# Patient Record
Sex: FEMALE | Race: WHITE | NOT HISPANIC OR LATINO | Employment: OTHER | URBAN - METROPOLITAN AREA
[De-identification: names, ages, dates, MRNs, and addresses within clinical notes are randomized per-mention and may not be internally consistent; named-entity substitution may affect disease eponyms.]

---

## 2017-02-09 RX ORDER — LANOLIN ALCOHOL/MO/W.PET/CERES
400 CREAM (GRAM) TOPICAL 3 TIMES WEEKLY
COMMUNITY
End: 2022-05-31 | Stop reason: ALTCHOICE

## 2017-02-09 RX ORDER — SERTRALINE HYDROCHLORIDE 100 MG/1
100 TABLET, FILM COATED ORAL DAILY
COMMUNITY

## 2017-02-09 RX ORDER — ISOSORBIDE MONONITRATE 60 MG/1
60 TABLET, EXTENDED RELEASE ORAL DAILY
COMMUNITY
End: 2022-05-31 | Stop reason: ALTCHOICE

## 2017-02-09 RX ORDER — ALPRAZOLAM 0.25 MG/1
0.25 TABLET ORAL
COMMUNITY
End: 2022-05-31 | Stop reason: ALTCHOICE

## 2017-02-09 RX ORDER — SPIRONOLACTONE 25 MG/1
12.5 TABLET ORAL DAILY
COMMUNITY
End: 2022-05-31 | Stop reason: ALTCHOICE

## 2017-02-09 RX ORDER — OMEPRAZOLE 20 MG/1
20 CAPSULE, DELAYED RELEASE ORAL DAILY
COMMUNITY
End: 2020-08-26

## 2017-02-09 RX ORDER — LEVOTHYROXINE SODIUM 0.07 MG/1
75 TABLET ORAL
COMMUNITY
End: 2018-07-16

## 2017-02-09 RX ORDER — MULTIVIT-MIN/IRON FUM/FOLIC AC 7.5 MG-4
1 TABLET ORAL DAILY
COMMUNITY
End: 2020-08-26

## 2017-02-09 RX ORDER — NIACIN 1000 MG/1
1000 TABLET, EXTENDED RELEASE ORAL
COMMUNITY
End: 2020-08-26

## 2017-02-09 RX ORDER — LOSARTAN POTASSIUM 100 MG/1
25 TABLET ORAL DAILY
COMMUNITY
End: 2018-07-16

## 2017-02-09 RX ORDER — FUROSEMIDE 40 MG/1
40 TABLET ORAL 2 TIMES DAILY
COMMUNITY

## 2017-02-13 ENCOUNTER — ANESTHESIA EVENT (OUTPATIENT)
Dept: GASTROENTEROLOGY | Facility: HOSPITAL | Age: 72
End: 2017-02-13
Payer: MEDICARE

## 2017-02-14 ENCOUNTER — ANESTHESIA (OUTPATIENT)
Dept: GASTROENTEROLOGY | Facility: HOSPITAL | Age: 72
End: 2017-02-14
Payer: MEDICARE

## 2017-02-14 ENCOUNTER — HOSPITAL ENCOUNTER (OUTPATIENT)
Facility: HOSPITAL | Age: 72
Setting detail: OUTPATIENT SURGERY
Discharge: HOME | End: 2017-02-14
Attending: INTERNAL MEDICINE | Admitting: INTERNAL MEDICINE
Payer: MEDICARE

## 2017-02-14 VITALS
HEART RATE: 92 BPM | RESPIRATION RATE: 18 BRPM | OXYGEN SATURATION: 95 % | DIASTOLIC BLOOD PRESSURE: 54 MMHG | WEIGHT: 185 LBS | SYSTOLIC BLOOD PRESSURE: 118 MMHG | BODY MASS INDEX: 32.78 KG/M2 | TEMPERATURE: 97.5 F | HEIGHT: 63 IN

## 2017-02-14 DIAGNOSIS — R19.7 DIARRHEA: ICD-10-CM

## 2017-02-14 DIAGNOSIS — D64.9 ANEMIA: ICD-10-CM

## 2017-02-14 DIAGNOSIS — D50.9 IRON DEFICIENCY ANEMIA: ICD-10-CM

## 2017-02-14 PROCEDURE — 88305 TISSUE EXAM BY PATHOLOGIST: CPT | Performed by: INTERNAL MEDICINE

## 2017-02-14 PROCEDURE — 88342 IMHCHEM/IMCYTCHM 1ST ANTB: CPT | Performed by: INTERNAL MEDICINE

## 2017-02-14 RX ORDER — LORAZEPAM 2 MG/ML
0.5 INJECTION INTRAMUSCULAR ONCE
Status: CANCELLED | OUTPATIENT
Start: 2017-02-14

## 2017-02-14 RX ORDER — SODIUM CHLORIDE 9 MG/ML
125 INJECTION, SOLUTION INTRAVENOUS CONTINUOUS
Status: DISCONTINUED | OUTPATIENT
Start: 2017-02-14 | End: 2017-02-14 | Stop reason: HOSPADM

## 2017-02-14 RX ORDER — LORAZEPAM 2 MG/ML
0.5 INJECTION INTRAMUSCULAR ONCE
Status: DISCONTINUED | OUTPATIENT
Start: 2017-02-14 | End: 2017-02-14 | Stop reason: HOSPADM

## 2017-02-14 RX ORDER — PROPOFOL 10 MG/ML
INJECTION, EMULSION INTRAVENOUS AS NEEDED
Status: DISCONTINUED | OUTPATIENT
Start: 2017-02-14 | End: 2017-02-14 | Stop reason: SURG

## 2017-02-14 RX ADMIN — SODIUM CHLORIDE: 0.9 INJECTION, SOLUTION INTRAVENOUS at 13:37

## 2017-02-14 RX ADMIN — PROPOFOL 50 MG: 10 INJECTION, EMULSION INTRAVENOUS at 13:46

## 2017-02-14 RX ADMIN — SODIUM CHLORIDE 125 ML/HR: 0.9 INJECTION, SOLUTION INTRAVENOUS at 12:49

## 2017-02-14 RX ADMIN — PROPOFOL 150 MG: 10 INJECTION, EMULSION INTRAVENOUS at 13:44

## 2018-04-11 ENCOUNTER — APPOINTMENT (OUTPATIENT)
Dept: RADIOLOGY | Facility: HOSPITAL | Age: 73
End: 2018-04-11
Payer: COMMERCIAL

## 2018-04-11 ENCOUNTER — HOSPITAL ENCOUNTER (OUTPATIENT)
Facility: HOSPITAL | Age: 73
Setting detail: OBSERVATION
Discharge: HOME HEALTH | End: 2018-04-13
Attending: SURGERY | Admitting: SURGERY
Payer: COMMERCIAL

## 2018-04-11 DIAGNOSIS — S22.22XD CLOSED FRACTURE OF BODY OF STERNUM WITH ROUTINE HEALING: Primary | ICD-10-CM

## 2018-04-11 LAB
ALBUMIN SERPL BCP-MCNC: 4.5 G/DL (ref 3.5–5.7)
ALP SERPL-CCNC: 116 IU/L (ref 55–165)
ALT SERPL W P-5'-P-CCNC: 41 IU/L (ref 9–28)
ANION GAP SERPL CALCULATED.3IONS-SCNC: 15.2 MM/L
ANION GAP SERPL CALCULATED.3IONS-SCNC: 9 MMOL/L (ref 4–13)
AST SERPL W P-5'-P-CCNC: 38 U/L (ref 7–26)
BASOPHILS # BLD AUTO: 0 X3/UL (ref 0–0.3)
BASOPHILS # BLD AUTO: 0.5 % (ref 0–2)
BILIRUB SERPL-MCNC: 0.4 MG/DL (ref 0.3–1)
BUN SERPL-MCNC: 44 MG/DL (ref 5–25)
BUN SERPL-MCNC: 44 MG/DL (ref 7–25)
CALCIUM SERPL-MCNC: 9.3 MG/DL
CALCIUM SERPL-MCNC: 9.9 MG/DL (ref 8.6–10.5)
CHLORIDE SERPL-SCNC: 104 MM/L (ref 98–107)
CHLORIDE SERPL-SCNC: 110 MMOL/L (ref 100–108)
CO2 SERPL-SCNC: 22 MMOL/L (ref 21–32)
CO2 SERPL-SCNC: 23 MM/L (ref 21–31)
CREAT SERPL-MCNC: 1.63 MG/DL (ref 0.6–1.3)
CREAT SERPL-MCNC: 1.73 MG/DL (ref 0.6–1.2)
DEPRECATED RDW RBC AUTO: 15.2 % (ref 11.5–14.5)
EGFR (HISTORICAL): 29 GFR
EGFR AFRICAN AMERICAN (HISTORICAL): 35 GFR
EOSINOPHIL # BLD AUTO: 0 X3/UL (ref 0–0.5)
EOSINOPHIL NFR BLD AUTO: 0.9 % (ref 0–5)
ERYTHROCYTE [DISTWIDTH] IN BLOOD BY AUTOMATED COUNT: 15.2 % (ref 11.6–15.1)
GFR SERPL CREATININE-BSD FRML MDRD: 31 ML/MIN/1.73SQ M
GLUCOSE (HISTORICAL): 165 MG/DL (ref 65–99)
GLUCOSE P FAST SERPL-MCNC: 123 MG/DL (ref 65–99)
GLUCOSE SERPL-MCNC: 123 MG/DL (ref 65–140)
HCT VFR BLD AUTO: 26.7 % (ref 34.8–46.1)
HCT VFR BLD AUTO: 29.3 % (ref 37–47)
HGB BLD-MCNC: 9 G/DL (ref 11.5–15.4)
HGB BLD-MCNC: 9.6 G/DL (ref 12–16)
LYMPHOCYTES # BLD AUTO: 0.9 X3/UL (ref 1.2–4.2)
LYMPHOCYTES NFR BLD AUTO: 18.5 % (ref 20.5–51.1)
MCH RBC QN AUTO: 33.3 PG (ref 26–34)
MCH RBC QN AUTO: 33.7 PG (ref 26.8–34.3)
MCHC RBC AUTO-ENTMCNC: 32.9 G/DL (ref 31–36)
MCHC RBC AUTO-ENTMCNC: 33.7 G/DL (ref 31.4–37.4)
MCV RBC AUTO: 100 FL (ref 82–98)
MCV RBC AUTO: 101.4 FL (ref 81–99)
MONOCYTES # BLD AUTO: 0.4 X3/UL (ref 0–1)
MONOCYTES NFR BLD AUTO: 8.6 % (ref 1.7–12)
NEUTROPHILS # BLD AUTO: 3.4 X3/UL (ref 1.4–6.5)
NEUTS SEG NFR BLD AUTO: 71.5 % (ref 42.2–75.2)
OSMOLALITY, SERUM (HISTORICAL): 291 MOSM (ref 262–291)
PLATELET # BLD AUTO: 146 THOUSANDS/UL (ref 149–390)
PLATELET # BLD AUTO: 171 X3/UL (ref 130–400)
PMV BLD AUTO: 7.8 FL (ref 8.6–11.7)
PMV BLD AUTO: 9.5 FL (ref 8.9–12.7)
POTASSIUM SERPL-SCNC: 4.2 MM/L (ref 3.5–5.5)
POTASSIUM SERPL-SCNC: 4.2 MMOL/L (ref 3.5–5.3)
RBC # BLD AUTO: 2.67 MILLION/UL (ref 3.81–5.12)
RBC # BLD AUTO: 2.89 X6/UL (ref 3.9–5.2)
SODIUM SERPL-SCNC: 138 MM/L (ref 134–143)
SODIUM SERPL-SCNC: 141 MMOL/L (ref 136–145)
TOTAL PROTEIN (HISTORICAL): 7.1 G/DL (ref 6.4–8.9)
TROPONIN I SERPL-MCNC: 0.02 NG/ML
TROPONIN I SERPL-MCNC: 0.04 NG/ML
WBC # BLD AUTO: 4.7 X3/UL (ref 4.8–10.8)
WBC # BLD AUTO: 6.13 THOUSAND/UL (ref 4.31–10.16)

## 2018-04-11 PROCEDURE — 99285 EMERGENCY DEPT VISIT HI MDM: CPT

## 2018-04-11 PROCEDURE — 71045 X-RAY EXAM CHEST 1 VIEW: CPT

## 2018-04-11 PROCEDURE — 80048 BASIC METABOLIC PNL TOTAL CA: CPT | Performed by: STUDENT IN AN ORGANIZED HEALTH CARE EDUCATION/TRAINING PROGRAM

## 2018-04-11 PROCEDURE — 85027 COMPLETE CBC AUTOMATED: CPT | Performed by: STUDENT IN AN ORGANIZED HEALTH CARE EDUCATION/TRAINING PROGRAM

## 2018-04-11 PROCEDURE — 93005 ELECTROCARDIOGRAM TRACING: CPT

## 2018-04-11 PROCEDURE — 36415 COLL VENOUS BLD VENIPUNCTURE: CPT | Performed by: STUDENT IN AN ORGANIZED HEALTH CARE EDUCATION/TRAINING PROGRAM

## 2018-04-11 PROCEDURE — 99219 PR INITIAL OBSERVATION CARE/DAY 50 MINUTES: CPT | Performed by: SURGERY

## 2018-04-11 PROCEDURE — 84484 ASSAY OF TROPONIN QUANT: CPT | Performed by: STUDENT IN AN ORGANIZED HEALTH CARE EDUCATION/TRAINING PROGRAM

## 2018-04-11 RX ORDER — LOSARTAN POTASSIUM 50 MG/1
100 TABLET ORAL DAILY
Status: DISCONTINUED | OUTPATIENT
Start: 2018-04-12 | End: 2018-04-13 | Stop reason: HOSPADM

## 2018-04-11 RX ORDER — PANTOPRAZOLE SODIUM 20 MG/1
20 TABLET, DELAYED RELEASE ORAL
Status: DISCONTINUED | OUTPATIENT
Start: 2018-04-12 | End: 2018-04-13 | Stop reason: HOSPADM

## 2018-04-11 RX ORDER — OXYCODONE HYDROCHLORIDE 5 MG/1
5 TABLET ORAL EVERY 4 HOURS PRN
Status: DISCONTINUED | OUTPATIENT
Start: 2018-04-11 | End: 2018-04-13 | Stop reason: HOSPADM

## 2018-04-11 RX ORDER — ONDANSETRON 2 MG/ML
4 INJECTION INTRAMUSCULAR; INTRAVENOUS EVERY 6 HOURS PRN
Status: DISCONTINUED | OUTPATIENT
Start: 2018-04-11 | End: 2018-04-13 | Stop reason: HOSPADM

## 2018-04-11 RX ORDER — ACETAMINOPHEN 325 MG/1
650 TABLET ORAL EVERY 6 HOURS SCHEDULED
Status: DISCONTINUED | OUTPATIENT
Start: 2018-04-12 | End: 2018-04-12

## 2018-04-11 RX ORDER — SPIRONOLACTONE 25 MG/1
25 TABLET ORAL DAILY
Status: DISCONTINUED | OUTPATIENT
Start: 2018-04-12 | End: 2018-04-12

## 2018-04-11 RX ORDER — ASPIRIN 81 MG/1
81 TABLET ORAL DAILY
COMMUNITY
End: 2021-11-16

## 2018-04-11 RX ORDER — ALPRAZOLAM 0.25 MG/1
0.25 TABLET ORAL
Status: DISCONTINUED | OUTPATIENT
Start: 2018-04-11 | End: 2018-04-13 | Stop reason: HOSPADM

## 2018-04-11 RX ORDER — LIDOCAINE 50 MG/G
1 PATCH TOPICAL DAILY
Status: DISCONTINUED | OUTPATIENT
Start: 2018-04-12 | End: 2018-04-13 | Stop reason: HOSPADM

## 2018-04-11 RX ORDER — DOCUSATE SODIUM 100 MG/1
100 CAPSULE, LIQUID FILLED ORAL 2 TIMES DAILY
Status: DISCONTINUED | OUTPATIENT
Start: 2018-04-11 | End: 2018-04-13 | Stop reason: HOSPADM

## 2018-04-11 RX ORDER — SENNOSIDES 8.6 MG
1 TABLET ORAL DAILY
Status: DISCONTINUED | OUTPATIENT
Start: 2018-04-12 | End: 2018-04-13 | Stop reason: HOSPADM

## 2018-04-11 RX ORDER — FUROSEMIDE 20 MG/1
20 TABLET ORAL DAILY
Status: DISCONTINUED | OUTPATIENT
Start: 2018-04-12 | End: 2018-04-12

## 2018-04-11 RX ORDER — METOPROLOL TARTRATE 50 MG/1
100 TABLET, FILM COATED ORAL DAILY
Status: DISCONTINUED | OUTPATIENT
Start: 2018-04-12 | End: 2018-04-12

## 2018-04-11 RX ORDER — SERTRALINE HYDROCHLORIDE 100 MG/1
100 TABLET, FILM COATED ORAL DAILY
Status: DISCONTINUED | OUTPATIENT
Start: 2018-04-12 | End: 2018-04-13 | Stop reason: HOSPADM

## 2018-04-11 RX ORDER — ISOSORBIDE MONONITRATE 60 MG/1
60 TABLET, EXTENDED RELEASE ORAL DAILY
Status: DISCONTINUED | OUTPATIENT
Start: 2018-04-12 | End: 2018-04-13 | Stop reason: HOSPADM

## 2018-04-11 RX ORDER — LEVOTHYROXINE SODIUM 0.07 MG/1
75 TABLET ORAL
Status: DISCONTINUED | OUTPATIENT
Start: 2018-04-12 | End: 2018-04-13 | Stop reason: HOSPADM

## 2018-04-11 RX ORDER — OXYCODONE HYDROCHLORIDE 5 MG/1
2.5 TABLET ORAL EVERY 4 HOURS PRN
Status: DISCONTINUED | OUTPATIENT
Start: 2018-04-11 | End: 2018-04-13 | Stop reason: HOSPADM

## 2018-04-11 RX ORDER — LANOLIN ALCOHOL/MO/W.PET/CERES
400 CREAM (GRAM) TOPICAL DAILY
Status: DISCONTINUED | OUTPATIENT
Start: 2018-04-12 | End: 2018-04-13 | Stop reason: HOSPADM

## 2018-04-12 ENCOUNTER — APPOINTMENT (OUTPATIENT)
Dept: RADIOLOGY | Facility: HOSPITAL | Age: 73
End: 2018-04-12
Payer: COMMERCIAL

## 2018-04-12 ENCOUNTER — APPOINTMENT (OUTPATIENT)
Dept: NON INVASIVE DIAGNOSTICS | Facility: HOSPITAL | Age: 73
End: 2018-04-12
Payer: COMMERCIAL

## 2018-04-12 PROBLEM — E78.5 HLD (HYPERLIPIDEMIA): Status: ACTIVE | Noted: 2018-04-12

## 2018-04-12 PROBLEM — R68.89 FORGETFULNESS: Status: ACTIVE | Noted: 2018-04-12

## 2018-04-12 PROBLEM — I50.32 CHRONIC DIASTOLIC CONGESTIVE HEART FAILURE (HCC): Status: ACTIVE | Noted: 2018-04-12

## 2018-04-12 PROBLEM — E03.9 HYPOTHYROIDISM: Status: ACTIVE | Noted: 2018-04-12

## 2018-04-12 PROBLEM — K21.9 GERD (GASTROESOPHAGEAL REFLUX DISEASE): Status: ACTIVE | Noted: 2018-04-12

## 2018-04-12 PROBLEM — I10 HTN (HYPERTENSION): Status: ACTIVE | Noted: 2018-04-12

## 2018-04-12 PROBLEM — I25.10 CAD (CORONARY ARTERY DISEASE): Status: ACTIVE | Noted: 2018-04-12

## 2018-04-12 PROBLEM — I50.9 CONGESTIVE HEART DISEASE (HCC): Status: ACTIVE | Noted: 2018-04-12

## 2018-04-12 PROBLEM — Z91.81 HX OF FALL: Status: ACTIVE | Noted: 2018-04-12

## 2018-04-12 PROBLEM — G89.11 ACUTE PAIN DUE TO TRAUMA: Status: ACTIVE | Noted: 2018-04-12

## 2018-04-12 PROBLEM — S22.22XD CLOSED FRACTURE OF BODY OF STERNUM WITH ROUTINE HEALING: Status: ACTIVE | Noted: 2018-04-12

## 2018-04-12 PROBLEM — N39.3 STRESS INCONTINENCE IN FEMALE: Status: ACTIVE | Noted: 2018-04-12

## 2018-04-12 LAB
ALBUMIN SERPL BCP-MCNC: 3.4 G/DL (ref 3.5–5)
ALP SERPL-CCNC: 111 U/L (ref 46–116)
ALT SERPL W P-5'-P-CCNC: 40 U/L (ref 12–78)
ANION GAP SERPL CALCULATED.3IONS-SCNC: 10 MMOL/L (ref 4–13)
APTT PPP: 30 SECONDS (ref 23–35)
AST SERPL W P-5'-P-CCNC: 27 U/L (ref 5–45)
ATRIAL RATE: 95 BPM
BILIRUB SERPL-MCNC: 0.38 MG/DL (ref 0.2–1)
BUN SERPL-MCNC: 41 MG/DL (ref 5–25)
CALCIUM SERPL-MCNC: 9.2 MG/DL
CHLORIDE SERPL-SCNC: 108 MMOL/L (ref 100–108)
CO2 SERPL-SCNC: 21 MMOL/L (ref 21–32)
CREAT SERPL-MCNC: 1.55 MG/DL (ref 0.6–1.3)
GFR SERPL CREATININE-BSD FRML MDRD: 33 ML/MIN/1.73SQ M
GLUCOSE SERPL-MCNC: 105 MG/DL (ref 65–140)
INR PPP: 1.24 (ref 0.86–1.16)
P AXIS: 67 DEGREES
POTASSIUM SERPL-SCNC: 3.9 MMOL/L (ref 3.5–5.3)
PR INTERVAL: 136 MS
PROT SERPL-MCNC: 6.8 G/DL (ref 6.4–8.2)
PROTHROMBIN TIME: 15.7 SECONDS (ref 12.1–14.4)
QRS AXIS: -84 DEGREES
QRSD INTERVAL: 120 MS
QT INTERVAL: 394 MS
QTC INTERVAL: 495 MS
SODIUM SERPL-SCNC: 139 MMOL/L (ref 136–145)
T WAVE AXIS: 81 DEGREES
VENTRICULAR RATE: 95 BPM

## 2018-04-12 PROCEDURE — G8987 SELF CARE CURRENT STATUS: HCPCS

## 2018-04-12 PROCEDURE — 97166 OT EVAL MOD COMPLEX 45 MIN: CPT

## 2018-04-12 PROCEDURE — 99232 SBSQ HOSP IP/OBS MODERATE 35: CPT | Performed by: PHYSICIAN ASSISTANT

## 2018-04-12 PROCEDURE — G8988 SELF CARE GOAL STATUS: HCPCS

## 2018-04-12 PROCEDURE — 85730 THROMBOPLASTIN TIME PARTIAL: CPT | Performed by: STUDENT IN AN ORGANIZED HEALTH CARE EDUCATION/TRAINING PROGRAM

## 2018-04-12 PROCEDURE — G8979 MOBILITY GOAL STATUS: HCPCS

## 2018-04-12 PROCEDURE — 80053 COMPREHEN METABOLIC PANEL: CPT | Performed by: STUDENT IN AN ORGANIZED HEALTH CARE EDUCATION/TRAINING PROGRAM

## 2018-04-12 PROCEDURE — 93306 TTE W/DOPPLER COMPLETE: CPT

## 2018-04-12 PROCEDURE — 97163 PT EVAL HIGH COMPLEX 45 MIN: CPT

## 2018-04-12 PROCEDURE — 93306 TTE W/DOPPLER COMPLETE: CPT | Performed by: INTERNAL MEDICINE

## 2018-04-12 PROCEDURE — 93010 ELECTROCARDIOGRAM REPORT: CPT | Performed by: INTERNAL MEDICINE

## 2018-04-12 PROCEDURE — G8989 SELF CARE D/C STATUS: HCPCS

## 2018-04-12 PROCEDURE — 85610 PROTHROMBIN TIME: CPT | Performed by: STUDENT IN AN ORGANIZED HEALTH CARE EDUCATION/TRAINING PROGRAM

## 2018-04-12 PROCEDURE — G8978 MOBILITY CURRENT STATUS: HCPCS

## 2018-04-12 RX ORDER — METOPROLOL TARTRATE 50 MG/1
100 TABLET, FILM COATED ORAL
Status: DISCONTINUED | OUTPATIENT
Start: 2018-04-12 | End: 2018-04-13 | Stop reason: HOSPADM

## 2018-04-12 RX ORDER — SPIRONOLACTONE 25 MG/1
25 TABLET ORAL
Status: DISCONTINUED | OUTPATIENT
Start: 2018-04-12 | End: 2018-04-13 | Stop reason: HOSPADM

## 2018-04-12 RX ORDER — SPIRONOLACTONE 25 MG/1
TABLET ORAL
Status: COMPLETED
Start: 2018-04-12 | End: 2018-04-12

## 2018-04-12 RX ORDER — ACETAMINOPHEN 325 MG/1
TABLET ORAL
Status: COMPLETED
Start: 2018-04-12 | End: 2018-04-12

## 2018-04-12 RX ORDER — ALPRAZOLAM 0.25 MG/1
TABLET ORAL
Status: COMPLETED
Start: 2018-04-12 | End: 2018-04-12

## 2018-04-12 RX ORDER — FUROSEMIDE 20 MG/1
20 TABLET ORAL
Status: DISCONTINUED | OUTPATIENT
Start: 2018-04-12 | End: 2018-04-13 | Stop reason: HOSPADM

## 2018-04-12 RX ORDER — METOPROLOL TARTRATE 50 MG/1
TABLET, FILM COATED ORAL
Status: COMPLETED
Start: 2018-04-12 | End: 2018-04-12

## 2018-04-12 RX ORDER — SERTRALINE HYDROCHLORIDE 100 MG/1
TABLET, FILM COATED ORAL
Status: COMPLETED
Start: 2018-04-12 | End: 2018-04-12

## 2018-04-12 RX ORDER — ACETAMINOPHEN 325 MG/1
975 TABLET ORAL EVERY 8 HOURS SCHEDULED
Status: DISCONTINUED | OUTPATIENT
Start: 2018-04-12 | End: 2018-04-13 | Stop reason: HOSPADM

## 2018-04-12 RX ORDER — FUROSEMIDE 20 MG/1
20 TABLET ORAL ONCE
Status: DISCONTINUED | OUTPATIENT
Start: 2018-04-12 | End: 2018-04-13 | Stop reason: HOSPADM

## 2018-04-12 RX ADMIN — LOSARTAN POTASSIUM 100 MG: 50 TABLET ORAL at 08:19

## 2018-04-12 RX ADMIN — METOPROLOL TARTRATE 100 MG: 50 TABLET ORAL at 01:07

## 2018-04-12 RX ADMIN — LEVOTHYROXINE SODIUM 75 MCG: 75 TABLET ORAL at 05:53

## 2018-04-12 RX ADMIN — FUROSEMIDE 20 MG: 20 TABLET ORAL at 16:52

## 2018-04-12 RX ADMIN — SERTRALINE HYDROCHLORIDE 100 MG: 100 TABLET ORAL at 21:13

## 2018-04-12 RX ADMIN — ACETAMINOPHEN 400 MCG: 400 TABLET ORAL at 08:20

## 2018-04-12 RX ADMIN — SENNOSIDES 8.6 MG: 8.6 TABLET, FILM COATED ORAL at 08:20

## 2018-04-12 RX ADMIN — OXYCODONE HYDROCHLORIDE 5 MG: 5 TABLET ORAL at 05:54

## 2018-04-12 RX ADMIN — OXYCODONE HYDROCHLORIDE 5 MG: 5 TABLET ORAL at 00:16

## 2018-04-12 RX ADMIN — ACETAMINOPHEN 975 MG: 325 TABLET, FILM COATED ORAL at 13:57

## 2018-04-12 RX ADMIN — DOCUSATE SODIUM 100 MG: 100 CAPSULE, LIQUID FILLED ORAL at 08:20

## 2018-04-12 RX ADMIN — ENOXAPARIN SODIUM 30 MG: 30 INJECTION SUBCUTANEOUS at 08:20

## 2018-04-12 RX ADMIN — METOPROLOL TARTRATE 100 MG: 50 TABLET ORAL at 21:12

## 2018-04-12 RX ADMIN — ACETAMINOPHEN 650 MG: 325 TABLET, FILM COATED ORAL at 05:54

## 2018-04-12 RX ADMIN — DOCUSATE SODIUM 100 MG: 100 CAPSULE, LIQUID FILLED ORAL at 17:08

## 2018-04-12 RX ADMIN — PANTOPRAZOLE SODIUM 20 MG: 20 TABLET, DELAYED RELEASE ORAL at 05:54

## 2018-04-12 RX ADMIN — ALPRAZOLAM 0.25 MG: 0.25 TABLET ORAL at 01:07

## 2018-04-12 RX ADMIN — SPIRONOLACTONE 25 MG: 25 TABLET, FILM COATED ORAL at 21:13

## 2018-04-12 RX ADMIN — Medication 1 TABLET: at 08:19

## 2018-04-12 RX ADMIN — ACETAMINOPHEN 975 MG: 325 TABLET, FILM COATED ORAL at 21:13

## 2018-04-12 RX ADMIN — ISOSORBIDE MONONITRATE 60 MG: 60 TABLET, EXTENDED RELEASE ORAL at 08:19

## 2018-04-12 RX ADMIN — FUROSEMIDE 20 MG: 20 TABLET ORAL at 08:20

## 2018-04-12 RX ADMIN — LIDOCAINE 1 PATCH: 50 PATCH TOPICAL at 08:21

## 2018-04-12 RX ADMIN — ALPRAZOLAM 0.25 MG: 0.25 TABLET ORAL at 21:12

## 2018-04-12 RX ADMIN — SPIRONOLACTONE 25 MG: 25 TABLET, FILM COATED ORAL at 01:07
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
## 2018-04-12 NOTE — SOCIAL WORK
Cm met with pt today & explained CM role  Pt lives with  in ranch home w [address]  Was [employer], retired & drives  No dme  No h/o fna or rhb  Denies any MH, D&A tx  Noted on h&p h/o anxiety & depression  PDP Dr Vincent Baptiste  Uses [facility] or [address] [place] [address]  No POA  Emergency contact,  Amparo Griffin c) [phone]  For therapy evals pending  Cm to follow for dc needs  Informed will take preferences into account if any services are needed   to transport home  CM reviewed d/c planning process including the following: identifying help at home, patient preference for d/c planning needs, Discharge Lounge, [facility] Meds to Bed program, availability of treatment team to discuss questions or concerns patient and/or family may have regarding understanding medications and recognizing signs and symptoms once discharged  CM also encouraged patient to follow up with all recommended appointments after discharge  Patient advised of importance for patient and family to participate in managing patients medical well being
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
## 2018-04-12 NOTE — PLAN OF CARE
Problem: DISCHARGE PLANNING - CARE MANAGEMENT  Goal: Discharge to post-acute care or home with appropriate resources  INTERVENTIONS:  - Conduct assessment to determine patient/family and health care team treatment goals, and need for post-acute services based on payer coverage, community resources, and patient preferences, and barriers to discharge  - Address psychosocial, clinical, and financial barriers to discharge as identified in assessment in conjunction with the patient/family and health care team  - Arrange appropriate level of post-acute services according to patient's   needs and preference and payer coverage in collaboration with the physician and health care team  - Communicate with and update the patient/family, physician, and health care team regarding progress on the discharge plan  - Arrange appropriate transportation to post-acute venues  - Follow therapy recommendations for discharge needs  Outcome: Progressing
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
## 2018-04-12 NOTE — SOCIAL WORK
CM met with the Pt at bedside to discuss D/C plan  Pt agreeable to [facility] referrals, will need commode order placed with [facility]
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
## 2018-04-13 ENCOUNTER — APPOINTMENT (OUTPATIENT)
Dept: RADIOLOGY | Facility: HOSPITAL | Age: 73
End: 2018-04-13
Payer: COMMERCIAL

## 2018-04-13 VITALS
DIASTOLIC BLOOD PRESSURE: 71 MMHG | WEIGHT: 155 LBS | RESPIRATION RATE: 18 BRPM | SYSTOLIC BLOOD PRESSURE: 139 MMHG | TEMPERATURE: 98.3 F | BODY MASS INDEX: 26.46 KG/M2 | HEIGHT: 64 IN | OXYGEN SATURATION: 97 % | HEART RATE: 84 BPM

## 2018-04-13 PROCEDURE — 99217 PR OBSERVATION CARE DISCHARGE MANAGEMENT: CPT | Performed by: SURGERY

## 2018-04-13 RX ORDER — OXYCODONE HYDROCHLORIDE 5 MG/1
5 TABLET ORAL EVERY 4 HOURS PRN
Qty: 20 TABLET | Refills: 0 | Status: SHIPPED | OUTPATIENT
Start: 2018-04-13 | End: 2018-07-16

## 2018-04-13 RX ORDER — SENNOSIDES 8.6 MG
1 TABLET ORAL DAILY
Qty: 120 EACH | Refills: 0 | Status: SHIPPED | OUTPATIENT
Start: 2018-04-14 | End: 2018-07-16

## 2018-04-13 RX ADMIN — LOSARTAN POTASSIUM 100 MG: 50 TABLET ORAL at 09:41

## 2018-04-13 RX ADMIN — Medication 1 TABLET: at 09:40

## 2018-04-13 RX ADMIN — LIDOCAINE 1 PATCH: 50 PATCH TOPICAL at 09:44

## 2018-04-13 RX ADMIN — DOCUSATE SODIUM 100 MG: 100 CAPSULE, LIQUID FILLED ORAL at 09:41

## 2018-04-13 RX ADMIN — SENNOSIDES 8.6 MG: 8.6 TABLET, FILM COATED ORAL at 09:40

## 2018-04-13 RX ADMIN — ISOSORBIDE MONONITRATE 60 MG: 60 TABLET, EXTENDED RELEASE ORAL at 09:41

## 2018-04-13 RX ADMIN — LEVOTHYROXINE SODIUM 75 MCG: 75 TABLET ORAL at 05:39

## 2018-04-13 RX ADMIN — ACETAMINOPHEN 975 MG: 325 TABLET, FILM COATED ORAL at 05:39

## 2018-04-13 RX ADMIN — ENOXAPARIN SODIUM 30 MG: 30 INJECTION SUBCUTANEOUS at 09:42

## 2018-04-13 RX ADMIN — PANTOPRAZOLE SODIUM 20 MG: 20 TABLET, DELAYED RELEASE ORAL at 05:39

## 2018-04-13 RX ADMIN — FUROSEMIDE 20 MG: 20 TABLET ORAL at 09:40

## 2018-04-13 RX ADMIN — ACETAMINOPHEN 400 MCG: 400 TABLET ORAL at 09:40

## 2018-04-13 RX ADMIN — OXYCODONE HYDROCHLORIDE 5 MG: 5 TABLET ORAL at 02:45

## 2018-04-13 NOTE — PROGRESS NOTES
Progress Note - Lawrence Chowdary 1945, 67 y o  female MRN: [id number]    Unit/Bed#: [facility] 923-01 Encounter: [id number]    Primary Care Provider: Thomasine Meckel, DO   Date and time admitted to hospital: 4/11/2018  8:07 PM        Hx of fall   Assessment & Plan    -pt/ot recommend outpatient rehab    -will get bedside commode to go home with        Jordan Duval    Seen by geriatrics  Recommend she stay active    -[facility]        CAD (coronary artery disease)   Assessment & Plan    trops wnl  Will give f/u with cardiology        GERD (gastroesophageal reflux disease)   Assessment & Plan    protonix        HLD (hyperlipidemia)   Assessment & Plan    On statin        HTN (hypertension)   Assessment & Plan    Continue home meds        Hypothyroidism   Assessment & Plan    Continue synthroid        Congestive heart disease ([address] )   Assessment & Plan    EF 28% on echo  Pt  Asking for referral to [facility]  Will give f/u with them  * Closed fracture of body of sternum with routine healing   Assessment & Plan    -incentive spirometry  Rib fracture protocol  -pt  Monitored on tele  No events   -trops trended, wnl  -echo EF 28%          Dispo:   -dc to home today  Subjective/Objective   Chief Complaint: pt   Wants to go home    Subjective/Objective: no complaints    Meds/Allergies   Prescriptions Prior to Admission   Medication Sig Dispense Refill Last Dose    aspirin (ECOTRIN LOW STRENGTH) 81 mg EC tablet Take 81 mg by mouth daily       furosemide (LASIX) 20 mg tablet Take 20 mg by mouth daily       losartan (COZAAR) 100 MG tablet Take 100 mg by mouth       metoprolol tartrate (LOPRESSOR) 100 mg tablet Take 100 mg by mouth daily   2/12/2017    spironolactone (ALDACTONE) 25 mg tablet Take 25 mg by mouth daily       ALPRAZolam (XANAX) 0 25 mg tablet Take 0 25 mg by mouth daily at bedtime as needed for anxiety   2/14/2017 at Unknown time    co-enzyme Q-10 30 MG capsule Take 100 mg by mouth daily       folic acid (FOLVITE) 953 mcg tablet Take 400 mcg by mouth daily       isosorbide mononitrate (IMDUR) 60 mg 24 hr tablet Take 60 mg by mouth daily       levothyroxine 75 mcg tablet Take 75 mcg by mouth daily in the early morning       Multiple Vitamins-Minerals (MULTIVITAMIN WITH MINERALS) tablet Take 1 tablet by mouth daily       niacin (NIASPAN) 1000 MG CR tablet Take 1,000 mg by mouth daily at bedtime       omeprazole (PriLOSEC) 20 mg delayed release capsule Take 20 mg by mouth daily       sertraline (ZOLOFT) 100 mg tablet Take 100 mg by mouth daily          Vitals: Blood pressure 119/59, pulse 77, temperature 98 8 °F (37 1 °C), temperature source Oral, resp  rate 18, height 5' 4" (1 626 m), weight 70 3 kg (155 lb), SpO2 97 %  Body mass index is 26 61 kg/m²  SpO2: SpO2: 97 %    ABG: No results found for: PHART, RHE0XUG, PO2ART, KZU9VTZ, U4OCGFIU, BEART, SOURCE      Intake/Output Summary (Last 24 hours) at 04/13/18 0850  Last data filed at 04/13/18 0501   Gross per 24 hour   Intake             1080 ml   Output                0 ml   Net             1080 ml       Invasive Devices     Peripheral Intravenous Line            Peripheral IV 04/11/18 Right Forearm 1 day                Nutrition/GI Proph/Bowel Reg: diet    Physical Exam:   GENERAL APPEARANCE: nad aaox3  HEENT: NCAT  CRISTIAN  CV: rrr no m/r/g  LUNGS: cta bl  ABD: soft nt nd bs+  EXT: no c/c/e  Intact distal pulses  NEURO: neuro intact  gcs 15  SKIN: warm, dry    Lab Results: BMP/CMP: No results found for: NA, K, CL, CO2, ANIONGAP, BUN, CREATININE, GLUCOSE, CALCIUM, AST, ALT, ALKPHOS, PROT, ALBUMIN, BILITOT, EGFR and CBC: No results found for: WBC, HGB, HCT, MCV, PLT, ADJUSTEDWBC, MCH, MCHC, RDW, MPV, NRBC  Imaging/EKG Studies:   Other Studies: echo: EF 28%  VTE Prophylaxis: lovenox
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
## 2018-04-13 NOTE — CASE MANAGEMENT
Continued Stay Review    Date: 4/13/18    Vital Signs: /59 (BP Location: Right arm)   Pulse 77   Temp 98 8 °F (37 1 °C) (Oral)   Resp 18   Ht 5' 4" (1 626 m)   Wt 70 3 kg (155 lb)   SpO2 97%   BMI 26 61 kg/m²     Medications:   Scheduled Meds:   Current Facility-Administered Medications:  acetaminophen 975 mg Oral Q8H [facility] Any Osborne PA-C   ALPRAZolam 0 25 mg Oral HS PRN Sophia Deshpande MD   docusate sodium 100 mg Oral BID Sophia Deshpande MD   enoxaparin 30 mg Subcutaneous Daily Sophia Deshpande MD   folic acid 687 mcg Oral Daily Sophia Deshpande MD   furosemide 20 mg Oral BID (diuretic) Sophia Deshpande MD   furosemide 20 mg Oral Once Sophia Deshpande MD   HYDROmorphone 0 5 mg Intravenous Q1H PRN Sophia Deshpande MD   isosorbide mononitrate 60 mg Oral Daily Sophia Deshpande MD   levothyroxine 75 mcg Oral Early Morning Sophia Deshpande MD   lidocaine 1 patch Transdermal Daily Sophia Deshpande MD   losartan 100 mg Oral Daily Sophia Deshpande MD   metoprolol tartrate 100 mg Oral HS Sophia Deshpande MD   multivitamin-minerals 1 tablet Oral Daily Sophia Deshpande MD   ondansetron 4 mg Intravenous Q6H PRN Sophia Deshpande MD   oxyCODONE 2 5 mg Oral Q4H PRN Sophia Deshpande MD   oxyCODONE 5 mg Oral Q4H PRN Sophia Deshpande MD   pantoprazole 20 mg Oral Early Morning Sophia Deshpande MD   senna 1 tablet Oral Daily Sophia Deshpande MD   sertraline 100 mg Oral Daily Sophia Deshpande MD   spironolactone 25 mg Oral HS Sophia Deshpande MD     Continuous Infusions:    PRN Meds: ALPRAZolam    HYDROmorphone    ondansetron    oxyCODONE 5 mg x 1 in last 24 hrs    Abnormal Labs/Diagnostic Results:     4/12 Xray R ankle pending      Age/Sex: 67 y o  female     Assessment/Plan:   4/13   Hx of fall   Assessment & Plan     -pt/ot recommend outpatient rehab    -will get bedside commode to go home with          Forgetfulness   Assessment & Plan     Seen by geriatrics     Recommend she stay active    -[facility]          CAD (coronary artery disease)   Assessment & Plan     trops wnl  Will give f/u with cardiology          GERD (gastroesophageal reflux disease)   Assessment & Plan     protonix          HLD (hyperlipidemia)   Assessment & Plan     On statin          HTN (hypertension)   Assessment & Plan     Continue home meds          Hypothyroidism   Assessment & Plan     Continue synthroid          Congestive heart disease (HCC)   Assessment & Plan     EF 28% on echo  Pt  Asking for referral to [facility]  Will give f/u with them             * Closed fracture of body of sternum with routine healing   Assessment & Plan     -incentive spirometry  Rib fracture protocol  -pt  Monitored on tele  No events   -trops trended, wnl  -echo EF 28%             Dispo:   -dc to home today        Discharge Plan: Home today

## 2018-04-13 NOTE — PLAN OF CARE
DISCHARGE PLANNING     Discharge to home or other facility with appropriate resources Adequate for Discharge        DISCHARGE PLANNING - CARE MANAGEMENT     Discharge to post-acute care or home with appropriate resources Adequate for Discharge        INFECTION - ADULT     Absence or prevention of progression during hospitalization Adequate for Discharge        Knowledge Deficit     Patient/family/caregiver demonstrates understanding of disease process, treatment plan, medications, and discharge instructions Adequate for Discharge        PAIN - ADULT     Verbalizes/displays adequate comfort level or baseline comfort level Adequate for Discharge        SAFETY ADULT     Patient will remain free of falls Adequate for Discharge     Maintain or return to baseline ADL function Adequate for Discharge     Maintain or return mobility status to optimal level Adequate for Discharge

## 2018-04-13 NOTE — ASSESSMENT & PLAN NOTE
-incentive spirometry  Rib fracture protocol  -pt  Monitored on tele   No events   -trops trended, wnl  -echo EF 28%

## 2018-04-13 NOTE — ASSESSMENT & PLAN NOTE
Seen by geriatrics     Recommend she stay active    -[facility]

## 2018-04-13 NOTE — DISCHARGE INSTRUCTIONS
Sternum Fracture Discharge Instructions: Your sternum fracture will take time to heal  Do not do any strenuous activity or lift any thing more than 10 pounds until you are cleared to do so by the Trauma clinic  Take your pain medication, if needed, as prescribed and make sure you continue to perform cough and deep breathing exercises and use your incentive spirometer every two hours while awake to maintain healthy lungs post injury  You should be seen in the Trauma Clinic 2-3 weeks after being discharged  Please call the Trauma Clinic sooner if you have any questions or concerns or if you experience increased work of breathing, shortness of breath, difficulty breathing, activity intolerance or chest pain  Continue to use incentive spirometer to encourage deep breathing  Follow up with trauma, cardiology, your primary doctor  Heart Failure   WHAT YOU NEED TO KNOW:   Heart failure (HF) is a condition that does not allow your heart to fill or pump properly  Not enough oxygen in your blood gets to your organs and tissues  HF can occur in the right side, the left side, or both lower chambers of your heart  HF is often caused by damage or injury to your heart  The damage may be caused by heart attack, other heart conditions, or high blood pressure  HF is a long-term condition that tends to get worse over time  It is important to manage your health to improve your quality of life  HF can be worsened by heavy alcohol use, smoking, diabetes that is not controlled, or obesity          DISCHARGE INSTRUCTIONS:   Call 911 if:   · You have any of the following signs of a heart attack:      ¨ Squeezing, pressure, or pain in your chest that lasts longer than 5 minutes or returns    ¨ Discomfort or pain in your back, neck, jaw, stomach, or arm     ¨ Trouble breathing    ¨ Nausea or vomiting    ¨ Lightheadedness or a sudden cold sweat, especially with chest pain or trouble breathing    Seek care immediately if:   · You gain 3 or more pounds (1 4 kg) in a day, or more than your healthcare provider says you should  · Your heartbeat is fast, slow, or uneven all the time  Contact your healthcare provider if:   · You have symptoms of worsening HF:      ¨ Shortness of breath at rest, at night, or that is getting worse in any way     ¨ Weight gain of 5 or more pounds (2 2 kg) in a week     ¨ More swelling in your legs or ankles     ¨ Abdominal pain or swelling     ¨ More coughing     ¨ Loss of appetite     ¨ Feeling tired all the time    · You feel hopeless or depressed, or you have lost interest in things you used to enjoy  · You often feel worried or afraid  · You have questions or concerns about your condition or care  Medicines: You may  need any of the following:  · Medicines  may be given to help regulate your heart rhythm  You may also need medicines to lower your blood pressure, and to get rid of extra fluids  · Take your medicine as directed  Contact your healthcare provider if you think your medicine is not helping or if you have side effects  Tell him or her if you are allergic to any medicine  Keep a list of the medicines, vitamins, and herbs you take  Include the amounts, and when and why you take them  Bring the list or the pill bottles to follow-up visits  Carry your medicine list with you in case of an emergency  Follow up with your healthcare provider or cardiologist as directed: You may need to return for other tests  You may need home health care  A healthcare provider will monitor your vital signs, weight, and make sure your medicines are working  Write down your questions so you remember to ask them during your visits  Go to cardiac rehab as directed:  Cardiac rehab is a program run by specialists who will help you safely strengthen your heart  The program includes exercise, relaxation, stress management, and heart-healthy nutrition   Healthcare providers will also make sure your medicines are helping to reduce your symptoms  Manage your HF:  · Do not smoke  Nicotine and other chemicals in cigarettes and cigars can cause lung damage and make HF difficult to manage  Ask your healthcare provider for information if you currently smoke and need help to quit  E-cigarettes or smokeless tobacco still contain nicotine  Talk to your healthcare provider before you use these products  · Do not drink alcohol or take illegal drugs  Alcohol and drugs can worsen your symptoms quickly  · Weigh yourself every morning  Use the same scale, in the same spot  Do this after you use the bathroom, but before you eat or drink anything  Wear the same type of clothing  Do not wear shoes  Record your weight each day so you will notice any sudden weight gain  Swelling and weight gain are signs of fluid retention  If you are overweight, ask how to lose weight safely  · Check your blood pressure and heart rate every day  Ask for more information about how to measure your blood pressure and heart rate correctly  Ask what these numbers should be for you  · Manage any chronic health conditions you have  These include high blood pressure, diabetes, obesity, high cholesterol, metabolic syndrome, and COPD  You will have fewer symptoms if you manage these health conditions  Follow your healthcare provider's recommendations and follow up with him or her regularly  · Eat heart-healthy foods and limit sodium (salt)  An easy way to do this is to eat more fresh fruits and vegetables and fewer canned and processed foods  Replace butter and margarine with heart-healthy oils such as olive oil and canola oil  Other heart-healthy foods include walnuts, whole-grain breads, low-fat dairy products, beans, and lean meats  Fatty fish such as salmon and tuna are also heart healthy  Ask how much salt you can eat each day  Do not use salt substitutes  · Drink liquids as directed    You may need to limit the amount of liquids you drink if you retain fluid  Ask how much liquid to drink each day and which liquids are best for you  · Stay active  If you are not active, your symptoms are likely to worsen quickly  Walking, bicycling, and other types of physical activity help maintain your strength and improve your mood  Physical activity also helps you manage your weight  Work with your healthcare provider to create an exercise plan that is right for you  · Get vaccines as directed  Get a flu shot every year  You may also need the pneumonia vaccine  The flu and pneumonia can be severe for a person who has HF  Vaccines protect you from these infections  Join a support group:  Living with HF can be difficult  It may be helpful to talk with others who have HF  You may learn how to better manage your condition or get emotional support  For more information:   · [address]  [place] , [place]   Phone: 1- 800 - 057-7940  Web Address: https://www strong com/  org   © 2017 [address] Information is for End User's use only and may not be sold, redistributed or otherwise used for commercial purposes  All illustrations and images included in CareNotes® are the copyrighted property of [employer]  or [employer]  The above information is an  only  It is not intended as medical advice for individual conditions or treatments  Talk to your doctor, nurse or pharmacist before following any medical regimen to see if it is safe and effective for you

## 2018-05-01 ENCOUNTER — OFFICE VISIT (OUTPATIENT)
Dept: SURGERY | Facility: CLINIC | Age: 73
End: 2018-05-01
Payer: COMMERCIAL

## 2018-05-01 VITALS
BODY MASS INDEX: 26.36 KG/M2 | DIASTOLIC BLOOD PRESSURE: 80 MMHG | TEMPERATURE: 97 F | HEIGHT: 64 IN | WEIGHT: 154.4 LBS | SYSTOLIC BLOOD PRESSURE: 158 MMHG

## 2018-05-01 DIAGNOSIS — S22.22XD CLOSED FRACTURE OF BODY OF STERNUM WITH ROUTINE HEALING: Primary | ICD-10-CM

## 2018-05-01 PROCEDURE — 99213 OFFICE O/P EST LOW 20 MIN: CPT | Performed by: SURGERY

## 2018-05-01 RX ORDER — OMEGA-3-ACID ETHYL ESTERS 1 G/1
2 CAPSULE, LIQUID FILLED ORAL DAILY
COMMUNITY
End: 2020-08-26
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
## 2018-07-16 RX ORDER — LOSARTAN POTASSIUM 25 MG/1
25 TABLET ORAL
COMMUNITY

## 2018-07-16 RX ORDER — LEVOTHYROXINE SODIUM 0.15 MG/1
150 TABLET ORAL DAILY
COMMUNITY

## 2018-07-16 RX ORDER — METOPROLOL TARTRATE 100 MG/1
150 TABLET ORAL
Status: ON HOLD | COMMUNITY
End: 2018-07-19 | Stop reason: SDUPTHER

## 2018-07-16 NOTE — PRE-PROCEDURE INSTRUCTIONS
Pre-Surgery Instructions:   Medication Instructions    levothyroxine 150 mcg tablet Instructed patient per Anesthesia Guidelines   losartan (COZAAR) 25 mg tablet Instructed patient per Anesthesia Guidelines   metoprolol tartrate (LOPRESSOR) 100 mg tablet Instructed patient per Anesthesia Guidelines

## 2018-07-17 ENCOUNTER — ANESTHESIA EVENT (OUTPATIENT)
Dept: PERIOP | Facility: HOSPITAL | Age: 73
End: 2018-07-17
Payer: MEDICARE

## 2018-07-17 NOTE — ANESTHESIA PREPROCEDURE EVALUATION
Review of Systems/Medical History  Patient summary reviewed  Chart reviewed  No history of anesthetic complications     Cardiovascular  Exercise tolerance (METS): >4,  Pacemaker/AICD (checked 2 wks ago  never fired  ), Hyperlipidemia, Hypertension controlled, CAD , CAD status: 3VD, History of CABG, Cardiac stents (angioplasty) > 1 year Angina with exertion, CHF , NYHA Classification: II compensated CHF,    Pulmonary  Smoker ex-smoker  Cumulative Pack Years: 27, Pneumonia, COPD ,        GI/Hepatic    GERD ,        Chronic kidney disease stage 2,        Endo/Other  History of thyroid disease , hypothyroidism,      GYN    Hysterectomy,   Comment: Uterine CA and B/L mastectomies  Limb alert left arm     Hematology  Anemia ,     Musculoskeletal    Arthritis     Neurology    Headaches (asymptomatic),   Comment: Increasing frailty with h/o falls  Psychology   Depression , being treated for depression,              Physical Exam    Airway    Mallampati score: II         Dental   No notable dental hx     Cardiovascular  Cardiovascular exam normal    Pulmonary  Pulmonary exam normal     Other Findings        Anesthesia Plan  ASA Score- 4     Anesthesia Type- IV sedation with anesthesia with ASA Monitors  Additional Monitors:   Airway Plan:         Plan Factors-Patient not instructed to abstain from smoking on day of procedure       Induction- intravenous  Postoperative Plan- Plan for postoperative opioid use  Informed Consent- Anesthetic plan and risks discussed with patient and spouse

## 2018-07-19 ENCOUNTER — ANESTHESIA (OUTPATIENT)
Dept: PERIOP | Facility: HOSPITAL | Age: 73
End: 2018-07-19
Payer: MEDICARE

## 2018-07-19 ENCOUNTER — HOSPITAL ENCOUNTER (OUTPATIENT)
Facility: HOSPITAL | Age: 73
Setting detail: OUTPATIENT SURGERY
Discharge: HOME | End: 2018-07-19
Attending: PLASTIC SURGERY | Admitting: PLASTIC SURGERY
Payer: MEDICARE

## 2018-07-19 VITALS
WEIGHT: 154 LBS | OXYGEN SATURATION: 99 % | HEART RATE: 77 BPM | TEMPERATURE: 98 F | SYSTOLIC BLOOD PRESSURE: 147 MMHG | DIASTOLIC BLOOD PRESSURE: 63 MMHG | HEIGHT: 64 IN | RESPIRATION RATE: 18 BRPM | BODY MASS INDEX: 26.29 KG/M2

## 2018-07-19 DIAGNOSIS — C44.321 SQUAMOUS CELL CARCINOMA OF SKIN OF NOSE: ICD-10-CM

## 2018-07-19 PROCEDURE — 88305 TISSUE EXAM BY PATHOLOGIST: CPT | Performed by: PATHOLOGY

## 2018-07-19 RX ORDER — DEXAMETHASONE SODIUM PHOSPHATE 4 MG/ML
4 INJECTION, SOLUTION INTRA-ARTICULAR; INTRALESIONAL; INTRAMUSCULAR; INTRAVENOUS; SOFT TISSUE ONCE AS NEEDED
Status: DISCONTINUED | OUTPATIENT
Start: 2018-07-19 | End: 2018-07-19 | Stop reason: HOSPADM

## 2018-07-19 RX ORDER — FENTANYL CITRATE/PF 50 MCG/ML
25 SYRINGE (ML) INJECTION
Status: DISCONTINUED | OUTPATIENT
Start: 2018-07-19 | End: 2018-07-19 | Stop reason: HOSPADM

## 2018-07-19 RX ORDER — FENTANYL CITRATE 50 UG/ML
INJECTION, SOLUTION INTRAMUSCULAR; INTRAVENOUS AS NEEDED
Status: DISCONTINUED | OUTPATIENT
Start: 2018-07-19 | End: 2018-07-19 | Stop reason: SURG

## 2018-07-19 RX ORDER — DIPHENHYDRAMINE HYDROCHLORIDE 50 MG/ML
12.5 INJECTION INTRAMUSCULAR; INTRAVENOUS ONCE AS NEEDED
Status: DISCONTINUED | OUTPATIENT
Start: 2018-07-19 | End: 2018-07-19 | Stop reason: HOSPADM

## 2018-07-19 RX ORDER — PROPOFOL 10 MG/ML
INJECTION, EMULSION INTRAVENOUS CONTINUOUS PRN
Status: DISCONTINUED | OUTPATIENT
Start: 2018-07-19 | End: 2018-07-19 | Stop reason: SURG

## 2018-07-19 RX ORDER — MAGNESIUM HYDROXIDE 1200 MG/15ML
LIQUID ORAL AS NEEDED
Status: DISCONTINUED | OUTPATIENT
Start: 2018-07-19 | End: 2018-07-19 | Stop reason: HOSPADM

## 2018-07-19 RX ORDER — FENTANYL CITRATE/PF 50 MCG/ML
12.5 SYRINGE (ML) INJECTION
Status: DISCONTINUED | OUTPATIENT
Start: 2018-07-19 | End: 2018-07-19 | Stop reason: HOSPADM

## 2018-07-19 RX ORDER — ONDANSETRON 4 MG/1
4 TABLET, ORALLY DISINTEGRATING ORAL EVERY 6 HOURS PRN
Status: DISCONTINUED | OUTPATIENT
Start: 2018-07-19 | End: 2018-07-19 | Stop reason: HOSPADM

## 2018-07-19 RX ORDER — MIDAZOLAM HYDROCHLORIDE 1 MG/ML
INJECTION INTRAMUSCULAR; INTRAVENOUS AS NEEDED
Status: DISCONTINUED | OUTPATIENT
Start: 2018-07-19 | End: 2018-07-19 | Stop reason: SURG

## 2018-07-19 RX ORDER — SODIUM CHLORIDE 9 MG/ML
75 INJECTION, SOLUTION INTRAVENOUS CONTINUOUS
Status: DISCONTINUED | OUTPATIENT
Start: 2018-07-19 | End: 2018-07-19 | Stop reason: HOSPADM

## 2018-07-19 RX ORDER — LIDOCAINE HYDROCHLORIDE AND EPINEPHRINE 5; 5 MG/ML; UG/ML
INJECTION, SOLUTION INFILTRATION; PERINEURAL AS NEEDED
Status: DISCONTINUED | OUTPATIENT
Start: 2018-07-19 | End: 2018-07-19 | Stop reason: HOSPADM

## 2018-07-19 RX ADMIN — PROPOFOL 75 MCG/KG/MIN: 10 INJECTION, EMULSION INTRAVENOUS at 14:10

## 2018-07-19 RX ADMIN — SODIUM CHLORIDE 75 ML/HR: 9 INJECTION, SOLUTION INTRAVENOUS at 13:31

## 2018-07-19 RX ADMIN — FENTANYL CITRATE 50 MCG: 50 INJECTION INTRAMUSCULAR; INTRAVENOUS at 14:10

## 2018-07-19 RX ADMIN — MIDAZOLAM HYDROCHLORIDE 1 MG: 1 INJECTION, SOLUTION INTRAMUSCULAR; INTRAVENOUS at 14:05

## 2018-07-19 RX ADMIN — FENTANYL CITRATE 50 MCG: 50 INJECTION INTRAMUSCULAR; INTRAVENOUS at 14:05

## 2018-07-19 RX ADMIN — MIDAZOLAM HYDROCHLORIDE 1 MG: 1 INJECTION, SOLUTION INTRAMUSCULAR; INTRAVENOUS at 14:10

## 2018-07-19 RX ADMIN — SODIUM CHLORIDE: 9 INJECTION, SOLUTION INTRAVENOUS at 14:10

## 2018-07-19 NOTE — DISCHARGE INSTRUCTIONS
Skin Grafting   WHAT YOU NEED TO KNOW:   Skin grafting is surgery to cover and repair wounds with a skin graft  A skin graft is a portion of healthy skin that is taken from another area of your body called the donor site  Substitute skin grafts may also be used  These grafts may be artificial or they may come from another person or animal, such as a pig  Substitute skin grafts may be used only as temporary covers when large areas of the skin are damaged  They are replaced with your own skin over time  DISCHARGE INSTRUCTIONS:   Medicines:   · Pain:  You may be given medicine to take away or decrease pain  Do not wait until the pain is severe before you take your medicine  · Antibiotics: This medicine is given to fight or prevent an infection caused by bacteria  Always take your antibiotics exactly as ordered by your healthcare provider  Do not stop taking your medicine unless directed by your healthcare provider  Never save antibiotics or take leftover antibiotics that were given to you for another illness  · Anti-itching medicine: Caregivers may give you medicine to help keep your skin from itching  This medicine may be given in an IV, as a shot, by mouth, or as a skin lotion  Sometimes this medicine can make you sleepy  · Take your medicine as directed  Contact your healthcare provider if you think your medicine is not helping or if you have side effects  Tell him or her if you are allergic to any medicine  Keep a list of the medicines, vitamins, and herbs you take  Include the amounts, and when and why you take them  Bring the list or the pill bottles to follow-up visits  Carry your medicine list with you in case of an emergency  Follow up with your healthcare provider as directed: You may need to return to have your wound checked or stitches removed  Write down your questions so you remember to ask them during your visits  Rest when you need to while you heal after surgery    Slowly start to do more each day  Return to your daily activities as directed  Wound care:   · Keep your wounds clean and dry:  When you are allowed to bathe, carefully wash the graft and donor sites with soap and water  Dry the area and put on clean, new bandages as directed  Change your bandages every time they get wet or dirty  · Limit movements, such as stretching: This will help prevent bleeding, shearing, and swelling in the wound and graft sites  · Protect the graft site from direct sunlight for at least 6 months: This will help prevent scarring and skin color changes  Contact your healthcare provider if:   · You have a fever  · You have nausea or vomiting  · Your skin is itchy, swollen, or has a rash  · You have questions or concerns about your condition or care  Seek care immediately or call 911 if:   · Blood soaks through your bandage  · You feel something is bulging out from your graft site and not going back in     · Your graft or donor site has blood, pus, or a foul-smelling odor  · You have more pain in the graft area  · You have sudden trouble breathing  © 2017 [facility] Information is for End User's use only and may not be sold, redistributed or otherwise used for commercial purposes  All illustrations and images included in CareNotes® are the copyrighted property of A D A M , Inc  or [employer]  The above information is an  only  It is not intended as medical advice for individual conditions or treatments  Talk to your doctor, nurse or pharmacist before following any medical regimen to see if it is safe and effective for you

## 2018-07-19 NOTE — OP NOTE
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 [C44 321]    Operative  Procedure(s) (LRB):  REMOVE NASAL LESION, FROZEN SECTION (N/A)  full thickness skin graft taken from right neck (N/A)    Specimen(s):  ID Type Source Tests Collected by Time Destination   1 : right nose lesion, suture at 6 o'clock Tissue Nose TISSUE EXAM Radha Sanders MD 2018 1434      Operative history:  Patient by biopsy was found to have squamous cell carcinoma in 3 areas on the right side of her nose extending to the nostril rim  This suspicious area Was about 23 x 33 mm in size it was removed taking 5 mm margins  A suture was placed at the 6 o'clock margin with frozen section examination showing that all tumor was removed  Perichondrium remained intact so this lady with multiple comorbidities and agreed in this situation to have a full-thickness skin graft taken from her right neck for coverage  Operative Procedure: The patient was taken to the operating room and placed supine on the operating table  She was prepped and draped in the usual fashion  Anesthesia was general supplemented with xylocaine 1% with epinephrine  Wide excision of the right ala of the nose lesion was performed as described  This was marked and sent for frozen section examination  Hemostasis in the defect was achieved with the bipolar  When finding all tumor was removed, a full-thickness skin graft was harvested from the right right neck of size equal to the defect on the nose  The graft was de-fatted  It was cut to fit and inset on the right side of the nose with a 4-0 silk tie-over bolster  Hemostasis in the donor area of the right neck was achieved with the bipolar    The area was closed with 3-0 Monocryl subcutaneous sutures, followed by a 3-0 Monocryl subcuticular skin closure  Super glue was applied  The patient tolerated the procedure well and was taken to the recovery area in good condition        SIGNATURE: Vika Villagran MD  DATE: July 19, 2018  TIME: 3:54 PM

## 2018-07-19 NOTE — ADDENDUM NOTE
Addendum  created 07/19/18 1600 by Navi Betancur,     Order list changed, Order sets accessed, Sign clinical note

## 2018-07-19 NOTE — ANESTHESIA POSTPROCEDURE EVALUATION
Post-Op Assessment Note      CV Status:  Stable    Mental Status:  Alert and awake    Hydration Status:  Euvolemic    PONV Controlled:  Controlled    Airway Patency:  Patent    Post Op Vitals Reviewed: Yes          Staff: AnesthesiologistJERMAINE           BP (P) 131/63 (07/19/18 1553)    Temp (!) (P) 97 1 °F (36 2 °C) (07/19/18 1553)    Pulse (P) 71 (07/19/18 1553)   Resp (P) 16 (07/19/18 1553)    SpO2 (P) 93 % (07/19/18 1553)

## 2019-09-17 PROCEDURE — 88305 TISSUE EXAM BY PATHOLOGIST: CPT | Performed by: PATHOLOGY

## 2019-09-18 ENCOUNTER — LAB REQUISITION (OUTPATIENT)
Dept: LAB | Facility: HOSPITAL | Age: 74
End: 2019-09-18
Payer: COMMERCIAL

## 2019-09-18 DIAGNOSIS — D49.2 NEOPLASM OF UNSPECIFIED BEHAVIOR OF BONE, SOFT TISSUE, AND SKIN: ICD-10-CM

## 2020-01-28 ENCOUNTER — TRANSCRIBE ORDERS (OUTPATIENT)
Dept: LAB | Facility: CLINIC | Age: 75
End: 2020-01-28

## 2020-01-28 ENCOUNTER — APPOINTMENT (OUTPATIENT)
Dept: RADIOLOGY | Facility: CLINIC | Age: 75
End: 2020-01-28
Payer: COMMERCIAL

## 2020-01-28 DIAGNOSIS — M54.9 DORSALGIA: ICD-10-CM

## 2020-01-28 DIAGNOSIS — M54.9 DORSALGIA: Primary | ICD-10-CM

## 2020-01-28 PROCEDURE — 72040 X-RAY EXAM NECK SPINE 2-3 VW: CPT

## 2020-01-28 PROCEDURE — 72080 X-RAY EXAM THORACOLMB 2/> VW: CPT

## 2020-05-31 ENCOUNTER — APPOINTMENT (EMERGENCY)
Dept: RADIOLOGY | Facility: HOSPITAL | Age: 75
End: 2020-05-31
Payer: COMMERCIAL

## 2020-05-31 ENCOUNTER — APPOINTMENT (EMERGENCY)
Dept: CT IMAGING | Facility: HOSPITAL | Age: 75
End: 2020-05-31
Payer: COMMERCIAL

## 2020-05-31 ENCOUNTER — HOSPITAL ENCOUNTER (EMERGENCY)
Facility: HOSPITAL | Age: 75
Discharge: HOME | End: 2020-05-31
Attending: EMERGENCY MEDICINE | Admitting: EMERGENCY MEDICINE
Payer: COMMERCIAL

## 2020-05-31 VITALS
SYSTOLIC BLOOD PRESSURE: 184 MMHG | WEIGHT: 154 LBS | BODY MASS INDEX: 26.29 KG/M2 | HEART RATE: 67 BPM | TEMPERATURE: 96.8 F | HEIGHT: 64 IN | OXYGEN SATURATION: 99 % | DIASTOLIC BLOOD PRESSURE: 77 MMHG | RESPIRATION RATE: 16 BRPM

## 2020-05-31 DIAGNOSIS — S42.309A HUMERUS FRACTURE: Primary | ICD-10-CM

## 2020-05-31 PROCEDURE — 73030 X-RAY EXAM OF SHOULDER: CPT

## 2020-05-31 PROCEDURE — 72125 CT NECK SPINE W/O DYE: CPT

## 2020-05-31 PROCEDURE — 70450 CT HEAD/BRAIN W/O DYE: CPT

## 2020-05-31 PROCEDURE — 99284 EMERGENCY DEPT VISIT MOD MDM: CPT | Performed by: PHYSICIAN ASSISTANT

## 2020-05-31 PROCEDURE — 99284 EMERGENCY DEPT VISIT MOD MDM: CPT

## 2020-05-31 RX ORDER — OXYCODONE HYDROCHLORIDE AND ACETAMINOPHEN 5; 325 MG/1; MG/1
1 TABLET ORAL ONCE
Status: COMPLETED | OUTPATIENT
Start: 2020-05-31 | End: 2020-05-31

## 2020-05-31 RX ORDER — OXYCODONE HYDROCHLORIDE AND ACETAMINOPHEN 5; 325 MG/1; MG/1
1 TABLET ORAL EVERY 8 HOURS PRN
Qty: 6 TABLET | Refills: 0 | Status: SHIPPED | OUTPATIENT
Start: 2020-05-31 | End: 2020-06-02

## 2020-05-31 RX ADMIN — OXYCODONE HYDROCHLORIDE AND ACETAMINOPHEN 1 TABLET: 5; 325 TABLET ORAL at 15:43

## 2020-06-03 ENCOUNTER — OFFICE VISIT (OUTPATIENT)
Dept: OBGYN CLINIC | Facility: CLINIC | Age: 75
End: 2020-06-03
Payer: COMMERCIAL

## 2020-06-03 VITALS — BODY MASS INDEX: 26.43 KG/M2 | WEIGHT: 154 LBS

## 2020-06-03 DIAGNOSIS — S42.294A OTHER CLOSED NONDISPLACED FRACTURE OF PROXIMAL END OF RIGHT HUMERUS, INITIAL ENCOUNTER: Primary | ICD-10-CM

## 2020-06-03 PROBLEM — S42.201A CLOSED FRACTURE OF RIGHT PROXIMAL HUMERUS: Status: ACTIVE | Noted: 2020-06-03

## 2020-06-03 PROCEDURE — 23600 CLTX PROX HUMRL FX W/O MNPJ: CPT | Performed by: ORTHOPAEDIC SURGERY

## 2020-06-03 PROCEDURE — 99203 OFFICE O/P NEW LOW 30 MIN: CPT | Performed by: ORTHOPAEDIC SURGERY

## 2020-06-09 ENCOUNTER — APPOINTMENT (OUTPATIENT)
Dept: LAB | Facility: CLINIC | Age: 75
End: 2020-06-09
Payer: COMMERCIAL

## 2020-06-09 ENCOUNTER — TRANSCRIBE ORDERS (OUTPATIENT)
Dept: LAB | Facility: CLINIC | Age: 75
End: 2020-06-09

## 2020-06-09 DIAGNOSIS — E78.5 HYPERLIPIDEMIA, UNSPECIFIED HYPERLIPIDEMIA TYPE: ICD-10-CM

## 2020-06-09 DIAGNOSIS — E03.9 HYPOTHYROIDISM, UNSPECIFIED TYPE: ICD-10-CM

## 2020-06-09 DIAGNOSIS — I25.5 ISCHEMIC CARDIOMYOPATHY: ICD-10-CM

## 2020-06-09 DIAGNOSIS — E78.5 HYPERLIPIDEMIA, UNSPECIFIED HYPERLIPIDEMIA TYPE: Primary | ICD-10-CM

## 2020-06-09 DIAGNOSIS — N18.30 CHRONIC KIDNEY DISEASE, STAGE III (MODERATE) (HCC): ICD-10-CM

## 2020-06-09 LAB
ALBUMIN SERPL BCP-MCNC: 3 G/DL (ref 3.5–5)
ALP SERPL-CCNC: 143 U/L (ref 46–116)
ALT SERPL W P-5'-P-CCNC: 14 U/L (ref 12–78)
ANION GAP SERPL CALCULATED.3IONS-SCNC: 7 MMOL/L (ref 4–13)
AST SERPL W P-5'-P-CCNC: 17 U/L (ref 5–45)
BILIRUB SERPL-MCNC: 0.39 MG/DL (ref 0.2–1)
BUN SERPL-MCNC: 62 MG/DL (ref 5–25)
CALCIUM SERPL-MCNC: 9.5 MG/DL (ref 8.3–10.1)
CHLORIDE SERPL-SCNC: 107 MMOL/L (ref 100–108)
CHOLEST SERPL-MCNC: 286 MG/DL (ref 50–200)
CO2 SERPL-SCNC: 24 MMOL/L (ref 21–32)
CREAT SERPL-MCNC: 1.91 MG/DL (ref 0.6–1.3)
ERYTHROCYTE [DISTWIDTH] IN BLOOD BY AUTOMATED COUNT: 13.6 % (ref 11.6–15.1)
GFR SERPL CREATININE-BSD FRML MDRD: 25 ML/MIN/1.73SQ M
GLUCOSE P FAST SERPL-MCNC: 94 MG/DL (ref 65–99)
HCT VFR BLD AUTO: 28.5 % (ref 34.8–46.1)
HDLC SERPL-MCNC: 33 MG/DL
HGB BLD-MCNC: 9.3 G/DL (ref 11.5–15.4)
LDLC SERPL CALC-MCNC: 179 MG/DL (ref 0–100)
MCH RBC QN AUTO: 30.7 PG (ref 26.8–34.3)
MCHC RBC AUTO-ENTMCNC: 32.6 G/DL (ref 31.4–37.4)
MCV RBC AUTO: 94 FL (ref 82–98)
NONHDLC SERPL-MCNC: 253 MG/DL
PLATELET # BLD AUTO: 271 THOUSANDS/UL (ref 149–390)
PMV BLD AUTO: 11.1 FL (ref 8.9–12.7)
POTASSIUM SERPL-SCNC: 4.3 MMOL/L (ref 3.5–5.3)
PROT SERPL-MCNC: 7.1 G/DL (ref 6.4–8.2)
RBC # BLD AUTO: 3.03 MILLION/UL (ref 3.81–5.12)
SODIUM SERPL-SCNC: 138 MMOL/L (ref 136–145)
TRIGL SERPL-MCNC: 371 MG/DL
TSH SERPL DL<=0.05 MIU/L-ACNC: 0.83 UIU/ML (ref 0.36–3.74)
WBC # BLD AUTO: 8.14 THOUSAND/UL (ref 4.31–10.16)

## 2020-06-09 PROCEDURE — 36415 COLL VENOUS BLD VENIPUNCTURE: CPT

## 2020-06-09 PROCEDURE — 85027 COMPLETE CBC AUTOMATED: CPT

## 2020-06-09 PROCEDURE — 80061 LIPID PANEL: CPT

## 2020-06-09 PROCEDURE — 80053 COMPREHEN METABOLIC PANEL: CPT

## 2020-06-09 PROCEDURE — 84443 ASSAY THYROID STIM HORMONE: CPT

## 2020-06-11 ENCOUNTER — OFFICE VISIT (OUTPATIENT)
Dept: OBGYN CLINIC | Facility: CLINIC | Age: 75
End: 2020-06-11

## 2020-06-11 ENCOUNTER — APPOINTMENT (OUTPATIENT)
Dept: RADIOLOGY | Facility: CLINIC | Age: 75
End: 2020-06-11
Payer: COMMERCIAL

## 2020-06-11 VITALS — TEMPERATURE: 97.9 F | HEIGHT: 64 IN | BODY MASS INDEX: 26.43 KG/M2

## 2020-06-11 DIAGNOSIS — S42.294A OTHER CLOSED NONDISPLACED FRACTURE OF PROXIMAL END OF RIGHT HUMERUS, INITIAL ENCOUNTER: ICD-10-CM

## 2020-06-11 DIAGNOSIS — S42.294A OTHER CLOSED NONDISPLACED FRACTURE OF PROXIMAL END OF RIGHT HUMERUS, INITIAL ENCOUNTER: Primary | ICD-10-CM

## 2020-06-11 PROCEDURE — 99024 POSTOP FOLLOW-UP VISIT: CPT | Performed by: ORTHOPAEDIC SURGERY

## 2020-06-11 PROCEDURE — 73030 X-RAY EXAM OF SHOULDER: CPT

## 2020-06-23 ENCOUNTER — APPOINTMENT (OUTPATIENT)
Dept: RADIOLOGY | Facility: CLINIC | Age: 75
End: 2020-06-23
Payer: COMMERCIAL

## 2020-06-23 ENCOUNTER — OFFICE VISIT (OUTPATIENT)
Dept: OBGYN CLINIC | Facility: CLINIC | Age: 75
End: 2020-06-23

## 2020-06-23 VITALS — BODY MASS INDEX: 26.43 KG/M2 | WEIGHT: 154 LBS

## 2020-06-23 DIAGNOSIS — S42.294D OTHER CLOSED NONDISPLACED FRACTURE OF PROXIMAL END OF RIGHT HUMERUS WITH ROUTINE HEALING, SUBSEQUENT ENCOUNTER: Primary | ICD-10-CM

## 2020-06-23 DIAGNOSIS — S42.294D OTHER CLOSED NONDISPLACED FRACTURE OF PROXIMAL END OF RIGHT HUMERUS WITH ROUTINE HEALING, SUBSEQUENT ENCOUNTER: ICD-10-CM

## 2020-06-23 PROCEDURE — 73030 X-RAY EXAM OF SHOULDER: CPT

## 2020-06-23 PROCEDURE — 99024 POSTOP FOLLOW-UP VISIT: CPT | Performed by: ORTHOPAEDIC SURGERY

## 2020-07-13 ENCOUNTER — APPOINTMENT (OUTPATIENT)
Dept: LAB | Facility: CLINIC | Age: 75
End: 2020-07-13
Payer: COMMERCIAL

## 2020-07-13 ENCOUNTER — TRANSCRIBE ORDERS (OUTPATIENT)
Dept: LAB | Facility: CLINIC | Age: 75
End: 2020-07-13

## 2020-07-13 DIAGNOSIS — I50.22 CHRONIC SYSTOLIC HEART FAILURE (HCC): ICD-10-CM

## 2020-07-13 DIAGNOSIS — I50.22 CHRONIC SYSTOLIC HEART FAILURE (HCC): Primary | ICD-10-CM

## 2020-07-13 DIAGNOSIS — I10 HYPERTENSION, UNSPECIFIED TYPE: ICD-10-CM

## 2020-07-13 DIAGNOSIS — N18.4 CHRONIC KIDNEY DISEASE, STAGE IV (SEVERE) (HCC): ICD-10-CM

## 2020-07-13 LAB
25(OH)D3 SERPL-MCNC: 22.6 NG/ML (ref 30–100)
ALBUMIN SERPL BCP-MCNC: 3.1 G/DL (ref 3.5–5)
ANION GAP SERPL CALCULATED.3IONS-SCNC: 8 MMOL/L (ref 4–13)
BACTERIA UR QL AUTO: ABNORMAL /HPF
BASOPHILS # BLD AUTO: 0.09 THOUSANDS/ΜL (ref 0–0.1)
BASOPHILS NFR BLD AUTO: 1 % (ref 0–1)
BILIRUB UR QL STRIP: NEGATIVE
BUN SERPL-MCNC: 48 MG/DL (ref 5–25)
CALCIUM SERPL-MCNC: 9.4 MG/DL (ref 8.3–10.1)
CHLORIDE SERPL-SCNC: 109 MMOL/L (ref 100–108)
CLARITY UR: ABNORMAL
CO2 SERPL-SCNC: 23 MMOL/L (ref 21–32)
COLOR UR: YELLOW
CREAT SERPL-MCNC: 1.62 MG/DL (ref 0.6–1.3)
CREAT UR-MCNC: 53.4 MG/DL
EOSINOPHIL # BLD AUTO: 0.22 THOUSAND/ΜL (ref 0–0.61)
EOSINOPHIL NFR BLD AUTO: 3 % (ref 0–6)
ERYTHROCYTE [DISTWIDTH] IN BLOOD BY AUTOMATED COUNT: 14 % (ref 11.6–15.1)
FERRITIN SERPL-MCNC: 136 NG/ML (ref 8–388)
GFR SERPL CREATININE-BSD FRML MDRD: 31 ML/MIN/1.73SQ M
GLUCOSE P FAST SERPL-MCNC: 80 MG/DL (ref 65–99)
GLUCOSE UR STRIP-MCNC: NEGATIVE MG/DL
HCT VFR BLD AUTO: 30.5 % (ref 34.8–46.1)
HGB BLD-MCNC: 9.6 G/DL (ref 11.5–15.4)
HGB UR QL STRIP.AUTO: ABNORMAL
HYALINE CASTS #/AREA URNS LPF: ABNORMAL /LPF
IMM GRANULOCYTES # BLD AUTO: 0.03 THOUSAND/UL (ref 0–0.2)
IMM GRANULOCYTES NFR BLD AUTO: 0 % (ref 0–2)
IRON SATN MFR SERPL: 17 %
IRON SERPL-MCNC: 56 UG/DL (ref 50–170)
KETONES UR STRIP-MCNC: NEGATIVE MG/DL
LEUKOCYTE ESTERASE UR QL STRIP: ABNORMAL
LYMPHOCYTES # BLD AUTO: 1.59 THOUSANDS/ΜL (ref 0.6–4.47)
LYMPHOCYTES NFR BLD AUTO: 19 % (ref 14–44)
MCH RBC QN AUTO: 29.9 PG (ref 26.8–34.3)
MCHC RBC AUTO-ENTMCNC: 31.5 G/DL (ref 31.4–37.4)
MCV RBC AUTO: 95 FL (ref 82–98)
MONOCYTES # BLD AUTO: 0.88 THOUSAND/ΜL (ref 0.17–1.22)
MONOCYTES NFR BLD AUTO: 11 % (ref 4–12)
NEUTROPHILS # BLD AUTO: 5.61 THOUSANDS/ΜL (ref 1.85–7.62)
NEUTS SEG NFR BLD AUTO: 66 % (ref 43–75)
NITRITE UR QL STRIP: NEGATIVE
NON-SQ EPI CELLS URNS QL MICRO: ABNORMAL /HPF
NRBC BLD AUTO-RTO: 0 /100 WBCS
PH UR STRIP.AUTO: 6 [PH]
PHOSPHATE SERPL-MCNC: 4 MG/DL (ref 2.3–4.1)
PLATELET # BLD AUTO: 188 THOUSANDS/UL (ref 149–390)
PMV BLD AUTO: 11.3 FL (ref 8.9–12.7)
POTASSIUM SERPL-SCNC: 4.5 MMOL/L (ref 3.5–5.3)
PROT UR STRIP-MCNC: ABNORMAL MG/DL
PROT UR-MCNC: 24 MG/DL
PROT/CREAT UR: 0.45 MG/G{CREAT} (ref 0–0.1)
RBC # BLD AUTO: 3.21 MILLION/UL (ref 3.81–5.12)
RBC #/AREA URNS AUTO: ABNORMAL /HPF
SODIUM SERPL-SCNC: 140 MMOL/L (ref 136–145)
SP GR UR STRIP.AUTO: 1.01 (ref 1–1.03)
TIBC SERPL-MCNC: 327 UG/DL (ref 250–450)
UROBILINOGEN UR QL STRIP.AUTO: 0.2 E.U./DL
WBC # BLD AUTO: 8.42 THOUSAND/UL (ref 4.31–10.16)
WBC #/AREA URNS AUTO: ABNORMAL /HPF

## 2020-07-13 PROCEDURE — 80069 RENAL FUNCTION PANEL: CPT

## 2020-07-13 PROCEDURE — 36415 COLL VENOUS BLD VENIPUNCTURE: CPT

## 2020-07-13 PROCEDURE — 83550 IRON BINDING TEST: CPT

## 2020-07-13 PROCEDURE — 84156 ASSAY OF PROTEIN URINE: CPT | Performed by: NURSE PRACTITIONER

## 2020-07-13 PROCEDURE — 82570 ASSAY OF URINE CREATININE: CPT | Performed by: NURSE PRACTITIONER

## 2020-07-13 PROCEDURE — 82306 VITAMIN D 25 HYDROXY: CPT

## 2020-07-13 PROCEDURE — 85025 COMPLETE CBC W/AUTO DIFF WBC: CPT

## 2020-07-13 PROCEDURE — 82728 ASSAY OF FERRITIN: CPT

## 2020-07-13 PROCEDURE — 83540 ASSAY OF IRON: CPT

## 2020-07-13 PROCEDURE — 81001 URINALYSIS AUTO W/SCOPE: CPT | Performed by: NURSE PRACTITIONER

## 2020-07-21 ENCOUNTER — OFFICE VISIT (OUTPATIENT)
Dept: OBGYN CLINIC | Facility: CLINIC | Age: 75
End: 2020-07-21

## 2020-07-21 ENCOUNTER — APPOINTMENT (OUTPATIENT)
Dept: RADIOLOGY | Facility: CLINIC | Age: 75
End: 2020-07-21
Payer: COMMERCIAL

## 2020-07-21 VITALS
HEART RATE: 69 BPM | BODY MASS INDEX: 28.15 KG/M2 | SYSTOLIC BLOOD PRESSURE: 152 MMHG | DIASTOLIC BLOOD PRESSURE: 78 MMHG | TEMPERATURE: 97.9 F | WEIGHT: 164 LBS

## 2020-07-21 DIAGNOSIS — S42.294D OTHER CLOSED NONDISPLACED FRACTURE OF PROXIMAL END OF RIGHT HUMERUS WITH ROUTINE HEALING, SUBSEQUENT ENCOUNTER: ICD-10-CM

## 2020-07-21 DIAGNOSIS — S42.294D OTHER CLOSED NONDISPLACED FRACTURE OF PROXIMAL END OF RIGHT HUMERUS WITH ROUTINE HEALING, SUBSEQUENT ENCOUNTER: Primary | ICD-10-CM

## 2020-07-21 PROCEDURE — 99024 POSTOP FOLLOW-UP VISIT: CPT | Performed by: ORTHOPAEDIC SURGERY

## 2020-07-21 PROCEDURE — 73030 X-RAY EXAM OF SHOULDER: CPT
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
## 2020-07-28 ENCOUNTER — HOSPITAL ENCOUNTER (INPATIENT)
Facility: HOSPITAL | Age: 75
LOS: 1 days | DRG: 871 | End: 2020-07-29
Attending: EMERGENCY MEDICINE | Admitting: INTERNAL MEDICINE
Payer: COMMERCIAL

## 2020-07-28 ENCOUNTER — APPOINTMENT (EMERGENCY)
Dept: RADIOLOGY | Facility: HOSPITAL | Age: 75
DRG: 871 | End: 2020-07-28
Payer: COMMERCIAL

## 2020-07-28 DIAGNOSIS — I50.42 CHRONIC COMBINED SYSTOLIC AND DIASTOLIC CHF (CONGESTIVE HEART FAILURE) (HCC): ICD-10-CM

## 2020-07-28 DIAGNOSIS — R91.1 PULMONARY NODULE: ICD-10-CM

## 2020-07-28 DIAGNOSIS — K57.90 DIVERTICULOSIS: ICD-10-CM

## 2020-07-28 DIAGNOSIS — R42 LIGHTHEADEDNESS: ICD-10-CM

## 2020-07-28 DIAGNOSIS — N17.9 ACUTE KIDNEY INJURY SUPERIMPOSED ON CHRONIC KIDNEY DISEASE (HCC): ICD-10-CM

## 2020-07-28 DIAGNOSIS — R65.10 SIRS (SYSTEMIC INFLAMMATORY RESPONSE SYNDROME) (HCC): ICD-10-CM

## 2020-07-28 DIAGNOSIS — R31.29 MICROSCOPIC HEMATURIA: ICD-10-CM

## 2020-07-28 DIAGNOSIS — K44.9 HIATAL HERNIA: ICD-10-CM

## 2020-07-28 DIAGNOSIS — I50.22 CHRONIC SYSTOLIC CHF (CONGESTIVE HEART FAILURE) (HCC): ICD-10-CM

## 2020-07-28 DIAGNOSIS — N18.9 ACUTE KIDNEY INJURY SUPERIMPOSED ON CHRONIC KIDNEY DISEASE (HCC): ICD-10-CM

## 2020-07-28 DIAGNOSIS — R50.9 FEVER: ICD-10-CM

## 2020-07-28 DIAGNOSIS — K35.32 PERFORATED APPENDICITIS: Primary | ICD-10-CM

## 2020-07-28 LAB
ALBUMIN SERPL BCP-MCNC: 3.4 G/DL (ref 3.5–5)
ALP SERPL-CCNC: 156 U/L (ref 46–116)
ALT SERPL W P-5'-P-CCNC: 17 U/L (ref 12–78)
ANION GAP SERPL CALCULATED.3IONS-SCNC: 10 MMOL/L (ref 4–13)
APTT PPP: 31 SECONDS (ref 23–37)
AST SERPL W P-5'-P-CCNC: 16 U/L (ref 5–45)
BASOPHILS # BLD AUTO: 0.04 THOUSANDS/ΜL (ref 0–0.1)
BASOPHILS NFR BLD AUTO: 0 % (ref 0–1)
BILIRUB SERPL-MCNC: 0.5 MG/DL (ref 0.2–1)
BUN SERPL-MCNC: 63 MG/DL (ref 5–25)
CALCIUM SERPL-MCNC: 9.3 MG/DL (ref 8.3–10.1)
CHLORIDE SERPL-SCNC: 96 MMOL/L (ref 100–108)
CO2 SERPL-SCNC: 27 MMOL/L (ref 21–32)
CREAT SERPL-MCNC: 2.53 MG/DL (ref 0.6–1.3)
EOSINOPHIL # BLD AUTO: 0.01 THOUSAND/ΜL (ref 0–0.61)
EOSINOPHIL NFR BLD AUTO: 0 % (ref 0–6)
ERYTHROCYTE [DISTWIDTH] IN BLOOD BY AUTOMATED COUNT: 14 % (ref 11.6–15.1)
GFR SERPL CREATININE-BSD FRML MDRD: 18 ML/MIN/1.73SQ M
GLUCOSE SERPL-MCNC: 105 MG/DL (ref 65–140)
GLUCOSE SERPL-MCNC: 129 MG/DL (ref 65–140)
HCT VFR BLD AUTO: 31.7 % (ref 34.8–46.1)
HGB BLD-MCNC: 10.2 G/DL (ref 11.5–15.4)
IMM GRANULOCYTES # BLD AUTO: 0.09 THOUSAND/UL (ref 0–0.2)
IMM GRANULOCYTES NFR BLD AUTO: 1 % (ref 0–2)
INR PPP: 1.12 (ref 0.84–1.19)
LACTATE SERPL-SCNC: 1 MMOL/L (ref 0.5–2)
LIPASE SERPL-CCNC: 228 U/L (ref 73–393)
LYMPHOCYTES # BLD AUTO: 1.27 THOUSANDS/ΜL (ref 0.6–4.47)
LYMPHOCYTES NFR BLD AUTO: 8 % (ref 14–44)
MAGNESIUM SERPL-MCNC: 2.1 MG/DL (ref 1.6–2.6)
MCH RBC QN AUTO: 30.3 PG (ref 26.8–34.3)
MCHC RBC AUTO-ENTMCNC: 32.2 G/DL (ref 31.4–37.4)
MCV RBC AUTO: 94 FL (ref 82–98)
MONOCYTES # BLD AUTO: 1.4 THOUSAND/ΜL (ref 0.17–1.22)
MONOCYTES NFR BLD AUTO: 8 % (ref 4–12)
NEUTROPHILS # BLD AUTO: 14.15 THOUSANDS/ΜL (ref 1.85–7.62)
NEUTS SEG NFR BLD AUTO: 83 % (ref 43–75)
NRBC BLD AUTO-RTO: 0 /100 WBCS
NT-PROBNP SERPL-MCNC: 8932 PG/ML
PLATELET # BLD AUTO: 200 THOUSANDS/UL (ref 149–390)
PMV BLD AUTO: 10.8 FL (ref 8.9–12.7)
POTASSIUM SERPL-SCNC: 4.7 MMOL/L (ref 3.5–5.3)
PROT SERPL-MCNC: 7.8 G/DL (ref 6.4–8.2)
PROTHROMBIN TIME: 14.4 SECONDS (ref 11.6–14.5)
RBC # BLD AUTO: 3.37 MILLION/UL (ref 3.81–5.12)
SODIUM SERPL-SCNC: 133 MMOL/L (ref 136–145)
TROPONIN I SERPL-MCNC: <0.02 NG/ML
WBC # BLD AUTO: 16.96 THOUSAND/UL (ref 4.31–10.16)

## 2020-07-28 PROCEDURE — 83880 ASSAY OF NATRIURETIC PEPTIDE: CPT | Performed by: EMERGENCY MEDICINE

## 2020-07-28 PROCEDURE — 87040 BLOOD CULTURE FOR BACTERIA: CPT | Performed by: EMERGENCY MEDICINE

## 2020-07-28 PROCEDURE — 83690 ASSAY OF LIPASE: CPT | Performed by: EMERGENCY MEDICINE

## 2020-07-28 PROCEDURE — 71046 X-RAY EXAM CHEST 2 VIEWS: CPT

## 2020-07-28 PROCEDURE — 82948 REAGENT STRIP/BLOOD GLUCOSE: CPT

## 2020-07-28 PROCEDURE — 80053 COMPREHEN METABOLIC PANEL: CPT | Performed by: EMERGENCY MEDICINE

## 2020-07-28 PROCEDURE — 84484 ASSAY OF TROPONIN QUANT: CPT | Performed by: EMERGENCY MEDICINE

## 2020-07-28 PROCEDURE — 85610 PROTHROMBIN TIME: CPT | Performed by: EMERGENCY MEDICINE

## 2020-07-28 PROCEDURE — 85025 COMPLETE CBC W/AUTO DIFF WBC: CPT | Performed by: EMERGENCY MEDICINE

## 2020-07-28 PROCEDURE — 36415 COLL VENOUS BLD VENIPUNCTURE: CPT | Performed by: EMERGENCY MEDICINE

## 2020-07-28 PROCEDURE — 99285 EMERGENCY DEPT VISIT HI MDM: CPT | Performed by: EMERGENCY MEDICINE

## 2020-07-28 PROCEDURE — 83605 ASSAY OF LACTIC ACID: CPT | Performed by: EMERGENCY MEDICINE

## 2020-07-28 PROCEDURE — 83735 ASSAY OF MAGNESIUM: CPT | Performed by: EMERGENCY MEDICINE

## 2020-07-28 PROCEDURE — 85730 THROMBOPLASTIN TIME PARTIAL: CPT | Performed by: EMERGENCY MEDICINE

## 2020-07-28 PROCEDURE — 93005 ELECTROCARDIOGRAM TRACING: CPT

## 2020-07-28 PROCEDURE — 99285 EMERGENCY DEPT VISIT HI MDM: CPT

## 2020-07-28 RX ORDER — SODIUM CHLORIDE 9 MG/ML
3 INJECTION INTRAVENOUS
Status: DISCONTINUED | OUTPATIENT
Start: 2020-07-28 | End: 2020-07-29 | Stop reason: HOSPADM

## 2020-07-29 ENCOUNTER — APPOINTMENT (EMERGENCY)
Dept: CT IMAGING | Facility: HOSPITAL | Age: 75
DRG: 871 | End: 2020-07-29
Payer: COMMERCIAL

## 2020-07-29 ENCOUNTER — APPOINTMENT (INPATIENT)
Dept: NON INVASIVE DIAGNOSTICS | Facility: HOSPITAL | Age: 75
DRG: 871 | End: 2020-07-29
Payer: COMMERCIAL

## 2020-07-29 ENCOUNTER — HOSPITAL ENCOUNTER (INPATIENT)
Facility: HOSPITAL | Age: 75
LOS: 5 days | Discharge: HOME | DRG: 871 | End: 2020-08-03
Attending: SURGERY | Admitting: SURGERY
Payer: COMMERCIAL

## 2020-07-29 VITALS
OXYGEN SATURATION: 93 % | WEIGHT: 163.8 LBS | SYSTOLIC BLOOD PRESSURE: 143 MMHG | RESPIRATION RATE: 18 BRPM | HEIGHT: 64 IN | TEMPERATURE: 100.1 F | DIASTOLIC BLOOD PRESSURE: 61 MMHG | HEART RATE: 78 BPM | BODY MASS INDEX: 27.96 KG/M2

## 2020-07-29 DIAGNOSIS — K35.32 PERFORATED APPENDICITIS: Primary | ICD-10-CM

## 2020-07-29 DIAGNOSIS — I50.42 CHRONIC COMBINED SYSTOLIC AND DIASTOLIC CHF (CONGESTIVE HEART FAILURE) (HCC): ICD-10-CM

## 2020-07-29 DIAGNOSIS — N17.9 ACUTE KIDNEY INJURY SUPERIMPOSED ON CHRONIC KIDNEY DISEASE (HCC): ICD-10-CM

## 2020-07-29 DIAGNOSIS — N18.9 ACUTE KIDNEY INJURY SUPERIMPOSED ON CHRONIC KIDNEY DISEASE (HCC): ICD-10-CM

## 2020-07-29 DIAGNOSIS — I34.0 MITRAL VALVE INSUFFICIENCY, UNSPECIFIED ETIOLOGY: ICD-10-CM

## 2020-07-29 PROBLEM — K35.80 ACUTE APPENDICITIS: Status: ACTIVE | Noted: 2020-07-29

## 2020-07-29 PROBLEM — R82.81 PYURIA: Status: ACTIVE | Noted: 2020-07-29

## 2020-07-29 PROBLEM — I70.0 AORTIC ATHEROSCLEROSIS (HCC): Status: ACTIVE | Noted: 2020-07-29

## 2020-07-29 PROBLEM — A41.9 SEPSIS (HCC): Status: ACTIVE | Noted: 2020-07-29

## 2020-07-29 PROBLEM — K44.9 HIATAL HERNIA: Status: ACTIVE | Noted: 2020-07-29

## 2020-07-29 PROBLEM — I50.22 CHRONIC SYSTOLIC CHF (CONGESTIVE HEART FAILURE) (HCC): Status: ACTIVE | Noted: 2020-07-29

## 2020-07-29 PROBLEM — K57.90 DIVERTICULOSIS: Status: ACTIVE | Noted: 2020-07-29

## 2020-07-29 PROBLEM — R65.10 SIRS (SYSTEMIC INFLAMMATORY RESPONSE SYNDROME) (HCC): Status: ACTIVE | Noted: 2020-07-29

## 2020-07-29 PROBLEM — E78.00 HYPERCHOLESTEROLEMIA: Status: ACTIVE | Noted: 2020-07-29

## 2020-07-29 PROBLEM — R31.29 MICROSCOPIC HEMATURIA: Status: ACTIVE | Noted: 2020-07-29

## 2020-07-29 PROBLEM — J98.11 ATELECTASIS: Status: ACTIVE | Noted: 2020-07-29

## 2020-07-29 PROBLEM — N20.0 RENAL CALCULUS: Status: ACTIVE | Noted: 2020-07-29

## 2020-07-29 PROBLEM — R91.1 PULMONARY NODULE: Status: ACTIVE | Noted: 2020-07-29

## 2020-07-29 PROBLEM — D72.829 LEUKOCYTOSIS: Status: ACTIVE | Noted: 2020-07-29

## 2020-07-29 PROBLEM — E55.9 VITAMIN D INSUFFICIENCY: Status: ACTIVE | Noted: 2020-07-29

## 2020-07-29 PROBLEM — R42 LIGHTHEADEDNESS: Status: ACTIVE | Noted: 2020-07-29

## 2020-07-29 LAB
ANION GAP SERPL CALCULATED.3IONS-SCNC: 11 MMOL/L (ref 4–13)
ANION GAP SERPL CALCULATED.3IONS-SCNC: 11 MMOL/L (ref 4–13)
ANION GAP SERPL CALCULATED.3IONS-SCNC: 8 MMOL/L (ref 4–13)
ATRIAL RATE: 100 BPM
BACTERIA UR QL AUTO: ABNORMAL /HPF
BILIRUB UR QL STRIP: NEGATIVE
BUN SERPL-MCNC: 55 MG/DL (ref 5–25)
BUN SERPL-MCNC: 59 MG/DL (ref 5–25)
BUN SERPL-MCNC: 60 MG/DL (ref 5–25)
CALCIUM SERPL-MCNC: 8.1 MG/DL (ref 8.3–10.1)
CALCIUM SERPL-MCNC: 8.6 MG/DL (ref 8.3–10.1)
CALCIUM SERPL-MCNC: 9.1 MG/DL (ref 8.3–10.1)
CHLORIDE SERPL-SCNC: 102 MMOL/L (ref 100–108)
CHLORIDE SERPL-SCNC: 111 MMOL/L (ref 100–108)
CHLORIDE SERPL-SCNC: 99 MMOL/L (ref 100–108)
CLARITY UR: CLEAR
CO2 SERPL-SCNC: 21 MMOL/L (ref 21–32)
CO2 SERPL-SCNC: 24 MMOL/L (ref 21–32)
CO2 SERPL-SCNC: 25 MMOL/L (ref 21–32)
COLOR UR: YELLOW
CREAT SERPL-MCNC: 2.29 MG/DL (ref 0.6–1.3)
CREAT SERPL-MCNC: 2.46 MG/DL (ref 0.6–1.3)
CREAT SERPL-MCNC: 2.67 MG/DL (ref 0.6–1.3)
ERYTHROCYTE [DISTWIDTH] IN BLOOD BY AUTOMATED COUNT: 14 % (ref 11.6–15.1)
ERYTHROCYTE [DISTWIDTH] IN BLOOD BY AUTOMATED COUNT: 14.4 % (ref 11.6–15.1)
GFR SERPL CREATININE-BSD FRML MDRD: 17 ML/MIN/1.73SQ M
GFR SERPL CREATININE-BSD FRML MDRD: 18 ML/MIN/1.73SQ M
GFR SERPL CREATININE-BSD FRML MDRD: 20 ML/MIN/1.73SQ M
GLUCOSE SERPL-MCNC: 106 MG/DL (ref 65–140)
GLUCOSE SERPL-MCNC: 90 MG/DL (ref 65–140)
GLUCOSE SERPL-MCNC: 99 MG/DL (ref 65–140)
GLUCOSE UR STRIP-MCNC: NEGATIVE MG/DL
HCT VFR BLD AUTO: 25.8 % (ref 34.8–46.1)
HCT VFR BLD AUTO: 30.9 % (ref 34.8–46.1)
HGB BLD-MCNC: 8.3 G/DL (ref 11.5–15.4)
HGB BLD-MCNC: 9.8 G/DL (ref 11.5–15.4)
HGB UR QL STRIP.AUTO: ABNORMAL
INR PPP: 1.17 (ref 0.84–1.19)
KETONES UR STRIP-MCNC: NEGATIVE MG/DL
LACTATE SERPL-SCNC: 1.5 MMOL/L (ref 0.5–2)
LEUKOCYTE ESTERASE UR QL STRIP: ABNORMAL
MAGNESIUM SERPL-MCNC: 2.1 MG/DL (ref 1.6–2.6)
MCH RBC QN AUTO: 30.2 PG (ref 26.8–34.3)
MCH RBC QN AUTO: 30.4 PG (ref 26.8–34.3)
MCHC RBC AUTO-ENTMCNC: 31.7 G/DL (ref 31.4–37.4)
MCHC RBC AUTO-ENTMCNC: 32.2 G/DL (ref 31.4–37.4)
MCV RBC AUTO: 95 FL (ref 82–98)
MCV RBC AUTO: 95 FL (ref 82–98)
NITRITE UR QL STRIP: NEGATIVE
NON-SQ EPI CELLS URNS QL MICRO: ABNORMAL /HPF
P AXIS: 58 DEGREES
PH UR STRIP.AUTO: 5.5 [PH]
PHOSPHATE SERPL-MCNC: 2.9 MG/DL (ref 2.3–4.1)
PLATELET # BLD AUTO: 151 THOUSANDS/UL (ref 149–390)
PLATELET # BLD AUTO: 181 THOUSANDS/UL (ref 149–390)
PMV BLD AUTO: 11.1 FL (ref 8.9–12.7)
PMV BLD AUTO: 11.3 FL (ref 8.9–12.7)
POTASSIUM SERPL-SCNC: 3.8 MMOL/L (ref 3.5–5.3)
POTASSIUM SERPL-SCNC: 4.1 MMOL/L (ref 3.5–5.3)
POTASSIUM SERPL-SCNC: 4.2 MMOL/L (ref 3.5–5.3)
PR INTERVAL: 130 MS
PROT UR STRIP-MCNC: ABNORMAL MG/DL
PROTHROMBIN TIME: 15 SECONDS (ref 11.6–14.5)
QRS AXIS: -74 DEGREES
QRSD INTERVAL: 134 MS
QT INTERVAL: 400 MS
QTC INTERVAL: 516 MS
RBC # BLD AUTO: 2.73 MILLION/UL (ref 3.81–5.12)
RBC # BLD AUTO: 3.24 MILLION/UL (ref 3.81–5.12)
RBC #/AREA URNS AUTO: ABNORMAL /HPF
SARS-COV-2 RNA RESP QL NAA+PROBE: NEGATIVE
SODIUM SERPL-SCNC: 135 MMOL/L (ref 136–145)
SODIUM SERPL-SCNC: 137 MMOL/L (ref 136–145)
SODIUM SERPL-SCNC: 140 MMOL/L (ref 136–145)
SP GR UR STRIP.AUTO: 1.01 (ref 1–1.03)
T WAVE AXIS: 94 DEGREES
UROBILINOGEN UR QL STRIP.AUTO: 0.2 E.U./DL
VENTRICULAR RATE: 100 BPM
WBC # BLD AUTO: 18.97 THOUSAND/UL (ref 4.31–10.16)
WBC # BLD AUTO: 19.54 THOUSAND/UL (ref 4.31–10.16)
WBC #/AREA URNS AUTO: ABNORMAL /HPF

## 2020-07-29 PROCEDURE — 83735 ASSAY OF MAGNESIUM: CPT | Performed by: NURSE PRACTITIONER

## 2020-07-29 PROCEDURE — 70450 CT HEAD/BRAIN W/O DYE: CPT

## 2020-07-29 PROCEDURE — 81001 URINALYSIS AUTO W/SCOPE: CPT | Performed by: EMERGENCY MEDICINE

## 2020-07-29 PROCEDURE — 80048 BASIC METABOLIC PNL TOTAL CA: CPT | Performed by: SURGERY

## 2020-07-29 PROCEDURE — 74176 CT ABD & PELVIS W/O CONTRAST: CPT

## 2020-07-29 PROCEDURE — 99223 1ST HOSP IP/OBS HIGH 75: CPT | Performed by: INTERNAL MEDICINE

## 2020-07-29 PROCEDURE — 93306 TTE W/DOPPLER COMPLETE: CPT | Performed by: INTERNAL MEDICINE

## 2020-07-29 PROCEDURE — 87077 CULTURE AEROBIC IDENTIFY: CPT | Performed by: EMERGENCY MEDICINE

## 2020-07-29 PROCEDURE — 99223 1ST HOSP IP/OBS HIGH 75: CPT | Performed by: SURGERY

## 2020-07-29 PROCEDURE — 99285 EMERGENCY DEPT VISIT HI MDM: CPT

## 2020-07-29 PROCEDURE — 80048 BASIC METABOLIC PNL TOTAL CA: CPT | Performed by: NURSE PRACTITIONER

## 2020-07-29 PROCEDURE — 85610 PROTHROMBIN TIME: CPT | Performed by: NURSE PRACTITIONER

## 2020-07-29 PROCEDURE — 87186 SC STD MICRODIL/AGAR DIL: CPT | Performed by: EMERGENCY MEDICINE

## 2020-07-29 PROCEDURE — 85027 COMPLETE CBC AUTOMATED: CPT | Performed by: NURSE PRACTITIONER

## 2020-07-29 PROCEDURE — 71250 CT THORAX DX C-: CPT

## 2020-07-29 PROCEDURE — 93010 ELECTROCARDIOGRAM REPORT: CPT | Performed by: INTERNAL MEDICINE

## 2020-07-29 PROCEDURE — 96361 HYDRATE IV INFUSION ADD-ON: CPT

## 2020-07-29 PROCEDURE — 80048 BASIC METABOLIC PNL TOTAL CA: CPT | Performed by: PHYSICIAN ASSISTANT

## 2020-07-29 PROCEDURE — NC001 PR NO CHARGE

## 2020-07-29 PROCEDURE — 99255 IP/OBS CONSLTJ NEW/EST HI 80: CPT

## 2020-07-29 PROCEDURE — 85027 COMPLETE CBC AUTOMATED: CPT | Performed by: PHYSICIAN ASSISTANT

## 2020-07-29 PROCEDURE — 87086 URINE CULTURE/COLONY COUNT: CPT | Performed by: EMERGENCY MEDICINE

## 2020-07-29 PROCEDURE — 99236 HOSP IP/OBS SAME DATE HI 85: CPT | Performed by: INTERNAL MEDICINE

## 2020-07-29 PROCEDURE — 87635 SARS-COV-2 COVID-19 AMP PRB: CPT | Performed by: EMERGENCY MEDICINE

## 2020-07-29 PROCEDURE — 83605 ASSAY OF LACTIC ACID: CPT | Performed by: PHYSICIAN ASSISTANT

## 2020-07-29 PROCEDURE — 36415 COLL VENOUS BLD VENIPUNCTURE: CPT | Performed by: PHYSICIAN ASSISTANT

## 2020-07-29 PROCEDURE — 84100 ASSAY OF PHOSPHORUS: CPT | Performed by: NURSE PRACTITIONER

## 2020-07-29 PROCEDURE — 96360 HYDRATION IV INFUSION INIT: CPT

## 2020-07-29 PROCEDURE — 93306 TTE W/DOPPLER COMPLETE: CPT

## 2020-07-29 PROCEDURE — 99223 1ST HOSP IP/OBS HIGH 75: CPT | Performed by: PHYSICIAN ASSISTANT

## 2020-07-29 RX ORDER — SERTRALINE HYDROCHLORIDE 100 MG/1
100 TABLET, FILM COATED ORAL DAILY
Status: DISCONTINUED | OUTPATIENT
Start: 2020-07-29 | End: 2020-07-29 | Stop reason: HOSPADM

## 2020-07-29 RX ORDER — LEVOTHYROXINE SODIUM 0.15 MG/1
150 TABLET ORAL
Status: DISCONTINUED | OUTPATIENT
Start: 2020-07-29 | End: 2020-07-29 | Stop reason: HOSPADM

## 2020-07-29 RX ORDER — ACETAMINOPHEN 325 MG/1
650 TABLET ORAL EVERY 6 HOURS PRN
Status: DISCONTINUED | OUTPATIENT
Start: 2020-07-29 | End: 2020-08-01

## 2020-07-29 RX ORDER — SERTRALINE HYDROCHLORIDE 100 MG/1
100 TABLET, FILM COATED ORAL DAILY
Status: DISCONTINUED | OUTPATIENT
Start: 2020-07-30 | End: 2020-07-30 | Stop reason: SDUPTHER

## 2020-07-29 RX ORDER — ACETAMINOPHEN 325 MG/1
650 TABLET ORAL EVERY 6 HOURS PRN
Status: CANCELLED | OUTPATIENT
Start: 2020-07-29

## 2020-07-29 RX ORDER — SODIUM CHLORIDE 9 MG/ML
125 INJECTION, SOLUTION INTRAVENOUS CONTINUOUS
Status: DISCONTINUED | OUTPATIENT
Start: 2020-07-29 | End: 2020-07-29

## 2020-07-29 RX ORDER — ISOSORBIDE MONONITRATE 60 MG/1
60 TABLET, EXTENDED RELEASE ORAL DAILY
Status: DISCONTINUED | OUTPATIENT
Start: 2020-07-30 | End: 2020-07-29

## 2020-07-29 RX ORDER — MELATONIN
1000 DAILY
Status: CANCELLED | OUTPATIENT
Start: 2020-07-30

## 2020-07-29 RX ORDER — OXYCODONE HYDROCHLORIDE 5 MG/1
2.5 TABLET ORAL EVERY 4 HOURS PRN
Status: DISCONTINUED | OUTPATIENT
Start: 2020-07-29 | End: 2020-08-03 | Stop reason: HOSPADM

## 2020-07-29 RX ORDER — ISOSORBIDE MONONITRATE 60 MG/1
60 TABLET, EXTENDED RELEASE ORAL DAILY
Status: DISCONTINUED | OUTPATIENT
Start: 2020-07-29 | End: 2020-07-29 | Stop reason: HOSPADM

## 2020-07-29 RX ORDER — LEVOTHYROXINE SODIUM 0.15 MG/1
150 TABLET ORAL
Status: CANCELLED | OUTPATIENT
Start: 2020-07-30

## 2020-07-29 RX ORDER — LEVOTHYROXINE SODIUM 0.07 MG/1
150 TABLET ORAL DAILY
Status: CANCELLED | OUTPATIENT
Start: 2020-07-30

## 2020-07-29 RX ORDER — MELATONIN
1000 DAILY
Status: DISCONTINUED | OUTPATIENT
Start: 2020-07-30 | End: 2020-07-29 | Stop reason: HOSPADM

## 2020-07-29 RX ORDER — SERTRALINE HYDROCHLORIDE 100 MG/1
100 TABLET, FILM COATED ORAL DAILY
Status: CANCELLED | OUTPATIENT
Start: 2020-07-30

## 2020-07-29 RX ORDER — MELATONIN
1000 DAILY
Status: DISCONTINUED | OUTPATIENT
Start: 2020-07-30 | End: 2020-08-03 | Stop reason: HOSPADM

## 2020-07-29 RX ORDER — HEPARIN SODIUM 5000 [USP'U]/ML
5000 INJECTION, SOLUTION INTRAVENOUS; SUBCUTANEOUS EVERY 8 HOURS SCHEDULED
Status: DISCONTINUED | OUTPATIENT
Start: 2020-07-29 | End: 2020-07-29 | Stop reason: HOSPADM

## 2020-07-29 RX ORDER — METOPROLOL SUCCINATE 50 MG/1
50 TABLET, EXTENDED RELEASE ORAL DAILY
Status: DISCONTINUED | OUTPATIENT
Start: 2020-07-29 | End: 2020-07-29 | Stop reason: HOSPADM

## 2020-07-29 RX ORDER — METOPROLOL SUCCINATE 50 MG/1
50 TABLET, EXTENDED RELEASE ORAL DAILY
Status: DISCONTINUED | OUTPATIENT
Start: 2020-07-30 | End: 2020-07-29

## 2020-07-29 RX ORDER — ACETAMINOPHEN 325 MG/1
650 TABLET ORAL EVERY 6 HOURS PRN
Status: DISCONTINUED | OUTPATIENT
Start: 2020-07-29 | End: 2020-07-29

## 2020-07-29 RX ORDER — OXYCODONE HYDROCHLORIDE 10 MG/1
10 TABLET ORAL EVERY 6 HOURS PRN
Status: DISCONTINUED | OUTPATIENT
Start: 2020-07-29 | End: 2020-07-29

## 2020-07-29 RX ORDER — ASPIRIN 81 MG/1
81 TABLET, CHEWABLE ORAL DAILY
Status: DISCONTINUED | OUTPATIENT
Start: 2020-07-30 | End: 2020-07-29 | Stop reason: HOSPADM

## 2020-07-29 RX ORDER — SODIUM CHLORIDE 9 MG/ML
3 INJECTION INTRAVENOUS
Status: CANCELLED | OUTPATIENT
Start: 2020-07-29

## 2020-07-29 RX ORDER — LOSARTAN POTASSIUM 25 MG/1
25 TABLET ORAL DAILY
Status: CANCELLED | OUTPATIENT
Start: 2020-07-30

## 2020-07-29 RX ORDER — SODIUM CHLORIDE 9 MG/ML
3 INJECTION INTRAVENOUS
Status: DISCONTINUED | OUTPATIENT
Start: 2020-07-29 | End: 2020-08-03 | Stop reason: HOSPADM

## 2020-07-29 RX ORDER — FENTANYL CITRATE 50 UG/ML
25 INJECTION, SOLUTION INTRAMUSCULAR; INTRAVENOUS EVERY 2 HOUR PRN
Status: DISCONTINUED | OUTPATIENT
Start: 2020-07-29 | End: 2020-08-01

## 2020-07-29 RX ORDER — SODIUM CHLORIDE 9 MG/ML
125 INJECTION, SOLUTION INTRAVENOUS CONTINUOUS
Status: DISCONTINUED | OUTPATIENT
Start: 2020-07-29 | End: 2020-07-29 | Stop reason: HOSPADM

## 2020-07-29 RX ORDER — OXYCODONE HYDROCHLORIDE 5 MG/1
5 TABLET ORAL EVERY 6 HOURS PRN
Status: DISCONTINUED | OUTPATIENT
Start: 2020-07-29 | End: 2020-07-29

## 2020-07-29 RX ORDER — SODIUM CHLORIDE 9 MG/ML
75 INJECTION, SOLUTION INTRAVENOUS CONTINUOUS
Status: DISCONTINUED | OUTPATIENT
Start: 2020-07-29 | End: 2020-07-29

## 2020-07-29 RX ORDER — SODIUM CHLORIDE, SODIUM GLUCONATE, SODIUM ACETATE, POTASSIUM CHLORIDE, MAGNESIUM CHLORIDE, SODIUM PHOSPHATE, DIBASIC, AND POTASSIUM PHOSPHATE .53; .5; .37; .037; .03; .012; .00082 G/100ML; G/100ML; G/100ML; G/100ML; G/100ML; G/100ML; G/100ML
125 INJECTION, SOLUTION INTRAVENOUS CONTINUOUS
Status: DISCONTINUED | OUTPATIENT
Start: 2020-07-29 | End: 2020-07-30

## 2020-07-29 RX ORDER — LOSARTAN POTASSIUM 25 MG/1
25 TABLET ORAL DAILY
Status: DISCONTINUED | OUTPATIENT
Start: 2020-07-29 | End: 2020-07-29

## 2020-07-29 RX ORDER — ACETAMINOPHEN 325 MG/1
650 TABLET ORAL EVERY 6 HOURS PRN
Status: DISCONTINUED | OUTPATIENT
Start: 2020-07-29 | End: 2020-07-29 | Stop reason: HOSPADM

## 2020-07-29 RX ORDER — OXYCODONE HYDROCHLORIDE 10 MG/1
10 TABLET ORAL EVERY 6 HOURS PRN
Status: CANCELLED | OUTPATIENT
Start: 2020-07-29

## 2020-07-29 RX ORDER — ALPRAZOLAM 0.25 MG/1
0.25 TABLET ORAL
Status: CANCELLED | OUTPATIENT
Start: 2020-07-29

## 2020-07-29 RX ORDER — HEPARIN SODIUM 5000 [USP'U]/ML
5000 INJECTION, SOLUTION INTRAVENOUS; SUBCUTANEOUS EVERY 8 HOURS SCHEDULED
Status: DISCONTINUED | OUTPATIENT
Start: 2020-07-29 | End: 2020-07-29

## 2020-07-29 RX ORDER — PANTOPRAZOLE SODIUM 40 MG/1
40 TABLET, DELAYED RELEASE ORAL
Status: CANCELLED | OUTPATIENT
Start: 2020-07-30

## 2020-07-29 RX ORDER — CEFTRIAXONE 1 G/50ML
1000 INJECTION, SOLUTION INTRAVENOUS ONCE
Status: DISCONTINUED | OUTPATIENT
Start: 2020-07-29 | End: 2020-07-29

## 2020-07-29 RX ORDER — ASPIRIN 81 MG/1
81 TABLET ORAL DAILY
Status: CANCELLED | OUTPATIENT
Start: 2020-07-30

## 2020-07-29 RX ORDER — HEPARIN SODIUM 5000 [USP'U]/ML
5000 INJECTION, SOLUTION INTRAVENOUS; SUBCUTANEOUS EVERY 8 HOURS SCHEDULED
Status: CANCELLED | OUTPATIENT
Start: 2020-07-29

## 2020-07-29 RX ORDER — PANTOPRAZOLE SODIUM 40 MG/1
40 TABLET, DELAYED RELEASE ORAL
Status: DISCONTINUED | OUTPATIENT
Start: 2020-07-29 | End: 2020-07-29 | Stop reason: HOSPADM

## 2020-07-29 RX ORDER — ASPIRIN 81 MG/1
81 TABLET, CHEWABLE ORAL DAILY
Status: DISCONTINUED | OUTPATIENT
Start: 2020-07-30 | End: 2020-08-03 | Stop reason: HOSPADM

## 2020-07-29 RX ORDER — DEXTROSE AND SODIUM CHLORIDE 5; .45 G/100ML; G/100ML
125 INJECTION, SOLUTION INTRAVENOUS CONTINUOUS
Status: DISCONTINUED | OUTPATIENT
Start: 2020-07-29 | End: 2020-07-29

## 2020-07-29 RX ORDER — ISOSORBIDE MONONITRATE 60 MG/1
60 TABLET, EXTENDED RELEASE ORAL DAILY
Status: CANCELLED | OUTPATIENT
Start: 2020-07-30

## 2020-07-29 RX ORDER — CEFTRIAXONE 1 G/50ML
INJECTION, SOLUTION INTRAVENOUS
Status: COMPLETED
Start: 2020-07-29 | End: 2020-07-29

## 2020-07-29 RX ORDER — OXYCODONE HYDROCHLORIDE 5 MG/1
5 TABLET ORAL EVERY 6 HOURS PRN
Status: CANCELLED | OUTPATIENT
Start: 2020-07-29

## 2020-07-29 RX ORDER — POTASSIUM CHLORIDE 20 MEQ/1
20 TABLET, EXTENDED RELEASE ORAL ONCE
Status: COMPLETED | OUTPATIENT
Start: 2020-07-29 | End: 2020-07-29

## 2020-07-29 RX ORDER — OXYCODONE HYDROCHLORIDE 5 MG/1
5 TABLET ORAL EVERY 6 HOURS PRN
Status: DISCONTINUED | OUTPATIENT
Start: 2020-07-29 | End: 2020-07-29 | Stop reason: HOSPADM

## 2020-07-29 RX ORDER — HEPARIN SODIUM 5000 [USP'U]/ML
5000 INJECTION, SOLUTION INTRAVENOUS; SUBCUTANEOUS EVERY 8 HOURS SCHEDULED
Status: DISCONTINUED | OUTPATIENT
Start: 2020-07-29 | End: 2020-08-02

## 2020-07-29 RX ORDER — LEVOTHYROXINE SODIUM 0.07 MG/1
150 TABLET ORAL
Status: DISCONTINUED | OUTPATIENT
Start: 2020-07-30 | End: 2020-08-03 | Stop reason: HOSPADM

## 2020-07-29 RX ORDER — SODIUM CHLORIDE 9 MG/ML
125 INJECTION, SOLUTION INTRAVENOUS CONTINUOUS
Status: CANCELLED | OUTPATIENT
Start: 2020-07-29

## 2020-07-29 RX ORDER — CHLORHEXIDINE GLUCONATE 0.12 MG/ML
15 RINSE ORAL EVERY 12 HOURS SCHEDULED
Status: DISCONTINUED | OUTPATIENT
Start: 2020-07-29 | End: 2020-07-29

## 2020-07-29 RX ORDER — METOPROLOL TARTRATE 100 MG/1
100 TABLET ORAL DAILY
Status: DISCONTINUED | OUTPATIENT
Start: 2020-07-29 | End: 2020-07-29

## 2020-07-29 RX ORDER — OXYCODONE HYDROCHLORIDE 5 MG/1
5 TABLET ORAL EVERY 4 HOURS PRN
Status: DISCONTINUED | OUTPATIENT
Start: 2020-07-29 | End: 2020-08-03 | Stop reason: HOSPADM

## 2020-07-29 RX ORDER — ASPIRIN 81 MG/1
81 TABLET, CHEWABLE ORAL DAILY
Status: CANCELLED | OUTPATIENT
Start: 2020-07-30

## 2020-07-29 RX ORDER — OXYCODONE HYDROCHLORIDE 10 MG/1
10 TABLET ORAL EVERY 6 HOURS PRN
Status: DISCONTINUED | OUTPATIENT
Start: 2020-07-29 | End: 2020-07-29 | Stop reason: HOSPADM

## 2020-07-29 RX ORDER — METOPROLOL SUCCINATE 50 MG/1
50 TABLET, EXTENDED RELEASE ORAL DAILY
Status: CANCELLED | OUTPATIENT
Start: 2020-07-30

## 2020-07-29 RX ORDER — PANTOPRAZOLE SODIUM 40 MG/1
40 TABLET, DELAYED RELEASE ORAL
Status: DISCONTINUED | OUTPATIENT
Start: 2020-07-30 | End: 2020-08-03 | Stop reason: HOSPADM

## 2020-07-29 RX ADMIN — SODIUM CHLORIDE 75 ML/HR: 0.9 INJECTION, SOLUTION INTRAVENOUS at 04:36

## 2020-07-29 RX ADMIN — CEFTRIAXONE 1000 MG: 1 INJECTION, SOLUTION INTRAVENOUS at 06:11

## 2020-07-29 RX ADMIN — PANTOPRAZOLE SODIUM 40 MG: 40 TABLET, DELAYED RELEASE ORAL at 06:15

## 2020-07-29 RX ADMIN — HEPARIN SODIUM 5000 UNITS: 5000 INJECTION INTRAVENOUS; SUBCUTANEOUS at 22:12

## 2020-07-29 RX ADMIN — PIPERACILLIN AND TAZOBACTAM 3.38 G: 3; .375 INJECTION, POWDER, LYOPHILIZED, FOR SOLUTION INTRAVENOUS at 08:14

## 2020-07-29 RX ADMIN — POTASSIUM CHLORIDE 20 MEQ: 1500 TABLET, EXTENDED RELEASE ORAL at 22:11

## 2020-07-29 RX ADMIN — ACETAMINOPHEN 650 MG: 325 TABLET, FILM COATED ORAL at 22:17

## 2020-07-29 RX ADMIN — LEVOTHYROXINE SODIUM 150 MCG: 150 TABLET ORAL at 06:12

## 2020-07-29 RX ADMIN — ISOSORBIDE MONONITRATE 60 MG: 60 TABLET, EXTENDED RELEASE ORAL at 08:07

## 2020-07-29 RX ADMIN — SODIUM CHLORIDE, SODIUM GLUCONATE, SODIUM ACETATE, POTASSIUM CHLORIDE, MAGNESIUM CHLORIDE, SODIUM PHOSPHATE, DIBASIC, AND POTASSIUM PHOSPHATE 125 ML/HR: .53; .5; .37; .037; .03; .012; .00082 INJECTION, SOLUTION INTRAVENOUS at 18:19

## 2020-07-29 RX ADMIN — METOPROLOL SUCCINATE 50 MG: 50 TABLET, EXTENDED RELEASE ORAL at 08:07

## 2020-07-29 RX ADMIN — ACETAMINOPHEN 650 MG: 325 TABLET, FILM COATED ORAL at 07:36

## 2020-07-29 RX ADMIN — HEPARIN SODIUM 5000 UNITS: 5000 INJECTION INTRAVENOUS; SUBCUTANEOUS at 06:11

## 2020-07-29 RX ADMIN — SODIUM CHLORIDE 1000 ML: 0.9 INJECTION, SOLUTION INTRAVENOUS at 00:32

## 2020-07-29 RX ADMIN — METRONIDAZOLE 500 MG: 500 INJECTION, SOLUTION INTRAVENOUS at 18:20

## 2020-07-29 RX ADMIN — Medication 3.38 G: at 02:55

## 2020-07-29 RX ADMIN — SERTRALINE HYDROCHLORIDE 100 MG: 100 TABLET ORAL at 08:08

## 2020-07-29 NOTE — ASSESSMENT & PLAN NOTE
As evidence by febrile of 103 5,  tachycardic and leukocytosis white blood cell count of 16 96-most likely secondary to perforated appendicitis    Please see additional treatment plan for perforated appendicitis

## 2020-07-29 NOTE — ASSESSMENT & PLAN NOTE
As evidence by a creatinine level 2 53 on admission baseline creatinine 1 5-1 6  Patient was given 1 L bolus in the emergency room  Will hold prior to admission Lasix and spironolactone  Cautious use of nephrotoxin agents  Trend BMP daily

## 2020-07-29 NOTE — ASSESSMENT & PLAN NOTE
· When the patient is able to tolerate PO, initiate cholecalciferol 1000 I  U  PO Qdaily  Outpatient follow-up with PCP in regards to this matter  Recheck a vitamin D 25-OH level in 1-2 months with her PCP    Results for [place] Patch (MRN [id number]) as of 7/29/2020 12:01   Ref   Range 7/13/2020 10:10   Vit D, 25-Hydroxy Latest Ref Range: 30 0 - 100 0 ng/mL 22 6 (L)

## 2020-07-29 NOTE — PLAN OF CARE
Problem: Potential for Falls  Goal: Patient will remain free of falls  Description  INTERVENTIONS:  - Assess patient frequently for physical needs  -  Identify cognitive and physical deficits and behaviors that affect risk of falls    -  [place] fall precautions as indicated by assessment   - Educate patient/family on patient safety including physical limitations  - Instruct patient to call for assistance with activity based on assessment  - Modify environment to reduce risk of injury  - Consider OT/PT consult to assist with strengthening/mobility  Outcome: Progressing     Problem: Prexisting or High Potential for Compromised Skin Integrity  Goal: Skin integrity is maintained or improved  Description  INTERVENTIONS:  - Identify patients at risk for skin breakdown  - Assess and monitor skin integrity  - Assess and monitor nutrition and hydration status  - Monitor labs   - Assess for incontinence   - Turn and reposition patient  - Assist with mobility/ambulation  - Relieve pressure over bony prominences  - Avoid friction and shearing  - Provide appropriate hygiene as needed including keeping skin clean and dry  - Evaluate need for skin moisturizer/barrier cream  - Collaborate with interdisciplinary team   - Patient/family teaching  - Consider wound care consult   Outcome: Progressing     Problem: PAIN - ADULT  Goal: Verbalizes/displays adequate comfort level or baseline comfort level  Description  Interventions:  - Encourage patient to monitor pain and request assistance  - Assess pain using appropriate pain scale  - Administer analgesics based on type and severity of pain and evaluate response  - Implement non-pharmacological measures as appropriate and evaluate response  - Consider cultural and social influences on pain and pain management  - Notify physician/advanced practitioner if interventions unsuccessful or patient reports new pain  Outcome: Progressing     Problem: INFECTION - ADULT  Goal: Absence or prevention of progression during hospitalization  Description  INTERVENTIONS:  - Assess and monitor for signs and symptoms of infection  - Monitor lab/diagnostic results  - Monitor all insertion sites, i e  indwelling lines, tubes, and drains  - Monitor endotracheal if appropriate and nasal secretions for changes in amount and color  - [place] appropriate cooling/warming therapies per order  - Administer medications as ordered  - Instruct and encourage patient and family to use good hand hygiene technique  - Identify and instruct in appropriate isolation precautions for identified infection/condition  Outcome: Progressing  Goal: Absence of fever/infection during neutropenic period  Description  INTERVENTIONS:  - Monitor WBC    Outcome: Progressing     Problem: SAFETY ADULT  Goal: Maintain or return to baseline ADL function  Description  INTERVENTIONS:  -  Assess patient's ability to carry out ADLs; assess patient's baseline for ADL function and identify physical deficits which impact ability to perform ADLs (bathing, care of mouth/teeth, toileting, grooming, dressing, etc )  - Assess/evaluate cause of self-care deficits   - Assess range of motion  - Assess patient's mobility; develop plan if impaired  - Assess patient's need for assistive devices and provide as appropriate  - Encourage maximum independence but intervene and supervise when necessary  - Involve family in performance of ADLs  - Assess for home care needs following discharge   - Consider OT consult to assist with ADL evaluation and planning for discharge  - Provide patient education as appropriate  Outcome: Progressing  Goal: Maintain or return mobility status to optimal level  Description  INTERVENTIONS:  - Assess patient's baseline mobility status (ambulation, transfers, stairs, etc )    - Identify cognitive and physical deficits and behaviors that affect mobility  - Identify mobility aids required to assist with transfers and/or ambulation (gait belt, sit-to-stand, lift, walker, cane, etc )  - [place] fall precautions as indicated by assessment  - Record patient progress and toleration of activity level on Mobility SBAR; progress patient to next Phase/Stage  - Instruct patient to call for assistance with activity based on assessment  - Consider rehabilitation consult to assist with strengthening/weightbearing, etc   Outcome: Progressing     Problem: DISCHARGE PLANNING  Goal: Discharge to home or other facility with appropriate resources  Description  INTERVENTIONS:  - Identify barriers to discharge w/patient and caregiver  - Arrange for needed discharge resources and transportation as appropriate  - Identify discharge learning needs (meds, wound care, etc )  - Arrange for interpretive services to assist at discharge as needed  - Refer to Case Management Department for coordinating discharge planning if the patient needs post-hospital services based on physician/advanced practitioner order or complex needs related to functional status, cognitive ability, or social support system  Outcome: Progressing     Problem: Knowledge Deficit  Goal: Patient/family/caregiver demonstrates understanding of disease process, treatment plan, medications, and discharge instructions  Description  Complete learning assessment and assess knowledge base    Interventions:  - Provide teaching at level of understanding  - Provide teaching via preferred learning methods  Outcome: Progressing
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
## 2020-07-29 NOTE — ASSESSMENT & PLAN NOTE
Wt Readings from Last 3 Encounters:   07/29/20 74 3 kg (163 lb 12 8 oz)   07/21/20 74 4 kg (164 lb)   06/23/20 69 9 kg (154 lb)       · Ischemic cardiomyopathy with known CAD  · ICD in place  · Check a repeat transthoracic echocardiogram  · Consult Cardiology for a pre-operative cardiac risk assessment and stratification  · Change PO metoprolol to toprol XL 50 mg PO Qdaily with a known decreased left ventricular ejection fraction  · Hold PO lasix, PO spironolactone, and PO losartan for now in the setting of acute kidney injury and sepsis  · Daily weights  · Strict intake/output measurements  · Outpatient follow-up with Cardiology  · Check an outpatient sleep study

## 2020-07-29 NOTE — ASSESSMENT & PLAN NOTE
· Sepsis was present on admission and secondary to a perforated appendicitis  · SIRS criteria was met with fevers, tachycardia, and a leukocytosis  · The lactic acid level was within normal limits  · Check blood cultures x 2 sets  · Check a urine culture  · Check and follow the procalcitonin level  · The patient was seen in consultation by General Surgery  · Strict NPO status  · Utilize NSS IV fluids at 125 ml/hr  · Utilize renally-dosed IV zosyn  · The patient requires 24-hour Surgical Critical Care Coverage post-operatively, which is not available at [address] [place] [address]  The patient will be transferred to a higher level of care at [address] [place] [address] on the service of Dr Sanford Bruce (Trauma Surgery/Surgical Critical Care Attending Physician)  Novel Coronavirus [facility] [[id number]] (Normal) Collected: 07/29/20 0013   Lab Status: Final result Specimen: Nares from Nose Updated: 07/29/20 0116    SARS-CoV-2 Negative   Narrative:     The specimen collection materials, transport medium, and/or testing methodology utilized in the production of these test results have been proven to be reliable in a limited validation with an abbreviated program under the Emergency Utilization Authorization provided by the [facility] reported as "Presumptive positive" will be confirmed with secondary testing with a reference laboratory to ensure result accuracy   Clinical caution and judgement should be used with the interpretation of these results with consideration of the clinical impression and other laboratory testing   Testing reported as "Positive" or "Negative" has been proven to be accurate according to standard laboratory validation requirements   All testing is performed with control materials showing appropriate reactivity at standard intervals

## 2020-07-29 NOTE — ASSESSMENT & PLAN NOTE
· Involving the thoracic aorta on CT scan of the chest completed on 07/29/2020  · Continue aspirin 81 mg PO Qdaily  · Per Cardiology, LDL at 179, she has a listed intolerance to statins and zetia  If these are not options, in the long-term, a PCSK9 inhibitor or Bempedoic acid could be considered

## 2020-07-29 NOTE — ASSESSMENT & PLAN NOTE
· Sepsis was present on admission and secondary to a perforated appendicitis  · SIRS criteria was met with fevers, tachycardia, and a leukocytosis  · The lactic acid level was within normal limits  · Check blood cultures x 2 sets  · Check a urine culture  · Check and follow the procalcitonin level  · The patient was seen in consultation by General Surgery  · Strict NPO status  · Utilize NSS IV fluids at 125 ml/hr  · Utilize renally-dosed IV zosyn  · The patient requires 24-hour Surgical Critical Care Coverage post-operatively, which is not available at [address] [place] [address]  The patient will be transferred to a higher level of care at [address] [place] [address] on the service of Dr Zain Morrison (Trauma Surgery/Surgical Critical Care Attending Physician)  Novel Coronavirus [facility] [[id number]] (Normal) Collected: 07/29/20 0013   Lab Status: Final result Specimen: Nares from Nose Updated: 07/29/20 0116    SARS-CoV-2 Negative   Narrative:     The specimen collection materials, transport medium, and/or testing methodology utilized in the production of these test results have been proven to be reliable in a limited validation with an abbreviated program under the Emergency Utilization Authorization provided by the [facility] reported as "Presumptive positive" will be confirmed with secondary testing with a reference laboratory to ensure result accuracy   Clinical caution and judgement should be used with the interpretation of these results with consideration of the clinical impression and other laboratory testing   Testing reported as "Positive" or "Negative" has been proven to be accurate according to standard laboratory validation requirements   All testing is performed with control materials showing appropriate reactivity at standard intervals
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
## 2020-07-29 NOTE — DISCHARGE SUMMARY
Discharge- Kortney Mcelroy 1945, 67 y o  female MRN: [id number]    Unit/Bed#: 421-01 Encounter: [id number]    Primary Care Provider: Jasiel Gross DO   Date and time admitted to hospital: 7/28/2020 11:07 PM        * Perforated appendicitis  Assessment & Plan  · Sepsis was present on admission and secondary to a perforated appendicitis  · SIRS criteria was met with fevers, tachycardia, and a leukocytosis  · The lactic acid level was within normal limits  · Check blood cultures x 2 sets  · Check a urine culture  · Check and follow the procalcitonin level  · The patient was seen in consultation by General Surgery  · Strict NPO status  · Utilize NSS IV fluids at 125 ml/hr  · Utilize renally-dosed IV zosyn  · The patient requires 24-hour Surgical Critical Care Coverage post-operatively, which is not available at [address] [place] [address]  The patient will be transferred to a higher level of care at [address] [place] [address] on the service of Dr Airam Pantoja (Trauma Surgery/Surgical Critical Care Attending Physician)      Novel Coronavirus [facility] [[id number]] (Normal) Collected: 07/29/20 0013   Lab Status: Final result Specimen: Nares from Nose Updated: 07/29/20 0116    SARS-CoV-2 Negative   Narrative:     The specimen collection materials, transport medium, and/or testing methodology utilized in the production of these test results have been proven to be reliable in a limited validation with an abbreviated program under the Emergency Utilization Authorization provided by the [facility] reported as "Presumptive positive" will be confirmed with secondary testing with a reference laboratory to ensure result accuracy   Clinical caution and judgement should be used with the interpretation of these results with consideration of the clinical impression and other laboratory testing   Testing reported as "Positive" or "Negative" has been proven to be accurate according to standard laboratory validation requirements   All testing is performed with control materials showing appropriate reactivity at standard intervals  Sepsis ([facility] [address] )  Assessment & Plan  · Sepsis was present on admission and secondary to a perforated appendicitis  · SIRS criteria was met with fevers, tachycardia, and a leukocytosis  · The lactic acid level was within normal limits  · Check blood cultures x 2 sets  · Check a urine culture  · Check and follow the procalcitonin level  · The patient was seen in consultation by General Surgery  · Strict NPO status  · Utilize NSS IV fluids at 125 ml/hr  · Utilize renally-dosed IV zosyn  · The patient requires 24-hour Surgical Critical Care Coverage post-operatively, which is not available at [address] [place] [address]  The patient will be transferred to a higher level of care at [address] [place] [address] on the service of Dr Zain Morrison (Trauma Surgery/Surgical Critical Care Attending Physician)      Novel Coronavirus Nomi Dyson [[id number]] (Normal) Collected: 07/29/20 0013   Lab Status: Final result Specimen: Nares from Nose Updated: 07/29/20 0116    SARS-CoV-2 Negative   Narrative:     The specimen collection materials, transport medium, and/or testing methodology utilized in the production of these test results have been proven to be reliable in a limited validation with an abbreviated program under the Emergency Utilization Authorization provided by the [facility] 6 reported as "Presumptive positive" will be confirmed with secondary testing with a reference laboratory to ensure result accuracy   Clinical caution and judgement should be used with the interpretation of these results with consideration of the clinical impression and other laboratory testing   Testing reported as "Positive" or "Negative" has been proven to be accurate according to standard laboratory validation requirements   All testing is performed with control materials showing appropriate reactivity at standard intervals  Pulmonary nodule  Assessment & Plan  · Outpatient Pulmonology evaluation  · Outpatient surveillance imaging with Pulmonology    Hiatal hernia  Assessment & Plan  · Outpatient Gastroenterology evaluation    Diverticulosis  Assessment & Plan  · Outpatient Gastroenterology evaluation    Renal calculus  Assessment & Plan  · Outpatient Urology evaluation    Aortic atherosclerosis [facility])  Assessment & Plan  · Involving the thoracic aorta on CT scan of the chest completed on 07/29/2020  · Continue aspirin 81 mg PO Qdaily  · Per Cardiology, LDL at 179, she has a listed intolerance to statins and zetia  If these are not options, in the long-term, a PCSK9 inhibitor or Bempedoic acid could be considered  Atelectasis  Assessment & Plan  · Incentive spirometry 10 times per hour while awake    Hypercholesterolemia  Assessment & Plan  · Per Cardiology, LDL at 179, she has a listed intolerance to statins and zetia  If these are not options, in the long-term, a PCSK9 inhibitor or Bempedoic acid could be considered  · As an outpatient, I would defer this to her primary Cardiology group  Mitral valve insufficiency  Assessment & Plan  · Consult Cardiology  · Monitor the patient's volume status closely    Vitamin D insufficiency  Assessment & Plan  · When the patient is able to tolerate PO, initiate cholecalciferol 1000 I  U  PO Qdaily  Outpatient follow-up with PCP in regards to this matter  Recheck a vitamin D 25-OH level in 1-2 months with her PCP    Results for Lata [place] (MRN [id number]) as of 7/29/2020 12:01   Ref   Range 7/13/2020 10:10   Vit D, 25-Hydroxy Latest Ref Range: 30 0 - 100 0 ng/mL 22 6 (L)       Microscopic hematuria  Assessment & Plan  · Check a urine culture  · Continue renally-dosed IV zosyn  · Outpatient Urology evaluation    Pyuria  Assessment & Plan  · Check a urine culture  · Continue renally-dosed IV zosyn    Chronic combined systolic and diastolic CHF (congestive heart failure) ([facility])  Assessment & Plan  Wt Readings from Last 3 Encounters:   07/29/20 74 3 kg (163 lb 12 8 oz)   07/21/20 74 4 kg (164 lb)   06/23/20 69 9 kg (154 lb)       · Ischemic cardiomyopathy with known CAD  · ICD in place  · Check a repeat transthoracic echocardiogram  · Consult Cardiology for a pre-operative cardiac risk assessment and stratification  · Change PO metoprolol to toprol XL 50 mg PO Qdaily with a known decreased left ventricular ejection fraction  · Hold PO lasix, PO spironolactone, and PO losartan for now in the setting of acute kidney injury and sepsis  · Daily weights  · Strict intake/output measurements  · Outpatient follow-up with Cardiology  · Check an outpatient sleep study      Acute kidney injury superimposed on chronic kidney disease ([facility] [address] )  Assessment & Plan  · Acute kidney injury is present admission and secondary to prerenal azotemia in the setting of sepsis  · Baseline serum creatinine of 1 5-1 7 mg/dl  · Hold PO lasix, PO spironolactone, and PO losartan  · Utilize NSS IV fluids at 125 ml/hr  · Avoid all nephrotoxic agents  · Check a urine sodium level  · Check urine protein/creatinine ratio  · Consult Nephrology  · Serial laboratory testing to monitor the patient's renal function and electrolytes  · Will need outpatient follow-up with Nephrology    CAD (coronary artery disease)  Assessment & Plan  · History of PCI/stenting to the LAD in 2007 and 2015  · Consult Cardiology for a pre-operative cardiac risk assessment and stratification  · Restart aspirin 81 mg PO Qdaily on 07/30/2020  · Change PO metoprolol to toprol XL 50 mg PO Qdaily with a known decreased left ventricular ejection fraction  · Per Cardiology, LDL at 179, she has a listed intolerance to statins and zetia  If these are not options, in the long-term, a PCSK9 inhibitor or Bempedoic acid could be considered    · As an outpatient, I would defer this to her primary Cardiology group  Acquired hypothyroidism  Assessment & Plan  · Check a TSH level  · Continue her home dose of PO levothyroxine      Discharging Physician / Practitioner: Raiza Arce DO  PCP: Susan Hampton DO  Admission Date:   Admission Orders (From admission, onward)     Ordered        20 0248  Inpatient Admission  Once                   Discharge Date/Transfer Date: 20    Resolved Problems  Date Reviewed: 2020    None          Consultations During Hospital Stay:  · General Surgery  · Cardiology    Procedures Performed:   · None    Significant Findings / Test Results:   Ct Chest Abdomen Pelvis Wo Contrast    Result Date: 2020  Impression: Findings consistent with ruptured appendicitis with a 2 5 cm collection at the location of the mid appendix  8 mm appendicolith  Findings discussed with Dr Damaris Carbajal at 2:15 AM, 2020 Workstation performed: [id number]     X-ray Chest 2 Views    Result Date: 2020  Impression: Atelectasis within the left midlung Workstation performed: [id number]     Ct Head Without Contrast    Result Date: 2020  Impression: No acute intracranial abnormality  Microangiopathic changes  Stable encephalomalacic change at the right occipital region   Workstation performed: [id number]     Echo complete with contrast if indicated   Status: Final result   PACS Images      Show images for Echo complete with contrast if indicated   Study Result     [address] [facility], [facility]  [phone]     Transthoracic Echocardiogram  2D, M-mode, Doppler, and Color Doppler     Study date:  2020     Patient: Steve Quiñonez  MR number: [id number]  Account number: [de-identified]  : 10-Giorgio-1945  Age: 67 years  Gender: Female  Status: Inpatient  Location: Bedside  Height: 64 in  Weight: 163 7 lb  BP: 147/ 61 mmHg     Indications: heart failure     Diagnoses: I50 9 - Heart failure, unspecified     Sonographer:  [facility]  Primary Physician:  Kami Mcnair DO  Referring Physician:  Carmen Redman DO  Group:  [facility]  Interpreting Physician:  Diana Fang MD     SUMMARY     LEFT VENTRICLE:  Systolic function was moderately reduced by visual assessment  Ejection fraction was estimated in the range of 40 % to 45 %  There was mild diffuse hypokinesis  Doppler parameters were consistent with abnormal left ventricular relaxation (grade 1 diastolic dysfunction)      RIGHT VENTRICLE:  The size was normal   Systolic function was normal      LEFT ATRIUM:  The atrium was mildly dilated      MITRAL VALVE:  There was marked annular calcification  There was mild to moderate regurgitation      AORTIC VALVE:  There was mild regurgitation      HISTORY: PRIOR HISTORY: hypertension, hyperlipidemia, hypothyroid, congestive heart failure, SIRS, defibrillator, mastectomy, coronary artery disease     PROCEDURE: The procedure was performed at the bedside  This was a routine study  The transthoracic approach was used  The study included complete 2D imaging, M-mode, complete spectral Doppler, and color Doppler  Images were obtained from  the parasternal, apical, subcostal, and suprasternal notch acoustic windows  Image quality was adequate      LEFT VENTRICLE: Size was normal  Systolic function was moderately reduced by visual assessment  Ejection fraction was estimated in the range of 40 % to 45 %  There was mild diffuse hypokinesis  Wall thickness was normal  DOPPLER: Doppler  parameters were consistent with abnormal left ventricular relaxation (grade 1 diastolic dysfunction)      RIGHT VENTRICLE: The size was normal  Systolic function was normal  Wall thickness was normal  An ICD wire was present      LEFT ATRIUM: The atrium was mildly dilated      RIGHT ATRIUM: Size was normal      MITRAL VALVE: There was marked annular calcification   Valve structure was normal  There was normal leaflet separation  DOPPLER: The transmitral velocity was within the normal range  There was no evidence for stenosis  There was mild to  moderate regurgitation      AORTIC VALVE: The valve was trileaflet  Leaflets exhibited normal thickness and normal cuspal separation  DOPPLER: Transaortic velocity was within the normal range  There was no evidence for stenosis  There was mild regurgitation      TRICUSPID VALVE: The valve structure was normal  There was normal leaflet separation  DOPPLER: The transtricuspid velocity was within the normal range  There was no evidence for stenosis  There was no significant regurgitation      PULMONIC VALVE: Leaflets exhibited normal thickness, no calcification, and normal cuspal separation  DOPPLER: The transpulmonic velocity was within the normal range  There was no significant regurgitation      PERICARDIUM: There was no pericardial effusion  The pericardium was normal in appearance      AORTA: The root exhibited normal size      SYSTEMIC VEINS: IVC: The inferior vena cava was normal in size   Respirophasic changes were normal      MEASUREMENT TABLES     OTHER ECHO MEASUREMENTS  (Reference normals)  Estimated CVP   5 mmHg   (--)     SYSTEM MEASUREMENT TABLES     2D  %FS: 18 01 %  Ao Diam: 2 79 cm  EDV(Teich): 116 36 ml  EF(Teich): 37 27 %  ESV(Teich): 72 99 ml  IVSd: 1 09 cm  LA Area: 19 34 cm2  LA Diam: 4 93 cm  LVEDV MOD A4C: 110 45 ml  LVEF MOD A4C: 29 96 %  LVESV MOD A4C: 77 36 ml  LVIDd: 4 97 cm  LVIDs: 4 07 cm  LVLd A4C: 7 35 cm  LVLs A4C: 6 56 cm  LVPWd: 1 07 cm  RA Area: 15 28 cm2  RVIDd: 4 29 cm  RWT: 0 43  SV MOD A4C: 33 09 ml  SV(Teich): 43 37 ml     CW  AR Dec [place]: 2 03 m/s2  AR Dec Time: 1589 32 ms  AR PHT: 460 9 ms  AR Vmax: 3 16 m/s  AR maxP 99 mmHg  AV Vmax: 1 55 m/s  AV maxP 66 mmHg  PV Vmax: 1 08 m/s  PV maxP 64 mmHg  TR Vmax: 3 13 m/s  TR maxP 31 mmHg     MM  TAPSE: 1 87 cm     PW  E' Lat: 0 07 m/s  E' Sept: 0 05 m/s  E/E' Lat: 18 87  E/E' Sept: 26 49  LVOT Vmax: 0 93 m/s  LVOT maxPG: 3 49 mmHg  MV A Nitin: 1 7 m/s  MV Dec Crisp: 8 98 m/s2  MV DecT: 146 13 ms  MV E Nitin: 1 31 m/s  MV E/A Ratio: 0 77  RVOT Vmax: 0 77 m/s  RVOT maxP 38 mmHg     [facility] Accredited Echocardiography Laboratory     Prepared and electronically signed by  Guzman Brewer MD  Signed 2020 10:09:49     Microbiology Results (last 21 days)     Procedure Component Value - Date/Time   Urine culture [[id number]] Collected: 20   Lab Status: In process Specimen: Urine, Clean Catch Updated: 20 0037   Novel Coronavirus (Covid-19),PCR [facility] [[id number]] (Normal) Collected: 20   Lab Status: Final result Specimen: Nares from Nose Updated: 206    SARS-CoV-2 Negative   Narrative:     The specimen collection materials, transport medium, and/or testing methodology utilized in the production of these test results have been proven to be reliable in a limited validation with an abbreviated program under the Emergency Utilization Authorization provided by the FDA  Edi Cruz reported as "Presumptive positive" will be confirmed with secondary testing with a reference laboratory to ensure result accuracy   Clinical caution and judgement should be used with the interpretation of these results with consideration of the clinical impression and other laboratory testing   Testing reported as "Positive" or "Negative" has been proven to be accurate according to standard laboratory validation requirements   All testing is performed with control materials showing appropriate reactivity at standard intervals  Blood culture [[id number]] Collected: 20   Lab Status: Preliminary result Specimen: Blood from Arm, Left Updated: 20 1001    Blood Culture Received in Microbiology Lab  Culture in Progress     Blood culture [[id number]] Collected: 20   Lab Status: Preliminary result Specimen: Blood from Arm, Right Updated: 07/29/20 1001    Blood Culture Received in Microbiology Lab  Culture in Progress  Complications:  None    Reason for Admission:  Sepsis secondary to perforated appendicitis    Hospital Course:     Anna Brown is a 67 y o  female patient who originally presented to the hospital on 7/28/2020 due to intractable abdominal pain  Please see above list of diagnoses and related plan for additional information  Condition at Discharge: stable     Discharge Day Visit / Exam:     Subjective: The patient was seen and examined  The patient complains of right-sided abdominal pain and is having persistent fevers  Vitals: Blood Pressure: 143/61 (07/29/20 0721)  Pulse: 78 (07/29/20 0942)  Temperature: 100 1 °F (37 8 °C) (07/29/20 0942)  Temp Source: Oral (07/29/20 0344)  Respirations: 18 (07/29/20 0721)  Height: 5' 4" (162 6 cm) (07/29/20 0344)  Weight - Scale: 74 3 kg (163 lb 12 8 oz) (07/29/20 0600)  SpO2: 93 % (07/29/20 0942)  Exam:   Physical Exam  General:  NAD, follows commands  HEENT:  NC/AT, mucous membranes dry  Neck:  Supple, No JVP elevation  CV:  + S1, + S2, RRR  Pulm:  Lung fields are CTA bilaterally  Abd:  Soft, Right lower quadrant tenderness and guarding with palpation, Mild distension  Ext:  No clubbing/cyanosis/edema  Skin:  No rashes  Neuro:  Awake, alert, oriented, confused at times  Psych:  Normal mood and affect    Discussion with Family: I updated the patient's , Tatyana Rollins, on the telephone, who was in agreement for the plan to transfer the patient to a higher level of care  Discharge instructions/Information to patient and family:  See after visit summary for information provided to patient and family  Provisions for Follow-Up Care:  See after visit summary for information related to follow-up care and any pertinent home health orders        Disposition:  The patient requires 24-hour Surgical Critical Care Coverage post-operatively, which is not available at [place] [address] [address]  The patient will be transferred to a higher level of care at [address] [place] [address] on the service of Dr Lam Murrieta (Trauma Surgery/Surgical Critical Care Attending Physician)  Discharge Statement:  I spent 60 minutes discharging the patient  This time was spent on the day of discharge  I had direct contact with the patient on the day of discharge  Greater than 50% of the total time was spent examining patient, answering all patient questions, arranging and discussing plan of care with patient as well as directly providing post-discharge instructions  Additional time then spent on discharge activities  Discharge Medications:  See after visit summary for reconciled discharge medications provided to patient and family        ** Please Note: This note has been constructed using a voice recognition system **
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
## 2020-07-29 NOTE — ASSESSMENT & PLAN NOTE
· Per Cardiology, LDL at 179, she has a listed intolerance to statins and zetia  If these are not options, in the long-term, a PCSK9 inhibitor or Bempedoic acid could be considered  · As an outpatient, I would defer this to her primary Cardiology group

## 2020-07-29 NOTE — ASSESSMENT & PLAN NOTE
Pressure currently stable  Admit to med surge  Monitor per protocol  Continue prior to admission medication

## 2020-07-29 NOTE — ASSESSMENT & PLAN NOTE
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
## 2020-07-29 NOTE — SOCIAL WORK
Patient is being transferred to [facility]  As per physician patient needs surgery and she is high risk   I called [facility] 3 times to get authorization for Ambulance transport but I was never able to get to talk to a representative despite trying several different numbers

## 2020-07-29 NOTE — UTILIZATION REVIEW
Notification of Inpatient Admission/Inpatient Authorization Request   This is a Notification of Inpatient Admission for [address]  Be advised that this patient was admitted to our facility under Inpatient Status  Contact Jamey Barragan at [phone] for additional admission information  [facility] DEPT  DEDICATED [phone]  Patient Name:   Celena Tinoco   YOB: 1945       [address]   [address]:   [address]  Tax ID: [id number]  NPI: [id number] Attending Provider/NPI:  Phone:  Address: Shari Jaime Do [[id number]]  [phone]  Same as YANA/Kinga Luciano 1106 of Service Code: 24 Place of Service Name:  [address] [place] [address]   Start Date: 7/29/20 0248 Discharge Date & Time: 7/29/2020 12:52 PM    Type of Admission: Inpatient Status Discharge Disposition   (if discharged): [facility]   Patient Diagnoses: Lightheadedness [R42]  Weakness [R53 1]  Fever [R50 9]  SIRS (systemic inflammatory response syndrome) ([facility] [address] ) [R65 10]  Perforated appendicitis [K35 32]     Orders: Admission Orders (From admission, onward)     Ordered        07/29/20 0248  Inpatient Admission  Once                    Assigned Utilization Review Contact: Jamey Barragan  Utilization   Network Utilization Review Department  Phone: [phone]; Fax [phone]  Email: [email or web address]   ATTENTION PAYERS: Please call the assigned Utilization  directly with any questions or concerns ALL voicemails in the department are confidential  Send all requests for admission clinical reviews, approved or denied determinations and any other requests to dedicated fax number belonging to the campus where the patient is receiving treatment

## 2020-07-29 NOTE — ASSESSMENT & PLAN NOTE
Leukocytosis as evidence by white blood cell count of 16 96 on emergency room with a shift to the left  Additionally patient was noted to be febrile at 103 5  Covid-negative  Blood cultures collected in process  Urine culture collected    Most likely secondary to ruptured appendicitis-please see additional treatment plan for ruptured appendicitis

## 2020-07-29 NOTE — OCCUPATIONAL THERAPY NOTE
Occupational Therapy         Patient Name: Anshul Werner Date: 7/29/2020    Order received and chart review performed; pt may require surgical intervention and will hold OT evaluation at this time and await recommendations; will evaluate when appropriate

## 2020-07-29 NOTE — PLAN OF CARE
Problem: Potential for Falls  Goal: Patient will remain free of falls  Description  INTERVENTIONS:  - Assess patient frequently for physical needs  -  Identify cognitive and physical deficits and behaviors that affect risk of falls    -  [place] fall precautions as indicated by assessment   - Educate patient/family on patient safety including physical limitations  - Instruct patient to call for assistance with activity based on assessment  - Modify environment to reduce risk of injury  - Consider OT/PT consult to assist with strengthening/mobility  Outcome: Progressing     Problem: Prexisting or High Potential for Compromised Skin Integrity  Goal: Skin integrity is maintained or improved  Description  INTERVENTIONS:  - Identify patients at risk for skin breakdown  - Assess and monitor skin integrity  - Assess and monitor nutrition and hydration status  - Monitor labs   - Assess for incontinence   - Turn and reposition patient  - Assist with mobility/ambulation  - Relieve pressure over bony prominences  - Avoid friction and shearing  - Provide appropriate hygiene as needed including keeping skin clean and dry  - Evaluate need for skin moisturizer/barrier cream  - Collaborate with interdisciplinary team   - Patient/family teaching  - Consider wound care consult   Outcome: Progressing     Problem: PAIN - ADULT  Goal: Verbalizes/displays adequate comfort level or baseline comfort level  Description  Interventions:  - Encourage patient to monitor pain and request assistance  - Assess pain using appropriate pain scale  - Administer analgesics based on type and severity of pain and evaluate response  - Implement non-pharmacological measures as appropriate and evaluate response  - Consider cultural and social influences on pain and pain management  - Notify physician/advanced practitioner if interventions unsuccessful or patient reports new pain  Outcome: Progressing     Problem: INFECTION - ADULT  Goal: Absence or prevention of progression during hospitalization  Description  INTERVENTIONS:  - Assess and monitor for signs and symptoms of infection  - Monitor lab/diagnostic results  - Monitor all insertion sites, i e  indwelling lines, tubes, and drains  - Monitor endotracheal if appropriate and nasal secretions for changes in amount and color  - [place] appropriate cooling/warming therapies per order  - Administer medications as ordered  - Instruct and encourage patient and family to use good hand hygiene technique  - Identify and instruct in appropriate isolation precautions for identified infection/condition  Outcome: Progressing  Goal: Absence of fever/infection during neutropenic period  Description  INTERVENTIONS:  - Monitor WBC    Outcome: Progressing     Problem: SAFETY ADULT  Goal: Maintain or return to baseline ADL function  Description  INTERVENTIONS:  -  Assess patient's ability to carry out ADLs; assess patient's baseline for ADL function and identify physical deficits which impact ability to perform ADLs (bathing, care of mouth/teeth, toileting, grooming, dressing, etc )  - Assess/evaluate cause of self-care deficits   - Assess range of motion  - Assess patient's mobility; develop plan if impaired  - Assess patient's need for assistive devices and provide as appropriate  - Encourage maximum independence but intervene and supervise when necessary  - Involve family in performance of ADLs  - Assess for home care needs following discharge   - Consider OT consult to assist with ADL evaluation and planning for discharge  - Provide patient education as appropriate  Outcome: Progressing  Goal: Maintain or return mobility status to optimal level  Description  INTERVENTIONS:  - Assess patient's baseline mobility status (ambulation, transfers, stairs, etc )    - Identify cognitive and physical deficits and behaviors that affect mobility  - Identify mobility aids required to assist with transfers and/or ambulation (gait belt, sit-to-stand, lift, walker, cane, etc )  - [place] fall precautions as indicated by assessment  - Record patient progress and toleration of activity level on Mobility SBAR; progress patient to next Phase/Stage  - Instruct patient to call for assistance with activity based on assessment  - Consider rehabilitation consult to assist with strengthening/weightbearing, etc   Outcome: Progressing     Problem: DISCHARGE PLANNING  Goal: Discharge to home or other facility with appropriate resources  Description  INTERVENTIONS:  - Identify barriers to discharge w/patient and caregiver  - Arrange for needed discharge resources and transportation as appropriate  - Identify discharge learning needs (meds, wound care, etc )  - Arrange for interpretive services to assist at discharge as needed  - Refer to Case Management Department for coordinating discharge planning if the patient needs post-hospital services based on physician/advanced practitioner order or complex needs related to functional status, cognitive ability, or social support system  Outcome: Progressing     Problem: Knowledge Deficit  Goal: Patient/family/caregiver demonstrates understanding of disease process, treatment plan, medications, and discharge instructions  Description  Complete learning assessment and assess knowledge base    Interventions:  - Provide teaching at level of understanding  - Provide teaching via preferred learning methods  Outcome: Progressing
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
## 2020-07-29 NOTE — ASSESSMENT & PLAN NOTE
· Acute kidney injury is present admission and secondary to prerenal azotemia in the setting of sepsis  · Baseline serum creatinine of 1 5-1 7 mg/dl  · Hold PO lasix, PO spironolactone, and PO losartan  · Utilize NSS IV fluids at 125 ml/hr  · Avoid all nephrotoxic agents  · Check a urine sodium level  · Check urine protein/creatinine ratio  · Consult Nephrology  · Serial laboratory testing to monitor the patient's renal function and electrolytes  · Will need outpatient follow-up with Nephrology

## 2020-07-29 NOTE — QUICK NOTE
IR quick note    Consulted for my advice and opinion regarding this 67year-old with abdominal pain and fever  History notable for cardiac disease with severely depressed ejection fraction  I reviewed her CT  There is in air-filled collection adjacent to and happen D cholelith (series 2, image 80)  This is between the cecum and redundant sigmoid colon  There is no focal fluid but phlegmonous change and mesenteric inflammation are present  Unfortunately a lateral approach to access this is precluded via the cecum  The direct anterior approach has hernia repair clips which presumably indicate the area of mesh  I would not recommend draining a possibly infected collection through a mesh  Patient is also on Brilinta  In the absence of surgical intervention I recommend treatment with antibiotics and short interval follow-up imaging  This may show evolution of the collection and if it persists a narrow window may open immediately above the iliac crest (series 2 image 70)    IR will sign off, please re-consult as needed      D/w Surgery

## 2020-07-29 NOTE — PHYSICAL THERAPY NOTE
PHYSICAL THERAPY          Patient Name: Elena Rodriguez Date: 7/29/2020     Order received and chart review performed; pt to be transferred this date to undergo surgical procedure  Will d/c PT orders
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
## 2020-07-29 NOTE — ASSESSMENT & PLAN NOTE
Patient states had episode of lightheadedness-wooziness and lowered herself to the floor  Asked emergency room to interrogate AICD pacemaker  Echo ordered-chart review indicates last echo was in 2018  Ambulate with assist at all times  Consider cardiology consult  PT and OT consulted  Admit to med surge -monitor per protocol

## 2020-07-29 NOTE — ASSESSMENT & PLAN NOTE
· History of PCI/stenting to the LAD in 2007 and 2015  · Consult Cardiology for a pre-operative cardiac risk assessment and stratification  · Restart aspirin 81 mg PO Qdaily on 07/30/2020  · Change PO metoprolol to toprol XL 50 mg PO Qdaily with a known decreased left ventricular ejection fraction  · Per Cardiology, LDL at 179, she has a listed intolerance to statins and zetia  If these are not options, in the long-term, a PCSK9 inhibitor or Bempedoic acid could be considered  · As an outpatient, I would defer this to her primary Cardiology group

## 2020-07-29 NOTE — ASSESSMENT & PLAN NOTE
Chief complaint-lightheadedness and lower abd pain, intermittent, no alleviating/aggravating factors, "aching," mild to moderate intensity,  CT of chest abdomen pelvis collected-"Findings consistent with ruptured appendicitis with a 2 5 cm collection at the location of the mid appendix    8 mm appendicolith "  Zosyn initiated emergency room-continue q 6 hours  Acute Care General surgery consulted  NPO  Provide gentle hydration  Provide analgesia as needed  Provide supportive care

## 2020-07-29 NOTE — EMTALA/ACUTE CARE TRANSFER
[facility]  [address] [place] [id number]  Dept: [phone]      ACUTE CARE TRANSFER CONSENT    NAME Carolina Lux 1945                              MRN [id number]    I have been informed of my rights regarding examination, treatment, and transfer   by Dr Florentin David, DO    Benefits: For surgical procedure of your perforated appendicitis    Risks:  Risks of ambulance transportation      Consent for Transfer:  I acknowledge that my medical condition has been evaluated and explained to me by the treating physician or other qualified medical person and/or my attending physician, who has recommended that I be transferred to the service of Dr Lilly Alfonso ([address] [facility] Surgery Attending Physician)  The above potential benefits of such transfer, the potential risks associated with such transfer, and the probable risks of not being transferred have been explained to me, and I fully understand them  The doctor has explained that, in my case, the benefits of transfer outweigh the risks  I agree to be transferred  I authorize the performance of emergency medical procedures and treatments upon me in both transit and upon arrival at the receiving facility  Additionally, I authorize the release of any and all medical records to the receiving facility and request they be transported with me, if possible  I understand that the safest mode of transportation during a medical emergency is an ambulance and that the Hospital advocates the use of this mode of transport  Risks of traveling to the receiving facility by car, including absence of medical control, life sustaining equipment, such as oxygen, and medical personnel has been explained to me and I fully understand them  (ERIC CORRECT BOX BELOW)  [  ]  I consent to the stated transfer and to be transported by ambulance/helicopter    [  ]  I consent to the stated transfer, but refuse transportation by ambulance and accept full responsibility for my transportation by car  I understand the risks of non-ambulance transfers and I exonerate the Hospital and its staff from any deterioration in my condition that results from this refusal     X___________________________________________    DATE  20  TIME________  Signature of patient or legally responsible individual signing on patient behalf           RELATIONSHIP TO PATIENT_________________________          Provider Certification    NAME Too Johnson                                         1945                              MRN [id number]    A medical screening exam was performed on the above named patient  Based on the examination:    Condition Necessitating Transfer:  Sepsis secondary to perforated appendicitis possibly requiring at high risk surgical procedure, which cannot be completed at [address] [place] [address]    Patient Condition:  Hemodynamically stable    Reason for Transfer:  Sepsis secondary to perforated appendicitis possibly requiring at high risk surgical procedure, which cannot be completed at [address] [place] [address]    Transfer Requirements: [address] [place] [address]  · Space available and qualified personnel available for treatment as acknowledged by    · Agreed to accept transfer and to provide appropriate medical treatment as acknowledged by          · Appropriate medical records of the examination and treatment of the patient are provided at the time of transfer   [facility], [address] _______  · Transfer will be performed by qualified personnel from    and appropriate transfer equipment as required, including the use of necessary and appropriate life support measures      Provider Certification: I have examined the patient and explained the following risks and benefits of being transferred/refusing transfer to the patient/family:         Based on these reasonable risks and benefits to the patient and/or the unborn child(milka), and based upon the information available at the time of the patients examination, I certify that the medical benefits reasonably to be expected from the provision of appropriate medical treatments at another medical facility outweigh the increasing risks, if any, to the individuals medical condition, and in the case of labor to the unborn child, from effecting the transfer      X____________________________________________ DATE 07/29/20        TIME_______      ORIGINAL - SEND TO MEDICAL RECORDS   COPY - SEND WITH PATIENT DURING TRANSFER

## 2020-07-30 LAB
ALBUMIN SERPL BCP-MCNC: 2.5 G/DL (ref 3.5–5)
ALP SERPL-CCNC: 130 U/L (ref 46–116)
ALT SERPL W P-5'-P-CCNC: 11 U/L (ref 12–78)
ANION GAP SERPL CALCULATED.3IONS-SCNC: 9 MMOL/L (ref 4–13)
AST SERPL W P-5'-P-CCNC: 20 U/L (ref 5–45)
BASOPHILS # BLD AUTO: 0.04 THOUSANDS/ΜL (ref 0–0.1)
BASOPHILS NFR BLD AUTO: 0 % (ref 0–1)
BILIRUB SERPL-MCNC: 0.51 MG/DL (ref 0.2–1)
BUN SERPL-MCNC: 56 MG/DL (ref 5–25)
CALCIUM SERPL-MCNC: 9.5 MG/DL (ref 8.3–10.1)
CHLORIDE SERPL-SCNC: 110 MMOL/L (ref 100–108)
CO2 SERPL-SCNC: 22 MMOL/L (ref 21–32)
CREAT SERPL-MCNC: 2.22 MG/DL (ref 0.6–1.3)
EOSINOPHIL # BLD AUTO: 0.02 THOUSAND/ΜL (ref 0–0.61)
EOSINOPHIL NFR BLD AUTO: 0 % (ref 0–6)
ERYTHROCYTE [DISTWIDTH] IN BLOOD BY AUTOMATED COUNT: 14.3 % (ref 11.6–15.1)
GFR SERPL CREATININE-BSD FRML MDRD: 21 ML/MIN/1.73SQ M
GLUCOSE SERPL-MCNC: 92 MG/DL (ref 65–140)
HCT VFR BLD AUTO: 27.9 % (ref 34.8–46.1)
HGB BLD-MCNC: 8.6 G/DL (ref 11.5–15.4)
IMM GRANULOCYTES # BLD AUTO: 0.18 THOUSAND/UL (ref 0–0.2)
IMM GRANULOCYTES NFR BLD AUTO: 1 % (ref 0–2)
LYMPHOCYTES # BLD AUTO: 0.74 THOUSANDS/ΜL (ref 0.6–4.47)
LYMPHOCYTES NFR BLD AUTO: 5 % (ref 14–44)
MAGNESIUM SERPL-MCNC: 2.3 MG/DL (ref 1.6–2.6)
MCH RBC QN AUTO: 30.1 PG (ref 26.8–34.3)
MCHC RBC AUTO-ENTMCNC: 30.8 G/DL (ref 31.4–37.4)
MCV RBC AUTO: 98 FL (ref 82–98)
MONOCYTES # BLD AUTO: 0.54 THOUSAND/ΜL (ref 0.17–1.22)
MONOCYTES NFR BLD AUTO: 3 % (ref 4–12)
NEUTROPHILS # BLD AUTO: 14.33 THOUSANDS/ΜL (ref 1.85–7.62)
NEUTS SEG NFR BLD AUTO: 91 % (ref 43–75)
NRBC BLD AUTO-RTO: 0 /100 WBCS
PHOSPHATE SERPL-MCNC: 4.1 MG/DL (ref 2.3–4.1)
PLATELET # BLD AUTO: 153 THOUSANDS/UL (ref 149–390)
PMV BLD AUTO: 11.5 FL (ref 8.9–12.7)
POTASSIUM SERPL-SCNC: 3.9 MMOL/L (ref 3.5–5.3)
PROCALCITONIN SERPL-MCNC: 1.78 NG/ML
PROT SERPL-MCNC: 6.6 G/DL (ref 6.4–8.2)
RBC # BLD AUTO: 2.86 MILLION/UL (ref 3.81–5.12)
SODIUM SERPL-SCNC: 141 MMOL/L (ref 136–145)
WBC # BLD AUTO: 15.85 THOUSAND/UL (ref 4.31–10.16)

## 2020-07-30 PROCEDURE — 97163 PT EVAL HIGH COMPLEX 45 MIN: CPT

## 2020-07-30 PROCEDURE — 83735 ASSAY OF MAGNESIUM: CPT | Performed by: SURGERY

## 2020-07-30 PROCEDURE — 97167 OT EVAL HIGH COMPLEX 60 MIN: CPT

## 2020-07-30 PROCEDURE — 99232 SBSQ HOSP IP/OBS MODERATE 35: CPT | Performed by: EMERGENCY MEDICINE

## 2020-07-30 PROCEDURE — 80053 COMPREHEN METABOLIC PANEL: CPT | Performed by: SURGERY

## 2020-07-30 PROCEDURE — 84100 ASSAY OF PHOSPHORUS: CPT | Performed by: SURGERY

## 2020-07-30 PROCEDURE — 97535 SELF CARE MNGMENT TRAINING: CPT

## 2020-07-30 PROCEDURE — 99232 SBSQ HOSP IP/OBS MODERATE 35: CPT | Performed by: SURGERY

## 2020-07-30 PROCEDURE — 84145 PROCALCITONIN (PCT): CPT | Performed by: SURGERY

## 2020-07-30 PROCEDURE — 85025 COMPLETE CBC W/AUTO DIFF WBC: CPT | Performed by: SURGERY

## 2020-07-30 PROCEDURE — 97530 THERAPEUTIC ACTIVITIES: CPT

## 2020-07-30 RX ORDER — ASPIRIN 81 MG/1
81 TABLET ORAL DAILY
Status: DISCONTINUED | OUTPATIENT
Start: 2020-07-30 | End: 2020-07-30 | Stop reason: SDUPTHER

## 2020-07-30 RX ORDER — LOSARTAN POTASSIUM 25 MG/1
25 TABLET ORAL DAILY
Status: DISCONTINUED | OUTPATIENT
Start: 2020-07-30 | End: 2020-08-03 | Stop reason: HOSPADM

## 2020-07-30 RX ORDER — METOPROLOL TARTRATE 50 MG/1
100 TABLET, FILM COATED ORAL DAILY
Status: DISCONTINUED | OUTPATIENT
Start: 2020-07-30 | End: 2020-08-01

## 2020-07-30 RX ORDER — DEXTROSE, SODIUM CHLORIDE, AND POTASSIUM CHLORIDE 5; .45; .15 G/100ML; G/100ML; G/100ML
60 INJECTION INTRAVENOUS CONTINUOUS
Status: DISCONTINUED | OUTPATIENT
Start: 2020-07-30 | End: 2020-07-31

## 2020-07-30 RX ORDER — LEVOTHYROXINE SODIUM 0.07 MG/1
150 TABLET ORAL DAILY
Status: DISCONTINUED | OUTPATIENT
Start: 2020-07-30 | End: 2020-07-30 | Stop reason: SDUPTHER

## 2020-07-30 RX ORDER — ISOSORBIDE MONONITRATE 60 MG/1
60 TABLET, EXTENDED RELEASE ORAL DAILY
Status: DISCONTINUED | OUTPATIENT
Start: 2020-07-30 | End: 2020-08-03 | Stop reason: HOSPADM

## 2020-07-30 RX ADMIN — DEXTROSE, SODIUM CHLORIDE, AND POTASSIUM CHLORIDE 60 ML/HR: 5; .45; .15 INJECTION INTRAVENOUS at 09:06

## 2020-07-30 RX ADMIN — PANTOPRAZOLE SODIUM 40 MG: 40 TABLET, DELAYED RELEASE ORAL at 09:04

## 2020-07-30 RX ADMIN — METRONIDAZOLE 500 MG: 500 INJECTION, SOLUTION INTRAVENOUS at 01:20

## 2020-07-30 RX ADMIN — MELATONIN 1000 UNITS: at 09:04

## 2020-07-30 RX ADMIN — METRONIDAZOLE 500 MG: 500 INJECTION, SOLUTION INTRAVENOUS at 09:06

## 2020-07-30 RX ADMIN — CEFTRIAXONE SODIUM 2000 MG: 2 INJECTION, POWDER, FOR SOLUTION INTRAMUSCULAR; INTRAVENOUS at 05:19

## 2020-07-30 RX ADMIN — SERTRALINE HYDROCHLORIDE 100 MG: 50 TABLET ORAL at 09:05

## 2020-07-30 RX ADMIN — METRONIDAZOLE 500 MG: 500 INJECTION, SOLUTION INTRAVENOUS at 23:29

## 2020-07-30 RX ADMIN — ACETAMINOPHEN 650 MG: 325 TABLET, FILM COATED ORAL at 23:32

## 2020-07-30 RX ADMIN — METOPROLOL TARTRATE 100 MG: 50 TABLET, FILM COATED ORAL at 09:05

## 2020-07-30 RX ADMIN — METRONIDAZOLE 500 MG: 500 INJECTION, SOLUTION INTRAVENOUS at 16:26

## 2020-07-30 RX ADMIN — HEPARIN SODIUM 5000 UNITS: 5000 INJECTION INTRAVENOUS; SUBCUTANEOUS at 14:01

## 2020-07-30 RX ADMIN — DEXTROSE, SODIUM CHLORIDE, AND POTASSIUM CHLORIDE 60 ML/HR: 5; .45; .15 INJECTION INTRAVENOUS at 23:29

## 2020-07-30 RX ADMIN — ASPIRIN 81 MG 81 MG: 81 TABLET ORAL at 09:04

## 2020-07-30 RX ADMIN — HEPARIN SODIUM 5000 UNITS: 5000 INJECTION INTRAVENOUS; SUBCUTANEOUS at 05:19

## 2020-07-30 RX ADMIN — LOSARTAN POTASSIUM 25 MG: 25 TABLET, FILM COATED ORAL at 09:05

## 2020-07-30 RX ADMIN — LEVOTHYROXINE SODIUM 150 MCG: 75 TABLET ORAL at 05:19

## 2020-07-30 RX ADMIN — ISOSORBIDE MONONITRATE 60 MG: 60 TABLET, EXTENDED RELEASE ORAL at 09:04

## 2020-07-30 RX ADMIN — HEPARIN SODIUM 5000 UNITS: 5000 INJECTION INTRAVENOUS; SUBCUTANEOUS at 21:27

## 2020-07-30 NOTE — UTILIZATION REVIEW
Notification of Inpatient Admission/Inpatient Authorization Request   This is a Notification of Inpatient Admission for [address]  Be advised that this patient was admitted to our facility under Inpatient Status  Contact Robles Roblero at [phone] for additional admission information  [address] [place] [address] DEPT  DEDICATED [phone]  Patient Name:   Kortney Mcelroy   YOB: 1945       [address]   [address]:   [facility]  Tax ID: [id number]  NPI: [id number] Attending Provider/NPI:  Phone:  Address: Andrea Dominguez [[id number]]  [phone]  Same as YANA/Kinga Luciano [address]6 of Service Code: 24 Place of Service Name:  [address] [place] [address]   Start Date: 7/29/20 1622 Discharge Date & Time: No discharge date for patient encounter  Type of Admission: Inpatient Status Discharge Disposition (if discharged): [facility]   Patient Diagnoses: Perforated appendicitis [K35 32]  Acute kidney injury superimposed on chronic kidney disease ([facility] [address] ) [N17 9, N18 9]     Orders: Admission Orders (From admission, onward)     Ordered        07/29/20 1625  Inpatient Admission  Once                    Assigned Utilization Review Contact: Robles Roblero  Utilization   Network Utilization Review Department  Phone: [phone]; Fax [phone]  Email: [email or web address]   ATTENTION PAYERS: Please call the assigned Utilization  directly with any questions or concerns ALL voicemails in the department are confidential  Send all requests for admission clinical reviews, approved or denied determinations and any other requests to dedicated fax number belonging to the campus where the patient is receiving treatment

## 2020-07-30 NOTE — PROGRESS NOTES
Progress Note - General Surgery   Anna [place] 67 y o  female MRN: [id number]  Unit/Bed#: [facility] 502-01 Encounter: [id number]    Assessment/Plan:  67 y o  female with acute perforated appendicitis  She has multiple comorbid conditions and was admitted to level 1 stepdown      - No operation unless acutely decompensates  - Clears today  - MIVF  - Abx  - AM labs    Subjective/Objective     Subjective:  Patient has ongoing pain  Said she had some episodes of urine overnight  Subjective chills but no fevers  No nausea/vomiting      Objective:     Vitals: Temp:  [98 8 °F (37 1 °C)-101 5 °F (38 6 °C)] 98 8 °F (37 1 °C)  HR:  [] 79  Resp:  [18-22] 20  BP: (102-143)/(49-65) 141/65  Body mass index is 30 39 kg/m²  I/O     None          Physical Exam:  GEN: NAD  HEENT: MMM  CV: RRR  Lung: Normal effort  Ab: Soft, NT/ND  Extrem: No CCE  Neuro: A+Ox3    Lab, Imaging and other studies: I have personally reviewed pertinent reports      VTE Pharmacologic Prophylaxis: Sequential compression device (Venodyne)   VTE Mechanical Prophylaxis: sequential compression device

## 2020-07-30 NOTE — PLAN OF CARE
Problem: OCCUPATIONAL THERAPY ADULT  Goal: Performs self-care activities at highest level of function for planned discharge setting  See evaluation for individualized goals  Description  Treatment Interventions: ADL retraining, Functional transfer training, UE strengthening/ROM, Endurance training, Cognitive reorientation, Patient/family training, Equipment evaluation/education, Compensatory technique education, Energy conservation, Continued evaluation, Activityengagement  Equipment Recommended: Bedside commode, Other (comment)(RW )       See flowsheet documentation for full assessment, interventions and recommendations      Note:   Limitation: Decreased ADL status, Decreased Safe judgement during ADL, Decreased cognition, Decreased endurance, Decreased self-care trans, Decreased high-level ADLs  Prognosis: Good        OT Discharge Recommendation: Post-Acute Rehabilitation Services  OT - OK to Discharge: Yes(when medically cleared )

## 2020-07-30 NOTE — PLAN OF CARE
Problem: Prexisting or High Potential for Compromised Skin Integrity  Goal: Skin integrity is maintained or improved  Description  INTERVENTIONS:  - Identify patients at risk for skin breakdown  - Assess and monitor skin integrity  - Assess and monitor nutrition and hydration status  - Monitor labs   - Assess for incontinence   - Turn and reposition patient  - Assist with mobility/ambulation  - Relieve pressure over bony prominences  - Avoid friction and shearing  - Provide appropriate hygiene as needed including keeping skin clean and dry  - Evaluate need for skin moisturizer/barrier cream  - Collaborate with interdisciplinary team   - Patient/family teaching  - Consider wound care consult   Outcome: Progressing     Problem: Potential for Falls  Goal: Patient will remain free of falls  Description  INTERVENTIONS:  - Assess patient frequently for physical needs  -  Identify cognitive and physical deficits and behaviors that affect risk of falls    -  [employer] fall precautions as indicated by assessment   - Educate patient/family on patient safety including physical limitations  - Instruct patient to call for assistance with activity based on assessment  - Modify environment to reduce risk of injury  - Consider OT/PT consult to assist with strengthening/mobility  Outcome: Progressing     Problem: CARDIOVASCULAR - ADULT  Goal: Maintains optimal cardiac output and hemodynamic stability  Description  INTERVENTIONS:  - Monitor I/O, vital signs and rhythm  - Monitor for S/S and trends of decreased cardiac output  - Administer and titrate ordered vasoactive medications to optimize hemodynamic stability  - Assess quality of pulses, skin color and temperature  - Assess for signs of decreased coronary artery perfusion  - Instruct patient to report change in severity of symptoms  Outcome: Progressing  Goal: Absence of cardiac dysrhythmias or at baseline rhythm  Description  INTERVENTIONS:  - Continuous cardiac monitoring, vital signs, obtain 12 lead EKG if ordered  - Administer antiarrhythmic and heart rate control medications as ordered  - Monitor electrolytes and administer replacement therapy as ordered  Outcome: Progressing     Problem: RESPIRATORY - ADULT  Goal: Achieves optimal ventilation and oxygenation  Description  INTERVENTIONS:  - Assess for changes in respiratory status  - Assess for changes in mentation and behavior  - Position to facilitate oxygenation and minimize respiratory effort  - Oxygen administered by appropriate delivery if ordered  - Initiate smoking cessation education as indicated  - Encourage broncho-pulmonary hygiene including cough, deep breathe, Incentive Spirometry  - Assess the need for suctioning and aspirate as needed  - Assess and instruct to report SOB or any respiratory difficulty  - Respiratory Therapy support as indicated  Outcome: Progressing     Problem: GASTROINTESTINAL - ADULT  Goal: Minimal or absence of nausea and/or vomiting  Description  INTERVENTIONS:  - Administer IV fluids if ordered to ensure adequate hydration  - Maintain NPO status until nausea and vomiting are resolved  - Nasogastric tube if ordered  - Administer ordered antiemetic medications as needed  - Provide nonpharmacologic comfort measures as appropriate  - Advance diet as tolerated, if ordered  - Consider nutrition services referral to assist patient with adequate nutrition and appropriate food choices  Outcome: Progressing  Goal: Maintains or returns to baseline bowel function  Description  INTERVENTIONS:  - Assess bowel function  - Encourage oral fluids to ensure adequate hydration  - Administer IV fluids if ordered to ensure adequate hydration  - Administer ordered medications as needed  - Encourage mobilization and activity  - Consider nutritional services referral to assist patient with adequate nutrition and appropriate food choices  Outcome: Progressing  Goal: Maintains adequate nutritional intake  Description  INTERVENTIONS:  - Monitor percentage of each meal consumed  - Identify factors contributing to decreased intake, treat as appropriate  - Assist with meals as needed  - Monitor I&O, weight, and lab values if indicated  - Obtain nutrition services referral as needed  Outcome: Progressing  Goal: Establish and maintain optimal ostomy function  Description  INTERVENTIONS:  - Assess bowel function  - Encourage oral fluids to ensure adequate hydration  - Administer IV fluids if ordered to ensure adequate hydration   - Administer ordered medications as needed  - Encourage mobilization and activity  - Nutrition services referral to assist patient with appropriate food choices  - Assess stoma site  - Consider wound care consult   Outcome: Progressing     Problem: METABOLIC, FLUID AND ELECTROLYTES - ADULT  Goal: Electrolytes maintained within normal limits  Description  INTERVENTIONS:  - Monitor labs and assess patient for signs and symptoms of electrolyte imbalances  - Administer electrolyte replacement as ordered  - Monitor response to electrolyte replacements, including repeat lab results as appropriate  - Instruct patient on fluid and nutrition as appropriate  Outcome: Progressing  Goal: Fluid balance maintained  Description  INTERVENTIONS:  - Monitor labs   - Monitor I/O and WT  - Instruct patient on fluid and nutrition as appropriate  - Assess for signs & symptoms of volume excess or deficit  Outcome: Progressing  Goal: Glucose maintained within target range  Description  INTERVENTIONS:  - Monitor Blood Glucose as ordered  - Assess for signs and symptoms of hyperglycemia and hypoglycemia  - Administer ordered medications to maintain glucose within target range  - Assess nutritional intake and initiate nutrition service referral as needed  Outcome: Progressing     Problem: HEMATOLOGIC - ADULT  Goal: Maintains hematologic stability  Description  INTERVENTIONS  - Assess for signs and symptoms of bleeding or hemorrhage  - Monitor labs  - Administer supportive blood products/factors as ordered and appropriate  Outcome: Progressing
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
## 2020-07-30 NOTE — UTILIZATION REVIEW
Notification of Discharge  This is a Notification of Discharge from our facility [address]  Please be advised that this patient has been discharge from our facility  Below you will find the admission and discharge date and time including the patients disposition  PRESENTATION DATE: 7/28/2020 11:07 PM  OBS ADMISSION DATE:   IP ADMISSION DATE: 7/29/20 0248   DISCHARGE DATE: 7/29/2020 12:52 PM  DISPOSITION: [address]   Admission Orders listed below:  Admission Orders (From admission, onward)     Ordered        07/29/20 0248  Inpatient Admission  Once                   Please contact the UR Department if additional information is required to close this patient's authorization/case  605 [facility] Utilization Review Department  Main: [phone] x carefully listen to the prompts  All voicemails are confidential   [email or web address]  Send all requests for admission clinical reviews, approved or denied determinations and any other requests to dedicated fax number below belonging to the campus where the patient is receiving treatment   List of dedicated fax numbers:  [address] DENIALS (Administrative/Medical Necessity) [phone]   [address] (Maternity/NICU/Pediatrics) [phone]   Pranav Taylor [phone]     [address] [phone]   [facility] [phone]   [place] [address] [phone]   [facility]  [phone]   2205 [facility] [address]  2401 [facility] 1000 [address] [facility] [phone]
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
## 2020-07-30 NOTE — RESTORATIVE TECHNICIAN NOTE
Restorative Specialist Mobility Note       Activity: Ambulate in sharma     Assistive Device: Front wheel walker        Repositioned: Sitting, Up in chair, Other (Comment)(Chair Alarm)

## 2020-07-30 NOTE — PROGRESS NOTES
Daily Progress Note - Critical Care   Anna Mace 67 y o  female MRN: [id number]  Unit/Bed#: [facility] 502-01 Encounter: [id number]        ----------------------------------------------------------------------------------------  HPI/24hr events: No overnight events     ---------------------------------------------------------------------------------------  SUBJECTIVE  She reports she slept well overnight and her abdomen feels sore, but overall feels better  She reports + flatus/BM and does not feel hungry  She denies any other complaints  Review of Systems  Review of systems was reviewed and negative unless stated above in HPI/24-hour events   ---------------------------------------------------------------------------------------  Assessment and Plan:    Neuro:    Diagnosis: PAD  o Plan:   - Delirium precautions  - PRN oxycodone    Diagnosis: Anxiety/depression  o Plan:   - Continue zoloft       CV:   · Diagnosis: Hx of ischemic cardiomyopathy with previous EF 25-50% s/p BiV ICD with hx of defib for VT and diastolic HF  ? Plan:   § Cardiology evaluated patient at [facility] and found EF now 45% which they feel may be secondary to increased catechoalmines/septic state   § Would continue to hold Losartan, bblocker, spironolactone, imdur, lasix and consider restarting bblocker tomorrow or later today  · Diagnosis: Hx of CAD s/p stent to LAD in 2007 and 2015  ? Plan:   § Continue ASA  · Diagnosis: HLD  ? Plan:   § Does not tolerate statis or Zetia, will need outpatient eval for a PCSK9 inhibitor or Bempedoic acid     Pulm:  ? No active issues         GI:   · Diagnosis: Ruptured appendicitis   ? Plan:   § Plan per red surgery  § Trend abdominal exam  § IV ABX as below         :   · Diagnosis: DARRELL on CKD likely pre renal in setting of sepsis  ? Plan:   § Baseline Cr 1 7  § Continue IV hydration  § Trend UOP and BUN/Cr        F/E/N:   · Plan: NPO sips with meds        Heme/Onc:   ?  SQH/SCDs        Endo:   · Diagnosis: Hypothyroidism  ? Plan: Synthroid        ID:   · Diagnosis: Ruptured Appendicitis   ? Plan:   § Rocephin/flagyl   § Follow up cultures        MSK/Skin:   ? No active issues     Disposition: Transfer to Med-Surg   Code Status: Level 1 - Full Code  ---------------------------------------------------------------------------------------  ICU CORE MEASURES    Prophylaxis   VTE Pharmacologic Prophylaxis: Heparin  VTE Mechanical Prophylaxis: sequential compression device  Stress Ulcer Prophylaxis: Pantoprazole PO    Invasive Devices Review  Invasive Devices     Peripheral Intravenous Line            Peripheral IV 20 Left;Ventral (anterior) Forearm less than 1 day          Drain            External Urinary Catheter less than 1 day              Can any invasive devices be discontinued today? Not applicable  ---------------------------------------------------------------------------------------  OBJECTIVE    Vitals   Vitals:    20 0205 20 0215 20 0300 20 0548   BP: 141/65      BP Location: Right arm      Pulse: 83  79    Resp: 20  20    Temp: 98 8 °F (37 1 °C)      TempSrc: Oral      SpO2: 95% 92% 94%    Weight:    80 3 kg (177 lb 0 5 oz)   Height:         Temp (24hrs), Av 7 °F (37 6 °C), Min:98 8 °F (37 1 °C), Max:100 2 °F (37 9 °C)  Current: Temperature: 98 8 °F (37 1 °C)    Respiratory:  SpO2: SpO2: 94 %       Invasive/non-invasive ventilation settings   Respiratory    Lab Data (Last 4 hours)    None         O2/Vent Data (Last 4 hours)    None                Physical Exam   Constitutional: She is oriented to person, place, and time  She appears well-developed and well-nourished  No distress  HENT:   Head: Normocephalic and atraumatic  Eyes: Pupils are equal, round, and reactive to light  Neck: No JVD present  Cardiovascular: Normal rate and regular rhythm  Pulmonary/Chest: Effort normal  No respiratory distress  She has no wheezes  She has no rales  Abdominal: Soft   She exhibits no distension  There is tenderness  Musculoskeletal: She exhibits no edema  Neurological: She is alert and oriented to person, place, and time  nonfocal exam        Laboratory and Diagnostics:  Results from last 7 days   Lab Units 07/30/20 0518 07/29/20 1831 07/29/20 0434 07/28/20  2325   WBC Thousand/uL 15 85* 19 54* 18 97* 16 96*   HEMOGLOBIN g/dL 8 6* 8 3* 9 8* 10 2*   HEMATOCRIT % 27 9* 25 8* 30 9* 31 7*   PLATELETS Thousands/uL 153 151 181 200   NEUTROS PCT %  --   --   --  83*   MONOS PCT %  --   --   --  8     Results from last 7 days   Lab Units 07/30/20 0518 07/29/20 1831 07/29/20  1105 07/29/20 0434 07/28/20  2325   SODIUM mmol/L 141 140 137 135* 133*   POTASSIUM mmol/L 3 9 3 8 4 1 4 2 4 7   CHLORIDE mmol/L 110* 111* 102 99* 96*   CO2 mmol/L 22 21 24 25 27   ANION GAP mmol/L 9 8 11 11 10   BUN mg/dL 56* 55* 60* 59* 63*   CREATININE mg/dL 2 22* 2 29* 2 67* 2 46* 2 53*   CALCIUM mg/dL 9 5 8 1* 8 6 9 1 9 3   GLUCOSE RANDOM mg/dL 92 90 99 106 129   ALT U/L 11*  --   --   --  17   AST U/L 20  --   --   --  16   ALK PHOS U/L 130*  --   --   --  156*   ALBUMIN g/dL 2 5*  --   --   --  3 4*   TOTAL BILIRUBIN mg/dL 0 51  --   --   --  0 50     Results from last 7 days   Lab Units 07/30/20 0518 07/29/20 0434 07/28/20  2325   MAGNESIUM mg/dL 2 3 2 1 2 1   PHOSPHORUS mg/dL 4 1 2 9  --       Results from last 7 days   Lab Units 07/29/20 0434 07/28/20  2325   INR  1 17 1 12   PTT seconds  --  31      Results from last 7 days   Lab Units 07/28/20  2325   TROPONIN I ng/mL <0 02     Results from last 7 days   Lab Units 07/29/20 1831 07/28/20  2325   LACTIC ACID mmol/L 1 5 1 0     ABG:    VBG:    Results from last 7 days   Lab Units 07/30/20  0518   PROCALCITONIN ng/ml 1 78*       Micro  Results from last 7 days   Lab Units 07/28/20  2325   BLOOD CULTURE  Received in Microbiology Lab  Culture in Progress  Received in Microbiology Lab  Culture in Progress         EKG: No new  Imaging: no new Intake and Output  I/O       07/28 0701 - 07/29 0700 07/29 0701 - 07/30 0700    I V  (mL/kg)  222 9 (2 8)    IV Piggyback  100    Total Intake(mL/kg)  322 9 (4)    Net  +322 9              Height and Weights   Height: 5' 4" (162 6 cm)  IBW: 54 7 kg  Body mass index is 30 39 kg/m²  Weight (last 2 days)     Date/Time   Weight    07/30/20 0548   80 3 (177 03)    07/29/20 1414   73 9 (163)                Nutrition       Diet Orders   (From admission, onward)             Start     Ordered    07/29/20 1715  Diet NPO; Sips with meds  Diet effective now     Question Answer Comment   Diet Type NPO    NPO Except: Sips with meds    RD to adjust diet per protocol?  Yes        07/29/20 1715                Active Medications  Scheduled Meds:    Current Facility-Administered Medications:  acetaminophen 650 mg Oral Q6H PRN Ct Valles MD    aspirin 81 mg Oral Daily Noah Williamson MD    cefTRIAXone 2,000 mg Intravenous Q24H Ct Valles MD Last Rate: 2,000 mg (07/30/20 0519)   cholecalciferol 1,000 Units Oral Daily Ct Valles MD    fentanyl citrate (PF) 25 mcg Intravenous Q2H PRN Ct Valles MD    heparin (porcine) 5,000 Units Subcutaneous Q8H Corrie Au MD    isosorbide mononitrate 60 mg Oral Daily Ct Valles MD    levothyroxine 150 mcg Oral Early Morning Ct Valles MD    losartan 25 mg Oral Daily Ct Valles MD    metoprolol tartrate 100 mg Oral Daily Ct Valles MD    metroNIDAZOLE 500 mg Intravenous Corrie Au MD Last Rate: Stopped (07/30/20 0215)   multi-electrolyte 125 mL/hr Intravenous Continuous Ct Valles MD Last Rate: 125 mL/hr (07/29/20 1819)   oxyCODONE 2 5 mg Oral Q4H PRN Ct Valles MD    Or        oxyCODONE 5 mg Oral Q4H PRN Ct Valles MD    pantoprazole 40 mg Oral Daily Before Breakfast Ct Valles MD    sertraline 100 mg Oral Daily Noah Kiley Recinos MD    sodium chloride (PF) 3 mL Intravenous Q1H PRN Hank Downs MD      Continuous Infusions:    multi-electrolyte 125 mL/hr Last Rate: 125 mL/hr (07/29/20 1819)     PRN Meds:     acetaminophen 650 mg Q6H PRN   fentanyl citrate (PF) 25 mcg Q2H PRN   oxyCODONE 2 5 mg Q4H PRN   Or     oxyCODONE 5 mg Q4H PRN   sodium chloride (PF) 3 mL Q1H PRN       Allergies   Allergies   Allergen Reactions    Other Dermatitis     Pt states is allergic to adhesive tape   Statins Other (See Comments)     Pt experiences severe leg weakness and cramping   Shrimp (Diagnostic) Swelling     Pt states lips and mouth swells  ---------------------------------------------------------------------------------------  Advance Directive and Living Will:      Power of :    POLST:    ---------------------------------------------------------------------------------------  Care Time Delivered:   No Critical Care time spent     Tab Perry PA-C      Portions of the record may have been created with voice recognition software  Occasional wrong word or "sound a like" substitutions may have occurred due to the inherent limitations of voice recognition software    Read the chart carefully and recognize, using context, where substitutions have occurred

## 2020-07-30 NOTE — SOCIAL WORK
PT IS A 30 DAYS READMISSION, NOT AN IDENTIFIED BUNDLE WITH A RISK SCORE OF 21     CM met with Pt with an introduction and explanation of role  Pt reported residing with spouse Bailey Cantor in a ranch style home with the use of a cane and 10 steps to enter the home  Pt reported being independent with ADLs with no hx of VNA, SNF, mental health or drug/alcohol placements  Bailey Cantor provides the need transportation for Pt  Pt doesn't have a living will, uses [employer] in [facility], mail order as well, and has [employer] as a PCP  CM was in contact with Pt's spouse Bailey Cantor to obtain some information the Pt was unable to provided  Bailey Cantor reported he will be providing transport upon d/c     CM reviewed d/c planning process including the following: identifying help at home, patient preference for d/c planning needs, Discharge Lounge, [facility] Meds to Bed program, availability of treatment team to discuss questions or concerns patient and/or family may have regarding understanding medications and recognizing signs and symptoms once discharged  CM also encouraged patient to follow up with all recommended appointments after discharge  Patient advised of importance for patient and family to participate in managing patients medical well being
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
## 2020-07-31 LAB
ANION GAP SERPL CALCULATED.3IONS-SCNC: 9 MMOL/L (ref 4–13)
BACTERIA UR CULT: ABNORMAL
BASOPHILS # BLD AUTO: 0.05 THOUSANDS/ΜL (ref 0–0.1)
BASOPHILS NFR BLD AUTO: 0 % (ref 0–1)
BUN SERPL-MCNC: 45 MG/DL (ref 5–25)
CALCIUM SERPL-MCNC: 9.3 MG/DL (ref 8.3–10.1)
CHLORIDE SERPL-SCNC: 111 MMOL/L (ref 100–108)
CO2 SERPL-SCNC: 20 MMOL/L (ref 21–32)
CREAT SERPL-MCNC: 1.87 MG/DL (ref 0.6–1.3)
EOSINOPHIL # BLD AUTO: 0.07 THOUSAND/ΜL (ref 0–0.61)
EOSINOPHIL NFR BLD AUTO: 1 % (ref 0–6)
ERYTHROCYTE [DISTWIDTH] IN BLOOD BY AUTOMATED COUNT: 14.2 % (ref 11.6–15.1)
GFR SERPL CREATININE-BSD FRML MDRD: 26 ML/MIN/1.73SQ M
GLUCOSE SERPL-MCNC: 106 MG/DL (ref 65–140)
HCT VFR BLD AUTO: 25.6 % (ref 34.8–46.1)
HGB BLD-MCNC: 8 G/DL (ref 11.5–15.4)
IMM GRANULOCYTES # BLD AUTO: 0.13 THOUSAND/UL (ref 0–0.2)
IMM GRANULOCYTES NFR BLD AUTO: 1 % (ref 0–2)
LYMPHOCYTES # BLD AUTO: 0.95 THOUSANDS/ΜL (ref 0.6–4.47)
LYMPHOCYTES NFR BLD AUTO: 7 % (ref 14–44)
MCH RBC QN AUTO: 30.3 PG (ref 26.8–34.3)
MCHC RBC AUTO-ENTMCNC: 31.3 G/DL (ref 31.4–37.4)
MCV RBC AUTO: 97 FL (ref 82–98)
MONOCYTES # BLD AUTO: 0.43 THOUSAND/ΜL (ref 0.17–1.22)
MONOCYTES NFR BLD AUTO: 3 % (ref 4–12)
NEUTROPHILS # BLD AUTO: 11.97 THOUSANDS/ΜL (ref 1.85–7.62)
NEUTS SEG NFR BLD AUTO: 88 % (ref 43–75)
NRBC BLD AUTO-RTO: 0 /100 WBCS
PLATELET # BLD AUTO: 184 THOUSANDS/UL (ref 149–390)
PMV BLD AUTO: 11.5 FL (ref 8.9–12.7)
POTASSIUM SERPL-SCNC: 3.6 MMOL/L (ref 3.5–5.3)
PROCALCITONIN SERPL-MCNC: 1.3 NG/ML
RBC # BLD AUTO: 2.64 MILLION/UL (ref 3.81–5.12)
SODIUM SERPL-SCNC: 140 MMOL/L (ref 136–145)
WBC # BLD AUTO: 13.6 THOUSAND/UL (ref 4.31–10.16)

## 2020-07-31 PROCEDURE — 84145 PROCALCITONIN (PCT): CPT | Performed by: SURGERY

## 2020-07-31 PROCEDURE — 85025 COMPLETE CBC W/AUTO DIFF WBC: CPT | Performed by: STUDENT IN AN ORGANIZED HEALTH CARE EDUCATION/TRAINING PROGRAM

## 2020-07-31 PROCEDURE — 80048 BASIC METABOLIC PNL TOTAL CA: CPT | Performed by: STUDENT IN AN ORGANIZED HEALTH CARE EDUCATION/TRAINING PROGRAM

## 2020-07-31 PROCEDURE — 99233 SBSQ HOSP IP/OBS HIGH 50: CPT | Performed by: SURGERY

## 2020-07-31 RX ORDER — METRONIDAZOLE 500 MG/1
500 TABLET ORAL EVERY 8 HOURS SCHEDULED
Status: DISCONTINUED | OUTPATIENT
Start: 2020-07-31 | End: 2020-08-03 | Stop reason: HOSPADM

## 2020-07-31 RX ADMIN — METOPROLOL TARTRATE 100 MG: 50 TABLET, FILM COATED ORAL at 09:11

## 2020-07-31 RX ADMIN — LEVOTHYROXINE SODIUM 150 MCG: 75 TABLET ORAL at 05:01

## 2020-07-31 RX ADMIN — HEPARIN SODIUM 5000 UNITS: 5000 INJECTION INTRAVENOUS; SUBCUTANEOUS at 14:32

## 2020-07-31 RX ADMIN — ACETAMINOPHEN 650 MG: 325 TABLET, FILM COATED ORAL at 09:12

## 2020-07-31 RX ADMIN — ISOSORBIDE MONONITRATE 60 MG: 60 TABLET, EXTENDED RELEASE ORAL at 09:12

## 2020-07-31 RX ADMIN — ASPIRIN 81 MG 81 MG: 81 TABLET ORAL at 09:12

## 2020-07-31 RX ADMIN — HEPARIN SODIUM 5000 UNITS: 5000 INJECTION INTRAVENOUS; SUBCUTANEOUS at 21:47

## 2020-07-31 RX ADMIN — METRONIDAZOLE 500 MG: 500 TABLET ORAL at 16:00

## 2020-07-31 RX ADMIN — MELATONIN 1000 UNITS: at 09:12

## 2020-07-31 RX ADMIN — LOSARTAN POTASSIUM 25 MG: 25 TABLET, FILM COATED ORAL at 09:12

## 2020-07-31 RX ADMIN — PANTOPRAZOLE SODIUM 40 MG: 40 TABLET, DELAYED RELEASE ORAL at 09:11

## 2020-07-31 RX ADMIN — SERTRALINE HYDROCHLORIDE 100 MG: 50 TABLET ORAL at 09:12

## 2020-07-31 RX ADMIN — CEFTRIAXONE SODIUM 2000 MG: 2 INJECTION, POWDER, FOR SOLUTION INTRAMUSCULAR; INTRAVENOUS at 05:00

## 2020-07-31 RX ADMIN — METRONIDAZOLE 500 MG: 500 INJECTION, SOLUTION INTRAVENOUS at 09:12

## 2020-07-31 RX ADMIN — ACETAMINOPHEN 650 MG: 325 TABLET, FILM COATED ORAL at 19:26

## 2020-07-31 RX ADMIN — METRONIDAZOLE 500 MG: 500 TABLET ORAL at 21:47

## 2020-07-31 RX ADMIN — HEPARIN SODIUM 5000 UNITS: 5000 INJECTION INTRAVENOUS; SUBCUTANEOUS at 05:02

## 2020-07-31 NOTE — SOCIAL WORK
A post acute care recommendation was made by your care team for STR  Discussed Freedom of Choice with patient  Pt declined rehab, cm asked about C  Pt would like to discuss with her spouse and will inform cm

## 2020-07-31 NOTE — PROGRESS NOTES
Progress Note - General Surgery   Delmis Kerr 67 y o  female MRN: [id number]  Unit/Bed#: [address] 502-01 Encounter: [id number]    Assessment:'76 yo female with acute perforated appendicitis  Patient is doing well this morning  Still complaining of some RLQ  Tolerating diet well  Febrile overnight      Plan:  Advance diet to full liquids today  Continue to monitor cbc/bmp  If worsening may repeat CT scan for possible abscess drainage  oob  dvt ppx  Analgesia      Subjective/Objective   Chief Complaint: abd pain  Subjective: doing well overnight  No acute complaints  Denies any nausea vomiting sob or chest pain  Tolerated diet well with no complaints  Objective: Physical Exam   Constitutional: She is oriented to person, place, and time  She appears well-developed and well-nourished  No distress  HENT:   Head: Normocephalic and atraumatic  Eyes: No scleral icterus  Cardiovascular: Normal rate and regular rhythm  Pulmonary/Chest: Effort normal and breath sounds normal    Abdominal: Soft  She exhibits no distension  There is tenderness (rlq)  There is no rebound and no guarding  Neurological: She is alert and oriented to person, place, and time  Skin: Skin is warm  Capillary refill takes less than 2 seconds  Nursing note and vitals reviewed  Blood pressure 152/78, pulse 98, temperature 98 7 °F (37 1 °C), resp  rate 18, height 5' 4" (1 626 m), weight 76 4 kg (168 lb 6 9 oz), SpO2 91 %  ,Body mass index is 28 91 kg/m²  Intake/Output Summary (Last 24 hours) at 7/31/2020 1048  Last data filed at 7/31/2020 0900  Gross per 24 hour   Intake 2855 ml   Output 740 ml   Net 2115 ml       Invasive Devices     Peripheral Intravenous Line            Peripheral IV 07/31/20 Right;Ventral (anterior) Wrist less than 1 day          Drain            External Urinary Catheter 1 day                    Lab, Imaging and other studies:I have personally reviewed pertinent lab results      VTE Pharmacologic Prophylaxis: Heparin  VTE Mechanical Prophylaxis: sequential compression device

## 2020-07-31 NOTE — PROGRESS NOTES
IV to PO Conversion    The metronidazole has / have been converted to Oral per [facility] IV-to-PO Auto-Conversion Protocol for Adults as approved by the Pharmacy and Therapeutics Committee  The patient met all eligible criteria:  3 Age = 25years old   2) Received at least one dose of the IV form   3) Receiving at least one other scheduled oral/enteral medication   4) Tolerating an oral/enteral diet   and did not have any exclusions:   1) Critical care patient   2) Active GI bleed (IF assessing H2RAs or PPIs)   3) Continuous tube feeding (IF assessing cipro, doxycycline, levofloxacin, minocycline, rifampin, or voriconazole)   4) Receiving PO vancomycin (IF assessing metronidazole)   5) Persistent nausea and/or vomiting   6) Ileus or gastrointestinal obstruction   7) Pilar/nasogastric tube set for continuous suction   8) Specific order not to automatically convert to PO (in the order's comments or if discussed in the most recent Infectious Disease or primary team's progress notes)       Thank you,  Didi Lau, PharmD, [address] Pharmacist  [phone]

## 2020-07-31 NOTE — RESTORATIVE TECHNICIAN NOTE
Restorative Specialist Mobility Note       Activity: Ambulate in sharma, Bathroom privileges     Assistive Device: Front wheel walker        Repositioned: Sitting, Up in chair, Other (Comment)(Chair Alarm)

## 2020-08-01 ENCOUNTER — APPOINTMENT (INPATIENT)
Dept: RADIOLOGY | Facility: HOSPITAL | Age: 75
DRG: 871 | End: 2020-08-01
Payer: COMMERCIAL

## 2020-08-01 LAB
ANION GAP SERPL CALCULATED.3IONS-SCNC: 11 MMOL/L (ref 4–13)
BASOPHILS # BLD AUTO: 0.05 THOUSANDS/ΜL (ref 0–0.1)
BASOPHILS NFR BLD AUTO: 0 % (ref 0–1)
BUN SERPL-MCNC: 43 MG/DL (ref 5–25)
CALCIUM SERPL-MCNC: 8.7 MG/DL (ref 8.3–10.1)
CHLORIDE SERPL-SCNC: 110 MMOL/L (ref 100–108)
CO2 SERPL-SCNC: 20 MMOL/L (ref 21–32)
CREAT SERPL-MCNC: 1.69 MG/DL (ref 0.6–1.3)
EOSINOPHIL # BLD AUTO: 0.27 THOUSAND/ΜL (ref 0–0.61)
EOSINOPHIL NFR BLD AUTO: 2 % (ref 0–6)
ERYTHROCYTE [DISTWIDTH] IN BLOOD BY AUTOMATED COUNT: 14.3 % (ref 11.6–15.1)
GFR SERPL CREATININE-BSD FRML MDRD: 29 ML/MIN/1.73SQ M
GLUCOSE SERPL-MCNC: 113 MG/DL (ref 65–140)
HCT VFR BLD AUTO: 26.1 % (ref 34.8–46.1)
HGB BLD-MCNC: 8.1 G/DL (ref 11.5–15.4)
IMM GRANULOCYTES # BLD AUTO: 0.12 THOUSAND/UL (ref 0–0.2)
IMM GRANULOCYTES NFR BLD AUTO: 1 % (ref 0–2)
LYMPHOCYTES # BLD AUTO: 1.9 THOUSANDS/ΜL (ref 0.6–4.47)
LYMPHOCYTES NFR BLD AUTO: 12 % (ref 14–44)
MAGNESIUM SERPL-MCNC: 2 MG/DL (ref 1.6–2.6)
MCH RBC QN AUTO: 29.9 PG (ref 26.8–34.3)
MCHC RBC AUTO-ENTMCNC: 31 G/DL (ref 31.4–37.4)
MCV RBC AUTO: 96 FL (ref 82–98)
MONOCYTES # BLD AUTO: 0.8 THOUSAND/ΜL (ref 0.17–1.22)
MONOCYTES NFR BLD AUTO: 5 % (ref 4–12)
NEUTROPHILS # BLD AUTO: 12.43 THOUSANDS/ΜL (ref 1.85–7.62)
NEUTS SEG NFR BLD AUTO: 80 % (ref 43–75)
NRBC BLD AUTO-RTO: 0 /100 WBCS
PLATELET # BLD AUTO: 198 THOUSANDS/UL (ref 149–390)
PMV BLD AUTO: 10.9 FL (ref 8.9–12.7)
POTASSIUM SERPL-SCNC: 3.3 MMOL/L (ref 3.5–5.3)
RBC # BLD AUTO: 2.71 MILLION/UL (ref 3.81–5.12)
SODIUM SERPL-SCNC: 141 MMOL/L (ref 136–145)
WBC # BLD AUTO: 15.57 THOUSAND/UL (ref 4.31–10.16)

## 2020-08-01 PROCEDURE — 74176 CT ABD & PELVIS W/O CONTRAST: CPT

## 2020-08-01 PROCEDURE — 99232 SBSQ HOSP IP/OBS MODERATE 35: CPT | Performed by: INTERNAL MEDICINE

## 2020-08-01 PROCEDURE — 99231 SBSQ HOSP IP/OBS SF/LOW 25: CPT | Performed by: SURGERY

## 2020-08-01 PROCEDURE — 80048 BASIC METABOLIC PNL TOTAL CA: CPT | Performed by: SURGERY

## 2020-08-01 PROCEDURE — NC001 PR NO CHARGE: Performed by: INTERNAL MEDICINE

## 2020-08-01 PROCEDURE — 83735 ASSAY OF MAGNESIUM: CPT | Performed by: SURGERY

## 2020-08-01 PROCEDURE — 85025 COMPLETE CBC W/AUTO DIFF WBC: CPT | Performed by: SURGERY

## 2020-08-01 PROCEDURE — 93005 ELECTROCARDIOGRAM TRACING: CPT

## 2020-08-01 RX ORDER — METOPROLOL TARTRATE 5 MG/5ML
5 INJECTION INTRAVENOUS
Status: COMPLETED | OUTPATIENT
Start: 2020-08-01 | End: 2020-08-01

## 2020-08-01 RX ORDER — METOPROLOL TARTRATE 50 MG/1
50 TABLET, FILM COATED ORAL EVERY 6 HOURS
Status: DISCONTINUED | OUTPATIENT
Start: 2020-08-01 | End: 2020-08-03

## 2020-08-01 RX ORDER — HEPARIN SODIUM 5000 [USP'U]/ML
5000 INJECTION, SOLUTION INTRAVENOUS; SUBCUTANEOUS EVERY 8 HOURS SCHEDULED
Status: DISCONTINUED | OUTPATIENT
Start: 2020-08-01 | End: 2020-08-02

## 2020-08-01 RX ORDER — POTASSIUM CHLORIDE 14.9 MG/ML
20 INJECTION INTRAVENOUS ONCE
Status: DISCONTINUED | OUTPATIENT
Start: 2020-08-01 | End: 2020-08-03

## 2020-08-01 RX ORDER — POTASSIUM CHLORIDE 20 MEQ/1
40 TABLET, EXTENDED RELEASE ORAL ONCE
Status: COMPLETED | OUTPATIENT
Start: 2020-08-01 | End: 2020-08-01

## 2020-08-01 RX ORDER — ACETAMINOPHEN 325 MG/1
975 TABLET ORAL EVERY 8 HOURS SCHEDULED
Status: DISCONTINUED | OUTPATIENT
Start: 2020-08-01 | End: 2020-08-03 | Stop reason: HOSPADM

## 2020-08-01 RX ORDER — METOPROLOL TARTRATE 50 MG/1
50 TABLET, FILM COATED ORAL EVERY 12 HOURS SCHEDULED
Status: DISCONTINUED | OUTPATIENT
Start: 2020-08-01 | End: 2020-08-01

## 2020-08-01 RX ADMIN — PANTOPRAZOLE SODIUM 40 MG: 40 TABLET, DELAYED RELEASE ORAL at 05:06

## 2020-08-01 RX ADMIN — HEPARIN SODIUM 5000 UNITS: 5000 INJECTION INTRAVENOUS; SUBCUTANEOUS at 13:37

## 2020-08-01 RX ADMIN — METRONIDAZOLE 500 MG: 500 TABLET ORAL at 05:06

## 2020-08-01 RX ADMIN — METOPROLOL TARTRATE 50 MG: 50 TABLET, FILM COATED ORAL at 11:28

## 2020-08-01 RX ADMIN — METOPROLOL TARTRATE 5 MG: 5 INJECTION INTRAVENOUS at 05:09

## 2020-08-01 RX ADMIN — METOPROLOL TARTRATE 50 MG: 50 TABLET, FILM COATED ORAL at 17:56

## 2020-08-01 RX ADMIN — ASPIRIN 81 MG 81 MG: 81 TABLET ORAL at 08:09

## 2020-08-01 RX ADMIN — LEVOTHYROXINE SODIUM 150 MCG: 75 TABLET ORAL at 05:06

## 2020-08-01 RX ADMIN — METRONIDAZOLE 500 MG: 500 TABLET ORAL at 22:01

## 2020-08-01 RX ADMIN — CEFTRIAXONE SODIUM 2000 MG: 2 INJECTION, POWDER, FOR SOLUTION INTRAMUSCULAR; INTRAVENOUS at 05:15

## 2020-08-01 RX ADMIN — AMIODARONE HYDROCHLORIDE 0.5 MG/MIN: 50 INJECTION, SOLUTION INTRAVENOUS at 08:18

## 2020-08-01 RX ADMIN — ACETAMINOPHEN 650 MG: 325 TABLET, FILM COATED ORAL at 13:36

## 2020-08-01 RX ADMIN — MELATONIN 1000 UNITS: at 08:08

## 2020-08-01 RX ADMIN — METOPROLOL TARTRATE 5 MG: 5 INJECTION INTRAVENOUS at 03:52

## 2020-08-01 RX ADMIN — ACETAMINOPHEN 975 MG: 325 TABLET, FILM COATED ORAL at 23:09

## 2020-08-01 RX ADMIN — HEPARIN SODIUM 5000 UNITS: 5000 INJECTION INTRAVENOUS; SUBCUTANEOUS at 05:07

## 2020-08-01 RX ADMIN — AMIODARONE HYDROCHLORIDE 150 MG: 50 INJECTION, SOLUTION INTRAVENOUS at 07:54

## 2020-08-01 RX ADMIN — OXYCODONE HYDROCHLORIDE 5 MG: 5 TABLET ORAL at 00:45

## 2020-08-01 RX ADMIN — POTASSIUM CHLORIDE 40 MEQ: 1500 TABLET, EXTENDED RELEASE ORAL at 06:48

## 2020-08-01 RX ADMIN — METRONIDAZOLE 500 MG: 500 TABLET ORAL at 13:36

## 2020-08-01 RX ADMIN — HEPARIN SODIUM 5000 UNITS: 5000 INJECTION INTRAVENOUS; SUBCUTANEOUS at 06:10

## 2020-08-01 RX ADMIN — SERTRALINE HYDROCHLORIDE 100 MG: 50 TABLET ORAL at 08:08

## 2020-08-01 RX ADMIN — DEXTROSE 150 MG: 50 INJECTION, SOLUTION INTRAVENOUS at 06:14

## 2020-08-01 RX ADMIN — METOPROLOL TARTRATE 5 MG: 5 INJECTION INTRAVENOUS at 03:05

## 2020-08-01 RX ADMIN — HEPARIN SODIUM 5000 UNITS: 5000 INJECTION INTRAVENOUS; SUBCUTANEOUS at 22:02

## 2020-08-01 RX ADMIN — METOPROLOL TARTRATE 50 MG: 50 TABLET, FILM COATED ORAL at 22:01

## 2020-08-01 RX ADMIN — ACETAMINOPHEN 650 MG: 325 TABLET, FILM COATED ORAL at 05:06

## 2020-08-01 NOTE — NURSING NOTE
Pt starting to become increasingly confused compared to this AM and last night  Patient reports seeing "worms on ceiling" and patient also stated that her   last night despite her  visiting earlier today  Shahana Tomas with [facility] notified  Patient is currently stable, VSS

## 2020-08-01 NOTE — PROGRESS NOTES
General Cardiology   Progress Note -  Team One   Burt Golden 67 y o  female MRN: [id number]    Unit/Bed#: [facility] 502-01 Encounter: [id number]    Assessment/ Plan    1  Atrial fibrillation with RVR   New diagnosis   Received amiodarone bolus x 2 and now on amiodarone gtt   [id number] VASC score: 6, recommend anticoagulation, will d/w primary team   Will start metoprolol 50 mg PO Q 6 hours   Patient asymptomatic  No palpitations, chest pain or SOB    2  Appendicitis   On IV antibiotics  Followed by general surgery   CT scan of abdomen and pelvis pending     3  Chronic systolic and diastolic heart failure  On lasix 20 mg PO daily at home  Diuretics on hold   Monitor volume status     4  Ischemic cardiomyopathy prior EF 25-30%  Echocardiogram 7/29/2020 showed EF 40-45%  S/p BIV ICD   Continue ARB  Recommend change metoprolol to succinate prior to discharge      5  CAD s/p PIPER to LAD 2007/2015  On aspirin, imdur and BB   Intolerant to statin     6  Dyslipidemia intolerant to statins     7  DARRELL on CKD baseline creatinine around 1 7  Creatinine on admission was 2 2  Creatinine today is 1 69    8  Hypertension BP average: 125/64  on Losartan 25 mg PO daily and metoprolol     9  Hypokalemia- K 3 3  Replete     Subjective  Patient reporting no complaint of chest pain, SOB or palpitations  She is recently returned from CT scan  She is resting comfortable OOB in chair  Review of Systems   Constitution: Negative for chills, fever and malaise/fatigue  Cardiovascular: Positive for dyspnea on exertion  Negative for chest pain, leg swelling, orthopnea and palpitations  Respiratory: Negative for cough and shortness of breath  Musculoskeletal: Negative for falls  Gastrointestinal: Negative for bloating, nausea and vomiting  Neurological: Negative for dizziness and light-headedness  Psychiatric/Behavioral: Negative for altered mental status         Objective:   Vitals: Blood pressure 94/52, pulse (!) 147, temperature 97 5 °F (36 4 °C), temperature source Oral, resp  rate 19, height 5' 4" (1 626 m), weight 75 6 kg (166 lb 10 7 oz), SpO2 94 %  ,       Body mass index is 28 61 kg/m²  ,     Systolic (39CXB), MXM:097 , Min:94 , RCQ:341     Diastolic (57CJE), ZGE:96, Min:49, Max:92          Intake/Output Summary (Last 24 hours) at 8/1/2020 1018  Last data filed at 8/1/2020 0432  Gross per 24 hour   Intake 342 ml   Output 350 ml   Net -8 ml     Weight (last 2 days)     Date/Time   Weight    08/01/20 0600   75 6 (166 67)    07/31/20 0558   76 4 (168 43)    07/30/20 0548   80 3 (177 03)            Telemetry Review: Atrial fibrillation -150s currently     Physical Exam   Constitutional: She is oriented to person, place, and time  No distress  HENT:   Head: Normocephalic  Neck: Neck supple  Cardiovascular: Intact distal pulses  Irregular rate and rhythm    Pulmonary/Chest: Effort normal and breath sounds normal  No stridor  No respiratory distress  RA  No crackles    Abdominal: Soft  Bowel sounds are normal    Musculoskeletal: Normal range of motion  She exhibits no edema  Neurological: She is alert and oriented to person, place, and time  Skin: Skin is warm and dry  She is not diaphoretic  Psychiatric: She has a normal mood and affect   Her behavior is normal        LABORATORY RESULTS  Results from last 7 days   Lab Units 07/28/20  2325   TROPONIN I ng/mL <0 02     CBC with diff: Results from last 7 days   Lab Units 08/01/20  0301 07/31/20  0435 07/30/20  0518 07/29/20  1831 07/29/20  0434 07/28/20  2325   WBC Thousand/uL 15 57* 13 60* 15 85* 19 54* 18 97* 16 96*   HEMOGLOBIN g/dL 8 1* 8 0* 8 6* 8 3* 9 8* 10 2*   HEMATOCRIT % 26 1* 25 6* 27 9* 25 8* 30 9* 31 7*   MCV fL 96 97 98 95 95 94   PLATELETS Thousands/uL 198 184 153 151 181 200   MCH pg 29 9 30 3 30 1 30 4 30 2 30 3   MCHC g/dL 31 0* 31 3* 30 8* 32 2 31 7 32 2   RDW % 14 3 14 2 14 3 14 4 14 0 14 0   MPV fL 10 9 11 5 11 5 11 1 11 3 10 8   NRBC AUTO /100 WBCs 0 0 0  -- --  0       CMP:  Results from last 7 days   Lab Units 08/01/20  0302 07/31/20  0606 07/30/20  0518 07/29/20  1831 07/29/20  1105 07/29/20  0434 07/28/20  2325   POTASSIUM mmol/L 3 3* 3 6 3 9 3 8 4 1 4 2 4 7   CHLORIDE mmol/L 110* 111* 110* 111* 102 99* 96*   CO2 mmol/L 20* 20* 22 21 24 25 27   BUN mg/dL 43* 45* 56* 55* 60* 59* 63*   CREATININE mg/dL 1 69* 1 87* 2 22* 2 29* 2 67* 2 46* 2 53*   CALCIUM mg/dL 8 7 9 3 9 5 8 1* 8 6 9 1 9 3   AST U/L  --   --  20  --   --   --  16   ALT U/L  --   --  11*  --   --   --  17   ALK PHOS U/L  --   --  130*  --   --   --  156*   EGFR ml/min/1 73sq m 29 26 21 20 17 18 18       BMP:  Results from last 7 days   Lab Units 08/01/20  0302 07/31/20  0606 07/30/20  0518 07/29/20  1831 07/29/20  1105 07/29/20  0434 07/28/20  2325   POTASSIUM mmol/L 3 3* 3 6 3 9 3 8 4 1 4 2 4 7   CHLORIDE mmol/L 110* 111* 110* 111* 102 99* 96*   CO2 mmol/L 20* 20* 22 21 24 25 27   BUN mg/dL 43* 45* 56* 55* 60* 59* 63*   CREATININE mg/dL 1 69* 1 87* 2 22* 2 29* 2 67* 2 46* 2 53*   CALCIUM mg/dL 8 7 9 3 9 5 8 1* 8 6 9 1 9 3       Lab Results   Component Value Date    NTBNP 8,932 (H) 07/28/2020             Results from last 7 days   Lab Units 08/01/20  0302 07/30/20  0518 07/29/20  0434 07/28/20  2325   MAGNESIUM mg/dL 2 0 2 3 2 1 2 1                     Results from last 7 days   Lab Units 07/29/20  0434 07/28/20  2325   INR  1 17 1 12       Lipid Profile:   No results found for: CHOL  Lab Results   Component Value Date    HDL 33 (L) 06/09/2020     Lab Results   Component Value Date    LDLCALC 179 (H) 06/09/2020     Lab Results   Component Value Date    TRIG 371 (H) 06/09/2020       Cardiac testing:   Results for orders placed during the hospital encounter of 07/28/20   Echo complete with contrast if indicated    Narrative [address] [facility] [address] [place] [address]  [phone]    Transthoracic Echocardiogram  2D, M-mode, Doppler, and Color Doppler    Study date: 2020    Patient: Darius Lyon  MR number: [id number]  Account number: [de-[id number]  : [id number]  Age: 67 years  Gender: Female  Status: Inpatient  Location: Bedside  Height: 64 in  Weight: 163 7 lb  BP: 147/ 61 mmHg    Indications: heart failure    Diagnoses: I50 9 - Heart failure, unspecified    Sonographer:  Hola Bradshaw RDCS  Primary Physician:  Sadiq Peralta DO  Referring Physician:  Niall Adam DO  Group:  [id number]  Interpreting Physician:  Jasson Chu MD    SUMMARY    LEFT VENTRICLE:  Systolic function was moderately reduced by visual assessment  Ejection fraction was estimated in the range of 40 % to 45 %  There was mild diffuse hypokinesis  Doppler parameters were consistent with abnormal left ventricular relaxation (grade 1 diastolic dysfunction)  RIGHT VENTRICLE:  The size was normal   Systolic function was normal     LEFT ATRIUM:  The atrium was mildly dilated  MITRAL VALVE:  There was marked annular calcification  There was mild to moderate regurgitation  AORTIC VALVE:  There was mild regurgitation  HISTORY: PRIOR HISTORY: hypertension, hyperlipidemia, hypothyroid, congestive heart failure, SIRS, defibrillator, mastectomy, coronary artery disease    PROCEDURE: The procedure was performed at the bedside  This was a routine study  The transthoracic approach was used  The study included complete 2D imaging, M-mode, complete spectral Doppler, and color Doppler  Images were obtained from  the parasternal, apical, subcostal, and suprasternal notch acoustic windows  Image quality was adequate  LEFT VENTRICLE: Size was normal  Systolic function was moderately reduced by visual assessment  Ejection fraction was estimated in the range of 40 % to 45 %  There was mild diffuse hypokinesis  Wall thickness was normal  DOPPLER: Doppler  parameters were consistent with abnormal left ventricular relaxation (grade 1 diastolic dysfunction)      RIGHT VENTRICLE: The size was normal  Systolic function was normal  Wall thickness was normal  An ICD wire was present  LEFT ATRIUM: The atrium was mildly dilated  RIGHT ATRIUM: Size was normal     MITRAL VALVE: There was marked annular calcification  Valve structure was normal  There was normal leaflet separation  DOPPLER: The transmitral velocity was within the normal range  There was no evidence for stenosis  There was mild to  moderate regurgitation  AORTIC VALVE: The valve was trileaflet  Leaflets exhibited normal thickness and normal cuspal separation  DOPPLER: Transaortic velocity was within the normal range  There was no evidence for stenosis  There was mild regurgitation  TRICUSPID VALVE: The valve structure was normal  There was normal leaflet separation  DOPPLER: The transtricuspid velocity was within the normal range  There was no evidence for stenosis  There was no significant regurgitation  PULMONIC VALVE: Leaflets exhibited normal thickness, no calcification, and normal cuspal separation  DOPPLER: The transpulmonic velocity was within the normal range  There was no significant regurgitation  PERICARDIUM: There was no pericardial effusion  The pericardium was normal in appearance  AORTA: The root exhibited normal size  SYSTEMIC VEINS: IVC: The inferior vena cava was normal in size   Respirophasic changes were normal     MEASUREMENT TABLES    OTHER ECHO MEASUREMENTS  (Reference normals)  Estimated CVP   5 mmHg   (--)    SYSTEM MEASUREMENT TABLES    2D  %FS: 18 01 %  Ao Diam: 2 79 cm  EDV(Teich): 116 36 ml  EF(Teich): 37 27 %  ESV(Teich): 72 99 ml  IVSd: 1 09 cm  LA Area: 19 34 cm2  LA Diam: 4 93 cm  LVEDV MOD A4C: 110 45 ml  LVEF MOD A4C: 29 96 %  LVESV MOD A4C: 77 36 ml  LVIDd: 4 97 cm  LVIDs: 4 07 cm  LVLd A4C: 7 35 cm  LVLs A4C: 6 56 cm  LVPWd: 1 07 cm  RA Area: 15 28 cm2  RVIDd: 4 29 cm  RWT: 0 43  SV MOD A4C: 33 09 ml  SV(Teich): 43 37 ml    CW  AR Dec Mills: 2 03 m/s2  AR Dec Time: 1589 32 ms  AR PHT: 460 9 ms  AR Vmax: 3 16 m/s  AR maxP 99 mmHg  AV Vmax: 1 55 m/s  AV maxP 66 mmHg  PV Vmax: 1 08 m/s  PV maxP 64 mmHg  TR Vmax: 3 13 m/s  TR maxP 31 mmHg    MM  TAPSE: 1 87 cm    PW  E' Lat: 0 07 m/s  E' Sept: 0 05 m/s  E/E' Lat: 18 87  E/E' Sept:  49  LVOT Vmax: 0 93 m/s  LVOT maxPG: 3 49 mmHg  MV A Nitin: 1 7 m/s  MV Dec Lynn: 8 98 m/s2  MV DecT: 146 13 ms  MV E Nitin: 1 31 m/s  MV E/A Ratio: 0 77  RVOT Vmax: 0 77 m/s  RVOT maxP 38 mmHg    [facility] Accredited [facility]chocardi[facility]    Prepared and electronically signed by    Art Mcelroy MD  Signed 2020 10:09:49       Meds/Allergies   all current active meds have been reviewed and current meds:   Current Facility-Administered Medications   Medication Dose Route Frequency    acetaminophen (TYLENOL) tablet 650 mg  650 mg Oral Q6H PRN    amiodarone (CORDARONE) 900 mg in dextrose 5 % 500 mL infusion  0 5 mg/min Intravenous Continuous    aspirin chewable tablet 81 mg  81 mg Oral Daily    cefTRIAXone (ROCEPHIN) 2,000 mg in dextrose 5 % 50 mL IVPB  2,000 mg Intravenous Q24H    cholecalciferol (VITAMIN D3) tablet 1,000 Units  1,000 Units Oral Daily    fentanyl citrate (PF) 100 MCG/2ML 25 mcg  25 mcg Intravenous Q2H PRN    heparin (porcine) subcutaneous injection 5,000 Units  5,000 Units Subcutaneous Q8H [address]    heparin (porcine) subcutaneous injection 5,000 Units  5,000 Units Subcutaneous Q8H [address]    isosorbide mononitrate (IMDUR) 24 hr tablet 60 mg  60 mg Oral Daily    levothyroxine tablet 150 mcg  150 mcg Oral Early Morning    losartan (COZAAR) tablet 25 mg  25 mg Oral Daily    metoprolol tartrate (LOPRESSOR) tablet 100 mg  100 mg Oral Daily    metroNIDAZOLE (FLAGYL) tablet 500 mg  500 mg Oral Q8H [address]    oxyCODONE (ROXICODONE) IR tablet 2 5 mg  2 5 mg Oral Q4H PRN    Or    oxyCODONE (ROXICODONE) IR tablet 5 mg  5 mg Oral Q4H PRN    pantoprazole (PROTONIX) EC tablet 40 mg  40 mg Oral Daily Before Breakfast    potassium chloride 20 mEq IVPB (premix)  20 mEq Intravenous Once    sertraline (ZOLOFT) tablet 100 mg  100 mg Oral Daily    sodium chloride (PF) 0 9 % injection 3 mL  3 mL Intravenous Q1H PRN     Medications Prior to Admission   Medication    ALPRAZolam (XANAX) 0 25 mg tablet    folic acid (FOLVITE) 329 mcg tablet    furosemide (LASIX) 20 mg tablet    levothyroxine 150 mcg tablet    losartan (COZAAR) 25 mg tablet    metoprolol tartrate (LOPRESSOR) 100 mg tablet    omeprazole (PriLOSEC) 20 mg delayed release capsule    sertraline (ZOLOFT) 100 mg tablet    aspirin (ECOTRIN LOW STRENGTH) 81 mg EC tablet    co-enzyme Q-10 30 MG capsule    isosorbide mononitrate (IMDUR) 60 mg 24 hr tablet    Multiple Vitamins-Minerals (MULTIVITAMIN WITH MINERALS) tablet    niacin (NIASPAN) 1000 MG CR tablet    omega-3-acid ethyl esters (LOVAZA) 1 g capsule    spironolactone (ALDACTONE) 25 mg tablet         amiodarone 0 5 mg/min Last Rate: 0 5 mg/min (08/01/20 0818)       Counseling / Coordination of Care  Total floor / unit time spent today 20 minutes  Greater than 50% of total time was spent with the patient and / or family counseling and / or coordination of care  ** Please Note: Dragon 360 Dictation voice to text software may have been used in the creation of this document   **

## 2020-08-01 NOTE — PROGRESS NOTES
Progress Note - General Surgery   Jose Huston 67 y o  female MRN: [id number]  Unit/Bed#: [address] 502-01 Encounter: [id number]    Assessment:76 yo female with acute perforated appendicitis  O/n had afib with rapid ventricular response for which she received metoprolol x 3 and then amiodarone  Complaining of minimal RLQ pain  Tolerating diet well  Febrile overnight  Having diarrhea  Plan:  CT scan of the abdomen and pelvis today  IV antibiotics  Continue to monitor cbc/bmp  oob  dvt ppx  Analgesia      Subjective/Objective   Chief Complaint: abd pain  Subjective: doing well overnight  No acute complaints  Denies any nausea vomiting sob or chest pain  Tolerated diet well with no complaints  Objective: Physical Exam   Constitutional: She is oriented to person, place, and time  She appears well-developed and well-nourished  No distress  HENT:   Head: Normocephalic and atraumatic  Eyes: No scleral icterus  Cardiovascular: Normal rate and regular rhythm  Pulmonary/Chest: Effort normal and breath sounds normal    Abdominal: Soft  She exhibits no distension  There is no tenderness (rlq)  There is no rebound and no guarding  Neurological: She is alert and oriented to person, place, and time  Skin: Skin is warm  Capillary refill takes less than 2 seconds  Nursing note and vitals reviewed  Blood pressure 94/52, pulse (!) 147, temperature 97 5 °F (36 4 °C), temperature source Oral, resp  rate 19, height 5' 4" (1 626 m), weight 75 6 kg (166 lb 10 7 oz), SpO2 94 %  ,Body mass index is 28 61 kg/m²        Intake/Output Summary (Last 24 hours) at 8/1/2020 0932  Last data filed at 8/1/2020 5792  Gross per 24 hour   Intake 342 ml   Output 350 ml   Net -8 ml       Invasive Devices     Peripheral Intravenous Line            Peripheral IV 08/01/20 Left;Ventral (anterior) Hand less than 1 day          Drain            External Urinary Catheter 2 days                    Lab, Imaging and other studies:I have personally reviewed pertinent lab results      VTE Pharmacologic Prophylaxis: Heparin  VTE Mechanical Prophylaxis: sequential compression device

## 2020-08-01 NOTE — PROGRESS NOTES
Pt converted into SARAH w/ RVR when ambulating from bathroom to bed, w/ HR sustained in 150-160bpm  Noah Degroot w/ [facility] team paged and presented to bedside  ECG, BMP, and Mg ordered and obtained  5mg IV metoprolol ordered and given

## 2020-08-01 NOTE — PLAN OF CARE
Problem: Prexisting or High Potential for Compromised Skin Integrity  Goal: Skin integrity is maintained or improved  Description  INTERVENTIONS:  - Identify patients at risk for skin breakdown  - Assess and monitor skin integrity  - Assess and monitor nutrition and hydration status  - Monitor labs   - Assess for incontinence   - Turn and reposition patient  - Assist with mobility/ambulation  - Relieve pressure over bony prominences  - Avoid friction and shearing  - Provide appropriate hygiene as needed including keeping skin clean and dry  - Evaluate need for skin moisturizer/barrier cream  - Collaborate with interdisciplinary team   - Patient/family teaching  - Consider wound care consult   Outcome: Progressing     Problem: Potential for Falls  Goal: Patient will remain free of falls  Description  INTERVENTIONS:  - Assess patient frequently for physical needs  -  Identify cognitive and physical deficits and behaviors that affect risk of falls    -  [place] fall precautions as indicated by assessment   - Educate patient/family on patient safety including physical limitations  - Instruct patient to call for assistance with activity based on assessment  - Modify environment to reduce risk of injury  - Consider OT/PT consult to assist with strengthening/mobility  Outcome: Progressing     Problem: CARDIOVASCULAR - ADULT  Goal: Maintains optimal cardiac output and hemodynamic stability  Description  INTERVENTIONS:  - Monitor I/O, vital signs and rhythm  - Monitor for S/S and trends of decreased cardiac output  - Administer and titrate ordered vasoactive medications to optimize hemodynamic stability  - Assess quality of pulses, skin color and temperature  - Assess for signs of decreased coronary artery perfusion  - Instruct patient to report change in severity of symptoms  Outcome: Progressing  Goal: Absence of cardiac dysrhythmias or at baseline rhythm  Description  INTERVENTIONS:  - Continuous cardiac monitoring, vital signs, obtain 12 lead EKG if ordered  - Administer antiarrhythmic and heart rate control medications as ordered  - Monitor electrolytes and administer replacement therapy as ordered  Outcome: Progressing     Problem: RESPIRATORY - ADULT  Goal: Achieves optimal ventilation and oxygenation  Description  INTERVENTIONS:  - Assess for changes in respiratory status  - Assess for changes in mentation and behavior  - Position to facilitate oxygenation and minimize respiratory effort  - Oxygen administered by appropriate delivery if ordered  - Initiate smoking cessation education as indicated  - Encourage broncho-pulmonary hygiene including cough, deep breathe, Incentive Spirometry  - Assess the need for suctioning and aspirate as needed  - Assess and instruct to report SOB or any respiratory difficulty  - Respiratory Therapy support as indicated  Outcome: Progressing     Problem: GASTROINTESTINAL - ADULT  Goal: Minimal or absence of nausea and/or vomiting  Description  INTERVENTIONS:  - Administer IV fluids if ordered to ensure adequate hydration  - Maintain NPO status until nausea and vomiting are resolved  - Nasogastric tube if ordered  - Administer ordered antiemetic medications as needed  - Provide nonpharmacologic comfort measures as appropriate  - Advance diet as tolerated, if ordered  - Consider nutrition services referral to assist patient with adequate nutrition and appropriate food choices  Outcome: Progressing  Goal: Maintains or returns to baseline bowel function  Description  INTERVENTIONS:  - Assess bowel function  - Encourage oral fluids to ensure adequate hydration  - Administer IV fluids if ordered to ensure adequate hydration  - Administer ordered medications as needed  - Encourage mobilization and activity  - Consider nutritional services referral to assist patient with adequate nutrition and appropriate food choices  Outcome: Progressing  Goal: Maintains adequate nutritional intake  Description  INTERVENTIONS:  - Monitor percentage of each meal consumed  - Identify factors contributing to decreased intake, treat as appropriate  - Assist with meals as needed  - Monitor I&O, weight, and lab values if indicated  - Obtain nutrition services referral as needed  Outcome: Progressing  Goal: Establish and maintain optimal ostomy function  Description  INTERVENTIONS:  - Assess bowel function  - Encourage oral fluids to ensure adequate hydration  - Administer IV fluids if ordered to ensure adequate hydration   - Administer ordered medications as needed  - Encourage mobilization and activity  - Nutrition services referral to assist patient with appropriate food choices  - Assess stoma site  - Consider wound care consult   Outcome: Progressing     Problem: METABOLIC, FLUID AND ELECTROLYTES - ADULT  Goal: Electrolytes maintained within normal limits  Description  INTERVENTIONS:  - Monitor labs and assess patient for signs and symptoms of electrolyte imbalances  - Administer electrolyte replacement as ordered  - Monitor response to electrolyte replacements, including repeat lab results as appropriate  - Instruct patient on fluid and nutrition as appropriate  Outcome: Progressing  Goal: Fluid balance maintained  Description  INTERVENTIONS:  - Monitor labs   - Monitor I/O and WT  - Instruct patient on fluid and nutrition as appropriate  - Assess for signs & symptoms of volume excess or deficit  Outcome: Progressing  Goal: Glucose maintained within target range  Description  INTERVENTIONS:  - Monitor Blood Glucose as ordered  - Assess for signs and symptoms of hyperglycemia and hypoglycemia  - Administer ordered medications to maintain glucose within target range  - Assess nutritional intake and initiate nutrition service referral as needed  Outcome: Progressing     Problem: HEMATOLOGIC - ADULT  Goal: Maintains hematologic stability  Description  INTERVENTIONS  - Assess for signs and symptoms of bleeding or hemorrhage  - Monitor labs  - Administer supportive blood products/factors as ordered and appropriate  Outcome: Progressing

## 2020-08-02 LAB
ANION GAP SERPL CALCULATED.3IONS-SCNC: 8 MMOL/L (ref 4–13)
APTT PPP: 23 SECONDS (ref 23–37)
APTT PPP: 67 SECONDS (ref 23–37)
BASOPHILS # BLD AUTO: 0.06 THOUSANDS/ΜL (ref 0–0.1)
BASOPHILS NFR BLD AUTO: 1 % (ref 0–1)
BUN SERPL-MCNC: 47 MG/DL (ref 5–25)
CALCIUM SERPL-MCNC: 8.9 MG/DL (ref 8.3–10.1)
CHLORIDE SERPL-SCNC: 110 MMOL/L (ref 100–108)
CO2 SERPL-SCNC: 19 MMOL/L (ref 21–32)
CREAT SERPL-MCNC: 1.88 MG/DL (ref 0.6–1.3)
EOSINOPHIL # BLD AUTO: 0.1 THOUSAND/ΜL (ref 0–0.61)
EOSINOPHIL NFR BLD AUTO: 1 % (ref 0–6)
ERYTHROCYTE [DISTWIDTH] IN BLOOD BY AUTOMATED COUNT: 14.5 % (ref 11.6–15.1)
ERYTHROCYTE [DISTWIDTH] IN BLOOD BY AUTOMATED COUNT: 14.5 % (ref 11.6–15.1)
GFR SERPL CREATININE-BSD FRML MDRD: 26 ML/MIN/1.73SQ M
GLUCOSE SERPL-MCNC: 117 MG/DL (ref 65–140)
HCT VFR BLD AUTO: 27.7 % (ref 34.8–46.1)
HCT VFR BLD AUTO: 27.7 % (ref 34.8–46.1)
HGB BLD-MCNC: 8.8 G/DL (ref 11.5–15.4)
HGB BLD-MCNC: 8.9 G/DL (ref 11.5–15.4)
IMM GRANULOCYTES # BLD AUTO: 0.12 THOUSAND/UL (ref 0–0.2)
IMM GRANULOCYTES NFR BLD AUTO: 1 % (ref 0–2)
INR PPP: 1.26 (ref 0.84–1.19)
LYMPHOCYTES # BLD AUTO: 1.23 THOUSANDS/ΜL (ref 0.6–4.47)
LYMPHOCYTES NFR BLD AUTO: 10 % (ref 14–44)
MCH RBC QN AUTO: 30.1 PG (ref 26.8–34.3)
MCH RBC QN AUTO: 30.4 PG (ref 26.8–34.3)
MCHC RBC AUTO-ENTMCNC: 31.8 G/DL (ref 31.4–37.4)
MCHC RBC AUTO-ENTMCNC: 32.1 G/DL (ref 31.4–37.4)
MCV RBC AUTO: 95 FL (ref 82–98)
MCV RBC AUTO: 95 FL (ref 82–98)
MONOCYTES # BLD AUTO: 0.84 THOUSAND/ΜL (ref 0.17–1.22)
MONOCYTES NFR BLD AUTO: 7 % (ref 4–12)
NEUTROPHILS # BLD AUTO: 10.05 THOUSANDS/ΜL (ref 1.85–7.62)
NEUTS SEG NFR BLD AUTO: 80 % (ref 43–75)
NRBC BLD AUTO-RTO: 0 /100 WBCS
PLATELET # BLD AUTO: 239 THOUSANDS/UL (ref 149–390)
PLATELET # BLD AUTO: 246 THOUSANDS/UL (ref 149–390)
PMV BLD AUTO: 10.8 FL (ref 8.9–12.7)
PMV BLD AUTO: 11.1 FL (ref 8.9–12.7)
POTASSIUM SERPL-SCNC: 4.7 MMOL/L (ref 3.5–5.3)
PROTHROMBIN TIME: 15.8 SECONDS (ref 11.6–14.5)
RBC # BLD AUTO: 2.92 MILLION/UL (ref 3.81–5.12)
RBC # BLD AUTO: 2.93 MILLION/UL (ref 3.81–5.12)
SODIUM SERPL-SCNC: 137 MMOL/L (ref 136–145)
WBC # BLD AUTO: 12.11 THOUSAND/UL (ref 4.31–10.16)
WBC # BLD AUTO: 12.4 THOUSAND/UL (ref 4.31–10.16)

## 2020-08-02 PROCEDURE — 85730 THROMBOPLASTIN TIME PARTIAL: CPT | Performed by: SURGERY

## 2020-08-02 PROCEDURE — 99232 SBSQ HOSP IP/OBS MODERATE 35: CPT | Performed by: SURGERY

## 2020-08-02 PROCEDURE — 85610 PROTHROMBIN TIME: CPT | Performed by: NURSE PRACTITIONER

## 2020-08-02 PROCEDURE — 85027 COMPLETE CBC AUTOMATED: CPT | Performed by: NURSE PRACTITIONER

## 2020-08-02 PROCEDURE — 80048 BASIC METABOLIC PNL TOTAL CA: CPT | Performed by: STUDENT IN AN ORGANIZED HEALTH CARE EDUCATION/TRAINING PROGRAM

## 2020-08-02 PROCEDURE — 85025 COMPLETE CBC W/AUTO DIFF WBC: CPT | Performed by: STUDENT IN AN ORGANIZED HEALTH CARE EDUCATION/TRAINING PROGRAM

## 2020-08-02 PROCEDURE — 99232 SBSQ HOSP IP/OBS MODERATE 35: CPT | Performed by: INTERNAL MEDICINE

## 2020-08-02 RX ORDER — SODIUM CHLORIDE 9 MG/ML
75 INJECTION, SOLUTION INTRAVENOUS CONTINUOUS
Status: DISCONTINUED | OUTPATIENT
Start: 2020-08-03 | End: 2020-08-03

## 2020-08-02 RX ORDER — HEPARIN SODIUM 1000 [USP'U]/ML
6000 INJECTION, SOLUTION INTRAVENOUS; SUBCUTANEOUS
Status: DISCONTINUED | OUTPATIENT
Start: 2020-08-02 | End: 2020-08-03

## 2020-08-02 RX ORDER — HEPARIN SODIUM 10000 [USP'U]/100ML
3-30 INJECTION, SOLUTION INTRAVENOUS
Status: DISCONTINUED | OUTPATIENT
Start: 2020-08-02 | End: 2020-08-03

## 2020-08-02 RX ORDER — HEPARIN SODIUM 1000 [USP'U]/ML
6000 INJECTION, SOLUTION INTRAVENOUS; SUBCUTANEOUS ONCE
Status: COMPLETED | OUTPATIENT
Start: 2020-08-02 | End: 2020-08-02

## 2020-08-02 RX ORDER — HEPARIN SODIUM 1000 [USP'U]/ML
3000 INJECTION, SOLUTION INTRAVENOUS; SUBCUTANEOUS
Status: DISCONTINUED | OUTPATIENT
Start: 2020-08-02 | End: 2020-08-03

## 2020-08-02 RX ORDER — LANOLIN ALCOHOL/MO/W.PET/CERES
3 CREAM (GRAM) TOPICAL
Status: DISCONTINUED | OUTPATIENT
Start: 2020-08-02 | End: 2020-08-03 | Stop reason: HOSPADM

## 2020-08-02 RX ADMIN — ACETAMINOPHEN 975 MG: 325 TABLET, FILM COATED ORAL at 06:19

## 2020-08-02 RX ADMIN — ACETAMINOPHEN 975 MG: 325 TABLET, FILM COATED ORAL at 14:58

## 2020-08-02 RX ADMIN — HEPARIN SODIUM 5000 UNITS: 5000 INJECTION INTRAVENOUS; SUBCUTANEOUS at 06:19

## 2020-08-02 RX ADMIN — HEPARIN SODIUM 6000 UNITS: 1000 INJECTION INTRAVENOUS; SUBCUTANEOUS at 12:30

## 2020-08-02 RX ADMIN — METOPROLOL TARTRATE 50 MG: 50 TABLET, FILM COATED ORAL at 16:27

## 2020-08-02 RX ADMIN — LEVOTHYROXINE SODIUM 150 MCG: 75 TABLET ORAL at 06:19

## 2020-08-02 RX ADMIN — METOPROLOL TARTRATE 50 MG: 50 TABLET, FILM COATED ORAL at 22:32

## 2020-08-02 RX ADMIN — METOPROLOL TARTRATE 50 MG: 50 TABLET, FILM COATED ORAL at 06:18

## 2020-08-02 RX ADMIN — METRONIDAZOLE 500 MG: 500 TABLET ORAL at 14:59

## 2020-08-02 RX ADMIN — METRONIDAZOLE 500 MG: 500 TABLET ORAL at 22:32

## 2020-08-02 RX ADMIN — MELATONIN 1000 UNITS: at 09:03

## 2020-08-02 RX ADMIN — ASPIRIN 81 MG 81 MG: 81 TABLET ORAL at 09:03

## 2020-08-02 RX ADMIN — METOPROLOL TARTRATE 50 MG: 50 TABLET, FILM COATED ORAL at 10:40

## 2020-08-02 RX ADMIN — PANTOPRAZOLE SODIUM 40 MG: 40 TABLET, DELAYED RELEASE ORAL at 06:19

## 2020-08-02 RX ADMIN — LOSARTAN POTASSIUM 25 MG: 25 TABLET, FILM COATED ORAL at 09:03

## 2020-08-02 RX ADMIN — SERTRALINE HYDROCHLORIDE 100 MG: 50 TABLET ORAL at 09:03

## 2020-08-02 RX ADMIN — METRONIDAZOLE 500 MG: 500 TABLET ORAL at 06:19

## 2020-08-02 RX ADMIN — HEPARIN SODIUM AND DEXTROSE 18 UNITS/KG/HR: 10000; 5 INJECTION INTRAVENOUS at 12:29

## 2020-08-02 RX ADMIN — CEFTRIAXONE SODIUM 2000 MG: 2 INJECTION, POWDER, FOR SOLUTION INTRAMUSCULAR; INTRAVENOUS at 06:20

## 2020-08-02 RX ADMIN — ACETAMINOPHEN 975 MG: 325 TABLET, FILM COATED ORAL at 22:31

## 2020-08-02 RX ADMIN — MELATONIN 3 MG: at 22:32

## 2020-08-02 RX ADMIN — ISOSORBIDE MONONITRATE 60 MG: 60 TABLET, EXTENDED RELEASE ORAL at 09:03

## 2020-08-02 NOTE — PROGRESS NOTES
General Cardiology   Progress Note -  Team One   Latha Arellano 67 y o  female MRN: [id number]    Unit/Bed#: [facility] 502-01 Encounter: [id number]    Assessment/ Plan  1  Atrial fibrillation with RVR   New diagnosis this admission   Received amiodarone bolus x 2 yesterday and now on amiodarone gtt  Will d/c amiodarone gtt today  [id number] VASC score: 6, recommend anticoagulation, Discussed with primary team  Will start heparin gtt  Continue metoprolol 50 mg PO Q 6 hours   Reviewed telemetry, patient V paced HR 70s     2  Appendicitis   On antibiotics  Followed by general surgery      3  Chronic systolic and diastolic heart failure  On lasix 20 mg PO daily at home  Diuretics on hold   Monitor volume status      4  Ischemic cardiomyopathy prior EF 25-30%  Echocardiogram 7/29/2020 showed EF 40-45%  S/p BIV ICD   Continue ARB  Recommend change metoprolol to succinate prior to discharge       5  CAD s/p PIPER to LAD 2007/2015  On aspirin, imdur and BB   Intolerant to statin      6  Dyslipidemia intolerant to statins      7  DARRELL on CKD baseline creatinine around 1 7  Creatinine on admission was 2 2  Creatinine today is 1 88     8  Hypertension BP average: 162/82  on Losartan 25 mg PO daily and metoprolol   Last /82       Subjective  Patient reports no complaints of chest pain, SOB or palpitations  No further abdominal pain  No fever or chills  Review of Systems   Constitution: Negative for chills, fever and malaise/fatigue  HENT: Negative for congestion  Cardiovascular: Negative for chest pain and dyspnea on exertion  Respiratory: Negative for cough and shortness of breath  Musculoskeletal: Negative for falls  Gastrointestinal: Negative for bloating, nausea and vomiting  Neurological: Negative for dizziness and light-headedness  Psychiatric/Behavioral: Negative for altered mental status         Objective:   Vitals: Blood pressure 121/82, pulse 68, temperature 98 1 °F (36 7 °C), temperature source Oral, resp  rate 20, height 5' 4", weight 76 4 kg (168 lb 6 9 oz), SpO2 96 %  ,       Body mass index is 28 91 kg/m²  ,     Systolic (02TSL), RVY:510 , Min:121 , ECU:183     Diastolic (06BFK), QQ, Min:73, Max:100          Intake/Output Summary (Last 24 hours) at 2020 1153  Last data filed at 2020 0800  Gross per 24 hour   Intake 635 79 ml   Output 200 ml   Net 435 79 ml     Weight (last 2 days)     Date/Time   Weight    20 0600   76 4 (168 43)    20 0600   75 6 (166 67)    20 0558   76 4 (168 43)            Telemetry Review: V paced HR 70s    Physical Exam   Constitutional: She is oriented to person, place, and time  She appears well-developed  No distress  HENT:   Head: Normocephalic  Neck: Neck supple  Cardiovascular: Normal rate and intact distal pulses  Pulses are palpable  No murmur heard  RACW PPM    Pulmonary/Chest: Effort normal and breath sounds normal  No stridor  No respiratory distress  Abdominal: Soft  Bowel sounds are normal    Neurological: She is alert and oriented to person, place, and time  Periods of confusion    Skin: Skin is warm and dry  She is not diaphoretic  Psychiatric: She has a normal mood and affect   Her behavior is normal        LABORATORY RESULTS  Results from last 7 days   Lab Units 20  2325   TROPONIN I ng/mL <0 02     CBC with diff:   Results from last 7 days   Lab Units 20  0618 20  0301 20  0435 20  0518 20  1831 20  0434 20  2325   WBC Thousand/uL 12 40* 15 57* 13 60* 15 85* 19 54* 18 97* 16 96*   HEMOGLOBIN g/dL 8 8* 8 1* 8 0* 8 6* 8 3* 9 8* 10 2*   HEMATOCRIT % 27 7* 26 1* 25 6* 27 9* 25 8* 30 9* 31 7*   MCV fL 95 96 97 98 95 95 94   PLATELETS Thousands/uL 239 198 184 153 151 181 200   MCH pg 30 1 29 9 30 3 30 1 30 4 30 2 30 3   MCHC g/dL 31 8 31 0* 31 3* 30 8* 32 2 31 7 32 2   RDW % 14 5 14 3 14 2 14 3 14 4 14 0 14 0   MPV fL 11 1 10 9 11 5 11 5 11 1 11 3 10 8   NRBC AUTO /100 WBCs 0 0 0 0  -- --  0       CMP:  Results from last 7 days   Lab Units 08/02/20  0618 08/01/20  0302 07/31/20  0606 07/30/20  0518 07/29/20  1831 07/29/20  1105 07/29/20  0434 07/28/20  2325   POTASSIUM mmol/L 4 7 3 3* 3 6 3 9 3 8 4 1 4 2 4 7   CHLORIDE mmol/L 110* 110* 111* 110* 111* 102 99* 96*   CO2 mmol/L 19* 20* 20* 22 21 24 25 27   BUN mg/dL 47* 43* 45* 56* 55* 60* 59* 63*   CREATININE mg/dL 1 88* 1 69* 1 87* 2 22* 2 29* 2 67* 2 46* 2 53*   CALCIUM mg/dL 8 9 8 7 9 3 9 5 8 1* 8 6 9 1 9 3   AST U/L  --   --   --  20  --   --   --  16   ALT U/L  --   --   --  11*  --   --   --  17   ALK PHOS U/L  --   --   --  130*  --   --   --  156*   EGFR ml/min/1 73sq m 26 29 26 21 20 17 18 18       BMP:  Results from last 7 days   Lab Units 08/02/20  0618 08/01/20 0302 07/31/20 0606 07/30/20  0518 07/29/20  1831 07/29/20  1105 07/29/20  0434   POTASSIUM mmol/L 4 7 3 3* 3 6 3 9 3 8 4 1 4 2   CHLORIDE mmol/L 110* 110* 111* 110* 111* 102 99*   CO2 mmol/L 19* 20* 20* 22 21 24 25   BUN mg/dL 47* 43* 45* 56* 55* 60* 59*   CREATININE mg/dL 1 88* 1 69* 1 87* 2 22* 2 29* 2 67* 2 46*   CALCIUM mg/dL 8 9 8 7 9 3 9 5 8 1* 8 6 9 1       Lab Results   Component Value Date    NTBN 8,932 (H) 07/28/2020             Results from last 7 days   Lab Units 08/01/20 0302 07/30/20  0518 07/29/20  0434 07/28/20  2325   MAGNESIUM mg/dL 2 0 2 3 2 1 2 1                     Results from last 7 days   Lab Units 07/29/20  0434 07/28/20  2325   INR  1 17 1 12       Lipid Profile:   No results found for: CHOL  Lab Results   Component Value Date    HDL 33 (L) 06/09/2020     Lab Results   Component Value Date    LDLCALC 179 (H) 06/09/2020     Lab Results   Component Value Date    TRIG 371 (H) 06/09/2020       Cardiac testing:   Results for orders placed during the hospital encounter of 07/28/20   Echo complete with contrast if indicated    Narrative [id number] Located within [facility] [address] [facility]  [address]  [address]  [phone]    Transthoracic Echocardiogram  2D, M-mode, Doppler, and Color Doppler    Study date:  2020    Patient: June Grace  MR number: [id number]  Account number: [de-identified]  : [id number]  Age: 67 years  Gender: Female  Status: Inpatient  Location: Bedside  Height: 64 in  Weight: 163 7 lb  BP: 147/ 61 mmHg    Indications: heart failure    Diagnoses: I50 9 - Heart failure, unspecified    Sonographer:  Willie Yuen RDCS  Primary Physician:  Jaja Mckinney DO  Referring Physician:  Ragena Olszewski, DO  Group:  [id number]  Interpreting Physician:  Terra Salazar MD    SUMMARY    LEFT VENTRICLE:  Systolic function was moderately reduced by visual assessment  Ejection fraction was estimated in the range of 40 % to 45 %  There was mild diffuse hypokinesis  Doppler parameters were consistent with abnormal left ventricular relaxation (grade 1 diastolic dysfunction)  RIGHT VENTRICLE:  The size was normal   Systolic function was normal     LEFT ATRIUM:  The atrium was mildly dilated  MITRAL VALVE:  There was marked annular calcification  There was mild to moderate regurgitation  AORTIC VALVE:  There was mild regurgitation  HISTORY: PRIOR HISTORY: hypertension, hyperlipidemia, hypothyroid, congestive heart failure, SIRS, defibrillator, mastectomy, coronary artery disease    PROCEDURE: The procedure was performed at the bedside  This was a routine study  The transthoracic approach was used  The study included complete 2D imaging, M-mode, complete spectral Doppler, and color Doppler  Images were obtained from  the parasternal, apical, subcostal, and suprasternal notch acoustic windows  Image quality was adequate  LEFT VENTRICLE: Size was normal  Systolic function was moderately reduced by visual assessment  Ejection fraction was estimated in the range of 40 % to 45 %  There was mild diffuse hypokinesis   Wall thickness was normal  DOPPLER: Doppler  parameters were consistent with abnormal left ventricular relaxation (grade 1 diastolic dysfunction)  RIGHT VENTRICLE: The size was normal  Systolic function was normal  Wall thickness was normal  An ICD wire was present  LEFT ATRIUM: The atrium was mildly dilated  RIGHT ATRIUM: Size was normal     MITRAL VALVE: There was marked annular calcification  Valve structure was normal  There was normal leaflet separation  DOPPLER: The transmitral velocity was within the normal range  There was no evidence for stenosis  There was mild to  moderate regurgitation  AORTIC VALVE: The valve was trileaflet  Leaflets exhibited normal thickness and normal cuspal separation  DOPPLER: Transaortic velocity was within the normal range  There was no evidence for stenosis  There was mild regurgitation  TRICUSPID VALVE: The valve structure was normal  There was normal leaflet separation  DOPPLER: The transtricuspid velocity was within the normal range  There was no evidence for stenosis  There was no significant regurgitation  PULMONIC VALVE: Leaflets exhibited normal thickness, no calcification, and normal cuspal separation  DOPPLER: The transpulmonic velocity was within the normal range  There was no significant regurgitation  PERICARDIUM: There was no pericardial effusion  The pericardium was normal in appearance  AORTA: The root exhibited normal size  SYSTEMIC VEINS: IVC: The inferior vena cava was normal in size   Respirophasic changes were normal     MEASUREMENT TABLES    OTHER ECHO MEASUREMENTS  (Reference normals)  Estimated CVP   5 mmHg   (--)    SYSTEM MEASUREMENT TABLES    2D  %FS: 18 01 %  Ao Diam: 2 79 cm  EDV(Teich): 116 36 ml  EF(Teich): 37 27 %  ESV(Teich): 72 99 ml  IVSd: 1 09 cm  LA Area: 19 34 cm2  LA Diam: 4 93 cm  LVEDV MOD A4C: 110 45 ml  LVEF MOD A4C: 29 96 %  LVESV MOD A4C: 77 36 ml  LVIDd: 4 97 cm  LVIDs: 4 07 cm  LVLd A4C: 7 35 cm  LVLs A4C: 6 56 cm  LVPWd: 1 07 cm  RA Area: 15 28 cm2  RVIDd: 4 29 cm  RWT: 0 43  SV MOD A4C: 33 09 ml  SV(Teich): 43 37 ml    CW  AR Dec [place]: 2 03 m/s2  AR Dec Time: 1589 32 ms  AR PHT: 460 9 ms  AR Vmax: 3 16 m/s  AR maxP 99 mmHg  AV Vmax: 1 55 m/s  AV maxP 66 mmHg  PV Vmax: 1 08 m/s  PV maxP 64 mmHg  TR Vmax: 3 13 m/s  TR maxP 31 mmHg    MM  TAPSE: 1 87 cm    PW  E' Lat: 0 07 m/s  E' Sept: 0 05 m/s  E/E' Lat: 18 87  E/E' Sept:  49  LVOT Vmax: 0 93 m/s  LVOT maxPG: 3 49 mmHg  MV A Nitin: 1 7 m/s  MV Dec [place]: 8 98 m/s2  MV DecT: 146 13 ms  MV E Nitin: 1 31 m/s  MV E/A Ratio: 0 77  RVOT Vmax: 0 77 m/s  RVOT maxP 38 mmHg    [facility]    Prepared and electronically signed by    Jasson Chu MD  Signed 2020 10:09:49       No results found for this or any previous visit  No results found for this or any previous visit  No procedure found  No results found for this or any previous visit        Meds/Allergies   all current active meds have been reviewed and current meds:   Current Facility-Administered Medications   Medication Dose Route Frequency    acetaminophen (TYLENOL) tablet 975 mg  975 mg Oral Q8H [facility]    aspirin chewable tablet 81 mg  81 mg Oral Daily    cefTRIAXone (ROCEPHIN) 2,000 mg in dextrose 5 % 50 mL IVPB  2,000 mg Intravenous Q24H    cholecalciferol (VITAMIN D3) tablet 1,000 Units  1,000 Units Oral Daily    heparin (porcine) subcutaneous injection 5,000 Units  5,000 Units Subcutaneous Q8H [facility]    heparin (porcine) subcutaneous injection 5,000 Units  5,000 Units Subcutaneous Q8H [facility]    heparin (VTE/PE) high   Intravenous Once    isosorbide mononitrate (IMDUR) 24 hr tablet 60 mg  60 mg Oral Daily    levothyroxine tablet 150 mcg  150 mcg Oral Early Morning    losartan (COZAAR) tablet 25 mg  25 mg Oral Daily    metoprolol tartrate (LOPRESSOR) tablet 50 mg  50 mg Oral Q6H    metroNIDAZOLE (FLAGYL) tablet 500 mg  500 mg Oral Q8H [facility]    oxyCODONE (ROXICODONE) IR tablet 2 5 mg  2 5 mg Oral Q4H PRN    Or    oxyCODONE (ROXICODONE) IR tablet 5 mg  5 mg Oral Q4H PRN    pantoprazole (PROTONIX) EC tablet 40 mg  40 mg Oral Daily Before Breakfast    potassium chloride 20 mEq IVPB (premix)  20 mEq Intravenous Once    sertraline (ZOLOFT) tablet 100 mg  100 mg Oral Daily    sodium chloride (PF) 0 9 % injection 3 mL  3 mL Intravenous Q1H PRN     Medications Prior to Admission   Medication    ALPRAZolam (XANAX) 0 25 mg tablet    folic acid (FOLVITE) 107 mcg tablet    furosemide (LASIX) 20 mg tablet    levothyroxine 150 mcg tablet    losartan (COZAAR) 25 mg tablet    metoprolol tartrate (LOPRESSOR) 100 mg tablet    omeprazole (PriLOSEC) 20 mg delayed release capsule    sertraline (ZOLOFT) 100 mg tablet    aspirin (ECOTRIN LOW STRENGTH) 81 mg EC tablet    co-enzyme Q-10 30 MG capsule    isosorbide mononitrate (IMDUR) 60 mg 24 hr tablet    Multiple Vitamins-Minerals (MULTIVITAMIN WITH MINERALS) tablet    niacin (NIASPAN) 1000 MG CR tablet    omega-3-acid ethyl esters (LOVAZA) 1 g capsule    spironolactone (ALDACTONE) 25 mg tablet       amiodarone, 0 5 mg/min, Last Rate: 0 5 mg/min (08/02/20 0800)        Counseling / Coordination of Care  Total floor / unit time spent today 20 minutes  Greater than 50% of total time was spent with the patient and / or family counseling and / or coordination of care  ** Please Note: Dragon 360 Dictation voice to text software may have been used in the creation of this document   **

## 2020-08-02 NOTE — PROGRESS NOTES
Progress Note - General Surgery   Gina Ahmadi 67 y o  female MRN: [id number]  Unit/Bed#: [facility] 502-01 Encounter: [id number]    Assessment:  Gina Ahmadi is a 67 y o  female w/ perforated appendicitis    Plan:  · Continue diet as tolerated  · Continue CTX/Flagyl, WBC improving, afebrile  · Serial exams, abdominal pain improving  · Add melatonin  · Continue to monitor Cr  · Appreciate cards recommendations, on amio gtt  · DVT ppx    Subjective/Objective     Subjective: Some delirium overnight  Improved this AM  Tolerating diet and having bowel function  Objective:     Blood pressure (!) 183/79, pulse 76, temperature 98 1 °F (36 7 °C), temperature source Oral, resp  rate 20, height 5' 4", weight 76 4 kg (168 lb 6 9 oz), SpO2 96 %  ,Body mass index is 28 91 kg/m²  Intake/Output Summary (Last 24 hours) at 8/2/2020 9510  Last data filed at 8/1/2020 2309  Gross per 24 hour   Intake 238 ml   Output 200 ml   Net 38 ml       Invasive Devices     Peripheral Intravenous Line            Peripheral IV 08/01/20 Left;Upper;Ventral (anterior) Arm less than 1 day    Peripheral IV 08/02/20 Left;Ventral (anterior) Forearm less than 1 day          Drain            External Urinary Catheter 3 days                Physical Exam:   GEN: NAD  HEENT: MMM  CV: warm/well perfused  Lung: normal effort  Ab: Soft, NT/ND  Extrem: No CCE  Neuro:  A+Ox3, motor and sensation grossly intact    Lab, Imaging and other studies:  CBC:   Lab Results   Component Value Date    WBC 12 40 (H) 08/02/2020    HGB 8 8 (L) 08/02/2020    HCT 27 7 (L) 08/02/2020    MCV 95 08/02/2020     08/02/2020    MCH 30 1 08/02/2020    MCHC 31 8 08/02/2020    RDW 14 5 08/02/2020    MPV 11 1 08/02/2020    NRBC 0 08/02/2020   , CMP:   Lab Results   Component Value Date    SODIUM 137 08/02/2020    K 4 7 08/02/2020     (H) 08/02/2020    CO2 19 (L) 08/02/2020    BUN 47 (H) 08/02/2020    CREATININE 1 88 (H) 08/02/2020    CALCIUM 8 9 08/02/2020    EGFR 26 08/02/2020 , Coagulation: No results found for: PT, INR, APTT  VTE Pharmacologic Prophylaxis: Heparin  VTE Mechanical Prophylaxis: sequential compression device

## 2020-08-03 VITALS
TEMPERATURE: 97.8 F | OXYGEN SATURATION: 94 % | BODY MASS INDEX: 29.06 KG/M2 | HEART RATE: 93 BPM | DIASTOLIC BLOOD PRESSURE: 59 MMHG | RESPIRATION RATE: 18 BRPM | SYSTOLIC BLOOD PRESSURE: 159 MMHG | WEIGHT: 170.19 LBS | HEIGHT: 64 IN

## 2020-08-03 PROBLEM — A41.9 SEPSIS (HCC): Status: RESOLVED | Noted: 2020-07-29 | Resolved: 2020-08-03

## 2020-08-03 LAB
ANION GAP SERPL CALCULATED.3IONS-SCNC: 9 MMOL/L (ref 4–13)
APTT PPP: 131 SECONDS (ref 23–37)
APTT PPP: 65 SECONDS (ref 23–37)
APTT PPP: >210 SECONDS (ref 23–37)
ATRIAL RATE: 174 BPM
BACTERIA BLD CULT: NORMAL
BACTERIA BLD CULT: NORMAL
BASOPHILS # BLD MANUAL: 0.12 THOUSAND/UL (ref 0–0.1)
BASOPHILS NFR MAR MANUAL: 1 % (ref 0–1)
BUN SERPL-MCNC: 43 MG/DL (ref 5–25)
CALCIUM SERPL-MCNC: 8.5 MG/DL (ref 8.3–10.1)
CHLORIDE SERPL-SCNC: 112 MMOL/L (ref 100–108)
CO2 SERPL-SCNC: 18 MMOL/L (ref 21–32)
CREAT SERPL-MCNC: 1.71 MG/DL (ref 0.6–1.3)
EOSINOPHIL # BLD MANUAL: 0.36 THOUSAND/UL (ref 0–0.4)
EOSINOPHIL NFR BLD MANUAL: 3 % (ref 0–6)
ERYTHROCYTE [DISTWIDTH] IN BLOOD BY AUTOMATED COUNT: 14.6 % (ref 11.6–15.1)
GFR SERPL CREATININE-BSD FRML MDRD: 29 ML/MIN/1.73SQ M
GLUCOSE SERPL-MCNC: 118 MG/DL (ref 65–140)
HCT VFR BLD AUTO: 25.3 % (ref 34.8–46.1)
HELMET CELLS BLD QL SMEAR: PRESENT
HGB BLD-MCNC: 8.1 G/DL (ref 11.5–15.4)
LYMPHOCYTES # BLD AUTO: 1.21 THOUSAND/UL (ref 0.6–4.47)
LYMPHOCYTES # BLD AUTO: 10 % (ref 14–44)
MAGNESIUM SERPL-MCNC: 2 MG/DL (ref 1.6–2.6)
MCH RBC QN AUTO: 30.2 PG (ref 26.8–34.3)
MCHC RBC AUTO-ENTMCNC: 32 G/DL (ref 31.4–37.4)
MCV RBC AUTO: 94 FL (ref 82–98)
MONOCYTES # BLD AUTO: 0.73 THOUSAND/UL (ref 0–1.22)
MONOCYTES NFR BLD: 6 % (ref 4–12)
NEUTROPHILS # BLD MANUAL: 9.2 THOUSAND/UL (ref 1.85–7.62)
NEUTS SEG NFR BLD AUTO: 76 % (ref 43–75)
NRBC BLD AUTO-RTO: 0 /100 WBCS
OVALOCYTES BLD QL SMEAR: PRESENT
PLATELET # BLD AUTO: 258 THOUSANDS/UL (ref 149–390)
PLATELET BLD QL SMEAR: ADEQUATE
PMV BLD AUTO: 10.7 FL (ref 8.9–12.7)
POIKILOCYTOSIS BLD QL SMEAR: PRESENT
POTASSIUM SERPL-SCNC: 3.7 MMOL/L (ref 3.5–5.3)
QRS AXIS: -43 DEGREES
QRSD INTERVAL: 148 MS
QT INTERVAL: 280 MS
QTC INTERVAL: 445 MS
RBC # BLD AUTO: 2.68 MILLION/UL (ref 3.81–5.12)
RBC MORPH BLD: PRESENT
SODIUM SERPL-SCNC: 139 MMOL/L (ref 136–145)
T WAVE AXIS: 167 DEGREES
VARIANT LYMPHS # BLD AUTO: 4 %
VENTRICULAR RATE: 152 BPM
WBC # BLD AUTO: 12.1 THOUSAND/UL (ref 4.31–10.16)
WBC TOXIC VACUOLES BLD QL SMEAR: PRESENT

## 2020-08-03 PROCEDURE — NC001 PR NO CHARGE: Performed by: SURGERY

## 2020-08-03 PROCEDURE — 85730 THROMBOPLASTIN TIME PARTIAL: CPT | Performed by: SURGERY

## 2020-08-03 PROCEDURE — 85007 BL SMEAR W/DIFF WBC COUNT: CPT | Performed by: SURGERY

## 2020-08-03 PROCEDURE — 83735 ASSAY OF MAGNESIUM: CPT | Performed by: SURGERY

## 2020-08-03 PROCEDURE — 99238 HOSP IP/OBS DSCHRG MGMT 30/<: CPT | Performed by: SURGERY

## 2020-08-03 PROCEDURE — 93010 ELECTROCARDIOGRAM REPORT: CPT | Performed by: INTERNAL MEDICINE

## 2020-08-03 PROCEDURE — 85027 COMPLETE CBC AUTOMATED: CPT | Performed by: SURGERY

## 2020-08-03 PROCEDURE — 85730 THROMBOPLASTIN TIME PARTIAL: CPT | Performed by: INTERNAL MEDICINE

## 2020-08-03 PROCEDURE — 80048 BASIC METABOLIC PNL TOTAL CA: CPT | Performed by: SURGERY

## 2020-08-03 PROCEDURE — 99232 SBSQ HOSP IP/OBS MODERATE 35: CPT | Performed by: NURSE PRACTITIONER

## 2020-08-03 RX ORDER — POTASSIUM CHLORIDE 20 MEQ/1
20 TABLET, EXTENDED RELEASE ORAL ONCE
Status: COMPLETED | OUTPATIENT
Start: 2020-08-03 | End: 2020-08-03

## 2020-08-03 RX ORDER — METOPROLOL SUCCINATE 25 MG/1
25 TABLET, EXTENDED RELEASE ORAL EVERY 12 HOURS
Status: DISCONTINUED | OUTPATIENT
Start: 2020-08-03 | End: 2020-08-03 | Stop reason: HOSPADM

## 2020-08-03 RX ORDER — METRONIDAZOLE 500 MG/1
500 TABLET ORAL EVERY 8 HOURS SCHEDULED
Qty: 21 TABLET | Refills: 0 | Status: SHIPPED | OUTPATIENT
Start: 2020-08-03 | End: 2020-08-10

## 2020-08-03 RX ORDER — CEFDINIR 300 MG/1
300 CAPSULE ORAL EVERY 12 HOURS SCHEDULED
Qty: 14 CAPSULE | Refills: 0 | Status: SHIPPED | OUTPATIENT
Start: 2020-08-03 | End: 2020-08-10

## 2020-08-03 RX ADMIN — METRONIDAZOLE 500 MG: 500 TABLET ORAL at 05:02

## 2020-08-03 RX ADMIN — ACETAMINOPHEN 975 MG: 325 TABLET, FILM COATED ORAL at 05:00

## 2020-08-03 RX ADMIN — ISOSORBIDE MONONITRATE 60 MG: 60 TABLET, EXTENDED RELEASE ORAL at 08:52

## 2020-08-03 RX ADMIN — ACETAMINOPHEN 975 MG: 325 TABLET, FILM COATED ORAL at 13:33

## 2020-08-03 RX ADMIN — METRONIDAZOLE 500 MG: 500 TABLET ORAL at 13:33

## 2020-08-03 RX ADMIN — MELATONIN 1000 UNITS: at 08:48

## 2020-08-03 RX ADMIN — ASPIRIN 81 MG 81 MG: 81 TABLET ORAL at 08:52

## 2020-08-03 RX ADMIN — SODIUM CHLORIDE 75 ML/HR: 0.9 INJECTION, SOLUTION INTRAVENOUS at 00:16

## 2020-08-03 RX ADMIN — CEFTRIAXONE SODIUM 2000 MG: 2 INJECTION, POWDER, FOR SOLUTION INTRAMUSCULAR; INTRAVENOUS at 05:00

## 2020-08-03 RX ADMIN — POTASSIUM CHLORIDE 20 MEQ: 1500 TABLET, EXTENDED RELEASE ORAL at 11:30

## 2020-08-03 RX ADMIN — HEPARIN SODIUM AND DEXTROSE 12 UNITS/KG/HR: 10000; 5 INJECTION INTRAVENOUS at 10:13

## 2020-08-03 RX ADMIN — APIXABAN 5 MG: 5 TABLET, FILM COATED ORAL at 11:30

## 2020-08-03 RX ADMIN — LOSARTAN POTASSIUM 25 MG: 25 TABLET, FILM COATED ORAL at 08:52

## 2020-08-03 RX ADMIN — PANTOPRAZOLE SODIUM 40 MG: 40 TABLET, DELAYED RELEASE ORAL at 06:42

## 2020-08-03 RX ADMIN — LEVOTHYROXINE SODIUM 150 MCG: 75 TABLET ORAL at 05:01

## 2020-08-03 RX ADMIN — SERTRALINE HYDROCHLORIDE 100 MG: 50 TABLET ORAL at 08:52

## 2020-08-03 RX ADMIN — METOPROLOL TARTRATE 50 MG: 50 TABLET, FILM COATED ORAL at 04:55

## 2020-08-03 NOTE — PROGRESS NOTES
General Cardiology   Progress Note -  [employer]   Celena Slight 67 y o  female MRN: [id number]    Unit/Bed#: [facility] 502-01 Encounter: [id number]    Assessment/ Plan    1  Paroxysmal atrial fibrillation with RVR, new onset  New diagnosis this admission, in setting of acute appendicitis  [id number] VASC score: 6, currently on IV heparin  No invasive procedures planned for this hospital stay and can transition to Eliquis 5 mg BID with plan to hold prior to future appendectomy  Transition to Toprol XL 25 mg q12 hours  Tele reviewed- V paced with 2 short runs of NSVT   K 3 7, replete  Mg 2 0      2  Appendicitis   On antibiotics, plan for future appendectomy  Afebrile, pain free on exam and tolerating diet  Followed by general surgery      3  Chronic systolic and diastolic heart failure  On lasix 20 mg PO daily at home  Diuretics on hold and she appears euvolemic on exam  +4 7 L since admission  If tolerating diet, would recommend decreasing or stopping IVF in next 24 hours to help prevent iatrogenic volume overload       4  Ischemic cardiomyopathy prior EF 25-30%  Echocardiogram 7/29/2020 showed EF 40-45%  S/p BIV ICD- V paced on tele  Continue ARB  Recommend changing metoprolol to succinate prior to discharge       5  CAD s/p PIPER to LAD 2007/2015  On aspirin, imdur and BB   Intolerant to statins     6  Dyslipidemia intolerant to statins      7  DARRELL on CKD improved back to baseline Cr 1 7 today      8  Hypertension BP average: 135/68  on Losartan 25 mg PO daily and metoprolol as above  Subjective  No abdominal pain but did have some mild nausea after eating this morning  Denies chest pain or SOB  Does note intermittent palpitations but none yet today  Review of Systems   Constitution: Negative for chills, diaphoresis and malaise/fatigue  Cardiovascular: Positive for palpitations  Negative for chest pain, dyspnea on exertion, leg swelling and orthopnea  Respiratory: Negative for cough and shortness of breath  Gastrointestinal: Positive for nausea  Negative for bloating and abdominal pain  Neurological: Negative for dizziness and light-headedness  All other systems reviewed and are negative  Objective:   Vitals: Blood pressure 153/67, pulse 74, temperature 98 °F (36 7 °C), temperature source Oral, resp  rate 18, height 5' 4", weight 77 2 kg (170 lb 3 1 oz), SpO2 96 %  , Body mass index is 29 21 kg/m²  ,     Systolic (45ZNS), LLZ:631 , Min:109 , PATTI:073     Diastolic (38IHP), ILL:37, Min:59, Max:82      Intake/Output Summary (Last 24 hours) at 8/3/2020 0909  Last data filed at 8/3/2020 0600  Gross per 24 hour   Intake 1159 94 ml   Output 500 ml   Net 659 94 ml     Wt Readings from Last 3 Encounters:   08/03/20 77 2 kg (170 lb 3 1 oz)   07/29/20 74 3 kg (163 lb 12 8 oz)   07/21/20 74 4 kg (164 lb)     Telemetry Review: V paced with 2 runs NSVT    Physical Exam   Constitutional: She is oriented to person, place, and time  No distress  Neck: Neck supple  No JVD present  Cardiovascular: Normal rate, regular rhythm, normal heart sounds and intact distal pulses  Exam reveals no gallop and no friction rub  No murmur heard  RACW ICD   Pulmonary/Chest: Effort normal  No respiratory distress  She has no rales  Clear, room air   Abdominal: Soft  Bowel sounds are normal  She exhibits no distension  There is no abdominal tenderness  Musculoskeletal: Normal range of motion  General: No edema  Neurological: She is alert and oriented to person, place, and time  Skin: Skin is warm and dry  She is not diaphoretic  No erythema  Psychiatric: She has a normal mood and affect  Vitals reviewed      LABORATORY RESULTS  Results from last 7 days   Lab Units 07/28/20  2325   TROPONIN I ng/mL <0 02     CBC with diff:   Results from last 7 days   Lab Units 08/03/20  0251 08/02/20  1246 08/02/20  0618 08/01/20  0301 07/31/20  0435 07/30/20  0518 07/29/20  1831  07/28/20  2325   WBC Thousand/uL 12 10* 12 11* 12 40* 15 57* 13 60* 15 85* 19 54*   < > 16 96*   HEMOGLOBIN g/dL 8 1* 8 9* 8 8* 8 1* 8 0* 8 6* 8 3*   < > 10 2*   HEMATOCRIT % 25 3* 27 7* 27 7* 26 1* 25 6* 27 9* 25 8*   < > 31 7*   MCV fL 94 95 95 96 97 98 95   < > 94   PLATELETS Thousands/uL 258 246 239 198 184 153 151   < > 200   MCH pg 30 2 30 4 30 1 29 9 30 3 30 1 30 4   < > 30 3   MCHC g/dL 32 0 32 1 31 8 31 0* 31 3* 30 8* 32 2   < > 32 2   RDW % 14 6 14 5 14 5 14 3 14 2 14 3 14 4   < > 14 0   MPV fL 10 7 10 8 11 1 10 9 11 5 11 5 11 1   < > 10 8   NRBC AUTO /100 WBCs 0  --  0 0 0 0  --   --  0    < > = values in this interval not displayed  CMP:  Results from last 7 days   Lab Units 08/03/20  0251 08/02/20  0618 08/01/20  0302 07/31/20  0606 07/30/20  0518 07/29/20  1831 07/29/20  1105  07/28/20  2325   POTASSIUM mmol/L 3 7 4 7 3 3* 3 6 3 9 3 8 4 1   < > 4 7   CHLORIDE mmol/L 112* 110* 110* 111* 110* 111* 102   < > 96*   CO2 mmol/L 18* 19* 20* 20* 22 21 24   < > 27   BUN mg/dL 43* 47* 43* 45* 56* 55* 60*   < > 63*   CREATININE mg/dL 1 71* 1 88* 1 69* 1 87* 2 22* 2 29* 2 67*   < > 2 53*   CALCIUM mg/dL 8 5 8 9 8 7 9 3 9 5 8 1* 8 6   < > 9 3   AST U/L  --   --   --   --  20  --   --   --  16   ALT U/L  --   --   --   --  11*  --   --   --  17   ALK PHOS U/L  --   --   --   --  130*  --   --   --  156*   EGFR ml/min/1 73sq m 29 26 29 26 21 20 17   < > 18    < > = values in this interval not displayed       BMP:  Results from last 7 days   Lab Units 08/03/20  0251 08/02/20  0618 08/01/20  0302 07/31/20  0606 07/30/20  0518 07/29/20  1831 07/29/20  1105   POTASSIUM mmol/L 3 7 4 7 3 3* 3 6 3 9 3 8 4 1   CHLORIDE mmol/L 112* 110* 110* 111* 110* 111* 102   CO2 mmol/L 18* 19* 20* 20* 22 21 24   BUN mg/dL 43* 47* 43* 45* 56* 55* 60*   CREATININE mg/dL 1 71* 1 88* 1 69* 1 87* 2 22* 2 29* 2 67*   CALCIUM mg/dL 8 5 8 9 8 7 9 3 9 5 8 1* 8 6     Lab Results   Component Value Date    CREATININE 1 71 (H) 08/03/2020    CREATININE 1 88 (H) 08/02/2020    CREATININE 1 69 (H) 2020     Lab Results   Component Value Date    NTBNP 8,932 (H) 2020      Results from last 7 days   Lab Units 20  0251 20  0302 20  0518 20  0434 20  2325   MAGNESIUM mg/dL 2 0 2 0 2 3 2 1 2 1     Results from last 7 days   Lab Units 20  1246 20  0434 20  2325   INR  1 26* 1 17 1 12     Lipid Profile:   No results found for: CHOL  Lab Results   Component Value Date    HDL 33 (L) 2020     Lab Results   Component Value Date    LDLCALC 179 (H) 2020     Lab Results   Component Value Date    TRIG 371 (H) 2020     Cardiac testing:   Results for orders placed during the hospital encounter of 20   Echo complete with contrast if indicated    Narrative [address]  [address], [address]  [phone]    Transthoracic Echocardiogram  2D, M-mode, Doppler, and Color Doppler    Study date:  2020    Patient: Lu Saavedra  MR number: [id number]  Account number: [de-[id number]  : [id number]  Age: 67 years  Gender: Female  Status: Inpatient  Location: Bedside  Height: 64 in  Weight: 163 7 lb  BP: 147/ 61 mmHg    Indications: heart failure    Diagnoses: I50 9 - Heart failure, unspecified    Sonographer:  Juventino Brady RDCS  Primary Physician:  [employer], DO  Referring Physician:  Abelino Adams DO  Group:  [id number]  Interpreting Physician:  Fior Newton MD    SUMMARY    LEFT VENTRICLE:  Systolic function was moderately reduced by visual assessment  Ejection fraction was estimated in the range of 40 % to 45 %  There was mild diffuse hypokinesis  Doppler parameters were consistent with abnormal left ventricular relaxation (grade 1 diastolic dysfunction)  RIGHT VENTRICLE:  The size was normal   Systolic function was normal     LEFT ATRIUM:  The atrium was mildly dilated  MITRAL VALVE:  There was marked annular calcification    There was mild to moderate regurgitation  AORTIC VALVE:  There was mild regurgitation  HISTORY: PRIOR HISTORY: hypertension, hyperlipidemia, hypothyroid, congestive heart failure, SIRS, defibrillator, mastectomy, coronary artery disease    PROCEDURE: The procedure was performed at the bedside  This was a routine study  The transthoracic approach was used  The study included complete 2D imaging, M-mode, complete spectral Doppler, and color Doppler  Images were obtained from  the parasternal, apical, subcostal, and suprasternal notch acoustic windows  Image quality was adequate  LEFT VENTRICLE: Size was normal  Systolic function was moderately reduced by visual assessment  Ejection fraction was estimated in the range of 40 % to 45 %  There was mild diffuse hypokinesis  Wall thickness was normal  DOPPLER: Doppler  parameters were consistent with abnormal left ventricular relaxation (grade 1 diastolic dysfunction)  RIGHT VENTRICLE: The size was normal  Systolic function was normal  Wall thickness was normal  An ICD wire was present  LEFT ATRIUM: The atrium was mildly dilated  RIGHT ATRIUM: Size was normal     MITRAL VALVE: There was marked annular calcification  Valve structure was normal  There was normal leaflet separation  DOPPLER: The transmitral velocity was within the normal range  There was no evidence for stenosis  There was mild to  moderate regurgitation  AORTIC VALVE: The valve was trileaflet  Leaflets exhibited normal thickness and normal cuspal separation  DOPPLER: Transaortic velocity was within the normal range  There was no evidence for stenosis  There was mild regurgitation  TRICUSPID VALVE: The valve structure was normal  There was normal leaflet separation  DOPPLER: The transtricuspid velocity was within the normal range  There was no evidence for stenosis  There was no significant regurgitation  PULMONIC VALVE: Leaflets exhibited normal thickness, no calcification, and normal cuspal separation  DOPPLER: The transpulmonic velocity was within the normal range  There was no significant regurgitation  PERICARDIUM: There was no pericardial effusion  The pericardium was normal in appearance  AORTA: The root exhibited normal size  SYSTEMIC VEINS: IVC: The inferior vena cava was normal in size   Respirophasic changes were normal     MEASUREMENT TABLES    OTHER ECHO MEASUREMENTS  (Reference normals)  Estimated CVP   5 mmHg   (--)    SYSTEM MEASUREMENT TABLES    2D  %FS: 18 01 %  Ao Diam: 2 79 cm  EDV(Teich): 116 36 ml  EF(Teich): 37 27 %  ESV(Teich): 72 99 ml  IVSd: 1 09 cm  LA Area: 19 34 cm2  LA Diam: 4 93 cm  LVEDV MOD A4C: 110 45 ml  LVEF MOD A4C: 29 96 %  LVESV MOD A4C: 77 36 ml  LVIDd: 4 97 cm  LVIDs: 4 07 cm  LVLd A4C: 7 35 cm  LVLs A4C: 6 56 cm  LVPWd: 1 07 cm  RA Area: 15 28 cm2  RVIDd: 4 29 cm  RWT: 0 43  SV MOD A4C: 33 09 ml  SV(Teich): 43 37 ml    CW  AR Dec McMinn: 2 03 m/s2  AR Dec Time: 1589 32 ms  AR PHT: 460 9 ms  AR Vmax: 3 16 m/s  AR maxP 99 mmHg  AV Vmax: 1 55 m/s  AV maxP 66 mmHg  PV Vmax: 1 08 m/s  PV maxP 64 mmHg  TR Vmax: 3 13 m/s  TR maxP 31 mmHg    MM  TAPSE: 1 87 cm    PW  E' Lat: 0 07 m/s  E' Sept: 0 05 m/s  E/E' Lat: 18 87  E/E' Sept: 26 49  LVOT Vmax: 0 93 m/s  LVOT maxPG: 3 49 mmHg  MV A Nitin: 1 7 m/s  MV Dec McMinn: 8 98 m/s2  MV DecT: 146 13 ms  MV E Nitin: 1 31 m/s  MV E/A Ratio: 0 77  RVOT Vmax: 0 77 m/s  RVOT maxP 38 mmHg    [facility] Commission Accredited Echocardiography Laboratory    Prepared and electronically signed by    Fior Newton MD  Signed 2020 10:09:49       Meds/Allergies   all current active meds have been reviewed and current meds:   Current Facility-Administered Medications   Medication Dose Route Frequency    acetaminophen (TYLENOL) tablet 975 mg  975 mg Oral Q8H [address]    aspirin chewable tablet 81 mg  81 mg Oral Daily    cefTRIAXone (ROCEPHIN) 2,000 mg in dextrose 5 % 50 mL IVPB  2,000 mg Intravenous Q24H    cholecalciferol (VITAMIN D3) tablet 1,000 Units  1,000 Units Oral Daily    heparin (porcine) 25,000 units in 250 mL infusion (premix)  3-30 Units/kg/hr (Order-Specific) Intravenous Titrated    heparin (porcine) injection 3,000 Units  3,000 Units Intravenous Q1H PRN    heparin (porcine) injection 6,000 Units  6,000 Units Intravenous Q1H PRN    isosorbide mononitrate (IMDUR) 24 hr tablet 60 mg  60 mg Oral Daily    levothyroxine tablet 150 mcg  150 mcg Oral Early Morning    losartan (COZAAR) tablet 25 mg  25 mg Oral Daily    melatonin tablet 3 mg  3 mg Oral HS    metoprolol tartrate (LOPRESSOR) tablet 50 mg  50 mg Oral Q6H    metroNIDAZOLE (FLAGYL) tablet 500 mg  500 mg Oral Q8H [facility]    oxyCODONE (ROXICODONE) IR tablet 2 5 mg  2 5 mg Oral Q4H PRN    Or    oxyCODONE (ROXICODONE) IR tablet 5 mg  5 mg Oral Q4H PRN    pantoprazole (PROTONIX) EC tablet 40 mg  40 mg Oral Daily Before Breakfast    sertraline (ZOLOFT) tablet 100 mg  100 mg Oral Daily    sodium chloride (PF) 0 9 % injection 3 mL  3 mL Intravenous Q1H PRN    sodium chloride 0 9 % infusion  75 mL/hr Intravenous Continuous     Medications Prior to Admission   Medication    ALPRAZolam (XANAX) 0 25 mg tablet    folic acid (FOLVITE) 467 mcg tablet    furosemide (LASIX) 20 mg tablet    levothyroxine 150 mcg tablet    losartan (COZAAR) 25 mg tablet    metoprolol tartrate (LOPRESSOR) 100 mg tablet    omeprazole (PriLOSEC) 20 mg delayed release capsule    sertraline (ZOLOFT) 100 mg tablet    aspirin (ECOTRIN LOW STRENGTH) 81 mg EC tablet    co-enzyme Q-10 30 MG capsule    isosorbide mononitrate (IMDUR) 60 mg 24 hr tablet    Multiple Vitamins-Minerals (MULTIVITAMIN WITH MINERALS) tablet    niacin (NIASPAN) 1000 MG CR tablet    omega-3-acid ethyl esters (LOVAZA) 1 g capsule    spironolactone (ALDACTONE) 25 mg tablet       heparin (porcine), 3-30 Units/kg/hr (Order-Specific), Last Rate: 12 Units/kg/hr (08/03/20 0986)  sodium chloride, 75 mL/hr, Last Rate: 75 mL/hr (08/03/20 0016)      Counseling / Coordination of Care  Total floor / unit time spent today 20 minutes  Greater than 50% of total time was spent with the patient and / or family counseling and / or coordination of care  ** Please Note: Dragon 360 Dictation voice to text software may have been used in the creation of this document   **

## 2020-08-03 NOTE — RESTORATIVE TECHNICIAN NOTE
Restorative Specialist Mobility Note       Activity: Ambulate in sharma, Chair     Assistive Device: Front wheel walker        Repositioned: Sitting, Up in chair, Other (Comment)(Chair Alarm)

## 2020-08-03 NOTE — QUICK NOTE
Nurse-Patient-Provider rounds were completed with the patient's nurse today, Maisha Erickson  We discussed the plan is to   - PT/OT eval  - Cardiology following for Afib with RVR  - heparin gtt  - abx    We reviewed all of the invasive devices/lines/telemetry orders  DVT prophylaxis:  - heparin gtt    Diet:  - soft, fluid restriction 2L, low sodium    Pain Assessment / Plan:  - Continue current pain management regimen    Mobility Assessment / Plan:  - Activity as tolerated  All questions and concerns were addressed        Jade Mcclellan PA-C

## 2020-08-03 NOTE — DISCHARGE SUMMARY
Discharge Summary - Jimmie Curtis 67 y o  female MRN: [id number]    Unit/Bed#: [address] 502-01 Encounter: [id number]    Admission Date:   Admission Orders (From admission, onward)     Ordered        07/29/20 1625  Inpatient Admission  Once                     Admitting Diagnosis: Perforated appendicitis [K35 32]  Acute kidney injury superimposed on chronic kidney disease ([facility] [place] [address] ) [N17 9, N18 9]    HPI:  Patient is a 50-year-old female past medical history migraines, hypertension, hyperlipidemia, GERD, chronic kidney disease, CHF, coronary artery disease, presents with acute perforated appendicitis with magali appendiceal abscess complicated with sepsis  Prior to arrival patient had complaints of fever, nausea, loss of appetite, multiple watery bowel movements  Patient was hypotensive EN route to the emergency department which responded to fluid boluses  Leukocytosis on admission 18 9  CT chest abdomen pelvis demonstrating ruptured appendix with 2 5 cm fluid collection  Procedures Performed: No orders of the defined types were placed in this encounter  Summary of Hospital Course:  Patient was admitted to the acute care surgery service 07/29/2020, started on antibiotics, ceftriaxone-Flagyl  Patient also had urine culture demonstrating urinary tract infection, treated with antibiotics  Also echocardiogram demonstrating EF of 40-45% diffuse hypokinesis and grade 1 diastolic dysfunction  CT chest abdomen pelvis on 08/01 demonstrating persistent inflammatory changes in the right lower quadrant, phlegmon with gas and minimum fluid  Patient also went into AFib with RVR on 08/01, Cardiology was consulted patient was started on a heparin drip and it was recommended to start Eliquis on discharge  Patient was tolerating a diet, having bowel movements, her pain was adequately controlled, she was voiding adequate urine, she was ambulating, she was deemed medically stable for discharge on 08/03/2020    Patient was sent home on Eliquis, cefdinir in Flagyl antibiotics to complete a 10 day course  Patient will follow up with General surgery in the office in 2 weeks  At the time of her office visit patient will be scheduled for interval appendectomy  Significant Findings, Care, Treatment and Services Provided: none    Complications: none    Discharge Diagnosis: same    Resolved Problems  Date Reviewed: 7/29/2020          Resolved    Sepsis ([facility] [address] ) 8/3/2020     Resolved by  Antonia Phipps MD          Condition at Discharge: good         Discharge instructions/Information to patient and family:   See after visit summary for information provided to patient and family  Provisions for Follow-Up Care:  See after visit summary for information related to follow-up care and any pertinent home health orders  PCP: Gracy Soto DO    Disposition: See After Visit Summary for discharge disposition information  Planned Readmission: No      Discharge Statement   I spent 30 minutes discharging the patient  This time was spent on the day of discharge  I had direct contact with the patient on the day of discharge  Additional documentation is required if more than 30 minutes were spent on discharge  Discharge Medications:  See after visit summary for reconciled discharge medications provided to patient and family

## 2020-08-03 NOTE — UTILIZATION REVIEW
Additional information as requested:    Patient states had episode of lightheadedness-wooziness and lowered herself to the floor    Patient is confused and cannot provide a reliable history at this time  She was admitted with lightheadedness and also admitted to lower abdominal pain, was also febrile with leukocytosis and evaluation showed acute kidney injury as well as a ruptured appendicitis  Pt is febrile in the ED here at 103 5    Neurological: Positive for light-headedness  Neurological: She is alert and oriented to person, place, and time  No cranial nerve deficit  Coordination normal  GCS eye subscore is 4  GCS verbal subscore is 5  GCS motor subscore is 6

## 2020-08-03 NOTE — UTILIZATION REVIEW
Continued Stay Review    Date: 8/3/2020                          Current Patient Class:  Inpatient  Current Level of Care:  Med Surg     HPI:75 y o  female initially admitted on 7/29/2020 with acute perforated appendicits with abscess  On antibiotics no surgical intervention planned  Assessment/Plan: 8/3: Pt with new onset afib with RVR on 8/1 s/o Amiodarone IV x 2 then gtt, weaned off on 8/2 Heparin gtt started  -  Plan Eliquis and Toprol XL, history of chronic systolic and diastolic heart failure  No PO lasix, monitor fluid intake consider stopping IVF' s to help prevent iatrogenic fluid overload  Ischemic cardiomyopathy - Echo on 7/29 EF 40/45% - s/- Biv ICD - V paced - continue ARB and change metoprolol to succinate prior to discaharge  Continues on ABx' s for appendiciti, continue serial exams, abdominal pain improving   Heparin gtt continues per Cardiology ( afib ) Diet as tolerated      Pertinent Labs/Diagnostic Results:   Results from last 7 days   Lab Units 07/29/20  0013   SARS-COV-2  Negative     Results from last 7 days   Lab Units 08/03/20  0251 08/02/20  1246 08/02/20  0618 08/01/20  0301 07/31/20  0435   WBC Thousand/uL 12 10* 12 11* 12 40* 15 57* 13 60*   HEMOGLOBIN g/dL 8 1* 8 9* 8 8* 8 1* 8 0*   HEMATOCRIT % 25 3* 27 7* 27 7* 26 1* 25 6*   PLATELETS Thousands/uL 258 246 239 198 184   NEUTROS ABS Thousands/µL  --   --  10 05* 12 43* 11 97*         Results from last 7 days   Lab Units 08/03/20  0251 08/02/20  0618 08/01/20  0302 07/31/20  0606 07/30/20  0518  07/29/20  0434 07/28/20  2325   SODIUM mmol/L 139 137 141 140 141   < > 135* 133*   POTASSIUM mmol/L 3 7 4 7 3 3* 3 6 3 9   < > 4 2 4 7   CHLORIDE mmol/L 112* 110* 110* 111* 110*   < > 99* 96*   CO2 mmol/L 18* 19* 20* 20* 22   < > 25 27   ANION GAP mmol/L 9 8 11 9 9   < > 11 10   BUN mg/dL 43* 47* 43* 45* 56*   < > 59* 63*   CREATININE mg/dL 1 71* 1 88* 1 69* 1 87* 2 22*   < > 2 46* 2 53*   EGFR ml/min/1 73sq m 29 26 29 26 21   < > 18 18 CALCIUM mg/dL 8 5 8 9 8 7 9 3 9 5   < > 9 1 9 3   MAGNESIUM mg/dL 2 0  --  2 0  --  2 3  --  2 1 2 1   PHOSPHORUS mg/dL  --   --   --   --  4 1  --  2 9  --     < > = values in this interval not displayed  Results from last 7 days   Lab Units 07/30/20  0518 07/28/20  2325   AST U/L 20 16   ALT U/L 11* 17   ALK PHOS U/L 130* 156*   TOTAL PROTEIN g/dL 6 6 7 8   ALBUMIN g/dL 2 5* 3 4*   TOTAL BILIRUBIN mg/dL 0 51 0 50     Results from last 7 days   Lab Units 07/28/20  2316   POC GLUCOSE mg/dl 105     Results from last 7 days   Lab Units 08/03/20  0251 08/02/20  0618 08/01/20  0302 07/31/20  0606 07/30/20  0518 07/29/20  1831 07/29/20  1105 07/29/20  0434 07/28/20  2325   GLUCOSE RANDOM mg/dL 118 117 113 106 92 90 99 106 129     Results from last 7 days   Lab Units 07/28/20  2325   TROPONIN I ng/mL <0 02     Results from last 7 days   Lab Units 08/03/20  1009 08/03/20  0251 08/03/20  0036  08/02/20  1246 07/29/20  0434 07/28/20  2325   PROTIME seconds  --   --   --   --  15 8* 15 0* 14 4   INR   --   --   --   --  1 26* 1 17 1 12   PTT seconds 65* 131* >210*   < > 23  --  31    < > = values in this interval not displayed       Results from last 7 days   Lab Units 07/31/20  0435 07/30/20  0518   PROCALCITONIN ng/ml 1 30* 1 78*     Results from last 7 days   Lab Units 07/29/20  1831 07/28/20  2325   LACTIC ACID mmol/L 1 5 1 0     Results from last 7 days   Lab Units 07/28/20  2325   NT-PRO BNP pg/mL 8,932*     Results from last 7 days   Lab Units 07/28/20  2325   LIPASE u/L 228     Results from last 7 days   Lab Units 07/29/20  0016   CLARITY UA  Clear   COLOR UA  Yellow   SPEC GRAV UA  1 010   PH UA  5 5   GLUCOSE UA mg/dl Negative   KETONES UA mg/dl Negative   BLOOD UA  Trace-Intact*   PROTEIN UA mg/dl Trace*   NITRITE UA  Negative   BILIRUBIN UA  Negative   UROBILINOGEN UA E U /dl 0 2   LEUKOCYTES UA  Moderate*   WBC UA /hpf 10-20*   RBC UA /hpf 1-2*   BACTERIA UA /hpf Occasional   EPITHELIAL CELLS WET PREP /hpf Occasional     Results from last 7 days   Lab Units 07/29/20  0016 07/28/20  2325   BLOOD CULTURE   --  No Growth After 5 Days  No Growth After 5 Days  URINE CULTURE  >100,000 cfu/ml Klebsiella pneumoniae*  --      CT A & P - 8/1 - Persistent inflammatory changes in the right lower quadrant with redemonstrated findings of an inflammatory phlegmonous region containing gas and minimal fluid in the suspected bed of the ruptured appendix     Fluid within the colon, correlate for diarrheal illness  Sigmoid diverticulosis with probable focal secondary sigmoid colitis/diverticulitis  Ventral abdominal hernia mesh repair       Vital Signs:   Date/Time   Temp   Pulse   Resp   BP   MAP (mmHg)   SpO2   O2 Device   Patient Position - Orthostatic VS    08/03/20 0716   98 °F (36 7 °C)   74   18   153/67   97   96 %   --   Lying    08/03/20 0038   98 2 °F (36 8 °C)   75   17   109/59   --   97 %   None (Room air)   Lying    08/02/20 2000   --   --   --   --   --   --   Nasal cannula   --    08/02/20 1913   98 8 °F (37 1 °C)   73   18   145/65   --   96 %   --   --    08/02/20 1516   98 °F (36 7 °C)   68   18   146/65   --   97 %   --   Sitting    08/02/20 1040   --   68   --   121/82   --   --   --   --    08/02/20 0700   98 1 °F (36 7 °C)   76   20   183/79Abnormal     --   96 %   --   Lying         Medications:   Scheduled Medications:  acetaminophen, 975 mg, Oral, Q8H BRIDGETTE  apixaban, 5 mg, Oral, BID  aspirin, 81 mg, Oral, Daily  cefTRIAXone, 2,000 mg, Intravenous, Q24H  cholecalciferol, 1,000 Units, Oral, Daily  isosorbide mononitrate, 60 mg, Oral, Daily  levothyroxine, 150 mcg, Oral, Early Morning  losartan, 25 mg, Oral, Daily  melatonin, 3 mg, Oral, HS  metoprolol succinate, 25 mg, Oral, Q12H  metroNIDAZOLE, 500 mg, Oral, Q8H BRIDGETTE  pantoprazole, 40 mg, Oral, Daily Before Breakfast  sertraline, 100 mg, Oral, Daily      Continuous IV Infusions:     PRN Meds:  oxyCODONE, 2 5 mg, Oral, Q4H PRN    Or  oxyCODONE, 5 mg, Oral, Q4H PRN  sodium chloride (PF), 3 mL, Intravenous, Q1H PRN        Discharge Plan: D     Network Utilization Review Department  [email or web address]  ATTENTION: Please call with any questions or concerns to [phone] and carefully listen to the prompts so that you are directed to the right person  All voicemails are confidential   Farrel Bodily all requests for admission clinical reviews, approved or denied determinations and any other requests to dedicated fax number below belonging to the campus where the patient is receiving treatment   List of dedicated fax numbers for the Facilities:  [address] [place] [address] DENIALS (Administrative/Medical Necessity) [phone]   [address] (Maternity/NICU/Pediatrics) [phone]   Oksana Albrecht [phone]   Nilam Blankenship [phone]   Radha Marquez [phone]   Marlyn Morin [phone]   [address] [place] [address] [phone]   [facility]  [phone]   [address] [place] [address], [address]  [address]01 [facility] [address] [facility] [phone]

## 2020-08-03 NOTE — DISCHARGE INSTRUCTIONS
Appendicitis   WHAT YOU NEED TO KNOW:   What is appendicitis? Appendicitis is inflammation of your appendix  The appendix is a small pouch  It is attached to the large intestine on the lower right side of the abdomen  The appendix may get blocked by food or by part of a bowel movement that becomes hard  The appendix can become infected with bacteria or a virus  Appendicitis can also be caused by a parasite or tumor  You will need immediate care to prevent a ruptured appendix  A ruptured appendix can cause bacteria to leak into the abdomen  This can lead to a serious infection called peritonitis  What are the signs and symptoms of appendicitis? Symptoms may start suddenly  The most common symptom is pain that starts at the belly button and moves to the lower right side of your abdomen  The pain worsens when you touch your abdomen, move, sneeze, cough, or take a deep breath  You may also have any of the following:  · Abdomen that feels hard    · Diarrhea or constipation    · Loss of appetite     · Nausea or vomiting     · Fever  How is appendicitis diagnosed? Your healthcare provider will examine you and check for pain or tenderness in your abdomen  You may also need any of the following:  · Blood tests  may show if you have an infection  · A urine test  may be used to check for a urinary tract infection or kidney stone  · CT or ultrasound  pictures of your abdomen may be used to check your appendix  You may be given contrast liquid to help your appendix show up better in the pictures  Tell the healthcare provider if you have ever had an allergic reaction to contrast liquid  How is appendicitis treated? · Medicines  may be given to fight an infection or to manage pain  Ask your healthcare provider how to take pain medicine safely  · Drainage  may be needed if you develop an abscess after a burst appendix   To drain the abscess, your healthcare provider guides a tube through your skin and into the abscess  Infected fluid drains through the tube  · An appendectomy  is surgery to remove your appendix  Your appendix may be removed through small incisions in your abdomen  If your appendix has burst, you may need an open appendectomy  A single, larger incision is made to remove the appendix and clean out the abdomen  When should I seek immediate care? · You have a fever  · You have severe pain in your abdomen  · You are vomiting and cannot keep food down  When should I contact my healthcare provider? · You have abdominal pain that does not go away, even after you take medicine  · You have chills, a cough, or feel weak and achy  · You have trouble having a bowel movement, or you have diarrhea  · You have questions or concerns about your condition or care  CARE AGREEMENT:   You have the right to help plan your care  Learn about your health condition and how it may be treated  Discuss treatment options with your caregivers to decide what care you want to receive  You always have the right to refuse treatment  The above information is an  only  It is not intended as medical advice for individual conditions or treatments  Talk to your doctor, nurse or pharmacist before following any medical regimen to see if it is safe and effective for you  © 2017 [address]  Information is for End User's use only and may not be sold, redistributed or otherwise used for commercial purposes  All illustrations and images included in CareNotes® are the copyrighted property of A D A M , Inc  or [employer] Drew

## 2020-08-03 NOTE — QUICK NOTE
Surgery   Quick note    Called to come evaluate patient as she slipped in the bathroom while changing her clothes she slowly fell on her left buttock  On evaluation, pt was sitting up in chair, laughing saying she was too embarrassed to ask for help getting up and really wants to go home  Pt did not striker her head  Pt did not lose consciousness  Patient is doing well, vital signs are stable  Strength and sensation upper and lower extremities full bilaterally  No tenderness over right hip  No current need for trauma evaluation  Patient is still cleared for discharge       German Calrin MD  8/3/2020  3:19 PM

## 2020-08-03 NOTE — PROGRESS NOTES
Progress Note - General Surgery   Manuel Lockett 67 y o  female MRN: [id number]  Unit/Bed#: [facility] 502-01 Encounter: [id number]    Assessment:  Manuel Lockett is a 67 y o  female w/ perforated appendicitis    Plan:  · PT OT recommending acute rehab, will have the re-evaluate patient she was previously independent at home for possible discharge with p o  Antibiotics and plan for interval appendectomy  · Continue diet as tolerated  · Continue CTX/Flagyl, WBC continues to improve, remains afebrile  · Serial exams, abdominal pain improving  · Continue to monitor Cr  · Appreciate cards recommendations, continue heparin drip  · DVT ppx    Subjective/Objective     Subjective:  No acute events overnight  Doing well  Tolerating diet  Pain well controlled  Objective:     Blood pressure 153/67, pulse 74, temperature 98 °F (36 7 °C), temperature source Oral, resp  rate 18, height 5' 4", weight 76 4 kg (168 lb 6 9 oz), SpO2 96 %  ,Body mass index is 28 91 kg/m²  Intake/Output Summary (Last 24 hours) at 8/3/2020 0827  Last data filed at 8/3/2020 0600  Gross per 24 hour   Intake 1159 94 ml   Output 500 ml   Net 659 94 ml       Invasive Devices     Peripheral Intravenous Line            Peripheral IV 08/02/20 Left;Ventral (anterior) Forearm 1 day          Drain            External Urinary Catheter less than 1 day                Physical Exam:   GEN: NAD  HEENT: MMM  CV:  Warm/well perfused  Lung: normal effort  Ab: Soft, NT/ND  Extrem: No CCE  Neuro:  A+Ox3, motor and sensation grossly intact    Lab, Imaging and other studies:  CBC:   Lab Results   Component Value Date    WBC 12 10 (H) 08/03/2020    HGB 8 1 (L) 08/03/2020    HCT 25 3 (L) 08/03/2020    MCV 94 08/03/2020     08/03/2020    MCH 30 2 08/03/2020    MCHC 32 0 08/03/2020    RDW 14 6 08/03/2020    MPV 10 7 08/03/2020    NRBC 0 08/03/2020   , CMP:   Lab Results   Component Value Date    SODIUM 139 08/03/2020    K 3 7 08/03/2020     (H) 08/03/2020    CO2 18 (L) 08/03/2020    BUN 43 (H) 08/03/2020    CREATININE 1 71 (H) 08/03/2020    CALCIUM 8 5 08/03/2020    EGFR 29 08/03/2020   , Coagulation:   Lab Results   Component Value Date    INR 1 26 (H) 08/02/2020     VTE Pharmacologic Prophylaxis: Heparin  VTE Mechanical Prophylaxis: sequential compression device

## 2020-08-04 NOTE — UTILIZATION REVIEW
Notification of Discharge  This is a Notification of Discharge from our facility [address]  Please be advised that this patient has been discharge from our facility  Below you will find the admission and discharge date and time including the patients disposition  PRESENTATION DATE: 7/29/2020  2:05 PM    IP ADMISSION DATE: 7/29/20 1622   DISCHARGE DATE: 8/3/2020  4:20 PM  DISPOSITION: Home/Self Care Home/Self Care   Admission Orders listed below:  Admission Orders (From admission, onward)     Ordered        07/29/20 1625  Inpatient Admission  Once                   Please contact the UR Department if additional information is required to close this patient's authorization/case  [facility] Utilization Review Department  Main: [phone] x carefully listen to the prompts  All voicemails are confidential   [email or web address]  Send all requests for admission clinical reviews, approved or denied determinations and any other requests to dedicated fax number below belonging to the campus where the patient is receiving treatment   List of dedicated fax numbers:  [address] DENIALS (Administrative/Medical Necessity) [phone]   [address] (Maternity/NICU/Pediatrics) [phone]   Nehal Handing [phone]   Yadiel Payton [phone]   Bree Moralessom [phone]   Malaika Anderson [facility] [address] [place] [address] [phone]   [facility]  [phone]   [address] [facility], [address]  2401 [facility] 1000 [address] [facility] [phone]

## 2020-08-17 ENCOUNTER — CONSULT (OUTPATIENT)
Dept: SURGERY | Facility: CLINIC | Age: 75
End: 2020-08-17
Payer: COMMERCIAL

## 2020-08-17 VITALS — HEIGHT: 64 IN | WEIGHT: 161.4 LBS | TEMPERATURE: 96.1 F | BODY MASS INDEX: 27.55 KG/M2 | HEART RATE: 50 BPM

## 2020-08-17 DIAGNOSIS — K35.32 PERFORATED APPENDICITIS: Primary | ICD-10-CM

## 2020-08-17 DIAGNOSIS — M79.89 LEG SWELLING: ICD-10-CM

## 2020-08-17 PROCEDURE — 99214 OFFICE O/P EST MOD 30 MIN: CPT | Performed by: SURGERY
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
## 2020-08-19 ENCOUNTER — TELEPHONE (OUTPATIENT)
Dept: SURGERY | Facility: CLINIC | Age: 75
End: 2020-08-19

## 2020-08-19 NOTE — TELEPHONE ENCOUNTER
Call to [facility] @ [phone] CT abdomen and pelvis w oral contrast [facility] pre-auth Approval # [id number] good from 8- thru 2-     K35 32 icd 10 code  23996 CPT code

## 2020-08-21 ENCOUNTER — HOSPITAL ENCOUNTER (OUTPATIENT)
Dept: CT IMAGING | Facility: HOSPITAL | Age: 75
Discharge: HOME | End: 2020-08-21
Payer: COMMERCIAL

## 2020-08-21 DIAGNOSIS — K35.32 PERFORATED APPENDICITIS: ICD-10-CM

## 2020-08-21 PROCEDURE — 74176 CT ABD & PELVIS W/O CONTRAST: CPT

## 2020-08-21 PROCEDURE — G1004 CDSM NDSC: HCPCS

## 2020-08-30 PROBLEM — Z95.810 S/P IMPLANTATION OF AUTOMATIC CARDIOVERTER/DEFIBRILLATOR (AICD): Status: ACTIVE | Noted: 2020-08-30

## 2020-08-30 PROBLEM — I10 ESSENTIAL HYPERTENSION: Status: ACTIVE | Noted: 2020-08-30

## 2020-08-30 PROBLEM — I25.5 ISCHEMIC CARDIOMYOPATHY: Status: ACTIVE | Noted: 2020-08-30

## 2020-08-30 PROBLEM — Z98.61 HISTORY OF PTCA: Status: ACTIVE | Noted: 2020-08-30

## 2020-08-30 PROBLEM — I48.91 ATRIAL FIBRILLATION (HCC): Status: ACTIVE | Noted: 2020-08-30

## 2020-08-31 DIAGNOSIS — K35.32 PERFORATED APPENDICITIS: Primary | ICD-10-CM

## 2020-09-01 ENCOUNTER — TELEPHONE (OUTPATIENT)
Dept: SURGERY | Facility: CLINIC | Age: 75
End: 2020-09-01

## 2020-09-01 NOTE — TELEPHONE ENCOUNTER
Called patient and informed her of the appointment for her CT scan on Friday 9-4 @ 1:30  Also told her I obtained her authorization

## 2020-09-04 ENCOUNTER — HOSPITAL ENCOUNTER (OUTPATIENT)
Dept: CT IMAGING | Facility: HOSPITAL | Age: 75
Discharge: HOME | End: 2020-09-04
Payer: COMMERCIAL

## 2020-09-04 DIAGNOSIS — K35.32 PERFORATED APPENDICITIS: ICD-10-CM

## 2020-09-04 PROCEDURE — G1004 CDSM NDSC: HCPCS

## 2020-09-04 PROCEDURE — 74176 CT ABD & PELVIS W/O CONTRAST: CPT

## 2020-09-14 ENCOUNTER — OFFICE VISIT (OUTPATIENT)
Dept: SURGERY | Facility: CLINIC | Age: 75
End: 2020-09-14
Payer: COMMERCIAL

## 2020-09-14 VITALS — BODY MASS INDEX: 27.66 KG/M2 | HEIGHT: 64 IN | HEART RATE: 66 BPM | TEMPERATURE: 96.5 F | WEIGHT: 162 LBS

## 2020-09-14 DIAGNOSIS — K35.33 APPENDICITIS WITH ABSCESS: Primary | ICD-10-CM

## 2020-09-14 PROCEDURE — 99213 OFFICE O/P EST LOW 20 MIN: CPT | Performed by: SURGERY
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
## 2020-09-18 ENCOUNTER — APPOINTMENT (OUTPATIENT)
Dept: LAB | Facility: CLINIC | Age: 75
End: 2020-09-18
Payer: COMMERCIAL

## 2020-09-18 ENCOUNTER — TRANSCRIBE ORDERS (OUTPATIENT)
Dept: LAB | Facility: CLINIC | Age: 75
End: 2020-09-18

## 2020-09-18 DIAGNOSIS — I10 HYPERTENSION, UNSPECIFIED TYPE: ICD-10-CM

## 2020-09-18 DIAGNOSIS — I50.22 CHRONIC SYSTOLIC HEART FAILURE (HCC): Primary | ICD-10-CM

## 2020-09-18 DIAGNOSIS — I50.22 CHRONIC SYSTOLIC HEART FAILURE (HCC): ICD-10-CM

## 2020-09-18 LAB
ANION GAP SERPL CALCULATED.3IONS-SCNC: 8 MMOL/L (ref 4–13)
BUN SERPL-MCNC: 37 MG/DL (ref 5–25)
CALCIUM SERPL-MCNC: 9.6 MG/DL (ref 8.3–10.1)
CHLORIDE SERPL-SCNC: 108 MMOL/L (ref 100–108)
CO2 SERPL-SCNC: 24 MMOL/L (ref 21–32)
CREAT SERPL-MCNC: 1.53 MG/DL (ref 0.6–1.3)
GFR SERPL CREATININE-BSD FRML MDRD: 33 ML/MIN/1.73SQ M
GLUCOSE SERPL-MCNC: 107 MG/DL (ref 65–140)
POTASSIUM SERPL-SCNC: 4.1 MMOL/L (ref 3.5–5.3)
SODIUM SERPL-SCNC: 140 MMOL/L (ref 136–145)

## 2020-09-18 PROCEDURE — 36415 COLL VENOUS BLD VENIPUNCTURE: CPT

## 2020-09-18 PROCEDURE — 80048 BASIC METABOLIC PNL TOTAL CA: CPT

## 2021-01-11 DIAGNOSIS — R11.10 VOMITING, INTRACTABILITY OF VOMITING NOT SPECIFIED, PRESENCE OF NAUSEA NOT SPECIFIED, UNSPECIFIED VOMITING TYPE: ICD-10-CM

## 2021-01-11 DIAGNOSIS — R51.9 NONINTRACTABLE HEADACHE, UNSPECIFIED CHRONICITY PATTERN, UNSPECIFIED HEADACHE TYPE: ICD-10-CM

## 2021-01-11 DIAGNOSIS — R06.02 SOB (SHORTNESS OF BREATH): ICD-10-CM

## 2021-01-11 DIAGNOSIS — R05.9 COUGH: ICD-10-CM

## 2021-01-11 PROCEDURE — U0005 INFEC AGEN DETEC AMPLI PROBE: HCPCS | Performed by: FAMILY MEDICINE

## 2021-01-11 PROCEDURE — U0003 INFECTIOUS AGENT DETECTION BY NUCLEIC ACID (DNA OR RNA); SEVERE ACUTE RESPIRATORY SYNDROME CORONAVIRUS 2 (SARS-COV-2) (CORONAVIRUS DISEASE [COVID-19]), AMPLIFIED PROBE TECHNIQUE, MAKING USE OF HIGH THROUGHPUT TECHNOLOGIES AS DESCRIBED BY CMS-2020-01-R: HCPCS | Performed by: FAMILY MEDICINE

## 2021-01-13 LAB — SARS-COV-2 RNA SPEC QL NAA+PROBE: NOT DETECTED

## 2021-06-07 ENCOUNTER — IMMUNIZATIONS (OUTPATIENT)
Dept: FAMILY MEDICINE CLINIC | Facility: HOSPITAL | Age: 76
End: 2021-06-07
Payer: COMMERCIAL

## 2021-06-07 ENCOUNTER — TRANSCRIBE ORDERS (OUTPATIENT)
Dept: LAB | Facility: CLINIC | Age: 76
End: 2021-06-07

## 2021-06-07 ENCOUNTER — APPOINTMENT (OUTPATIENT)
Dept: LAB | Facility: CLINIC | Age: 76
End: 2021-06-07
Payer: COMMERCIAL

## 2021-06-07 DIAGNOSIS — E03.9 HYPOTHYROIDISM, UNSPECIFIED TYPE: ICD-10-CM

## 2021-06-07 DIAGNOSIS — N17.9 ACUTE RENAL FAILURE, UNSPECIFIED ACUTE RENAL FAILURE TYPE (HCC): Primary | ICD-10-CM

## 2021-06-07 DIAGNOSIS — Z13.220 SCREENING FOR LIPOID DISORDERS: ICD-10-CM

## 2021-06-07 DIAGNOSIS — N17.9 ACUTE RENAL FAILURE, UNSPECIFIED ACUTE RENAL FAILURE TYPE (HCC): ICD-10-CM

## 2021-06-07 DIAGNOSIS — Z23 ENCOUNTER FOR IMMUNIZATION: Primary | ICD-10-CM

## 2021-06-07 LAB
ALBUMIN SERPL BCP-MCNC: 2.6 G/DL (ref 3.5–5)
ALP SERPL-CCNC: 170 U/L (ref 46–116)
ALT SERPL W P-5'-P-CCNC: 14 U/L (ref 12–78)
ANION GAP SERPL CALCULATED.3IONS-SCNC: 8 MMOL/L (ref 4–13)
AST SERPL W P-5'-P-CCNC: 24 U/L (ref 5–45)
BILIRUB SERPL-MCNC: 0.36 MG/DL (ref 0.2–1)
BUN SERPL-MCNC: 56 MG/DL (ref 5–25)
CALCIUM ALBUM COR SERPL-MCNC: 10.8 MG/DL (ref 8.3–10.1)
CALCIUM SERPL-MCNC: 9.7 MG/DL (ref 8.3–10.1)
CHLORIDE SERPL-SCNC: 102 MMOL/L (ref 100–108)
CHOLEST SERPL-MCNC: 278 MG/DL (ref 50–200)
CO2 SERPL-SCNC: 27 MMOL/L (ref 21–32)
CREAT SERPL-MCNC: 1.64 MG/DL (ref 0.6–1.3)
ERYTHROCYTE [DISTWIDTH] IN BLOOD BY AUTOMATED COUNT: 17.5 % (ref 11.6–15.1)
GFR SERPL CREATININE-BSD FRML MDRD: 30 ML/MIN/1.73SQ M
GLUCOSE P FAST SERPL-MCNC: 87 MG/DL (ref 65–99)
HCT VFR BLD AUTO: 26.1 % (ref 34.8–46.1)
HDLC SERPL-MCNC: 28 MG/DL
HGB BLD-MCNC: 7.9 G/DL (ref 11.5–15.4)
LDLC SERPL CALC-MCNC: 176 MG/DL (ref 0–100)
MCH RBC QN AUTO: 26.6 PG (ref 26.8–34.3)
MCHC RBC AUTO-ENTMCNC: 30.3 G/DL (ref 31.4–37.4)
MCV RBC AUTO: 88 FL (ref 82–98)
NONHDLC SERPL-MCNC: 250 MG/DL
PLATELET # BLD AUTO: 289 THOUSANDS/UL (ref 149–390)
PMV BLD AUTO: 11 FL (ref 8.9–12.7)
POTASSIUM SERPL-SCNC: 3.9 MMOL/L (ref 3.5–5.3)
PROT SERPL-MCNC: 6.7 G/DL (ref 6.4–8.2)
RBC # BLD AUTO: 2.97 MILLION/UL (ref 3.81–5.12)
SODIUM SERPL-SCNC: 137 MMOL/L (ref 136–145)
TRIGL SERPL-MCNC: 369 MG/DL
TSH SERPL DL<=0.05 MIU/L-ACNC: 0.13 UIU/ML (ref 0.36–3.74)
WBC # BLD AUTO: 6.82 THOUSAND/UL (ref 4.31–10.16)

## 2021-06-07 PROCEDURE — 80053 COMPREHEN METABOLIC PANEL: CPT

## 2021-06-07 PROCEDURE — 85027 COMPLETE CBC AUTOMATED: CPT

## 2021-06-07 PROCEDURE — 36415 COLL VENOUS BLD VENIPUNCTURE: CPT

## 2021-06-07 PROCEDURE — 80061 LIPID PANEL: CPT

## 2021-06-07 PROCEDURE — 0001A SARS-COV-2 / COVID-19 MRNA VACCINE (PFIZER-BIONTECH) 30 MCG: CPT

## 2021-06-07 PROCEDURE — 91300 SARS-COV-2 / COVID-19 MRNA VACCINE (PFIZER-BIONTECH) 30 MCG: CPT

## 2021-06-07 PROCEDURE — 84443 ASSAY THYROID STIM HORMONE: CPT

## 2021-06-28 ENCOUNTER — IMMUNIZATIONS (OUTPATIENT)
Dept: FAMILY MEDICINE CLINIC | Facility: HOSPITAL | Age: 76
End: 2021-06-28

## 2021-06-28 DIAGNOSIS — Z23 ENCOUNTER FOR IMMUNIZATION: Primary | ICD-10-CM

## 2021-06-28 PROCEDURE — 91300 SARS-COV-2 / COVID-19 MRNA VACCINE (PFIZER-BIONTECH) 30 MCG: CPT

## 2021-06-28 PROCEDURE — 0002A SARS-COV-2 / COVID-19 MRNA VACCINE (PFIZER-BIONTECH) 30 MCG: CPT

## 2021-10-22 ENCOUNTER — HOSPITAL ENCOUNTER (EMERGENCY)
Facility: HOSPITAL | Age: 76
Discharge: HOME | End: 2021-10-22
Attending: EMERGENCY MEDICINE | Admitting: EMERGENCY MEDICINE
Payer: COMMERCIAL

## 2021-10-22 ENCOUNTER — APPOINTMENT (EMERGENCY)
Dept: RADIOLOGY | Facility: HOSPITAL | Age: 76
End: 2021-10-22
Payer: COMMERCIAL

## 2021-10-22 VITALS
DIASTOLIC BLOOD PRESSURE: 77 MMHG | RESPIRATION RATE: 18 BRPM | WEIGHT: 138.89 LBS | HEART RATE: 79 BPM | HEIGHT: 66 IN | BODY MASS INDEX: 22.32 KG/M2 | SYSTOLIC BLOOD PRESSURE: 193 MMHG | OXYGEN SATURATION: 95 % | TEMPERATURE: 97.8 F

## 2021-10-22 DIAGNOSIS — S42.309A HUMERUS FRACTURE: Primary | ICD-10-CM

## 2021-10-22 PROCEDURE — 73030 X-RAY EXAM OF SHOULDER: CPT

## 2021-10-22 PROCEDURE — 99284 EMERGENCY DEPT VISIT MOD MDM: CPT | Performed by: PHYSICIAN ASSISTANT

## 2021-10-22 PROCEDURE — 73090 X-RAY EXAM OF FOREARM: CPT

## 2021-10-22 PROCEDURE — 73060 X-RAY EXAM OF HUMERUS: CPT

## 2021-10-22 PROCEDURE — 99283 EMERGENCY DEPT VISIT LOW MDM: CPT

## 2021-10-22 RX ORDER — OXYCODONE HYDROCHLORIDE AND ACETAMINOPHEN 5; 325 MG/1; MG/1
1 TABLET ORAL ONCE
Status: COMPLETED | OUTPATIENT
Start: 2021-10-22 | End: 2021-10-22

## 2021-10-22 RX ORDER — HYDROCODONE BITARTRATE AND ACETAMINOPHEN 5; 325 MG/1; MG/1
1 TABLET ORAL EVERY 6 HOURS PRN
Qty: 12 TABLET | Refills: 0 | Status: SHIPPED | OUTPATIENT
Start: 2021-10-22 | End: 2021-11-02

## 2021-10-22 RX ADMIN — OXYCODONE HYDROCHLORIDE AND ACETAMINOPHEN 1 TABLET: 5; 325 TABLET ORAL at 17:30

## 2021-10-23 PROCEDURE — 29105 APPLICATION LONG ARM SPLINT: CPT | Performed by: PHYSICIAN ASSISTANT

## 2021-10-26 ENCOUNTER — OFFICE VISIT (OUTPATIENT)
Dept: OBGYN CLINIC | Facility: CLINIC | Age: 76
End: 2021-10-26
Payer: COMMERCIAL

## 2021-10-26 VITALS
HEART RATE: 63 BPM | HEIGHT: 66 IN | BODY MASS INDEX: 22.42 KG/M2 | SYSTOLIC BLOOD PRESSURE: 151 MMHG | DIASTOLIC BLOOD PRESSURE: 75 MMHG

## 2021-10-26 DIAGNOSIS — S42.401A CLOSED FRACTURE OF DISTAL END OF RIGHT HUMERUS, UNSPECIFIED FRACTURE MORPHOLOGY, INITIAL ENCOUNTER: Primary | ICD-10-CM

## 2021-10-26 PROCEDURE — 99213 OFFICE O/P EST LOW 20 MIN: CPT | Performed by: ORTHOPAEDIC SURGERY

## 2021-10-28 ENCOUNTER — APPOINTMENT (EMERGENCY)
Dept: RADIOLOGY | Facility: HOSPITAL | Age: 76
End: 2021-10-28
Payer: COMMERCIAL

## 2021-10-28 ENCOUNTER — HOSPITAL ENCOUNTER (EMERGENCY)
Facility: HOSPITAL | Age: 76
Discharge: HOME | End: 2021-10-28
Attending: EMERGENCY MEDICINE | Admitting: EMERGENCY MEDICINE
Payer: COMMERCIAL

## 2021-10-28 VITALS
DIASTOLIC BLOOD PRESSURE: 80 MMHG | TEMPERATURE: 98.2 F | OXYGEN SATURATION: 98 % | SYSTOLIC BLOOD PRESSURE: 193 MMHG | HEART RATE: 87 BPM | RESPIRATION RATE: 20 BRPM

## 2021-10-28 DIAGNOSIS — S42.201A CLOSED FRACTURE OF RIGHT PROXIMAL HUMERUS: ICD-10-CM

## 2021-10-28 DIAGNOSIS — R07.81 RIB PAIN ON RIGHT SIDE: Primary | ICD-10-CM

## 2021-10-28 PROCEDURE — 71045 X-RAY EXAM CHEST 1 VIEW: CPT

## 2021-10-28 PROCEDURE — 99285 EMERGENCY DEPT VISIT HI MDM: CPT | Performed by: EMERGENCY MEDICINE

## 2021-10-28 PROCEDURE — 99283 EMERGENCY DEPT VISIT LOW MDM: CPT

## 2021-10-28 PROCEDURE — 93005 ELECTROCARDIOGRAM TRACING: CPT

## 2021-10-28 RX ORDER — ACETAMINOPHEN 325 MG/1
650 TABLET ORAL ONCE
Status: COMPLETED | OUTPATIENT
Start: 2021-10-28 | End: 2021-10-28

## 2021-10-28 RX ORDER — OXYCODONE HYDROCHLORIDE 5 MG/1
5 TABLET ORAL EVERY 4 HOURS PRN
Qty: 7 TABLET | Refills: 0 | Status: SHIPPED | OUTPATIENT
Start: 2021-10-28 | End: 2021-10-31

## 2021-10-28 RX ORDER — OXYCODONE HYDROCHLORIDE 5 MG/1
5 TABLET ORAL ONCE
Status: COMPLETED | OUTPATIENT
Start: 2021-10-28 | End: 2021-10-28

## 2021-10-28 RX ADMIN — ACETAMINOPHEN 650 MG: 325 TABLET, FILM COATED ORAL at 20:43

## 2021-10-28 RX ADMIN — OXYCODONE HYDROCHLORIDE 5 MG: 5 TABLET ORAL at 20:43

## 2021-11-01 LAB
ATRIAL RATE: 83 BPM
ATRIAL RATE: 84 BPM
P AXIS: 80 DEGREES
P AXIS: 91 DEGREES
PR INTERVAL: 142 MS
PR INTERVAL: 142 MS
QRS AXIS: -66 DEGREES
QRS AXIS: -66 DEGREES
QRSD INTERVAL: 134 MS
QRSD INTERVAL: 134 MS
QT INTERVAL: 436 MS
QT INTERVAL: 446 MS
QTC INTERVAL: 515 MS
QTC INTERVAL: 524 MS
T WAVE AXIS: 77 DEGREES
T WAVE AXIS: 85 DEGREES
VENTRICULAR RATE: 83 BPM
VENTRICULAR RATE: 84 BPM

## 2021-11-01 PROCEDURE — 93010 ELECTROCARDIOGRAM REPORT: CPT | Performed by: INTERNAL MEDICINE

## 2021-11-02 ENCOUNTER — OFFICE VISIT (OUTPATIENT)
Dept: OBGYN CLINIC | Facility: CLINIC | Age: 76
End: 2021-11-02
Payer: COMMERCIAL

## 2021-11-02 ENCOUNTER — APPOINTMENT (OUTPATIENT)
Dept: RADIOLOGY | Facility: MEDICAL CENTER | Age: 76
End: 2021-11-02
Payer: COMMERCIAL

## 2021-11-02 VITALS — BODY MASS INDEX: 22.42 KG/M2 | HEIGHT: 66 IN

## 2021-11-02 DIAGNOSIS — S42.401D CLOSED FRACTURE OF DISTAL END OF RIGHT HUMERUS WITH ROUTINE HEALING, UNSPECIFIED FRACTURE MORPHOLOGY, SUBSEQUENT ENCOUNTER: ICD-10-CM

## 2021-11-02 DIAGNOSIS — S42.401D CLOSED FRACTURE OF DISTAL END OF RIGHT HUMERUS WITH ROUTINE HEALING, UNSPECIFIED FRACTURE MORPHOLOGY, SUBSEQUENT ENCOUNTER: Primary | ICD-10-CM

## 2021-11-02 PROCEDURE — 99213 OFFICE O/P EST LOW 20 MIN: CPT | Performed by: ORTHOPAEDIC SURGERY

## 2021-11-02 PROCEDURE — 73060 X-RAY EXAM OF HUMERUS: CPT

## 2021-11-16 ENCOUNTER — APPOINTMENT (EMERGENCY)
Dept: CT IMAGING | Facility: HOSPITAL | Age: 76
End: 2021-11-16
Payer: COMMERCIAL

## 2021-11-16 ENCOUNTER — HOSPITAL ENCOUNTER (EMERGENCY)
Facility: HOSPITAL | Age: 76
Discharge: HOME | End: 2021-11-16
Attending: EMERGENCY MEDICINE
Payer: COMMERCIAL

## 2021-11-16 VITALS
DIASTOLIC BLOOD PRESSURE: 72 MMHG | RESPIRATION RATE: 20 BRPM | BODY MASS INDEX: 22.35 KG/M2 | OXYGEN SATURATION: 99 % | SYSTOLIC BLOOD PRESSURE: 158 MMHG | WEIGHT: 138.45 LBS | HEART RATE: 91 BPM | TEMPERATURE: 99 F

## 2021-11-16 DIAGNOSIS — N12 PYELONEPHRITIS OF LEFT KIDNEY: ICD-10-CM

## 2021-11-16 DIAGNOSIS — N39.0 UTI (URINARY TRACT INFECTION): Primary | ICD-10-CM

## 2021-11-16 DIAGNOSIS — D64.9 ANEMIA: ICD-10-CM

## 2021-11-16 LAB
ABO GROUP BLD: NORMAL
ABO GROUP BLD: NORMAL
ALBUMIN SERPL BCP-MCNC: 2.6 G/DL (ref 3.5–5)
ALP SERPL-CCNC: 226 U/L (ref 46–116)
ALT SERPL W P-5'-P-CCNC: 9 U/L (ref 12–78)
ANION GAP SERPL CALCULATED.3IONS-SCNC: 9 MMOL/L (ref 4–13)
AST SERPL W P-5'-P-CCNC: 17 U/L (ref 5–45)
BACTERIA UR QL AUTO: ABNORMAL /HPF
BASOPHILS # BLD AUTO: 0.04 THOUSANDS/ΜL (ref 0–0.1)
BASOPHILS NFR BLD AUTO: 0 % (ref 0–1)
BILIRUB SERPL-MCNC: 0.16 MG/DL (ref 0.2–1)
BILIRUB UR QL STRIP: ABNORMAL
BLD GP AB SCN SERPL QL: NEGATIVE
BUN SERPL-MCNC: 63 MG/DL (ref 5–25)
CALCIUM ALBUM COR SERPL-MCNC: 10.2 MG/DL (ref 8.3–10.1)
CALCIUM SERPL-MCNC: 9.1 MG/DL (ref 8.3–10.1)
CHLORIDE SERPL-SCNC: 100 MMOL/L (ref 100–108)
CLARITY UR: ABNORMAL
CO2 SERPL-SCNC: 26 MMOL/L (ref 21–32)
COLOR UR: ABNORMAL
CREAT SERPL-MCNC: 2.18 MG/DL (ref 0.6–1.3)
EOSINOPHIL # BLD AUTO: 0.12 THOUSAND/ΜL (ref 0–0.61)
EOSINOPHIL NFR BLD AUTO: 1 % (ref 0–6)
ERYTHROCYTE [DISTWIDTH] IN BLOOD BY AUTOMATED COUNT: 17.5 % (ref 11.6–15.1)
GFR SERPL CREATININE-BSD FRML MDRD: 21 ML/MIN/1.73SQ M
GLUCOSE SERPL-MCNC: 145 MG/DL (ref 65–140)
GLUCOSE UR STRIP-MCNC: NEGATIVE MG/DL
HCT VFR BLD AUTO: 24.4 % (ref 34.8–46.1)
HGB BLD-MCNC: 7.7 G/DL (ref 11.5–15.4)
HGB UR QL STRIP.AUTO: ABNORMAL
IMM GRANULOCYTES # BLD AUTO: 0.04 THOUSAND/UL (ref 0–0.2)
IMM GRANULOCYTES NFR BLD AUTO: 0 % (ref 0–2)
KETONES UR STRIP-MCNC: ABNORMAL MG/DL
LEUKOCYTE ESTERASE UR QL STRIP: ABNORMAL
LYMPHOCYTES # BLD AUTO: 1.03 THOUSANDS/ΜL (ref 0.6–4.47)
LYMPHOCYTES NFR BLD AUTO: 12 % (ref 14–44)
MCH RBC QN AUTO: 29.6 PG (ref 26.8–34.3)
MCHC RBC AUTO-ENTMCNC: 31.6 G/DL (ref 31.4–37.4)
MCV RBC AUTO: 94 FL (ref 82–98)
MONOCYTES # BLD AUTO: 0.79 THOUSAND/ΜL (ref 0.17–1.22)
MONOCYTES NFR BLD AUTO: 9 % (ref 4–12)
MUCOUS THREADS UR QL AUTO: ABNORMAL
NEUTROPHILS # BLD AUTO: 6.93 THOUSANDS/ΜL (ref 1.85–7.62)
NEUTS SEG NFR BLD AUTO: 78 % (ref 43–75)
NITRITE UR QL STRIP: POSITIVE
NON-SQ EPI CELLS URNS QL MICRO: ABNORMAL /HPF
NRBC BLD AUTO-RTO: 0 /100 WBCS
PH UR STRIP.AUTO: 8.5 [PH]
PLATELET # BLD AUTO: 386 THOUSANDS/UL (ref 149–390)
PMV BLD AUTO: 11 FL (ref 8.9–12.7)
POTASSIUM SERPL-SCNC: 4.1 MMOL/L (ref 3.5–5.3)
PROT SERPL-MCNC: 6.9 G/DL (ref 6.4–8.2)
PROT UR STRIP-MCNC: >=300 MG/DL
RBC # BLD AUTO: 2.6 MILLION/UL (ref 3.81–5.12)
RBC #/AREA URNS AUTO: ABNORMAL /HPF
RH BLD: POSITIVE
RH BLD: POSITIVE
SODIUM SERPL-SCNC: 135 MMOL/L (ref 136–145)
SP GR UR STRIP.AUTO: 1.01 (ref 1–1.03)
SPECIMEN EXPIRATION DATE: NORMAL
UROBILINOGEN UR QL STRIP.AUTO: 1 E.U./DL
WBC # BLD AUTO: 8.95 THOUSAND/UL (ref 4.31–10.16)
WBC #/AREA URNS AUTO: ABNORMAL /HPF

## 2021-11-16 PROCEDURE — 86850 RBC ANTIBODY SCREEN: CPT | Performed by: EMERGENCY MEDICINE

## 2021-11-16 PROCEDURE — 85025 COMPLETE CBC W/AUTO DIFF WBC: CPT

## 2021-11-16 PROCEDURE — G1004 CDSM NDSC: HCPCS

## 2021-11-16 PROCEDURE — 74176 CT ABD & PELVIS W/O CONTRAST: CPT

## 2021-11-16 PROCEDURE — 80053 COMPREHEN METABOLIC PANEL: CPT

## 2021-11-16 PROCEDURE — 87086 URINE CULTURE/COLONY COUNT: CPT

## 2021-11-16 PROCEDURE — 86901 BLOOD TYPING SEROLOGIC RH(D): CPT | Performed by: EMERGENCY MEDICINE

## 2021-11-16 PROCEDURE — 96368 THER/DIAG CONCURRENT INF: CPT

## 2021-11-16 PROCEDURE — 87186 SC STD MICRODIL/AGAR DIL: CPT

## 2021-11-16 PROCEDURE — 99285 EMERGENCY DEPT VISIT HI MDM: CPT | Performed by: EMERGENCY MEDICINE

## 2021-11-16 PROCEDURE — 96365 THER/PROPH/DIAG IV INF INIT: CPT

## 2021-11-16 PROCEDURE — 87077 CULTURE AEROBIC IDENTIFY: CPT

## 2021-11-16 PROCEDURE — 36415 COLL VENOUS BLD VENIPUNCTURE: CPT

## 2021-11-16 PROCEDURE — 81001 URINALYSIS AUTO W/SCOPE: CPT

## 2021-11-16 PROCEDURE — 86900 BLOOD TYPING SEROLOGIC ABO: CPT | Performed by: EMERGENCY MEDICINE

## 2021-11-16 PROCEDURE — 99284 EMERGENCY DEPT VISIT MOD MDM: CPT

## 2021-11-16 RX ORDER — SODIUM CHLORIDE, SODIUM GLUCONATE, SODIUM ACETATE, POTASSIUM CHLORIDE, MAGNESIUM CHLORIDE, SODIUM PHOSPHATE, DIBASIC, AND POTASSIUM PHOSPHATE .53; .5; .37; .037; .03; .012; .00082 G/100ML; G/100ML; G/100ML; G/100ML; G/100ML; G/100ML; G/100ML
500 INJECTION, SOLUTION INTRAVENOUS ONCE
Status: COMPLETED | OUTPATIENT
Start: 2021-11-16 | End: 2021-11-16

## 2021-11-16 RX ORDER — CEFTRIAXONE 1 G/50ML
1000 INJECTION, SOLUTION INTRAVENOUS EVERY 24 HOURS
Status: DISCONTINUED | OUTPATIENT
Start: 2021-11-16 | End: 2021-11-16 | Stop reason: HOSPADM

## 2021-11-16 RX ORDER — CEPHALEXIN 500 MG/1
1000 CAPSULE ORAL EVERY 12 HOURS SCHEDULED
Qty: 20 CAPSULE | Refills: 0 | Status: SHIPPED | OUTPATIENT
Start: 2021-11-16 | End: 2021-11-17 | Stop reason: SDUPTHER

## 2021-11-16 RX ORDER — CEPHALEXIN 500 MG/1
1000 CAPSULE ORAL EVERY 12 HOURS SCHEDULED
Qty: 20 CAPSULE | Refills: 0 | Status: SHIPPED | OUTPATIENT
Start: 2021-11-16 | End: 2021-11-16 | Stop reason: SDUPTHER

## 2021-11-16 RX ADMIN — CEFTRIAXONE 1000 MG: 1 INJECTION, SOLUTION INTRAVENOUS at 16:51

## 2021-11-16 RX ADMIN — SODIUM CHLORIDE, SODIUM GLUCONATE, SODIUM ACETATE, POTASSIUM CHLORIDE, MAGNESIUM CHLORIDE, SODIUM PHOSPHATE, DIBASIC, AND POTASSIUM PHOSPHATE 500 ML: .53; .5; .37; .037; .03; .012; .00082 INJECTION, SOLUTION INTRAVENOUS at 16:38

## 2021-11-17 ENCOUNTER — TELEPHONE (OUTPATIENT)
Dept: EMERGENCY DEPT | Facility: HOSPITAL | Age: 76
End: 2021-11-17

## 2021-11-17 DIAGNOSIS — N39.0 UTI (URINARY TRACT INFECTION): ICD-10-CM

## 2021-11-17 RX ORDER — CEPHALEXIN 500 MG/1
1000 CAPSULE ORAL EVERY 12 HOURS SCHEDULED
Qty: 20 CAPSULE | Refills: 0 | Status: SHIPPED | OUTPATIENT
Start: 2021-11-17 | End: 2021-11-27

## 2021-11-18 DIAGNOSIS — N39.0 UTI (URINARY TRACT INFECTION): Primary | ICD-10-CM

## 2021-11-18 LAB
BACTERIA UR CULT: ABNORMAL

## 2021-11-18 RX ORDER — TETRACYCLINE HYDROCHLORIDE 250 MG/1
250 CAPSULE ORAL 2 TIMES DAILY
Qty: 14 CAPSULE | Refills: 0 | Status: SHIPPED | OUTPATIENT
Start: 2021-11-18 | End: 2021-11-25

## 2021-11-30 ENCOUNTER — APPOINTMENT (OUTPATIENT)
Dept: RADIOLOGY | Facility: MEDICAL CENTER | Age: 76
End: 2021-11-30
Payer: COMMERCIAL

## 2021-11-30 ENCOUNTER — OFFICE VISIT (OUTPATIENT)
Dept: OBGYN CLINIC | Facility: CLINIC | Age: 76
End: 2021-11-30
Payer: COMMERCIAL

## 2021-11-30 VITALS — BODY MASS INDEX: 22.35 KG/M2 | HEIGHT: 66 IN

## 2021-11-30 DIAGNOSIS — S42.401D CLOSED FRACTURE OF DISTAL END OF RIGHT HUMERUS WITH ROUTINE HEALING, UNSPECIFIED FRACTURE MORPHOLOGY, SUBSEQUENT ENCOUNTER: ICD-10-CM

## 2021-11-30 DIAGNOSIS — S42.401D CLOSED FRACTURE OF DISTAL END OF RIGHT HUMERUS WITH ROUTINE HEALING, UNSPECIFIED FRACTURE MORPHOLOGY, SUBSEQUENT ENCOUNTER: Primary | ICD-10-CM

## 2021-11-30 PROCEDURE — 99213 OFFICE O/P EST LOW 20 MIN: CPT | Performed by: ORTHOPAEDIC SURGERY

## 2021-11-30 PROCEDURE — 73060 X-RAY EXAM OF HUMERUS: CPT

## 2021-12-21 ENCOUNTER — TELEPHONE (OUTPATIENT)
Dept: HEMATOLOGY ONCOLOGY | Facility: CLINIC | Age: 76
End: 2021-12-21

## 2022-01-26 ENCOUNTER — TELEPHONE (OUTPATIENT)
Dept: HEMATOLOGY ONCOLOGY | Facility: CLINIC | Age: 77
End: 2022-01-26

## 2022-01-26 NOTE — TELEPHONE ENCOUNTER
Reschedule Appointment     Who is calling in [address] [place] [address]   Doctor Appointment Scheduled with Amado Rick    Original date and time 1-28-22 @ 11:00am    New date and time 3-4-22 @ 11;00am    Location [facility]   Patient verbalized understanding

## 2022-02-15 ENCOUNTER — APPOINTMENT (OUTPATIENT)
Dept: RADIOLOGY | Facility: MEDICAL CENTER | Age: 77
End: 2022-02-15
Payer: COMMERCIAL

## 2022-02-15 ENCOUNTER — OFFICE VISIT (OUTPATIENT)
Dept: OBGYN CLINIC | Facility: CLINIC | Age: 77
End: 2022-02-15
Payer: COMMERCIAL

## 2022-02-15 VITALS
HEIGHT: 66 IN | BODY MASS INDEX: 22.35 KG/M2 | SYSTOLIC BLOOD PRESSURE: 151 MMHG | HEART RATE: 71 BPM | DIASTOLIC BLOOD PRESSURE: 81 MMHG

## 2022-02-15 DIAGNOSIS — S42.294A OTHER CLOSED NONDISPLACED FRACTURE OF PROXIMAL END OF RIGHT HUMERUS, INITIAL ENCOUNTER: ICD-10-CM

## 2022-02-15 DIAGNOSIS — S42.294A OTHER CLOSED NONDISPLACED FRACTURE OF PROXIMAL END OF RIGHT HUMERUS, INITIAL ENCOUNTER: Primary | ICD-10-CM

## 2022-02-15 DIAGNOSIS — S42.401A CLOSED FRACTURE OF DISTAL END OF RIGHT HUMERUS, UNSPECIFIED FRACTURE MORPHOLOGY, INITIAL ENCOUNTER: ICD-10-CM

## 2022-02-15 PROCEDURE — 73060 X-RAY EXAM OF HUMERUS: CPT

## 2022-02-15 PROCEDURE — 99213 OFFICE O/P EST LOW 20 MIN: CPT | Performed by: ORTHOPAEDIC SURGERY
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
## 2022-02-16 ENCOUNTER — APPOINTMENT (OUTPATIENT)
Dept: LAB | Facility: MEDICAL CENTER | Age: 77
End: 2022-02-16
Payer: COMMERCIAL

## 2022-02-16 DIAGNOSIS — E78.5 HYPERLIPIDEMIA, UNSPECIFIED HYPERLIPIDEMIA TYPE: ICD-10-CM

## 2022-02-16 DIAGNOSIS — E03.9 MYXEDEMA HEART DISEASE: ICD-10-CM

## 2022-02-16 DIAGNOSIS — I51.9 MYXEDEMA HEART DISEASE: ICD-10-CM

## 2022-02-16 LAB
ALBUMIN SERPL BCP-MCNC: 3.1 G/DL (ref 3.5–5)
ALP SERPL-CCNC: 228 U/L (ref 46–116)
ALT SERPL W P-5'-P-CCNC: 16 U/L (ref 12–78)
ANION GAP SERPL CALCULATED.3IONS-SCNC: 9 MMOL/L (ref 4–13)
AST SERPL W P-5'-P-CCNC: 18 U/L (ref 5–45)
BILIRUB SERPL-MCNC: 0.29 MG/DL (ref 0.2–1)
BUN SERPL-MCNC: 49 MG/DL (ref 5–25)
CALCIUM ALBUM COR SERPL-MCNC: 9.9 MG/DL (ref 8.3–10.1)
CALCIUM SERPL-MCNC: 9.2 MG/DL (ref 8.3–10.1)
CHLORIDE SERPL-SCNC: 106 MMOL/L (ref 100–108)
CHOLEST SERPL-MCNC: 223 MG/DL
CO2 SERPL-SCNC: 21 MMOL/L (ref 21–32)
CREAT SERPL-MCNC: 2.02 MG/DL (ref 0.6–1.3)
ERYTHROCYTE [DISTWIDTH] IN BLOOD BY AUTOMATED COUNT: 16.6 % (ref 11.6–15.1)
GFR SERPL CREATININE-BSD FRML MDRD: 23 ML/MIN/1.73SQ M
GLUCOSE P FAST SERPL-MCNC: 100 MG/DL (ref 65–99)
HCT VFR BLD AUTO: 26.2 % (ref 34.8–46.1)
HDLC SERPL-MCNC: 25 MG/DL
HGB BLD-MCNC: 7.8 G/DL (ref 11.5–15.4)
LDLC SERPL CALC-MCNC: 136 MG/DL (ref 0–100)
MCH RBC QN AUTO: 29.7 PG (ref 26.8–34.3)
MCHC RBC AUTO-ENTMCNC: 29.8 G/DL (ref 31.4–37.4)
MCV RBC AUTO: 100 FL (ref 82–98)
NONHDLC SERPL-MCNC: 198 MG/DL
PLATELET # BLD AUTO: 357 THOUSANDS/UL (ref 149–390)
PMV BLD AUTO: 11.1 FL (ref 8.9–12.7)
POTASSIUM SERPL-SCNC: 3.5 MMOL/L (ref 3.5–5.3)
PROT SERPL-MCNC: 7.1 G/DL (ref 6.4–8.2)
RBC # BLD AUTO: 2.63 MILLION/UL (ref 3.81–5.12)
SODIUM SERPL-SCNC: 136 MMOL/L (ref 136–145)
TRIGL SERPL-MCNC: 312 MG/DL
TSH SERPL DL<=0.05 MIU/L-ACNC: 0.73 UIU/ML (ref 0.36–3.74)
WBC # BLD AUTO: 8.79 THOUSAND/UL (ref 4.31–10.16)

## 2022-02-16 PROCEDURE — 80053 COMPREHEN METABOLIC PANEL: CPT

## 2022-02-16 PROCEDURE — 84443 ASSAY THYROID STIM HORMONE: CPT

## 2022-02-16 PROCEDURE — 85027 COMPLETE CBC AUTOMATED: CPT

## 2022-02-16 PROCEDURE — 36415 COLL VENOUS BLD VENIPUNCTURE: CPT

## 2022-02-16 PROCEDURE — 80061 LIPID PANEL: CPT

## 2022-03-04 ENCOUNTER — CONSULT (OUTPATIENT)
Dept: HEMATOLOGY ONCOLOGY | Facility: CLINIC | Age: 77
End: 2022-03-04
Payer: COMMERCIAL

## 2022-03-04 ENCOUNTER — APPOINTMENT (OUTPATIENT)
Dept: LAB | Facility: MEDICAL CENTER | Age: 77
End: 2022-03-04
Payer: COMMERCIAL

## 2022-03-04 VITALS — TEMPERATURE: 98.5 F | SYSTOLIC BLOOD PRESSURE: 159 MMHG | HEART RATE: 70 BPM | DIASTOLIC BLOOD PRESSURE: 67 MMHG

## 2022-03-04 DIAGNOSIS — D63.1 ANEMIA DUE TO CHRONIC KIDNEY DISEASE, UNSPECIFIED CKD STAGE: ICD-10-CM

## 2022-03-04 DIAGNOSIS — N18.9 ANEMIA DUE TO CHRONIC KIDNEY DISEASE, UNSPECIFIED CKD STAGE: Primary | ICD-10-CM

## 2022-03-04 DIAGNOSIS — N18.9 ANEMIA DUE TO CHRONIC KIDNEY DISEASE, UNSPECIFIED CKD STAGE: ICD-10-CM

## 2022-03-04 DIAGNOSIS — D50.9 IRON DEFICIENCY ANEMIA, UNSPECIFIED IRON DEFICIENCY ANEMIA TYPE: ICD-10-CM

## 2022-03-04 DIAGNOSIS — N18.9 CHRONIC KIDNEY DISEASE, UNSPECIFIED CKD STAGE: ICD-10-CM

## 2022-03-04 DIAGNOSIS — D63.1 ANEMIA DUE TO CHRONIC KIDNEY DISEASE, UNSPECIFIED CKD STAGE: Primary | ICD-10-CM

## 2022-03-04 LAB
ERYTHROCYTE [DISTWIDTH] IN BLOOD BY AUTOMATED COUNT: 15.9 % (ref 11.6–15.1)
FERRITIN SERPL-MCNC: 78 NG/ML (ref 8–388)
HCT VFR BLD AUTO: 31.7 % (ref 34.8–46.1)
HGB BLD-MCNC: 9.7 G/DL (ref 11.5–15.4)
IRON SATN MFR SERPL: 9 % (ref 15–50)
IRON SERPL-MCNC: 37 UG/DL (ref 50–170)
MCH RBC QN AUTO: 28.1 PG (ref 26.8–34.3)
MCHC RBC AUTO-ENTMCNC: 30.6 G/DL (ref 31.4–37.4)
MCV RBC AUTO: 92 FL (ref 82–98)
PLATELET # BLD AUTO: 339 THOUSANDS/UL (ref 149–390)
PMV BLD AUTO: 11 FL (ref 8.9–12.7)
RBC # BLD AUTO: 3.45 MILLION/UL (ref 3.81–5.12)
TIBC SERPL-MCNC: 428 UG/DL (ref 250–450)
WBC # BLD AUTO: 6.98 THOUSAND/UL (ref 4.31–10.16)

## 2022-03-04 PROCEDURE — 82728 ASSAY OF FERRITIN: CPT

## 2022-03-04 PROCEDURE — 99205 OFFICE O/P NEW HI 60 MIN: CPT | Performed by: NURSE PRACTITIONER

## 2022-03-04 PROCEDURE — 83540 ASSAY OF IRON: CPT

## 2022-03-04 PROCEDURE — 36415 COLL VENOUS BLD VENIPUNCTURE: CPT | Performed by: NURSE PRACTITIONER

## 2022-03-04 PROCEDURE — 85027 COMPLETE CBC AUTOMATED: CPT | Performed by: NURSE PRACTITIONER

## 2022-03-04 PROCEDURE — 83550 IRON BINDING TEST: CPT
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
## 2022-03-07 DIAGNOSIS — N18.9 ANEMIA DUE TO CHRONIC KIDNEY DISEASE, UNSPECIFIED CKD STAGE: Primary | ICD-10-CM

## 2022-03-07 DIAGNOSIS — D63.1 ANEMIA DUE TO CHRONIC KIDNEY DISEASE, UNSPECIFIED CKD STAGE: Primary | ICD-10-CM

## 2022-03-07 PROBLEM — D50.9 IRON DEFICIENCY ANEMIA: Status: ACTIVE | Noted: 2022-03-07

## 2022-03-07 RX ORDER — SODIUM CHLORIDE 9 MG/ML
20 INJECTION, SOLUTION INTRAVENOUS ONCE
Status: CANCELLED | OUTPATIENT
Start: 2022-03-10

## 2022-03-10 ENCOUNTER — HOSPITAL ENCOUNTER (OUTPATIENT)
Dept: INFUSION CENTER | Facility: HOSPITAL | Age: 77
Discharge: HOME | End: 2022-03-10
Attending: INTERNAL MEDICINE

## 2022-03-10 ENCOUNTER — HOSPITAL ENCOUNTER (OUTPATIENT)
Dept: INFUSION CENTER | Facility: HOSPITAL | Age: 77
Discharge: HOME | End: 2022-03-10
Attending: INTERNAL MEDICINE
Payer: COMMERCIAL

## 2022-03-10 VITALS
SYSTOLIC BLOOD PRESSURE: 150 MMHG | TEMPERATURE: 96.9 F | DIASTOLIC BLOOD PRESSURE: 70 MMHG | RESPIRATION RATE: 16 BRPM | HEART RATE: 65 BPM

## 2022-03-10 DIAGNOSIS — N18.9 ANEMIA DUE TO CHRONIC KIDNEY DISEASE, UNSPECIFIED CKD STAGE: ICD-10-CM

## 2022-03-10 DIAGNOSIS — D50.9 IRON DEFICIENCY ANEMIA, UNSPECIFIED IRON DEFICIENCY ANEMIA TYPE: Primary | ICD-10-CM

## 2022-03-10 DIAGNOSIS — D63.1 ANEMIA DUE TO CHRONIC KIDNEY DISEASE, UNSPECIFIED CKD STAGE: ICD-10-CM

## 2022-03-10 PROCEDURE — 96365 THER/PROPH/DIAG IV INF INIT: CPT

## 2022-03-10 RX ORDER — SODIUM CHLORIDE 9 MG/ML
20 INJECTION, SOLUTION INTRAVENOUS ONCE
Status: CANCELLED | OUTPATIENT
Start: 2022-03-17

## 2022-03-10 RX ORDER — SODIUM CHLORIDE 9 MG/ML
20 INJECTION, SOLUTION INTRAVENOUS ONCE
Status: COMPLETED | OUTPATIENT
Start: 2022-03-10 | End: 2022-03-10

## 2022-03-10 RX ADMIN — SODIUM CHLORIDE 20 ML/HR: 0.9 INJECTION, SOLUTION INTRAVENOUS at 14:29

## 2022-03-10 RX ADMIN — SODIUM CHLORIDE 510 MG: 9 INJECTION, SOLUTION INTRAVENOUS at 14:29

## 2022-03-10 NOTE — PLAN OF CARE
Problem: INFECTION - ADULT  Goal: Absence or prevention of progression during hospitalization  Description: INTERVENTIONS:  - Assess and monitor for signs and symptoms of infection  - Monitor lab/diagnostic results  - Monitor all insertion sites, i e  indwelling lines, tubes, and drains  - Monitor endotracheal if appropriate and nasal secretions for changes in amount and color  - [place] appropriate cooling/warming therapies per order  - Administer medications as ordered  - Instruct and encourage patient and family to use good hand hygiene technique  - Identify and instruct in appropriate isolation precautions for identified infection/condition  Outcome: Progressing     Problem: Knowledge Deficit  Goal: Patient/family/caregiver demonstrates understanding of disease process, treatment plan, medications, and discharge instructions  Description: Complete learning assessment and assess knowledge base    Interventions:  - Provide teaching at level of understanding  - Provide teaching via preferred learning methods  Outcome: Progressing

## 2022-03-17 ENCOUNTER — HOSPITAL ENCOUNTER (OUTPATIENT)
Dept: INFUSION CENTER | Facility: HOSPITAL | Age: 77
Discharge: HOME | End: 2022-03-17
Attending: INTERNAL MEDICINE

## 2022-03-17 ENCOUNTER — HOSPITAL ENCOUNTER (OUTPATIENT)
Dept: INFUSION CENTER | Facility: HOSPITAL | Age: 77
Discharge: HOME | End: 2022-03-17
Attending: INTERNAL MEDICINE
Payer: COMMERCIAL

## 2022-03-17 VITALS
TEMPERATURE: 97.8 F | RESPIRATION RATE: 18 BRPM | DIASTOLIC BLOOD PRESSURE: 78 MMHG | OXYGEN SATURATION: 97 % | HEART RATE: 75 BPM | SYSTOLIC BLOOD PRESSURE: 152 MMHG

## 2022-03-17 DIAGNOSIS — N18.9 ANEMIA DUE TO CHRONIC KIDNEY DISEASE, UNSPECIFIED CKD STAGE: ICD-10-CM

## 2022-03-17 DIAGNOSIS — D50.9 IRON DEFICIENCY ANEMIA, UNSPECIFIED IRON DEFICIENCY ANEMIA TYPE: Primary | ICD-10-CM

## 2022-03-17 DIAGNOSIS — D63.1 ANEMIA DUE TO CHRONIC KIDNEY DISEASE, UNSPECIFIED CKD STAGE: ICD-10-CM

## 2022-03-17 PROCEDURE — 96365 THER/PROPH/DIAG IV INF INIT: CPT

## 2022-03-17 RX ORDER — SODIUM CHLORIDE 9 MG/ML
20 INJECTION, SOLUTION INTRAVENOUS ONCE
Status: CANCELLED | OUTPATIENT
Start: 2022-03-17

## 2022-03-17 RX ORDER — SODIUM CHLORIDE 9 MG/ML
20 INJECTION, SOLUTION INTRAVENOUS ONCE
Status: COMPLETED | OUTPATIENT
Start: 2022-03-17 | End: 2022-03-17

## 2022-03-17 RX ADMIN — SODIUM CHLORIDE 20 ML/HR: 9 INJECTION, SOLUTION INTRAVENOUS at 14:59

## 2022-03-17 RX ADMIN — SODIUM CHLORIDE 510 MG: 9 INJECTION, SOLUTION INTRAVENOUS at 15:03

## 2022-03-17 NOTE — PLAN OF CARE
Problem: Potential for Falls  Goal: Patient will remain free of falls  Description: INTERVENTIONS:  - Educate patient/family on patient safety including physical limitations  - Instruct patient to call for assistance with activity   - Consult OT/PT to assist with strengthening/mobility   - Keep Call bell within reach  - Keep bed low and locked with side rails adjusted as appropriate  - Keep care items and personal belongings within reach  - Initiate and maintain comfort rounds  - Make Fall Risk Sign visible to staff  - Apply yellow socks and bracelet for high fall risk patients  - Consider moving patient to room near nurses station  Outcome: Progressing     Problem: INFECTION - ADULT  Goal: Absence or prevention of progression during hospitalization  Description: INTERVENTIONS:  - Assess and monitor for signs and symptoms of infection  - Monitor lab/diagnostic results  - Monitor all insertion sites, i e  indwelling lines, tubes, and drains  - Monitor endotracheal if appropriate and nasal secretions for changes in amount and color  - [place] appropriate cooling/warming therapies per order  - Administer medications as ordered  - Instruct and encourage patient and family to use good hand hygiene technique  - Identify and instruct in appropriate isolation precautions for identified infection/condition  Outcome: Progressing     Problem: Knowledge Deficit  Goal: Patient/family/caregiver demonstrates understanding of disease process, treatment plan, medications, and discharge instructions  Description: Complete learning assessment and assess knowledge base    Interventions:  - Provide teaching at level of understanding  - Provide teaching via preferred learning methods  Outcome: Progressing

## 2022-04-04 ENCOUNTER — APPOINTMENT (OUTPATIENT)
Dept: LAB | Facility: MEDICAL CENTER | Age: 77
End: 2022-04-04
Payer: COMMERCIAL

## 2022-04-04 DIAGNOSIS — N18.9 CHRONIC KIDNEY DISEASE, UNSPECIFIED CKD STAGE: ICD-10-CM

## 2022-04-04 DIAGNOSIS — E79.0 HYPERURICEMIA: ICD-10-CM

## 2022-04-04 LAB
25(OH)D3 SERPL-MCNC: 34.6 NG/ML (ref 30–100)
ALBUMIN SERPL BCP-MCNC: 3.1 G/DL (ref 3.5–5)
ANION GAP SERPL CALCULATED.3IONS-SCNC: 11 MMOL/L (ref 4–13)
BUN SERPL-MCNC: 43 MG/DL (ref 5–25)
CALCIUM ALBUM COR SERPL-MCNC: 10.5 MG/DL (ref 8.3–10.1)
CALCIUM SERPL-MCNC: 9.8 MG/DL (ref 8.3–10.1)
CHLORIDE SERPL-SCNC: 104 MMOL/L (ref 100–108)
CO2 SERPL-SCNC: 24 MMOL/L (ref 21–32)
CREAT SERPL-MCNC: 1.73 MG/DL (ref 0.6–1.3)
GFR SERPL CREATININE-BSD FRML MDRD: 28 ML/MIN/1.73SQ M
GLUCOSE SERPL-MCNC: 141 MG/DL (ref 65–140)
PHOSPHATE SERPL-MCNC: 4 MG/DL (ref 2.3–4.1)
POTASSIUM SERPL-SCNC: 3 MMOL/L (ref 3.5–5.3)
PTH-INTACT SERPL-MCNC: 183.9 PG/ML (ref 18.4–80.1)
SODIUM SERPL-SCNC: 139 MMOL/L (ref 136–145)
URATE SERPL-MCNC: 7.8 MG/DL (ref 2–6.8)

## 2022-04-04 PROCEDURE — 84550 ASSAY OF BLOOD/URIC ACID: CPT

## 2022-04-04 PROCEDURE — 83970 ASSAY OF PARATHORMONE: CPT

## 2022-04-04 PROCEDURE — 36415 COLL VENOUS BLD VENIPUNCTURE: CPT

## 2022-04-04 PROCEDURE — 80069 RENAL FUNCTION PANEL: CPT

## 2022-04-04 PROCEDURE — 82306 VITAMIN D 25 HYDROXY: CPT

## 2022-04-21 ENCOUNTER — APPOINTMENT (OUTPATIENT)
Dept: LAB | Facility: MEDICAL CENTER | Age: 77
End: 2022-04-21
Payer: COMMERCIAL

## 2022-04-21 DIAGNOSIS — N18.4 CHRONIC KIDNEY DISEASE, STAGE IV (SEVERE) (HCC): ICD-10-CM

## 2022-04-21 LAB
ANION GAP SERPL CALCULATED.3IONS-SCNC: 6 MMOL/L (ref 4–13)
BUN SERPL-MCNC: 48 MG/DL (ref 5–25)
CALCIUM SERPL-MCNC: 9.4 MG/DL (ref 8.3–10.1)
CHLORIDE SERPL-SCNC: 110 MMOL/L (ref 100–108)
CO2 SERPL-SCNC: 25 MMOL/L (ref 21–32)
CREAT SERPL-MCNC: 1.97 MG/DL (ref 0.6–1.3)
GFR SERPL CREATININE-BSD FRML MDRD: 23 ML/MIN/1.73SQ M
GLUCOSE SERPL-MCNC: 120 MG/DL (ref 65–140)
POTASSIUM SERPL-SCNC: 3.3 MMOL/L (ref 3.5–5.3)
SODIUM SERPL-SCNC: 141 MMOL/L (ref 136–145)

## 2022-04-21 PROCEDURE — 80048 BASIC METABOLIC PNL TOTAL CA: CPT

## 2022-04-21 PROCEDURE — 36415 COLL VENOUS BLD VENIPUNCTURE: CPT

## 2022-05-05 ENCOUNTER — APPOINTMENT (OUTPATIENT)
Dept: LAB | Facility: MEDICAL CENTER | Age: 77
End: 2022-05-05
Payer: COMMERCIAL

## 2022-05-05 DIAGNOSIS — N18.4 CHRONIC KIDNEY DISEASE, STAGE IV (SEVERE) (HCC): ICD-10-CM

## 2022-05-05 LAB
ANION GAP SERPL CALCULATED.3IONS-SCNC: 12 MMOL/L (ref 4–13)
BUN SERPL-MCNC: 42 MG/DL (ref 5–25)
CALCIUM SERPL-MCNC: 9.7 MG/DL (ref 8.3–10.1)
CHLORIDE SERPL-SCNC: 104 MMOL/L (ref 100–108)
CO2 SERPL-SCNC: 21 MMOL/L (ref 21–32)
CREAT SERPL-MCNC: 1.88 MG/DL (ref 0.6–1.3)
GFR SERPL CREATININE-BSD FRML MDRD: 25 ML/MIN/1.73SQ M
GLUCOSE P FAST SERPL-MCNC: 90 MG/DL (ref 65–99)
POTASSIUM SERPL-SCNC: 4 MMOL/L (ref 3.5–5.3)
SODIUM SERPL-SCNC: 137 MMOL/L (ref 136–145)

## 2022-05-05 PROCEDURE — 80048 BASIC METABOLIC PNL TOTAL CA: CPT

## 2022-05-05 PROCEDURE — 36415 COLL VENOUS BLD VENIPUNCTURE: CPT

## 2022-05-31 ENCOUNTER — HOSPITAL ENCOUNTER (INPATIENT)
Facility: HOSPITAL | Age: 77
LOS: 3 days | Discharge: HOME HEALTH | DRG: 392 | End: 2022-06-03
Attending: EMERGENCY MEDICINE | Admitting: FAMILY MEDICINE
Payer: COMMERCIAL

## 2022-05-31 ENCOUNTER — APPOINTMENT (EMERGENCY)
Dept: CT IMAGING | Facility: HOSPITAL | Age: 77
DRG: 392 | End: 2022-05-31
Payer: COMMERCIAL

## 2022-05-31 ENCOUNTER — APPOINTMENT (EMERGENCY)
Dept: RADIOLOGY | Facility: HOSPITAL | Age: 77
DRG: 392 | End: 2022-05-31
Payer: COMMERCIAL

## 2022-05-31 DIAGNOSIS — K62.5 RECTAL BLEEDING: ICD-10-CM

## 2022-05-31 DIAGNOSIS — K57.92 DIVERTICULITIS: ICD-10-CM

## 2022-05-31 DIAGNOSIS — N17.9 AKI (ACUTE KIDNEY INJURY) (HCC): Primary | ICD-10-CM

## 2022-05-31 DIAGNOSIS — R11.2 NAUSEA & VOMITING: ICD-10-CM

## 2022-05-31 DIAGNOSIS — N18.9 ACUTE KIDNEY INJURY SUPERIMPOSED ON CHRONIC KIDNEY DISEASE (HCC): ICD-10-CM

## 2022-05-31 DIAGNOSIS — K57.90 DIVERTICULOSIS: ICD-10-CM

## 2022-05-31 DIAGNOSIS — N17.9 ACUTE KIDNEY INJURY SUPERIMPOSED ON CHRONIC KIDNEY DISEASE (HCC): ICD-10-CM

## 2022-05-31 PROBLEM — R77.8 ELEVATED TROPONIN: Status: ACTIVE | Noted: 2022-05-31

## 2022-05-31 PROBLEM — R79.89 ELEVATED TROPONIN: Status: ACTIVE | Noted: 2022-05-31

## 2022-05-31 PROBLEM — I48.0 PAROXYSMAL ATRIAL FIBRILLATION (HCC): Status: ACTIVE | Noted: 2020-08-30

## 2022-05-31 LAB
2HR DELTA HS TROPONIN: -4 NG/L
4HR DELTA HS TROPONIN: 9 NG/L
ABO GROUP BLD: NORMAL
ALBUMIN SERPL BCP-MCNC: 2.7 G/DL (ref 3.5–5)
ALP SERPL-CCNC: 194 U/L (ref 46–116)
ALT SERPL W P-5'-P-CCNC: 9 U/L (ref 12–78)
ANION GAP SERPL CALCULATED.3IONS-SCNC: 17 MMOL/L (ref 4–13)
AST SERPL W P-5'-P-CCNC: 20 U/L (ref 5–45)
BASOPHILS # BLD AUTO: 0.05 THOUSANDS/ΜL (ref 0–0.1)
BASOPHILS NFR BLD AUTO: 1 % (ref 0–1)
BILIRUB DIRECT SERPL-MCNC: 0.24 MG/DL (ref 0–0.2)
BILIRUB SERPL-MCNC: 0.41 MG/DL (ref 0.2–1)
BLD GP AB SCN SERPL QL: NEGATIVE
BUN SERPL-MCNC: 128 MG/DL (ref 5–25)
CALCIUM SERPL-MCNC: 9.7 MG/DL (ref 8.3–10.1)
CARDIAC TROPONIN I PNL SERPL HS: 61 NG/L
CARDIAC TROPONIN I PNL SERPL HS: 65 NG/L
CARDIAC TROPONIN I PNL SERPL HS: 74 NG/L
CHLORIDE SERPL-SCNC: 96 MMOL/L (ref 100–108)
CO2 SERPL-SCNC: 17 MMOL/L (ref 21–32)
CREAT SERPL-MCNC: 4.07 MG/DL (ref 0.6–1.3)
EOSINOPHIL # BLD AUTO: 0.07 THOUSAND/ΜL (ref 0–0.61)
EOSINOPHIL NFR BLD AUTO: 1 % (ref 0–6)
ERYTHROCYTE [DISTWIDTH] IN BLOOD BY AUTOMATED COUNT: 15.2 % (ref 11.6–15.1)
GFR SERPL CREATININE-BSD FRML MDRD: 9 ML/MIN/1.73SQ M
GLUCOSE SERPL-MCNC: 97 MG/DL (ref 65–140)
HCT VFR BLD AUTO: 30.4 % (ref 34.8–46.1)
HGB BLD-MCNC: 9.5 G/DL (ref 11.5–15.4)
IMM GRANULOCYTES # BLD AUTO: 0.05 THOUSAND/UL (ref 0–0.2)
IMM GRANULOCYTES NFR BLD AUTO: 1 % (ref 0–2)
LACTATE SERPL-SCNC: 0.4 MMOL/L (ref 0.5–2)
LIPASE SERPL-CCNC: 360 U/L (ref 73–393)
LYMPHOCYTES # BLD AUTO: 1.59 THOUSANDS/ΜL (ref 0.6–4.47)
LYMPHOCYTES NFR BLD AUTO: 19 % (ref 14–44)
MAGNESIUM SERPL-MCNC: 2.5 MG/DL (ref 1.6–2.6)
MCH RBC QN AUTO: 28.4 PG (ref 26.8–34.3)
MCHC RBC AUTO-ENTMCNC: 31.3 G/DL (ref 31.4–37.4)
MCV RBC AUTO: 91 FL (ref 82–98)
MONOCYTES # BLD AUTO: 0.72 THOUSAND/ΜL (ref 0.17–1.22)
MONOCYTES NFR BLD AUTO: 9 % (ref 4–12)
NEUTROPHILS # BLD AUTO: 6.02 THOUSANDS/ΜL (ref 1.85–7.62)
NEUTS SEG NFR BLD AUTO: 69 % (ref 43–75)
NRBC BLD AUTO-RTO: 0 /100 WBCS
PHOSPHATE SERPL-MCNC: 7 MG/DL (ref 2.3–4.1)
PLATELET # BLD AUTO: 299 THOUSANDS/UL (ref 149–390)
PMV BLD AUTO: 11.8 FL (ref 8.9–12.7)
POTASSIUM SERPL-SCNC: 5.3 MMOL/L (ref 3.5–5.3)
PROT SERPL-MCNC: 7.5 G/DL (ref 6.4–8.2)
RBC # BLD AUTO: 3.34 MILLION/UL (ref 3.81–5.12)
RH BLD: POSITIVE
SODIUM SERPL-SCNC: 130 MMOL/L (ref 136–145)
SPECIMEN EXPIRATION DATE: NORMAL
WBC # BLD AUTO: 8.5 THOUSAND/UL (ref 4.31–10.16)

## 2022-05-31 PROCEDURE — 83605 ASSAY OF LACTIC ACID: CPT | Performed by: FAMILY MEDICINE

## 2022-05-31 PROCEDURE — 96365 THER/PROPH/DIAG IV INF INIT: CPT

## 2022-05-31 PROCEDURE — 86850 RBC ANTIBODY SCREEN: CPT | Performed by: EMERGENCY MEDICINE

## 2022-05-31 PROCEDURE — 83690 ASSAY OF LIPASE: CPT | Performed by: EMERGENCY MEDICINE

## 2022-05-31 PROCEDURE — 83735 ASSAY OF MAGNESIUM: CPT | Performed by: EMERGENCY MEDICINE

## 2022-05-31 PROCEDURE — 85025 COMPLETE CBC W/AUTO DIFF WBC: CPT | Performed by: EMERGENCY MEDICINE

## 2022-05-31 PROCEDURE — 36415 COLL VENOUS BLD VENIPUNCTURE: CPT | Performed by: EMERGENCY MEDICINE

## 2022-05-31 PROCEDURE — G1004 CDSM NDSC: HCPCS

## 2022-05-31 PROCEDURE — 80048 BASIC METABOLIC PNL TOTAL CA: CPT | Performed by: EMERGENCY MEDICINE

## 2022-05-31 PROCEDURE — 99285 EMERGENCY DEPT VISIT HI MDM: CPT

## 2022-05-31 PROCEDURE — 99285 EMERGENCY DEPT VISIT HI MDM: CPT | Performed by: EMERGENCY MEDICINE

## 2022-05-31 PROCEDURE — 84484 ASSAY OF TROPONIN QUANT: CPT | Performed by: FAMILY MEDICINE

## 2022-05-31 PROCEDURE — 84484 ASSAY OF TROPONIN QUANT: CPT | Performed by: EMERGENCY MEDICINE

## 2022-05-31 PROCEDURE — 84100 ASSAY OF PHOSPHORUS: CPT | Performed by: EMERGENCY MEDICINE

## 2022-05-31 PROCEDURE — 74176 CT ABD & PELVIS W/O CONTRAST: CPT

## 2022-05-31 PROCEDURE — 93005 ELECTROCARDIOGRAM TRACING: CPT

## 2022-05-31 PROCEDURE — 86900 BLOOD TYPING SEROLOGIC ABO: CPT | Performed by: EMERGENCY MEDICINE

## 2022-05-31 PROCEDURE — 99223 1ST HOSP IP/OBS HIGH 75: CPT | Performed by: FAMILY MEDICINE

## 2022-05-31 PROCEDURE — 86901 BLOOD TYPING SEROLOGIC RH(D): CPT | Performed by: EMERGENCY MEDICINE

## 2022-05-31 PROCEDURE — 80076 HEPATIC FUNCTION PANEL: CPT | Performed by: EMERGENCY MEDICINE

## 2022-05-31 PROCEDURE — 71045 X-RAY EXAM CHEST 1 VIEW: CPT

## 2022-05-31 PROCEDURE — 96361 HYDRATE IV INFUSION ADD-ON: CPT

## 2022-05-31 RX ORDER — FLUTICASONE PROPIONATE 50 MCG
2 SPRAY, SUSPENSION (ML) NASAL DAILY
COMMUNITY

## 2022-05-31 RX ORDER — DOCUSATE SODIUM 100 MG/1
100 CAPSULE, LIQUID FILLED ORAL 2 TIMES DAILY
Status: DISCONTINUED | OUTPATIENT
Start: 2022-05-31 | End: 2022-06-03 | Stop reason: HOSPADM

## 2022-05-31 RX ORDER — ALLOPURINOL 100 MG/1
100 TABLET ORAL DAILY
Status: DISCONTINUED | OUTPATIENT
Start: 2022-05-31 | End: 2022-06-03 | Stop reason: HOSPADM

## 2022-05-31 RX ORDER — METOPROLOL SUCCINATE 100 MG/1
200 TABLET, EXTENDED RELEASE ORAL DAILY
Status: DISCONTINUED | OUTPATIENT
Start: 2022-05-31 | End: 2022-06-03 | Stop reason: HOSPADM

## 2022-05-31 RX ORDER — LANOLIN ALCOHOL/MO/W.PET/CERES
6 CREAM (GRAM) TOPICAL
Status: DISCONTINUED | OUTPATIENT
Start: 2022-05-31 | End: 2022-06-03 | Stop reason: HOSPADM

## 2022-05-31 RX ORDER — METRONIDAZOLE 500 MG/100ML
500 INJECTION, SOLUTION INTRAVENOUS EVERY 8 HOURS
Status: DISCONTINUED | OUTPATIENT
Start: 2022-05-31 | End: 2022-05-31

## 2022-05-31 RX ORDER — B-COMPLEX WITH VITAMIN C
1 TABLET ORAL 2 TIMES DAILY WITH MEALS
COMMUNITY

## 2022-05-31 RX ORDER — CHOLECALCIFEROL (VITAMIN D3) 125 MCG
5 CAPSULE ORAL
COMMUNITY

## 2022-05-31 RX ORDER — SERTRALINE HYDROCHLORIDE 100 MG/1
100 TABLET, FILM COATED ORAL DAILY
Status: DISCONTINUED | OUTPATIENT
Start: 2022-05-31 | End: 2022-06-03 | Stop reason: HOSPADM

## 2022-05-31 RX ORDER — ALLOPURINOL 100 MG/1
100 TABLET ORAL DAILY
COMMUNITY

## 2022-05-31 RX ORDER — OMEPRAZOLE 20 MG/1
20 CAPSULE, DELAYED RELEASE ORAL DAILY
COMMUNITY

## 2022-05-31 RX ORDER — SODIUM CHLORIDE, SODIUM GLUCONATE, SODIUM ACETATE, POTASSIUM CHLORIDE, MAGNESIUM CHLORIDE, SODIUM PHOSPHATE, DIBASIC, AND POTASSIUM PHOSPHATE .53; .5; .37; .037; .03; .012; .00082 G/100ML; G/100ML; G/100ML; G/100ML; G/100ML; G/100ML; G/100ML
100 INJECTION, SOLUTION INTRAVENOUS CONTINUOUS
Status: DISCONTINUED | OUTPATIENT
Start: 2022-05-31 | End: 2022-06-03 | Stop reason: HOSPADM

## 2022-05-31 RX ORDER — LEVOTHYROXINE SODIUM 0.15 MG/1
150 TABLET ORAL
Status: DISCONTINUED | OUTPATIENT
Start: 2022-06-01 | End: 2022-06-03 | Stop reason: HOSPADM

## 2022-05-31 RX ORDER — CEFTRIAXONE 1 G/50ML
1000 INJECTION, SOLUTION INTRAVENOUS DAILY
Status: DISCONTINUED | OUTPATIENT
Start: 2022-06-01 | End: 2022-06-03 | Stop reason: HOSPADM

## 2022-05-31 RX ORDER — PANTOPRAZOLE SODIUM 20 MG/1
20 TABLET, DELAYED RELEASE ORAL
Status: DISCONTINUED | OUTPATIENT
Start: 2022-06-01 | End: 2022-06-03 | Stop reason: HOSPADM

## 2022-05-31 RX ORDER — SENNA AND DOCUSATE SODIUM 50; 8.6 MG/1; MG/1
1 TABLET, FILM COATED ORAL 2 TIMES DAILY
COMMUNITY

## 2022-05-31 RX ORDER — METRONIDAZOLE 500 MG/100ML
500 INJECTION, SOLUTION INTRAVENOUS EVERY 8 HOURS
Status: DISCONTINUED | OUTPATIENT
Start: 2022-05-31 | End: 2022-06-03 | Stop reason: HOSPADM

## 2022-05-31 RX ADMIN — Medication 6 MG: at 21:21

## 2022-05-31 RX ADMIN — METOPROLOL SUCCINATE 200 MG: 100 TABLET, EXTENDED RELEASE ORAL at 16:46

## 2022-05-31 RX ADMIN — SERTRALINE 100 MG: 100 TABLET, FILM COATED ORAL at 16:47

## 2022-05-31 RX ADMIN — METRONIDAZOLE 500 MG: 500 INJECTION, SOLUTION INTRAVENOUS at 23:35

## 2022-05-31 RX ADMIN — CEFEPIME HYDROCHLORIDE 500 MG: 2 INJECTION, POWDER, FOR SOLUTION INTRAVENOUS at 13:41

## 2022-05-31 RX ADMIN — SODIUM CHLORIDE 1000 ML: 0.9 INJECTION, SOLUTION INTRAVENOUS at 12:22

## 2022-05-31 RX ADMIN — ALLOPURINOL 100 MG: 100 TABLET ORAL at 16:47

## 2022-05-31 RX ADMIN — METRONIDAZOLE 500 MG: 500 INJECTION, SOLUTION INTRAVENOUS at 15:01

## 2022-05-31 RX ADMIN — SODIUM CHLORIDE, SODIUM GLUCONATE, SODIUM ACETATE, POTASSIUM CHLORIDE, MAGNESIUM CHLORIDE, SODIUM PHOSPHATE, DIBASIC, AND POTASSIUM PHOSPHATE 100 ML/HR: .53; .5; .37; .037; .03; .012; .00082 INJECTION, SOLUTION INTRAVENOUS at 16:45

## 2022-05-31 RX ADMIN — DOCUSATE SODIUM 100 MG: 100 CAPSULE, LIQUID FILLED ORAL at 21:21
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
## 2022-05-31 NOTE — ED NOTES
Patient transported to [address] via litter with [address] R [address], SAE  05/31/22 9255

## 2022-05-31 NOTE — ASSESSMENT & PLAN NOTE
Increasing bleeding due to hemorrhoids this past week  Continue to hold Xarelto  GI Consult   Stool Softener

## 2022-05-31 NOTE — ASSESSMENT & PLAN NOTE
Non ischemic myocardial injury  Monitor on tele and trend 1 additional troponin  Hold Xarelto due to rectal bleeding

## 2022-05-31 NOTE — ED NOTES
Medication reconciliation completed based off of medication list that patient provided upon arrival to ED        Roger Burton RN  05/31/22 7506

## 2022-05-31 NOTE — ASSESSMENT & PLAN NOTE
Wt Readings from Last 3 Encounters:   05/31/22 62 8 kg (138 lb 7 2 oz)   11/16/21 62 8 kg (138 lb 7 2 oz)   10/22/21 63 kg (138 lb 14 2 oz)       Appears euvolemic on exam  Will hold lasix and provide gentle volume expansion in setting of DARRELL   Low salt diet, I&O

## 2022-05-31 NOTE — ASSESSMENT & PLAN NOTE
Lab Results   Component Value Date    EGFR 9 05/31/2022    EGFR 25 05/05/2022    EGFR 23 04/21/2022    CREATININE 4 07 (H) 05/31/2022    CREATININE 1 88 (H) 05/05/2022    CREATININE 1 97 (H) 04/21/2022     Baseline creatinine appears to be around 2, Patient established with outpatient Nephrology  Check urine sodium, urine creatinine, urine urea nitrogen  Improved with volume expansion  Isolyte @ 100cc/hr   PVR q shift  Retention protocol  Continue to hold nephrotoxin agents (Lasix)

## 2022-06-01 LAB
ANION GAP SERPL CALCULATED.3IONS-SCNC: 12 MMOL/L (ref 4–13)
ATRIAL RATE: 87 BPM
BACTERIA UR QL AUTO: ABNORMAL /HPF
BASOPHILS # BLD AUTO: 0.05 THOUSANDS/ΜL (ref 0–0.1)
BASOPHILS NFR BLD AUTO: 1 % (ref 0–1)
BILIRUB UR QL STRIP: NEGATIVE
BUN SERPL-MCNC: 115 MG/DL (ref 5–25)
CALCIUM SERPL-MCNC: 9.5 MG/DL (ref 8.3–10.1)
CHLORIDE SERPL-SCNC: 102 MMOL/L (ref 100–108)
CLARITY UR: ABNORMAL
CO2 SERPL-SCNC: 19 MMOL/L (ref 21–32)
COLOR UR: YELLOW
CREAT SERPL-MCNC: 3.21 MG/DL (ref 0.6–1.3)
EOSINOPHIL # BLD AUTO: 0.11 THOUSAND/ΜL (ref 0–0.61)
EOSINOPHIL NFR BLD AUTO: 2 % (ref 0–6)
ERYTHROCYTE [DISTWIDTH] IN BLOOD BY AUTOMATED COUNT: 15 % (ref 11.6–15.1)
GFR SERPL CREATININE-BSD FRML MDRD: 13 ML/MIN/1.73SQ M
GLUCOSE SERPL-MCNC: 106 MG/DL (ref 65–140)
GLUCOSE UR STRIP-MCNC: NEGATIVE MG/DL
HCT VFR BLD AUTO: 29.5 % (ref 34.8–46.1)
HGB BLD-MCNC: 9.3 G/DL (ref 11.5–15.4)
HGB UR QL STRIP.AUTO: ABNORMAL
IMM GRANULOCYTES # BLD AUTO: 0.02 THOUSAND/UL (ref 0–0.2)
IMM GRANULOCYTES NFR BLD AUTO: 0 % (ref 0–2)
KETONES UR STRIP-MCNC: NEGATIVE MG/DL
LEUKOCYTE ESTERASE UR QL STRIP: ABNORMAL
LYMPHOCYTES # BLD AUTO: 1 THOUSANDS/ΜL (ref 0.6–4.47)
LYMPHOCYTES NFR BLD AUTO: 15 % (ref 14–44)
MCH RBC QN AUTO: 28.9 PG (ref 26.8–34.3)
MCHC RBC AUTO-ENTMCNC: 31.5 G/DL (ref 31.4–37.4)
MCV RBC AUTO: 92 FL (ref 82–98)
MONOCYTES # BLD AUTO: 0.39 THOUSAND/ΜL (ref 0.17–1.22)
MONOCYTES NFR BLD AUTO: 6 % (ref 4–12)
NEUTROPHILS # BLD AUTO: 4.94 THOUSANDS/ΜL (ref 1.85–7.62)
NEUTS SEG NFR BLD AUTO: 76 % (ref 43–75)
NITRITE UR QL STRIP: NEGATIVE
NON-SQ EPI CELLS URNS QL MICRO: ABNORMAL /HPF
NRBC BLD AUTO-RTO: 0 /100 WBCS
P AXIS: 40 DEGREES
PH UR STRIP.AUTO: 6 [PH]
PLATELET # BLD AUTO: 276 THOUSANDS/UL (ref 149–390)
PMV BLD AUTO: 11.6 FL (ref 8.9–12.7)
POTASSIUM SERPL-SCNC: 5.2 MMOL/L (ref 3.5–5.3)
PR INTERVAL: 130 MS
PROT UR STRIP-MCNC: ABNORMAL MG/DL
QRS AXIS: 269 DEGREES
QRSD INTERVAL: 118 MS
QT INTERVAL: 404 MS
QTC INTERVAL: 486 MS
RBC # BLD AUTO: 3.22 MILLION/UL (ref 3.81–5.12)
RBC #/AREA URNS AUTO: ABNORMAL /HPF
SODIUM SERPL-SCNC: 133 MMOL/L (ref 136–145)
SP GR UR STRIP.AUTO: 1.01 (ref 1–1.03)
T WAVE AXIS: 92 DEGREES
UROBILINOGEN UR QL STRIP.AUTO: 0.2 E.U./DL
VENTRICULAR RATE: 87 BPM
WBC # BLD AUTO: 6.51 THOUSAND/UL (ref 4.31–10.16)
WBC #/AREA URNS AUTO: ABNORMAL /HPF

## 2022-06-01 PROCEDURE — 99222 1ST HOSP IP/OBS MODERATE 55: CPT | Performed by: INTERNAL MEDICINE

## 2022-06-01 PROCEDURE — 84300 ASSAY OF URINE SODIUM: CPT | Performed by: FAMILY MEDICINE

## 2022-06-01 PROCEDURE — 97163 PT EVAL HIGH COMPLEX 45 MIN: CPT

## 2022-06-01 PROCEDURE — 81001 URINALYSIS AUTO W/SCOPE: CPT | Performed by: FAMILY MEDICINE

## 2022-06-01 PROCEDURE — 99232 SBSQ HOSP IP/OBS MODERATE 35: CPT | Performed by: FAMILY MEDICINE

## 2022-06-01 PROCEDURE — 97535 SELF CARE MNGMENT TRAINING: CPT

## 2022-06-01 PROCEDURE — 87086 URINE CULTURE/COLONY COUNT: CPT | Performed by: FAMILY MEDICINE

## 2022-06-01 PROCEDURE — 82570 ASSAY OF URINE CREATININE: CPT | Performed by: FAMILY MEDICINE

## 2022-06-01 PROCEDURE — 85025 COMPLETE CBC W/AUTO DIFF WBC: CPT | Performed by: FAMILY MEDICINE

## 2022-06-01 PROCEDURE — 97530 THERAPEUTIC ACTIVITIES: CPT

## 2022-06-01 PROCEDURE — 87077 CULTURE AEROBIC IDENTIFY: CPT | Performed by: FAMILY MEDICINE

## 2022-06-01 PROCEDURE — 87186 SC STD MICRODIL/AGAR DIL: CPT | Performed by: FAMILY MEDICINE

## 2022-06-01 PROCEDURE — 97167 OT EVAL HIGH COMPLEX 60 MIN: CPT

## 2022-06-01 PROCEDURE — 93010 ELECTROCARDIOGRAM REPORT: CPT | Performed by: INTERNAL MEDICINE

## 2022-06-01 PROCEDURE — 80048 BASIC METABOLIC PNL TOTAL CA: CPT | Performed by: FAMILY MEDICINE

## 2022-06-01 RX ADMIN — Medication 6 MG: at 21:01

## 2022-06-01 RX ADMIN — METRONIDAZOLE 500 MG: 500 INJECTION, SOLUTION INTRAVENOUS at 23:30

## 2022-06-01 RX ADMIN — CEFTRIAXONE 1000 MG: 1 INJECTION, SOLUTION INTRAVENOUS at 10:27

## 2022-06-01 RX ADMIN — ALLOPURINOL 100 MG: 100 TABLET ORAL at 10:25

## 2022-06-01 RX ADMIN — SODIUM CHLORIDE, SODIUM GLUCONATE, SODIUM ACETATE, POTASSIUM CHLORIDE, MAGNESIUM CHLORIDE, SODIUM PHOSPHATE, DIBASIC, AND POTASSIUM PHOSPHATE 100 ML/HR: .53; .5; .37; .037; .03; .012; .00082 INJECTION, SOLUTION INTRAVENOUS at 13:12

## 2022-06-01 RX ADMIN — METRONIDAZOLE 500 MG: 500 INJECTION, SOLUTION INTRAVENOUS at 06:07

## 2022-06-01 RX ADMIN — SERTRALINE 100 MG: 100 TABLET, FILM COATED ORAL at 10:25

## 2022-06-01 RX ADMIN — METOPROLOL SUCCINATE 200 MG: 100 TABLET, EXTENDED RELEASE ORAL at 10:25

## 2022-06-01 RX ADMIN — LEVOTHYROXINE SODIUM 150 MCG: 150 TABLET ORAL at 06:07

## 2022-06-01 RX ADMIN — DOCUSATE SODIUM 100 MG: 100 CAPSULE, LIQUID FILLED ORAL at 10:25

## 2022-06-01 RX ADMIN — DOCUSATE SODIUM 100 MG: 100 CAPSULE, LIQUID FILLED ORAL at 17:40

## 2022-06-01 RX ADMIN — METRONIDAZOLE 500 MG: 500 INJECTION, SOLUTION INTRAVENOUS at 15:25

## 2022-06-01 RX ADMIN — PANTOPRAZOLE SODIUM 20 MG: 20 TABLET, DELAYED RELEASE ORAL at 06:07
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
## 2022-06-01 NOTE — PLAN OF CARE
Problem: PHYSICAL THERAPY ADULT  Goal: Performs mobility at highest level of function for planned discharge setting  See evaluation for individualized goals  Description: Treatment/Interventions: Functional transfer training, LE strengthening/ROM, Elevations, Therapeutic exercise, Endurance training, Bed mobility, Gait training          See flowsheet documentation for full assessment, interventions and recommendations  Note: Prognosis: Guarded  Problem List: Decreased strength, Decreased endurance, Decreased mobility, Impaired balance, Decreased cognition, Impaired judgement, Decreased safety awareness  Assessment: Patient is a 67 y o  female evaluated by Physical Therapy s/p admit to [facility] on 5/31/2022 with admitting diagnosis of: Diverticulitis, Diverticulosis, Nausea, Rectal bleeding, Nausea & vomiting, DARRELL (acute kidney injury), and principal problem of: Acute kidney injury superimposed on chronic kidney disease  PT was consulted to assess patient's functional mobility and discharge needs  Ordered are PT Evaluation and treatment with activity level of: up with assistance  Comorbidities affecting patient's physical performance at time of assessment include: CAD, GERD, CHF, HLD, HTN, arthritis, cancer, CKD, a-fib  Personal factors affecting the patient at time of IE include: ambulating with assistive device, step(s) to enter home, inability to navigate community distances, impaired cognition, history of fall(s), impaired safety awareness, decreased initiation and engagement, limited insight into impairments, inability/difficulty performing IADLs and inability/difficulty performing ADLs  Please locate objective findings from PT assessment regarding body systems outlined above  Upon evaluation, pt able to perform all functional mobility with mod-maxA x 2, hand held assist, and increased time  Max verbal cuing provided for safety awareness, sequencing, and direction   RW trialed initially for functional transfers, however pt unable to successfully bear weight through UEs or move RW during pivot transfer  Hand held assist x 2 then trialed; required maxA x 2 but remained much safer than attempt with RW  Ambulation not attempted this date d/t this  HR and SpO2 remained WFL on RA throughout  The patient's AM-PAC Basic Mobility Inpatient Short Form Raw Score is 9  A Raw score of less than or equal to 16 suggests the patient may benefit from discharge to post-acute rehabilitation services  Please also refer to the recommendation of the Physical Therapist for safe discharge planning  Co treatment with OT secondary to complex medical condition of pt, possible A of 2 required to achieve and maintain transitional movements, requiring the need of skilled therapeutic intervention of 2 therapists to achieve delivery of services  Pt will benefit from continued PT intervention during LOS to address current deficits, increase LOF, and facilitate safe d/c to next level of care when medically appropriate  D/c recommendation at this time is post-acute rehabilitation services  PT Discharge Recommendation: Post acute rehabilitation services          See flowsheet documentation for full assessment
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
## 2022-06-01 NOTE — PLAN OF CARE
Problem: Potential for Falls  Goal: Patient will remain free of falls  Description: INTERVENTIONS:  - Educate patient/family on patient safety including physical limitations  - Instruct patient to call for assistance with activity   - Consult OT/PT to assist with strengthening/mobility   - Keep Call bell within reach  - Keep bed low and locked with side rails adjusted as appropriate  - Keep care items and personal belongings within reach  - Initiate and maintain comfort rounds  - Make Fall Risk Sign visible to staff  - Offer Toileting every 2 Hours, in advance of need  - Initiate/Maintain bed alarm  - Obtain necessary fall risk management equipment  - Apply yellow socks and bracelet for high fall risk patients  - Keep patient in room near nurses station  Outcome: Progressing     Problem: MOBILITY - ADULT  Goal: Maintain or return to baseline ADL function  Description: INTERVENTIONS:  -  Assess patient's ability to carry out ADLs; assess patient's baseline for ADL function and identify physical deficits which impact ability to perform ADLs (bathing, care of mouth/teeth, toileting, grooming, dressing, etc )  - Assess/evaluate cause of self-care deficits   - Assess range of motion  - Assess patient's mobility; develop plan if impaired  - Assess patient's need for assistive devices and provide as appropriate  - Encourage maximum independence but intervene and supervise when necessary  - Involve family in performance of ADLs  - Assess for home care needs following discharge   - Consider OT consult to assist with ADL evaluation and planning for discharge  - Provide patient education as appropriate  Outcome: Progressing  Goal: Maintains/Returns to pre admission functional level  Description: INTERVENTIONS:  - Perform BMAT or MOVE assessment daily    - Set and communicate daily mobility goal to care team and patient/family/caregiver     - Collaborate with rehabilitation services on mobility goals if consulted  - Reposition patient every 2 hours  - Out of bed to chair 3 times a day   - Out of bed for meals 3 times a day  - Out of bed for toileting  - Record patient progress and toleration of activity level   Outcome: Progressing     Problem: Prexisting or High Potential for Compromised Skin Integrity  Goal: Skin integrity is maintained or improved  Description: INTERVENTIONS:  - Identify patients at risk for skin breakdown  - Assess and monitor skin integrity  - Assess and monitor nutrition and hydration status  - Monitor labs   - Assess for incontinence   - Turn and reposition patient  - Assist with mobility/ambulation  - Relieve pressure over bony prominences  - Avoid friction and shearing  - Provide appropriate hygiene as needed including keeping skin clean and dry  - Evaluate need for skin moisturizer/barrier cream  - Collaborate with interdisciplinary team   - Patient/family teaching  Outcome: Progressing     Problem: Nutrition/Hydration-ADULT  Goal: Nutrient/Hydration intake appropriate for improving, restoring or maintaining nutritional needs  Description: Monitor and assess patient's nutrition/hydration status for malnutrition  Collaborate with interdisciplinary team and initiate plan and interventions as ordered  Monitor patient's weight and dietary intake as ordered or per policy  Utilize nutrition screening tool and intervene as necessary  Determine patient's food preferences and provide high-protein, high-caloric foods as appropriate       INTERVENTIONS:  - Monitor oral intake, urinary output, labs, and treatment plans  - Assess nutrition and hydration status and recommend course of action  - Evaluate amount of meals eaten  - Allow adequate time for meals  - Assess need for intravenous fluids  - Provide specific nutrition/hydration education as appropriate  - Include patient/family/caregiver in decisions related to nutrition  Outcome: Progressing     Problem: PAIN - ADULT  Goal: Verbalizes/displays adequate comfort level or baseline comfort level  Description: Interventions:  - Encourage patient to monitor pain and request assistance  - Assess pain using 0-10 pain scale  - Administer analgesics based on type and severity of pain and evaluate response  - Implement non-pharmacological measures as appropriate and evaluate response  - Notify physician/advanced practitioner if interventions unsuccessful or patient reports new pain  Outcome: Progressing     Problem: INFECTION - ADULT  Goal: Absence or prevention of progression during hospitalization  Description: INTERVENTIONS:  - Assess and monitor for signs and symptoms of infection  - Monitor WBC  - Monitor temp  - Monitor all insertion sites, i e  indwelling lines, tubes, and drains  - [place] appropriate cooling/warming therapies per order  - Administer medications as ordered  - Instruct and encourage patient and family to use good hand hygiene technique  Outcome: Progressing     Problem: DISCHARGE PLANNING  Goal: Discharge to home or other facility with appropriate resources  Description: INTERVENTIONS:  - Identify barriers to discharge w/patient and caregiver  - Arrange for needed discharge resources and transportation as appropriate  - Identify discharge learning needs (meds, wound care, etc )  - Refer to Case Management Department for coordinating discharge planning if the patient needs post-hospital services based on physician/advanced practitioner order or complex needs related to functional status, cognitive ability, or social support system  Outcome: Progressing     Problem: Knowledge Deficit  Goal: Patient/family/caregiver demonstrates understanding of disease process, treatment plan, medications, and discharge instructions  Description: Complete learning assessment and assess knowledge base    Interventions:  - Provide teaching at level of understanding  - Provide teaching via preferred learning methods  Outcome: Progressing     Problem: GASTROINTESTINAL - ADULT  Goal: Minimal or absence of nausea and/or vomiting  Description: INTERVENTIONS:  - Administer IV fluids if ordered to ensure adequate hydration  - Administer ordered antiemetic medications as needed  - Provide nonpharmacologic comfort measures as appropriate  - Advance diet as tolerated, if ordered  - Consider nutrition services referral to assist patient with adequate nutrition and appropriate food choices  Outcome: Progressing  Goal: Maintains or returns to baseline bowel function  Description: INTERVENTIONS:  - Assess bowel function  - Encourage oral fluids to ensure adequate hydration  - Administer IV fluids if ordered to ensure adequate hydration  - Administer ordered medications as needed  - Encourage mobilization and activity  - Consider nutritional services referral to assist patient with adequate nutrition and appropriate food choices  Outcome: Progressing
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
## 2022-06-01 NOTE — PLAN OF CARE
Problem: Potential for Falls  Goal: Patient will remain free of falls  Description: INTERVENTIONS:  - Educate patient/family on patient safety including physical limitations  - Instruct patient to call for assistance with activity   - Consult OT/PT to assist with strengthening/mobility   - Keep Call bell within reach  - Keep bed low and locked with side rails adjusted as appropriate  - Keep care items and personal belongings within reach  - Initiate and maintain comfort rounds  - Make Fall Risk Sign visible to staff  - Offer Toileting every 2 Hours, in advance of need  - Initiate/Maintain bed alarm  - Obtain necessary fall risk management equipment  - Apply yellow socks and bracelet for high fall risk patients  - Keep patient in room near nurses station  Outcome: Progressing     Problem: MOBILITY - ADULT  Goal: Maintain or return to baseline ADL function  Description: INTERVENTIONS:  -  Assess patient's ability to carry out ADLs; assess patient's baseline for ADL function and identify physical deficits which impact ability to perform ADLs (bathing, care of mouth/teeth, toileting, grooming, dressing, etc )  - Assess/evaluate cause of self-care deficits   - Assess range of motion  - Assess patient's mobility; develop plan if impaired  - Assess patient's need for assistive devices and provide as appropriate  - Encourage maximum independence but intervene and supervise when necessary  - Involve family in performance of ADLs  - Assess for home care needs following discharge   - Consider OT consult to assist with ADL evaluation and planning for discharge  - Provide patient education as appropriate  Outcome: Progressing  Goal: Maintains/Returns to pre admission functional level  Description: INTERVENTIONS:  - Perform BMAT or MOVE assessment daily    - Set and communicate daily mobility goal to care team and patient/family/caregiver     - Collaborate with rehabilitation services on mobility goals if consulted  - Reposition patient every 2 hours  - Out of bed to chair 3 times a day   - Out of bed for meals 3 times a day  - Out of bed for toileting  - Record patient progress and toleration of activity level   Outcome: Progressing     Problem: Prexisting or High Potential for Compromised Skin Integrity  Goal: Skin integrity is maintained or improved  Description: INTERVENTIONS:  - Identify patients at risk for skin breakdown  - Assess and monitor skin integrity  - Assess and monitor nutrition and hydration status  - Monitor labs   - Assess for incontinence   - Turn and reposition patient  - Assist with mobility/ambulation  - Relieve pressure over bony prominences  - Avoid friction and shearing  - Provide appropriate hygiene as needed including keeping skin clean and dry  - Evaluate need for skin moisturizer/barrier cream  - Collaborate with interdisciplinary team   - Patient/family teaching  Outcome: Progressing     Problem: Nutrition/Hydration-ADULT  Goal: Nutrient/Hydration intake appropriate for improving, restoring or maintaining nutritional needs  Description: Monitor and assess patient's nutrition/hydration status for malnutrition  Collaborate with interdisciplinary team and initiate plan and interventions as ordered  Monitor patient's weight and dietary intake as ordered or per policy  Utilize nutrition screening tool and intervene as necessary  Determine patient's food preferences and provide high-protein, high-caloric foods as appropriate       INTERVENTIONS:  - Monitor oral intake, urinary output, labs, and treatment plans  - Assess nutrition and hydration status and recommend course of action  - Evaluate amount of meals eaten  - Allow adequate time for meals  - Assess need for intravenous fluids  - Provide specific nutrition/hydration education as appropriate  - Include patient/family/caregiver in decisions related to nutrition  Outcome: Progressing     Problem: PAIN - ADULT  Goal: Verbalizes/displays adequate comfort level or baseline comfort level  Description: Interventions:  - Encourage patient to monitor pain and request assistance  - Assess pain using 0-10 pain scale  - Administer analgesics based on type and severity of pain and evaluate response  - Implement non-pharmacological measures as appropriate and evaluate response  - Notify physician/advanced practitioner if interventions unsuccessful or patient reports new pain  Outcome: Progressing     Problem: INFECTION - ADULT  Goal: Absence or prevention of progression during hospitalization  Description: INTERVENTIONS:  - Assess and monitor for signs and symptoms of infection  - Monitor WBC  - Monitor temp  - Monitor all insertion sites, i e  indwelling lines, tubes, and drains  - [place] appropriate cooling/warming therapies per order  - Administer medications as ordered  - Instruct and encourage patient and family to use good hand hygiene technique  Outcome: Progressing     Problem: DISCHARGE PLANNING  Goal: Discharge to home or other facility with appropriate resources  Description: INTERVENTIONS:  - Identify barriers to discharge w/patient and caregiver  - Arrange for needed discharge resources and transportation as appropriate  - Identify discharge learning needs (meds, wound care, etc )  - Refer to Case Management Department for coordinating discharge planning if the patient needs post-hospital services based on physician/advanced practitioner order or complex needs related to functional status, cognitive ability, or social support system  Outcome: Progressing     Problem: Knowledge Deficit  Goal: Patient/family/caregiver demonstrates understanding of disease process, treatment plan, medications, and discharge instructions  Description: Complete learning assessment and assess knowledge base    Interventions:  - Provide teaching at level of understanding  - Provide teaching via preferred learning methods  Outcome: Progressing     Problem: GASTROINTESTINAL - ADULT  Goal: Minimal or absence of nausea and/or vomiting  Description: INTERVENTIONS:  - Administer IV fluids if ordered to ensure adequate hydration  - Administer ordered antiemetic medications as needed  - Provide nonpharmacologic comfort measures as appropriate  - Advance diet as tolerated, if ordered  - Consider nutrition services referral to assist patient with adequate nutrition and appropriate food choices  Outcome: Progressing  Goal: Maintains or returns to baseline bowel function  Description: INTERVENTIONS:  - Assess bowel function  - Encourage oral fluids to ensure adequate hydration  - Administer IV fluids if ordered to ensure adequate hydration  - Administer ordered medications as needed  - Encourage mobilization and activity  - Consider nutritional services referral to assist patient with adequate nutrition and appropriate food choices  Outcome: Progressing

## 2022-06-01 NOTE — PLAN OF CARE
Problem: Nutrition/Hydration-ADULT  Goal: Nutrient/Hydration intake appropriate for improving, restoring or maintaining nutritional needs  Description: Monitor and assess patient's nutrition/hydration status for malnutrition  Collaborate with interdisciplinary team and initiate plan and interventions as ordered  Monitor patient's weight and dietary intake as ordered or per policy  Utilize nutrition screening tool and intervene as necessary  Determine patient's food preferences and provide high-protein, high-caloric foods as appropriate       INTERVENTIONS:  - Monitor oral intake, urinary output, labs, and treatment plans  - Assess nutrition and hydration status and recommend course of action  - Evaluate amount of meals eaten  - Allow adequate time for meals  - Assess need for intravenous fluids  - Provide specific nutrition/hydration education as appropriate  - Include patient/family/caregiver in decisions related to nutrition  Outcome: Progressing

## 2022-06-01 NOTE — PROGRESS NOTES
[facility] [address]  Progress Note - David Jameson 1945, 67 y o  female MRN: [id number]  Unit/Bed#: 411-01 Encounter: [id number]  Primary Care Provider: Salina Card DO   Date and time admitted to hospital: 5/31/2022 10:52 AM    Diverticulitis  Assessment & Plan  Mild acute diverticulitis, will treat with Rocephin/Flagyl      Rectal bleeding  Assessment & Plan  Increasing bleeding due to hemorrhoids this past week  Continue to hold Xarelto  GI Consult   Stool Softener     * Acute kidney injury superimposed on chronic kidney disease [facility])  Assessment & Plan  Lab Results   Component Value Date    EGFR 9 05/31/2022    EGFR 25 05/05/2022    EGFR 23 04/21/2022    CREATININE 4 07 (H) 05/31/2022    CREATININE 1 88 (H) 05/05/2022    CREATININE 1 97 (H) 04/21/2022     Baseline creatinine appears to be around 2, Patient established with outpatient Nephrology  Check urine sodium, urine creatinine, urine urea nitrogen  Improved with volume expansion  Isolyte @ 100cc/hr   PVR q shift  Retention protocol  Continue to hold nephrotoxin agents (Lasix)      Elevated troponin  Assessment & Plan  Non ischemic myocardial injury  Monitor on tele and trend 1 additional troponin  Hold Xarelto due to rectal bleeding     Essential hypertension  Assessment & Plan  Hold cozaar and lasix due to DARRELL  Continue metoprolol 200mg daily      Paroxysmal atrial fibrillation (HCC)  Assessment & Plan  S/p pacer/defibrilator   Continue toprol xl 200mg daily  Hold xarelto     Chronic combined systolic and diastolic CHF (congestive heart failure) (HCC)  Assessment & Plan  Wt Readings from Last 3 Encounters:   05/31/22 62 8 kg (138 lb 7 2 oz)   11/16/21 62 8 kg (138 lb 7 2 oz)   10/22/21 63 kg (138 lb 14 2 oz)       Appears euvolemic on exam  Will hold lasix and provide gentle volume expansion in setting of DARRELL   Low salt diet, I&O       Acquired hypothyroidism  Assessment & Plan  Continue Synthroid        Progress Note - Carmen AGUILAR Georgian Riedel 67 y o  female MRN: [id number]    Unit/Bed#: 411-01 Encounter: [id number]        Subjective:   Patient seen and examined, feeling better less abdominal pain without nausea/emesis  , no acute complaints or events overnight     Objective:     Vitals:   Vitals:    06/01/22 0705   BP: 150/62   Pulse: 66   Resp: 16   Temp: (!) 97 4 °F (36 3 °C)   SpO2: 96%     Body mass index is 22 35 kg/m²      Intake/Output Summary (Last 24 hours) at 6/1/2022 1059  Last data filed at 6/1/2022 0839  Gross per 24 hour   Intake 240 ml   Output --   Net 240 ml       Physical Exam:   /62 (BP Location: Right arm)   Pulse 66   Temp (!) 97 4 °F (36 3 °C) (Oral)   Resp 16   Ht 5' 6" (1 676 m)   Wt 62 8 kg (138 lb 7 2 oz)   SpO2 96%   BMI 22 35 kg/m²   General appearance: alert and oriented, in no acute distress  Head: Normocephalic, without obvious abnormality, atraumatic  Lungs: clear to auscultation bilaterally  Heart: regular rate and rhythm, S1, S2 normal, no murmur, click, rub or gallop  Abdomen: soft, mild tenderness, no rebound/guarding  Extremities: extremities normal, warm and well-perfused; no cyanosis, clubbing, or edema  Pulses: 2+ and symmetric  Neurologic: Grossly normal     Invasive Devices  Report    Peripheral Intravenous Line  Duration           Peripheral IV 05/31/22 Right;Ventral (anterior) Forearm <1 day                Results from last 7 days   Lab Units 06/01/22  0922 05/31/22  1119   WBC Thousand/uL 6 51 8 50   HEMOGLOBIN g/dL 9 3* 9 5*   HEMATOCRIT % 29 5* 30 4*   PLATELETS Thousands/uL 276 299       Results from last 7 days   Lab Units 06/01/22  0922 05/31/22  1119   POTASSIUM mmol/L 5 2 5 3   CHLORIDE mmol/L 102 96*   CO2 mmol/L 19* 17*   BUN mg/dL 115* 128*   CREATININE mg/dL 3 21* 4 07*   CALCIUM mg/dL 9 5 9 7   ALK PHOS U/L  --  194*   ALT U/L  --  9*   AST U/L  --  20       Medication Administration - last 24 hours from 05/31/2022 1059 to 06/01/2022 1059       Date/Time Order Dose Route Action Action by     05/31/2022 1222 sodium chloride 0 9 % bolus 1,000 mL 1,000 mL Intravenous New Bag Lorene Friendgalina     05/31/2022 1454 cefepime (MAXIPIME) 500 mg in sodium chloride 0 9 % 50 mL IVPB 0 mg Intravenous Stopped Caroline R Sep, RN     05/31/2022 1341 cefepime (MAXIPIME) 500 mg in sodium chloride 0 9 % 50 mL IVPB 500 mg Intravenous New Bag Lorene Friendgalina     05/31/2022 1501 metroNIDAZOLE (FLAGYL) IVPB (premix) 500 mg 100 mL 500 mg Intravenous New Bag Caroline R Sep, RN     06/01/2022 1025 allopurinol (ZYLOPRIM) tablet 100 mg 100 mg Oral Given Alfredo Santiago, RN     05/31/2022 1647 allopurinol (ZYLOPRIM) tablet 100 mg 100 mg Oral Given Mikki Phillips, RN     06/01/2022 0607 levothyroxine tablet 150 mcg 150 mcg Oral Given Mandi Singh, RN     05/31/2022 2121 melatonin tablet 6 mg 6 mg Oral Given Mikki Phillips, RN     06/01/2022 0607 pantoprazole (PROTONIX) EC tablet 20 mg 20 mg Oral Given Mandi Francisco, RN     06/01/2022 1025 sertraline (ZOLOFT) tablet 100 mg 100 mg Oral Given [facility] Jack, RN     05/31/2022 1647 sertraline (ZOLOFT) tablet 100 mg 100 mg Oral Given Mikki Phillips, RN     06/01/2022 1025 metoprolol succinate (TOPROL-XL) 24 hr tablet 200 mg 200 mg Oral Given [facility], RN     05/31/2022 1646 metoprolol succinate (TOPROL-XL) 24 hr tablet 200 mg 200 mg Oral Given Mikki Phillips, RN     05/31/2022 1645 multi-electrolyte (PLASMALYTE-A/ISOLYTE-S PH 7 4) IV solution 100 mL/hr Intravenous New Bag Nela Rice, SAE     06/01/2022 1027 cefTRIAXone (ROCEPHIN) IVPB (premix in dextrose) 1,000 mg 50 mL 1,000 mg Intravenous [address] [facility] Jack, RN     06/01/2022 4798 metroNIDAZOLE (FLAGYL) IVPB (premix) 500 mg 100 mL 500 mg Intravenous [facility]     05/31/2022 2335 metroNIDAZOLE (FLAGYL) IVPB (premix) 500 mg 100 mL 500 mg Intravenous [address] [facility] [address] [place], RN     06/01/2022 1028 docusate sodium (COLACE) capsule 100 mg 100 mg Oral Given Alfredo Santiago RN     05/31/2022 0376 docusate sodium (COLACE) capsule 100 mg 100 mg Oral Given Carley Martinez RN            Lab, Imaging and other studies: I have personally reviewed pertinent reports      VTE Pharmacologic Prophylaxis: none 2/2 rectal bleeding  VTE Mechanical Prophylaxis: sequential compression device     Tio Peralta MD  6/1/2022,10:59 AM

## 2022-06-01 NOTE — CASE MANAGEMENT
Case Management Assessment & Discharge Planning Note    Patient name Marybeth Garcia  Location [address] [id number]/[id number] MRN [id number]  : 1945 Date 2022       Current Admission Date: 2022  Current Admission Diagnosis:Acute kidney injury superimposed on chronic kidney disease [facility])   Patient Active Problem List    Diagnosis Date Noted    Diverticulitis 2022    Rectal bleeding 2022    Elevated troponin 2022    Iron deficiency anemia 2022    Anemia due to chronic kidney disease 2022    [facility] 2020    Ischemic cardiomyopathy 2020    S/P implantation of automatic cardioverter/defibrillator (AICD) 2020    Essential hypertension 2020    History of PTCA 2020    Leg swelling 2020    Acute kidney injury superimposed on chronic kidney disease ([facility] ) 2020    Perforated appendicitis 2020    Chronic combined systolic and diastolic CHF (congestive heart failure) ([facility] ) 2020    Pyuria 2020    Microscopic hematuria 2020    Vitamin D insufficiency 2020    Mitral valve insufficiency 2020    Hypercholesterolemia 2020    Atelectasis 2020    Aortic atherosclerosis ([facility] ) 2020    Renal calculus 2020    Diverticulosis 2020    Hiatal hernia 2020    Pulmonary nodule 2020    Acute appendicitis 2020    Closed fracture of right proximal humerus 2020    Closed fracture of body of sternum with routine healing 2018    Congestive heart disease ([facility] ) 2018    Acquired hypothyroidism 2018    CAD (coronary artery disease) 2018    Forgetfulness 2018    Acute pain due to trauma 2018    Hx of fall 2018    Stress incontinence in female 2018      LOS (days): 1  Geometric Mean LOS (GMLOS) (days): 2 60  Days to GMLOS:1 8     OBJECTIVE:    Risk of Unplanned Readmission Score: 12 52 Current admission status: Inpatient    Preferred Pharmacy:   [id number] [address], [address] [place] [address] Jacquelyn [address]  [address]  DR Bhupinder FREEMAN [address]  Phone: [phone] Fax: [phone]    Primary Care Provider: Shannon Warner DO    Primary Insurance: [facility] REP  Secondary Insurance:     ASSESSMENT:  [address]  Representative - Spouse   Primary Phone: [phone] (Home)               Advance Directives  Does patient have a [address]?: No  Was patient offered paperwork?: Yes (declines)  Does patient currently have a Health Care decision maker?: Yes, please see Health Care Proxy section  Does patient have Advance Directives?: No  Was patient offered paperwork?: Yes (declines)  Primary Contact: Jax Leyla (Spouse)   [phone] (H)         Readmission Root Cause  30 Day Readmission: No    Patient Information  Admitted from[de-identified] Home  Mental Status: Alert  During Assessment patient was accompanied by: Not accompanied during assessment  Assessment information provided by[de-identified] Patient, Spouse  Primary Caregiver: Self  Support Systems: Spouse/significant other  [place] of Residence: [address] do you live in?: [employer] entry access options   Select all that apply : Stairs  Number of steps to enter home : 2 (2 TAMY from the back; alot from the front)  Type of Current Residence: Other (Comment) (1 story home)  In the last 12 months, how many places have you lived?: 1  In the last 12 months, was there a time when you did not have a steady place to sleep or slept in a shelter (including now)?: No  Homeless/housing insecurity resource given?: N/A  Living Arrangements: Lives w/ Spouse/significant other    Activities of Daily Living Prior to Admission  Functional Status: Assistance  Completes ADLs independently?: No  Level of ADL dependence: Assistance  Ambulates independently?: No  Level of ambulatory dependence: Assistance  Does patient use assisted devices?: Yes  Assisted Devices (DME) used:  Wheelchair, [employer] (spouses states pt pretty much uses the wc)  Does patient currently own DME?: Yes  What DME does the patient currently own?: Straight Darrol Flattery, Wheelchair  Does patient have a history of Outpatient Therapy (PT/OT)?: No  Does the patient have a history of Short-Term Rehab?: No  Does patient have a history of HHC?: No  Does patient currently have [facility]?: No      Patient Information Continued  Income Source: Pension/skilled nursing  Does patient have prescription coverage?: Yes  Within the past 12 months, you worried that your food would run out before you got the money to buy more : Never true  Within the past 12 months, the food you bought just didn't last and you didn't have money to get more : Never true  Food insecurity resource given?: N/A  Does patient receive dialysis treatments?: No  Does patient have a history of substance abuse?: No  Does patient have a history of Mental Health Diagnosis?: Yes (depression)  Has patient received inpatient treatment related to mental health in the last 2 years?: No    PHQ 2/9 Screening   Reviewed PHQ 2/9 Depression Screening Score?: No    Means of Transportation  Means of Transport to Appts[de-identified] Family transport (spouse)  In the past 12 months, has lack of transportation kept you from medical appointments or from getting medications?: No  In the past 12 months, has lack of transportation kept you from meetings, work, or from getting things needed for daily living?: No  Was application for public transport provided?: N/A        DISCHARGE DETAILS:    Discharge planning discussed with[de-identified] patient and spouse        CM contacted family/caregiver?: Yes Valery Bashir (Spouse)   [phone] (H))  Were Treatment Team discharge recommendations reviewed with patient/caregiver?: Yes  Did patient/caregiver verbalize understanding of patient care needs?: Yes  Were patient/caregiver advised of the risks associated with not following Treatment Team discharge recommendations?: Yes    Contacts  Patient Contacts: Carmela Breen (Spouse)   [phone] (H)  Relationship to Patient[de-identified] Family  Contact Method: Phone  Phone Number: [phone] (H)  Reason/Outcome: Discharge Planning    Discharge Destination Plan[de-identified] Home with [address] [place] [address], Short Term Rehab (?home w hhc vs STR, will follow pt's progress with PT/OT)  Transport at Discharge :  (if home, spouse to transport pt home   If rehab, Cm will arrange transport)

## 2022-06-01 NOTE — PLAN OF CARE
Problem: OCCUPATIONAL THERAPY ADULT  Goal: Performs self-care activities at highest level of function for planned discharge setting  See evaluation for individualized goals  Description: Treatment Interventions: ADL retraining, Functional transfer training, UE strengthening/ROM, Endurance training, Cognitive reorientation, Patient/family training, Equipment evaluation/education, Energy conservation, Activityengagement          See flowsheet documentation for full assessment, interventions and recommendations  Note: Limitation: Decreased ADL status, Decreased UE strength, Decreased Safe judgement during ADL, Decreased cognition, Decreased endurance, Decreased self-care trans, Decreased high-level ADLs  Prognosis: Fair  Assessment: Pt is a 67 y o  female who was admitted to [facility] on 5/31/2022 w/ Acute kidney injury superimposed on chronic kidney disease ([facility] )  Pt has a past medical history of a-Fib, CAD, breast cancer, depression, CKD, GERD, HTN, and pneumonia  Pt lives with her  in a 1 story home with 10 TAMY  At baseline, pt was completing ADLs with assistance and functional mobility and transfers with assistance and use of RW versus w/c  Currently, pt requires maximum to total A overall for ADLs and max A of 2 with RW for mobility and transfers  Pt presents with deficits in the following categories: steps to enter environment, difficulty performing ADLS and difficulty performing IADLS  activity tolerance, endurance, standing balance/tolerance, sitting balance/tolerance, UE strength, safety , judgement , attention  and sequencing   These deficits, along with pt's  cognitive impairments limit the pt's ability to safely engage in areas of occupation such as: bathing/shower, toilet hygiene, dressing, functional mobility and clothing management  Pt would benefit from continued skilled acute OT in order to maximize independence and safety in ADLs, mobility, and transfers   OT would recommend pt discharge to STR when medically cleared  Pt's raw score on the AM-PAC Daily Activity inpatient short form is 14, standardized score is 33 39, less than 39 4  Patients at this level are likely to benefit from DC to STR however, please refer to therapist recommendation for safe DC planning  OT will work towards the established goals  Co treatment with PT secondary to complex medical condition of pt, possible A of 2 required to achieve and maintain transitional movements, requiring the need of skilled therapeutic intervention of 2 therapists to achieve delivery of services  OT Discharge Recommendation: Post acute rehabilitation services  OT - OK to Discharge:  Yes
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
## 2022-06-02 LAB
ABO GROUP BLD: NORMAL
ANION GAP SERPL CALCULATED.3IONS-SCNC: 18 MMOL/L (ref 4–13)
BASOPHILS # BLD AUTO: 0.03 THOUSANDS/ΜL (ref 0–0.1)
BASOPHILS NFR BLD AUTO: 1 % (ref 0–1)
BLD GP AB SCN SERPL QL: NEGATIVE
BUN SERPL-MCNC: 88 MG/DL (ref 5–25)
CALCIUM SERPL-MCNC: 8.7 MG/DL (ref 8.3–10.1)
CHLORIDE SERPL-SCNC: 101 MMOL/L (ref 100–108)
CO2 SERPL-SCNC: 15 MMOL/L (ref 21–32)
CREAT SERPL-MCNC: 2.37 MG/DL (ref 0.6–1.3)
CREAT UR-MCNC: 22 MG/DL
EOSINOPHIL # BLD AUTO: 0.17 THOUSAND/ΜL (ref 0–0.61)
EOSINOPHIL NFR BLD AUTO: 4 % (ref 0–6)
ERYTHROCYTE [DISTWIDTH] IN BLOOD BY AUTOMATED COUNT: 14.9 % (ref 11.6–15.1)
GFR SERPL CREATININE-BSD FRML MDRD: 19 ML/MIN/1.73SQ M
GLUCOSE SERPL-MCNC: 82 MG/DL (ref 65–140)
HCT VFR BLD AUTO: 22.9 % (ref 34.8–46.1)
HCT VFR BLD AUTO: 31.3 % (ref 34.8–46.1)
HGB BLD-MCNC: 7.2 G/DL (ref 11.5–15.4)
HGB BLD-MCNC: 9.9 G/DL (ref 11.5–15.4)
IMM GRANULOCYTES # BLD AUTO: 0.02 THOUSAND/UL (ref 0–0.2)
IMM GRANULOCYTES NFR BLD AUTO: 0 % (ref 0–2)
LYMPHOCYTES # BLD AUTO: 0.73 THOUSANDS/ΜL (ref 0.6–4.47)
LYMPHOCYTES NFR BLD AUTO: 16 % (ref 14–44)
MCH RBC QN AUTO: 28.7 PG (ref 26.8–34.3)
MCHC RBC AUTO-ENTMCNC: 31.4 G/DL (ref 31.4–37.4)
MCV RBC AUTO: 91 FL (ref 82–98)
MONOCYTES # BLD AUTO: 0.32 THOUSAND/ΜL (ref 0.17–1.22)
MONOCYTES NFR BLD AUTO: 7 % (ref 4–12)
NEUTROPHILS # BLD AUTO: 3.26 THOUSANDS/ΜL (ref 1.85–7.62)
NEUTS SEG NFR BLD AUTO: 72 % (ref 43–75)
NRBC BLD AUTO-RTO: 0 /100 WBCS
PLATELET # BLD AUTO: 225 THOUSANDS/UL (ref 149–390)
PMV BLD AUTO: 11.7 FL (ref 8.9–12.7)
POTASSIUM SERPL-SCNC: 4.5 MMOL/L (ref 3.5–5.3)
RBC # BLD AUTO: 2.51 MILLION/UL (ref 3.81–5.12)
RH BLD: POSITIVE
SODIUM 24H UR-SCNC: 77 MOL/L
SODIUM SERPL-SCNC: 134 MMOL/L (ref 136–145)
SPECIMEN EXPIRATION DATE: NORMAL
WBC # BLD AUTO: 4.53 THOUSAND/UL (ref 4.31–10.16)

## 2022-06-02 PROCEDURE — 80048 BASIC METABOLIC PNL TOTAL CA: CPT | Performed by: FAMILY MEDICINE

## 2022-06-02 PROCEDURE — 99232 SBSQ HOSP IP/OBS MODERATE 35: CPT | Performed by: FAMILY MEDICINE

## 2022-06-02 PROCEDURE — 86900 BLOOD TYPING SEROLOGIC ABO: CPT | Performed by: FAMILY MEDICINE

## 2022-06-02 PROCEDURE — 85014 HEMATOCRIT: CPT | Performed by: FAMILY MEDICINE

## 2022-06-02 PROCEDURE — 97116 GAIT TRAINING THERAPY: CPT

## 2022-06-02 PROCEDURE — 85018 HEMOGLOBIN: CPT | Performed by: FAMILY MEDICINE

## 2022-06-02 PROCEDURE — 86901 BLOOD TYPING SEROLOGIC RH(D): CPT | Performed by: FAMILY MEDICINE

## 2022-06-02 PROCEDURE — 97530 THERAPEUTIC ACTIVITIES: CPT

## 2022-06-02 PROCEDURE — 85025 COMPLETE CBC W/AUTO DIFF WBC: CPT | Performed by: FAMILY MEDICINE

## 2022-06-02 PROCEDURE — 86850 RBC ANTIBODY SCREEN: CPT | Performed by: FAMILY MEDICINE

## 2022-06-02 RX ORDER — SODIUM BICARBONATE 650 MG/1
650 TABLET ORAL
Status: DISCONTINUED | OUTPATIENT
Start: 2022-06-02 | End: 2022-06-03 | Stop reason: HOSPADM

## 2022-06-02 RX ADMIN — METRONIDAZOLE 500 MG: 500 INJECTION, SOLUTION INTRAVENOUS at 16:49

## 2022-06-02 RX ADMIN — METRONIDAZOLE 500 MG: 500 INJECTION, SOLUTION INTRAVENOUS at 22:41

## 2022-06-02 RX ADMIN — LEVOTHYROXINE SODIUM 150 MCG: 150 TABLET ORAL at 05:03

## 2022-06-02 RX ADMIN — DOCUSATE SODIUM 100 MG: 100 CAPSULE, LIQUID FILLED ORAL at 17:28

## 2022-06-02 RX ADMIN — SODIUM CHLORIDE, SODIUM GLUCONATE, SODIUM ACETATE, POTASSIUM CHLORIDE, MAGNESIUM CHLORIDE, SODIUM PHOSPHATE, DIBASIC, AND POTASSIUM PHOSPHATE 100 ML/HR: .53; .5; .37; .037; .03; .012; .00082 INJECTION, SOLUTION INTRAVENOUS at 01:09

## 2022-06-02 RX ADMIN — METOPROLOL SUCCINATE 200 MG: 100 TABLET, EXTENDED RELEASE ORAL at 08:16

## 2022-06-02 RX ADMIN — SODIUM CHLORIDE, SODIUM GLUCONATE, SODIUM ACETATE, POTASSIUM CHLORIDE, MAGNESIUM CHLORIDE, SODIUM PHOSPHATE, DIBASIC, AND POTASSIUM PHOSPHATE 100 ML/HR: .53; .5; .37; .037; .03; .012; .00082 INJECTION, SOLUTION INTRAVENOUS at 13:02

## 2022-06-02 RX ADMIN — DOCUSATE SODIUM 100 MG: 100 CAPSULE, LIQUID FILLED ORAL at 08:15

## 2022-06-02 RX ADMIN — SODIUM BICARBONATE 650 MG: 650 TABLET ORAL at 08:15

## 2022-06-02 RX ADMIN — Medication 6 MG: at 21:08

## 2022-06-02 RX ADMIN — SODIUM BICARBONATE 650 MG: 650 TABLET ORAL at 16:48

## 2022-06-02 RX ADMIN — PANTOPRAZOLE SODIUM 20 MG: 20 TABLET, DELAYED RELEASE ORAL at 06:02

## 2022-06-02 RX ADMIN — CEFTRIAXONE 1000 MG: 1 INJECTION, SOLUTION INTRAVENOUS at 08:15

## 2022-06-02 RX ADMIN — METRONIDAZOLE 500 MG: 500 INJECTION, SOLUTION INTRAVENOUS at 06:03

## 2022-06-02 RX ADMIN — SERTRALINE 100 MG: 100 TABLET, FILM COATED ORAL at 08:15

## 2022-06-02 RX ADMIN — ALLOPURINOL 100 MG: 100 TABLET ORAL at 08:15

## 2022-06-02 NOTE — ASSESSMENT & PLAN NOTE
Increasing bleeding due to hemorrhoids this past week  Continue to hold Xarelto  GI Consult appreciated  Stool Softener   No further bleeding episodes, plan to resume Xarelto on discharge

## 2022-06-02 NOTE — PHYSICAL THERAPY NOTE
PHYSICAL THERAPY NOTE          Patient Name: Wilber Mireles Date: 6/2/2022 06/02/22 9496   PT Last Visit   PT Visit Date 06/02/22   Note Type   Note Type Treatment   Pain Assessment   Pain Assessment Tool 0-10   Pain Score No Pain   Restrictions/Precautions   Weight Bearing Precautions Per Order No   Other Precautions   (Fall Risk; Multiple lines; Bed Alarm; Chair Alarm; Cognitive)   General   Family/Caregiver Present No   Cognition   Overall Cognitive Status Impaired   Arousal/Participation Alert; Cooperative   Following Commands Follows one step commands with increased time or repetition   Subjective   Subjective "I'd rather sit"   Bed Mobility   Additional Comments OOB in chair at start and end of PT session   Transfers   Sit to Stand 3  Moderate assistance   Additional items Assist x 2;Armrests; Increased time required;Verbal cues   Stand to Sit 3  Moderate assistance   Additional items Assist x 2;Armrests; Increased time required;Verbal cues   Additional Comments Stood with RW poor+ balance for brief change and hygiene with max A  Ambulation/Elevation   Gait pattern Excessively slow; Short stride; Foward flexed; Shuffling;Decreased foot clearance   Gait Assistance 3  Moderate assist   Additional items Assist x 1;Assist x 2;Verbal cues   Assistive Device Rolling walker   Distance 5' with chiar follow for safety   Balance   Static Sitting Fair   Dynamic Sitting Fair   Static Standing Poor +   Dynamic Standing Poor +   Ambulatory Poor +  (RW)   Endurance Deficit   Endurance Deficit Yes   Activity Tolerance   Activity Tolerance Patient limited by fatigue   Assessment   Prognosis Fair   Problem List Decreased strength;Decreased endurance; Impaired balance;Decreased mobility; Decreased cognition; Impaired judgement;Decreased safety awareness   Assessment Pt  seen for PT treatment session this date with interventions consisting of transfers and  gait training w/ emphasis on improving pt's ability to ambulate  Pt  Requiring extensive cues for sequence and safety  In comparison to previous session, Pt  With improvements in activity tolerance  Limited to short distance due to fatigue and cognative deficit  Difficulty follow cues  Gait is unsteady  Pt is in need of continued activity in PT to improve strength balance endurance mobility transfers and ambulation with return to maximize LOF  From PT/mobility standpoint, recommendation at time of d/c would be post acute rehab in order to promote return to PLOF and independence  The patient's AM-[facility] Basic Mobility Inpatient Short Form Raw Score is 11  A Raw score of less than or equal to 16 suggests the patient may benefit from discharge to post-acute rehabilitation services  Please also refer to physical therapy recommendation for safe DC planning  Goals   LTG Expiration Date 06/15/22   PT Treatment Day 1   Plan   Treatment/Interventions   (Functional transfer training; LE strengthening/ROM; Elevations; Therapeutic exercise;  Endurance training; Bed mobility; Gait training)   Progress Slow progress, decreased activity tolerance   PT Frequency 3-5x/wk   Recommendation   PT Discharge Recommendation Post acute rehabilitation services   AM-PAC Basic Mobility Inpatient   Turning in Bed Without Bedrails 2   Lying on Back to Sitting on Edge of Flat Bed 2   Moving Bed to Chair 2   Standing Up From Chair 2   Walk in Room 2   Climb 3-5 Stairs 1   Basic Mobility Inpatient Raw Score 11   Basic Mobility Standardized Score 30 25   Turning Head Towards Sound 4   Follow Simple Instructions 3   Low Function Basic Mobility Raw Score 18   Low Function Basic Mobility Standardized Score 29 25   Highest Level Of Mobility   JH-HLM Goal 4: Move to chair/commode   JH-HLM Achieved 5: Stand (1 or more minutes)   Education   Education Provided Mobility training   Patient Reinforcement needed   End of Consult   Patient Position at End of Consult Bedside chair;Bed/Chair alarm activated; All needs within reach   End of Consult Comments discussed POC with PT

## 2022-06-02 NOTE — PLAN OF CARE
Problem: PHYSICAL THERAPY ADULT  Goal: Performs mobility at highest level of function for planned discharge setting  See evaluation for individualized goals  Description: Treatment/Interventions: Functional transfer training, LE strengthening/ROM, Elevations, Therapeutic exercise, Endurance training, Bed mobility, Gait training          See flowsheet documentation for full assessment, interventions and recommendations  Outcome: Progressing  Note: Prognosis: Fair  Problem List: Decreased strength, Decreased endurance, Impaired balance, Decreased mobility, Decreased cognition, Impaired judgement, Decreased safety awareness  Assessment: Pt  seen for PT treatment session this date with interventions consisting of transfers and  gait training w/ emphasis on improving pt's ability to ambulate  Pt  Requiring extensive cues for sequence and safety  In comparison to previous session, Pt  With improvements in activity tolerance  Limited to short distance due to fatigue and cognative deficit  Difficulty follow cues  Gait is unsteady  Pt is in need of continued activity in PT to improve strength balance endurance mobility transfers and ambulation with return to maximize LOF  From PT/mobility standpoint, recommendation at time of d/c would be post acute rehab in order to promote return to PLOF and independence  The patient's AM-[facility] Basic Mobility Inpatient Short Form Raw Score is 11  A Raw score of less than or equal to 16 suggests the patient may benefit from discharge to post-acute rehabilitation services  Please also refer to physical therapy recommendation for safe DC planning  PT Discharge Recommendation: Post acute rehabilitation services          See flowsheet documentation for full assessment

## 2022-06-02 NOTE — PLAN OF CARE
Problem: Potential for Falls  Goal: Patient will remain free of falls  Description: INTERVENTIONS:  - Educate patient/family on patient safety including physical limitations  - Instruct patient to call for assistance with activity   - Consult OT/PT to assist with strengthening/mobility   - Keep Call bell within reach  - Keep bed low and locked with side rails adjusted as appropriate  - Keep care items and personal belongings within reach  - Initiate and maintain comfort rounds  - Make Fall Risk Sign visible to staff  - Offer Toileting every 2 Hours, in advance of need  - Initiate/Maintain bed alarm  - Obtain necessary fall risk management equipment  - Apply yellow socks and bracelet for high fall risk patients  - Keep patient in room near nurses station  Outcome: Progressing     Problem: PAIN - ADULT  Goal: Verbalizes/displays adequate comfort level or baseline comfort level  Description: Interventions:  - Encourage patient to monitor pain and request assistance  - Assess pain using 0-10 pain scale  - Administer analgesics based on type and severity of pain and evaluate response  - Implement non-pharmacological measures as appropriate and evaluate response  - Notify physician/advanced practitioner if interventions unsuccessful or patient reports new pain  Outcome: Progressing     Problem: GASTROINTESTINAL - ADULT  Goal: Minimal or absence of nausea and/or vomiting  Description: INTERVENTIONS:  - Administer IV fluids if ordered to ensure adequate hydration  - Administer ordered antiemetic medications as needed  - Provide nonpharmacologic comfort measures as appropriate  - Advance diet as tolerated, if ordered  - Consider nutrition services referral to assist patient with adequate nutrition and appropriate food choices  Outcome: Progressing  Goal: Maintains or returns to baseline bowel function  Description: INTERVENTIONS:  - Assess bowel function  - Encourage oral fluids to ensure adequate hydration  - Administer IV fluids if ordered to ensure adequate hydration  - Administer ordered medications as needed  - Encourage mobilization and activity  - Consider nutritional services referral to assist patient with adequate nutrition and appropriate food choices  Outcome: Progressing

## 2022-06-02 NOTE — UTILIZATION REVIEW
Inpatient Admission Authorization Request   NOTIFICATION OF INPATIENT ADMISSION/INPATIENT AUTHORIZATION REQUEST   SERVICING FACILITY:   [address] [place] [address]  [address], [place], [address]  Tax ID:  [id number]  NPI: [id number]  Place of Service: [place] [address]  Place of Service Code: 24     ATTENDING PROVIDER:  Attending Name and NPI#: Ralph Pearce [place] [[id number]]  Address: [address]  Phone: [phone]     UTILIZATION REVIEW CONTACT:  Cb Wasserman Utilization   Network Utilization Review Department  Phone: [phone]  Fax [phone]  Email: [email or web address]     PHYSICIAN ADVISORY SERVICES:  FOR [id number] REVIEW - MEDICAL NECESSITY DENIAL  Phone: [phone]  Fax: [phone]  Email: [email or web address]     TYPE OF REQUEST:  Inpatient Status     ADMISSION INFORMATION:  ADMISSION DATE/TIME: 5/31/22  2:31 PM  PATIENT DIAGNOSIS CODE/DESCRIPTION:  Diverticulitis [K57 92]  Diverticulosis [K57 90]  Nausea [R11 0]  Rectal bleeding [K62 5]  Nausea & vomiting [R11 2]  DARRELL (acute kidney injury) ([facility] ) [N17 9]  DISCHARGE DATE/TIME: No discharge date for patient encounter  IMPORTANT INFORMATION:  Please contact the Cb Pretty "Christa Chanel" Bautista directly with any questions or concerns regarding this request  Department voicemails are confidential     Send requests for admission clinical reviews, concurrent reviews, approvals, and administrative denials due to lack of clinical to fax [phone]

## 2022-06-02 NOTE — ASSESSMENT & PLAN NOTE
Mild acute diverticulitis, will treat with Rocephin/Flagyl  Transition to ceftin/flagyl on dc for 7 days total antibiotics

## 2022-06-02 NOTE — ASSESSMENT & PLAN NOTE
Lab Results   Component Value Date    EGFR 19 06/02/2022    EGFR 13 06/01/2022    EGFR 9 05/31/2022    CREATININE 2 37 (H) 06/02/2022    CREATININE 3 21 (H) 06/01/2022    CREATININE 4 07 (H) 05/31/2022     Baseline creatinine appears to be around 2, Patient established with outpatient Nephrology  Continues to improve with volume expansion  Has an anion gap acidosis, likely due to renal failure, will start bicarb tablets  Isolyte @ 100cc/hr through today  PVR q shift  Retention protocol  Continue to hold nephrotoxin agents (Lasix)

## 2022-06-02 NOTE — CASE MANAGEMENT
Case Management Discharge Planning Note    Patient name Mia Encarnacionopshire  Location [address] [id number] MRN [id number]  : 1945 Date 2022       Current Admission Date: 2022  Current Admission Diagnosis:Acute kidney injury superimposed on chronic kidney disease [facility])   Patient Active Problem List    Diagnosis Date Noted    Diverticulitis 2022    Rectal bleeding 2022    Elevated troponin 2022    Iron deficiency anemia 2022    Anemia due to chronic kidney disease 2022    [facility] 2020    Ischemic cardiomyopathy 2020    S/P implantation of automatic cardioverter/defibrillator (AICD) 2020    Essential hypertension 2020    History of PTCA 2020    Leg swelling 2020    Acute kidney injury superimposed on chronic kidney disease ([facility] ) 2020    Perforated appendicitis 2020    Chronic combined systolic and diastolic CHF (congestive heart failure) ([facility] ) 2020    Pyuria 2020    Microscopic hematuria 2020    Vitamin D insufficiency 2020    Mitral valve insufficiency 2020    Hypercholesterolemia 2020    Atelectasis 2020    Aortic atherosclerosis ([facility] ) 2020    Renal calculus 2020    Diverticulosis 2020    Hiatal hernia 2020    Pulmonary nodule 2020    Acute appendicitis 2020    Closed fracture of right proximal humerus 2020    Closed fracture of body of sternum with routine healing 2018    Congestive heart disease ([facility] ) 2018    Acquired hypothyroidism 2018    CAD (coronary artery disease) 2018    Forgetfulness 2018    Acute pain due to trauma 2018    Hx of fall 2018    Stress incontinence in female 2018      LOS (days): 2  Geometric Mean LOS (GMLOS) (days): 2 60  Days to GMLOS:0 6     OBJECTIVE:  Risk of Unplanned Readmission Score: 14 92         Current admission status: Inpatient   Preferred Pharmacy:   [facility] [address], [address] [facility] [address]  [address]  [place] [address]  Phone: [phone] Fax: [phone]    Primary Care Provider: Anai Giron DO    Primary Insurance: Nilay Bustos [facility] [place] REP  Secondary Insurance:     DISCHARGE DETAILS:Message left for pt's spouse:Mika to discuss dc plans/needs  ?STR vs returning home with [facility]hc  Awaiting a call back

## 2022-06-03 VITALS
RESPIRATION RATE: 18 BRPM | SYSTOLIC BLOOD PRESSURE: 157 MMHG | TEMPERATURE: 97.3 F | BODY MASS INDEX: 22.25 KG/M2 | DIASTOLIC BLOOD PRESSURE: 56 MMHG | HEART RATE: 61 BPM | WEIGHT: 138.45 LBS | HEIGHT: 66 IN | OXYGEN SATURATION: 100 %

## 2022-06-03 DIAGNOSIS — D63.1 ANEMIA DUE TO CHRONIC KIDNEY DISEASE, UNSPECIFIED CKD STAGE: Primary | ICD-10-CM

## 2022-06-03 DIAGNOSIS — N18.9 CHRONIC KIDNEY DISEASE, UNSPECIFIED CKD STAGE: ICD-10-CM

## 2022-06-03 DIAGNOSIS — D50.9 IRON DEFICIENCY ANEMIA, UNSPECIFIED IRON DEFICIENCY ANEMIA TYPE: ICD-10-CM

## 2022-06-03 DIAGNOSIS — N18.9 ANEMIA DUE TO CHRONIC KIDNEY DISEASE, UNSPECIFIED CKD STAGE: Primary | ICD-10-CM

## 2022-06-03 LAB
ANION GAP SERPL CALCULATED.3IONS-SCNC: 16 MMOL/L (ref 4–13)
BASOPHILS # BLD AUTO: 0.03 THOUSANDS/ΜL (ref 0–0.1)
BASOPHILS NFR BLD AUTO: 1 % (ref 0–1)
BUN SERPL-MCNC: 70 MG/DL (ref 5–25)
CALCIUM SERPL-MCNC: 8.9 MG/DL (ref 8.3–10.1)
CHLORIDE SERPL-SCNC: 103 MMOL/L (ref 100–108)
CO2 SERPL-SCNC: 20 MMOL/L (ref 21–32)
CREAT SERPL-MCNC: 2.05 MG/DL (ref 0.6–1.3)
EOSINOPHIL # BLD AUTO: 0.2 THOUSAND/ΜL (ref 0–0.61)
EOSINOPHIL NFR BLD AUTO: 4 % (ref 0–6)
ERYTHROCYTE [DISTWIDTH] IN BLOOD BY AUTOMATED COUNT: 14.8 % (ref 11.6–15.1)
GFR SERPL CREATININE-BSD FRML MDRD: 22 ML/MIN/1.73SQ M
GLUCOSE SERPL-MCNC: 84 MG/DL (ref 65–140)
HCT VFR BLD AUTO: 24.9 % (ref 34.8–46.1)
HGB BLD-MCNC: 8.1 G/DL (ref 11.5–15.4)
IMM GRANULOCYTES # BLD AUTO: 0.02 THOUSAND/UL (ref 0–0.2)
IMM GRANULOCYTES NFR BLD AUTO: 0 % (ref 0–2)
LYMPHOCYTES # BLD AUTO: 0.85 THOUSANDS/ΜL (ref 0.6–4.47)
LYMPHOCYTES NFR BLD AUTO: 17 % (ref 14–44)
MCH RBC QN AUTO: 28.8 PG (ref 26.8–34.3)
MCHC RBC AUTO-ENTMCNC: 32.5 G/DL (ref 31.4–37.4)
MCV RBC AUTO: 89 FL (ref 82–98)
MONOCYTES # BLD AUTO: 0.42 THOUSAND/ΜL (ref 0.17–1.22)
MONOCYTES NFR BLD AUTO: 8 % (ref 4–12)
NEUTROPHILS # BLD AUTO: 3.6 THOUSANDS/ΜL (ref 1.85–7.62)
NEUTS SEG NFR BLD AUTO: 70 % (ref 43–75)
NRBC BLD AUTO-RTO: 0 /100 WBCS
PLATELET # BLD AUTO: 257 THOUSANDS/UL (ref 149–390)
PMV BLD AUTO: 11.7 FL (ref 8.9–12.7)
POTASSIUM SERPL-SCNC: 3.8 MMOL/L (ref 3.5–5.3)
RBC # BLD AUTO: 2.81 MILLION/UL (ref 3.81–5.12)
SODIUM SERPL-SCNC: 139 MMOL/L (ref 136–145)
WBC # BLD AUTO: 5.12 THOUSAND/UL (ref 4.31–10.16)

## 2022-06-03 PROCEDURE — 99239 HOSP IP/OBS DSCHRG MGMT >30: CPT | Performed by: FAMILY MEDICINE

## 2022-06-03 PROCEDURE — 97110 THERAPEUTIC EXERCISES: CPT

## 2022-06-03 PROCEDURE — 80048 BASIC METABOLIC PNL TOTAL CA: CPT | Performed by: FAMILY MEDICINE

## 2022-06-03 PROCEDURE — 97530 THERAPEUTIC ACTIVITIES: CPT

## 2022-06-03 PROCEDURE — 97535 SELF CARE MNGMENT TRAINING: CPT

## 2022-06-03 PROCEDURE — 85025 COMPLETE CBC W/AUTO DIFF WBC: CPT | Performed by: FAMILY MEDICINE

## 2022-06-03 RX ORDER — METRONIDAZOLE 500 MG/1
500 TABLET ORAL EVERY 8 HOURS SCHEDULED
Qty: 15 TABLET | Refills: 0 | Status: SHIPPED | OUTPATIENT
Start: 2022-06-03 | End: 2022-06-08

## 2022-06-03 RX ORDER — CEFUROXIME AXETIL 250 MG/1
250 TABLET ORAL EVERY 12 HOURS SCHEDULED
Qty: 10 TABLET | Refills: 0 | Status: SHIPPED | OUTPATIENT
Start: 2022-06-03 | End: 2022-06-08

## 2022-06-03 RX ORDER — SODIUM BICARBONATE 650 MG/1
650 TABLET ORAL
Qty: 30 TABLET | Refills: 0 | Status: SHIPPED | OUTPATIENT
Start: 2022-06-03

## 2022-06-03 RX ORDER — ONDANSETRON 2 MG/ML
4 INJECTION INTRAMUSCULAR; INTRAVENOUS EVERY 4 HOURS PRN
Status: DISCONTINUED | OUTPATIENT
Start: 2022-06-03 | End: 2022-06-03 | Stop reason: HOSPADM

## 2022-06-03 RX ADMIN — PANTOPRAZOLE SODIUM 20 MG: 20 TABLET, DELAYED RELEASE ORAL at 06:01

## 2022-06-03 RX ADMIN — METRONIDAZOLE 500 MG: 500 INJECTION, SOLUTION INTRAVENOUS at 06:01

## 2022-06-03 RX ADMIN — METOPROLOL SUCCINATE 200 MG: 100 TABLET, EXTENDED RELEASE ORAL at 08:20

## 2022-06-03 RX ADMIN — SERTRALINE 100 MG: 100 TABLET, FILM COATED ORAL at 08:20

## 2022-06-03 RX ADMIN — CEFTRIAXONE 1000 MG: 1 INJECTION, SOLUTION INTRAVENOUS at 08:20

## 2022-06-03 RX ADMIN — DOCUSATE SODIUM 100 MG: 100 CAPSULE, LIQUID FILLED ORAL at 08:20

## 2022-06-03 RX ADMIN — ONDANSETRON 4 MG: 2 INJECTION INTRAMUSCULAR; INTRAVENOUS at 08:58

## 2022-06-03 RX ADMIN — SODIUM BICARBONATE 650 MG: 650 TABLET ORAL at 08:20

## 2022-06-03 RX ADMIN — LEVOTHYROXINE SODIUM 150 MCG: 150 TABLET ORAL at 05:54

## 2022-06-03 RX ADMIN — ALLOPURINOL 100 MG: 100 TABLET ORAL at 08:20

## 2022-06-03 RX ADMIN — SODIUM CHLORIDE, SODIUM GLUCONATE, SODIUM ACETATE, POTASSIUM CHLORIDE, MAGNESIUM CHLORIDE, SODIUM PHOSPHATE, DIBASIC, AND POTASSIUM PHOSPHATE 100 ML/HR: .53; .5; .37; .037; .03; .012; .00082 INJECTION, SOLUTION INTRAVENOUS at 00:01

## 2022-06-03 NOTE — DISCHARGE SUMMARY
Discharge Summary - David Jameson 67 y o  female MRN: [id number]    Unit/Bed#: 849-86 Encounter: [id number]    Admission Date:   Admission Orders (From admission, onward)     Ordered        05/31/22 1431  Inpatient Admission  Once                        Admitting Diagnosis: Diverticulitis [K57 92]  Diverticulosis [K57 90]  Nausea [R11 0]  Rectal bleeding [K62 5]  Nausea & vomiting [R11 2]  DARRELL (acute kidney injury) ([facility] [address] ) [N17 9]    HPI: Patient is a pleasant 79-year-old female who presents to the emergency room accompanied by her  chief complaint of decreased appetite, nausea, vomiting and rectal bleeding  Patient has a known history of hemorrhoids and stated that over the past week she has had increasing lower abdominal pain associated with rectal bleeding  She states that she at times strains to move her bowels  She has been unable to keep most things down by mouth  Denies any fevers, chills, sick contacts or recent travel  No dizziness falls or chest pain  Procedures Performed: No orders of the defined types were placed in this encounter  Summary of Hospital Course:     Acute diverticulitis:  Patient presented with rectal bleeding in the ER, found to have acute mild diverticulitis on CT scan, initiated on Rocephin/Flagyl and seen in consultation by Gastroenterology  Fortunately she tolerated diet, had normal bowel movement prior to discharge, and will follow-up with GI and Colorectal surgery  She will complete an additional 5 days of oral antibiotics on discharge  Rectal bleeding:  Patient has a history of hemorrhoids with intermittent bleeding, anticoagulation (Xarelto) was held, hemoglobin was trended and remained stable  Due to decreased to 8 on date of discharge but I suspect that is due to hemodilution as she had 2 normal bowel movements prior to discharge    An ambulatory referral colorectal surgery was placed prior to discharge the patient was instructed to follow up to assess if she could address hemorrhoids noted on exam     Acute kidney injury superimposed on chronic kidney disease:  Patient had a mild renal tubular acidosis, started on IV fluids and sodium bicarb with improvement  Renal function returned to baseline on discharge, chronic renal medications were resumed and she will follow-up with her primary nephrologist within a week of discharge    Significant Findings, Care, Treatment and Services Provided:     CT    1   Mild uncomplicated sigmoid diverticulitis  Fluid levels in the colon as can be seen with diarrheal disease  2   Extensive inflammatory changes involving the left renal collecting system are similar in appearance to most recent study, with stable positioning of a left-sided nephroureteral stent and unchanged moderate hydroureteronephrosis  Resolution of the   previously seen perivesicular fat stranding  Complications: none    Discharge Diagnosis: see above    Medical Problems             Resolved Problems  Date Reviewed: 11/2/2021   None                 Condition at Discharge: fair         Discharge instructions/Information to patient and family:   See after visit summary for information provided to patient and family  Provisions for Follow-Up Care:  See after visit summary for information related to follow-up care and any pertinent home health orders  PCP: Merle Galiica DO    Disposition: Home    Planned Readmission: No      Discharge Statement   I spent 45 minutes discharging the patient  This time was spent on the day of discharge  I had direct contact with the patient on the day of discharge  Additional documentation is required if more than 30 minutes were spent on discharge  Discharge Medications:  See after visit summary for reconciled discharge medications provided to patient and family

## 2022-06-03 NOTE — CASE MANAGEMENT
Case Management Discharge Planning Note    Patient name Sabra Julien  Location [address] [id number] MRN [id number]  : 1945 Date 6/3/2022       Current Admission Date: 2022  Current Admission Diagnosis:Acute kidney injury superimposed on chronic kidney disease [facility])   Patient Active Problem List    Diagnosis Date Noted    Diverticulitis 2022    Rectal bleeding 2022    Elevated troponin 2022    Iron deficiency anemia 2022    Anemia due to chronic kidney disease 2022    Paroxysmal atrial fibrillation ([facility] ) 2020    Ischemic cardiomyopathy 2020    S/P implantation of automatic cardioverter/defibrillator (AICD) 2020    Essential hypertension 2020    History of PTCA 2020    Leg swelling 2020    Acute kidney injury superimposed on chronic kidney disease ([facility] ) 2020    Perforated appendicitis 2020    Chronic combined systolic and diastolic CHF (congestive heart failure) ([facility] ) 2020    Pyuria 2020    Microscopic hematuria 2020    Vitamin D insufficiency 2020    Mitral valve insufficiency 2020    Hypercholesterolemia 2020    Atelectasis 2020    Aortic atherosclerosis ([facility] ) 2020    Renal calculus 2020    Diverticulosis 2020    Hiatal hernia 2020    Pulmonary nodule 2020    Acute appendicitis 2020    Closed fracture of right proximal humerus 2020    Closed fracture of body of sternum with routine healing 2018    Congestive heart disease ([facility] ) 2018    Acquired hypothyroidism 2018    CAD (coronary artery disease) 2018    Forgetfulness 2018    Acute pain due to trauma 2018    Hx of fall 2018    Stress incontinence in female 2018      LOS (days): 3  Geometric Mean LOS (GMLOS) (days): 2 60  Days to GMLOS:-0 1     OBJECTIVE:  Risk of Unplanned Readmission Score: 15 11         Current admission status: Inpatient   Preferred Pharmacy:   [facility], [address] [place] [address] [address]  [address]  [place] [address]  Phone: [phone] Fax: [phone]    Primary Care Provider: Michelle Saldana DO    Primary Insurance: Anthony Peterson [facility] REP  Secondary Insurance:     DISCHARGE DETAILS:Spoke w pt's spouse Scott Alvarez this am and discussed therapy's recommendation of STR on dc  Per Scott Alvarez, they did this before and his wife came out no better than when she went in  At this time spouse wants his wife to return home on dc with [facility] (RN, PT and OT)

## 2022-06-03 NOTE — CASE MANAGEMENT
Case Management Discharge Planning Note    Patient name Angeli Arora  Location [address] [id number] MRN [id number]  : 1945 Date 6/3/2022       Current Admission Date: 2022  Current Admission Diagnosis:Acute kidney injury superimposed on chronic kidney disease [facility])   Patient Active Problem List    Diagnosis Date Noted    Diverticulitis 2022    Rectal bleeding 2022    Elevated troponin 2022    Iron deficiency anemia 2022    Anemia due to chronic kidney disease 2022    Paroxysmal atrial fibrillation ([facility] ) 2020    Ischemic cardiomyopathy 2020    S/P implantation of automatic cardioverter/defibrillator (AICD) 2020    Essential hypertension 2020    History of PTCA 2020    Leg swelling 2020    Acute kidney injury superimposed on chronic kidney disease ([facility] ) 2020    Perforated appendicitis 2020    Chronic combined systolic and diastolic CHF (congestive heart failure) ([facility] ) 2020    Pyuria 2020    Microscopic hematuria 2020    Vitamin D insufficiency 2020    Mitral valve insufficiency 2020    Hypercholesterolemia 2020    Atelectasis 2020    Aortic atherosclerosis ([facility] ) 2020    Renal calculus 2020    Diverticulosis 2020    Hiatal hernia 2020    Pulmonary nodule 2020    Acute appendicitis 2020    Closed fracture of right proximal humerus 2020    Closed fracture of body of sternum with routine healing 2018    Congestive heart disease ([facility] ) 2018    Acquired hypothyroidism 2018    CAD (coronary artery disease) 2018    Forgetfulness 2018    Acute pain due to trauma 2018    Hx of fall 2018    Stress incontinence in female 2018      LOS (days): 3  Geometric Mean LOS (GMLOS) (days): 2 60  Days to GMLOS:-0 2     OBJECTIVE:  Risk of Unplanned Readmission Score: 15 11         Current admission status: Inpatient   Preferred Pharmacy:   [facility], [address] [place] [address] [address]  [address]  [place] [address]  Phone: [phone] Fax: [phone]    Primary Care Provider: Shamar Ibarra DO    Primary Insurance: [facility] REP  Secondary Insurance:     [facility] homecare accepted pt for a start of care on Sunday 6/5/22

## 2022-06-03 NOTE — NURSING NOTE
AVS printed and discharge instructions reviewed with spouse by RN, Shanell Gomez, including follow-up appointments, medication regimen, e-prescribed medications, information on taking antibiotics at home, information on HF and National Suicide Prevention Hotline  IV discontinued  Patient dressed with assistance and transferred into wheelchair   pulled car around to main exit  Patient taken in wheelchair to same location by PCA, [facility]  Patient discharged

## 2022-06-03 NOTE — CASE MANAGEMENT
Case Management Discharge Planning Note    Patient name Sabra Julien  Location [address] [id number] MRN [id number]  : 1945 Date 6/3/2022       Current Admission Date: 2022  Current Admission Diagnosis:Acute kidney injury superimposed on chronic kidney disease [facility])   Patient Active Problem List    Diagnosis Date Noted    Diverticulitis 2022    Rectal bleeding 2022    Elevated troponin 2022    Iron deficiency anemia 2022    Anemia due to chronic kidney disease 2022    Paroxysmal atrial fibrillation ([facility] ) 2020    Ischemic cardiomyopathy 2020    S/P implantation of automatic cardioverter/defibrillator (AICD) 2020    Essential hypertension 2020    History of PTCA 2020    Leg swelling 2020    Acute kidney injury superimposed on chronic kidney disease ([facility] ) 2020    Perforated appendicitis 2020    Chronic combined systolic and diastolic CHF (congestive heart failure) ([facility] ) 2020    Pyuria 2020    Microscopic hematuria 2020    Vitamin D insufficiency 2020    Mitral valve insufficiency 2020    Hypercholesterolemia 2020    Atelectasis 2020    Aortic atherosclerosis ([facility] ) 2020    Renal calculus 2020    Diverticulosis 2020    Hiatal hernia 2020    Pulmonary nodule 2020    Acute appendicitis 2020    Closed fracture of right proximal humerus 2020    Closed fracture of body of sternum with routine healing 2018    Congestive heart disease ([facility] ) 2018    Acquired hypothyroidism 2018    CAD (coronary artery disease) 2018    Forgetfulness 2018    Acute pain due to trauma 2018    Hx of fall 2018    Stress incontinence in female 2018      LOS (days): 3  Geometric Mean LOS (GMLOS) (days): 2 60  Days to GMLOS:-0 2     OBJECTIVE:  Risk of Unplanned Readmission Score: 15 11         Current admission status: Inpatient   Preferred Pharmacy:   [facility] [address], [address] [address]  [address]  [address]  [place] [address]  Phone: [phone] Fax: [phone]    Primary Care Provider: Michelle Saldana DO    Primary Insurance: Anthony Peterson [facility] REP  Secondary Insurance:     Brian Zuñiga; [facility] both unable to accept pt  Referral to [facility], waiting to hear back

## 2022-06-03 NOTE — PHYSICAL THERAPY NOTE
PHYSICAL THERAPY NOTE          Patient Name: Gwen David Date: 6/3/2022   06/03/22 1134   PT Last Visit   PT Visit Date 06/03/22   Note Type   Note Type Treatment   Pain Assessment   Pain Assessment Tool 0-10   Pain Score No Pain   Restrictions/Precautions   Weight Bearing Precautions Per Order No   Other Precautions   (Cognitive; Chair Alarm; Bed Alarm; Multiple lines; Telemetry; Fall Risk)   General   Family/Caregiver Present No   Cognition   Overall Cognitive Status Impaired   Arousal/Participation Alert; Cooperative   Attention Attends with cues to redirect   Following Commands Follows one step commands inconsistently   Subjective   Subjective Agreeable to therapy   Bed Mobility   Supine to Sit 3  Moderate assistance   Additional items Assist x 2; Increased time required;Verbal cues;LE management; Bedrails   Additional Comments Completed TE prior to tx OOB   Transfers   Sit to Stand 3  Moderate assistance   Additional items Assist x 2; Increased time required;Verbal cues   Stand to Sit 3  Moderate assistance   Additional items Assist x 2;Armrests; Increased time required;Verbal cues   Stand pivot 3  Moderate assistance   Additional items Assist x 2; Increased time required;Verbal cues   Toilet transfer 3  Moderate assistance   Additional items Assist x 2; Increased time required;Verbal cues; Commode   Additional Comments Requires max A for clothing and hygiene  Ambulation/Elevation   Gait pattern   (essively slow; Short stride; Foward flexed;  Shuffling; Decreased foot clearance)   Gait Assistance 3  Moderate assist   Additional items Assist x 2;Verbal cues   Assistive Device Rolling walker   Distance 5' x 2, 3' x 1   Balance   Static Sitting Fair   Dynamic Sitting Fair   Static Standing Poor +   Dynamic Standing Poor +   Ambulatory Poor +  (RW)   Endurance Deficit   Endurance Deficit Yes   Activity Tolerance   Activity Tolerance Patient limited by fatigue   Exercises   Heelslides Supine;15 reps   Hip Abduction Supine;15 reps   Hip Adduction Supine;15 reps   Knee AROM Long Arc Quad Supine;15 reps   Ankle Pumps Supine;15 reps   Assessment   Prognosis Fair   Problem List Decreased strength;Decreased endurance; Impaired balance;Decreased mobility; Decreased cognition; Impaired judgement;Decreased safety awareness   Assessment Pt  seen for PT treatment session this date with interventions consisting of transfers and  gait training w/ emphasis on improving pt's ability to ambulate  Pt  Requiring extensive cues for sequence and safety  In comparison to previous session, Pt  With min improvements in activity tolerance  Limited to short distance due to fatigue and cognative deficit  Difficulty follow cues  Pt is in need of continued activity in PT to improve strength balance endurance mobility transfers and ambulation with return to maximize LOF  From PT/mobility standpoint, recommendation at time of d/c would be post acute rehab in order to promote return to PLOF and independence  The patient's AM-[facility] Basic Mobility Inpatient Short Form Raw Score is 11  A Raw score of less than or equal to 16 suggests the patient may benefit from discharge to post-acute rehabilitation services  Please also refer to physical therapy recommendation for safe DC planning  Co-treat with ALETHA this session due to complexity of pt and requires A x 2 to provided skilled interventions  Goals   LTG Expiration Date 06/15/22   PT Treatment Day 2   Plan   Treatment/Interventions   (Functional transfer training; LE strengthening/ROM; Elevations; Therapeutic exercise;  Endurance training; Bed mobility; Gait training)   Progress Slow progress, decreased activity tolerance   PT Frequency 3-5x/wk   Recommendation   PT Discharge Recommendation Post acute rehabilitation services   AM-PAC Basic Mobility Inpatient   Turning in Bed Without Bedrails 2   Lying on Back to Sitting on Edge of Flat Bed 2   Moving Bed to Chair 2   Standing Up From Chair 2   Walk in Room 2   Climb 3-5 Stairs 1   Basic Mobility Inpatient Raw Score 11   Basic Mobility Standardized Score 30 25   Turning Head Towards Sound 4   Follow Simple Instructions 3   Low Function Basic Mobility Raw Score 18   Low Function Basic Mobility Standardized Score 29 25   Highest Level Of Mobility   JH-M Goal 4: Move to chair/commode   JH-HLM Achieved 5: Stand (1 or more minutes)   Education   Education Provided Mobility training   Patient Reinforcement needed   End of Consult   Patient Position at End of Consult Bedside chair;Bed/Chair alarm activated; All needs within reach   End of Consult Comments discussed POC with PT

## 2022-06-03 NOTE — PLAN OF CARE
Problem: OCCUPATIONAL THERAPY ADULT  Goal: Performs self-care activities at highest level of function for planned discharge setting  See evaluation for individualized goals  Description: Treatment Interventions: ADL retraining, Functional transfer training, UE strengthening/ROM, Endurance training, Cognitive reorientation, Patient/family training, Equipment evaluation/education, Energy conservation, Activityengagement          See flowsheet documentation for full assessment, interventions and recommendations  Outcome: Progressing  Note: Limitation: Decreased ADL status, Decreased UE strength, Decreased Safe judgement during ADL, Decreased cognition, Decreased endurance, Decreased self-care trans, Decreased high-level ADLs  Prognosis: Fair  Assessment: Patient participated in Skilled OT session this date with interventions consisting of ADL re training with the use of correct body mechnaics, therapeutic exercise to: increase functional use of BUEs, increase BUE muscle strength  and  therapeutic activities to: increase activity tolerance   Co treatment with PTA secondary to complex medical condition of pt, mod x A of 2 required to achieve and maintain transitional movements, requiring the need of skilled therapeutic intervention of 2 therapists to achieve delivery of services  Patient agreeable to OT treatment session, upon arrival patient was found supine in bed  Supine to sit txfrs with Mod Ax2, sit to stand txfrs from bed with Mod Ax2, txfrs from bed to kimberly chair with Mod Ax2 and use of RW, Sit to stand txfrs from kimberly chair with Mod Ax2, pivot txfrs from kimberly chair to bedside commode with use of RW and Mod Ax2, Sit to stand txfrs from bedside commode with Mod Ax2  Pt completed toileting with Max A  Pt participated in UE exercises BUE 1x10 all planes to increase strength, endurance, ROM, txfrs, self cares   Pt required Max verbal cues throughout treatment to complete 1 step tasks with inconsistent follow through  Patient continues to be functioning below baseline level, occupational performance remains limited secondary to factors listed above and increased risk for falls and injury  From OT standpoint, recommendation at time of d/c would be Post acute rehabilitation services  The patient's raw score on the AM-PAC Daily Activity inpatient short form is 15, standardized score is 34 69, less than 39 4  Patients at this level are likely to benefit from discharge to post-acute rehabilitation services  Please refer to the recommendation of the Occupational Therapist for safe discharge planning  OT Discharge Recommendation: Post acute rehabilitation services  OT - OK to Discharge:  Yes

## 2022-06-03 NOTE — CASE MANAGEMENT
Case Management Discharge Planning Note    Patient name Abby Ortega  Location [address] [id number] MRN [id number]  : 1945 Date 6/3/2022       Current Admission Date: 2022  Current Admission Diagnosis:Acute kidney injury superimposed on chronic kidney disease [facility])   Patient Active Problem List    Diagnosis Date Noted    Diverticulitis 2022    Rectal bleeding 2022    Elevated troponin 2022    Iron deficiency anemia 2022    Anemia due to chronic kidney disease 2022    Paroxysmal atrial fibrillation ([facility] ) 2020    Ischemic cardiomyopathy 2020    S/P implantation of automatic cardioverter/defibrillator (AICD) 2020    Essential hypertension 2020    History of PTCA 2020    Leg swelling 2020    Acute kidney injury superimposed on chronic kidney disease ([facility] ) 2020    Perforated appendicitis 2020    Chronic combined systolic and diastolic CHF (congestive heart failure) ([facility] ) 2020    Pyuria 2020    Microscopic hematuria 2020    Vitamin D insufficiency 2020    Mitral valve insufficiency 2020    Hypercholesterolemia 2020    Atelectasis 2020    Aortic atherosclerosis ([facility] ) 2020    Renal calculus 2020    Diverticulosis 2020    Hiatal hernia 2020    Pulmonary nodule 2020    Acute appendicitis 2020    Closed fracture of right proximal humerus 2020    Closed fracture of body of sternum with routine healing 2018    [facility] ) 2018    Acquired hypothyroidism 2018    CAD (coronary artery disease) 2018    Forgetfulness 2018    Acute pain due to trauma 2018    Hx of fall 2018    Stress incontinence in female 2018      LOS (days): 3  Geometric Mean LOS (GMLOS) (days): 2 60  Days to GMLOS:-0 3     OBJECTIVE:  Risk of Unplanned Readmission Score: 14 41         Current admission status: Inpatient   Preferred Pharmacy:   [facility] [address], [address]  [address]  [place] [address]  Phone: [phone] Fax: [phone]    Primary Care Provider: Merle Galicia DO    Primary Insurance: Maricela Schaumann [facility] REP  Secondary Insurance:     DISCHARGE DETAILS:Pt is being dc'd home on this date [facility]  Spouse will be over shortly to transport pt home  Statement Selected

## 2022-06-03 NOTE — PLAN OF CARE
Problem: Potential for Falls  Goal: Patient will remain free of falls  Description: INTERVENTIONS:  - Educate patient/family on patient safety including physical limitations  - Instruct patient to call for assistance with activity   - Consult OT/PT to assist with strengthening/mobility   - Keep Call bell within reach  - Keep bed low and locked with side rails adjusted as appropriate  - Keep care items and personal belongings within reach  - Initiate and maintain comfort rounds  - Make Fall Risk Sign visible to staff  - Offer Toileting every 2 Hours, in advance of need  - Initiate/Maintain bed alarm  - Obtain necessary fall risk management equipment  - Apply yellow socks and bracelet for high fall risk patients  - Keep patient in room near nurses station  Outcome: Progressing     Problem: MOBILITY - ADULT  Goal: Maintain or return to baseline ADL function  Description: INTERVENTIONS:  -  Assess patient's ability to carry out ADLs; assess patient's baseline for ADL function and identify physical deficits which impact ability to perform ADLs (bathing, care of mouth/teeth, toileting, grooming, dressing, etc )  - Assess/evaluate cause of self-care deficits   - Assess range of motion  - Assess patient's mobility; develop plan if impaired  - Assess patient's need for assistive devices and provide as appropriate  - Encourage maximum independence but intervene and supervise when necessary  - Involve family in performance of ADLs  - Assess for home care needs following discharge   - Consider OT consult to assist with ADL evaluation and planning for discharge  - Provide patient education as appropriate  Outcome: Progressing  Goal: Maintains/Returns to pre admission functional level  Description: INTERVENTIONS:  - Perform BMAT or MOVE assessment daily    - Set and communicate daily mobility goal to care team and patient/family/caregiver     - Collaborate with rehabilitation services on mobility goals if consulted  - Reposition patient every 2 hours  - Out of bed to chair 3 times a day   - Out of bed for meals 3 times a day  - Out of bed for toileting  - Record patient progress and toleration of activity level   Outcome: Progressing     Problem: Prexisting or High Potential for Compromised Skin Integrity  Goal: Skin integrity is maintained or improved  Description: INTERVENTIONS:  - Identify patients at risk for skin breakdown  - Assess and monitor skin integrity  - Assess and monitor nutrition and hydration status  - Monitor labs   - Assess for incontinence   - Turn and reposition patient  - Assist with mobility/ambulation  - Relieve pressure over bony prominences  - Avoid friction and shearing  - Provide appropriate hygiene as needed including keeping skin clean and dry  - Evaluate need for skin moisturizer/barrier cream  - Collaborate with interdisciplinary team   - Patient/family teaching  Outcome: Progressing     Problem: Nutrition/Hydration-ADULT  Goal: Nutrient/Hydration intake appropriate for improving, restoring or maintaining nutritional needs  Description: Monitor and assess patient's nutrition/hydration status for malnutrition  Collaborate with interdisciplinary team and initiate plan and interventions as ordered  Monitor patient's weight and dietary intake as ordered or per policy  Utilize nutrition screening tool and intervene as necessary  Determine patient's food preferences and provide high-protein, high-caloric foods as appropriate       INTERVENTIONS:  - Monitor oral intake, urinary output, labs, and treatment plans  - Assess nutrition and hydration status and recommend course of action  - Evaluate amount of meals eaten  - Allow adequate time for meals  - Assess need for intravenous fluids  - Provide specific nutrition/hydration education as appropriate  - Include patient/family/caregiver in decisions related to nutrition  Outcome: Progressing     Problem: PAIN - ADULT  Goal: Verbalizes/displays adequate comfort level or baseline comfort level  Description: Interventions:  - Encourage patient to monitor pain and request assistance  - Assess pain using 0-10 pain scale  - Administer analgesics based on type and severity of pain and evaluate response  - Implement non-pharmacological measures as appropriate and evaluate response  - Notify physician/advanced practitioner if interventions unsuccessful or patient reports new pain  Outcome: Progressing     Problem: INFECTION - ADULT  Goal: Absence or prevention of progression during hospitalization  Description: INTERVENTIONS:  - Assess and monitor for signs and symptoms of infection  - Monitor WBC  - Monitor temp  - Monitor all insertion sites, i e  indwelling lines, tubes, and drains  - [place] appropriate cooling/warming therapies per order  - Administer medications as ordered  - Instruct and encourage patient and family to use good hand hygiene technique  Outcome: Progressing     Problem: DISCHARGE PLANNING  Goal: Discharge to home or other facility with appropriate resources  Description: INTERVENTIONS:  - Identify barriers to discharge w/patient and caregiver  - Arrange for needed discharge resources and transportation as appropriate  - Identify discharge learning needs (meds, wound care, etc )  - Refer to Case Management Department for coordinating discharge planning if the patient needs post-hospital services based on physician/advanced practitioner order or complex needs related to functional status, cognitive ability, or social support system  Outcome: Progressing     Problem: Knowledge Deficit  Goal: Patient/family/caregiver demonstrates understanding of disease process, treatment plan, medications, and discharge instructions  Description: Complete learning assessment and assess knowledge base    Interventions:  - Provide teaching at level of understanding  - Provide teaching via preferred learning methods  Outcome: Progressing     Problem: GASTROINTESTINAL - ADULT  Goal: Minimal or absence of nausea and/or vomiting  Description: INTERVENTIONS:  - Administer IV fluids if ordered to ensure adequate hydration  - Administer ordered antiemetic medications as needed  - Provide nonpharmacologic comfort measures as appropriate  - Advance diet as tolerated, if ordered  - Consider nutrition services referral to assist patient with adequate nutrition and appropriate food choices  Outcome: Progressing  Goal: Maintains or returns to baseline bowel function  Description: INTERVENTIONS:  - Assess bowel function  - Encourage oral fluids to ensure adequate hydration  - Administer IV fluids if ordered to ensure adequate hydration  - Administer ordered medications as needed  - Encourage mobilization and activity  - Consider nutritional services referral to assist patient with adequate nutrition and appropriate food choices  Outcome: Progressing     Problem: GENITOURINARY - ADULT  Goal: Maintains or returns to baseline urinary function  Description: INTERVENTIONS:  - Assess urinary function  - Encourage oral fluids to ensure adequate hydration if ordered  - Administer IV fluids as ordered to ensure adequate hydration  - Administer ordered medications as needed  - Offer frequent toileting  - Follow urinary retention protocol if ordered  Outcome: Progressing  Goal: Absence of urinary retention  Description: INTERVENTIONS:  - Assess patients ability to void and empty bladder  - Monitor I/O  - Bladder scan as needed  - Discuss with physician/AP medications to alleviate retention as needed  - Discuss catheterization for long term situations as appropriate  Outcome: Progressing

## 2022-06-03 NOTE — PLAN OF CARE
Problem: PHYSICAL THERAPY ADULT  Goal: Performs mobility at highest level of function for planned discharge setting  See evaluation for individualized goals  Description: Treatment/Interventions: Functional transfer training, LE strengthening/ROM, Elevations, Therapeutic exercise, Endurance training, Bed mobility, Gait training          See flowsheet documentation for full assessment, interventions and recommendations  Outcome: Progressing  Note: Prognosis: Fair  Problem List: Decreased strength, Decreased endurance, Impaired balance, Decreased mobility, Decreased cognition, Impaired judgement, Decreased safety awareness  Assessment: Pt  seen for PT treatment session this date with interventions consisting of transfers and  gait training w/ emphasis on improving pt's ability to ambulate  Pt  Requiring extensive cues for sequence and safety  In comparison to previous session, Pt  With min improvements in activity tolerance  Limited to short distance due to fatigue and cognative deficit  Difficulty follow cues  Pt is in need of continued activity in PT to improve strength balance endurance mobility transfers and ambulation with return to maximize LOF  From PT/mobility standpoint, recommendation at time of d/c would be post acute rehab in order to promote return to PLOF and independence  The patient's AM-PAC Basic Mobility Inpatient Short Form Raw Score is 11  A Raw score of less than or equal to 16 suggests the patient may benefit from discharge to post-acute rehabilitation services  Please also refer to physical therapy recommendation for safe DC planning  Co-treat with ALETHA this session due to complexity of pt and requires A x 2 to provided skilled interventions  PT Discharge Recommendation: Post acute rehabilitation services          See flowsheet documentation for full assessment

## 2022-06-03 NOTE — CASE MANAGEMENT
Case Management Discharge Planning Note    Patient name Mainor Peters  Location [address] [id number] MRN [id number]  : 1945 Date 6/3/2022       Current Admission Date: 2022  Current Admission Diagnosis:Acute kidney injury superimposed on chronic kidney disease [facility])   Patient Active Problem List    Diagnosis Date Noted    Diverticulitis 2022    Rectal bleeding 2022    Elevated troponin 2022    Iron deficiency anemia 2022    Anemia due to chronic kidney disease 2022    [facility] ) 2020    Ischemic cardiomyopathy 2020    S/P implantation of automatic cardioverter/defibrillator (AICD) 2020    Essential hypertension 2020    History of PTCA 2020    Leg swelling 2020    Acute kidney injury superimposed on chronic kidney disease ([facility] ) 2020    Perforated appendicitis 2020    Chronic combined systolic and diastolic CHF (congestive heart failure) ([facility] ) 2020    Pyuria 2020    Microscopic hematuria 2020    Vitamin D insufficiency 2020    Mitral valve insufficiency 2020    Hypercholesterolemia 2020    Atelectasis 2020    Aortic atherosclerosis ([facility] ) 2020    Renal calculus 2020    Diverticulosis 2020    Hiatal hernia 2020    Pulmonary nodule 2020    Acute appendicitis 2020    Closed fracture of right proximal humerus 2020    Closed fracture of body of sternum with routine healing 2018    Congestive heart disease ([facility] ) 2018    Acquired hypothyroidism 2018    CAD (coronary artery disease) 2018    Forgetfulness 2018    Acute pain due to trauma 2018    Hx of fall 2018    Stress incontinence in female 2018      LOS (days): 3  Geometric Mean LOS (GMLOS) (days): 2 60  Days to GMLOS:-0 2     OBJECTIVE:  Risk of Unplanned Readmission Score: 15 11         Current admission status: Inpatient   Preferred Pharmacy:   [id number], [address] [facility]  [address]  [address]  [place] [address]  Phone: [phone] Fax: [phone]    Primary Care Provider: Salina Card DO    Primary Insurance: Breanna Arias [facility]  Secondary Insurance:     DISCHARGE DETAILS:    Discharge planning discussed with[de-identified] ELBA  Freedom of Choice: Yes  Comments - Freedom of Choice: referral [facility]; if can't accept referral to [facility]  CM contacted family/caregiver?: Yes  Were Treatment Team discharge recommendations reviewed with patient/caregiver?: Yes  Did patient/caregiver verbalize understanding of patient care needs?: Yes  Were patient/caregiver advised of the risks associated with not following Treatment Team discharge recommendations?: Yes         [address] [facility]         Is the patient interested in [address] at discharge?: Yes  [address] requested[de-identified] Occupational Therapy, Physical Therapy, [address] Name[de-identified] Other ([facility])  [address] [place] [address] Provider[de-identified] PCP  Home Health Services Needed[de-identified] Strengthening/Theraputic Exercises to Improve Function, Gait/ADL Training, Evaluate Functional Status and Safety  Homebound Criteria Met[de-identified] Requires the Assistance of Another Person for Safe Ambulation or to Leave the Home, Uses an Assist Device (i e  cane, walker, etc)  Supporting Clincal Findings[de-identified] Bed Bound or Wheelchair Bound, Limited Endurance     [facility] unable to accept pt; referral to [facility]

## 2022-06-03 NOTE — CASE MANAGEMENT
Case Management Discharge Planning Note    Patient name Madina Perez  Location [address] [id number] MRN [id number]  : 1945 Date 6/3/2022       Current Admission Date: 2022  Current Admission Diagnosis:Acute kidney injury superimposed on chronic kidney disease [facility])   Patient Active Problem List    Diagnosis Date Noted    Diverticulitis 2022    Rectal bleeding 2022    Elevated troponin 2022    Iron deficiency anemia 2022    Anemia due to chronic kidney disease 2022    [facility] 2020    Ischemic cardiomyopathy 2020    S/P implantation of automatic cardioverter/defibrillator (AICD) 2020    Essential hypertension 2020    History of PTCA 2020    Leg swelling 2020    Acute kidney injury superimposed on chronic kidney disease ([facility] ) 2020    Perforated appendicitis 2020    Chronic combined systolic and diastolic CHF (congestive heart failure) ([facility] ) 2020    Pyuria 2020    Microscopic hematuria 2020    Vitamin D insufficiency 2020    Mitral valve insufficiency 2020    Hypercholesterolemia 2020    Atelectasis 2020    Aortic atherosclerosis [facility] ) 2020    Renal calculus 2020    Diverticulosis 2020    Hiatal hernia 2020    Pulmonary nodule 2020    Acute appendicitis 2020    Closed fracture of right proximal humerus 2020    Closed fracture of body of sternum with routine healing 2018    Congestive heart disease [facility] ) 2018    Acquired hypothyroidism 2018    CAD (coronary artery disease) 2018    Forgetfulness 2018    Acute pain due to trauma 2018    Hx of fall 2018    Stress incontinence in female 2018      LOS (days): 3  Geometric Mean LOS (GMLOS) (days): 2 60  Days to GMLOS:-0 1     OBJECTIVE:  Risk of Unplanned Readmission Score: 15 11         Current admission status: Inpatient   Preferred Pharmacy:   [id number] [address], [address] [facility] [address]  [address]   [place] [address]  Phone: [phone] Fax: [phone]    Primary Care Provider: Mikie Flores DO    [facility]  Secondary Insurance:     DISCHARGE DETAILS:    Discharge planning discussed with[de-identified] TAMRANVYJ:AHLIOAS  [place] of Choice: Yes  Comments - Freedom of Choice: referral [facility]  CM contacted family/caregiver?: Yes  Were Treatment Team discharge recommendations reviewed with patient/caregiver?: Yes  Did patient/caregiver verbalize understanding of patient care needs?: Yes  Were patient/caregiver advised of the risks associated with not following Treatment Team discharge recommendations?: Yes         [address] [facility]         Is the patient interested in [address] at discharge?: Yes  [address] requested[de-identified] Occupational Therapy, Physical Therapy, [address] Name[de-identified] [address] [place] [address] Provider[de-identified] PCP  Home Health Services Needed[de-identified] Strengthening/Theraputic Exercises to Improve Function, Gait/ADL Training, Evaluate Functional Status and Safety  Homebound Criteria Met[de-identified] Requires the Assistance of Another Person for Safe Ambulation or to Leave the Home, Uses an Assist Device (i e  cane, walker, etc)  Supporting Clincal Findings[de-identified] Bed Bound or Wheelchair Bound, Limited Endurance

## 2022-06-03 NOTE — OCCUPATIONAL THERAPY NOTE
Occupational Therapy Progress Note     Patient Name: Shai Gonzales  [id number] Date: 6/3/2022  Problem List  Principal Problem:    Acute kidney injury superimposed on chronic kidney disease [facility])  Active Problems:    Acquired hypothyroidism    Chronic combined systolic and diastolic CHF (congestive heart failure) (HCC)    Paroxysmal atrial fibrillation ([facility] )    Essential hypertension    Diverticulitis    Rectal bleeding    Elevated troponin            06/03/22 1133   OT Last Visit   OT Visit Date 06/03/22   Note Type   Note Type Treatment   Restrictions/Precautions   Weight Bearing Precautions Per Order No   Other Precautions Cognitive; Chair Alarm; Bed Alarm;Multiple lines;Telemetry; Fall Risk   Pain Assessment   Pain Assessment Tool 0-10   Pain Score No Pain   ADL   Toileting Assistance  2  Maximal Assistance   Toileting Deficit Setup;Verbal cueing;Supervison/safety; Increased time to complete;Clothing management up;Clothing management down;Perineal hygiene; Bedside commode;Steadying   Bed Mobility   Supine to Sit 3  Moderate assistance   Additional items Assist x 2; Increased time required;Verbal cues;LE management; Bedrails   Transfers   Sit to Stand 3  Moderate assistance   Additional items Assist x 2; Increased time required;Verbal cues;Armrests   Stand to Sit 3  Moderate assistance   Additional items Assist x 2; Increased time required;Verbal cues;Armrests   Stand pivot 3  Moderate assistance   Additional items Assist x 2; Increased time required;Verbal cues   Additional Comments Use of RW and Max cues throughout   Functional Mobility   Additional Comments Unable to assess at this time due to inconsistant follow through   Toilet Transfers   Toilet Transfer From Rolling walker   Toilet Transfer Type To and from   Toilet Transfer to Standard bedside commode   Toilet Transfer Technique Stand pivot   Toilet Transfers Moderate assistance  (Ax2)   Therapeutic Exercise - ROM   UE-ROM Yes   ROM- Right Upper Extremities   R Shoulder AROM; Flexion; Extension;ABduction;Horizontal ABduction   R Elbow AROM;Elbow flexion;Elbow extension   R Weight/Reps/Sets 1x10   ROM - Left Upper Extremities    L Shoulder AROM; Flexion; Extension;ABduction;Horizontal ABduction   L Elbow AROM;Elbow flexion;Elbow extension   L Weight/Reps/Sets 1x10   Cognition   Overall Cognitive Status Impaired   Arousal/Participation Alert; Cooperative   Attention Attends with cues to redirect   Orientation Level Disoriented to place; Disoriented to time;Disoriented to situation   Memory Within functional limits   Following Commands Follows one step commands inconsistently   Assessment   Assessment Patient participated in Skilled OT session this date with interventions consisting of ADL re training with the use of correct body mechnaics, therapeutic exercise to: increase functional use of BUEs, increase BUE muscle strength  and  therapeutic activities to: increase activity tolerance   Co treatment with PTA secondary to complex medical condition of pt, mod x A of 2 required to achieve and maintain transitional movements, requiring the need of skilled therapeutic intervention of 2 therapists to achieve delivery of services  Patient agreeable to OT treatment session, upon arrival patient was found supine in bed  Supine to sit txfrs with Mod Ax2, sit to stand txfrs from bed with Mod Ax2, txfrs from bed to kimberly chair with Mod Ax2 and use of RW, Sit to stand txfrs from kimberly chair with Mod Ax2, pivot txfrs from kimberly chair to bedside commode with use of RW and Mod Ax2, Sit to stand txfrs from bedside commode with Mod Ax2  Pt completed toileting with Max A  Pt participated in UE exercises BUE 1x10 all planes to increase strength, endurance, ROM, txfrs, self cares  Pt required Max verbal cues throughout treatment to complete 1 step tasks with inconsistent follow through   Patient continues to be functioning below baseline level, occupational performance remains limited secondary to factors listed above and increased risk for falls and injury  From OT standpoint, recommendation at time of d/c would be Post acute rehabilitation services  The patient's raw score on the AM-PAC Daily Activity inpatient short form is 15, standardized score is 34 69, less than 39 4  Patients at this level are likely to benefit from discharge to post-acute rehabilitation services  Please refer to the recommendation of the Occupational Therapist for safe discharge planning  Plan   Goal Expiration Date 06/15/22   OT Treatment Day 1   OT Frequency 3-5x/wk   Recommendation   OT Discharge Recommendation Post acute rehabilitation services   AM-PAC Daily Activity Inpatient   Lower Body Dressing 2   Bathing 2   Toileting 2   Upper Body Dressing 2   Grooming 3   Eating 4   Daily Activity Raw Score 15   Daily Activity Standardized Score (Calc for Raw Score >=11) 34 69   AM-PAC Applied Cognition Inpatient   Following a Speech/Presentation 3   Understanding Ordinary Conversation 3   Taking Medications 2   Remembering Where Things Are Placed or Put Away 2   Remembering List of 4-5 Errands 2   Taking Care of Complicated Tasks 2   Applied Cognition Raw Score 14   Applied Cognition Standardized Score 32 02     Pt left seated in kimberly chair with chair alarm on and all needs in reach

## 2022-06-04 LAB — BACTERIA UR CULT: ABNORMAL

## 2022-06-27 ENCOUNTER — APPOINTMENT (OUTPATIENT)
Dept: LAB | Facility: MEDICAL CENTER | Age: 77
End: 2022-06-27
Payer: COMMERCIAL

## 2022-06-27 DIAGNOSIS — N18.9 CHRONIC KIDNEY DISEASE, UNSPECIFIED CKD STAGE: ICD-10-CM

## 2022-06-27 DIAGNOSIS — D50.9 IRON DEFICIENCY ANEMIA, UNSPECIFIED IRON DEFICIENCY ANEMIA TYPE: ICD-10-CM

## 2022-06-27 LAB
ALBUMIN SERPL BCP-MCNC: 2.8 G/DL (ref 3.5–5)
ALP SERPL-CCNC: 142 U/L (ref 46–116)
ALT SERPL W P-5'-P-CCNC: 10 U/L (ref 12–78)
ANION GAP SERPL CALCULATED.3IONS-SCNC: 10 MMOL/L (ref 4–13)
AST SERPL W P-5'-P-CCNC: 21 U/L (ref 5–45)
BASOPHILS # BLD AUTO: 0.06 THOUSANDS/ΜL (ref 0–0.1)
BASOPHILS NFR BLD AUTO: 1 % (ref 0–1)
BILIRUB SERPL-MCNC: 0.34 MG/DL (ref 0.2–1)
BUN SERPL-MCNC: 37 MG/DL (ref 5–25)
CALCIUM ALBUM COR SERPL-MCNC: 10.4 MG/DL (ref 8.3–10.1)
CALCIUM SERPL-MCNC: 9.4 MG/DL (ref 8.3–10.1)
CHLORIDE SERPL-SCNC: 108 MMOL/L (ref 100–108)
CO2 SERPL-SCNC: 23 MMOL/L (ref 21–32)
CREAT SERPL-MCNC: 1.36 MG/DL (ref 0.6–1.3)
EOSINOPHIL # BLD AUTO: 0.15 THOUSAND/ΜL (ref 0–0.61)
EOSINOPHIL NFR BLD AUTO: 2 % (ref 0–6)
ERYTHROCYTE [DISTWIDTH] IN BLOOD BY AUTOMATED COUNT: 16.1 % (ref 11.6–15.1)
FERRITIN SERPL-MCNC: 313 NG/ML (ref 8–388)
GFR SERPL CREATININE-BSD FRML MDRD: 37 ML/MIN/1.73SQ M
GLUCOSE P FAST SERPL-MCNC: 96 MG/DL (ref 65–99)
HCT VFR BLD AUTO: 26.4 % (ref 34.8–46.1)
HGB BLD-MCNC: 8.2 G/DL (ref 11.5–15.4)
IMM GRANULOCYTES # BLD AUTO: 0.01 THOUSAND/UL (ref 0–0.2)
IMM GRANULOCYTES NFR BLD AUTO: 0 % (ref 0–2)
IRON SATN MFR SERPL: 27 % (ref 15–50)
IRON SERPL-MCNC: 60 UG/DL (ref 50–170)
LYMPHOCYTES # BLD AUTO: 1.63 THOUSANDS/ΜL (ref 0.6–4.47)
LYMPHOCYTES NFR BLD AUTO: 25 % (ref 14–44)
MCH RBC QN AUTO: 28.8 PG (ref 26.8–34.3)
MCHC RBC AUTO-ENTMCNC: 31.1 G/DL (ref 31.4–37.4)
MCV RBC AUTO: 93 FL (ref 82–98)
MONOCYTES # BLD AUTO: 0.53 THOUSAND/ΜL (ref 0.17–1.22)
MONOCYTES NFR BLD AUTO: 8 % (ref 4–12)
NEUTROPHILS # BLD AUTO: 4.11 THOUSANDS/ΜL (ref 1.85–7.62)
NEUTS SEG NFR BLD AUTO: 64 % (ref 43–75)
NRBC BLD AUTO-RTO: 0 /100 WBCS
PLATELET # BLD AUTO: 264 THOUSANDS/UL (ref 149–390)
PMV BLD AUTO: 11.2 FL (ref 8.9–12.7)
POTASSIUM SERPL-SCNC: 3.4 MMOL/L (ref 3.5–5.3)
PROT SERPL-MCNC: 6.8 G/DL (ref 6.4–8.2)
RBC # BLD AUTO: 2.85 MILLION/UL (ref 3.81–5.12)
SODIUM SERPL-SCNC: 141 MMOL/L (ref 136–145)
TIBC SERPL-MCNC: 219 UG/DL (ref 250–450)
WBC # BLD AUTO: 6.49 THOUSAND/UL (ref 4.31–10.16)

## 2022-06-27 PROCEDURE — 82728 ASSAY OF FERRITIN: CPT

## 2022-06-27 PROCEDURE — 83540 ASSAY OF IRON: CPT

## 2022-06-27 PROCEDURE — 83550 IRON BINDING TEST: CPT

## 2022-06-27 PROCEDURE — 36415 COLL VENOUS BLD VENIPUNCTURE: CPT

## 2022-06-27 PROCEDURE — 85025 COMPLETE CBC W/AUTO DIFF WBC: CPT

## 2022-06-27 PROCEDURE — 80053 COMPREHEN METABOLIC PANEL: CPT

## 2022-07-05 ENCOUNTER — TELEPHONE (OUTPATIENT)
Dept: HEMATOLOGY ONCOLOGY | Facility: CLINIC | Age: 77
End: 2022-07-05

## 2022-07-05 NOTE — TELEPHONE ENCOUNTER
Appointment Cancellation Or Reschedule     Person calling in Patients  [facility]    Provider Dr Reggie Ann    Office Visit Date and Time 7/5 @ 2pm   Office Visit Location [place]   Did patient want to reschedule their office appointment? If so, when was it scheduled to? no   Is this patient calling to reschedule an infusion appointment? no   When is their next infusion appointment? n/a   Is this patient a Chemo patient? no   Reason for Cancellation or Reschedule patient sick, will call back when able to make appt       If the patient is a treatment patient, please route this to the office nurse  If the patient is not on treatment, please route to the office MA  If the patient is a surgical oncology patient, please route to surg/onc clinical pool

## 2022-09-21 ENCOUNTER — HOSPITAL ENCOUNTER (INPATIENT)
Facility: HOSPITAL | Age: 77
LOS: 12 days | Discharge: NURSING, REHAB OR LONG-TERM CARE | DRG: 871 | End: 2022-10-04
Attending: EMERGENCY MEDICINE | Admitting: INTERNAL MEDICINE
Payer: COMMERCIAL

## 2022-09-21 ENCOUNTER — APPOINTMENT (EMERGENCY)
Dept: CT IMAGING | Facility: HOSPITAL | Age: 77
DRG: 871 | End: 2022-09-21
Payer: COMMERCIAL

## 2022-09-21 DIAGNOSIS — K62.5 RECTAL BLEEDING: ICD-10-CM

## 2022-09-21 DIAGNOSIS — N13.30 HYDROURETERONEPHROSIS: ICD-10-CM

## 2022-09-21 DIAGNOSIS — E03.9 ACQUIRED HYPOTHYROIDISM: ICD-10-CM

## 2022-09-21 DIAGNOSIS — I48.0 PAROXYSMAL ATRIAL FIBRILLATION (HCC): ICD-10-CM

## 2022-09-21 DIAGNOSIS — R29.90 STROKE-LIKE SYMPTOMS: ICD-10-CM

## 2022-09-21 DIAGNOSIS — E87.29 INCREASED ANION GAP METABOLIC ACIDOSIS: ICD-10-CM

## 2022-09-21 DIAGNOSIS — I27.20 PULMONARY HYPERTENSION (HCC): ICD-10-CM

## 2022-09-21 DIAGNOSIS — R79.89 LOW TSH LEVEL: ICD-10-CM

## 2022-09-21 DIAGNOSIS — K59.00 CONSTIPATION: ICD-10-CM

## 2022-09-21 DIAGNOSIS — N17.9 ACUTE KIDNEY INJURY (HCC): ICD-10-CM

## 2022-09-21 DIAGNOSIS — E87.5 HYPERKALEMIA: Primary | ICD-10-CM

## 2022-09-21 DIAGNOSIS — R19.5 OCCULT GI BLEEDING: ICD-10-CM

## 2022-09-21 DIAGNOSIS — E55.9 VITAMIN D DEFICIENCY: ICD-10-CM

## 2022-09-21 DIAGNOSIS — A41.9 SEPSIS DUE TO URINARY TRACT INFECTION (HCC): ICD-10-CM

## 2022-09-21 DIAGNOSIS — I10 ESSENTIAL HYPERTENSION: ICD-10-CM

## 2022-09-21 DIAGNOSIS — N39.0 SEPSIS DUE TO URINARY TRACT INFECTION (HCC): ICD-10-CM

## 2022-09-21 DIAGNOSIS — K92.2 GASTROINTESTINAL HEMORRHAGE, UNSPECIFIED GASTROINTESTINAL HEMORRHAGE TYPE: ICD-10-CM

## 2022-09-21 DIAGNOSIS — U07.1 COVID-19 VIRUS INFECTION: ICD-10-CM

## 2022-09-21 DIAGNOSIS — D62 ACUTE BLOOD LOSS ANEMIA: ICD-10-CM

## 2022-09-21 DIAGNOSIS — D64.9 ANEMIA: ICD-10-CM

## 2022-09-21 DIAGNOSIS — I50.42 CHRONIC COMBINED SYSTOLIC AND DIASTOLIC CHF (CONGESTIVE HEART FAILURE) (HCC): ICD-10-CM

## 2022-09-21 LAB
ANION GAP SERPL CALCULATED.3IONS-SCNC: 13 MMOL/L (ref 4–13)
BUN SERPL-MCNC: 92 MG/DL (ref 5–25)
CALCIUM SERPL-MCNC: 9.6 MG/DL (ref 8.3–10.1)
CARDIAC TROPONIN I PNL SERPL HS: 24 NG/L
CHLORIDE SERPL-SCNC: 108 MMOL/L (ref 96–108)
CO2 SERPL-SCNC: 14 MMOL/L (ref 21–32)
CREAT SERPL-MCNC: 3.02 MG/DL (ref 0.6–1.3)
ERYTHROCYTE [DISTWIDTH] IN BLOOD BY AUTOMATED COUNT: 17.5 % (ref 11.6–15.1)
GFR SERPL CREATININE-BSD FRML MDRD: 14 ML/MIN/1.73SQ M
GLUCOSE SERPL-MCNC: 105 MG/DL (ref 65–140)
HCT VFR BLD AUTO: 20.2 % (ref 34.8–46.1)
HGB BLD-MCNC: 6.1 G/DL (ref 11.5–15.4)
INR PPP: 1.82 (ref 0.84–1.19)
MAGNESIUM SERPL-MCNC: 2.4 MG/DL (ref 1.6–2.6)
MCH RBC QN AUTO: 28.6 PG (ref 26.8–34.3)
MCHC RBC AUTO-ENTMCNC: 30.2 G/DL (ref 31.4–37.4)
MCV RBC AUTO: 95 FL (ref 82–98)
PLATELET # BLD AUTO: 308 THOUSANDS/UL (ref 149–390)
PMV BLD AUTO: 10.3 FL (ref 8.9–12.7)
POTASSIUM SERPL-SCNC: 6.8 MMOL/L (ref 3.5–5.3)
PROTHROMBIN TIME: 21.3 SECONDS (ref 11.6–14.5)
RBC # BLD AUTO: 2.13 MILLION/UL (ref 3.81–5.12)
SODIUM SERPL-SCNC: 135 MMOL/L (ref 135–147)
WBC # BLD AUTO: 13.11 THOUSAND/UL (ref 4.31–10.16)

## 2022-09-21 PROCEDURE — 36415 COLL VENOUS BLD VENIPUNCTURE: CPT | Performed by: EMERGENCY MEDICINE

## 2022-09-21 PROCEDURE — 85730 THROMBOPLASTIN TIME PARTIAL: CPT | Performed by: EMERGENCY MEDICINE

## 2022-09-21 PROCEDURE — 80048 BASIC METABOLIC PNL TOTAL CA: CPT | Performed by: EMERGENCY MEDICINE

## 2022-09-21 PROCEDURE — 94640 AIRWAY INHALATION TREATMENT: CPT

## 2022-09-21 PROCEDURE — 85025 COMPLETE CBC W/AUTO DIFF WBC: CPT | Performed by: EMERGENCY MEDICINE

## 2022-09-21 PROCEDURE — 93005 ELECTROCARDIOGRAM TRACING: CPT

## 2022-09-21 PROCEDURE — 99285 EMERGENCY DEPT VISIT HI MDM: CPT

## 2022-09-21 PROCEDURE — 99285 EMERGENCY DEPT VISIT HI MDM: CPT | Performed by: EMERGENCY MEDICINE

## 2022-09-21 PROCEDURE — 83735 ASSAY OF MAGNESIUM: CPT | Performed by: EMERGENCY MEDICINE

## 2022-09-21 PROCEDURE — 70450 CT HEAD/BRAIN W/O DYE: CPT

## 2022-09-21 PROCEDURE — 84484 ASSAY OF TROPONIN QUANT: CPT | Performed by: EMERGENCY MEDICINE

## 2022-09-21 PROCEDURE — 85610 PROTHROMBIN TIME: CPT | Performed by: EMERGENCY MEDICINE

## 2022-09-21 RX ORDER — CALCIUM GLUCONATE 20 MG/ML
2 INJECTION, SOLUTION INTRAVENOUS ONCE
Status: COMPLETED | OUTPATIENT
Start: 2022-09-22 | End: 2022-09-22

## 2022-09-21 RX ORDER — DEXTROSE MONOHYDRATE 25 G/50ML
25 INJECTION, SOLUTION INTRAVENOUS ONCE
Status: COMPLETED | OUTPATIENT
Start: 2022-09-21 | End: 2022-09-22

## 2022-09-21 RX ORDER — ALBUTEROL SULFATE 2.5 MG/3ML
10 SOLUTION RESPIRATORY (INHALATION) ONCE
Status: COMPLETED | OUTPATIENT
Start: 2022-09-21 | End: 2022-09-22

## 2022-09-21 RX ORDER — SODIUM CHLORIDE 9 MG/ML
3 INJECTION INTRAVENOUS
Status: DISCONTINUED | OUTPATIENT
Start: 2022-09-21 | End: 2022-10-04 | Stop reason: HOSPADM

## 2022-09-21 RX ORDER — CALCIUM GLUCONATE 20 MG/ML
1 INJECTION, SOLUTION INTRAVENOUS ONCE
Status: COMPLETED | OUTPATIENT
Start: 2022-09-22 | End: 2022-09-22

## 2022-09-21 RX ORDER — SODIUM CHLORIDE, SODIUM GLUCONATE, SODIUM ACETATE, POTASSIUM CHLORIDE, MAGNESIUM CHLORIDE, SODIUM PHOSPHATE, DIBASIC, AND POTASSIUM PHOSPHATE .53; .5; .37; .037; .03; .012; .00082 G/100ML; G/100ML; G/100ML; G/100ML; G/100ML; G/100ML; G/100ML
1000 INJECTION, SOLUTION INTRAVENOUS ONCE
Status: COMPLETED | OUTPATIENT
Start: 2022-09-21 | End: 2022-09-22

## 2022-09-22 ENCOUNTER — APPOINTMENT (INPATIENT)
Dept: CT IMAGING | Facility: HOSPITAL | Age: 77
DRG: 871 | End: 2022-09-22
Payer: COMMERCIAL

## 2022-09-22 ENCOUNTER — APPOINTMENT (OUTPATIENT)
Dept: ULTRASOUND IMAGING | Facility: HOSPITAL | Age: 77
DRG: 871 | End: 2022-09-22
Payer: COMMERCIAL

## 2022-09-22 ENCOUNTER — APPOINTMENT (OUTPATIENT)
Dept: NON INVASIVE DIAGNOSTICS | Facility: HOSPITAL | Age: 77
DRG: 871 | End: 2022-09-22
Payer: COMMERCIAL

## 2022-09-22 PROBLEM — E87.2 NORMAL ANION GAP METABOLIC ACIDOSIS: Status: ACTIVE | Noted: 2022-09-22

## 2022-09-22 PROBLEM — R29.90 STROKE-LIKE SYMPTOMS: Status: ACTIVE | Noted: 2022-09-22

## 2022-09-22 PROBLEM — N18.9 ANEMIA IN CHRONIC KIDNEY DISEASE: Status: ACTIVE | Noted: 2018-03-20

## 2022-09-22 PROBLEM — E87.5 HYPERKALEMIA: Status: ACTIVE | Noted: 2022-09-22

## 2022-09-22 PROBLEM — K92.2 GI BLEED: Status: ACTIVE | Noted: 2022-09-22

## 2022-09-22 PROBLEM — K21.9 GASTROESOPHAGEAL REFLUX DISEASE WITHOUT ESOPHAGITIS: Status: ACTIVE | Noted: 2019-02-08

## 2022-09-22 PROBLEM — N39.0 UTI (URINARY TRACT INFECTION): Status: ACTIVE | Noted: 2022-09-22

## 2022-09-22 PROBLEM — E87.29 INCREASED ANION GAP METABOLIC ACIDOSIS: Status: ACTIVE | Noted: 2022-09-22

## 2022-09-22 PROBLEM — I12.9 BENIGN HYPERTENSIVE RENAL DISEASE: Status: ACTIVE | Noted: 2019-09-12

## 2022-09-22 PROBLEM — E87.20 NORMAL ANION GAP METABOLIC ACIDOSIS: Status: ACTIVE | Noted: 2022-09-22

## 2022-09-22 PROBLEM — A41.9 SEPSIS DUE TO URINARY TRACT INFECTION (HCC): Status: ACTIVE | Noted: 2022-09-22

## 2022-09-22 PROBLEM — D63.1 ANEMIA IN CHRONIC KIDNEY DISEASE: Status: ACTIVE | Noted: 2018-03-20

## 2022-09-22 PROBLEM — N13.30 HYDROURETERONEPHROSIS: Status: ACTIVE | Noted: 2022-09-22

## 2022-09-22 PROBLEM — D62 ACUTE BLOOD LOSS ANEMIA: Status: ACTIVE | Noted: 2019-02-08

## 2022-09-22 LAB
2HR DELTA HS TROPONIN: 6 NG/L
4HR DELTA HS TROPONIN: 9 NG/L
ABO GROUP BLD BPU: NORMAL
ABO GROUP BLD: NORMAL
ALBUMIN SERPL BCP-MCNC: 3 G/DL (ref 3.5–5)
ALP SERPL-CCNC: 151 U/L (ref 46–116)
ALT SERPL W P-5'-P-CCNC: 9 U/L (ref 12–78)
ANION GAP SERPL CALCULATED.3IONS-SCNC: 14 MMOL/L (ref 4–13)
ANION GAP SERPL CALCULATED.3IONS-SCNC: 16 MMOL/L (ref 4–13)
ANION GAP SERPL CALCULATED.3IONS-SCNC: 17 MMOL/L (ref 4–13)
ANION GAP SERPL CALCULATED.3IONS-SCNC: 9 MMOL/L (ref 4–13)
AORTIC ROOT: 3.1 CM
APICAL FOUR CHAMBER EJECTION FRACTION: 47 %
APTT PPP: 39 SECONDS (ref 23–37)
AST SERPL W P-5'-P-CCNC: 18 U/L (ref 5–45)
ATRIAL RATE: 85 BPM
BACTERIA UR QL AUTO: ABNORMAL /HPF
BASE EX.OXY STD BLDV CALC-SCNC: 66.2 % (ref 60–80)
BASE EXCESS BLDV CALC-SCNC: -15.9 MMOL/L
BASOPHILS # BLD AUTO: 0.05 THOUSANDS/ÂΜL (ref 0–0.1)
BASOPHILS NFR BLD AUTO: 0 % (ref 0–1)
BILIRUB SERPL-MCNC: 0.28 MG/DL (ref 0.2–1)
BILIRUB UR QL STRIP: NEGATIVE
BLD GP AB SCN SERPL QL: NEGATIVE
BPU ID: NORMAL
BUN SERPL-MCNC: 81 MG/DL (ref 5–25)
BUN SERPL-MCNC: 88 MG/DL (ref 5–25)
BUN SERPL-MCNC: 91 MG/DL (ref 5–25)
BUN SERPL-MCNC: 93 MG/DL (ref 5–25)
CALCIUM ALBUM COR SERPL-MCNC: 10.9 MG/DL (ref 8.3–10.1)
CALCIUM SERPL-MCNC: 10.1 MG/DL (ref 8.3–10.1)
CALCIUM SERPL-MCNC: 8.7 MG/DL (ref 8.3–10.1)
CALCIUM SERPL-MCNC: 8.9 MG/DL (ref 8.3–10.1)
CALCIUM SERPL-MCNC: 9.4 MG/DL (ref 8.3–10.1)
CARDIAC TROPONIN I PNL SERPL HS: 30 NG/L
CARDIAC TROPONIN I PNL SERPL HS: 33 NG/L
CHLORIDE SERPL-SCNC: 104 MMOL/L (ref 96–108)
CHLORIDE SERPL-SCNC: 105 MMOL/L (ref 96–108)
CHLORIDE SERPL-SCNC: 106 MMOL/L (ref 96–108)
CHLORIDE SERPL-SCNC: 107 MMOL/L (ref 96–108)
CHOLEST SERPL-MCNC: 199 MG/DL
CLARITY UR: CLEAR
CO2 SERPL-SCNC: 13 MMOL/L (ref 21–32)
CO2 SERPL-SCNC: 14 MMOL/L (ref 21–32)
CO2 SERPL-SCNC: 17 MMOL/L (ref 21–32)
CO2 SERPL-SCNC: 24 MMOL/L (ref 21–32)
COLOR UR: YELLOW
CREAT SERPL-MCNC: 2.57 MG/DL (ref 0.6–1.3)
CREAT SERPL-MCNC: 2.64 MG/DL (ref 0.6–1.3)
CREAT SERPL-MCNC: 2.66 MG/DL (ref 0.6–1.3)
CREAT SERPL-MCNC: 2.69 MG/DL (ref 0.6–1.3)
CREAT UR-MCNC: 23.9 MG/DL
CROSSMATCH: NORMAL
CRP SERPL QL: 10.8 MG/L
E WAVE DECELERATION TIME: 190 MS
EOSINOPHIL # BLD AUTO: 0.1 THOUSAND/ÂΜL (ref 0–0.61)
EOSINOPHIL NFR BLD AUTO: 1 % (ref 0–6)
ERYTHROCYTE [DISTWIDTH] IN BLOOD BY AUTOMATED COUNT: 16 % (ref 11.6–15.1)
EST. AVERAGE GLUCOSE BLD GHB EST-MCNC: 105 MG/DL
FERRITIN SERPL-MCNC: 100 NG/ML (ref 8–388)
FLUAV RNA RESP QL NAA+PROBE: NEGATIVE
FLUBV RNA RESP QL NAA+PROBE: NEGATIVE
FRACTIONAL SHORTENING: 35 % (ref 28–44)
GFR SERPL CREATININE-BSD FRML MDRD: 16 ML/MIN/1.73SQ M
GFR SERPL CREATININE-BSD FRML MDRD: 17 ML/MIN/1.73SQ M
GLUCOSE SERPL-MCNC: 106 MG/DL (ref 65–140)
GLUCOSE SERPL-MCNC: 125 MG/DL (ref 65–140)
GLUCOSE SERPL-MCNC: 138 MG/DL (ref 65–140)
GLUCOSE SERPL-MCNC: 313 MG/DL (ref 65–140)
GLUCOSE SERPL-MCNC: 91 MG/DL (ref 65–140)
GLUCOSE UR STRIP-MCNC: NEGATIVE MG/DL
HBA1C MFR BLD: 5.3 %
HCO3 BLDV-SCNC: 11.6 MMOL/L (ref 24–30)
HCT VFR BLD AUTO: 24.1 % (ref 34.8–46.1)
HDLC SERPL-MCNC: 26 MG/DL
HGB BLD-MCNC: 7 G/DL (ref 11.5–15.4)
HGB BLD-MCNC: 7.4 G/DL (ref 11.5–15.4)
HGB BLD-MCNC: 7.5 G/DL (ref 11.5–15.4)
HGB UR QL STRIP.AUTO: ABNORMAL
IMM GRANULOCYTES # BLD AUTO: 0.1 THOUSAND/UL (ref 0–0.2)
IMM GRANULOCYTES NFR BLD AUTO: 1 % (ref 0–2)
INTERVENTRICULAR SEPTUM IN DIASTOLE (PARASTERNAL SHORT AXIS VIEW): 1.5 CM
INTERVENTRICULAR SEPTUM: 1.5 CM (ref 0.6–1.1)
IRON SATN MFR SERPL: 26 % (ref 15–50)
IRON SERPL-MCNC: 90 UG/DL (ref 50–170)
KETONES UR STRIP-MCNC: NEGATIVE MG/DL
LACTATE SERPL-SCNC: 1.4 MMOL/L (ref 0.5–2)
LDLC SERPL CALC-MCNC: 102 MG/DL (ref 0–100)
LEFT ATRIUM AREA SYSTOLE SINGLE PLANE A4C: 20.9 CM2
LEFT ATRIUM SIZE: 4.8 CM
LEFT INTERNAL DIMENSION IN SYSTOLE: 3.2 CM (ref 2.1–4)
LEFT VENTRICLE DIASTOLIC VOLUME (MOD BIPLANE): 102 ML
LEFT VENTRICLE SYSTOLIC VOLUME (MOD BIPLANE): 55 ML
LEFT VENTRICULAR INTERNAL DIMENSION IN DIASTOLE: 4.9 CM (ref 3.5–6)
LEFT VENTRICULAR POSTERIOR WALL IN END DIASTOLE: 1.4 CM
LEFT VENTRICULAR STROKE VOLUME: 75 ML
LEUKOCYTE ESTERASE UR QL STRIP: ABNORMAL
LV EF: 46 %
LVSV (TEICH): 75 ML
LYMPHOCYTES # BLD AUTO: 1.82 THOUSANDS/ÂΜL (ref 0.6–4.47)
LYMPHOCYTES NFR BLD AUTO: 14 % (ref 14–44)
MAGNESIUM SERPL-MCNC: 2.3 MG/DL (ref 1.6–2.6)
MCH RBC QN AUTO: 29.3 PG (ref 26.8–34.3)
MCHC RBC AUTO-ENTMCNC: 31.1 G/DL (ref 31.4–37.4)
MCV RBC AUTO: 94 FL (ref 82–98)
MONOCYTES # BLD AUTO: 0.96 THOUSAND/ÂΜL (ref 0.17–1.22)
MONOCYTES NFR BLD AUTO: 8 % (ref 4–12)
MV E'TISSUE VEL-SEP: 6 CM/S
MV PEAK A VEL: 1.41 M/S
MV PEAK E VEL: 123 CM/S
MV STENOSIS PRESSURE HALF TIME: 74 MS
MV VALVE AREA P 1/2 METHOD: 2.97 CM2
NEUTROPHILS # BLD AUTO: 9.57 THOUSANDS/ÂΜL (ref 1.85–7.62)
NEUTS SEG NFR BLD AUTO: 76 % (ref 43–75)
NITRITE UR QL STRIP: POSITIVE
NON-SQ EPI CELLS URNS QL MICRO: ABNORMAL /HPF
NRBC BLD AUTO-RTO: 0 /100 WBCS
O2 CT BLDV-SCNC: 5.1 ML/DL
P AXIS: 76 DEGREES
PA SYSTOLIC PRESSURE: 52 MMHG
PCO2 BLDV: 34.8 MM HG (ref 42–50)
PH BLDV: 7.14 [PH] (ref 7.3–7.4)
PH UR STRIP.AUTO: 6 [PH]
PHOSPHATE SERPL-MCNC: 5.2 MG/DL (ref 2.3–4.1)
PLATELET # BLD AUTO: 272 THOUSANDS/UL (ref 149–390)
PMV BLD AUTO: 9.9 FL (ref 8.9–12.7)
PO2 BLDV: 39.5 MM HG (ref 35–45)
POTASSIUM SERPL-SCNC: 5.1 MMOL/L (ref 3.5–5.3)
POTASSIUM SERPL-SCNC: 5.4 MMOL/L (ref 3.5–5.3)
POTASSIUM SERPL-SCNC: 5.7 MMOL/L (ref 3.5–5.3)
POTASSIUM SERPL-SCNC: 6.2 MMOL/L (ref 3.5–5.3)
PR INTERVAL: 156 MS
PROCALCITONIN SERPL-MCNC: 0.27 NG/ML
PROCALCITONIN SERPL-MCNC: 0.29 NG/ML
PROT SERPL-MCNC: 6.8 G/DL (ref 6.4–8.4)
PROT UR STRIP-MCNC: ABNORMAL MG/DL
QRS AXIS: -84 DEGREES
QRSD INTERVAL: 124 MS
QT INTERVAL: 408 MS
QTC INTERVAL: 485 MS
RA PRESSURE ESTIMATED: 10 MMHG
RBC # BLD AUTO: 2.56 MILLION/UL (ref 3.81–5.12)
RBC #/AREA URNS AUTO: ABNORMAL /HPF
RH BLD: POSITIVE
RIGHT ATRIUM AREA SYSTOLE A4C: 12.8 CM2
RIGHT VENTRICLE ID DIMENSION: 3.5 CM
RSV RNA RESP QL NAA+PROBE: NEGATIVE
RV PSP: 52 MMHG
SARS-COV-2 RNA RESP QL NAA+PROBE: NEGATIVE
SL CV LV EF: 45
SL CV PED ECHO LEFT VENTRICLE DIASTOLIC VOLUME (MOD BIPLANE) 2D: 114 ML
SL CV PED ECHO LEFT VENTRICLE SYSTOLIC VOLUME (MOD BIPLANE) 2D: 40 ML
SODIUM 24H UR-SCNC: 98 MOL/L
SODIUM SERPL-SCNC: 136 MMOL/L (ref 135–147)
SODIUM SERPL-SCNC: 136 MMOL/L (ref 135–147)
SODIUM SERPL-SCNC: 137 MMOL/L (ref 135–147)
SODIUM SERPL-SCNC: 137 MMOL/L (ref 135–147)
SP GR UR STRIP.AUTO: 1.01 (ref 1–1.03)
SPECIMEN EXPIRATION DATE: NORMAL
T WAVE AXIS: 97 DEGREES
TIBC SERPL-MCNC: 340 UG/DL (ref 250–450)
TR MAX PG: 42 MMHG
TR PEAK VELOCITY: 3.3 M/S
TRICUSPID ANNULAR PLANE SYSTOLIC EXCURSION: 2 CM
TRICUSPID VALVE PEAK REGURGITATION VELOCITY: 3.25 M/S
TRIGL SERPL-MCNC: 354 MG/DL
UNIT DISPENSE STATUS: NORMAL
UNIT PRODUCT CODE: NORMAL
UNIT PRODUCT VOLUME: 350 ML
UNIT RH: NORMAL
URATE SERPL-MCNC: 4.6 MG/DL (ref 2–7.5)
UROBILINOGEN UR QL STRIP.AUTO: 0.2 E.U./DL
UUN 24H UR-MCNC: 296 MG/DL
VENTRICULAR RATE: 85 BPM
WBC # BLD AUTO: 12.6 THOUSAND/UL (ref 4.31–10.16)
WBC #/AREA URNS AUTO: ABNORMAL /HPF
WBC CLUMPS # UR AUTO: PRESENT /UL

## 2022-09-22 PROCEDURE — 93306 TTE W/DOPPLER COMPLETE: CPT | Performed by: INTERNAL MEDICINE

## 2022-09-22 PROCEDURE — 86900 BLOOD TYPING SEROLOGIC ABO: CPT | Performed by: EMERGENCY MEDICINE

## 2022-09-22 PROCEDURE — 86850 RBC ANTIBODY SCREEN: CPT | Performed by: EMERGENCY MEDICINE

## 2022-09-22 PROCEDURE — 83540 ASSAY OF IRON: CPT

## 2022-09-22 PROCEDURE — 86140 C-REACTIVE PROTEIN: CPT

## 2022-09-22 PROCEDURE — 0241U HB NFCT DS VIR RESP RNA 4 TRGT: CPT | Performed by: INTERNAL MEDICINE

## 2022-09-22 PROCEDURE — 80048 BASIC METABOLIC PNL TOTAL CA: CPT | Performed by: INTERNAL MEDICINE

## 2022-09-22 PROCEDURE — 97163 PT EVAL HIGH COMPLEX 45 MIN: CPT

## 2022-09-22 PROCEDURE — G1004 CDSM NDSC: HCPCS

## 2022-09-22 PROCEDURE — 92523 SPEECH SOUND LANG COMPREHEN: CPT

## 2022-09-22 PROCEDURE — 36415 COLL VENOUS BLD VENIPUNCTURE: CPT | Performed by: EMERGENCY MEDICINE

## 2022-09-22 PROCEDURE — 81001 URINALYSIS AUTO W/SCOPE: CPT | Performed by: EMERGENCY MEDICINE

## 2022-09-22 PROCEDURE — C9113 INJ PANTOPRAZOLE SODIUM, VIA: HCPCS | Performed by: EMERGENCY MEDICINE

## 2022-09-22 PROCEDURE — 85025 COMPLETE CBC W/AUTO DIFF WBC: CPT

## 2022-09-22 PROCEDURE — 83036 HEMOGLOBIN GLYCOSYLATED A1C: CPT

## 2022-09-22 PROCEDURE — 86923 COMPATIBILITY TEST ELECTRIC: CPT

## 2022-09-22 PROCEDURE — 87086 URINE CULTURE/COLONY COUNT: CPT | Performed by: EMERGENCY MEDICINE

## 2022-09-22 PROCEDURE — 87077 CULTURE AEROBIC IDENTIFY: CPT | Performed by: EMERGENCY MEDICINE

## 2022-09-22 PROCEDURE — 97167 OT EVAL HIGH COMPLEX 60 MIN: CPT

## 2022-09-22 PROCEDURE — 93306 TTE W/DOPPLER COMPLETE: CPT

## 2022-09-22 PROCEDURE — 93010 ELECTROCARDIOGRAM REPORT: CPT | Performed by: INTERNAL MEDICINE

## 2022-09-22 PROCEDURE — 84300 ASSAY OF URINE SODIUM: CPT | Performed by: EMERGENCY MEDICINE

## 2022-09-22 PROCEDURE — 82570 ASSAY OF URINE CREATININE: CPT | Performed by: EMERGENCY MEDICINE

## 2022-09-22 PROCEDURE — 82728 ASSAY OF FERRITIN: CPT

## 2022-09-22 PROCEDURE — 99255 IP/OBS CONSLTJ NEW/EST HI 80: CPT | Performed by: INTERNAL MEDICINE

## 2022-09-22 PROCEDURE — 96365 THER/PROPH/DIAG IV INF INIT: CPT

## 2022-09-22 PROCEDURE — 82948 REAGENT STRIP/BLOOD GLUCOSE: CPT

## 2022-09-22 PROCEDURE — 85018 HEMOGLOBIN: CPT

## 2022-09-22 PROCEDURE — 84100 ASSAY OF PHOSPHORUS: CPT

## 2022-09-22 PROCEDURE — 92610 EVALUATE SWALLOWING FUNCTION: CPT

## 2022-09-22 PROCEDURE — 96375 TX/PRO/DX INJ NEW DRUG ADDON: CPT

## 2022-09-22 PROCEDURE — 83735 ASSAY OF MAGNESIUM: CPT

## 2022-09-22 PROCEDURE — 80061 LIPID PANEL: CPT

## 2022-09-22 PROCEDURE — 74176 CT ABD & PELVIS W/O CONTRAST: CPT

## 2022-09-22 PROCEDURE — 71250 CT THORAX DX C-: CPT

## 2022-09-22 PROCEDURE — 84540 ASSAY OF URINE/UREA-N: CPT | Performed by: EMERGENCY MEDICINE

## 2022-09-22 PROCEDURE — 87186 SC STD MICRODIL/AGAR DIL: CPT | Performed by: EMERGENCY MEDICINE

## 2022-09-22 PROCEDURE — 86901 BLOOD TYPING SEROLOGIC RH(D): CPT | Performed by: EMERGENCY MEDICINE

## 2022-09-22 PROCEDURE — P9016 RBC LEUKOCYTES REDUCED: HCPCS

## 2022-09-22 PROCEDURE — 99223 1ST HOSP IP/OBS HIGH 75: CPT

## 2022-09-22 PROCEDURE — 80053 COMPREHEN METABOLIC PANEL: CPT

## 2022-09-22 PROCEDURE — 84484 ASSAY OF TROPONIN QUANT: CPT | Performed by: EMERGENCY MEDICINE

## 2022-09-22 PROCEDURE — 87040 BLOOD CULTURE FOR BACTERIA: CPT | Performed by: EMERGENCY MEDICINE

## 2022-09-22 PROCEDURE — 84145 PROCALCITONIN (PCT): CPT | Performed by: EMERGENCY MEDICINE

## 2022-09-22 PROCEDURE — 82805 BLOOD GASES W/O2 SATURATION: CPT | Performed by: EMERGENCY MEDICINE

## 2022-09-22 PROCEDURE — 83605 ASSAY OF LACTIC ACID: CPT | Performed by: EMERGENCY MEDICINE

## 2022-09-22 PROCEDURE — 83550 IRON BINDING TEST: CPT

## 2022-09-22 PROCEDURE — 84550 ASSAY OF BLOOD/URIC ACID: CPT

## 2022-09-22 PROCEDURE — 84484 ASSAY OF TROPONIN QUANT: CPT

## 2022-09-22 PROCEDURE — 76770 US EXAM ABDO BACK WALL COMP: CPT

## 2022-09-22 PROCEDURE — 80048 BASIC METABOLIC PNL TOTAL CA: CPT

## 2022-09-22 PROCEDURE — 84145 PROCALCITONIN (PCT): CPT

## 2022-09-22 RX ORDER — ACETAMINOPHEN 325 MG/1
500 TABLET ORAL EVERY 6 HOURS PRN
Status: DISCONTINUED | OUTPATIENT
Start: 2022-09-22 | End: 2022-09-28

## 2022-09-22 RX ORDER — ASPIRIN 81 MG/1
81 TABLET, CHEWABLE ORAL DAILY
Status: DISCONTINUED | OUTPATIENT
Start: 2022-09-22 | End: 2022-09-22

## 2022-09-22 RX ORDER — DEXTROSE MONOHYDRATE 25 G/50ML
25 INJECTION, SOLUTION INTRAVENOUS ONCE
Status: COMPLETED | OUTPATIENT
Start: 2022-09-22 | End: 2022-09-22

## 2022-09-22 RX ORDER — SERTRALINE HYDROCHLORIDE 100 MG/1
100 TABLET, FILM COATED ORAL DAILY
Status: DISCONTINUED | OUTPATIENT
Start: 2022-09-22 | End: 2022-10-04 | Stop reason: HOSPADM

## 2022-09-22 RX ORDER — CEFTRIAXONE 2 G/50ML
2000 INJECTION, SOLUTION INTRAVENOUS ONCE
Status: COMPLETED | OUTPATIENT
Start: 2022-09-22 | End: 2022-09-22

## 2022-09-22 RX ORDER — PANTOPRAZOLE SODIUM 40 MG/10ML
40 INJECTION, POWDER, LYOPHILIZED, FOR SOLUTION INTRAVENOUS ONCE
Status: COMPLETED | OUTPATIENT
Start: 2022-09-22 | End: 2022-09-22

## 2022-09-22 RX ORDER — FUROSEMIDE 10 MG/ML
40 INJECTION INTRAMUSCULAR; INTRAVENOUS ONCE
Status: COMPLETED | OUTPATIENT
Start: 2022-09-22 | End: 2022-09-22

## 2022-09-22 RX ORDER — CALCIUM GLUCONATE 20 MG/ML
1 INJECTION, SOLUTION INTRAVENOUS ONCE
Status: COMPLETED | OUTPATIENT
Start: 2022-09-22 | End: 2022-09-22

## 2022-09-22 RX ORDER — METOPROLOL TARTRATE 100 MG/1
200 TABLET ORAL DAILY
Status: DISCONTINUED | OUTPATIENT
Start: 2022-09-22 | End: 2022-09-22

## 2022-09-22 RX ORDER — SODIUM BICARBONATE 650 MG/1
650 TABLET ORAL
Status: DISCONTINUED | OUTPATIENT
Start: 2022-09-22 | End: 2022-09-22

## 2022-09-22 RX ORDER — CEFTRIAXONE 1 G/50ML
1000 INJECTION, SOLUTION INTRAVENOUS EVERY 24 HOURS
Status: DISCONTINUED | OUTPATIENT
Start: 2022-09-23 | End: 2022-09-29

## 2022-09-22 RX ORDER — FLUTICASONE PROPIONATE 50 MCG
2 SPRAY, SUSPENSION (ML) NASAL DAILY
Status: DISCONTINUED | OUTPATIENT
Start: 2022-09-22 | End: 2022-10-04 | Stop reason: HOSPADM

## 2022-09-22 RX ORDER — LEVOTHYROXINE SODIUM 0.15 MG/1
150 TABLET ORAL
Status: DISCONTINUED | OUTPATIENT
Start: 2022-09-22 | End: 2022-09-25

## 2022-09-22 RX ORDER — METOPROLOL TARTRATE 50 MG/1
50 TABLET, FILM COATED ORAL 3 TIMES DAILY
Status: DISCONTINUED | OUTPATIENT
Start: 2022-09-23 | End: 2022-09-24

## 2022-09-22 RX ADMIN — Medication 1 TABLET: at 08:27

## 2022-09-22 RX ADMIN — CALCIUM GLUCONATE 2 G: 20 INJECTION, SOLUTION INTRAVENOUS at 02:22

## 2022-09-22 RX ADMIN — SODIUM CHLORIDE, SODIUM GLUCONATE, SODIUM ACETATE, POTASSIUM CHLORIDE, MAGNESIUM CHLORIDE, SODIUM PHOSPHATE, DIBASIC, AND POTASSIUM PHOSPHATE 1000 ML: .53; .5; .37; .037; .03; .012; .00082 INJECTION, SOLUTION INTRAVENOUS at 00:29

## 2022-09-22 RX ADMIN — SERTRALINE 100 MG: 100 TABLET, FILM COATED ORAL at 08:27

## 2022-09-22 RX ADMIN — ASPIRIN 81 MG CHEWABLE TABLET 81 MG: 81 TABLET CHEWABLE at 08:27

## 2022-09-22 RX ADMIN — FLUTICASONE PROPIONATE 2 SPRAY: 50 SPRAY, METERED NASAL at 09:45

## 2022-09-22 RX ADMIN — INSULIN HUMAN 5 UNITS: 100 INJECTION, SOLUTION PARENTERAL at 20:07

## 2022-09-22 RX ADMIN — CEFTRIAXONE 2000 MG: 2 INJECTION, SOLUTION INTRAVENOUS at 03:41

## 2022-09-22 RX ADMIN — CALCIUM GLUCONATE 1 G: 20 INJECTION, SOLUTION INTRAVENOUS at 00:37

## 2022-09-22 RX ADMIN — SODIUM BICARBONATE 75 ML/HR: 84 INJECTION, SOLUTION INTRAVENOUS at 11:23

## 2022-09-22 RX ADMIN — PANTOPRAZOLE SODIUM 40 MG: 40 INJECTION, POWDER, FOR SOLUTION INTRAVENOUS at 01:06

## 2022-09-22 RX ADMIN — SODIUM BICARBONATE 50 MEQ: 84 INJECTION INTRAVENOUS at 10:00

## 2022-09-22 RX ADMIN — CALCIUM GLUCONATE 1 G: 20 INJECTION, SOLUTION INTRAVENOUS at 19:51

## 2022-09-22 RX ADMIN — SODIUM CHLORIDE 250 ML: 0.9 INJECTION, SOLUTION INTRAVENOUS at 19:52

## 2022-09-22 RX ADMIN — Medication 1 TABLET: at 15:57

## 2022-09-22 RX ADMIN — METOPROLOL TARTRATE 200 MG: 50 TABLET, FILM COATED ORAL at 08:28

## 2022-09-22 RX ADMIN — INSULIN HUMAN 10 UNITS: 100 INJECTION, SOLUTION PARENTERAL at 00:22

## 2022-09-22 RX ADMIN — LEVOTHYROXINE SODIUM 150 MCG: 150 TABLET ORAL at 08:27

## 2022-09-22 RX ADMIN — DEXTROSE MONOHYDRATE 25 G: 25 INJECTION, SOLUTION INTRAVENOUS at 00:29

## 2022-09-22 RX ADMIN — FUROSEMIDE 40 MG: 10 INJECTION, SOLUTION INTRAMUSCULAR; INTRAVENOUS at 19:48

## 2022-09-22 RX ADMIN — SODIUM BICARBONATE 650 MG TABLET 650 MG: at 08:27

## 2022-09-22 RX ADMIN — ALBUTEROL SULFATE 10 MG: 2.5 SOLUTION RESPIRATORY (INHALATION) at 00:24

## 2022-09-22 RX ADMIN — DEXTROSE MONOHYDRATE 25 ML: 25 INJECTION, SOLUTION INTRAVENOUS at 19:45

## 2022-09-22 NOTE — ASSESSMENT & PLAN NOTE
· Patient presented with episodes of vertigo over the past several days    Abnormal coordination on exam  · CT of the head negative  · Will admit on stroke pathway    9/22 - lipid panel shows cholesterol 223,     Plan:  Follow-up MRI   Q4 neurochecks  Continue Aspirin 81 mg oral daily  Will consider starting statin after MRI results (patient has allergy to atorvastatin)  PT/OT consult

## 2022-09-22 NOTE — ASSESSMENT & PLAN NOTE
· Continue metoprolol tartrate 200 mg oral daily  · Current blood pressure is 143/78  · Hold Cozaar due to DARRELL
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
## 2022-09-22 NOTE — ASSESSMENT & PLAN NOTE
CT of pelvis:   Left nephroureteral stent in place  Mild-to moderate left hydronephrosis is similar prior study  Inflammation along the proximal left ureter is similar prior study  Bilateral renal vascular calcifications  4 mm calcification in the   right kidney is likely a calculus  No right hydroureteronephrosis  Bilateral renal cortical thinning      FENA 8 1% - post renal/obstruction    Plan:  Further recs from Nephrology appreciated
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
## 2022-09-22 NOTE — PROGRESS NOTES
[facility] [address]  Progress Note - Latha Arellano 1945, 65 y o  female MRN: [id number]  Unit/Bed#: 409-01 Encounter: [id number]  Primary Care Provider: Gabby Vines DO   Date and time admitted to hospital: 9/21/2022  9:40 PM    * Sepsis due to urinary tract infection [facility])  Assessment & Plan  · Patient presented meeting sepsis criteria for tachycardia, leukocytosis  Did present with several episodes of vertigo over the past several days  · White blood cells elevated at 13 11  · Procalcitonin elevated at 0 29  · Lactic acid within normal limits  · Urinalysis showed evidence of nitrites, leukocytes, moderate bacteria    9/22 - WBCs trending down, procalcitonin trending down    Plan:  Follow-up Urine culture   Follow-up Blood culture   Continue ceftriaxone IV Q 24  Trend WBCs and procalcitonin     Stroke-like symptoms  Assessment & Plan  · Patient presented with episodes of vertigo over the past several days  Abnormal coordination on exam  · CT of the head negative  · Will admit on stroke pathway    9/22 - lipid panel shows cholesterol 223,     Plan:  Follow-up MRI   Q4 neurochecks  Continue Aspirin 81 mg oral daily  Will consider starting statin after MRI results (patient has allergy to atorvastatin)  PT/OT consult      Hydroureteronephrosis  Assessment & Plan  CT of pelvis:   Left nephroureteral stent in place  Mild-to moderate left hydronephrosis is similar prior study  Inflammation along the proximal left ureter is similar prior study  Bilateral renal vascular calcifications  4 mm calcification in the   right kidney is likely a calculus  No right hydroureteronephrosis  Bilateral renal cortical thinning      FENA 8 1% - post renal/obstruction    Plan:  Further recs from Nephrology appreciated    Increased anion gap metabolic acidosis  Assessment & Plan  · anion gap metabolic acidosis present on admission  · VBG showed pH of 7 141  · Anion gap 17  · Suspect this may be due to acute kidney injury  Patient has a history of renal tubular acidosis  · Serum uric acid normal  · FENA 8 1% - post-renal/obstructive    Plan:  Continue sodium bicarbonate 650 mg tablet b i d  Nephrology consult    GI bleed  Assessment & Plan  · Patient presented with hemoglobin of 6 1  Denies any knowledge of melena, hematochezia does have history of diverticulitis causing rectal bleed in the past  · CT of abdomen/pelvis shows no acute bleeding  · Will administer 1 unit PRBCs tonight    Plan:   Continue to Hold Eliquis  Follow-up GI consult    Hyperkalemia  Assessment & Plan  · Potassium 6 8 upon arrival  · Patient received insulin, bicarb, albuterol, calcium gluconate in the ED  · Potassium now improved to 5 1    9/22 - increasing again to 5 7, then 5 4  Plan:  Administer additional dose of calcium gluconate    Acute blood loss anemia  Assessment & Plan  · Patient presented with hemoglobin of 6 1 upon arrival   This is far from baseline of 8 2  · Patient was noted to have heme-positive stool on exam in the ED  Denies any melena, hematochezia to her knowledge  · Will administer 1 unit PRBCs tonight  · Q 8 H&H  · GI consult  · Please see assessment and plan for GI bleed    Essential hypertension  Assessment & Plan  · Continue metoprolol tartrate 200 mg oral daily  · Current blood pressure is 143/78  · Hold Cozaar due to DARRELL    Paroxysmal atrial fibrillation [facility])  Assessment & Plan  · Patient has history of paroxysmal AFib  · Rhythm is AV paced  Pacemaker in place    · Current heart rate 95  Plan:  Continue metoprolol tartrate 200 mg oral daily  Eliquis on hold due to GI bleed    Chronic combined systolic and diastolic CHF (congestive heart failure) ([facility])  Assessment & Plan  Wt Readings from Last 3 Encounters:   09/22/22 63 5 kg (140 lb)   05/31/22 62 8 kg (138 lb 7 2 oz)   11/16/21 62 8 kg (138 lb 7 2 oz)     · Does not appear to be in exacerbation at this time  · Continue metoprolol tartrate 20 mg oral daily  · Will hold Lasix due to DARRELL  · Continue monitor for signs of volume overload        Acute kidney injury superimposed on chronic kidney disease [facility])  Assessment & Plan  Lab Results   Component Value Date    EGFR 16 2022    EGFR 16 2022    EGFR 16 2022    CREATININE 2 64 (H) 2022    CREATININE 2 66 (H) 2022    CREATININE 2 69 (H) 2022   · Patient has history of CKD  In the past, patient was admitted with DARRELL which was determined to be renal tubular acidosis  Current presentation is similar with elevated potassium on arrival   Other etiology and may be due to the anemia  · Creatinine 3 02 upon arrival now down to 2 69 after 1 L fluids  · Will hold Lasix, losartan  · Continue home sodium bicarb 650 mg tablet oral b i d  After meals  · Will monitor for improvement in a m  After administration blood   · nephrology consult          VTE Pharmacologic Prophylaxis: VTE Score: 10 High Risk (Score >/= 5) - Pharmacological DVT Prophylaxis Ordered: enoxaparin (Lovenox)  Sequential Compression Devices Ordered  Patient Centered Rounds: I performed bedside rounds with nursing staff today  Discussions with Specialists or Other Care Team Provider:     Time Spent for Care: 20 minutes  More than 50% of total time spent on counseling and coordination of care as described above  Current Length of Stay: 0 day(s)  Current Patient Status: Inpatient   Certification Statement: The patient will continue to require additional inpatient hospital stay due to IV antibiotics  Discharge Plan: Anticipate discharge in 24-48 hrs to home  Code Status: Level 3 - DNAR and DNI    Subjective:   Patient seen at bedside, lying in bed comfortably  No acute distress  Patient seems slightly confused but is able to answer questions  Currently denies any pain       Objective:     Vitals:   Temp (24hrs), Av °F (36 7 °C), Min:97 3 °F (36 3 °C), Max:98 8 °F (37 1 °C)    Temp:  [97 3 °F (36 3 °C)-98 8 °F (37 1 °C)] 98 4 °F (36 9 °C)  HR:  [] 67  Resp:  [15-28] 17  BP: (105-187)/(46-89) 137/47  SpO2:  [92 %-100 %] 95 %  Body mass index is 19 kg/m²  Input and Output Summary (last 24 hours): Intake/Output Summary (Last 24 hours) at 9/22/2022 1818  Last data filed at 9/22/2022 1425  Gross per 24 hour   Intake 850 ml   Output 50 ml   Net 800 ml       Physical Exam:   Physical Exam  Vitals and nursing note reviewed  Constitutional:       General: She is not in acute distress  Appearance: She is well-developed  She is obese  HENT:      Head: Normocephalic and atraumatic  Right Ear: External ear normal       Left Ear: External ear normal       Nose: Nose normal       Mouth/Throat:      Mouth: Mucous membranes are moist    Eyes:      Conjunctiva/sclera: Conjunctivae normal    Cardiovascular:      Rate and Rhythm: Normal rate and regular rhythm  Heart sounds: No murmur heard  Pulmonary:      Effort: Pulmonary effort is normal  No respiratory distress  Breath sounds: Normal breath sounds  Abdominal:      Palpations: Abdomen is soft  Tenderness: There is no abdominal tenderness  Musculoskeletal:         General: No swelling or tenderness  Cervical back: Neck supple  Skin:     General: Skin is warm and dry  Findings: No rash  Neurological:      Mental Status: She is alert  She is disoriented  Motor: Weakness present  Comments: Impaired memory  Has been confirms this is patient's dementia baseline         Additional Data:     Labs:  Results from last 7 days   Lab Units 09/22/22  1639 09/22/22  0724 09/22/22  0540   WBC Thousand/uL  --   --  12 60*   HEMOGLOBIN g/dL 7 4*   < > 7 5*   HEMATOCRIT %  --   --  24 1*   PLATELETS Thousands/uL  --   --  272   NEUTROS PCT %  --   --  76*   LYMPHS PCT %  --   --  14   MONOS PCT %  --   --  8   EOS PCT %  --   --  1    < > = values in this interval not displayed       Results from last 7 days   Lab Units 09/22/22  1639 09/22/22  1220 09/22/22  0430   SODIUM mmol/L 136   < > 136   POTASSIUM mmol/L 6 2*   < > 5 7*   CHLORIDE mmol/L 105   < > 106   CO2 mmol/L 17*   < > 14*   BUN mg/dL 88*   < > 93*   CREATININE mg/dL 2 57*   < > 2 66*   ANION GAP mmol/L 14*   < > 16*   CALCIUM mg/dL 9 4   < > 10 1   ALBUMIN g/dL  --   --  3 0*   TOTAL BILIRUBIN mg/dL  --   --  0 28   ALK PHOS U/L  --   --  151*   ALT U/L  --   --  9*   AST U/L  --   --  18   GLUCOSE RANDOM mg/dL 106   < > 106    < > = values in this interval not displayed  Results from last 7 days   Lab Units 09/21/22  2314   INR  1 82*     Results from last 7 days   Lab Units 09/22/22  0119 09/22/22  0021   POC GLUCOSE mg/dl 125 106     Results from last 7 days   Lab Units 09/22/22  0430   HEMOGLOBIN A1C % 5 3     Results from last 7 days   Lab Units 09/22/22  0430 09/22/22  0229 09/22/22  0125   LACTIC ACID mmol/L  --  1 4  --    PROCALCITONIN ng/ml 0 27*  --  0 29*       Lines/Drains:  Invasive Devices  Report    Peripheral Intravenous Line  Duration           Peripheral IV 09/21/22 Right Forearm <1 day    Peripheral IV 09/22/22 Right Antecubital <1 day          Drain  Duration           External Urinary Catheter 112 days                  Telemetry:  Telemetry Orders (From admission, onward)             48 Hour Telemetry Monitoring  Continuous x 48 hours        References:    Telemetry Guidelines   Question:  Reason for 48 Hour Telemetry  Answer:  Acute CVA (<24 hrs old, hemispheric strokes, selected brainstem strokes, cardiac arrhythmias)                          Imaging: Reviewed radiology reports from this admission including: abdominal/pelvic CT    Recent Cultures (last 7 days):   Results from last 7 days   Lab Units 09/22/22  0229   BLOOD CULTURE  Received in Microbiology Lab  Culture in Progress  Received in Microbiology Lab  Culture in Progress         Last 24 Hours Medication List:   Current Facility-Administered Medications   Medication Dose Route Frequency Provider Last Rate    acetaminophen  488 mg Oral Q6H PRN Beth Pan PA-C      aspirin  81 mg Oral Daily Beth Pan PA-C      calcium carbonate-vitamin D  1 tablet Oral BID With Meals Beth Pan PA-C      [START ON 9/23/2022] cefTRIAXone  1,000 mg Intravenous Q24H Beth Pan PA-C      fluticasone  2 spray Nasal Daily Beth Pan PA-C      levothyroxine  150 mcg Oral Early Morning Beth Pan PA-C      [START ON 9/23/2022] metoprolol tartrate  50 mg Oral TID Sergey Ceballos DO      sertraline  100 mg Oral Daily Beth Pan PA-C      sodium bicarbonate infusion  75 mL/hr Intravenous Continuous Homar Hebert DO 75 mL/hr (09/22/22 1123)    sodium chloride (PF)  3 mL Intravenous Q1H PRN Beth Pan PA-C          Today, Patient Was Seen By: Kevin King MD    **Please Note: This note may have been constructed using a voice recognition system  **

## 2022-09-22 NOTE — ASSESSMENT & PLAN NOTE
· Patient presented with episodes of vertigo over the past several days    Abnormal coordination on exam  · CT of the head negative  · Will admit on stroke pathway  · MRI ordered  · Q 4 neurochecks  · Check fasting lipid panel  · Aspirin 81 mg oral daily  · PT consult  · OT consult  · Will follow-up MRI

## 2022-09-22 NOTE — ASSESSMENT & PLAN NOTE
· anion gap metabolic acidosis present on admission  · VBG showed pH of 7 141  · Anion gap 17  · Suspect this may be due to acute kidney injury  Patient has a history of renal tubular acidosis  · Serum uric acid normal  · FENA 8 1% - post-renal/obstructive    Plan:  Continue sodium bicarbonate 650 mg tablet b i d    Nephrology consult

## 2022-09-22 NOTE — NURSING NOTE
Patient refusing to follow commands and finish Neuro assessment stating shes "sick of doing this" Neuro assessment incomplete

## 2022-09-22 NOTE — PLAN OF CARE
Problem: PAIN - ADULT  Goal: Verbalizes/displays adequate comfort level or baseline comfort level  Description: Interventions:  - Encourage patient to monitor pain and request assistance  - Assess pain using appropriate pain scale  - Administer analgesics based on type and severity of pain and evaluate response  - Implement non-pharmacological measures as appropriate and evaluate response  - Consider cultural and social influences on pain and pain management  - Notify physician/advanced practitioner if interventions unsuccessful or patient reports new pain  Outcome: Progressing     Problem: INFECTION - ADULT  Goal: Absence or prevention of progression during hospitalization  Description: INTERVENTIONS:  - Assess and monitor for signs and symptoms of infection  - Monitor lab/diagnostic results  - Monitor all insertion sites, i e  indwelling lines, tubes, and drains  - Monitor endotracheal if appropriate and nasal secretions for changes in amount and color  - [place] appropriate cooling/warming therapies per order  - Administer medications as ordered  - Instruct and encourage patient and family to use good hand hygiene technique  - Identify and instruct in appropriate isolation precautions for identified infection/condition  Outcome: Progressing  Goal: Absence of fever/infection during neutropenic period  Description: INTERVENTIONS:  - Monitor WBC    Outcome: Progressing     Problem: SAFETY ADULT  Goal: Patient will remain free of falls  Description: INTERVENTIONS:  - Educate patient/family on patient safety including physical limitations  - Instruct patient to call for assistance with activity   - Consult OT/PT to assist with strengthening/mobility   - Keep Call bell within reach  - Keep bed low and locked with side rails adjusted as appropriate  - Keep care items and personal belongings within reach  - Initiate and maintain comfort rounds  - Make Fall Risk Sign visible to staff  - Offer Toileting every 2 Hours, in advance of need  - Initiate/Maintain bed/chair alarm  - Obtain necessary fall risk management equipment: alarms  - Apply yellow socks and bracelet for high fall risk patients  - Consider moving patient to room near nurses station  Outcome: Progressing  Goal: Maintain or return to baseline ADL function  Description: INTERVENTIONS:  -  Assess patient's ability to carry out ADLs; assess patient's baseline for ADL function and identify physical deficits which impact ability to perform ADLs (bathing, care of mouth/teeth, toileting, grooming, dressing, etc )  - Assess/evaluate cause of self-care deficits   - Assess range of motion  - Assess patient's mobility; develop plan if impaired  - Assess patient's need for assistive devices and provide as appropriate  - Encourage maximum independence but intervene and supervise when necessary  - Involve family in performance of ADLs  - Assess for home care needs following discharge   - Consider OT consult to assist with ADL evaluation and planning for discharge  - Provide patient education as appropriate  Outcome: Progressing  Goal: Maintains/Returns to pre admission functional level  Description: INTERVENTIONS:  - Perform BMAT or MOVE assessment daily    - Set and communicate daily mobility goal to care team and patient/family/caregiver  - Collaborate with rehabilitation services on mobility goals if consulted  - Perform Range of Motion 4 times a day  - Reposition patient every 2 hours    - Dangle patient 3-4 times a day  - Stand patient 4 times a day  - Ambulate patient 4 times a day  - Out of bed to chair 3 times a day   - Out of bed for meals 3 times a day  - Out of bed for toileting  - Record patient progress and toleration of activity level   Outcome: Progressing     Problem: DISCHARGE PLANNING  Goal: Discharge to home or other facility with appropriate resources  Description: INTERVENTIONS:  - Identify barriers to discharge w/patient and caregiver  - Arrange for needed discharge resources and transportation as appropriate  - Identify discharge learning needs (meds, wound care, etc )  - Arrange for interpretive services to assist at discharge as needed  - Refer to Case Management Department for coordinating discharge planning if the patient needs post-hospital services based on physician/advanced practitioner order or complex needs related to functional status, cognitive ability, or social support system  Outcome: Progressing     Problem: Knowledge Deficit  Goal: Patient/family/caregiver demonstrates understanding of disease process, treatment plan, medications, and discharge instructions  Description: Complete learning assessment and assess knowledge base    Interventions:  - Provide teaching at level of understanding  - Provide teaching via preferred learning methods  Outcome: Progressing     Problem: NEUROSENSORY - ADULT  Goal: Achieves stable or improved neurological status  Description: INTERVENTIONS  - Monitor and report changes in neurological status  - Monitor vital signs such as temperature, blood pressure, glucose, and any other labs ordered   - Initiate measures to prevent increased intracranial pressure      Outcome: Progressing     Problem: CARDIOVASCULAR - ADULT  Goal: Maintains optimal cardiac output and hemodynamic stability  Description: INTERVENTIONS:  - Monitor I/O, vital signs and rhythm  - Monitor for S/S and trends of decreased cardiac output  - Administer and titrate ordered vasoactive medications to optimize hemodynamic stability  - Assess quality of pulses, skin color and temperature  - Assess for signs of decreased coronary artery perfusion  - Instruct patient to report change in severity of symptoms  Outcome: Progressing     Problem: GENITOURINARY - ADULT  Goal: Maintains or returns to baseline urinary function  Description: INTERVENTIONS:  - Assess urinary function  - Encourage oral fluids to ensure adequate hydration if ordered  - Administer IV fluids as ordered to ensure adequate hydration  - Administer ordered medications as needed  - Offer frequent toileting  - Follow urinary retention protocol if ordered  Outcome: Progressing  Goal: Absence of urinary retention  Description: INTERVENTIONS:  - Assess patient's ability to void and empty bladder  - Monitor I/O  - Bladder scan as needed  - Discuss with physician/AP medications to alleviate retention as needed  - Discuss catheterization for long term situations as appropriate  Outcome: Progressing     Problem: METABOLIC, FLUID AND ELECTROLYTES - ADULT  Goal: Electrolytes maintained within normal limits  Description: INTERVENTIONS:  - Monitor labs and assess patient for signs and symptoms of electrolyte imbalances  - Administer electrolyte replacement as ordered  - Monitor response to electrolyte replacements, including repeat lab results as appropriate  - Instruct patient on fluid and nutrition as appropriate  Outcome: Progressing     Problem: SKIN/TISSUE INTEGRITY - ADULT  Goal: Skin Integrity remains intact(Skin Breakdown Prevention)  Description: Assess:  -Perform Arcadio assessment every shift  -Clean and moisturize skin every 4 houra  -Inspect skin when repositioning, toileting, and assisting with ADLS  -Assess under medical devices such as electrodes every shift  -Assess extremities for adequate circulation and sensation     Bed Management:  -Have minimal linens on bed & keep smooth, unwrinkled  -Change linens as needed when moist or perspiring  -Avoid sitting or lying in one position for more than 2 hours while in bed  -Keep HOB at 30 degrees     Toileting:  -Offer bedside commode  -Assess for incontinence every 2 hours  -Use incontinent care products after each incontinent episode such as breif    Activity:  -Mobilize patient 4 times a day  -Encourage activity and walks on unit  -Encourage or provide ROM exercises   -Turn and reposition patient every 2 Hours  -Use appropriate equipment to lift or move patient in bed  -Instruct/ Assist with weight shifting every 2 hours when out of bed in chair  -Consider limitation of chair time 4 hour intervals    Skin Care:  -Avoid use of baby powder, tape, friction and shearing, hot water or constrictive clothing  -Relieve pressure over bony prominences using alevyn  -Do not massage red bony areas    Next Steps:  -Teach patient strategies to minimize risks such as weight shift  Outcome: Progressing     Problem: MUSCULOSKELETAL - ADULT  Goal: Maintain or return mobility to safest level of function  Description: INTERVENTIONS:  - Assess patient's ability to carry out ADLs; assess patient's baseline for ADL function and identify physical deficits which impact ability to perform ADLs (bathing, care of mouth/teeth, toileting, grooming, dressing, etc )  - Assess/evaluate cause of self-care deficits   - Assess range of motion  - Assess patient's mobility  - Assess patient's need for assistive devices and provide as appropriate  - Encourage maximum independence but intervene and supervise when necessary  - Involve family in performance of ADLs  - Assess for home care needs following discharge   - Consider OT consult to assist with ADL evaluation and planning for discharge  - Provide patient education as appropriate  Outcome: Progressing

## 2022-09-22 NOTE — PLAN OF CARE
Problem: OCCUPATIONAL THERAPY ADULT  Goal: Performs self-care activities at highest level of function for planned discharge setting  See evaluation for individualized goals  Description: Treatment Interventions: ADL retraining, Functional transfer training, UE strengthening/ROM, Endurance training, Patient/family training, Equipment evaluation/education, Activityengagement, Cognitive reorientation          See flowsheet documentation for full assessment, interventions and recommendations  Note: Limitation: Decreased ADL status, Decreased UE strength, Decreased Safe judgement during ADL, Decreased cognition, Decreased endurance, Decreased self-care trans, Decreased high-level ADLs     Assessment: Pt is a 65 y o  female seen for OT evaluation s/p admit to [facility] on 9/21/2022 w/ Sepsis due to urinary tract infection ([facility] [address] )  Comorbidities affecting pt's functional performance at time of assessment include: DM and depression, GERD, CHF, migraine, hyperlipidemia, HTN, anemia, CA, CKD, a-fib, thyroid gland, arthritis  Personal factors affecting pt at time of IE include:steps to enter environment, behavioral pattern, difficulty performing ADLS, difficulty performing IADLS , limited insight into deficits, decreased initiation and engagement  and health management   Prior to admission, pt was (A) ADLs and IADLs with use of RW during mobility  Upon evaluation: Pt requires max (A) x1-2 with use of RW for functional transfers 2* the following deficits impacting occupational performance: weakness, decreased strength, decreased balance, decreased tolerance, impaired attention, impaired initiation, impaired memory, impaired sequencing, impaired problem solving, impulsivity, decreased safety awareness and impaired interpersonal skills  Pt to benefit from continued skilled OT tx while in the hospital to address deficits as defined above and maximize level of functional independence w ADL's and functional mobility   Occupational Performance areas to address include: grooming, bathing/shower, toilet hygiene, dressing, functional mobility, community mobility and clothing management  The patient's raw score on the AM-PAC Daily Activity inpatient short form is 13, standardized score is 32 03, less than 39 4  Patients at this level are likely to benefit from discharge to post-acute rehabilitation services  Please refer to the recommendation of the Occupational Therapist for safe discharge planning  Pt benefited from co-evaluation of skilled OT and PT therapists in order to most appropriately address functional deficits d/t extensive assistance required for safe functional mobility, decreased activity tolerance, and regression from functioning level prior to admission and/or onset of present illness  OT/PT objectives were addressed separately; please see PT note for specific goal areas targeted       OT Discharge Recommendation: Post acute rehabilitation services
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
## 2022-09-22 NOTE — ASSESSMENT & PLAN NOTE
· Patient presented meeting sepsis criteria for tachycardia, leukocytosis    Did present with several episodes of vertigo over the past several days  · White blood cells elevated at 13 11  · Procalcitonin elevated at 0 29  · Lactic acid within normal limits  · Urinalysis showed evidence of nitrites, leukocytes, moderate bacteria  · Urine culture sent  · Blood culture sent  · Initiate patient on ceftriaxone IV Q 24  · Follow-up white blood cells, procalcitonin
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
## 2022-09-22 NOTE — ASSESSMENT & PLAN NOTE
· anion gap metabolic acidosis present on admission  · VBG showed pH of 7 141  · Anion gap 17  · Suspect this may be due to acute kidney injury  Patient has a history of renal tubular acidosis  · Continue sodium bicarbonate 650 mg tablet b i d    · Check serum uric acid  · Check urine urea, sodium, creatinine  · Nephrology consult
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
## 2022-09-22 NOTE — ASSESSMENT & PLAN NOTE
· Patient presented meeting sepsis criteria for tachycardia, leukocytosis    Did present with several episodes of vertigo over the past several days  · White blood cells elevated at 13 11  · Procalcitonin elevated at 0 29  · Lactic acid within normal limits  · Urinalysis showed evidence of nitrites, leukocytes, moderate bacteria    9/22 - WBCs trending down, procalcitonin trending down    Plan:  Follow-up Urine culture   Follow-up Blood culture   Continue ceftriaxone IV Q 24  Trend WBCs and procalcitonin

## 2022-09-22 NOTE — ASSESSMENT & PLAN NOTE
· Patient presented with hemoglobin of 6 1    Denies any knowledge of melena, hematochezia does have history of diverticulitis causing rectal bleed in the past  · CT abdomen pelvis pending  · GI consult  · Will administer 1 unit PRBCs tonight  · Hold Eliquis  · GI consult

## 2022-09-22 NOTE — ASSESSMENT & PLAN NOTE
· Patient presented with hemoglobin of 6 1 upon arrival   This is far from baseline of 8 2  · Patient was noted to have heme-positive stool on exam in the ED    Denies any melena, hematochezia to her knowledge  · Will administer 1 unit PRBCs tonight  · Q 8 H&H  · GI consult  · Please see assessment and plan for GI bleed

## 2022-09-22 NOTE — PLAN OF CARE
Problem: PHYSICAL THERAPY ADULT  Goal: Performs mobility at highest level of function for planned discharge setting  See evaluation for individualized goals  Description: Treatment/Interventions: Functional transfer training, LE strengthening/ROM, Elevations, Therapeutic exercise, Endurance training, Bed mobility, Gait training          See flowsheet documentation for full assessment, interventions and recommendations  Note: Prognosis: Guarded  Problem List: Decreased strength, Decreased endurance, Impaired balance, Decreased mobility, Impaired judgement, Decreased cognition, Decreased safety awareness  Assessment: Patient is a 65 y o  female evaluated by Physical Therapy s/p admit to [address] [place] [address],[address] on 9/21/2022 with admitting diagnosis of: Hyperkalemia, Rectal bleeding, Weakness, Anemia, Occult GI bleeding, Acute kidney injury, Increased anion gap metabolic acidosis, Stroke-like symptoms, Sepsis due to urinary tract infection, and principal problem of: Sepsis due to urinary tract infection  PT was consulted to assess patient's functional mobility and discharge needs  Ordered are PT Evaluation and treatment with activity level of: up and OOB as tolerated  Comorbidities affecting patient's physical performance at time of assessment include: CAD, GERD, CHF, HLD, HTN, arthritis, hx of cancer, CKD, hypothyroidism, a-fib  Personal factors affecting the patient at time of IE include: ambulating with assistive device, step(s) to enter home, inability to navigate community distances, impaired cognition, impaired safety awareness, decreased initiation and engagement, inability/difficulty performing IADLs and inability/difficulty performing ADLs  Please locate objective findings from PT assessment regarding body systems outlined above  Upon evaluation, pt able to perform all functional mobility with maxA x 2, RW, and increased time  Frequent verbal cuing provided throughout for safety awareness and sequencing  Pt able to stand EOB with maxA x 2 however significant posterior lean demonstrated and pt unable to shift weight forwards onto RW  Further mobility not assessed d/t this  Pt with significant confusion throughout session; appropriately conversational, however providing incorrect answers to PLOF questions  The patient's AM-PAC Basic Mobility Inpatient Short Form Raw Score is 8  A Raw score of less than or equal to 16 suggests the patient may benefit from discharge to post-acute rehabilitation services  Please also refer to the recommendation of the Physical Therapist for safe discharge planning  Co treatment with OT secondary to complex medical condition of pt, possible A of 2 required to achieve and maintain transitional movements, requiring the need of skilled therapeutic intervention of 2 therapists to achieve delivery of services  Pt will benefit from continued PT intervention during LOS to address current deficits, increase LOF, and facilitate safe d/c to next level of care when medically appropriate  D/c recommendation at this time is post-acute rehabilitation services  PT Discharge Recommendation: Post acute rehabilitation services    See flowsheet documentation for full assessment

## 2022-09-22 NOTE — ASSESSMENT & PLAN NOTE
· Patient has history of paroxysmal AFib  · Rhythm is AV paced    Pacemaker in place  · Current heart rate 95  · Continue metoprolol tartrate 200 mg oral daily  · Eliquis on hold due to GI bleed

## 2022-09-22 NOTE — ASSESSMENT & PLAN NOTE
· Potassium 6 8 upon arrival  · Patient received insulin, bicarb, albuterol, calcium gluconate in the ED  · Potassium now improved to 5 1    9/22 - increasing again to 5 7, then 5 4    Plan:  Administer additional dose of calcium gluconate

## 2022-09-22 NOTE — ASSESSMENT & PLAN NOTE
Wt Readings from Last 3 Encounters:   09/21/22 63 5 kg (140 lb)   05/31/22 62 8 kg (138 lb 7 2 oz)   11/16/21 62 8 kg (138 lb 7 2 oz)     · Does not appear to be in exacerbation at this time  · Continue metoprolol tartrate 20 mg oral daily  · Will hold Lasix due to DARRELL  · Continue monitor for signs of volume overload

## 2022-09-22 NOTE — ASSESSMENT & PLAN NOTE
Lab Results   Component Value Date    EGFR 16 09/22/2022    EGFR 16 09/22/2022    EGFR 16 09/22/2022    CREATININE 2 64 (H) 09/22/2022    CREATININE 2 66 (H) 09/22/2022    CREATININE 2 69 (H) 09/22/2022   · Patient has history of CKD  In the past, patient was admitted with DARRELL which was determined to be renal tubular acidosis  Current presentation is similar with elevated potassium on arrival   Other etiology and may be due to the anemia  · Creatinine 3 02 upon arrival now down to 2 69 after 1 L fluids  · Will hold Lasix, losartan  · Continue home sodium bicarb 650 mg tablet oral b i d  After meals  · Will monitor for improvement in a m   After administration blood   · nephrology consult

## 2022-09-22 NOTE — TREATMENT PLAN
65year old female admitted with vertiginous symptoms as well as urinary tract infection  In January 2021 she was admitted at [facility] with a proximal ureteral stone and had cystoscopy LEFT ureteral stent insertion  Appears this same stent has been retained since that time without exchange or definitive therapy now almost 22 months  She has had at least 2 symptomatic enterococcus faecalis UTIs since that time  On review of images this stent is likely chronically occluded as she has had left hydroureteronephrosis for over a year  Acute plan  Stroke pathway for vertiginous symptoms  Culture directed antibiotic therapy for likely UTI    Longterm plan  She willl need definitive stone treatment and extraction of the retained stent via cystoscopy LEFT ureteroscopy lasering extraction of encrusted/retained stent as well as ureteral/renal stones, temporary stent exchange  This can be arranged with her primary/performing urologist or a referral to [facility] urology on an outpatient basis  Formal consult to follow      Timo Franco PA-C  09/22/22  7:47 PM

## 2022-09-22 NOTE — QUICK NOTE
Repeat labs noted  TCO2 Trend had increased to 24 and trended back to 17 which is above previous 14  Pt on bicarbonate gtt  Potassium had decreased to 5 4 and trended back up to 6 2  There is concern for GI bleed so AVOID kayexalate  Recommend IV calcium, insulin/dextrose for shifting and repeat chem in 4 hours  Give lasix 40mg IV for excretion and NSS bolus to maintain euvolemia  Would replace losses in 1:1 ratio

## 2022-09-22 NOTE — ASSESSMENT & PLAN NOTE
Lab Results   Component Value Date    EGFR 16 09/22/2022    EGFR 14 09/21/2022    EGFR 37 06/27/2022    CREATININE 2 69 (H) 09/22/2022    CREATININE 3 02 (H) 09/21/2022    CREATININE 1 36 (H) 06/27/2022   · Patient has history of CKD  In the past, patient was admitted with DARRELL which was determined to be renal tubular acidosis  Current presentation is similar with elevated potassium on arrival   Other etiology and may be due to the anemia  · Creatinine 3 02 upon arrival now down to 2 69 after 1 L fluids  · Will hold Lasix, losartan  · Continue home sodium bicarb 650 mg tablet oral b i d  After meals  · Check urine sodium, urea nitrogen, creatinine  · Will monitor for improvement in a m   After administration blood

## 2022-09-22 NOTE — ASSESSMENT & PLAN NOTE
Wt Readings from Last 3 Encounters:   09/22/22 63 5 kg (140 lb)   05/31/22 62 8 kg (138 lb 7 2 oz)   11/16/21 62 8 kg (138 lb 7 2 oz)     · Does not appear to be in exacerbation at this time  · Continue metoprolol tartrate 20 mg oral daily  · Will hold Lasix due to DARRELL  · Continue monitor for signs of volume overload

## 2022-09-22 NOTE — ASSESSMENT & PLAN NOTE
· Potassium 6 8 upon arrival  · Patient received insulin, bicarb, albuterol, calcium gluconate in the ED  · Potassium now improved to 5 1

## 2022-09-22 NOTE — ASSESSMENT & PLAN NOTE
· Patient has history of paroxysmal AFib  · Rhythm is AV paced  Pacemaker in place    · Current heart rate 95  Plan:  Continue metoprolol tartrate 200 mg oral daily  Eliquis on hold due to GI bleed

## 2022-09-22 NOTE — ASSESSMENT & PLAN NOTE
· Patient presented with hemoglobin of 6 1    Denies any knowledge of melena, hematochezia does have history of diverticulitis causing rectal bleed in the past  · CT of abdomen/pelvis shows no acute bleeding  · Will administer 1 unit PRBCs tonight    Plan:   Continue to Hold Eliquis  Follow-up GI consult

## 2022-09-23 ENCOUNTER — ANESTHESIA EVENT (INPATIENT)
Dept: PERIOP | Facility: HOSPITAL | Age: 77
DRG: 871 | End: 2022-09-23
Payer: COMMERCIAL

## 2022-09-23 ENCOUNTER — APPOINTMENT (OUTPATIENT)
Dept: PERIOP | Facility: HOSPITAL | Age: 77
DRG: 871 | End: 2022-09-23
Payer: COMMERCIAL

## 2022-09-23 ENCOUNTER — TELEPHONE (OUTPATIENT)
Dept: OTHER | Facility: HOSPITAL | Age: 77
End: 2022-09-23

## 2022-09-23 ENCOUNTER — ANESTHESIA (INPATIENT)
Dept: PERIOP | Facility: HOSPITAL | Age: 77
DRG: 871 | End: 2022-09-23
Payer: COMMERCIAL

## 2022-09-23 PROBLEM — S42.201A CLOSED FRACTURE OF RIGHT PROXIMAL HUMERUS: Status: RESOLVED | Noted: 2020-06-03 | Resolved: 2022-09-23

## 2022-09-23 PROBLEM — K35.80 ACUTE APPENDICITIS: Status: RESOLVED | Noted: 2020-07-29 | Resolved: 2022-09-23

## 2022-09-23 PROBLEM — J98.11 ATELECTASIS: Status: RESOLVED | Noted: 2020-07-29 | Resolved: 2022-09-23

## 2022-09-23 LAB
ABO GROUP BLD BPU: NORMAL
ALBUMIN SERPL BCP-MCNC: 2.6 G/DL (ref 3.5–5)
ALP SERPL-CCNC: 138 U/L (ref 46–116)
ALT SERPL W P-5'-P-CCNC: 6 U/L (ref 12–78)
ANION GAP SERPL CALCULATED.3IONS-SCNC: 10 MMOL/L (ref 4–13)
ANION GAP SERPL CALCULATED.3IONS-SCNC: 9 MMOL/L (ref 4–13)
AST SERPL W P-5'-P-CCNC: 24 U/L (ref 5–45)
BASOPHILS # BLD AUTO: 0.05 THOUSANDS/ÂΜL (ref 0–0.1)
BASOPHILS NFR BLD AUTO: 1 % (ref 0–1)
BILIRUB DIRECT SERPL-MCNC: 0.09 MG/DL (ref 0–0.2)
BILIRUB SERPL-MCNC: 0.45 MG/DL (ref 0.2–1)
BPU ID: NORMAL
BUN SERPL-MCNC: 76 MG/DL (ref 5–25)
BUN SERPL-MCNC: 78 MG/DL (ref 5–25)
CALCIUM SERPL-MCNC: 9.1 MG/DL (ref 8.3–10.1)
CALCIUM SERPL-MCNC: 9.4 MG/DL (ref 8.3–10.1)
CHLORIDE SERPL-SCNC: 105 MMOL/L (ref 96–108)
CHLORIDE SERPL-SCNC: 105 MMOL/L (ref 96–108)
CO2 SERPL-SCNC: 23 MMOL/L (ref 21–32)
CO2 SERPL-SCNC: 24 MMOL/L (ref 21–32)
CREAT SERPL-MCNC: 2.4 MG/DL (ref 0.6–1.3)
CREAT SERPL-MCNC: 2.45 MG/DL (ref 0.6–1.3)
CROSSMATCH: NORMAL
EOSINOPHIL # BLD AUTO: 0.35 THOUSAND/ÂΜL (ref 0–0.61)
EOSINOPHIL NFR BLD AUTO: 4 % (ref 0–6)
ERYTHROCYTE [DISTWIDTH] IN BLOOD BY AUTOMATED COUNT: 26.3 % (ref 11.6–15.1)
GFR SERPL CREATININE-BSD FRML MDRD: 18 ML/MIN/1.73SQ M
GFR SERPL CREATININE-BSD FRML MDRD: 18 ML/MIN/1.73SQ M
GLUCOSE SERPL-MCNC: 119 MG/DL (ref 65–140)
GLUCOSE SERPL-MCNC: 85 MG/DL (ref 65–140)
HCT VFR BLD AUTO: 27 % (ref 34.8–46.1)
HCT VFR BLD AUTO: 27.5 % (ref 34.8–46.1)
HGB BLD-MCNC: 6.4 G/DL (ref 11.5–15.4)
HGB BLD-MCNC: 9 G/DL (ref 11.5–15.4)
HGB BLD-MCNC: 9.1 G/DL (ref 11.5–15.4)
IMM GRANULOCYTES # BLD AUTO: 0.04 THOUSAND/UL (ref 0–0.2)
IMM GRANULOCYTES NFR BLD AUTO: 0 % (ref 0–2)
LYMPHOCYTES # BLD AUTO: 1.4 THOUSANDS/ÂΜL (ref 0.6–4.47)
LYMPHOCYTES NFR BLD AUTO: 16 % (ref 14–44)
MAGNESIUM SERPL-MCNC: 1.8 MG/DL (ref 1.6–2.6)
MCH RBC QN AUTO: 26.7 PG (ref 26.8–34.3)
MCHC RBC AUTO-ENTMCNC: 33.3 G/DL (ref 31.4–37.4)
MCV RBC AUTO: 80 FL (ref 82–98)
MONOCYTES # BLD AUTO: 0.62 THOUSAND/ÂΜL (ref 0.17–1.22)
MONOCYTES NFR BLD AUTO: 7 % (ref 4–12)
NEUTROPHILS # BLD AUTO: 6.58 THOUSANDS/ÂΜL (ref 1.85–7.62)
NEUTS SEG NFR BLD AUTO: 72 % (ref 43–75)
NRBC BLD AUTO-RTO: 0 /100 WBCS
PHOSPHATE SERPL-MCNC: 4.8 MG/DL (ref 2.3–4.1)
PLATELET # BLD AUTO: 182 THOUSANDS/UL (ref 149–390)
PMV BLD AUTO: 9.8 FL (ref 8.9–12.7)
POTASSIUM SERPL-SCNC: 4.6 MMOL/L (ref 3.5–5.3)
POTASSIUM SERPL-SCNC: 5.2 MMOL/L (ref 3.5–5.3)
PROCALCITONIN SERPL-MCNC: 0.34 NG/ML
PROT SERPL-MCNC: 6.6 G/DL (ref 6.4–8.4)
RBC # BLD AUTO: 3.37 MILLION/UL (ref 3.81–5.12)
SODIUM SERPL-SCNC: 138 MMOL/L (ref 135–147)
SODIUM SERPL-SCNC: 138 MMOL/L (ref 135–147)
UNIT DISPENSE STATUS: NORMAL
UNIT PRODUCT CODE: NORMAL
UNIT PRODUCT VOLUME: 300 ML
UNIT RH: NORMAL
WBC # BLD AUTO: 9.04 THOUSAND/UL (ref 4.31–10.16)

## 2022-09-23 PROCEDURE — 97530 THERAPEUTIC ACTIVITIES: CPT

## 2022-09-23 PROCEDURE — NC001 PR NO CHARGE: Performed by: INTERNAL MEDICINE

## 2022-09-23 PROCEDURE — 99233 SBSQ HOSP IP/OBS HIGH 50: CPT | Performed by: INTERNAL MEDICINE

## 2022-09-23 PROCEDURE — 85025 COMPLETE CBC W/AUTO DIFF WBC: CPT

## 2022-09-23 PROCEDURE — 84100 ASSAY OF PHOSPHORUS: CPT | Performed by: INTERNAL MEDICINE

## 2022-09-23 PROCEDURE — 99222 1ST HOSP IP/OBS MODERATE 55: CPT

## 2022-09-23 PROCEDURE — 85018 HEMOGLOBIN: CPT | Performed by: INTERNAL MEDICINE

## 2022-09-23 PROCEDURE — 97110 THERAPEUTIC EXERCISES: CPT

## 2022-09-23 PROCEDURE — 85014 HEMATOCRIT: CPT | Performed by: INTERNAL MEDICINE

## 2022-09-23 PROCEDURE — 43235 EGD DIAGNOSTIC BRUSH WASH: CPT | Performed by: INTERNAL MEDICINE

## 2022-09-23 PROCEDURE — 99223 1ST HOSP IP/OBS HIGH 75: CPT | Performed by: INTERNAL MEDICINE

## 2022-09-23 PROCEDURE — 80048 BASIC METABOLIC PNL TOTAL CA: CPT | Performed by: INTERNAL MEDICINE

## 2022-09-23 PROCEDURE — 84145 PROCALCITONIN (PCT): CPT | Performed by: INTERNAL MEDICINE

## 2022-09-23 PROCEDURE — 83735 ASSAY OF MAGNESIUM: CPT | Performed by: INTERNAL MEDICINE

## 2022-09-23 PROCEDURE — NC001 PR NO CHARGE

## 2022-09-23 PROCEDURE — 80076 HEPATIC FUNCTION PANEL: CPT | Performed by: INTERNAL MEDICINE

## 2022-09-23 PROCEDURE — 99232 SBSQ HOSP IP/OBS MODERATE 35: CPT | Performed by: INTERNAL MEDICINE

## 2022-09-23 PROCEDURE — 0DJ08ZZ INSPECTION OF UPPER INTESTINAL TRACT, VIA NATURAL OR ARTIFICIAL OPENING ENDOSCOPIC: ICD-10-PCS | Performed by: INTERNAL MEDICINE

## 2022-09-23 PROCEDURE — 85018 HEMOGLOBIN: CPT

## 2022-09-23 PROCEDURE — P9016 RBC LEUKOCYTES REDUCED: HCPCS

## 2022-09-23 RX ORDER — PHENYLEPHRINE HYDROCHLORIDE 10 MG/ML
INJECTION INTRAVENOUS AS NEEDED
Status: DISCONTINUED | OUTPATIENT
Start: 2022-09-23 | End: 2022-09-23

## 2022-09-23 RX ORDER — SODIUM CHLORIDE, SODIUM LACTATE, POTASSIUM CHLORIDE, CALCIUM CHLORIDE 600; 310; 30; 20 MG/100ML; MG/100ML; MG/100ML; MG/100ML
INJECTION, SOLUTION INTRAVENOUS CONTINUOUS PRN
Status: DISCONTINUED | OUTPATIENT
Start: 2022-09-23 | End: 2022-09-23

## 2022-09-23 RX ORDER — EPHEDRINE SULFATE 50 MG/ML
INJECTION INTRAVENOUS AS NEEDED
Status: DISCONTINUED | OUTPATIENT
Start: 2022-09-23 | End: 2022-09-23

## 2022-09-23 RX ORDER — SODIUM CHLORIDE, SODIUM LACTATE, POTASSIUM CHLORIDE, CALCIUM CHLORIDE 600; 310; 30; 20 MG/100ML; MG/100ML; MG/100ML; MG/100ML
75 INJECTION, SOLUTION INTRAVENOUS CONTINUOUS
Status: DISCONTINUED | OUTPATIENT
Start: 2022-09-23 | End: 2022-09-23

## 2022-09-23 RX ORDER — PROPOFOL 10 MG/ML
INJECTION, EMULSION INTRAVENOUS AS NEEDED
Status: DISCONTINUED | OUTPATIENT
Start: 2022-09-23 | End: 2022-09-23

## 2022-09-23 RX ORDER — LIDOCAINE HYDROCHLORIDE 20 MG/ML
INJECTION, SOLUTION EPIDURAL; INFILTRATION; INTRACAUDAL; PERINEURAL AS NEEDED
Status: DISCONTINUED | OUTPATIENT
Start: 2022-09-23 | End: 2022-09-23

## 2022-09-23 RX ORDER — DEXTROSE, SODIUM CHLORIDE, SODIUM LACTATE, POTASSIUM CHLORIDE, AND CALCIUM CHLORIDE 5; .6; .31; .03; .02 G/100ML; G/100ML; G/100ML; G/100ML; G/100ML
50 INJECTION, SOLUTION INTRAVENOUS CONTINUOUS
Status: DISCONTINUED | OUTPATIENT
Start: 2022-09-23 | End: 2022-09-27

## 2022-09-23 RX ADMIN — PROPOFOL 20 MG: 10 INJECTION, EMULSION INTRAVENOUS at 10:13

## 2022-09-23 RX ADMIN — LIDOCAINE HYDROCHLORIDE 40 MG: 20 INJECTION, SOLUTION EPIDURAL; INFILTRATION; INTRACAUDAL; PERINEURAL at 10:11

## 2022-09-23 RX ADMIN — METOPROLOL TARTRATE 50 MG: 50 TABLET, FILM COATED ORAL at 08:37

## 2022-09-23 RX ADMIN — METOPROLOL TARTRATE 50 MG: 50 TABLET, FILM COATED ORAL at 20:47

## 2022-09-23 RX ADMIN — SODIUM CHLORIDE, SODIUM LACTATE, POTASSIUM CHLORIDE, AND CALCIUM CHLORIDE: .6; .31; .03; .02 INJECTION, SOLUTION INTRAVENOUS at 09:50

## 2022-09-23 RX ADMIN — PROPOFOL 20 MG: 10 INJECTION, EMULSION INTRAVENOUS at 10:12

## 2022-09-23 RX ADMIN — LEVOTHYROXINE SODIUM 150 MCG: 150 TABLET ORAL at 05:16

## 2022-09-23 RX ADMIN — SODIUM BICARBONATE 75 ML/HR: 84 INJECTION, SOLUTION INTRAVENOUS at 06:07

## 2022-09-23 RX ADMIN — SERTRALINE 100 MG: 100 TABLET, FILM COATED ORAL at 08:37

## 2022-09-23 RX ADMIN — METOPROLOL TARTRATE 50 MG: 50 TABLET, FILM COATED ORAL at 15:31

## 2022-09-23 RX ADMIN — CEFTRIAXONE 1000 MG: 1 INJECTION, SOLUTION INTRAVENOUS at 05:16

## 2022-09-23 RX ADMIN — DEXTROSE, SODIUM CHLORIDE, SODIUM LACTATE, POTASSIUM CHLORIDE, AND CALCIUM CHLORIDE 50 ML/HR: 5; .6; .31; .03; .02 INJECTION, SOLUTION INTRAVENOUS at 09:46

## 2022-09-23 RX ADMIN — EPHEDRINE SULFATE 5 MG: 50 INJECTION, SOLUTION INTRAVENOUS at 10:22

## 2022-09-23 RX ADMIN — PHENYLEPHRINE HYDROCHLORIDE 100 MCG: 10 INJECTION INTRAVENOUS at 10:22

## 2022-09-23 RX ADMIN — Medication 1 TABLET: at 15:31

## 2022-09-23 RX ADMIN — PROPOFOL 30 MG: 10 INJECTION, EMULSION INTRAVENOUS at 10:11

## 2022-09-23 RX ADMIN — Medication 1 TABLET: at 08:37

## 2022-09-23 NOTE — UTILIZATION REVIEW
Inpatient Admission Authorization Request   NOTIFICATION OF INPATIENT ADMISSION/INPATIENT AUTHORIZATION REQUEST   SERVICING FACILITY:   [address] [place] [address]  [address]  Tax ID:  [id number]  NPI: [id number]  Place of Service: [place] [address]  Place of Service Code: 24     ATTENDING PROVIDER:  Attending Name and NPI#: James Romo [[id number]]  Address: [address]  Phone: [phone]     UTILIZATION REVIEW CONTACT:  Sharon Solis, Utilization   Network Utilization Review Department  Phone: [phone]  Fax [phone]  Email: [email or web address]     PHYSICIAN ADVISORY SERVICES:  FOR FXWZ-JR-PAFF REVIEW - MEDICAL NECESSITY DENIAL  Phone: [phone]  Fax: [phone]  Email: [email or web address]     TYPE OF REQUEST:  Inpatient Status     ADMISSION INFORMATION:  ADMISSION DATE/TIME: 9/22/22  1:25 AM  PATIENT DIAGNOSIS CODE/DESCRIPTION:  Hyperkalemia [E87 5]  Rectal bleeding [K62 5]  Weakness [R53 1]  Anemia [D64 9]  Occult GI bleeding [R19 5]  Acute kidney injury ([address] ) [N17 9]  Increased anion gap metabolic acidosis [X53 5]  Stroke-like symptoms [R29 90]  Sepsis due to urinary tract infection ([facility] [address] ) [A41 9, N39 0]  DISCHARGE DATE/TIME: No discharge date for patient encounter  IMPORTANT INFORMATION:  Please contact Sharon Lyles (Lorenz Sills) directly with any questions or concerns regarding this request  Department voicemails are confidential     Send requests for admission clinical reviews, concurrent reviews, approvals, and administrative denials due to lack of clinical to fax [phone]

## 2022-09-23 NOTE — ASSESSMENT & PLAN NOTE
· Patient presented meeting sepsis criteria for tachycardia, leukocytosis  Did present with several episodes of vertigo over the past several days  · White blood cells elevated at 13 11  · Procalcitonin elevated at 0 29  · Lactic acid within normal limits  · Urinalysis showed evidence of nitrites, leukocytes, moderate bacteria    9/23 - WBCs trending down, procalcitonin trending down    Patient clinically feeling much better, no burning with urination    Plan:  Urine culture shows Gram-negative bacteria   Follow-up Blood culture   Continue ceftriaxone IV Q 24  Trend WBCs and procalcitonin

## 2022-09-23 NOTE — PROGRESS NOTES
[facility] [address] [place]  Progress Note - Etelvina Ponce 1945, 65 y o  female MRN: [id number]  Unit/Bed#: 409-01 Encounter: [id number]  Primary Care Provider: Lajuanda Osgood, DO   Date and time admitted to hospital: 9/21/2022  9:40 PM    * Sepsis due to urinary tract infection [facility])  Assessment & Plan  · Patient presented meeting sepsis criteria for tachycardia, leukocytosis  Did present with several episodes of vertigo over the past several days  · White blood cells elevated at 13 11  · Procalcitonin elevated at 0 29  · Lactic acid within normal limits  · Urinalysis showed evidence of nitrites, leukocytes, moderate bacteria    9/23 - WBCs trending down, procalcitonin trending down  Patient clinically feeling much better, no burning with urination    Plan:  Urine culture shows Gram-negative bacteria   Follow-up Blood culture   Continue ceftriaxone IV Q 24  Trend WBCs and procalcitonin     Stroke-like symptoms  Assessment & Plan  · Patient presented with episodes of vertigo over the past several days  Abnormal coordination on exam  · CT of the head negative  · Will admit on stroke pathway    9/23- lipid panel shows cholesterol 223,   Patient's weakness unchanged    Plan:  Follow-up MRI   Q4 neurochecks  Continue Aspirin 81 mg oral daily  Will consider starting statin after MRI results (patient has allergy to atorvastatin)  PT/OT consult      Hydroureteronephrosis  Assessment & Plan  CT of pelvis:   Left nephroureteral stent in place  Mild-to moderate left hydronephrosis is similar prior study  Inflammation along the proximal left ureter is similar prior study  Bilateral renal vascular calcifications  4 mm calcification in the   right kidney is likely a calculus  No right hydroureteronephrosis  Bilateral renal cortical thinning  FENA 8 1% - post renal/obstruction  Patient seen by Nephrology, chronic > 1 year  Stent chronically occluded   Definitive stone tx and extraction can be done as op per urology  Plan:  Follow-up with urology as outpatient for definitive surgical treatment    GI bleed  Assessment & Plan  · Patient presented with hemoglobin of 6 1  Denies any knowledge of melena, hematochezia does have history of diverticulitis causing rectal bleed in the past  · CT of abdomen/pelvis shows no acute bleeding  · Will administer 1 unit PRBCs tonight    9/23 - EGD found 10+ polyps in stomach, most likely source of GI bleed  Patient received 1 unit of blood overnight, hemoglobin improved from 6 4-9  Plan:   Continue to Hold Eliquis  Continue to monitor hemoglobin Q8, transfuse if less than 7    Hyperkalemia  Assessment & Plan  · Potassium 6 8 upon arrival  · Patient received insulin, bicarb, albuterol, calcium gluconate in the ED  · Potassium now improved to 5 1    9/22 - increasing again to 5 7, then 5 4  Most likely due to holding diuretics  Plan:  Administer additional dose of calcium gluconate    Acute blood loss anemia  Assessment & Plan  · Patient presented with hemoglobin of 6 1 upon arrival   This is far from baseline of 8 2  · Patient was noted to have heme-positive stool on exam in the ED    Denies any melena, hematochezia to her knowledge  · Will administer 1 unit PRBCs tonight  · Q 8 H&H  · GI consult  · Please see assessment and plan for GI bleed    Chronic combined systolic and diastolic CHF (congestive heart failure) (HCC)  Assessment & Plan  Wt Readings from Last 3 Encounters:   09/22/22 53 4 kg (117 lb 11 6 oz)   05/31/22 62 8 kg (138 lb 7 2 oz)   11/16/21 62 8 kg (138 lb 7 2 oz)     · Does not appear to be in exacerbation at this time  · Continue metoprolol tartrate 20 mg oral daily  · Will hold Lasix due to DARRELL  · Continue monitor for signs of volume overload        Acute kidney injury superimposed on chronic kidney disease [facility])  Assessment & Plan  Lab Results   Component Value Date    EGFR 18 09/23/2022    EGFR 18 09/23/2022    EGFR 17 09/22/2022    CREATININE 2 40 (H) 2022    CREATININE 2 45 (H) 2022    CREATININE 2 57 (H) 2022   · Patient has history of CKD  In the past, patient was admitted with DARRELL which was determined to be renal tubular acidosis  Current presentation is similar with elevated potassium on arrival   Other etiology and may be due to the anemia  · Creatinine 3 02 upon arrival now down to 2 69 after 1 L fluids  · Will hold Lasix, losartan  · Continue home sodium bicarb 650 mg tablet oral b i d  After meals  · Creatinine continues to improve    Nephro recommendations: Start d5lr at gentle rate until PO intake resumed  Plan:  Continue gentle IV fluids  Monitor creatinine              VTE Pharmacologic Prophylaxis: VTE Score: 10 High Risk (Score >/= 5) - Pharmacological DVT Prophylaxis Ordered: enoxaparin (Lovenox)  Sequential Compression Devices Ordered  Patient Centered Rounds: I performed bedside rounds with nursing staff today  Discussions with Specialists or Other Care Team Provider:  Nephrology and urology    Education and Discussions with Family / Patient: Updated  (niece) at bedside  Time Spent for Care: 20 minutes  More than 50% of total time spent on counseling and coordination of care as described above  Current Length of Stay: 1 day(s)  Current Patient Status: Inpatient   Certification Statement: The patient will continue to require additional inpatient hospital stay due to IV antibiotics  Discharge Plan: Anticipate discharge in 24-48 hrs to home  Code Status: Level 3 - DNAR and DNI    Subjective:   Patient seen at bedside today, sitting up in chair comfortably  No acute distress  Patient states that she is feeling much better  She has energy and appetite  Denies fever/nausea/vomiting  Patient denies burning with urination, and states she is peeing very well      Objective:     Vitals:   Temp (24hrs), Av 4 °F (36 9 °C), Min:97 9 °F (36 6 °C), Max:100 2 °F (37 9 °C)    Temp:  [97 9 °F (36 6 °C)-100 2 °F (37 9 °C)] 98 5 °F (36 9 °C)  HR:  [61-79] 72  Resp:  [13-20] 18  BP: (101-181)/(47-73) 135/51  SpO2:  [93 %-100 %] 98 %  Body mass index is 19 kg/m²  Input and Output Summary (last 24 hours): Intake/Output Summary (Last 24 hours) at 9/23/2022 1501  Last data filed at 9/23/2022 1149  Gross per 24 hour   Intake 464 58 ml   Output 450 ml   Net 14 58 ml       Physical Exam:   Physical Exam  Vitals and nursing note reviewed  Constitutional:       General: She is not in acute distress  Appearance: She is well-developed and normal weight  HENT:      Head: Normocephalic and atraumatic  Right Ear: External ear normal       Left Ear: External ear normal       Nose: Nose normal       Mouth/Throat:      Mouth: Mucous membranes are moist    Eyes:      General:         Right eye: No discharge  Left eye: No discharge  Conjunctiva/sclera: Conjunctivae normal    Cardiovascular:      Rate and Rhythm: Normal rate and regular rhythm  Heart sounds: No murmur heard  Pulmonary:      Effort: Pulmonary effort is normal  No respiratory distress  Breath sounds: Normal breath sounds  Abdominal:      Palpations: Abdomen is soft  Tenderness: There is no abdominal tenderness  Musculoskeletal:      Cervical back: Neck supple  Right lower leg: No edema  Left lower leg: No edema  Skin:     General: Skin is warm and dry  Findings: No rash  Neurological:      Mental Status: She is alert  Motor: Weakness present     Psychiatric:         Mood and Affect: Mood normal       Comments: Patient has dementia, confusion present yesterday is resolved         Additional Data:     Labs:  Results from last 7 days   Lab Units 09/23/22  0632   WBC Thousand/uL 9 04   HEMOGLOBIN g/dL 9 0*   HEMATOCRIT % 27 0*   PLATELETS Thousands/uL 182   NEUTROS PCT % 72   LYMPHS PCT % 16   MONOS PCT % 7   EOS PCT % 4     Results from last 7 days   Lab Units 09/23/22  0632   SODIUM mmol/L 138 POTASSIUM mmol/L 5 2   CHLORIDE mmol/L 105   CO2 mmol/L 23   BUN mg/dL 76*   CREATININE mg/dL 2 40*   ANION GAP mmol/L 10   CALCIUM mg/dL 9 4   ALBUMIN g/dL 2 6*   TOTAL BILIRUBIN mg/dL 0 45   ALK PHOS U/L 138*   ALT U/L 6*   AST U/L 24   GLUCOSE RANDOM mg/dL 85     Results from last 7 days   Lab Units 09/21/22  2314   INR  1 82*     Results from last 7 days   Lab Units 09/22/22  2039 09/22/22  0119 09/22/22  0021   POC GLUCOSE mg/dl 138 125 106     Results from last 7 days   Lab Units 09/22/22  0430   HEMOGLOBIN A1C % 5 3     Results from last 7 days   Lab Units 09/23/22  0632 09/22/22  0430 09/22/22  0229 09/22/22  0125   LACTIC ACID mmol/L  --   --  1 4  --    PROCALCITONIN ng/ml 0 34* 0 27*  --  0 29*       Lines/Drains:  Invasive Devices  Report    Peripheral Intravenous Line  Duration           Peripheral IV 09/21/22 Right Forearm 1 day    Peripheral IV 09/22/22 Right Antecubital 1 day          Drain  Duration           External Urinary Catheter <1 day                  Telemetry:  Telemetry Orders (From admission, onward)             48 Hour Telemetry Monitoring  Continuous x 48 hours        References:    Telemetry Guidelines   Question:  Reason for 48 Hour Telemetry  Answer:  Acute CVA (<24 hrs old, hemispheric strokes, selected brainstem strokes, cardiac arrhythmias)                            Imaging: Reviewed radiology reports from this admission including: abdominal/pelvic CT    Recent Cultures (last 7 days):   Results from last 7 days   Lab Units 09/22/22 0229 09/22/22  0128   BLOOD CULTURE  No Growth at 24 hrs    No Growth at 24 hrs   --    URINE CULTURE   --  80,000-89,000 cfu/ml Gram Negative Robin Enteric Like*       Last 24 Hours Medication List:   Current Facility-Administered Medications   Medication Dose Route Frequency Provider Last Rate    acetaminophen  488 mg Oral Q6H PRN Conrad Duverney, PA-C      calcium carbonate-vitamin D  1 tablet Oral BID With Meals Conrad Duverney, PA-C  cefTRIAXone  1,000 mg Intravenous Q24H Radames Fernandes PA-C 1,000 mg (09/23/22 0516)    dextrose 5% lactated ringer's  50 mL/hr Intravenous Continuous [place] Anup, DO 50 mL/hr (09/23/22 1007)    fluticasone  2 spray Nasal Daily Radames Fernandes PA-C      levothyroxine  150 mcg Oral Early Morning Radames Fernandes PA-C      metoprolol tartrate  50 mg Oral TID Sharonda Ceballos DO      sertraline  100 mg Oral Daily Radames Fernandes PA-C      sodium chloride (PF)  3 mL Intravenous Q1H PRN Radames Fernandes PA-C          Today, Patient Was Seen By: Bhavna Urbina MD    **Please Note: This note may have been constructed using a voice recognition system  **

## 2022-09-23 NOTE — PLAN OF CARE
Problem: PHYSICAL THERAPY ADULT  Goal: Performs mobility at highest level of function for planned discharge setting  See evaluation for individualized goals  Description: Treatment/Interventions: Functional transfer training, LE strengthening/ROM, Elevations, Therapeutic exercise, Endurance training, Bed mobility, Gait training          See flowsheet documentation for full assessment, interventions and recommendations  Outcome: Progressing  Note: Prognosis: Fair  Problem List:  (Decreased strength; Decreased endurance; Impaired balance; Decreased mobility; Impaired judgement; Decreased cognition; Decreased safety awareness)  Assessment: Pt seen for PT treatment session this date with interventions consisting of therapeutic exercise to improve endurance to improve functional mobility and therapeutic activity to improve transfers and increase activity tolerance with functional mobility to decrease fall risk  Pt agreeable to PT treatment session upon arrival, pt found in bed  in no apparent distress  Since previous session, pt has made good progress as evidenced by improved transfers  Barriers during this session include weakness and fatigue  Pt continues to be functioning below baseline level, and remains limited 2* factors listed above and including impaired activity tolerance  Pt prognosis for achieving goals is good, pending pt progress with hospitalization/medical status improvements, and indicated by motivated to participate in therapy and ability to follow cues  PT will continue to see pt during current hospitalization in order to address the deficits listed above and provide interventions consistent w/ POC in effort to achieve goals  Current goals and POC remain appropriate, pt continues to have rehab potential  Pt is in need of continued activity in PT to improve strength balance endurance mobility transfers  with return to maximize LOF   From PT/mobility standpoint, recommendation at time of d/c would be post acute rehab  in order to promote return to PLOF and independence  The patient's AM-PAC Basic Mobility Inpatient Short Form Raw Score is 10  A Raw score of less than or equal to 16 suggests the patient may benefit from discharge to post-acute rehabilitation services  Please also refer to the recommendation of  Physical Therapy for safe discharge planning  PT Discharge Recommendation: Post acute rehabilitation services    See flowsheet documentation for full assessment
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
## 2022-09-23 NOTE — PLAN OF CARE
Problem: PAIN - ADULT  Goal: Verbalizes/displays adequate comfort level or baseline comfort level  Description: Interventions:  - Encourage patient to monitor pain and request assistance  - Assess pain using appropriate pain scale  - Administer analgesics based on type and severity of pain and evaluate response  - Implement non-pharmacological measures as appropriate and evaluate response  - Consider cultural and social influences on pain and pain management  - Notify physician/advanced practitioner if interventions unsuccessful or patient reports new pain  Outcome: Progressing     Problem: INFECTION - ADULT  Goal: Absence or prevention of progression during hospitalization  Description: INTERVENTIONS:  - Assess and monitor for signs and symptoms of infection  - Monitor lab/diagnostic results  - Monitor all insertion sites, i e  indwelling lines, tubes, and drains  - Monitor endotracheal if appropriate and nasal secretions for changes in amount and color  - [place] appropriate cooling/warming therapies per order  - Administer medications as ordered  - Instruct and encourage patient and family to use good hand hygiene technique  - Identify and instruct in appropriate isolation precautions for identified infection/condition  Outcome: Progressing  Goal: Absence of fever/infection during neutropenic period  Description: INTERVENTIONS:  - Monitor WBC    Outcome: Progressing     Problem: SAFETY ADULT  Goal: Patient will remain free of falls  Description: INTERVENTIONS:  - Educate patient/family on patient safety including physical limitations  - Instruct patient to call for assistance with activity   - Consult OT/PT to assist with strengthening/mobility   - Keep Call bell within reach  - Keep bed low and locked with side rails adjusted as appropriate  - Keep care items and personal belongings within reach  - Initiate and maintain comfort rounds  - Make Fall Risk Sign visible to staff  - Offer Toileting every 2 Hours, in advance of need  - Initiate/Maintain bed/chair alarm  - Obtain necessary fall risk management equipment: alarms  - Apply yellow socks and bracelet for high fall risk patients  - Consider moving patient to room near nurses station  Outcome: Progressing  Goal: Maintain or return to baseline ADL function  Description: INTERVENTIONS:  -  Assess patient's ability to carry out ADLs; assess patient's baseline for ADL function and identify physical deficits which impact ability to perform ADLs (bathing, care of mouth/teeth, toileting, grooming, dressing, etc )  - Assess/evaluate cause of self-care deficits   - Assess range of motion  - Assess patient's mobility; develop plan if impaired  - Assess patient's need for assistive devices and provide as appropriate  - Encourage maximum independence but intervene and supervise when necessary  - Involve family in performance of ADLs  - Assess for home care needs following discharge   - Consider OT consult to assist with ADL evaluation and planning for discharge  - Provide patient education as appropriate  Outcome: Progressing  Goal: Maintains/Returns to pre admission functional level  Description: INTERVENTIONS:  - Perform BMAT or MOVE assessment daily    - Set and communicate daily mobility goal to care team and patient/family/caregiver  - Collaborate with rehabilitation services on mobility goals if consulted  - Perform Range of Motion 4 times a day  - Reposition patient every 2 hours    - Dangle patient 3-4 times a day  - Stand patient 4 times a day  - Ambulate patient 4 times a day  - Out of bed to chair 3 times a day   - Out of bed for meals 3 times a day  - Out of bed for toileting  - Record patient progress and toleration of activity level   Outcome: Progressing     Problem: DISCHARGE PLANNING  Goal: Discharge to home or other facility with appropriate resources  Description: INTERVENTIONS:  - Identify barriers to discharge w/patient and caregiver  - Arrange for needed discharge resources and transportation as appropriate  - Identify discharge learning needs (meds, wound care, etc )  - Arrange for interpretive services to assist at discharge as needed  - Refer to Case Management Department for coordinating discharge planning if the patient needs post-hospital services based on physician/advanced practitioner order or complex needs related to functional status, cognitive ability, or social support system  Outcome: Progressing     Problem: Knowledge Deficit  Goal: Patient/family/caregiver demonstrates understanding of disease process, treatment plan, medications, and discharge instructions  Description: Complete learning assessment and assess knowledge base    Interventions:  - Provide teaching at level of understanding  - Provide teaching via preferred learning methods  Outcome: Progressing     Problem: NEUROSENSORY - ADULT  Goal: Achieves stable or improved neurological status  Description: INTERVENTIONS  - Monitor and report changes in neurological status  - Monitor vital signs such as temperature, blood pressure, glucose, and any other labs ordered   - Initiate measures to prevent increased intracranial pressure      Outcome: Progressing     Problem: CARDIOVASCULAR - ADULT  Goal: Maintains optimal cardiac output and hemodynamic stability  Description: INTERVENTIONS:  - Monitor I/O, vital signs and rhythm  - Monitor for S/S and trends of decreased cardiac output  - Administer and titrate ordered vasoactive medications to optimize hemodynamic stability  - Assess quality of pulses, skin color and temperature  - Assess for signs of decreased coronary artery perfusion  - Instruct patient to report change in severity of symptoms  Outcome: Progressing     Problem: GENITOURINARY - ADULT  Goal: Maintains or returns to baseline urinary function  Description: INTERVENTIONS:  - Assess urinary function  - Encourage oral fluids to ensure adequate hydration if ordered  - Administer IV fluids as ordered to ensure adequate hydration  - Administer ordered medications as needed  - Offer frequent toileting  - Follow urinary retention protocol if ordered  Outcome: Progressing  Goal: Absence of urinary retention  Description: INTERVENTIONS:  - Assess patient's ability to void and empty bladder  - Monitor I/O  - Bladder scan as needed  - Discuss with physician/AP medications to alleviate retention as needed  - Discuss catheterization for long term situations as appropriate  Outcome: Progressing     Problem: METABOLIC, FLUID AND ELECTROLYTES - ADULT  Goal: Electrolytes maintained within normal limits  Description: INTERVENTIONS:  - Monitor labs and assess patient for signs and symptoms of electrolyte imbalances  - Administer electrolyte replacement as ordered  - Monitor response to electrolyte replacements, including repeat lab results as appropriate  - Instruct patient on fluid and nutrition as appropriate  Outcome: Progressing     Problem: SKIN/TISSUE INTEGRITY - ADULT  Goal: Skin Integrity remains intact(Skin Breakdown Prevention)  Description: Assess:  -Perform Arcadio assessment every shift  -Clean and moisturize skin every 4 houra  -Inspect skin when repositioning, toileting, and assisting with ADLS  -Assess under medical devices such as electrodes every shift  -Assess extremities for adequate circulation and sensation     Bed Management:  -Have minimal linens on bed & keep smooth, unwrinkled  -Change linens as needed when moist or perspiring  -Avoid sitting or lying in one position for more than 2 hours while in bed  -Keep HOB at 30 degrees     Toileting:  -Offer bedside commode  -Assess for incontinence every 2 hours  -Use incontinent care products after each incontinent episode such as breif    Activity:  -Mobilize patient 4 times a day  -Encourage activity and walks on unit  -Encourage or provide ROM exercises   -Turn and reposition patient every 2 Hours  -Use appropriate equipment to lift or move patient in bed  -Instruct/ Assist with weight shifting every 2 hours when out of bed in chair  -Consider limitation of chair time 4 hour intervals    Skin Care:  -Avoid use of baby powder, tape, friction and shearing, hot water or constrictive clothing  -Relieve pressure over bony prominences using alevyn  -Do not massage red bony areas    Next Steps:  -Teach patient strategies to minimize risks such as weight shift  Outcome: Progressing     Problem: MUSCULOSKELETAL - ADULT  Goal: Maintain or return mobility to safest level of function  Description: INTERVENTIONS:  - Assess patient's ability to carry out ADLs; assess patient's baseline for ADL function and identify physical deficits which impact ability to perform ADLs (bathing, care of mouth/teeth, toileting, grooming, dressing, etc )  - Assess/evaluate cause of self-care deficits   - Assess range of motion  - Assess patient's mobility  - Assess patient's need for assistive devices and provide as appropriate  - Encourage maximum independence but intervene and supervise when necessary  - Involve family in performance of ADLs  - Assess for home care needs following discharge   - Consider OT consult to assist with ADL evaluation and planning for discharge  - Provide patient education as appropriate  Outcome: Progressing     Problem: Potential for Falls  Goal: Patient will remain free of falls  Description: INTERVENTIONS:  - Educate patient/family on patient safety including physical limitations  - Instruct patient to call for assistance with activity   - Consult OT/PT to assist with strengthening/mobility   - Keep Call bell within reach  - Keep bed low and locked with side rails adjusted as appropriate  - Keep care items and personal belongings within reach  - Initiate and maintain comfort rounds  - Make Fall Risk Sign visible to staff  - Offer Toileting every 2 Hours, in advance of need  - Initiate/Maintain bed/chair alarm  - Obtain necessary fall risk management equipment: alarms  - Apply yellow socks and bracelet for high fall risk patients  - Consider moving patient to room near nurses station  Outcome: Progressing     Problem: MOBILITY - ADULT  Goal: Maintain or return to baseline ADL function  Description: INTERVENTIONS:  -  Assess patient's ability to carry out ADLs; assess patient's baseline for ADL function and identify physical deficits which impact ability to perform ADLs (bathing, care of mouth/teeth, toileting, grooming, dressing, etc )  - Assess/evaluate cause of self-care deficits   - Assess range of motion  - Assess patient's mobility; develop plan if impaired  - Assess patient's need for assistive devices and provide as appropriate  - Encourage maximum independence but intervene and supervise when necessary  - Involve family in performance of ADLs  - Assess for home care needs following discharge   - Consider OT consult to assist with ADL evaluation and planning for discharge  - Provide patient education as appropriate  Outcome: Progressing  Goal: Maintains/Returns to pre admission functional level  Description: INTERVENTIONS:  - Perform BMAT or MOVE assessment daily    - Set and communicate daily mobility goal to care team and patient/family/caregiver  - Collaborate with rehabilitation services on mobility goals if consulted  - Perform Range of Motion 4 times a day  - Reposition patient every 2 hours    - Dangle patient 3-4 times a day  - Stand patient 4 times a day  - Ambulate patient 4 times a day  - Out of bed to chair 3 times a day   - Out of bed for meals 3 times a day  - Out of bed for toileting  - Record patient progress and toleration of activity level   Outcome: Progressing

## 2022-09-23 NOTE — TELEPHONE ENCOUNTER
Angelia Diaz is a 71-year-old female seen in urologic consultation at my [facility]  She has retained ureteral stent for 22 months  She will be treated for UTI  Will require sooner rather than later planning for ureteroscopy with laser and stone removal   Please contact patient with office follow-up with AP and/or Dr Noemi Cogan to discuss surgical options moving forward then begin scheduling contingent on discussion outcome  Thank you

## 2022-09-23 NOTE — ASSESSMENT & PLAN NOTE
Wt Readings from Last 3 Encounters:   09/22/22 53 4 kg (117 lb 11 6 oz)   05/31/22 62 8 kg (138 lb 7 2 oz)   11/16/21 62 8 kg (138 lb 7 2 oz)     · Does not appear to be in exacerbation at this time  · Continue metoprolol tartrate 20 mg oral daily  · Will hold Lasix due to DARRELL  · Continue monitor for signs of volume overload

## 2022-09-23 NOTE — ASSESSMENT & PLAN NOTE
CT of pelvis:   Left nephroureteral stent in place  Mild-to moderate left hydronephrosis is similar prior study  Inflammation along the proximal left ureter is similar prior study  Bilateral renal vascular calcifications  4 mm calcification in the   right kidney is likely a calculus  No right hydroureteronephrosis  Bilateral renal cortical thinning  FENA 8 1% - post renal/obstruction  Patient seen by Nephrology, chronic > 1 year  Stent chronically occluded  Definitive stone tx and extraction can be done as op per urology      Plan:  Follow-up with urology as outpatient for definitive surgical treatment
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
## 2022-09-23 NOTE — ANESTHESIA PREPROCEDURE EVALUATION
Procedure:  EGD    Relevant Problems   ANESTHESIA (within normal limits)      CARDIO   (+) Aortic atherosclerosis (HCC)   (+) CAD (coronary artery disease)   (+) Essential hypertension   (+) History of PTCA   (+) Hypercholesterolemia   (+) Mitral valve insufficiency   (+) Paroxysmal atrial fibrillation (HCC)      ENDO   (+) Acquired hypothyroidism      GI/HEPATIC   (+) GI bleed   (+) Gastroesophageal reflux disease without esophagitis   (+) Hiatal hernia   (+) Rectal bleeding      /RENAL   (+) Acute kidney injury superimposed on chronic kidney disease (HCC)   (+) Benign hypertensive renal disease   (+) Hydroureteronephrosis      HEMATOLOGY   (+) Acute blood loss anemia   (+) Anemia due to chronic kidney disease   (+) Iron deficiency anemia      NEURO/PSYCH   (+) Dementia (HCC)      Cardiovascular and Mediastinum   (+) Chronic combined systolic and diastolic CHF (congestive heart failure) (HCC)   (+) Ischemic cardiomyopathy      Other   (+) Hyperkalemia   (+) Increased anion gap metabolic acidosis   (+) S/P implantation of automatic cardioverter/defibrillator (AICD)   (+) Sepsis due to urinary tract infection ([facility])   (+) Stroke-like symptoms      Physical Exam    Airway    Mallampati score: II  TM Distance: >3 FB       Dental   Comment: edentulous,     Cardiovascular      Pulmonary      Other Findings  Denies nausea/vomiting, states no abdominal pain but stomach feels "upset" - reports diarrhea with blood     Lab Results   Component Value Date    WBC 9 04 09/23/2022    HGB 9 0 (L) 09/23/2022     09/23/2022     Lab Results   Component Value Date    SODIUM 138 09/23/2022    K 5 2 09/23/2022    BUN 76 (H) 09/23/2022    CREATININE 2 40 (H) 09/23/2022    EGFR 18 09/23/2022     Lab Results   Component Value Date    HGBA1C 5 3 09/22/2022       TTE yesterday Interpretation Summary         Left Ventricle: Left ventricular cavity size is normal  Wall thickness is moderately increased   There is moderate concentric hypertrophy  The left ventricular ejection fraction is 45%  Systolic function is mildly reduced  There is mild global hypokinesis  Diastolic function is mildly abnormal, consistent with grade I (abnormal) relaxation    Right Ventricle: Right ventricular cavity size is normal  Systolic function is normal     Left Atrium: The atrium is moderately dilated    Mitral Valve: There is severe annular calcification  There is moderate regurgitation    Tricuspid Valve: The estimated right ventricular systolic pressure is 38 95 mmHg    IVC/SVC: The right atrial pressure is estimated at 10 0 mmHg  The inferior vena cava is normal in size  Respirophasic changes were normal     Pulmonary Artery: The estimated pulmonary artery systolic pressure is 62 0 mmHg  The pulmonary artery systolic pressure is moderately increased  Anesthesia Plan  ASA Score- 3     Anesthesia Type- IV sedation with anesthesia with ASA Monitors  Additional Monitors:   Airway Plan:           Plan Factors-    Chart reviewed  Existing labs reviewed  Patient summary reviewed  Patient is not a current smoker  Induction- intravenous  Postoperative Plan-     Informed Consent- Anesthetic plan and risks discussed with patient and spouse (phone consent with  Remy Bales)  I personally reviewed this patient with the CRNA  Discussed and agreed on the Anesthesia Plan with the CRNA  Mike Hall

## 2022-09-23 NOTE — ASSESSMENT & PLAN NOTE
· Patient presented with episodes of vertigo over the past several days  Abnormal coordination on exam  · CT of the head negative  · Will admit on stroke pathway    9/23- lipid panel shows cholesterol 223,    Patient's weakness unchanged    Plan:  Follow-up MRI   Q4 neurochecks  Continue Aspirin 81 mg oral daily  Will consider starting statin after MRI results (patient has allergy to atorvastatin)  PT/OT consult

## 2022-09-23 NOTE — PROGRESS NOTES
Progress Note - Nephrology   Dagoberto Norman 65 y o  female MRN: [id number]  Unit/Bed#: 409-01 Encounter: [id number]    A/P:  1  DINO POA, suspect prerenal in part due to anemia,diuretic use, anorexia, gi bleed  Likely ischemic ATN component as well  Her Cr trends are unchanged at 2 4  Can be severely azotemic due to gi bleed/anemia/diuretic  Continue to hold standing diuretics  Hyperkalemia and acidosis improving,no emergent need for HD at this point  Record accurate I/O  Attempted to contact Isabel Yepez (spouse) but the phone # does not connect  Do not feel pt overtly uremic  Start d5lr at gentle rate until PO intake resumed  NPO status can cause hyperkalemia by transcellular shifts and insulin deficiency  2  CKD 4 baseline 1 7-2 0 previously, followed by Dr Denita Lal, seen in office 4/22  No emergent need for HD, still above baseline  3  Hyperkalemia due to gi bleed, increased reabsorption, dino,ckd, acidosis  Most recent potassium 5 2  Treat if > 5 4  Check chem q12 for now until trends more stable  2 gram k diet  Required D5/insulin/bicarb/ lasix/IVF for transcellular shifting  NPO today  Can start gentle IVF with LR until diet changed, LR does not cause hyperkalemia unless given at significant volumes  4  AGMA due to DINO/CKD  Can come off gtt, goal > 22  Resume sodium bicarb 650 bid  5  Hydronephrosis, chronic > 1 year  Stent chronically occluded  Check urine cx  Definitive stone tx and extraction can be done as op per urology  6  UTI, sepsis, abx per primary  7  ABLA, gi bleed  Keep type and screen active  Gi eval  hgb trended down to 6 4 and 9 0 this AM, sp 2 unit PRBC so far        Follow up reason for today's visit:   Dino/ckd/hyperkalemia      Subjective:   Patient seen and examined today  Offers no complaints  Appears comfortable  Denies cp/sob/n/v/d  A complete 10 point review of systems was performed and is otherwise negative  BP mildly elevated  Pt on room air     Weight highly variable, not likely accurate  UOP 500ml if completely recorded  Objective:     Vitals: Blood pressure 166/66, pulse 77, temperature 98 4 °F (36 9 °C), resp  rate 20, height 5' 6" (1 676 m), weight 53 4 kg (117 lb 11 6 oz), SpO2 99 %  ,Body mass index is 19 kg/m²  Weight (last 2 days)     Date/Time Weight    09/22/22 13:58:41 53 4 (117 73)    09/22/22 1027 63 5 (140)    09/21/22 2147 63 5 (140)            Intake/Output Summary (Last 24 hours) at 9/23/2022 0828  Last data filed at 9/23/2022 0527  Gross per 24 hour   Intake 364 58 ml   Output 500 ml   Net -135 42 ml     I/O last 3 completed shifts:   In: 1214 6 [Blood:1014 6; IV Piggyback:200]  Out: 500 [Urine:500]         Physical Exam: /66   Pulse 77   Temp 98 4 °F (36 9 °C)   Resp 20   Ht 5' 6" (1 676 m)   Wt 53 4 kg (117 lb 11 6 oz)   SpO2 99%   BMI 19 00 kg/m²     General Appearance:    Alert, cooperative, no distress, appears stated age   Head:    Normocephalic, without obvious abnormality, atraumatic   Eyes:    Conjunctiva/corneas clear   Ears:    Normal external ears   Nose:   Nares normal, septum midline, mucosa normal, no drainage    or sinus tenderness   Throat:   Lips, mucosa, and tongue normal; teeth and gums normal   Neck:   Supple, symmetrical, trachea midline, no adenopathy;        thyroid:  No enlargement/tenderness/nodules; no carotid    bruit or JVD   Back:     Symmetric, no curvature, ROM normal, no CVA tenderness   Lungs:     Clear to auscultation bilaterally, respirations unlabored   Chest wall:    No tenderness or deformity   Heart:    Regular rate and rhythm, S1 and S2 normal, no murmur, rub   or gallop   Abdomen:     Soft, non-tender, bowel sounds active   Extremities:   Extremities normal, atraumatic, no cyanosis or edema   Skin:   Skin color, texture, turgor normal, no rashes or lesions   Lymph nodes:   Cervical normal   Neurologic:   CNII-XII intact, disoriented, weakness, dementia at baseline             Lab, Imaging and other studies: I have personally reviewed pertinent labs  CBC:   Lab Results   Component Value Date    WBC 9 04 09/23/2022    HGB 9 0 (L) 09/23/2022    HCT 27 0 (L) 09/23/2022    MCV 80 (L) 09/23/2022     09/23/2022    MCH 26 7 (L) 09/23/2022    MCHC 33 3 09/23/2022    RDW 26 3 (H) 09/23/2022    MPV 9 8 09/23/2022    NRBC 0 09/23/2022     CMP:   Lab Results   Component Value Date    K 5 2 09/23/2022     09/23/2022    CO2 23 09/23/2022    BUN 76 (H) 09/23/2022    CREATININE 2 40 (H) 09/23/2022    CALCIUM 9 4 09/23/2022    AST 24 09/23/2022    ALT 6 (L) 09/23/2022    ALKPHOS 138 (H) 09/23/2022    EGFR 18 09/23/2022         Results from last 7 days   Lab Units 09/23/22  0632 09/23/22  0041 09/22/22  1639 09/22/22  1220 09/22/22  0430   POTASSIUM mmol/L 5 2 4 6 6 2*   < > 5 7*   CHLORIDE mmol/L 105 105 105   < > 106   CO2 mmol/L 23 24 17*   < > 14*   BUN mg/dL 76* 78* 88*   < > 93*   CREATININE mg/dL 2 40* 2 45* 2 57*   < > 2 66*   CALCIUM mg/dL 9 4 9 1 9 4   < > 10 1   ALK PHOS U/L 138*  --   --   --  151*   ALT U/L 6*  --   --   --  9*   AST U/L 24  --   --   --  18    < > = values in this interval not displayed  Phosphorus:   Lab Results   Component Value Date    PHOS 4 8 (H) 09/23/2022     Magnesium:   Lab Results   Component Value Date    MG 1 8 09/23/2022     Urinalysis: No results found for: Kristyn Cheese, SPECGRAV, PHUR, LEUKOCYTESUR, NITRITE, PROTEINUA, GLUCOSEU, KETONESU, BILIRUBINUR, BLOODU  Ionized Calcium: No results found for: CAION  Coagulation: No results found for: PT, INR, APTT  Troponin: No results found for: TROPONINI  ABG: No results found for: PHART, JFX7IIM, PO2ART, JBA5GOM, T4BTIOHC, BEART, SOURCE  Radiology review:     IMAGING  Procedure: CT chest abdomen pelvis wo contrast    Result Date: 9/22/2022  Narrative: CT CHEST, ABDOMEN AND PELVIS WITHOUT IV CONTRAST INDICATION:   Sepsis sepsis d/t UTI; DARRELL  Phuc Zapata COMPARISON:  CT chest July 29, 2020    CT abdomen pelvis May 31, 2022 TECHNIQUE: CT examination of the chest, abdomen and pelvis was performed without intravenous contrast  Axial, sagittal, and coronal 2D reformatted images were created from the source data and submitted for interpretation  Radiation dose length product (DLP) for this visit:  589 mGy-cm   This examination, like all CT scans performed in the [facility], was performed utilizing techniques to minimize radiation dose exposure, including the use of iterative reconstruction and automated exposure control  Enteric contrast was not administered  FINDINGS: CHEST LUNGS:  No focal consolidation  Mild bibasilar linear atelectasis  Subpleural 2 mm left lower lobe nodule is unchanged from index study of April 11, 2018 (series 3, image 68)  Bronchiolectasis in the posterior lower lobes PLEURA:  Unremarkable  HEART/GREAT VESSELS: Cardiomegaly  Coronary artery calcifications and atherosclerotic calcifications of the aorta  Mitral annular calcifications  No thoracic aortic aneurysm  MEDIASTINUM AND JEN:  Unremarkable  CHEST WALL AND LOWER NECK:  Right chest wall cardiac device  ABDOMEN LIVER/BILIARY TREE:  Unremarkable  GALLBLADDER:  Gallbladder is surgically absent  SPLEEN:  Unremarkable  PANCREAS:  Unremarkable  ADRENAL GLANDS:  Unremarkable  KIDNEYS/URETERS:  Left nephroureteral stent in place  Mild-to moderate left hydronephrosis is similar prior study  Inflammation along the proximal left ureter is similar prior study  Bilateral renal vascular calcifications  4 mm calcification in the right kidney is likely a calculus  No right hydroureteronephrosis  Bilateral renal cortical thinning  STOMACH AND BOWEL:  There is colonic diverticulosis without evidence of acute diverticulitis  Small amount of hyperattenuating layering material in the cecum represents ingested material (series 601, image 55)  No bowel obstruction  Small hiatal hernia   APPENDIX:  There are expected postoperative changes of appendectomy  ABDOMINOPELVIC CAVITY:  Left para-aortic lymph node at the level of the renal vein is unchanged in size measuring 1 1 cm in short axis (series 2, image 63)  No pneumoperitoneum  No ascites  VESSELS:  Atherosclerotic changes are present  No evidence of aneurysm  PELVIS REPRODUCTIVE ORGANS:  Surgical changes of prior hysterectomy  URINARY BLADDER:  Unremarkable  ABDOMINAL WALL/INGUINAL REGIONS:  Postsurgical change  Small infraumbilical hernia containing nonobstructed small bowel  Unchanged angulation of the sternum without displacement likely representing sequelae of remote injury  OSSEOUS STRUCTURES:  No acute fracture or destructive osseous lesion  Spinal degenerative changes are noted  Angulation of the sternum without displacement is similar prior study and likely represents sequelae of remote injury  Chronic fracture deformity of the right humeral neck (series 601, image 68     Impression: Mild-to-moderate left hydroureteronephrosis with inflammation along the proximal ureter is similar to the May 31, 2022 CT  Nephroureteral stent is in place  Workstation performed: [id number]     Procedure: CT head without contrast    Result Date: 9/21/2022  Narrative: CT BRAIN - WITHOUT CONTRAST INDICATION:   Dysmetria and gait disturbance for several days  COMPARISON:  7/29/2020  TECHNIQUE:  CT examination of the brain was performed  In addition to axial images, sagittal and coronal 2D reformatted images were created and submitted for interpretation  Radiation dose length product (DLP) for this visit:  825 38 mGy-cm   This examination, like all CT scans performed in the [facility], was performed utilizing techniques to minimize radiation dose exposure, including the use of iterative  reconstruction and automated exposure control  IMAGE QUALITY:  Diagnostic   FINDINGS: PARENCHYMA:  Stable encephalomalacia and gliosis in the right posterior temporal occipital region consistent with a chronic infarct  Periventricular and subcortical hypoattenuating foci consistent with microangiopathic disease  No acute intracranial hemorrhage or mass effect  VENTRICLES AND EXTRA-AXIAL SPACES:  No hydrocephalus or extra-axial collection  VISUALIZED ORBITS AND PARANASAL SINUSES:  Intact globes and orbits  Clear paranasal sinuses  CALVARIUM AND EXTRACRANIAL SOFT TISSUES:  No lytic or blastic lesion or fracture  Impression: No acute intracranial abnormality  Workstation performed: [id number]     Procedure: US kidney and bladder    Result Date: 9/23/2022  Narrative: RENAL ULTRASOUND INDICATION:   acute kidney injury, hydronephrosis  COMPARISON: Multiple priors most recently same day CT TECHNIQUE:   Ultrasound of the retroperitoneum was performed with a curvilinear transducer utilizing volumetric sweeps and still imaging techniques  FINDINGS: KIDNEYS: Symmetric and normal size  Right kidney:  10 1 x 5 2 x 4 7 cm  Volume 128 8 mL Left kidney:  9 9 x 7 9 x 6 1 cm  Volume 248 1 mL Right kidney Poorly visualized due to underpenetration  Normal echogenicity and contour  No mass is identified  No hydronephrosis  No shadowing calculi  No perinephric fluid collections  Left kidney Poorly visualized due to underpenetration  Normal echogenicity and contour  No mass is identified  Moderate hydronephrosis with stent in place, however, the stent is poorly visualized  No shadowing calculi  No perinephric fluid collections  URETERS: Nonvisualized  BLADDER: Under distended, limiting evaluation  Impression: Limited visualization of the kidneys  Moderate left hydronephrosis  Stent poorly visualized  Workstation performed: [id number]     Procedure: Echo complete w/ contrast if indicated    Result Date: 9/22/2022  Narrative: [employer]  Left Ventricle: Left ventricular cavity size is normal  Wall thickness is moderately increased  There is moderate concentric hypertrophy  The left ventricular ejection fraction is 45%   Systolic function is mildly reduced  There is mild global hypokinesis  Diastolic function is mildly abnormal, consistent with grade I (abnormal) relaxation    Right Ventricle: Right ventricular cavity size is normal  Systolic function is normal    Left Atrium: The atrium is moderately dilated    Mitral Valve: There is severe annular calcification  There is moderate regurgitation    Tricuspid Valve: The estimated right ventricular systolic pressure is 64 44 mmHg    IVC/SVC: The right atrial pressure is estimated at 10 0 mmHg  The inferior vena cava is normal in size  Respirophasic changes were normal    Pulmonary Artery: The estimated pulmonary artery systolic pressure is 27 3 mmHg  The pulmonary artery systolic pressure is moderately increased  Current Facility-Administered Medications   Medication Dose Route Frequency    acetaminophen (TYLENOL) tablet 488 mg  488 mg Oral Q6H PRN    calcium carbonate-vitamin D (OSCAL-D) 500 mg-200 units per tablet 1 tablet  1 tablet Oral BID With Meals    cefTRIAXone (ROCEPHIN) IVPB (premix in dextrose) 1,000 mg 50 mL  1,000 mg Intravenous Q24H    fluticasone (FLONASE) 50 mcg/act nasal spray 2 spray  2 spray Nasal Daily    levothyroxine tablet 150 mcg  150 mcg Oral Early Morning    metoprolol tartrate (LOPRESSOR) tablet 50 mg  50 mg Oral TID    sertraline (ZOLOFT) tablet 100 mg  100 mg Oral Daily    sodium bicarbonate 150 mEq in dextrose 5 % 1,000 mL infusion  75 mL/hr Intravenous Continuous    sodium chloride (PF) 0 9 % injection 3 mL  3 mL Intravenous Q1H PRN     Medications Discontinued During This Encounter   Medication Reason    calcium gluconate 3 g in sodium chloride 0 9 % 100 mL IVPB     sodium bicarbonate tablet 650 mg     metoprolol tartrate (LOPRESSOR) tablet 200 mg     aspirin chewable tablet 81 mg        Windy Velasquez DO      This progress note was produced in part using a dictation device which may document imprecise wording from author's original intent

## 2022-09-23 NOTE — ANESTHESIA POSTPROCEDURE EVALUATION
Post-Op Assessment Note    CV Status:  Stable  Pain Score: 0    Pain management: adequate     Mental Status:  Alert and awake   Hydration Status:  Euvolemic   PONV Controlled:  Controlled   Airway Patency:  Patent      Post Op Vitals Reviewed: Yes      Staff: Anesthesiologist, CRNA         No complications documented      BP (!) 172/72 (09/23/22 1031)    Temp 100 2 °F (37 9 °C) (09/23/22 1031)    Pulse 69 (09/23/22 1031)   Resp 20 (09/23/22 1031)    SpO2 99 % (09/23/22 1031)

## 2022-09-23 NOTE — ASSESSMENT & PLAN NOTE
Lab Results   Component Value Date    EGFR 18 09/23/2022    EGFR 18 09/23/2022    EGFR 17 09/22/2022    CREATININE 2 40 (H) 09/23/2022    CREATININE 2 45 (H) 09/23/2022    CREATININE 2 57 (H) 09/22/2022   · Patient has history of CKD  In the past, patient was admitted with DARRELL which was determined to be renal tubular acidosis  Current presentation is similar with elevated potassium on arrival   Other etiology and may be due to the anemia  · Creatinine 3 02 upon arrival now down to 2 69 after 1 L fluids  · Will hold Lasix, losartan  · Continue home sodium bicarb 650 mg tablet oral b i d  After meals  · Creatinine continues to improve    Nephro recommendations: Start d5lr at gentle rate until PO intake resumed       Plan:  Continue gentle IV fluids  Monitor creatinine

## 2022-09-23 NOTE — ASSESSMENT & PLAN NOTE
· Patient presented with hemoglobin of 6 1  Denies any knowledge of melena, hematochezia does have history of diverticulitis causing rectal bleed in the past  · CT of abdomen/pelvis shows no acute bleeding  · Will administer 1 unit PRBCs tonight    9/23 - EGD found 10+ polyps in stomach, most likely source of GI bleed  Patient received 1 unit of blood overnight, hemoglobin improved from 6 4-9      Plan:   Continue to Hold Eliquis  Continue to monitor hemoglobin Q8, transfuse if less than 7
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
## 2022-09-23 NOTE — CASE MANAGEMENT
Case Management Assessment & Discharge Planning Note    Patient name Jessie Banegas  Location [id number] MRN [id number]  : 1945 Date 2022       Current Admission Date: 2022  Current Admission Diagnosis:Sepsis due to urinary tract infection [facility])   Patient Active Problem List    Diagnosis Date Noted    Dementia ([place] [address] )     Stroke-like symptoms 2022    Hyperkalemia 2022    Sepsis due to urinary tract infection ([place] [address] ) 2022    GI bleed 2022    Increased anion gap metabolic acidosis     Hydroureteronephrosis 2022    Diverticulitis 2022    Rectal bleeding 2022    Elevated troponin 2022    Iron deficiency anemia 2022    Anemia due to chronic kidney disease 2022    Paroxysmal atrial fibrillation ([place] [address] ) 2020    Ischemic cardiomyopathy 2020    S/P implantation of automatic cardioverter/defibrillator (AICD) 2020    Essential hypertension 2020    History of PTCA 2020    Leg swelling 2020    Acute kidney injury superimposed on chronic kidney disease ([place] [address] ) 2020    Perforated appendicitis 2020    Sepsis ([place] [address] ) 2020    Chronic combined systolic and diastolic CHF (congestive heart failure) ([place] [address] ) 2020    Pyuria 2020    Microscopic hematuria 2020    Vitamin D insufficiency 2020    Mitral valve insufficiency 2020    Hypercholesterolemia 2020    Aortic atherosclerosis ([place] [address] ) 2020    Renal calculus 2020    Diverticulosis 2020    Hiatal hernia 2020    Pulmonary nodule 2020    Benign hypertensive renal disease 2019    Acute blood loss anemia 2019    Gastroesophageal reflux disease without esophagitis 2019    Closed fracture of body of sternum with routine healing 2018    Congest[facility] disease [facility] ) 2018    Acquired hypothyroidism 2018    CAD (coronary artery disease) 04/12/2018    Forgetfulness 04/12/2018    Acute pain due to trauma 04/12/2018    Hx of fall 04/12/2018    Stress incontinence in female 04/12/2018    Anemia in chronic kidney disease 03/20/2018      LOS (days): 1  Geometric Mean LOS (GMLOS) (days): 3 50  Days to GMLOS:2 1     OBJECTIVE:    Risk of Unplanned Readmission Score: 21 93         Current admission status: Inpatient       Preferred Pharmacy:   [address] [place] [address] [address], [address] [place] [address]  [address]  [address]  DR Lauro FREEMAN [address]  Phone: [phone] Fax: [phone]    Primary Care Provider: Maine Contreras DO    Primary Insurance: [facility]  Secondary Insurance:     ASSESSMENT:  [address]  Representative - Spouse   Primary Phone: [phone] (Home)               Advance Directives  Does patient have a [address]?: No  Was patient offered paperwork?: Yes (declines)  Does patient currently have a Health Care decision maker?: Yes, please see Health Care Proxy section  Does patient have Advance Directives?: No  Was patient offered paperwork?: Yes (declines)  Primary Contact: Lepanto Shankar (Spouse)   [phone] (H)         Readmission Root Cause  30 Day Readmission: No    Patient Information  Admitted from[de-identified] Home  Mental Status: Alert  During Assessment patient was accompanied by: Not accompanied during assessment  Assessment information provided by[de-identified] Patient  Primary Caregiver: [address] [facility] of Residence: [address] do you live in?: [place] entry access options   Select all that apply : Stairs  Number of steps to enter home :  (5+5 TAMY)  Type of Current Residence: [place]  In the last 12 months, was there a time when you were not able to pay the mortgage or rent on time?: No  In the last 12 months, how many places have you lived?: 1  In the last 12 months, was there a time when you did not have a steady place to sleep or slept in a shelter (including now)?: No  Homeless/housing insecurity resource given?: N/A  Living Arrangements: Lives w/ Spouse/significant other  Is patient a ?: No    Activities of Daily Living Prior to Admission  Functional Status: Assistance  Completes ADLs independently?: No  Level of ADL dependence: Assistance  Ambulates independently?: No  Level of ambulatory dependence: Assistance  Does patient use assisted devices?: Yes  Assisted Devices (DME) used: Olivia Paulino, Wheelchair  Does patient currently own DME?: Yes  What DME does the patient currently own?: Straight Jessica Valle, Wheelchair  Does patient have a history of Outpatient Therapy (PT/OT)?: No  Does the patient have a history of Short-Term Rehab?: Yes (hx STR [facility] and hx STR at [facility])  Does patient have a history of [facility]?: Yes (spouse unsure of agency)  Does patient currently have [facility]?: No         Patient Information Continued  Income Source: Pension/detention  Does patient have prescription coverage?: Yes  Within the past 12 months, you worried that your food would run out before you got the money to buy more : Never true  Within the past 12 months, the food you bought just didn't last and you didn't have money to get more : Never true  Food insecurity resource given?: N/A  Does patient receive dialysis treatments?: No  Does patient have a history of substance abuse?: No  Does patient have a history of Mental Health Diagnosis?: No    PHQ 2/9 Screening   Reviewed PHQ 2/9 Depression Screening Score?: No    Means of Transportation  Means of Transport to Appts[de-identified] Family transport (spouse)  In the past 12 months, has lack of transportation kept you from medical appointments or from getting medications?: No  In the past 12 months, has lack of transportation kept you from meetings, work, or from getting things needed for daily living?: No  Was application for public transport provided?: N/A        DISCHARGE DETAILS:    Discharge planning discussed with[de-identified] patient and spouse:Mika  Freedom of Choice: Yes  Comments - Freedom of Choice: blanket referrals to SNF's  CM contacted family/caregiver?: Yes  Were Treatment Team discharge recommendations reviewed with patient/caregiver?: Yes  Did patient/caregiver verbalize understanding of patient care needs?: Yes  Were patient/caregiver advised of the risks associated with not following Treatment Team discharge recommendations?: Yes    Contacts  Patient Contacts: Doug Velasquez (Spouse)  Relationship to Patient[de-identified] Family  Contact Method: Phone  Phone Number: [phone]  Reason/Outcome: Discharge Planning      Would you like to participate in our [address] service program?  : No - Declined       Discharge Destination Plan[de-identified] Short Term Rehab  Transport at Discharge :  (will need transport to rehab facility on dc)      Therapy recommending STR on dc  CM followed up with pt's spouse:Mika who is agreeable to same  [facility] referrals made, CM will f/u
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
## 2022-09-24 PROBLEM — E83.52 HYPERCALCEMIA: Status: ACTIVE | Noted: 2022-09-24

## 2022-09-24 LAB
ALBUMIN SERPL BCP-MCNC: 2.7 G/DL (ref 3.5–5)
ALP SERPL-CCNC: 146 U/L (ref 46–116)
ALT SERPL W P-5'-P-CCNC: 7 U/L (ref 12–78)
ANION GAP SERPL CALCULATED.3IONS-SCNC: 12 MMOL/L (ref 4–13)
AST SERPL W P-5'-P-CCNC: 18 U/L (ref 5–45)
BACTERIA UR CULT: ABNORMAL
BASOPHILS # BLD AUTO: 0.05 THOUSANDS/ÂΜL (ref 0–0.1)
BASOPHILS NFR BLD AUTO: 1 % (ref 0–1)
BILIRUB SERPL-MCNC: 0.32 MG/DL (ref 0.2–1)
BUN SERPL-MCNC: 57 MG/DL (ref 5–25)
CALCIUM ALBUM COR SERPL-MCNC: 10.2 MG/DL (ref 8.3–10.1)
CALCIUM SERPL-MCNC: 9.2 MG/DL (ref 8.3–10.1)
CHLORIDE SERPL-SCNC: 103 MMOL/L (ref 96–108)
CO2 SERPL-SCNC: 23 MMOL/L (ref 21–32)
CREAT SERPL-MCNC: 2.04 MG/DL (ref 0.6–1.3)
EOSINOPHIL # BLD AUTO: 0.33 THOUSAND/ÂΜL (ref 0–0.61)
EOSINOPHIL NFR BLD AUTO: 3 % (ref 0–6)
ERYTHROCYTE [DISTWIDTH] IN BLOOD BY AUTOMATED COUNT: 25.9 % (ref 11.6–15.1)
GFR SERPL CREATININE-BSD FRML MDRD: 22 ML/MIN/1.73SQ M
GLUCOSE SERPL-MCNC: 97 MG/DL (ref 65–140)
HCT VFR BLD AUTO: 27.7 % (ref 34.8–46.1)
HGB BLD-MCNC: 9 G/DL (ref 11.5–15.4)
IMM GRANULOCYTES # BLD AUTO: 0.03 THOUSAND/UL (ref 0–0.2)
IMM GRANULOCYTES NFR BLD AUTO: 0 % (ref 0–2)
LYMPHOCYTES # BLD AUTO: 1.21 THOUSANDS/ÂΜL (ref 0.6–4.47)
LYMPHOCYTES NFR BLD AUTO: 11 % (ref 14–44)
MAGNESIUM SERPL-MCNC: 1.7 MG/DL (ref 1.6–2.6)
MCH RBC QN AUTO: 26.3 PG (ref 26.8–34.3)
MCHC RBC AUTO-ENTMCNC: 32.5 G/DL (ref 31.4–37.4)
MCV RBC AUTO: 81 FL (ref 82–98)
MONOCYTES # BLD AUTO: 0.85 THOUSAND/ÂΜL (ref 0.17–1.22)
MONOCYTES NFR BLD AUTO: 8 % (ref 4–12)
NEUTROPHILS # BLD AUTO: 8.4 THOUSANDS/ÂΜL (ref 1.85–7.62)
NEUTS SEG NFR BLD AUTO: 77 % (ref 43–75)
NRBC BLD AUTO-RTO: 0 /100 WBCS
PHOSPHATE SERPL-MCNC: 4.1 MG/DL (ref 2.3–4.1)
PLATELET # BLD AUTO: 246 THOUSANDS/UL (ref 149–390)
PMV BLD AUTO: 10.4 FL (ref 8.9–12.7)
POTASSIUM SERPL-SCNC: 4.2 MMOL/L (ref 3.5–5.3)
PROT SERPL-MCNC: 6.6 G/DL (ref 6.4–8.4)
RBC # BLD AUTO: 3.42 MILLION/UL (ref 3.81–5.12)
SODIUM SERPL-SCNC: 138 MMOL/L (ref 135–147)
WBC # BLD AUTO: 10.87 THOUSAND/UL (ref 4.31–10.16)

## 2022-09-24 PROCEDURE — 99232 SBSQ HOSP IP/OBS MODERATE 35: CPT | Performed by: INTERNAL MEDICINE

## 2022-09-24 PROCEDURE — 85025 COMPLETE CBC W/AUTO DIFF WBC: CPT | Performed by: INTERNAL MEDICINE

## 2022-09-24 PROCEDURE — 84100 ASSAY OF PHOSPHORUS: CPT | Performed by: INTERNAL MEDICINE

## 2022-09-24 PROCEDURE — 80053 COMPREHEN METABOLIC PANEL: CPT | Performed by: INTERNAL MEDICINE

## 2022-09-24 PROCEDURE — 83735 ASSAY OF MAGNESIUM: CPT | Performed by: INTERNAL MEDICINE

## 2022-09-24 RX ORDER — METOPROLOL SUCCINATE 50 MG/1
50 TABLET, EXTENDED RELEASE ORAL EVERY 12 HOURS
Status: DISCONTINUED | OUTPATIENT
Start: 2022-09-24 | End: 2022-10-04 | Stop reason: HOSPADM

## 2022-09-24 RX ORDER — LABETALOL HYDROCHLORIDE 5 MG/ML
10 INJECTION, SOLUTION INTRAVENOUS EVERY 4 HOURS PRN
Status: DISCONTINUED | OUTPATIENT
Start: 2022-09-24 | End: 2022-10-04 | Stop reason: HOSPADM

## 2022-09-24 RX ORDER — METOPROLOL SUCCINATE 50 MG/1
50 TABLET, EXTENDED RELEASE ORAL EVERY 12 HOURS
Status: DISCONTINUED | OUTPATIENT
Start: 2022-09-24 | End: 2022-09-24

## 2022-09-24 RX ADMIN — MAGNESIUM OXIDE TAB 400 MG (241.3 MG ELEMENTAL MG) 400 MG: 400 (241.3 MG) TAB at 09:00

## 2022-09-24 RX ADMIN — METOPROLOL TARTRATE 50 MG: 50 TABLET, FILM COATED ORAL at 09:00

## 2022-09-24 RX ADMIN — METOPROLOL SUCCINATE 50 MG: 50 TABLET, FILM COATED, EXTENDED RELEASE ORAL at 20:59

## 2022-09-24 RX ADMIN — Medication 1 TABLET: at 17:05

## 2022-09-24 RX ADMIN — FLUTICASONE PROPIONATE 2 SPRAY: 50 SPRAY, METERED NASAL at 09:36

## 2022-09-24 RX ADMIN — DEXTROSE, SODIUM CHLORIDE, SODIUM LACTATE, POTASSIUM CHLORIDE, AND CALCIUM CHLORIDE 50 ML/HR: 5; .6; .31; .03; .02 INJECTION, SOLUTION INTRAVENOUS at 05:59

## 2022-09-24 RX ADMIN — LEVOTHYROXINE SODIUM 150 MCG: 150 TABLET ORAL at 05:05

## 2022-09-24 RX ADMIN — CEFTRIAXONE 1000 MG: 1 INJECTION, SOLUTION INTRAVENOUS at 03:13

## 2022-09-24 RX ADMIN — Medication 1 TABLET: at 09:00

## 2022-09-24 RX ADMIN — SERTRALINE 100 MG: 100 TABLET, FILM COATED ORAL at 09:00

## 2022-09-24 NOTE — PLAN OF CARE
Problem: PAIN - ADULT  Goal: Verbalizes/displays adequate comfort level or baseline comfort level  Description: Interventions:  - Encourage patient to monitor pain and request assistance  - Assess pain using appropriate pain scale  - Administer analgesics based on type and severity of pain and evaluate response  - Implement non-pharmacological measures as appropriate and evaluate response  - Consider cultural and social influences on pain and pain management  - Notify physician/advanced practitioner if interventions unsuccessful or patient reports new pain  Outcome: Progressing     Problem: INFECTION - ADULT  Goal: Absence or prevention of progression during hospitalization  Description: INTERVENTIONS:  - Assess and monitor for signs and symptoms of infection  - Monitor lab/diagnostic results  - Monitor all insertion sites, i e  indwelling lines, tubes, and drains  - Monitor endotracheal if appropriate and nasal secretions for changes in amount and color  - Sturgeon appropriate cooling/warming therapies per order  - Administer medications as ordered  - Instruct and encourage patient and family to use good hand hygiene technique  - Identify and instruct in appropriate isolation precautions for identified infection/condition  Outcome: Progressing  Goal: Absence of fever/infection during neutropenic period  Description: INTERVENTIONS:  - Monitor WBC    Outcome: Progressing     Problem: SAFETY ADULT  Goal: Patient will remain free of falls  Description: INTERVENTIONS:  - Educate patient/family on patient safety including physical limitations  - Instruct patient to call for assistance with activity   - Consult OT/PT to assist with strengthening/mobility   - Keep Call bell within reach  - Keep bed low and locked with side rails adjusted as appropriate  - Keep care items and personal belongings within reach  - Initiate and maintain comfort rounds  - Make Fall Risk Sign visible to staff  - Offer Toileting every 2 Hours, in advance of need  - Initiate/Maintain bed/chair alarm  - Obtain necessary fall risk management equipment: alarms  - Apply yellow socks and bracelet for high fall risk patients  - Consider moving patient to room near nurses station  Outcome: Progressing  Goal: Maintain or return to baseline ADL function  Description: INTERVENTIONS:  -  Assess patient's ability to carry out ADLs; assess patient's baseline for ADL function and identify physical deficits which impact ability to perform ADLs (bathing, care of mouth/teeth, toileting, grooming, dressing, etc )  - Assess/evaluate cause of self-care deficits   - Assess range of motion  - Assess patient's mobility; develop plan if impaired  - Assess patient's need for assistive devices and provide as appropriate  - Encourage maximum independence but intervene and supervise when necessary  - Involve family in performance of ADLs  - Assess for home care needs following discharge   - Consider OT consult to assist with ADL evaluation and planning for discharge  - Provide patient education as appropriate  Outcome: Progressing  Goal: Maintains/Returns to pre admission functional level  Description: INTERVENTIONS:  - Perform BMAT or MOVE assessment daily    - Set and communicate daily mobility goal to care team and patient/family/caregiver  - Collaborate with rehabilitation services on mobility goals if consulted  - Perform Range of Motion 4 times a day  - Reposition patient every 2 hours    - Dangle patient 3-4 times a day  - Stand patient 4 times a day  - Ambulate patient 4 times a day  - Out of bed to chair 3 times a day   - Out of bed for meals 3 times a day  - Out of bed for toileting  - Record patient progress and toleration of activity level   Outcome: Progressing     Problem: DISCHARGE PLANNING  Goal: Discharge to home or other facility with appropriate resources  Description: INTERVENTIONS:  - Identify barriers to discharge w/patient and caregiver  - Arrange for needed discharge resources and transportation as appropriate  - Identify discharge learning needs (meds, wound care, etc )  - Arrange for interpretive services to assist at discharge as needed  - Refer to Case Management Department for coordinating discharge planning if the patient needs post-hospital services based on physician/advanced practitioner order or complex needs related to functional status, cognitive ability, or social support system  Outcome: Progressing     Problem: Knowledge Deficit  Goal: Patient/family/caregiver demonstrates understanding of disease process, treatment plan, medications, and discharge instructions  Description: Complete learning assessment and assess knowledge base    Interventions:  - Provide teaching at level of understanding  - Provide teaching via preferred learning methods  Outcome: Progressing     Problem: NEUROSENSORY - ADULT  Goal: Achieves stable or improved neurological status  Description: INTERVENTIONS  - Monitor and report changes in neurological status  - Monitor vital signs such as temperature, blood pressure, glucose, and any other labs ordered   - Initiate measures to prevent increased intracranial pressure      Outcome: Progressing     Problem: CARDIOVASCULAR - ADULT  Goal: Maintains optimal cardiac output and hemodynamic stability  Description: INTERVENTIONS:  - Monitor I/O, vital signs and rhythm  - Monitor for S/S and trends of decreased cardiac output  - Administer and titrate ordered vasoactive medications to optimize hemodynamic stability  - Assess quality of pulses, skin color and temperature  - Assess for signs of decreased coronary artery perfusion  - Instruct patient to report change in severity of symptoms  Outcome: Progressing     Problem: GENITOURINARY - ADULT  Goal: Maintains or returns to baseline urinary function  Description: INTERVENTIONS:  - Assess urinary function  - Encourage oral fluids to ensure adequate hydration if ordered  - Administer IV fluids as ordered to ensure adequate hydration  - Administer ordered medications as needed  - Offer frequent toileting  - Follow urinary retention protocol if ordered  Outcome: Progressing  Goal: Absence of urinary retention  Description: INTERVENTIONS:  - Assess patient's ability to void and empty bladder  - Monitor I/O  - Bladder scan as needed  - Discuss with physician/AP medications to alleviate retention as needed  - Discuss catheterization for long term situations as appropriate  Outcome: Progressing     Problem: METABOLIC, FLUID AND ELECTROLYTES - ADULT  Goal: Electrolytes maintained within normal limits  Description: INTERVENTIONS:  - Monitor labs and assess patient for signs and symptoms of electrolyte imbalances  - Administer electrolyte replacement as ordered  - Monitor response to electrolyte replacements, including repeat lab results as appropriate  - Instruct patient on fluid and nutrition as appropriate  Outcome: Progressing     Problem: SKIN/TISSUE INTEGRITY - ADULT  Goal: Skin Integrity remains intact(Skin Breakdown Prevention)  Description: Assess:  -Perform Arcadio assessment every shift  -Clean and moisturize skin every 4 houra  -Inspect skin when repositioning, toileting, and assisting with ADLS  -Assess under medical devices such as electrodes every shift  -Assess extremities for adequate circulation and sensation     Bed Management:  -Have minimal linens on bed & keep smooth, unwrinkled  -Change linens as needed when moist or perspiring  -Avoid sitting or lying in one position for more than 2 hours while in bed  -Keep HOB at 30 degrees     Toileting:  -Offer bedside commode  -Assess for incontinence every 2 hours  -Use incontinent care products after each incontinent episode such as breif    Activity:  -Mobilize patient 4 times a day  -Encourage activity and walks on unit  -Encourage or provide ROM exercises   -Turn and reposition patient every 2 Hours  -Use appropriate equipment to lift or move patient in bed  -Instruct/ Assist with weight shifting every 2 hours when out of bed in chair  -Consider limitation of chair time 4 hour intervals    Skin Care:  -Avoid use of baby powder, tape, friction and shearing, hot water or constrictive clothing  -Relieve pressure over bony prominences using alevyn  -Do not massage red bony areas    Next Steps:  -Teach patient strategies to minimize risks such as weight shift  Outcome: Progressing     Problem: MUSCULOSKELETAL - ADULT  Goal: Maintain or return mobility to safest level of function  Description: INTERVENTIONS:  - Assess patient's ability to carry out ADLs; assess patient's baseline for ADL function and identify physical deficits which impact ability to perform ADLs (bathing, care of mouth/teeth, toileting, grooming, dressing, etc )  - Assess/evaluate cause of self-care deficits   - Assess range of motion  - Assess patient's mobility  - Assess patient's need for assistive devices and provide as appropriate  - Encourage maximum independence but intervene and supervise when necessary  - Involve family in performance of ADLs  - Assess for home care needs following discharge   - Consider OT consult to assist with ADL evaluation and planning for discharge  - Provide patient education as appropriate  Outcome: Progressing     Problem: Potential for Falls  Goal: Patient will remain free of falls  Description: INTERVENTIONS:  - Educate patient/family on patient safety including physical limitations  - Instruct patient to call for assistance with activity   - Consult OT/PT to assist with strengthening/mobility   - Keep Call bell within reach  - Keep bed low and locked with side rails adjusted as appropriate  - Keep care items and personal belongings within reach  - Initiate and maintain comfort rounds  - Make Fall Risk Sign visible to staff  - Offer Toileting every 2 Hours, in advance of need  - Initiate/Maintain bed/chair alarm  - Obtain necessary fall risk management equipment: alarms  - Apply yellow socks and bracelet for high fall risk patients  - Consider moving patient to room near nurses station  Outcome: Progressing     Problem: MOBILITY - ADULT  Goal: Maintain or return to baseline ADL function  Description: INTERVENTIONS:  -  Assess patient's ability to carry out ADLs; assess patient's baseline for ADL function and identify physical deficits which impact ability to perform ADLs (bathing, care of mouth/teeth, toileting, grooming, dressing, etc )  - Assess/evaluate cause of self-care deficits   - Assess range of motion  - Assess patient's mobility; develop plan if impaired  - Assess patient's need for assistive devices and provide as appropriate  - Encourage maximum independence but intervene and supervise when necessary  - Involve family in performance of ADLs  - Assess for home care needs following discharge   - Consider OT consult to assist with ADL evaluation and planning for discharge  - Provide patient education as appropriate  Outcome: Progressing  Goal: Maintains/Returns to pre admission functional level  Description: INTERVENTIONS:  - Perform BMAT or MOVE assessment daily    - Set and communicate daily mobility goal to care team and patient/family/caregiver  - Collaborate with rehabilitation services on mobility goals if consulted  - Perform Range of Motion 4 times a day  - Reposition patient every 2 hours    - Dangle patient 3-4 times a day  - Stand patient 4 times a day  - Ambulate patient 4 times a day  - Out of bed to chair 3 times a day   - Out of bed for meals 3 times a day  - Out of bed for toileting  - Record patient progress and toleration of activity level   Outcome: Progressing     Problem: Prexisting or High Potential for Compromised Skin Integrity  Goal: Skin integrity is maintained or improved  Description: INTERVENTIONS:  - Identify patients at risk for skin breakdown  - Assess and monitor skin integrity  - Assess and monitor nutrition and hydration status  - Monitor labs   - Assess for incontinence   - Turn and reposition patient  - Assist with mobility/ambulation  - Relieve pressure over bony prominences  - Avoid friction and shearing  - Provide appropriate hygiene as needed including keeping skin clean and dry  - Evaluate need for skin moisturizer/barrier cream  - Collaborate with interdisciplinary team   - Patient/family teaching  - Consider wound care consult   Outcome: Progressing     Problem: Nutrition/Hydration-ADULT  Goal: Nutrient/Hydration intake appropriate for improving, restoring or maintaining nutritional needs  Description: Monitor and assess patient's nutrition/hydration status for malnutrition  Collaborate with interdisciplinary team and initiate plan and interventions as ordered  Monitor patient's weight and dietary intake as ordered or per policy  Utilize nutrition screening tool and intervene as necessary  Determine patient's food preferences and provide high-protein, high-caloric foods as appropriate       INTERVENTIONS:  - Monitor oral intake, urinary output, labs, and treatment plans  - Assess nutrition and hydration status and recommend course of action  - Evaluate amount of meals eaten  - Assist patient with eating if necessary   - Allow adequate time for meals  - Recommend/ encourage appropriate diets, oral nutritional supplements, and vitamin/mineral supplements  - Order, calculate, and assess calorie counts as needed  - Recommend, monitor, and adjust tube feedings and TPN/PPN based on assessed needs  - Assess need for intravenous fluids  - Provide specific nutrition/hydration education as appropriate  - Include patient/family/caregiver in decisions related to nutrition  Outcome: Progressing

## 2022-09-24 NOTE — ASSESSMENT & PLAN NOTE
· Resolved with treatment  · Follow the potassium level    Results from last 7 days   Lab Units 09/24/22  0417 09/23/22  0632 09/23/22  0041   SODIUM mmol/L 138 138 138   POTASSIUM mmol/L 4 2 5 2 4 6   CHLORIDE mmol/L 103 105 105   CO2 mmol/L 23 23 24   BUN mg/dL 57* 76* 78*   CREATININE mg/dL 2 04* 2 40* 2 45*   CALCIUM mg/dL 9 2 9 4 9 1

## 2022-09-24 NOTE — ASSESSMENT & PLAN NOTE
· Required a PRBC's (packed red blood cells) transfusion  · Continue to hold eliquis for now  · Serial laboratory testing to monitor the patient's hemoglobin/hematocrit levels  · The patient was seen in consultation by Gastroenterology and underwent an EGD on 09/23/2022:  IMPRESSION:  · The stomach and duodenum appeared normal   · Ten or more polyps measuring smaller than 5 mm in the cardia and body of the stomach  · Medium sliding hiatal hernia (type I hiatal hernia)  · The esophagus appeared normal   RECOMMENDATION:  Consider colonoscopy if continues to drop her hemoglobin    Results from last 7 days   Lab Units 09/24/22  0417 09/23/22  2101 09/23/22  0632 09/22/22  0724 09/22/22  0540   WBC Thousand/uL 10 87*  --  9 04  --  12 60*   HEMOGLOBIN g/dL 9 0* 9 1* 9 0*   < > 7 5*   HEMATOCRIT % 27 7* 27 5* 27 0*  --  24 1*   PLATELETS Thousands/uL 246  --  182  --  272    < > = values in this interval not displayed

## 2022-09-24 NOTE — ASSESSMENT & PLAN NOTE
Lab Results   Component Value Date    EGFR 22 09/24/2022    EGFR 18 09/23/2022    EGFR 18 09/23/2022    CREATININE 2 04 (H) 09/24/2022    CREATININE 2 40 (H) 09/23/2022    CREATININE 2 45 (H) 09/23/2022     · Acute kidney injury was present on admission  · Baseline serum creatinine of 1 7-2 0 mg/dl  · The patient was seen in consultation by Nephrology    · Continue IV fluids with D5 Lactated Ringer's solution at 50 ml/hr per Nephrology  · Continue to hold PO lasix and PO losartan  · Avoid all nephrotoxic agents  · Serial laboratory testing to monitor the patient's renal function and electrolyte levels

## 2022-09-24 NOTE — PLAN OF CARE
Problem: PAIN - ADULT  Goal: Verbalizes/displays adequate comfort level or baseline comfort level  Description: Interventions:  - Encourage patient to monitor pain and request assistance  - Assess pain using appropriate pain scale  - Administer analgesics based on type and severity of pain and evaluate response  - Implement non-pharmacological measures as appropriate and evaluate response  - Consider cultural and social influences on pain and pain management  - Notify physician/advanced practitioner if interventions unsuccessful or patient reports new pain  Outcome: Progressing     Problem: INFECTION - ADULT  Goal: Absence or prevention of progression during hospitalization  Description: INTERVENTIONS:  - Assess and monitor for signs and symptoms of infection  - Monitor lab/diagnostic results  - Monitor all insertion sites, i e  indwelling lines, tubes, and drains  - Monitor endotracheal if appropriate and nasal secretions for changes in amount and color  - [place] appropriate cooling/warming therapies per order  - Administer medications as ordered  - Instruct and encourage patient and family to use good hand hygiene technique  - Identify and instruct in appropriate isolation precautions for identified infection/condition  Outcome: Progressing  Goal: Absence of fever/infection during neutropenic period  Description: INTERVENTIONS:  - Monitor WBC    Outcome: Progressing     Problem: SAFETY ADULT  Goal: Patient will remain free of falls  Description: INTERVENTIONS:  - Educate patient/family on patient safety including physical limitations  - Instruct patient to call for assistance with activity   - Consult OT/PT to assist with strengthening/mobility   - Keep Call bell within reach  - Keep bed low and locked with side rails adjusted as appropriate  - Keep care items and personal belongings within reach  - Initiate and maintain comfort rounds  - Make Fall Risk Sign visible to staff  - Offer Toileting every 2 Hours, in advance of need  - Initiate/Maintain bed/chair alarm  - Obtain necessary fall risk management equipment: alarms  - Apply yellow socks and bracelet for high fall risk patients  - Consider moving patient to room near nurses station  Outcome: Progressing  Goal: Maintain or return to baseline ADL function  Description: INTERVENTIONS:  -  Assess patient's ability to carry out ADLs; assess patient's baseline for ADL function and identify physical deficits which impact ability to perform ADLs (bathing, care of mouth/teeth, toileting, grooming, dressing, etc )  - Assess/evaluate cause of self-care deficits   - Assess range of motion  - Assess patient's mobility; develop plan if impaired  - Assess patient's need for assistive devices and provide as appropriate  - Encourage maximum independence but intervene and supervise when necessary  - Involve family in performance of ADLs  - Assess for home care needs following discharge   - Consider OT consult to assist with ADL evaluation and planning for discharge  - Provide patient education as appropriate  Outcome: Progressing  Goal: Maintains/Returns to pre admission functional level  Description: INTERVENTIONS:  - Perform BMAT or MOVE assessment daily    - Set and communicate daily mobility goal to care team and patient/family/caregiver  - Collaborate with rehabilitation services on mobility goals if consulted  - Perform Range of Motion 4 times a day  - Reposition patient every 2 hours    - Dangle patient 3-4 times a day  - Stand patient 4 times a day  - Ambulate patient 4 times a day  - Out of bed to chair 3 times a day   - Out of bed for meals 3 times a day  - Out of bed for toileting  - Record patient progress and toleration of activity level   Outcome: Progressing     Problem: DISCHARGE PLANNING  Goal: Discharge to home or other facility with appropriate resources  Description: INTERVENTIONS:  - Identify barriers to discharge w/patient and caregiver  - Arrange for needed discharge resources and transportation as appropriate  - Identify discharge learning needs (meds, wound care, etc )  - Arrange for interpretive services to assist at discharge as needed  - Refer to Case Management Department for coordinating discharge planning if the patient needs post-hospital services based on physician/advanced practitioner order or complex needs related to functional status, cognitive ability, or social support system  Outcome: Progressing     Problem: Knowledge Deficit  Goal: Patient/family/caregiver demonstrates understanding of disease process, treatment plan, medications, and discharge instructions  Description: Complete learning assessment and assess knowledge base    Interventions:  - Provide teaching at level of understanding  - Provide teaching via preferred learning methods  Outcome: Progressing     Problem: NEUROSENSORY - ADULT  Goal: Achieves stable or improved neurological status  Description: INTERVENTIONS  - Monitor and report changes in neurological status  - Monitor vital signs such as temperature, blood pressure, glucose, and any other labs ordered   - Initiate measures to prevent increased intracranial pressure      Outcome: Progressing     Problem: CARDIOVASCULAR - ADULT  Goal: Maintains optimal cardiac output and hemodynamic stability  Description: INTERVENTIONS:  - Monitor I/O, vital signs and rhythm  - Monitor for S/S and trends of decreased cardiac output  - Administer and titrate ordered vasoactive medications to optimize hemodynamic stability  - Assess quality of pulses, skin color and temperature  - Assess for signs of decreased coronary artery perfusion  - Instruct patient to report change in severity of symptoms  Outcome: Progressing     Problem: GENITOURINARY - ADULT  Goal: Maintains or returns to baseline urinary function  Description: INTERVENTIONS:  - Assess urinary function  - Encourage oral fluids to ensure adequate hydration if ordered  - Administer IV fluids as ordered to ensure adequate hydration  - Administer ordered medications as needed  - Offer frequent toileting  - Follow urinary retention protocol if ordered  Outcome: Progressing  Goal: Absence of urinary retention  Description: INTERVENTIONS:  - Assess patient's ability to void and empty bladder  - Monitor I/O  - Bladder scan as needed  - Discuss with physician/AP medications to alleviate retention as needed  - Discuss catheterization for long term situations as appropriate  Outcome: Progressing     Problem: METABOLIC, FLUID AND ELECTROLYTES - ADULT  Goal: Electrolytes maintained within normal limits  Description: INTERVENTIONS:  - Monitor labs and assess patient for signs and symptoms of electrolyte imbalances  - Administer electrolyte replacement as ordered  - Monitor response to electrolyte replacements, including repeat lab results as appropriate  - Instruct patient on fluid and nutrition as appropriate  Outcome: Progressing     Problem: SKIN/TISSUE INTEGRITY - ADULT  Goal: Skin Integrity remains intact(Skin Breakdown Prevention)  Description: Assess:  -Perform [place] assessment every shift  -Clean and moisturize skin every 4 houra  -Inspect skin when repositioning, toileting, and assisting with ADLS  -Assess under medical devices such as electrodes every shift  -Assess extremities for adequate circulation and sensation     Bed Management:  -Have minimal linens on bed & keep smooth, unwrinkled  -Change linens as needed when moist or perspiring  -Avoid sitting or lying in one position for more than 2 hours while in bed  -Keep HOB at 30 degrees     Toileting:  -Offer bedside commode  -Assess for incontinence every 2 hours  -Use incontinent care products after each incontinent episode such as breif    Activity:  -Mobilize patient 4 times a day  -Encourage activity and walks on unit  -Encourage or provide ROM exercises   -Turn and reposition patient every 2 Hours  -Use appropriate equipment to lift or move patient in bed  -Instruct/ Assist with weight shifting every 2 hours when out of bed in chair  -Consider limitation of chair time 4 hour intervals    Skin Care:  -Avoid use of baby powder, tape, friction and shearing, hot water or constrictive clothing  -Relieve pressure over bony prominences using alevyn  -Do not massage red bony areas    Next Steps:  -Teach patient strategies to minimize risks such as weight shift  Outcome: Progressing     Problem: MUSCULOSKELETAL - ADULT  Goal: Maintain or return mobility to safest level of function  Description: INTERVENTIONS:  - Assess patient's ability to carry out ADLs; assess patient's baseline for ADL function and identify physical deficits which impact ability to perform ADLs (bathing, care of mouth/teeth, toileting, grooming, dressing, etc )  - Assess/evaluate cause of self-care deficits   - Assess range of motion  - Assess patient's mobility  - Assess patient's need for assistive devices and provide as appropriate  - Encourage maximum independence but intervene and supervise when necessary  - Involve family in performance of ADLs  - Assess for home care needs following discharge   - Consider OT consult to assist with ADL evaluation and planning for discharge  - Provide patient education as appropriate  Outcome: Progressing     Problem: Potential for Falls  Goal: Patient will remain free of falls  Description: INTERVENTIONS:  - Educate patient/family on patient safety including physical limitations  - Instruct patient to call for assistance with activity   - Consult OT/PT to assist with strengthening/mobility   - Keep Call bell within reach  - Keep bed low and locked with side rails adjusted as appropriate  - Keep care items and personal belongings within reach  - Initiate and maintain comfort rounds  - Make Fall Risk Sign visible to staff  - Offer Toileting every 2 Hours, in advance of need  - Initiate/Maintain bed/chair alarm  - Obtain necessary fall risk management equipment: alarms  - Apply yellow socks and bracelet for high fall risk patients  - Consider moving patient to room near nurses station  Outcome: Progressing     Problem: MOBILITY - ADULT  Goal: Maintain or return to baseline ADL function  Description: INTERVENTIONS:  -  Assess patient's ability to carry out ADLs; assess patient's baseline for ADL function and identify physical deficits which impact ability to perform ADLs (bathing, care of mouth/teeth, toileting, grooming, dressing, etc )  - Assess/evaluate cause of self-care deficits   - Assess range of motion  - Assess patient's mobility; develop plan if impaired  - Assess patient's need for assistive devices and provide as appropriate  - Encourage maximum independence but intervene and supervise when necessary  - Involve family in performance of ADLs  - Assess for home care needs following discharge   - Consider OT consult to assist with ADL evaluation and planning for discharge  - Provide patient education as appropriate  Outcome: Progressing  Goal: Maintains/Returns to pre admission functional level  Description: INTERVENTIONS:  - Perform BMAT or MOVE assessment daily    - Set and communicate daily mobility goal to care team and patient/family/caregiver  - Collaborate with rehabilitation services on mobility goals if consulted  - Perform Range of Motion 4 times a day  - Reposition patient every 2 hours    - Dangle patient 3-4 times a day  - Stand patient 4 times a day  - Ambulate patient 4 times a day  - Out of bed to chair 3 times a day   - Out of bed for meals 3 times a day  - Out of bed for toileting  - Record patient progress and toleration of activity level   Outcome: Progressing     Problem: Prexisting or High Potential for Compromised Skin Integrity  Goal: Skin integrity is maintained or improved  Description: INTERVENTIONS:  - Identify patients at risk for skin breakdown  - Assess and monitor skin integrity  - Assess and monitor nutrition and hydration status  - Monitor labs   - Assess for incontinence   - Turn and reposition patient  - Assist with mobility/ambulation  - Relieve pressure over bony prominences  - Avoid friction and shearing  - Provide appropriate hygiene as needed including keeping skin clean and dry  - Evaluate need for skin moisturizer/barrier cream  - Collaborate with interdisciplinary team   - Patient/family teaching  - Consider wound care consult   Outcome: Progressing     Problem: Nutrition/Hydration-ADULT  Goal: Nutrient/Hydration intake appropriate for improving, restoring or maintaining nutritional needs  Description: Monitor and assess patient's nutrition/hydration status for malnutrition  Collaborate with interdisciplinary team and initiate plan and interventions as ordered  Monitor patient's weight and dietary intake as ordered or per policy  Utilize nutrition screening tool and intervene as necessary  Determine patient's food preferences and provide high-protein, high-caloric foods as appropriate       INTERVENTIONS:  - Monitor oral intake, urinary output, labs, and treatment plans  - Assess nutrition and hydration status and recommend course of action  - Evaluate amount of meals eaten  - Assist patient with eating if necessary   - Allow adequate time for meals  - Recommend/ encourage appropriate diets, oral nutritional supplements, and vitamin/mineral supplements  - Order, calculate, and assess calorie counts as needed  - Recommend, monitor, and adjust tube feedings and TPN/PPN based on assessed needs  - Assess need for intravenous fluids  - Provide specific nutrition/hydration education as appropriate  - Include patient/family/caregiver in decisions related to nutrition  Outcome: Progressing

## 2022-09-24 NOTE — ASSESSMENT & PLAN NOTE
· Stroke pathway  · No acute intracranial abnormality observed on CT scan of the head without contrast completed on 09/21/2022  · Cannot have an MRI of the brain without contrast at [address] [facility] due to the patient having an indwelling pacemaker  · Will need an outpatient MRI of the brain without contrast at another campus as soon as possible  · If the patient has worsening neurologic symptoms, she will need to be transferred to a different campus, which is capable of performing an MRI of the brain with an indwelling pacemaker  · Allergy/intolerance to statin therapy in the past  · The eliquis is on hold the setting of gastrointestinal bleeding    · PT/OT  · Outpatient Neurology evaluation

## 2022-09-24 NOTE — ASSESSMENT & PLAN NOTE
· Continue to hold eliquis at this time in the setting of gastrointestinal bleeding  · Change metoprolol tartrate to metoprolol succinate (toprol XL) with the decreased LVEF, which should be continued upon discharge

## 2022-09-24 NOTE — ASSESSMENT & PLAN NOTE
Wt Readings from Last 3 Encounters:   09/22/22 53 4 kg (117 lb 11 6 oz)   05/31/22 62 8 kg (138 lb 7 2 oz)   11/16/21 62 8 kg (138 lb 7 2 oz)     · Does not appear to be in exacerbation at this time  · Continue to hold lasix per Nephrology  · Change metoprolol tartrate to metoprolol succinate (toprol XL) with the decreased LVEF, which should be continued upon discharge  · Daily weights  · Strict intake/output measurements  · Outpatient follow-up with Cardiology    Echocardiogram (09/22/2022): Interpretation Summary         Left Ventricle: Left ventricular cavity size is normal  Wall thickness is moderately increased  There is moderate concentric hypertrophy  The left ventricular ejection fraction is 45%  Systolic function is mildly reduced  There is mild global hypokinesis  Diastolic function is mildly abnormal, consistent with grade I (abnormal) relaxation    Right Ventricle: Right ventricular cavity size is normal  Systolic function is normal     Left Atrium: The atrium is moderately dilated    Mitral Valve: There is severe annular calcification  There is moderate regurgitation    Tricuspid Valve: The estimated right ventricular systolic pressure is 88 92 mmHg    IVC/SVC: The right atrial pressure is estimated at 10 0 mmHg  The inferior vena cava is normal in size  Respirophasic changes were normal     Pulmonary Artery: The estimated pulmonary artery systolic pressure is 94 9 mmHg  The pulmonary artery systolic pressure is moderately increased

## 2022-09-24 NOTE — ASSESSMENT & PLAN NOTE
· Change metoprolol tartrate to metoprolol succinate (toprol XL) with the decreased LVEF, which should be continued upon discharge  · Continue to hold losartan in the setting of acute kidney injury  · Likely will need additional PO hypertension medications  · Utilize PRN IV labetalol for a SBP>170 mmHg or DBP>100 mmHg, Hold for a HR<60 bpm  · Follow the blood pressure trend

## 2022-09-24 NOTE — ASSESSMENT & PLAN NOTE
· Sepsis was present on admission and due to acute cystitis  · SIRS criteria was met with a leukocytosis and tachycardia    · Continue ceftriaxone 1000 mg IV every 24 hours  · Follow the culture results

## 2022-09-24 NOTE — ASSESSMENT & PLAN NOTE
· The patient was seen in consultation by Urology with the following recommendations:  "Plan:   No plan for any urological intervention at this time      Treat current suspected UTI, await final urine cultures  Patient maintain on IV ceftriaxone 1 g daily      Dr Daniel Kim from Urology attested brief note, he remarked that retained indwelling ureteral stents can be very difficult to remove her left ureteral stent is likely encrusted and will require both retrograde and antegrade renal surgery if the proximal coil is fixed with stone formation      The patient will need definitive stone treatment and extraction of the retained left ureteral stent via cystoscopy and laser for extraction of then crusted/retained stent as well as ureteral/renal stones with temporary stent exchange  This can be done after hospital discharge and after the suspected acute urinary tract infection has cleared completed antibiotic course    The patient can follow with her neurologist at the [facility] that she previously establish care with almost 2 years ago for the initial placement of the stent or referral to [facility] on an outpatient basis      If the patient develops worsening creatinine over the weekend, then obtain renal ultrasound to see if worsening hydroureteronephrosis and then, in that situation, the patient may be a candidate for IR placement of a left PCN tube "

## 2022-09-24 NOTE — PROGRESS NOTES
Progress Note - Nephrology   Jose Huston 65 y o  female MRN: [id number]  Unit/Bed#: 409-01 Encounter: [id number]    A/P:  1  Acute kidney injury present on admission   - maximal creatinine of 3 point II mg per has trended down to 2 04 mg/dL today   - etiology is sepsis secondary to UTI, GI bleed with anemia and decreased renal perfusion superimposed on diuretic use  - has left hydronephrosis with a stent in place but the stent is not well visualized   - urology states that a stent would need to be removed by cystoscopy but if creatinine worsens would consider percutaneous nephrostomy   - CT initially demonstrated left proximal ureteral inflammation   - fortunately the creatinine is improving and will hold on a percutaneous nephrostomy  - receiving D5 lactated Ringer's at 50 mils per hour with improvement   - avoid nephrotoxins and maintain a mean arterial pressure greater than 65 mmHg  2  Chronic kidney disease stage 4 -    - baseline creatinine was 1 7-2 mg/dL-    -follows with Dr Latricia Bradshaw at [facility]   - kidney ultrasound shows bilateral cortical thinning    3  Hyperkalemia   - -treated urgently with glucose and insulin  Was persisting due to acidosis  Acidosis was treated with bicarbonate with improvement   - would check postvoid residuals and in-situ low-potassium diet   - potassium is 4 2 today    4  E coli urinary tract infection   - being treated with ceftriaxone with improvement    5  Chronic combined systolic and diastolic failure   - obtain daily weights and salt avoidance        6  Acute blood loss anemia   - hemoglobin is stable at 9 g per dL      Follow up reason for today's visit: Acute kidney injury    Sepsis due to urinary tract infection [facility])    Patient Active Problem List   Diagnosis    Closed fracture of body of sternum with routine healing    Congestive heart disease ([facility] [address] )    Acquired hypothyroidism    CAD (coronary artery disease)    Forgetfulness    Acute pain due to trauma    Hx of fall    Stress incontinence in female    Acute kidney injury superimposed on chronic kidney disease ([facility] [address] )    Perforated appendicitis    Sepsis ([facility] )    Chronic combined systolic and diastolic CHF (congestive heart failure) ([facility])    Pyuria    Microscopic hematuria    Vitamin D insufficiency    Mitral valve insufficiency    Hypercholesterolemia    Aortic atherosclerosis (HCC)    Renal calculus    Diverticulosis    Hiatal hernia    Pulmonary nodule    Leg swelling    Paroxysmal atrial fibrillation (HCC)    Ischemic cardiomyopathy    S/P implantation of automatic cardioverter/defibrillator (AICD)    Essential hypertension    History of PTCA    Anemia due to chronic kidney disease    Iron deficiency anemia    Diverticulitis    Rectal bleeding    Elevated troponin    Acute blood loss anemia    Anemia in chronic kidney disease    Benign hypertensive renal disease    Gastroesophageal reflux disease without esophagitis    Stroke-like symptoms    Hyperkalemia    Sepsis due to urinary tract infection (HCC)    Gastrointestinal bleeding    Increased anion gap metabolic acidosis    Hydroureteronephrosis    Dementia (HCC)    Hypercalcemia         Subjective:   A 10 point ROS is neg  She "wants to go home"    Objective:     Vitals: Blood pressure 168/81, pulse 77, temperature 98 6 °F (37 °C), temperature source Oral, resp  rate 21, height 5' 6" (1 676 m), weight 53 4 kg (117 lb 11 6 oz), SpO2 97 %  ,Body mass index is 19 kg/m²  Weight (last 2 days)     Date/Time Weight    09/22/22 13:58:41 53 4 (117 73)    09/22/22 1027 63 5 (140)            Intake/Output Summary (Last 24 hours) at 9/24/2022 1410  Last data filed at 9/24/2022 1230  Gross per 24 hour   Intake 1490 83 ml   Output 1000 ml   Net 490 83 ml     I/O last 3 completed shifts: In: 1595 4 [P O :120; I V :1110 8;  Blood:314 6; IV Piggyback:50]  Out: 950 [Urine:950]         Physical Exam: /81   Pulse 77   Temp 98 6 °F (37 °C) (Oral)   Resp 21   Ht 5' 6" (1 676 m)   Wt 53 4 kg (117 lb 11 6 oz)   SpO2 97%   BMI 19 00 kg/m²     General Appearance:    Alert,frail  cooperative, no distress, appears stated age   Head:    Normocephalic, without obvious abnormality, atraumatic   Eyes:    Conjunctiva/corneas clear   Ears:    Normal external ears   Nose:   Nares normal, septum midline, mucosa normal, no drainage    or sinus tenderness   Throat:   Lips, mucosa, and tongue normal; teeth and gums normal   Neck:   Supple, symmetrical, trachea midline, no adenopathy;        thyroid:  No enlargement/tenderness/nodules; no carotid    bruit or JVD   Back:     Symmetric, no curvature, ROM normal, no CVA tenderness   Lungs:     Clear to auscultation bilaterally, respirations unlabored   Chest wall:    No tenderness or deformity   Heart:    Regular rate and rhythm, S1 and S2 normal, no murmur, rub   or gallop   Abdomen:     Soft, non-tender, bowel sounds active wearing a diaper    Extremities:   Extremities normal, atraumatic, no cyanosis or edema   Skin:   Skin color, texture, turgor normal, no rashes or lesions   Lymph nodes:   Cervical normal   Neurologic:   CNII-XII conversant, a little confused butmoving extremities            Lab, Imaging and other studies: I have personally reviewed pertinent labs  CBC:   Lab Results   Component Value Date    WBC 10 87 (H) 09/24/2022    HGB 9 0 (L) 09/24/2022    HCT 27 7 (L) 09/24/2022    MCV 81 (L) 09/24/2022     09/24/2022    MCH 26 3 (L) 09/24/2022    MCHC 32 5 09/24/2022    RDW 25 9 (H) 09/24/2022    MPV 10 4 09/24/2022    NRBC 0 09/24/2022     CMP:   Lab Results   Component Value Date    K 4 2 09/24/2022     09/24/2022    CO2 23 09/24/2022    BUN 57 (H) 09/24/2022    CREATININE 2 04 (H) 09/24/2022    CALCIUM 9 2 09/24/2022    AST 18 09/24/2022    ALT 7 (L) 09/24/2022    ALKPHOS 146 (H) 09/24/2022    EGFR 22 09/24/2022           Results from last 7 days   Lab Units 09/24/22  0418 09/23/22  5504 09/23/22  0041 09/22/22  1220 09/22/22  0430   POTASSIUM mmol/L 4 2 5 2 4 6   < > 5 7*   CHLORIDE mmol/L 103 105 105   < > 106   CO2 mmol/L 23 23 24   < > 14*   BUN mg/dL 57* 76* 78*   < > 93*   CREATININE mg/dL 2 04* 2 40* 2 45*   < > 2 66*   CALCIUM mg/dL 9 2 9 4 9 1   < > 10 1   ALK PHOS U/L 146* 138*  --   --  151*   ALT U/L 7* 6*  --   --  9*   AST U/L 18 24  --   --  18    < > = values in this interval not displayed  Phosphorus:   Lab Results   Component Value Date    PHOS 4 1 09/24/2022     Magnesium:   Lab Results   Component Value Date    MG 1 7 09/24/2022     Urinalysis: No results found for: Shona Barbie, SPECGRAV, PHUR, LEUKOCYTESUR, NITRITE, PROTEINUA, GLUCOSEU, KETONESU, BILIRUBINUR, BLOODU  Ionized Calcium: No results found for: CAION  Coagulation: No results found for: PT, INR, APTT  Troponin: No results found for: TROPONINI  ABG: No results found for: PHART, OHT3XMT, PO2ART, IYG2JEJ, D9ZQHFLB, BEART, SOURCE  Radiology review:     IMAGING  Procedure: EGD    Addendum Date: 9/23/2022 Addendum:   [facility] Operating Room [phone] DATE OF SERVICE: 9/23/22 PHYSICIAN(S): Attending: Bailee Pimentel MD Fellow: No Staff Documented INDICATION: Acute blood loss anemia, Gastrointestinal hemorrhage, unspecified gastrointestinal hemorrhage type POST-OP DIAGNOSIS: See the impression below  PREPROCEDURE: Informed consent was obtained for the procedure, including sedation  Risks of perforation, hemorrhage, adverse drug reaction and aspiration were discussed  The patient was placed in the left lateral decubitus position  Patient was explained about the risks and benefits of the procedure  Risks including but not limited to bleeding, infection, and perforation were explained in detail  Also explained about less than 100% sensitivity with the exam and other alternatives   DETAILS OF PROCEDURE: Patient was taken to the procedure room where a time out was performed to confirm correct patient and correct procedure  The patient underwent monitored anesthesia care, which was administered by an anesthesia professional  The patient's blood pressure, heart rate, level of consciousness, respirations and oxygen were monitored throughout the procedure  The scope was advanced to the second part of the duodenum  Retroflexion was performed in the fundus  The patient experienced no blood loss  The procedure was not difficult  The patient tolerated the procedure well  There were no apparent complications  ANESTHESIA INFORMATION: ASA: III Anesthesia Type: IV Sedation with Anesthesia MEDICATIONS: No administrations occurring from 0952 to 1026 on 09/23/22 FINDINGS: The stomach and duodenum appeared normal  Ten or more polyps measuring smaller than 5 mm in the cardia and body of the stomach Medium sliding hiatal hernia (type I hiatal hernia) The esophagus appeared normal  SPECIMENS: * No specimens in log * IMPRESSION: The stomach and duodenum appeared normal  Ten or more polyps measuring smaller than 5 mm in the cardia and body of the stomach Medium sliding hiatal hernia (type I hiatal hernia) The esophagus appeared normal  RECOMMENDATION: Consider colonoscopy if continues to drop her hemoglobin   Valerie Jordan MD     Result Date: 9/23/2022  Narrative: [facility] Operating Room [phone] DATE OF SERVICE: 9/23/22 PHYSICIAN(S): Attending: Valerie Jordan MD Fellow: No Staff Documented INDICATION: Acute blood loss anemia, Gastrointestinal hemorrhage, unspecified gastrointestinal hemorrhage type POST-OP DIAGNOSIS: See the impression below  PREPROCEDURE: Informed consent was obtained for the procedure, including sedation  Risks of perforation, hemorrhage, adverse drug reaction and aspiration were discussed  The patient was placed in the left lateral decubitus position   Patient was explained about the risks and benefits of the procedure  Risks including but not limited to bleeding, infection, and perforation were explained in detail  Also explained about less than 100% sensitivity with the exam and other alternatives  DETAILS OF PROCEDURE: Patient was taken to the procedure room where a time out was performed to confirm correct patient and correct procedure  The patient underwent monitored anesthesia care, which was administered by an anesthesia professional  The patient's blood pressure, heart rate, level of consciousness, respirations and oxygen were monitored throughout the procedure  The scope was advanced to the second part of the duodenum  Retroflexion was performed in the fundus  The patient experienced no blood loss  The procedure was not difficult  The patient tolerated the procedure well  There were no apparent complications  ANESTHESIA INFORMATION: ASA: III Anesthesia Type: IV Sedation with Anesthesia MEDICATIONS: No administrations occurring from 0952 to 1026 on 09/23/22 FINDINGS: The stomach and duodenum appeared normal  Medium sliding hiatal hernia (type I hiatal hernia) The esophagus appeared normal  SPECIMENS: * No specimens in log *     Impression: The stomach and duodenum appeared normal  Medium sliding hiatal hernia (type I hiatal hernia) The esophagus appeared normal  RECOMMENDATION: Consider colonoscopy if continues to drop her hemoglobin   Oseas Dorado MD     Procedure: CT chest abdomen pelvis wo contrast    Result Date: 9/22/2022  Narrative: CT CHEST, ABDOMEN AND PELVIS WITHOUT IV CONTRAST INDICATION:   Sepsis sepsis d/t UTI; DARRELL  Lajean Cassette COMPARISON:  CT chest July 29, 2020  CT abdomen pelvis May 31, 2022 TECHNIQUE: CT examination of the chest, abdomen and pelvis was performed without intravenous contrast  Axial, sagittal, and coronal 2D reformatted images were created from the source data and submitted for interpretation  Radiation dose length product (DLP) for this visit:  589 mGy-cm     This examination, like all CT scans performed in the [facility], was performed utilizing techniques to minimize radiation dose exposure, including the use of iterative reconstruction and automated exposure control  Enteric contrast was not administered  FINDINGS: CHEST LUNGS:  No focal consolidation  Mild bibasilar linear atelectasis  Subpleural 2 mm left lower lobe nodule is unchanged from index study of April 11, 2018 (series 3, image 68)  Bronchiolectasis in the posterior lower lobes PLEURA:  Unremarkable  HEART/GREAT VESSELS: Cardiomegaly  Coronary artery calcifications and atherosclerotic calcifications of the aorta  Mitral annular calcifications  No thoracic aortic aneurysm  MEDIASTINUM AND JEN:  Unremarkable  CHEST WALL AND LOWER NECK:  Right chest wall cardiac device  ABDOMEN LIVER/BILIARY TREE:  Unremarkable  GALLBLADDER:  Gallbladder is surgically absent  SPLEEN:  Unremarkable  PANCREAS:  Unremarkable  ADRENAL GLANDS:  Unremarkable  KIDNEYS/URETERS:  Left nephroureteral stent in place  Mild-to moderate left hydronephrosis is similar prior study  Inflammation along the proximal left ureter is similar prior study  Bilateral renal vascular calcifications  4 mm calcification in the right kidney is likely a calculus  No right hydroureteronephrosis  Bilateral renal cortical thinning  STOMACH AND BOWEL:  There is colonic diverticulosis without evidence of acute diverticulitis  Small amount of hyperattenuating layering material in the cecum represents ingested material (series 601, image 55)  No bowel obstruction  Small hiatal hernia  APPENDIX:  There are expected postoperative changes of appendectomy  ABDOMINOPELVIC CAVITY:  Left para-aortic lymph node at the level of the renal vein is unchanged in size measuring 1 1 cm in short axis (series 2, image 63)  No pneumoperitoneum  No ascites  VESSELS:  Atherosclerotic changes are present  No evidence of aneurysm   PELVIS REPRODUCTIVE ORGANS:  Surgical changes of prior hysterectomy  URINARY BLADDER:  Unremarkable  ABDOMINAL WALL/INGUINAL REGIONS:  Postsurgical change  Small infraumbilical hernia containing nonobstructed small bowel  Unchanged angulation of the sternum without displacement likely representing sequelae of remote injury  OSSEOUS STRUCTURES:  No acute fracture or destructive osseous lesion  Spinal degenerative changes are noted  Angulation of the sternum without displacement is similar prior study and likely represents sequelae of remote injury  Chronic fracture deformity of the right humeral neck (series 601, image 68     Impression: Mild-to-moderate left hydroureteronephrosis with inflammation along the proximal ureter is similar to the May 31, 2022 CT  Nephroureteral stent is in place  Workstation performed: [id number]     Procedure: CT head without contrast    Result Date: 9/21/2022  Narrative: CT BRAIN - WITHOUT CONTRAST INDICATION:   Dysmetria and gait disturbance for several days  COMPARISON:  7/29/2020  TECHNIQUE:  CT examination of the brain was performed  In addition to axial images, sagittal and coronal 2D reformatted images were created and submitted for interpretation  Radiation dose length product (DLP) for this visit:  825 38 mGy-cm   This examination, like all CT scans performed in the [facility], was performed utilizing techniques to minimize radiation dose exposure, including the use of iterative  reconstruction and automated exposure control  IMAGE QUALITY:  Diagnostic  FINDINGS: PARENCHYMA:  Stable encephalomalacia and gliosis in the right posterior temporal occipital region consistent with a chronic infarct  Periventricular and subcortical hypoattenuating foci consistent with microangiopathic disease  No acute intracranial hemorrhage or mass effect  VENTRICLES AND EXTRA-AXIAL SPACES:  No hydrocephalus or extra-axial collection  VISUALIZED ORBITS AND PARANASAL SINUSES:  Intact globes and orbits    Clear paranasal sinuses  CALVARIUM AND EXTRACRANIAL SOFT TISSUES:  No lytic or blastic lesion or fracture  Impression: No acute intracranial abnormality  Workstation performed: [id number]     Procedure: US kidney and bladder    Result Date: 9/23/2022  Narrative: RENAL ULTRASOUND INDICATION:   acute kidney injury, hydronephrosis  COMPARISON: Multiple priors most recently same day CT TECHNIQUE:   Ultrasound of the retroperitoneum was performed with a curvilinear transducer utilizing volumetric sweeps and still imaging techniques  FINDINGS: KIDNEYS: Symmetric and normal size  Right kidney:  10 1 x 5 2 x 4 7 cm  Volume 128 8 mL Left kidney:  9 9 x 7 9 x 6 1 cm  Volume 248 1 mL Right kidney Poorly visualized due to underpenetration  Normal echogenicity and contour  No mass is identified  No hydronephrosis  No shadowing calculi  No perinephric fluid collections  Left kidney Poorly visualized due to underpenetration  Normal echogenicity and contour  No mass is identified  Moderate hydronephrosis with stent in place, however, the stent is poorly visualized  No shadowing calculi  No perinephric fluid collections  URETERS: Nonvisualized  BLADDER: Under distended, limiting evaluation  Impression: Limited visualization of the kidneys  Moderate left hydronephrosis  Stent poorly visualized  Workstation performed: [id number]     Procedure: Echo complete w/ contrast if indicated    Result Date: 9/22/2022  Narrative: Loren Clunes  Left Ventricle: Left ventricular cavity size is normal  Wall thickness is moderately increased  There is moderate concentric hypertrophy  The left ventricular ejection fraction is 45%  Systolic function is mildly reduced  There is mild global hypokinesis  Diastolic function is mildly abnormal, consistent with grade I (abnormal) relaxation    Right Ventricle: Right ventricular cavity size is normal  Systolic function is normal    Left Atrium: The atrium is moderately dilated    Mitral Valve:  There is severe annular calcification  There is moderate regurgitation    Tricuspid Valve: The estimated right ventricular systolic pressure is 31 02 mmHg    IVC/SVC: The right atrial pressure is estimated at 10 0 mmHg  The inferior vena cava is normal in size  Respirophasic changes were normal    Pulmonary Artery: The estimated pulmonary artery systolic pressure is 58 3 mmHg  The pulmonary artery systolic pressure is moderately increased  Current Facility-Administered Medications   Medication Dose Route Frequency    acetaminophen (TYLENOL) tablet 488 mg  488 mg Oral Q6H PRN    calcium carbonate-vitamin D (OSCAL-D) 500 mg-200 units per tablet 1 tablet  1 tablet Oral BID With Meals    cefTRIAXone (ROCEPHIN) IVPB (premix in dextrose) 1,000 mg 50 mL  1,000 mg Intravenous Q24H    dextrose 5 % in lactated Ringer's infusion  50 mL/hr Intravenous Continuous    fluticasone (FLONASE) 50 mcg/act nasal spray 2 spray  2 spray Nasal Daily    labetalol (NORMODYNE) injection 10 mg  10 mg Intravenous Q4H PRN    levothyroxine tablet 150 mcg  150 mcg Oral Early Morning    metoprolol succinate (TOPROL-XL) 24 hr tablet 50 mg  50 mg Oral Q12H    sertraline (ZOLOFT) tablet 100 mg  100 mg Oral Daily    sodium chloride (PF) 0 9 % injection 3 mL  3 mL Intravenous Q1H PRN     Medications Discontinued During This Encounter   Medication Reason    calcium gluconate 3 g in sodium chloride 0 9 % 100 mL IVPB     sodium bicarbonate tablet 650 mg     metoprolol tartrate (LOPRESSOR) tablet 200 mg     aspirin chewable tablet 81 mg     sodium bicarbonate 150 mEq in dextrose 5 % 1,000 mL infusion     lactated ringers infusion     metoprolol tartrate (LOPRESSOR) tablet 50 mg     metoprolol succinate (TOPROL-XL) 24 hr tablet 50 mg        Mary Costello MD      This progress note was produced in part using a dictation device which may document imprecise wording from author's original intent

## 2022-09-24 NOTE — PROGRESS NOTES
[address] [facility] [address] [place]  Progress Note - Dagoberto Ester 1945, 65 y o  female MRN: [id number]  Unit/Bed#: 409-01 Encounter: [id number]  Primary Care Provider: Molly Abraham DO   Date and time admitted to hospital: 9/21/2022  9:40 PM    * Sepsis due to urinary tract infection [facility])  Assessment & Plan  · Sepsis was present on admission and due to acute cystitis  · SIRS criteria was met with a leukocytosis and tachycardia  · Continue ceftriaxone 1000 mg IV every 24 hours  · Follow the culture results    Gastrointestinal bleeding  Assessment & Plan  · Please see the assessment and plan for "Acute blood loss anemia"    Hypercalcemia  Assessment & Plan  · Check an ionized calcium level and intact-PTH level    Hydroureteronephrosis  Assessment & Plan  · The patient was seen in consultation by Urology with the following recommendations:  "Plan:   No plan for any urological intervention at this time      Treat current suspected UTI, await final urine cultures  Patient maintain on IV ceftriaxone 1 g daily      Dr Ignacia Young from Urology attested brief note, he remarked that retained indwelling ureteral stents can be very difficult to remove her left ureteral stent is likely encrusted and will require both retrograde and antegrade renal surgery if the proximal coil is fixed with stone formation      The patient will need definitive stone treatment and extraction of the retained left ureteral stent via cystoscopy and laser for extraction of then crusted/retained stent as well as ureteral/renal stones with temporary stent exchange  This can be done after hospital discharge and after the suspected acute urinary tract infection has cleared completed antibiotic course    The patient can follow with her neurologist at the [facility] that she previously establish care with almost 2 years ago for the initial placement of the stent or referral to [facility] on an outpatient basis      If the patient develops worsening creatinine over the weekend, then obtain renal ultrasound to see if worsening hydroureteronephrosis and then, in that situation, the patient may be a candidate for IR placement of a left PCN tube "    Increased anion gap metabolic acidosis  Assessment & Plan  · Resolved with sodium bicarbonate treatment per Nephrology  · Continue to monitor    Hyperkalemia  Assessment & Plan  · Resolved with treatment  · Follow the potassium level    Results from last 7 days   Lab Units 09/24/22  0417 09/23/22  0632 09/23/22  0041   SODIUM mmol/L 138 138 138   POTASSIUM mmol/L 4 2 5 2 4 6   CHLORIDE mmol/L 103 105 105   CO2 mmol/L 23 23 24   BUN mg/dL 57* 76* 78*   CREATININE mg/dL 2 04* 2 40* 2 45*   CALCIUM mg/dL 9 2 9 4 9 1         Stroke-like symptoms  Assessment & Plan  · Stroke pathway  · No acute intracranial abnormality observed on CT scan of the head without contrast completed on 09/21/2022  · Cannot have an MRI of the brain without contrast at [address] due to the patient having an indwelling pacemaker  · Will need an outpatient MRI of the brain without contrast at another campus as soon as possible  · If the patient has worsening neurologic symptoms, she will need to be transferred to a different campus, which is capable of performing an MRI of the brain with an indwelling pacemaker  · Allergy/intolerance to statin therapy in the past  · The eliquis is on hold the setting of gastrointestinal bleeding    · PT/OT  · Outpatient Neurology evaluation      Acute blood loss anemia  Assessment & Plan  · Required a PRBC's (packed red blood cells) transfusion  · Continue to hold eliquis for now  · Serial laboratory testing to monitor the patient's hemoglobin/hematocrit levels  · The patient was seen in consultation by Gastroenterology and underwent an EGD on 09/23/2022:  IMPRESSION:  · The stomach and duodenum appeared normal   · Ten or more polyps measuring smaller than 5 mm in the cardia and body of the stomach  · Medium sliding hiatal hernia (type I hiatal hernia)  · The esophagus appeared normal   RECOMMENDATION:  Consider colonoscopy if continues to drop her hemoglobin    Results from last 7 days   Lab Units 09/24/22  0417 09/23/22  2101 09/23/22  0632 09/22/22  0724 09/22/22  0540   WBC Thousand/uL 10 87*  --  9 04  --  12 60*   HEMOGLOBIN g/dL 9 0* 9 1* 9 0*   < > 7 5*   HEMATOCRIT % 27 7* 27 5* 27 0*  --  24 1*   PLATELETS Thousands/uL 246  --  182  --  272    < > = values in this interval not displayed  Essential hypertension  Assessment & Plan  · Change metoprolol tartrate to metoprolol succinate (toprol XL) with the decreased LVEF, which should be continued upon discharge  · Continue to hold losartan in the setting of acute kidney injury  · Likely will need additional PO hypertension medications  · Utilize PRN IV labetalol for a SBP>170 mmHg or DBP>100 mmHg, Hold for a HR<60 bpm  · Follow the blood pressure trend    Paroxysmal atrial fibrillation (HCC)  Assessment & Plan  · Continue to hold eliquis at this time in the setting of gastrointestinal bleeding  · Change metoprolol tartrate to metoprolol succinate (toprol XL) with the decreased LVEF, which should be continued upon discharge    Chronic combined systolic and diastolic CHF (congestive heart failure) (HCC)  Assessment & Plan  Wt Readings from Last 3 Encounters:   09/22/22 53 4 kg (117 lb 11 6 oz)   05/31/22 62 8 kg (138 lb 7 2 oz)   11/16/21 62 8 kg (138 lb 7 2 oz)     · Does not appear to be in exacerbation at this time  · Continue to hold lasix per Nephrology  · Change metoprolol tartrate to metoprolol succinate (toprol XL) with the decreased LVEF, which should be continued upon discharge  · Daily weights  · Strict intake/output measurements  · Outpatient follow-up with Cardiology    Echocardiogram (09/22/2022):     Interpretation Summary         Left Ventricle: Left ventricular cavity size is normal  Wall thickness is moderately increased  There is moderate concentric hypertrophy  The left ventricular ejection fraction is 45%  Systolic function is mildly reduced  There is mild global hypokinesis  Diastolic function is mildly abnormal, consistent with grade I (abnormal) relaxation    Right Ventricle: Right ventricular cavity size is normal  Systolic function is normal     Left Atrium: The atrium is moderately dilated    Mitral Valve: There is severe annular calcification  There is moderate regurgitation    Tricuspid Valve: The estimated right ventricular systolic pressure is 78 74 mmHg    IVC/SVC: The right atrial pressure is estimated at 10 0 mmHg  The inferior vena cava is normal in size  Respirophasic changes were normal     Pulmonary Artery: The estimated pulmonary artery systolic pressure is 66 6 mmHg  The pulmonary artery systolic pressure is moderately increased  Acute kidney injury superimposed on chronic kidney disease [facility])  Assessment & Plan  Lab Results   Component Value Date    EGFR 22 09/24/2022    EGFR 18 09/23/2022    EGFR 18 09/23/2022    CREATININE 2 04 (H) 09/24/2022    CREATININE 2 40 (H) 09/23/2022    CREATININE 2 45 (H) 09/23/2022     · Acute kidney injury was present on admission  · Baseline serum creatinine of 1 7-2 0 mg/dl  · The patient was seen in consultation by Nephrology  · Continue IV fluids with D5 Lactated Ringer's solution at 50 ml/hr per Nephrology  · Continue to hold PO lasix and PO losartan  · Avoid all nephrotoxic agents  · Serial laboratory testing to monitor the patient's renal function and electrolyte levels      Acquired hypothyroidism  Assessment & Plan  · Check a TSH level  · Continue levothyroxine 150 mcg PO Qdaily        VTE Pharmacologic Prophylaxis: VTE Score: 10 High Risk (Score >/= 5) - Pharmacological DVT Prophylaxis Contraindicated  Sequential Compression Devices Ordered  Patient Centered Rounds: I performed bedside rounds with nursing staff today      Education and Discussions with Family / Patient: Updated  () at bedside  Time Spent for Care: 30 minutes  More than 50% of total time spent on counseling and coordination of care as described above  Current Length of Stay: 2 day(s)  Current Patient Status: Inpatient   Certification Statement: The patient will continue to require additional inpatient hospital stay due to the need for IV antibiotic treatment, for continuous IV fluids, and for short-term rehabilitation SNF placement upon discharge  Discharge Plan: Anticipate discharge in 48-72 hrs to rehab facility  Code Status: Level 3 - DNAR and DNI    Subjective: The patient was seen and examined  The patient is confused this morning  She offers no specific complaints  Objective:     Vitals:   Temp (24hrs), Av 7 °F (37 1 °C), Min:98 5 °F (36 9 °C), Max:99 4 °F (37 4 °C)    Temp:  [98 5 °F (36 9 °C)-99 4 °F (37 4 °C)] 98 6 °F (37 °C)  HR:  [72-87] 77  Resp:  [18-21] 21  BP: (104-168)/(51-81) 168/81  SpO2:  [91 %-98 %] 97 %  Body mass index is 19 kg/m²  Input and Output Summary (last 24 hours):      Intake/Output Summary (Last 24 hours) at 2022 1355  Last data filed at 2022 0800  Gross per 24 hour   Intake 1370 83 ml   Output 1000 ml   Net 370 83 ml       Physical Exam:   Physical Exam   General:  NAD, follows commands  HEENT:  NC/AT, mucous membranes moist  Neck:  Supple, No JVP elevation  CV:  + S1, + S2, RRR  Pulm:  Lung fields are CTA bilaterally  Abd:  Soft, Non-tender, Non-distended  Ext:  No clubbing/cyanosis/edema  Skin:  No rashes  Neuro:  Awake, alert, confused, not oriented  Psych:  Normal mood and affect      Additional Data:    Labs:  Results from last 7 days   Lab Units 22  0417   WBC Thousand/uL 10 87*   HEMOGLOBIN g/dL 9 0*   HEMATOCRIT % 27 7*   PLATELETS Thousands/uL 246   NEUTROS PCT % 77*   LYMPHS PCT % 11*   MONOS PCT % 8   EOS PCT % 3     Results from last 7 days   Lab Units 22  0417   SODIUM mmol/L 138   POTASSIUM mmol/L 4 2   CHLORIDE mmol/L 103   CO2 mmol/L 23   BUN mg/dL 57*   CREATININE mg/dL 2 04*   ANION GAP mmol/L 12   CALCIUM mg/dL 9 2   ALBUMIN g/dL 2 7*   TOTAL BILIRUBIN mg/dL 0 32   ALK PHOS U/L 146*   ALT U/L 7*   AST U/L 18   GLUCOSE RANDOM mg/dL 97     Results from last 7 days   Lab Units 09/21/22  2314   INR  1 82*     Results from last 7 days   Lab Units 09/22/22  2039 09/22/22  0119 09/22/22  0021   POC GLUCOSE mg/dl 138 125 106     Results from last 7 days   Lab Units 09/22/22  0430   HEMOGLOBIN A1C % 5 3     Results from last 7 days   Lab Units 09/23/22  0632 09/22/22  0430 09/22/22  0229 09/22/22  0125   LACTIC ACID mmol/L  --   --  1 4  --    PROCALCITONIN ng/ml 0 34* 0 27*  --  0 29*       Lines/Drains:  Invasive Devices  Report    Peripheral Intravenous Line  Duration           Peripheral IV 09/22/22 Right Antecubital 2 days    Peripheral IV 09/24/22 Right;Ventral (anterior) Forearm <1 day          Drain  Duration           External Urinary Catheter 1 day                  Telemetry:  Telemetry Orders (From admission, onward)             48 Hour Telemetry Monitoring  Continuous x 48 hours        References:    Telemetry Guidelines   Question:  Reason for 48 Hour Telemetry  Answer:  Arrhythmias Requiring Medical Therapy (eg  SVT, Vtach/fib, Bradycardia, Uncontrolled A-fib)                 Telemetry Reviewed: Paced cardiac rhythm  Indication for Continued Telemetry Use: Arrthymias requiring medical therapy             Imaging: No pertinent imaging reviewed  Recent Cultures (last 7 days):   Results from last 7 days   Lab Units 09/22/22 0229 09/22/22  0128   BLOOD CULTURE  No Growth at 48 hrs  No Growth at 48 hrs    --    URINE CULTURE   --  >100,000 cfu/ml Escherichia coli*       Last 24 Hours Medication List:   Current Facility-Administered Medications   Medication Dose Route Frequency Provider Last Rate    acetaminophen  488 mg Oral Q6H PRN Kateryna Hsu PA-C      calcium carbonate-vitamin D  1 tablet Oral BID With Meals Shelli Meza PA-C      cefTRIAXone  1,000 mg Intravenous Q24H Shelli eMza PA-C 1,000 mg (09/24/22 0313)    dextrose 5% lactated ringer's  50 mL/hr Intravenous Continuous HungFairbury Anup, DO 50 mL/hr (09/24/22 0559)    fluticasone  2 spray Nasal Daily Shelli Meza PA-C      labetalol  10 mg Intravenous Q4H PRN Binta Ceballos DO      levothyroxine  150 mcg Oral Early Morning Shelli Meza PA-C      metoprolol succinate  50 mg Oral Q12H Binta Ceballos DO      sertraline  100 mg Oral Daily Shelli Meza PA-C      sodium chloride (PF)  3 mL Intravenous Q1H PRN Shelli Meza PA-C          Today, Patient Was Seen By: Gabriel Boykin DO    **Please Note: This note may have been constructed using a voice recognition system  **

## 2022-09-24 NOTE — ASSESSMENT & PLAN NOTE
· Stroke pathway  · No acute intracranial abnormality observed on CT scan of the head without contrast completed on 09/21/2022  · Cannot have an MRI of the brain without contrast at [address] [facility] due to the patient having an indwelling pacemaker  · Will need an outpatient MRI of the brain without contrast at another campus as soon as possible  · If the patient has worsening neurologic symptoms, she will need to be transferred to a different campus, which is capable of performing an MRI of the brain with an indwelling pacemaker  · PT/OT  · Outpatient Neurology evaluation

## 2022-09-25 ENCOUNTER — APPOINTMENT (OUTPATIENT)
Dept: RADIOLOGY | Facility: HOSPITAL | Age: 77
DRG: 871 | End: 2022-09-25
Payer: COMMERCIAL

## 2022-09-25 PROBLEM — U07.1 COVID-19 VIRUS INFECTION: Status: ACTIVE | Noted: 2022-09-25

## 2022-09-25 PROBLEM — R79.89 LOW TSH LEVEL: Status: ACTIVE | Noted: 2022-09-25

## 2022-09-25 LAB
ALBUMIN SERPL BCP-MCNC: 2.6 G/DL (ref 3.5–5)
ALP SERPL-CCNC: 131 U/L (ref 46–116)
ALT SERPL W P-5'-P-CCNC: 9 U/L (ref 12–78)
ANION GAP SERPL CALCULATED.3IONS-SCNC: 10 MMOL/L (ref 4–13)
AST SERPL W P-5'-P-CCNC: 20 U/L (ref 5–45)
BACTERIA UR QL AUTO: ABNORMAL /HPF
BASOPHILS # BLD AUTO: 0.02 THOUSANDS/ÂΜL (ref 0–0.1)
BASOPHILS NFR BLD AUTO: 0 % (ref 0–1)
BILIRUB SERPL-MCNC: 0.26 MG/DL (ref 0.2–1)
BILIRUB UR QL STRIP: NEGATIVE
BUN SERPL-MCNC: 49 MG/DL (ref 5–25)
CA-I BLD-SCNC: 1.14 MMOL/L (ref 1.12–1.32)
CALCIUM ALBUM COR SERPL-MCNC: 9.8 MG/DL (ref 8.3–10.1)
CALCIUM SERPL-MCNC: 8.7 MG/DL (ref 8.3–10.1)
CHLORIDE SERPL-SCNC: 105 MMOL/L (ref 96–108)
CLARITY UR: CLEAR
CO2 SERPL-SCNC: 25 MMOL/L (ref 21–32)
COLOR UR: YELLOW
CREAT SERPL-MCNC: 2.02 MG/DL (ref 0.6–1.3)
EOSINOPHIL # BLD AUTO: 0.1 THOUSAND/ÂΜL (ref 0–0.61)
EOSINOPHIL NFR BLD AUTO: 1 % (ref 0–6)
ERYTHROCYTE [DISTWIDTH] IN BLOOD BY AUTOMATED COUNT: 25.2 % (ref 11.6–15.1)
FLUAV RNA RESP QL NAA+PROBE: NEGATIVE
FLUBV RNA RESP QL NAA+PROBE: NEGATIVE
GFR SERPL CREATININE-BSD FRML MDRD: 23 ML/MIN/1.73SQ M
GLUCOSE SERPL-MCNC: 94 MG/DL (ref 65–140)
GLUCOSE UR STRIP-MCNC: NEGATIVE MG/DL
HCT VFR BLD AUTO: 25.4 % (ref 34.8–46.1)
HGB BLD-MCNC: 8.1 G/DL (ref 11.5–15.4)
HGB UR QL STRIP.AUTO: ABNORMAL
IMM GRANULOCYTES # BLD AUTO: 0.03 THOUSAND/UL (ref 0–0.2)
IMM GRANULOCYTES NFR BLD AUTO: 0 % (ref 0–2)
INR PPP: 1.1 (ref 0.84–1.19)
KETONES UR STRIP-MCNC: NEGATIVE MG/DL
LACTATE SERPL-SCNC: 2 MMOL/L (ref 0.5–2)
LEUKOCYTE ESTERASE UR QL STRIP: ABNORMAL
LYMPHOCYTES # BLD AUTO: 0.67 THOUSANDS/ÂΜL (ref 0.6–4.47)
LYMPHOCYTES NFR BLD AUTO: 9 % (ref 14–44)
MAGNESIUM SERPL-MCNC: 1.6 MG/DL (ref 1.6–2.6)
MCH RBC QN AUTO: 26.6 PG (ref 26.8–34.3)
MCHC RBC AUTO-ENTMCNC: 31.9 G/DL (ref 31.4–37.4)
MCV RBC AUTO: 83 FL (ref 82–98)
MONOCYTES # BLD AUTO: 0.73 THOUSAND/ÂΜL (ref 0.17–1.22)
MONOCYTES NFR BLD AUTO: 10 % (ref 4–12)
NEUTROPHILS # BLD AUTO: 5.91 THOUSANDS/ÂΜL (ref 1.85–7.62)
NEUTS SEG NFR BLD AUTO: 80 % (ref 43–75)
NITRITE UR QL STRIP: NEGATIVE
NON-SQ EPI CELLS URNS QL MICRO: ABNORMAL /HPF
NRBC BLD AUTO-RTO: 0 /100 WBCS
PH UR STRIP.AUTO: 6.5 [PH]
PHOSPHATE SERPL-MCNC: 3.5 MG/DL (ref 2.3–4.1)
PLATELET # BLD AUTO: 211 THOUSANDS/UL (ref 149–390)
PMV BLD AUTO: 9.9 FL (ref 8.9–12.7)
POTASSIUM SERPL-SCNC: 3.9 MMOL/L (ref 3.5–5.3)
PROCALCITONIN SERPL-MCNC: 0.28 NG/ML
PROT SERPL-MCNC: 6.9 G/DL (ref 6.4–8.4)
PROT UR STRIP-MCNC: ABNORMAL MG/DL
PROTHROMBIN TIME: 14.5 SECONDS (ref 11.6–14.5)
PTH-INTACT SERPL-MCNC: 125.1 PG/ML (ref 18.4–80.1)
RBC # BLD AUTO: 3.05 MILLION/UL (ref 3.81–5.12)
RBC #/AREA URNS AUTO: ABNORMAL /HPF
RSV RNA RESP QL NAA+PROBE: NEGATIVE
SARS-COV-2 RNA RESP QL NAA+PROBE: POSITIVE
SODIUM SERPL-SCNC: 140 MMOL/L (ref 135–147)
SP GR UR STRIP.AUTO: <=1.005 (ref 1–1.03)
TSH SERPL DL<=0.05 MIU/L-ACNC: 0.05 UIU/ML (ref 0.45–4.5)
UROBILINOGEN UR QL STRIP.AUTO: 0.2 E.U./DL
WBC # BLD AUTO: 7.46 THOUSAND/UL (ref 4.31–10.16)
WBC #/AREA URNS AUTO: ABNORMAL /HPF

## 2022-09-25 PROCEDURE — 83970 ASSAY OF PARATHORMONE: CPT | Performed by: INTERNAL MEDICINE

## 2022-09-25 PROCEDURE — 99232 SBSQ HOSP IP/OBS MODERATE 35: CPT | Performed by: INTERNAL MEDICINE

## 2022-09-25 PROCEDURE — 0241U HB NFCT DS VIR RESP RNA 4 TRGT: CPT | Performed by: INTERNAL MEDICINE

## 2022-09-25 PROCEDURE — 81001 URINALYSIS AUTO W/SCOPE: CPT | Performed by: INTERNAL MEDICINE

## 2022-09-25 PROCEDURE — 87040 BLOOD CULTURE FOR BACTERIA: CPT | Performed by: INTERNAL MEDICINE

## 2022-09-25 PROCEDURE — 84145 PROCALCITONIN (PCT): CPT | Performed by: INTERNAL MEDICINE

## 2022-09-25 PROCEDURE — 85025 COMPLETE CBC W/AUTO DIFF WBC: CPT | Performed by: INTERNAL MEDICINE

## 2022-09-25 PROCEDURE — 83605 ASSAY OF LACTIC ACID: CPT | Performed by: INTERNAL MEDICINE

## 2022-09-25 PROCEDURE — 82397 CHEMILUMINESCENT ASSAY: CPT | Performed by: INTERNAL MEDICINE

## 2022-09-25 PROCEDURE — 84443 ASSAY THYROID STIM HORMONE: CPT | Performed by: INTERNAL MEDICINE

## 2022-09-25 PROCEDURE — 80053 COMPREHEN METABOLIC PANEL: CPT | Performed by: INTERNAL MEDICINE

## 2022-09-25 PROCEDURE — 85610 PROTHROMBIN TIME: CPT | Performed by: INTERNAL MEDICINE

## 2022-09-25 PROCEDURE — 87086 URINE CULTURE/COLONY COUNT: CPT | Performed by: INTERNAL MEDICINE

## 2022-09-25 PROCEDURE — 82330 ASSAY OF CALCIUM: CPT | Performed by: INTERNAL MEDICINE

## 2022-09-25 PROCEDURE — 83735 ASSAY OF MAGNESIUM: CPT | Performed by: INTERNAL MEDICINE

## 2022-09-25 PROCEDURE — 84100 ASSAY OF PHOSPHORUS: CPT | Performed by: INTERNAL MEDICINE

## 2022-09-25 PROCEDURE — 71045 X-RAY EXAM CHEST 1 VIEW: CPT

## 2022-09-25 RX ORDER — HEPARIN SODIUM 5000 [USP'U]/ML
5000 INJECTION, SOLUTION INTRAVENOUS; SUBCUTANEOUS EVERY 8 HOURS SCHEDULED
Status: DISCONTINUED | OUTPATIENT
Start: 2022-09-25 | End: 2022-09-25

## 2022-09-25 RX ORDER — LEVOTHYROXINE SODIUM 0.12 MG/1
125 TABLET ORAL
Status: DISCONTINUED | OUTPATIENT
Start: 2022-09-26 | End: 2022-10-04 | Stop reason: HOSPADM

## 2022-09-25 RX ORDER — POTASSIUM CHLORIDE 750 MG/1
10 TABLET, EXTENDED RELEASE ORAL ONCE
Status: COMPLETED | OUTPATIENT
Start: 2022-09-25 | End: 2022-09-25

## 2022-09-25 RX ADMIN — SERTRALINE 100 MG: 100 TABLET, FILM COATED ORAL at 08:01

## 2022-09-25 RX ADMIN — DEXTROSE, SODIUM CHLORIDE, SODIUM LACTATE, POTASSIUM CHLORIDE, AND CALCIUM CHLORIDE 50 ML/HR: 5; .6; .31; .03; .02 INJECTION, SOLUTION INTRAVENOUS at 05:04

## 2022-09-25 RX ADMIN — FLUTICASONE PROPIONATE 2 SPRAY: 50 SPRAY, METERED NASAL at 08:02

## 2022-09-25 RX ADMIN — METOPROLOL SUCCINATE 50 MG: 50 TABLET, FILM COATED, EXTENDED RELEASE ORAL at 22:44

## 2022-09-25 RX ADMIN — APIXABAN 2.5 MG: 2.5 TABLET, FILM COATED ORAL at 17:10

## 2022-09-25 RX ADMIN — CEFTRIAXONE 1000 MG: 1 INJECTION, SOLUTION INTRAVENOUS at 03:41

## 2022-09-25 RX ADMIN — Medication 1 TABLET: at 08:02

## 2022-09-25 RX ADMIN — POTASSIUM CHLORIDE 10 MEQ: 750 TABLET, EXTENDED RELEASE ORAL at 17:12

## 2022-09-25 RX ADMIN — ACETAMINOPHEN 488 MG: 325 TABLET, FILM COATED ORAL at 08:02

## 2022-09-25 RX ADMIN — Medication 1 TABLET: at 17:10

## 2022-09-25 RX ADMIN — MAGNESIUM OXIDE TAB 400 MG (241.3 MG ELEMENTAL MG) 400 MG: 400 (241.3 MG) TAB at 17:10

## 2022-09-25 RX ADMIN — APIXABAN 2.5 MG: 2.5 TABLET, FILM COATED ORAL at 11:54

## 2022-09-25 RX ADMIN — ACETAMINOPHEN 488 MG: 325 TABLET, FILM COATED ORAL at 18:38

## 2022-09-25 RX ADMIN — LEVOTHYROXINE SODIUM 150 MCG: 150 TABLET ORAL at 06:26

## 2022-09-25 RX ADMIN — METOPROLOL SUCCINATE 50 MG: 50 TABLET, FILM COATED, EXTENDED RELEASE ORAL at 08:02

## 2022-09-25 NOTE — ASSESSMENT & PLAN NOTE
· The patient's eliquis has been on hold    · Will restart eliquis at 2 5 mg PO BID on 09/25/2022 (needs the decreased dose (2 5 mg) based on her weight and serum creatinine level)  · Changed metoprolol tartrate to metoprolol succinate (toprol XL) with the decreased LVEF, which should be continued upon discharge

## 2022-09-25 NOTE — PROGRESS NOTES
[address] [facility] [address] [place]  Progress Note - Cora Ramirez 1945, 65 y o  female MRN: [id number]  Unit/Bed#: 409-01 Encounter: [id number]  Primary Care Provider: Sadiq Peralta DO   Date and time admitted to hospital: 9/21/2022  9:40 PM    * Sepsis due to urinary tract infection [facility])  Assessment & Plan  · Sepsis was present on admission and due to acute cystitis  · SIRS criteria was met with a leukocytosis and tachycardia  · Now with fevers and tachycardia on 09/25/2022 due to the COVID-19 virus infection  · Continue ceftriaxone 1000 mg IV every 24 hours  · Follow the culture results    Gastrointestinal bleeding  Assessment & Plan  · Please see the assessment and plan for "Acute blood loss anemia"    COVID-19 virus infection  Assessment & Plan  · Developed fevers on 09/25/2022 and was retested for COVID-19 which is now positive  · I notified by the patient's  of this finding  · Check a portable chest xray and D-dimer level  · Does not require IV dexamethasone treatment with normal oxygen saturation levels on room air  · Will not treat with IV remdesivir due to the patient's acute kidney injury and mild COVID-19 status  · The patient's eliquis has been on hold    · Will restart eliquis at 2 5 mg PO BID on 09/25/2022 (needs the decreased dose (2 5 mg) based on her weight and serum creatinine level)  · Incentive spirometry  · Respiratory protocol    Low TSH level  Assessment & Plan  · Please see the assessment and plan for "Acquired hypothyroidism"    Hydroureteronephrosis  Assessment & Plan  · The patient was seen in consultation by Urology with the following recommendations:  "Plan:   No plan for any urological intervention at this time      Treat current suspected UTI, await final urine cultures  Patient maintain on IV ceftriaxone 1 g daily      Dr Isadora Henderson from Urology attested brief note, he remarked that retained indwelling ureteral stents can be very difficult to remove her left ureteral stent is likely encrusted and will require both retrograde and antegrade renal surgery if the proximal coil is fixed with stone formation      The patient will need definitive stone treatment and extraction of the retained left ureteral stent via cystoscopy and laser for extraction of then crusted/retained stent as well as ureteral/renal stones with temporary stent exchange  This can be done after hospital discharge and after the suspected acute urinary tract infection has cleared completed antibiotic course    The patient can follow with her neurologist at the [facility] that she previously establish care with almost 2 years ago for the initial placement of the stent or referral to [facility] on an outpatient basis      If the patient develops worsening creatinine over the weekend, then obtain renal ultrasound to see if worsening hydroureteronephrosis and then, in that situation, the patient may be a candidate for IR placement of a left PCN tube "    Increased anion gap metabolic acidosis  Assessment & Plan  · Resolved with sodium bicarbonate treatment per Nephrology  · Continue to monitor    Hyperkalemia  Assessment & Plan  · Resolved with treatment  · Follow the potassium level    Results from last 7 days   Lab Units 09/25/22  0658 09/24/22  0417 09/23/22  0632   SODIUM mmol/L 140 138 138   POTASSIUM mmol/L 3 9 4 2 5 2   CHLORIDE mmol/L 105 103 105   CO2 mmol/L 25 23 23   BUN mg/dL 49* 57* 76*   CREATININE mg/dL 2 02* 2 04* 2 40*   CALCIUM mg/dL 8 7 9 2 9 4         Stroke-like symptoms  Assessment & Plan  · Stroke pathway  · No acute intracranial abnormality observed on CT scan of the head without contrast completed on 09/21/2022  · Cannot have an MRI of the [place] [facility] [place] due to the patient having an indwelling pacemaker  · Will need an outpatient MRI of the brain without contrast at another campus as soon as possible  · If the patient has worsening neurologic symptoms, she will need to be transferred to a different campus, which is capable of performing an MRI of the brain with an indwelling pacemaker  · Allergy/intolerance to statin therapy in the past  · The eliquis is on hold the setting of gastrointestinal bleeding  · PT/OT  · Outpatient Neurology evaluation      Acute blood loss anemia  Assessment & Plan  · Required a PRBC's (packed red blood cells) transfusion  · The patient's eliquis has been on hold  · Will restart eliquis at 2 5 mg PO BID on 09/25/2022 (needs the decreased dose (2 5 mg) based on her weight and serum creatinine level)  · Serial laboratory testing to monitor the patient's hemoglobin/hematocrit levels  · The patient was seen in consultation by Gastroenterology and underwent an EGD on 09/23/2022:  IMPRESSION:  · The stomach and duodenum appeared normal   · Ten or more polyps measuring smaller than 5 mm in the cardia and body of the stomach  · Medium sliding hiatal hernia (type I hiatal hernia)  · The esophagus appeared normal   RECOMMENDATION:  Consider colonoscopy if continues to drop her hemoglobin    Results from last 7 days   Lab Units 09/25/22  0658 09/24/22  0417 09/23/22  2101 09/23/22  0632   WBC Thousand/uL 7 46 10 87*  --  9 04   HEMOGLOBIN g/dL 8 1* 9 0* 9 1* 9 0*   HEMATOCRIT % 25 4* 27 7* 27 5* 27 0*   PLATELETS Thousands/uL 211 246  --  182         Essential hypertension  Assessment & Plan  · Changed metoprolol tartrate to metoprolol succinate (toprol XL) with the decreased LVEF, which should be continued upon discharge  · Continue to hold losartan in the setting of acute kidney injury  · Likely will need additional PO hypertension medications  · Utilize PRN IV labetalol for a SBP>170 mmHg or DBP>100 mmHg, Hold for a HR<60 bpm  · Follow the blood pressure trend    Paroxysmal atrial fibrillation (HCC)  Assessment & Plan  · The patient's eliquis has been on hold    · Will restart eliquis at 2 5 mg PO BID on 09/25/2022 (needs the decreased dose (2 5 mg) based on her weight and serum creatinine level)  · Changed metoprolol tartrate to metoprolol succinate (toprol XL) with the decreased LVEF, which should be continued upon discharge    Chronic combined systolic and diastolic CHF (congestive heart failure) (HCC)  Assessment & Plan  Wt Readings from Last 3 Encounters:   09/25/22 54 1 kg (119 lb 4 3 oz)   05/31/22 62 8 kg (138 lb 7 2 oz)   11/16/21 62 8 kg (138 lb 7 2 oz)     · Does not appear to be in exacerbation at this time  · Continue to hold lasix and losartan per Nephrology  · Change metoprolol tartrate to metoprolol succinate (toprol XL) with the decreased LVEF, which should be continued upon discharge  · Daily weights  · Strict intake/output measurements  · Outpatient follow-up with Cardiology    Echocardiogram (09/22/2022): Interpretation Summary         Left Ventricle: Left ventricular cavity size is normal  Wall thickness is moderately increased  There is moderate concentric hypertrophy  The left ventricular ejection fraction is 45%  Systolic function is mildly reduced  There is mild global hypokinesis  Diastolic function is mildly abnormal, consistent with grade I (abnormal) relaxation    Right Ventricle: Right ventricular cavity size is normal  Systolic function is normal     Left Atrium: The atrium is moderately dilated    Mitral Valve: There is severe annular calcification  There is moderate regurgitation    Tricuspid Valve: The estimated right ventricular systolic pressure is 20 35 mmHg    IVC/SVC: The right atrial pressure is estimated at 10 0 mmHg  The inferior vena cava is normal in size  Respirophasic changes were normal     Pulmonary Artery: The estimated pulmonary artery systolic pressure is 07 9 mmHg  The pulmonary artery systolic pressure is moderately increased        Acute kidney injury superimposed on chronic kidney disease [facility])  Assessment & Plan  Lab Results   Component Value Date    EGFR 23 09/25/2022    EGFR 22 09/24/2022    EGFR 18 09/23/2022    CREATININE 2 02 (H) 09/25/2022    CREATININE 2 04 (H) 09/24/2022    CREATININE 2 40 (H) 09/23/2022     · Acute kidney injury was present on admission  · Baseline serum creatinine of 1 7-2 0 mg/dl  · The patient was seen in consultation by Nephrology  · Continue IV fluids with D5 Lactated Ringer's solution at 50 ml/hr per Nephrology  · Continue to hold PO lasix and PO losartan  · Avoid all nephrotoxic agents  · Serial laboratory testing to monitor the patient's renal function and electrolyte levels      Acquired hypothyroidism  Assessment & Plan  · Decrease levothyroxine to 125 mcg PO Qdaily based on the low TSH level  · Recheck a TSH level in 4-6 weeks     Latest Reference Range & Units 09/25/22 06:58   TSH 3RD GENERATON 0 450 - 4 500 uIU/mL 0 051 (L)   (L): Data is abnormally low      VTE Pharmacologic Prophylaxis: VTE Score: 10 High Risk (Score >/= 5) - Pharmacological DVT Prophylaxis Ordered: apixaban (Eliquis)  Sequential Compression Devices Ordered  Patient Centered Rounds: I performed bedside rounds with nursing staff today  Education and Discussions with Family / Patient: Updated  () via phone  Time Spent for Care: 30 minutes  More than 50% of total time spent on counseling and coordination of care as described above  Current Length of Stay: 3 day(s)  Current Patient Status: Inpatient   Certification Statement: The patient will continue to require additional inpatient hospital stay due to the need for IV antibiotic treatment, for continuous IV fluids, for serial laboratory testing to montior her renal function, and for short-term rehabilitation placement upon discharge  Discharge Plan: Anticipate discharge in 48-72 hrs to rehab facility  Code Status: Level 3 - DNAR and DNI    Subjective: The patient was seen and examined  The patient developed fevers this morning  No chest pain  No shortness of breath    No abdominal pain  No nausea or vomiting  Objective:     Vitals:   Temp (24hrs), Av 5 °F (37 5 °C), Min:98 4 °F (36 9 °C), Max:101 2 °F (38 4 °C)    Temp:  [98 4 °F (36 9 °C)-101 2 °F (38 4 °C)] 98 4 °F (36 9 °C)  HR:  [] 79  Resp:  [18-20] 20  BP: (144-194)/(70-97) 148/70  SpO2:  [92 %-97 %] 94 %  Body mass index is 19 25 kg/m²  Input and Output Summary (last 24 hours):      Intake/Output Summary (Last 24 hours) at 2022 1106  Last data filed at 2022 0959  Gross per 24 hour   Intake 300 ml   Output 750 ml   Net -450 ml       Physical Exam:   Physical Exam   General:  NAD, follows commands  HEENT:  NC/AT, mucous membranes moist  Neck:  Supple, No JVP elevation  CV:  + S1, + S2, RRR  Pulm:  Lung fields are CTA bilaterally  Abd:  Soft, Non-tender, Non-distended  Ext:  No clubbing/cyanosis/edema  Skin:  No rashes  Neuro:  Awake, alert, confused, not oriented  Psych:  Normal mood and affect      Additional Data:    Labs:  Results from last 7 days   Lab Units 22  0658   WBC Thousand/uL 7 46   HEMOGLOBIN g/dL 8 1*   HEMATOCRIT % 25 4*   PLATELETS Thousands/uL 211   NEUTROS PCT % 80*   LYMPHS PCT % 9*   MONOS PCT % 10   EOS PCT % 1     Results from last 7 days   Lab Units 22  0658   SODIUM mmol/L 140   POTASSIUM mmol/L 3 9   CHLORIDE mmol/L 105   CO2 mmol/L 25   BUN mg/dL 49*   CREATININE mg/dL 2 02*   ANION GAP mmol/L 10   CALCIUM mg/dL 8 7   ALBUMIN g/dL 2 6*   TOTAL BILIRUBIN mg/dL 0 26   ALK PHOS U/L 131*   ALT U/L 9*   AST U/L 20   GLUCOSE RANDOM mg/dL 94     Results from last 7 days   Lab Units 22  0658   INR  1 10     Results from last 7 days   Lab Units 229 22  0119 22  0021   POC GLUCOSE mg/dl 138 125 106     Results from last 7 days   Lab Units 22  0430   HEMOGLOBIN A1C % 5 3     Results from last 7 days   Lab Units 22  0929 22  0658 22  0632 22  0430 22  0229 22  0125   LACTIC ACID mmol/L 2 0  --   --   -- 1 4  --    PROCALCITONIN ng/ml  --  0 28* 0 34* 0 27*  --  0 29*       Lines/Drains:  Invasive Devices  Report    Peripheral Intravenous Line  Duration           Peripheral IV 09/24/22 Right;Ventral (anterior) Forearm 1 day          Drain  Duration           External Urinary Catheter 2 days                      Imaging: No pertinent imaging reviewed  Recent Cultures (last 7 days):   Results from last 7 days   Lab Units 09/22/22  0229 09/22/22  0128   BLOOD CULTURE  No Growth at 72 hrs  No Growth at 72 hrs   --    URINE CULTURE   --  >100,000 cfu/ml Escherichia coli*       Last 24 Hours Medication List:   Current Facility-Administered Medications   Medication Dose Route Frequency Provider Last Rate    acetaminophen  488 mg Oral Q6H PRN Reed Castillo PA-C      apixaban  2 5 mg Oral BID Sena Ceballos DO      calcium carbonate-vitamin D  1 tablet Oral BID With Meals Reed Castillo PA-C      cefTRIAXone  1,000 mg Intravenous Q24H Reed Castillo PA-C 1,000 mg (09/25/22 0341)    dextrose 5% lactated ringer's  50 mL/hr Intravenous Continuous [facility] Anup, DO 50 mL/hr (09/25/22 0504)    fluticasone  2 spray Nasal Daily Reed Castillo PA-C      labetalol  10 mg Intravenous Q4H PRN Sena Ceballos DO      [START ON 9/26/2022] levothyroxine  125 mcg Oral Early Morning Sena Ceballos DO      metoprolol succinate  50 mg Oral Q12H Yann Ceballos DO      sertraline  100 mg Oral Daily Reed Castillo PA-C      sodium chloride (PF)  3 mL Intravenous Q1H PRN Reed Castillo PA-C          Today, Patient Was Seen By: Shanon Snyder DO    **Please Note: This note may have been constructed using a voice recognition system  **

## 2022-09-25 NOTE — ASSESSMENT & PLAN NOTE
· Decrease levothyroxine to 125 mcg PO Qdaily based on the low TSH level  · Recheck a TSH level in 4-6 weeks     Latest Reference Range & Units 09/25/22 06:58   TSH 3RD GENERATON 0 450 - 4 500 uIU/mL 0 051 (L)   (L): Data is abnormally low

## 2022-09-25 NOTE — ASSESSMENT & PLAN NOTE
Lab Results   Component Value Date    EGFR 23 09/25/2022    EGFR 22 09/24/2022    EGFR 18 09/23/2022    CREATININE 2 02 (H) 09/25/2022    CREATININE 2 04 (H) 09/24/2022    CREATININE 2 40 (H) 09/23/2022     · Acute kidney injury was present on admission  · Baseline serum creatinine of 1 7-2 0 mg/dl  · The patient was seen in consultation by Nephrology    · Continue IV fluids with D5 Lactated Ringer's solution at 50 ml/hr per Nephrology  · Continue to hold PO lasix and PO losartan  · Avoid all nephrotoxic agents  · Serial laboratory testing to monitor the patient's renal function and electrolyte levels

## 2022-09-25 NOTE — ASSESSMENT & PLAN NOTE
· Required a PRBC's (packed red blood cells) transfusion  · The patient's eliquis has been on hold    · Will restart eliquis at 2 5 mg PO BID on 09/25/2022 (needs the decreased dose (2 5 mg) based on her weight and serum creatinine level)  · Serial laboratory testing to monitor the patient's hemoglobin/hematocrit levels  · The patient was seen in consultation by Gastroenterology and underwent an EGD on 09/23/2022:  IMPRESSION:  · The stomach and duodenum appeared normal   · Ten or more polyps measuring smaller than 5 mm in the cardia and body of the stomach  · Medium sliding hiatal hernia (type I hiatal hernia)  · The esophagus appeared normal   RECOMMENDATION:  Consider colonoscopy if continues to drop her hemoglobin    Results from last 7 days   Lab Units 09/25/22  0658 09/24/22  0417 09/23/22  2101 09/23/22  0632   WBC Thousand/uL 7 46 10 87*  --  9 04   HEMOGLOBIN g/dL 8 1* 9 0* 9 1* 9 0*   HEMATOCRIT % 25 4* 27 7* 27 5* 27 0*   PLATELETS Thousands/uL 211 246  --  182

## 2022-09-25 NOTE — PLAN OF CARE
Problem: PAIN - ADULT  Goal: Verbalizes/displays adequate comfort level or baseline comfort level  Description: Interventions:  - Encourage patient to monitor pain and request assistance  - Assess pain using appropriate pain scale  - Administer analgesics based on type and severity of pain and evaluate response  - Implement non-pharmacological measures as appropriate and evaluate response  - Consider cultural and social influences on pain and pain management  - Notify physician/advanced practitioner if interventions unsuccessful or patient reports new pain  Outcome: Progressing     Problem: INFECTION - ADULT  Goal: Absence or prevention of progression during hospitalization  Description: INTERVENTIONS:  - Assess and monitor for signs and symptoms of infection  - Monitor lab/diagnostic results  - Monitor all insertion sites, i e  indwelling lines, tubes, and drains  - Monitor endotracheal if appropriate and nasal secretions for changes in amount and color  - [place] appropriate cooling/warming therapies per order  - Administer medications as ordered  - Instruct and encourage patient and family to use good hand hygiene technique  - Identify and instruct in appropriate isolation precautions for identified infection/condition  Outcome: Progressing  Goal: Absence of fever/infection during neutropenic period  Description: INTERVENTIONS:  - Monitor WBC    Outcome: Progressing     Problem: SAFETY ADULT  Goal: Patient will remain free of falls  Description: INTERVENTIONS:  - Educate patient/family on patient safety including physical limitations  - Instruct patient to call for assistance with activity   - Consult OT/PT to assist with strengthening/mobility   - Keep Call bell within reach  - Keep bed low and locked with side rails adjusted as appropriate  - Keep care items and personal belongings within reach  - Initiate and maintain comfort rounds  - Make Fall Risk Sign visible to staff  - Offer Toileting every 2 Hours, in advance of need  - Initiate/Maintain bed/chair alarm  - Obtain necessary fall risk management equipment: alarms  - Apply yellow socks and bracelet for high fall risk patients  - Consider moving patient to room near nurses station  Outcome: Progressing  Goal: Maintain or return to baseline ADL function  Description: INTERVENTIONS:  -  Assess patient's ability to carry out ADLs; assess patient's baseline for ADL function and identify physical deficits which impact ability to perform ADLs (bathing, care of mouth/teeth, toileting, grooming, dressing, etc )  - Assess/evaluate cause of self-care deficits   - Assess range of motion  - Assess patient's mobility; develop plan if impaired  - Assess patient's need for assistive devices and provide as appropriate  - Encourage maximum independence but intervene and supervise when necessary  - Involve family in performance of ADLs  - Assess for home care needs following discharge   - Consider OT consult to assist with ADL evaluation and planning for discharge  - Provide patient education as appropriate  Outcome: Progressing  Goal: Maintains/Returns to pre admission functional level  Description: INTERVENTIONS:  - Perform BMAT or MOVE assessment daily    - Set and communicate daily mobility goal to care team and patient/family/caregiver  - Collaborate with rehabilitation services on mobility goals if consulted  - Perform Range of Motion 4 times a day  - Reposition patient every 2 hours    - Dangle patient 3-4 times a day  - Stand patient 4 times a day  - Ambulate patient 4 times a day  - Out of bed to chair 3 times a day   - Out of bed for meals 3 times a day  - Out of bed for toileting  - Record patient progress and toleration of activity level   Outcome: Progressing     Problem: DISCHARGE PLANNING  Goal: Discharge to home or other facility with appropriate resources  Description: INTERVENTIONS:  - Identify barriers to discharge w/patient and caregiver  - Arrange for needed discharge resources and transportation as appropriate  - Identify discharge learning needs (meds, wound care, etc )  - Arrange for interpretive services to assist at discharge as needed  - Refer to Case Management Department for coordinating discharge planning if the patient needs post-hospital services based on physician/advanced practitioner order or complex needs related to functional status, cognitive ability, or social support system  Outcome: Progressing     Problem: Knowledge Deficit  Goal: Patient/family/caregiver demonstrates understanding of disease process, treatment plan, medications, and discharge instructions  Description: Complete learning assessment and assess knowledge base    Interventions:  - Provide teaching at level of understanding  - Provide teaching via preferred learning methods  Outcome: Progressing     Problem: NEUROSENSORY - ADULT  Goal: Achieves stable or improved neurological status  Description: INTERVENTIONS  - Monitor and report changes in neurological status  - Monitor vital signs such as temperature, blood pressure, glucose, and any other labs ordered   - Initiate measures to prevent increased intracranial pressure      Outcome: Progressing     Problem: CARDIOVASCULAR - ADULT  Goal: Maintains optimal cardiac output and hemodynamic stability  Description: INTERVENTIONS:  - Monitor I/O, vital signs and rhythm  - Monitor for S/S and trends of decreased cardiac output  - Administer and titrate ordered vasoactive medications to optimize hemodynamic stability  - Assess quality of pulses, skin color and temperature  - Assess for signs of decreased coronary artery perfusion  - Instruct patient to report change in severity of symptoms  Outcome: Progressing     Problem: GENITOURINARY - ADULT  Goal: Maintains or returns to baseline urinary function  Description: INTERVENTIONS:  - Assess urinary function  - Encourage oral fluids to ensure adequate hydration if ordered  - Administer IV fluids as ordered to ensure adequate hydration  - Administer ordered medications as needed  - Offer frequent toileting  - Follow urinary retention protocol if ordered  Outcome: Progressing  Goal: Absence of urinary retention  Description: INTERVENTIONS:  - Assess patient's ability to void and empty bladder  - Monitor I/O  - Bladder scan as needed  - Discuss with physician/AP medications to alleviate retention as needed  - Discuss catheterization for long term situations as appropriate  Outcome: Progressing     Problem: METABOLIC, FLUID AND ELECTROLYTES - ADULT  Goal: Electrolytes maintained within normal limits  Description: INTERVENTIONS:  - Monitor labs and assess patient for signs and symptoms of electrolyte imbalances  - Administer electrolyte replacement as ordered  - Monitor response to electrolyte replacements, including repeat lab results as appropriate  - Instruct patient on fluid and nutrition as appropriate  Outcome: Progressing     Problem: SKIN/TISSUE INTEGRITY - ADULT  Goal: Skin Integrity remains intact(Skin Breakdown Prevention)  Description: Assess:  -Perform Arcadio assessment every shift  -Clean and moisturize skin every 4 houra  -Inspect skin when repositioning, toileting, and assisting with ADLS  -Assess under medical devices such as electrodes every shift  -Assess extremities for adequate circulation and sensation     Bed Management:  -Have minimal linens on bed & keep smooth, unwrinkled  -Change linens as needed when moist or perspiring  -Avoid sitting or lying in one position for more than 2 hours while in bed  -Keep HOB at 30 degrees     Toileting:  -Offer bedside commode  -Assess for incontinence every 2 hours  -Use incontinent care products after each incontinent episode such as breif    Activity:  -Mobilize patient 4 times a day  -Encourage activity and walks on unit  -Encourage or provide ROM exercises   -Turn and reposition patient every 2 Hours  -Use appropriate equipment to lift or move patient in bed  -Instruct/ Assist with weight shifting every 2 hours when out of bed in chair  -Consider limitation of chair time 4 hour intervals    Skin Care:  -Avoid use of baby powder, tape, friction and shearing, hot water or constrictive clothing  -Relieve pressure over bony prominences using alevyn  -Do not massage red bony areas    Next Steps:  -Teach patient strategies to minimize risks such as weight shift  Outcome: Progressing     Problem: MUSCULOSKELETAL - ADULT  Goal: Maintain or return mobility to safest level of function  Description: INTERVENTIONS:  - Assess patient's ability to carry out ADLs; assess patient's baseline for ADL function and identify physical deficits which impact ability to perform ADLs (bathing, care of mouth/teeth, toileting, grooming, dressing, etc )  - Assess/evaluate cause of self-care deficits   - Assess range of motion  - Assess patient's mobility  - Assess patient's need for assistive devices and provide as appropriate  - Encourage maximum independence but intervene and supervise when necessary  - Involve family in performance of ADLs  - Assess for home care needs following discharge   - Consider OT consult to assist with ADL evaluation and planning for discharge  - Provide patient education as appropriate  Outcome: Progressing     Problem: Potential for Falls  Goal: Patient will remain free of falls  Description: INTERVENTIONS:  - Educate patient/family on patient safety including physical limitations  - Instruct patient to call for assistance with activity   - Consult OT/PT to assist with strengthening/mobility   - Keep Call bell within reach  - Keep bed low and locked with side rails adjusted as appropriate  - Keep care items and personal belongings within reach  - Initiate and maintain comfort rounds  - Make Fall Risk Sign visible to staff  - Offer Toileting every 2 Hours, in advance of need  - Initiate/Maintain bed/chair alarm  - Obtain necessary fall risk management equipment: alarms  - Apply yellow socks and bracelet for high fall risk patients  - Consider moving patient to room near nurses station  Outcome: Progressing     Problem: MOBILITY - ADULT  Goal: Maintain or return to baseline ADL function  Description: INTERVENTIONS:  -  Assess patient's ability to carry out ADLs; assess patient's baseline for ADL function and identify physical deficits which impact ability to perform ADLs (bathing, care of mouth/teeth, toileting, grooming, dressing, etc )  - Assess/evaluate cause of self-care deficits   - Assess range of motion  - Assess patient's mobility; develop plan if impaired  - Assess patient's need for assistive devices and provide as appropriate  - Encourage maximum independence but intervene and supervise when necessary  - Involve family in performance of ADLs  - Assess for home care needs following discharge   - Consider OT consult to assist with ADL evaluation and planning for discharge  - Provide patient education as appropriate  Outcome: Progressing  Goal: Maintains/Returns to pre admission functional level  Description: INTERVENTIONS:  - Perform BMAT or MOVE assessment daily    - Set and communicate daily mobility goal to care team and patient/family/caregiver  - Collaborate with rehabilitation services on mobility goals if consulted  - Perform Range of Motion 4 times a day  - Reposition patient every 2 hours    - Dangle patient 3-4 times a day  - Stand patient 4 times a day  - Ambulate patient 4 times a day  - Out of bed to chair 3 times a day   - Out of bed for meals 3 times a day  - Out of bed for toileting  - Record patient progress and toleration of activity level   Outcome: Progressing     Problem: Prexisting or High Potential for Compromised Skin Integrity  Goal: Skin integrity is maintained or improved  Description: INTERVENTIONS:  - Identify patients at risk for skin breakdown  - Assess and monitor skin integrity  - Assess and monitor nutrition and hydration status  - Monitor labs   - Assess for incontinence   - Turn and reposition patient  - Assist with mobility/ambulation  - Relieve pressure over bony prominences  - Avoid friction and shearing  - Provide appropriate hygiene as needed including keeping skin clean and dry  - Evaluate need for skin moisturizer/barrier cream  - Collaborate with interdisciplinary team   - Patient/family teaching  - Consider wound care consult   Outcome: Progressing     Problem: Nutrition/Hydration-ADULT  Goal: Nutrient/Hydration intake appropriate for improving, restoring or maintaining nutritional needs  Description: Monitor and assess patient's nutrition/hydration status for malnutrition  Collaborate with interdisciplinary team and initiate plan and interventions as ordered  Monitor patient's weight and dietary intake as ordered or per policy  Utilize nutrition screening tool and intervene as necessary  Determine patient's food preferences and provide high-protein, high-caloric foods as appropriate       INTERVENTIONS:  - Monitor oral intake, urinary output, labs, and treatment plans  - Assess nutrition and hydration status and recommend course of action  - Evaluate amount of meals eaten  - Assist patient with eating if necessary   - Allow adequate time for meals  - Recommend/ encourage appropriate diets, oral nutritional supplements, and vitamin/mineral supplements  - Order, calculate, and assess calorie counts as needed  - Recommend, monitor, and adjust tube feedings and TPN/PPN based on assessed needs  - Assess need for intravenous fluids  - Provide specific nutrition/hydration education as appropriate  - Include patient/family/caregiver in decisions related to nutrition  Outcome: Progressing

## 2022-09-25 NOTE — ASSESSMENT & PLAN NOTE
· Sepsis was present on admission and due to acute cystitis  · SIRS criteria was met with a leukocytosis and tachycardia    · Now with fevers and tachycardia on 09/25/2022 due to the COVID-19 virus infection  · Continue ceftriaxone 1000 mg IV every 24 hours  · Follow the culture results

## 2022-09-25 NOTE — ASSESSMENT & PLAN NOTE
· The patient was seen in consultation by Urology with the following recommendations:  "Plan:   No plan for any urological intervention at this time      Treat current suspected UTI, await final urine cultures  Patient maintain on IV ceftriaxone 1 g daily      Dr Isi Lan from Urology attested brief note, he remarked that retained indwelling ureteral stents can be very difficult to remove her left ureteral stent is likely encrusted and will require both retrograde and antegrade renal surgery if the proximal coil is fixed with stone formation      The patient will need definitive stone treatment and extraction of the retained left ureteral stent via cystoscopy and laser for extraction of then crusted/retained stent as well as ureteral/renal stones with temporary stent exchange  This can be done after hospital discharge and after the suspected acute urinary tract infection has cleared completed antibiotic course    The patient can follow with her neurologist at the [facility] that she previously establish care with almost 2 years ago for the initial placement of the stent or referral to [address] [place] [address] Urology on an outpatient basis      If the patient develops worsening creatinine over the weekend, then obtain renal ultrasound to see if worsening hydroureteronephrosis and then, in that situation, the patient may be a candidate for IR placement of a left PCN tube "

## 2022-09-25 NOTE — ASSESSMENT & PLAN NOTE
· Developed fevers on 09/25/2022 and was retested for COVID-19 which is now positive  · I notified by the patient's  of this finding  · Check a portable chest xray and D-dimer level  · Does not require IV dexamethasone treatment with normal oxygen saturation levels on room air  · Will not treat with IV remdesivir due to the patient's acute kidney injury and mild COVID-19 status  · The patient's eliquis has been on hold    · Will restart eliquis at 2 5 mg PO BID on 09/25/2022 (needs the decreased dose (2 5 mg) based on her weight and serum creatinine level)  · Incentive spirometry  · Respiratory protocol

## 2022-09-25 NOTE — PROGRESS NOTES
Progress Note - Nephrology   Rinku Avendano 65 y o  female MRN: [id number]  Unit/Bed#: 409-01 Encounter: [id number]    A/P:  1  Acute kidney injury present on admission superimposed on chronic kidney disease stage IV   -- kidney function has improved slightly to a creatinine of 2 02 from 2 04 yesterday  - nonoliguric:  360/650   - etiology is likely sepsis secondary to UTI with a GI bleed with anemia and decreased renal perfusion superimposed on diuretic use as well as obstructive uropathy    - has a pure Wick draining dark yellow urine   - continue to monitor and she will need outpatient follow-up with her nephrologist    2  Chronic kidney disease stage 4   - baseline creatinine 1 7-2 mg/dL  - kidney ultrasound shows bilateral cortical thinning   - she follows with Dr Claribel Whiting at [facility] and she is at her baseline    3  Hyperkalemia   - initially treated urgently with glucose and insulin and persisted due to acidosis which was treated   - currently on a low-potassium diet and now her potassium is dropped to 3 5   - will give her a low dose of potassium to avoid arrhythmias and will need a repeat lab in the morning with magnesium    4  E coli urinary tract infection   - being treated with ceftriaxone with improvement    5  Chronic combined systolic and diastolic failure   - she sounds compensated today    6  Acute blood loss anemia   - hemoglobin dropped from 9-8 1 today    Will need evaluation      Follow up reason for today's visit:  Acute kidney injury superimposed on chronic kidney disease stage 4    Sepsis due to urinary tract infection [facility])    Patient Active Problem List   Diagnosis    Closed fracture of body of sternum with routine healing    Congestive heart disease ([facility] )    Acquired hypothyroidism    CAD (coronary artery disease)    Forgetfulness    Acute pain due to trauma    Hx of fall    Stress incontinence in female    Acute kidney injury superimposed on chronic kidney disease ([facility] )  Perforated appendicitis    Sepsis ([facility] )    Chronic combined systolic and diastolic CHF (congestive heart failure) (HCC)    Pyuria    Microscopic hematuria    Vitamin D insufficiency    Mitral valve insufficiency    Hypercholesterolemia    Aortic atherosclerosis (HCC)    Renal calculus    Diverticulosis    Hiatal hernia    Pulmonary nodule    Leg swelling    Paroxysmal atrial fibrillation (HCC)    Ischemic cardiomyopathy    S/P implantation of automatic cardioverter/defibrillator (AICD)    Essential hypertension    History of PTCA    Anemia due to chronic kidney disease    Iron deficiency anemia    Diverticulitis    Rectal bleeding    Elevated troponin    Acute blood loss anemia    Anemia in chronic kidney disease    Benign hypertensive renal disease    Gastroesophageal reflux disease without esophagitis    Stroke-like symptoms    Hyperkalemia    Sepsis due to urinary tract infection (HCC)    Gastrointestinal bleeding    Increased anion gap metabolic acidosis    Hydroureteronephrosis    Dementia (HCC)    Low TSH level    COVID-19 virus infection         Subjective:   Complains of weakness  Denies headaches dizziness chest pain shortness of breath abdominal pain nausea vomiting diarrhea  Appetite is fair  She is voiding via a pure Wick    Objective:     Vitals: Blood pressure 148/70, pulse 79, temperature 98 4 °F (36 9 °C), resp  rate 20, height 5' 6" (1 676 m), weight 54 1 kg (119 lb 4 3 oz), SpO2 94 %  ,Body mass index is 19 25 kg/m²  Weight (last 2 days)     Date/Time Weight    09/25/22 0600 54 1 (119 27)            Intake/Output Summary (Last 24 hours) at 9/25/2022 1431  Last data filed at 9/25/2022 1320  Gross per 24 hour   Intake 300 ml   Output 1050 ml   Net -750 ml     I/O last 3 completed shifts:   In: 1490 8 [P O :480; I V :1010 8]  Out: 1150 [Urine:1150]         Physical Exam: /70   Pulse 79   Temp 98 4 °F (36 9 °C)   Resp 20   Ht 5' 6" (1 676 m)   Wt 54 1 kg (119 lb 4 3 oz)   SpO2 94%   BMI 19 25 kg/m²     General Appearance:    Alert, cooperative, no distress, appears stated age   Head:    Normocephalic, without obvious abnormality, atraumatic   Eyes:    Conjunctiva/corneas clear   Ears:    Normal external ears   Nose:   Nares normal, septum midline, mucosa normal, no drainage    or sinus tenderness   Throat:   Lips, mucosa, and tongue normal; teeth and gums normal   Neck:   Supple, symmetrical, trachea midline, no adenopathy;        thyroid:  No enlargement/tenderness/nodules; no carotid    bruit or JVD   Back:     Symmetric, no curvature, ROM normal, no CVA tenderness   Lungs:     Clear to auscultation bilaterally, respirations unlabored   Chest wall:    No tenderness or deformity   Heart:    Regular rate and rhythm, S1 and S2 normal, no murmur, rub   or gallop   Abdomen:     Soft, non-tender, bowel sounds active   Extremities:   Extremities normal, atraumatic, no cyanosis or edema   Skin:   Skin color, texture, turgor normal, no rashes or lesions   Lymph nodes:   Cervical normal   Neurologic:   CNII-XII intact            Lab, Imaging and other studies: I have personally reviewed pertinent labs  CBC:   Lab Results   Component Value Date    WBC 7 46 09/25/2022    HGB 8 1 (L) 09/25/2022    HCT 25 4 (L) 09/25/2022    MCV 83 09/25/2022     09/25/2022    MCH 26 6 (L) 09/25/2022    MCHC 31 9 09/25/2022    RDW 25 2 (H) 09/25/2022    MPV 9 9 09/25/2022    NRBC 0 09/25/2022     CMP:   Lab Results   Component Value Date    K 3 9 09/25/2022     09/25/2022    CO2 25 09/25/2022    BUN 49 (H) 09/25/2022    CREATININE 2 02 (H) 09/25/2022    CALCIUM 8 7 09/25/2022    AST 20 09/25/2022    ALT 9 (L) 09/25/2022    ALKPHOS 131 (H) 09/25/2022    EGFR 23 09/25/2022           Results from last 7 days   Lab Units 09/25/22  0658 09/24/22  0417 09/23/22  0632   POTASSIUM mmol/L 3 9 4 2 5 2   CHLORIDE mmol/L 105 103 105   CO2 mmol/L 25 23 23   BUN mg/dL 49* 57* 76* CREATININE mg/dL 2 02* 2 04* 2 40*   CALCIUM mg/dL 8 7 9 2 9 4   ALK PHOS U/L 131* 146* 138*   ALT U/L 9* 7* 6*   AST U/L 20 18 24         Phosphorus:   Lab Results   Component Value Date    PHOS 3 5 09/25/2022     Magnesium:   Lab Results   Component Value Date    MG 1 6 09/25/2022     Urinalysis:   Lab Results   Component Value Date    COLORU Yellow 09/25/2022    CLARITYU Clear 09/25/2022    SPECGRAV <=1 005 09/25/2022    PHUR 6 5 09/25/2022    LEUKOCYTESUR Moderate (A) 09/25/2022    NITRITE Negative 09/25/2022    GLUCOSEU Negative 09/25/2022    KETONESU Negative 09/25/2022    BILIRUBINUR Negative 09/25/2022    BLOODU Large (A) 09/25/2022     Ionized Calcium: No results found for: CAION  Coagulation:   Lab Results   Component Value Date    INR 1 10 09/25/2022     Troponin: No results found for: TROPONINI  ABG: No results found for: PHART, VMC7ORH, PO2ART, LHI2FCE, F9JPKTIL, BEART, SOURCE  Radiology review:     IMAGING  Procedure: EGD    Addendum Date: 9/23/2022 Addendum:   [facility] Operating Room [address] [phone] DATE OF SERVICE: 9/23/22 PHYSICIAN(S): Attending: Doreen Catalan MD Fellow: No Staff Documented INDICATION: Acute blood loss anemia, Gastrointestinal hemorrhage, unspecified gastrointestinal hemorrhage type POST-OP DIAGNOSIS: See the impression below  PREPROCEDURE: Informed consent was obtained for the procedure, including sedation  Risks of perforation, hemorrhage, adverse drug reaction and aspiration were discussed  The patient was placed in the left lateral decubitus position  Patient was explained about the risks and benefits of the procedure  Risks including but not limited to bleeding, infection, and perforation were explained in detail  Also explained about less than 100% sensitivity with the exam and other alternatives   DETAILS OF PROCEDURE: Patient was taken to the procedure room where a time out was performed to confirm correct patient and correct procedure  The patient underwent monitored anesthesia care, which was administered by an anesthesia professional  The patient's blood pressure, heart rate, level of consciousness, respirations and oxygen were monitored throughout the procedure  The scope was advanced to the second part of the duodenum  Retroflexion was performed in the fundus  The patient experienced no blood loss  The procedure was not difficult  The patient tolerated the procedure well  There were no apparent complications  ANESTHESIA INFORMATION: ASA: III Anesthesia Type: IV Sedation with Anesthesia MEDICATIONS: No administrations occurring from 0952 to 1026 on 09/23/22 FINDINGS: The stomach and duodenum appeared normal  Ten or more polyps measuring smaller than 5 mm in the cardia and body of the stomach Medium sliding hiatal hernia (type I hiatal hernia) The esophagus appeared normal  SPECIMENS: * No specimens in log * IMPRESSION: The stomach and duodenum appeared normal  Ten or more polyps measuring smaller than 5 mm in the cardia and body of the stomach Medium sliding hiatal hernia (type I hiatal hernia) The esophagus appeared normal  RECOMMENDATION: Consider colonoscopy if continues to drop her hemoglobin   Louis Lam MD     Result Date: 9/23/2022  Narrative: [facility] Operating Room [address] [phone] DATE OF SERVICE: 9/23/22 PHYSICIAN(S): Attending: Louis Lam MD Fellow: No Staff Documented INDICATION: Acute blood loss anemia, Gastrointestinal hemorrhage, unspecified gastrointestinal hemorrhage type POST-OP DIAGNOSIS: See the impression below  PREPROCEDURE: Informed consent was obtained for the procedure, including sedation  Risks of perforation, hemorrhage, adverse drug reaction and aspiration were discussed  The patient was placed in the left lateral decubitus position  Patient was explained about the risks and benefits of the procedure   Risks including but not limited to bleeding, infection, and perforation were explained in detail  Also explained about less than 100% sensitivity with the exam and other alternatives  DETAILS OF PROCEDURE: Patient was taken to the procedure room where a time out was performed to confirm correct patient and correct procedure  The patient underwent monitored anesthesia care, which was administered by an anesthesia professional  The patient's blood pressure, heart rate, level of consciousness, respirations and oxygen were monitored throughout the procedure  The scope was advanced to the second part of the duodenum  Retroflexion was performed in the fundus  The patient experienced no blood loss  The procedure was not difficult  The patient tolerated the procedure well  There were no apparent complications  ANESTHESIA INFORMATION: ASA: III Anesthesia Type: IV Sedation with Anesthesia MEDICATIONS: No administrations occurring from 0952 to 1026 on 09/23/22 FINDINGS: The stomach and duodenum appeared normal  Medium sliding hiatal hernia (type I hiatal hernia) The esophagus appeared normal  SPECIMENS: * No specimens in log *     Impression: The stomach and duodenum appeared normal  Medium sliding hiatal hernia (type I hiatal hernia) The esophagus appeared normal  RECOMMENDATION: Consider colonoscopy if continues to drop her hemoglobin   Gautam Sue MD     Procedure: XR chest portable    Result Date: 9/25/2022  Narrative: CHEST INDICATION:   fever, sepsis  COMPARISON:  5/31/2010 EXAM PERFORMED/VIEWS:  XR CHEST PORTABLE FINDINGS:  Right multilead pacer as before  Cardiomediastinal silhouette appears unremarkable  The lungs are clear  No pneumothorax or pleural effusion  Osseous structures appear within normal limits for patient age  Impression: No acute cardiopulmonary disease  Workstation performed: [id number]     Procedure: US kidney and bladder    Result Date: 9/23/2022  Narrative: RENAL ULTRASOUND INDICATION:   acute kidney injury, hydronephrosis  COMPARISON: Multiple priors most recently same day CT TECHNIQUE:   Ultrasound of the retroperitoneum was performed with a curvilinear transducer utilizing volumetric sweeps and still imaging techniques  FINDINGS: KIDNEYS: Symmetric and normal size  Right kidney:  10 1 x 5 2 x 4 7 cm  Volume 128 8 mL Left kidney:  9 9 x 7 9 x 6 1 cm  Volume 248 1 mL Right kidney Poorly visualized due to underpenetration  Normal echogenicity and contour  No mass is identified  No hydronephrosis  No shadowing calculi  No perinephric fluid collections  Left kidney Poorly visualized due to underpenetration  Normal echogenicity and contour  No mass is identified  Moderate hydronephrosis with stent in place, however, the stent is poorly visualized  No shadowing calculi  No perinephric fluid collections  URETERS: Nonvisualized  BLADDER: Under distended, limiting evaluation  Impression: Limited visualization of the kidneys  Moderate left hydronephrosis  Stent poorly visualized   Workstation performed: [id number]       Current Facility-Administered Medications   Medication Dose Route Frequency    acetaminophen (TYLENOL) tablet 488 mg  488 mg Oral Q6H PRN    apixaban (ELIQUIS) tablet 2 5 mg  2 5 mg Oral BID    calcium carbonate-vitamin D (OSCAL-D) 500 mg-200 units per tablet 1 tablet  1 tablet Oral BID With Meals    cefTRIAXone (ROCEPHIN) IVPB (premix in dextrose) 1,000 mg 50 mL  1,000 mg Intravenous Q24H    dextrose 5 % in lactated Ringer's infusion  50 mL/hr Intravenous Continuous    fluticasone (FLONASE) 50 mcg/act nasal spray 2 spray  2 spray Nasal Daily    labetalol (NORMODYNE) injection 10 mg  10 mg Intravenous Q4H PRN    [START ON 9/26/2022] levothyroxine tablet 125 mcg  125 mcg Oral Early Morning    metoprolol succinate (TOPROL-XL) 24 hr tablet 50 mg  50 mg Oral Q12H    sertraline (ZOLOFT) tablet 100 mg  100 mg Oral Daily    sodium chloride (PF) 0 9 % injection 3 mL  3 mL Intravenous Q1H PRN     Medications Discontinued During This Encounter   Medication Reason    calcium gluconate 3 g in sodium chloride 0 9 % 100 mL IVPB     sodium bicarbonate tablet 650 mg     metoprolol tartrate (LOPRESSOR) tablet 200 mg     aspirin chewable tablet 81 mg     sodium bicarbonate 150 mEq in dextrose 5 % 1,000 mL infusion     lactated ringers infusion     metoprolol tartrate (LOPRESSOR) tablet 50 mg     metoprolol succinate (TOPROL-XL) 24 hr tablet 50 mg     levothyroxine tablet 150 mcg     heparin (porcine) subcutaneous injection 5,000 Units        Yamilex Alonso MD      This progress note was produced in part using a dictation device which may document imprecise wording from author's original intent

## 2022-09-25 NOTE — ASSESSMENT & PLAN NOTE
· Resolved with treatment  · Follow the potassium level    Results from last 7 days   Lab Units 09/25/22  0658 09/24/22  0417 09/23/22  0632   SODIUM mmol/L 140 138 138   POTASSIUM mmol/L 3 9 4 2 5 2   CHLORIDE mmol/L 105 103 105   CO2 mmol/L 25 23 23   BUN mg/dL 49* 57* 76*   CREATININE mg/dL 2 02* 2 04* 2 40*   CALCIUM mg/dL 8 7 9 2 9 4

## 2022-09-25 NOTE — ASSESSMENT & PLAN NOTE
· Changed metoprolol tartrate to metoprolol succinate (toprol XL) with the decreased LVEF, which should be continued upon discharge  · Continue to hold losartan in the setting of acute kidney injury  · Likely will need additional PO hypertension medications  · Utilize PRN IV labetalol for a SBP>170 mmHg or DBP>100 mmHg, Hold for a HR<60 bpm  · Follow the blood pressure trend

## 2022-09-25 NOTE — ASSESSMENT & PLAN NOTE
Wt Readings from Last 3 Encounters:   09/25/22 54 1 kg (119 lb 4 3 oz)   05/31/22 62 8 kg (138 lb 7 2 oz)   11/16/21 62 8 kg (138 lb 7 2 oz)     · Does not appear to be in exacerbation at this time  · Continue to hold lasix and losartan per Nephrology  · Change metoprolol tartrate to metoprolol succinate (toprol XL) with the decreased LVEF, which should be continued upon discharge  · Daily weights  · Strict intake/output measurements  · Outpatient follow-up with Cardiology    Echocardiogram (09/22/2022): Interpretation Summary         Left Ventricle: Left ventricular cavity size is normal  Wall thickness is moderately increased  There is moderate concentric hypertrophy  The left ventricular ejection fraction is 45%  Systolic function is mildly reduced  There is mild global hypokinesis  Diastolic function is mildly abnormal, consistent with grade I (abnormal) relaxation    Right Ventricle: Right ventricular cavity size is normal  Systolic function is normal     Left Atrium: The atrium is moderately dilated    Mitral Valve: There is severe annular calcification  There is moderate regurgitation    Tricuspid Valve: The estimated right ventricular systolic pressure is 64 96 mmHg    IVC/SVC: The right atrial pressure is estimated at 10 0 mmHg  The inferior vena cava is normal in size  Respirophasic changes were normal     Pulmonary Artery: The estimated pulmonary artery systolic pressure is 19 2 mmHg  The pulmonary artery systolic pressure is moderately increased

## 2022-09-26 LAB
ALBUMIN SERPL BCP-MCNC: 2.6 G/DL (ref 3.5–5)
ALP SERPL-CCNC: 131 U/L (ref 46–116)
ALT SERPL W P-5'-P-CCNC: 8 U/L (ref 12–78)
ANION GAP SERPL CALCULATED.3IONS-SCNC: 13 MMOL/L (ref 4–13)
AST SERPL W P-5'-P-CCNC: 30 U/L (ref 5–45)
BACTERIA UR CULT: NORMAL
BASOPHILS # BLD AUTO: 0.04 THOUSANDS/ÂΜL (ref 0–0.1)
BASOPHILS NFR BLD AUTO: 1 % (ref 0–1)
BILIRUB SERPL-MCNC: 0.25 MG/DL (ref 0.2–1)
BUN SERPL-MCNC: 45 MG/DL (ref 5–25)
CALCIUM ALBUM COR SERPL-MCNC: 10.1 MG/DL (ref 8.3–10.1)
CALCIUM SERPL-MCNC: 9 MG/DL (ref 8.3–10.1)
CHLORIDE SERPL-SCNC: 103 MMOL/L (ref 96–108)
CK SERPL-CCNC: 72 U/L (ref 26–192)
CO2 SERPL-SCNC: 22 MMOL/L (ref 21–32)
CREAT SERPL-MCNC: 1.94 MG/DL (ref 0.6–1.3)
CRP SERPL QL: 44 MG/L
D DIMER PPP FEU-MCNC: 2.91 UG/ML FEU
EOSINOPHIL # BLD AUTO: 0.07 THOUSAND/ÂΜL (ref 0–0.61)
EOSINOPHIL NFR BLD AUTO: 1 % (ref 0–6)
ERYTHROCYTE [DISTWIDTH] IN BLOOD BY AUTOMATED COUNT: 25.2 % (ref 11.6–15.1)
FOLATE SERPL-MCNC: 12.4 NG/ML (ref 3.1–17.5)
GFR SERPL CREATININE-BSD FRML MDRD: 24 ML/MIN/1.73SQ M
GLUCOSE SERPL-MCNC: 87 MG/DL (ref 65–140)
HCT VFR BLD AUTO: 29 % (ref 34.8–46.1)
HGB BLD-MCNC: 8.6 G/DL (ref 11.5–15.4)
IMM GRANULOCYTES # BLD AUTO: 0.05 THOUSAND/UL (ref 0–0.2)
IMM GRANULOCYTES NFR BLD AUTO: 1 % (ref 0–2)
LACTATE SERPL-SCNC: 1.4 MMOL/L (ref 0.5–2)
LYMPHOCYTES # BLD AUTO: 1.18 THOUSANDS/ÂΜL (ref 0.6–4.47)
LYMPHOCYTES NFR BLD AUTO: 14 % (ref 14–44)
MAGNESIUM SERPL-MCNC: 1.8 MG/DL (ref 1.6–2.6)
MCH RBC QN AUTO: 25.9 PG (ref 26.8–34.3)
MCHC RBC AUTO-ENTMCNC: 29.7 G/DL (ref 31.4–37.4)
MCV RBC AUTO: 87 FL (ref 82–98)
MONOCYTES # BLD AUTO: 0.84 THOUSAND/ÂΜL (ref 0.17–1.22)
MONOCYTES NFR BLD AUTO: 10 % (ref 4–12)
NEUTROPHILS # BLD AUTO: 6.1 THOUSANDS/ÂΜL (ref 1.85–7.62)
NEUTS SEG NFR BLD AUTO: 73 % (ref 43–75)
NRBC BLD AUTO-RTO: 0 /100 WBCS
PHOSPHATE SERPL-MCNC: 3.3 MG/DL (ref 2.3–4.1)
PLATELET # BLD AUTO: 212 THOUSANDS/UL (ref 149–390)
PMV BLD AUTO: 10.1 FL (ref 8.9–12.7)
POTASSIUM SERPL-SCNC: 4.3 MMOL/L (ref 3.5–5.3)
PROCALCITONIN SERPL-MCNC: 0.31 NG/ML
PROT SERPL-MCNC: 6.4 G/DL (ref 6.4–8.4)
RBC # BLD AUTO: 3.32 MILLION/UL (ref 3.81–5.12)
SODIUM SERPL-SCNC: 138 MMOL/L (ref 135–147)
VIT B12 SERPL-MCNC: 233 PG/ML (ref 100–900)
WBC # BLD AUTO: 8.28 THOUSAND/UL (ref 4.31–10.16)

## 2022-09-26 PROCEDURE — 83605 ASSAY OF LACTIC ACID: CPT | Performed by: INTERNAL MEDICINE

## 2022-09-26 PROCEDURE — 99233 SBSQ HOSP IP/OBS HIGH 50: CPT | Performed by: INTERNAL MEDICINE

## 2022-09-26 PROCEDURE — 92507 TX SP LANG VOICE COMM INDIV: CPT

## 2022-09-26 PROCEDURE — 82607 VITAMIN B-12: CPT | Performed by: INTERNAL MEDICINE

## 2022-09-26 PROCEDURE — 85379 FIBRIN DEGRADATION QUANT: CPT | Performed by: INTERNAL MEDICINE

## 2022-09-26 PROCEDURE — 86140 C-REACTIVE PROTEIN: CPT | Performed by: INTERNAL MEDICINE

## 2022-09-26 PROCEDURE — 97530 THERAPEUTIC ACTIVITIES: CPT

## 2022-09-26 PROCEDURE — 99232 SBSQ HOSP IP/OBS MODERATE 35: CPT | Performed by: NURSE PRACTITIONER

## 2022-09-26 PROCEDURE — 80053 COMPREHEN METABOLIC PANEL: CPT | Performed by: INTERNAL MEDICINE

## 2022-09-26 PROCEDURE — 85025 COMPLETE CBC W/AUTO DIFF WBC: CPT | Performed by: INTERNAL MEDICINE

## 2022-09-26 PROCEDURE — 97110 THERAPEUTIC EXERCISES: CPT

## 2022-09-26 PROCEDURE — 83735 ASSAY OF MAGNESIUM: CPT | Performed by: INTERNAL MEDICINE

## 2022-09-26 PROCEDURE — 82746 ASSAY OF FOLIC ACID SERUM: CPT | Performed by: INTERNAL MEDICINE

## 2022-09-26 PROCEDURE — 84100 ASSAY OF PHOSPHORUS: CPT | Performed by: INTERNAL MEDICINE

## 2022-09-26 PROCEDURE — 82550 ASSAY OF CK (CPK): CPT | Performed by: INTERNAL MEDICINE

## 2022-09-26 PROCEDURE — 84145 PROCALCITONIN (PCT): CPT | Performed by: INTERNAL MEDICINE

## 2022-09-26 RX ORDER — POLYETHYLENE GLYCOL 3350 17 G/17G
17 POWDER, FOR SOLUTION ORAL DAILY PRN
Status: DISCONTINUED | OUTPATIENT
Start: 2022-09-26 | End: 2022-10-04 | Stop reason: HOSPADM

## 2022-09-26 RX ORDER — ALLOPURINOL 100 MG/1
100 TABLET ORAL DAILY
Status: DISCONTINUED | OUTPATIENT
Start: 2022-09-26 | End: 2022-10-04 | Stop reason: HOSPADM

## 2022-09-26 RX ORDER — PANTOPRAZOLE SODIUM 20 MG/1
20 TABLET, DELAYED RELEASE ORAL
Status: DISCONTINUED | OUTPATIENT
Start: 2022-09-27 | End: 2022-10-04

## 2022-09-26 RX ORDER — ONDANSETRON 2 MG/ML
4 INJECTION INTRAMUSCULAR; INTRAVENOUS EVERY 4 HOURS PRN
Status: DISCONTINUED | OUTPATIENT
Start: 2022-09-26 | End: 2022-10-04 | Stop reason: HOSPADM

## 2022-09-26 RX ORDER — MAGNESIUM SULFATE 1 G/100ML
1 INJECTION INTRAVENOUS ONCE
Status: DISCONTINUED | OUTPATIENT
Start: 2022-09-26 | End: 2022-09-26

## 2022-09-26 RX ADMIN — Medication 1 TABLET: at 17:43

## 2022-09-26 RX ADMIN — CEFTRIAXONE 1000 MG: 1 INJECTION, SOLUTION INTRAVENOUS at 04:20

## 2022-09-26 RX ADMIN — APIXABAN 2.5 MG: 2.5 TABLET, FILM COATED ORAL at 17:43

## 2022-09-26 RX ADMIN — MAGNESIUM OXIDE TAB 400 MG (241.3 MG ELEMENTAL MG) 400 MG: 400 (241.3 MG) TAB at 08:57

## 2022-09-26 RX ADMIN — METOPROLOL SUCCINATE 50 MG: 50 TABLET, FILM COATED, EXTENDED RELEASE ORAL at 21:20

## 2022-09-26 RX ADMIN — FLUTICASONE PROPIONATE 2 SPRAY: 50 SPRAY, METERED NASAL at 08:58

## 2022-09-26 RX ADMIN — APIXABAN 2.5 MG: 2.5 TABLET, FILM COATED ORAL at 08:56

## 2022-09-26 RX ADMIN — MAGNESIUM OXIDE TAB 400 MG (241.3 MG ELEMENTAL MG) 400 MG: 400 (241.3 MG) TAB at 14:07

## 2022-09-26 RX ADMIN — LEVOTHYROXINE SODIUM 125 MCG: 125 TABLET ORAL at 05:47

## 2022-09-26 RX ADMIN — METOPROLOL SUCCINATE 50 MG: 50 TABLET, FILM COATED, EXTENDED RELEASE ORAL at 08:57

## 2022-09-26 RX ADMIN — DEXTROSE, SODIUM CHLORIDE, SODIUM LACTATE, POTASSIUM CHLORIDE, AND CALCIUM CHLORIDE 50 ML/HR: 5; .6; .31; .03; .02 INJECTION, SOLUTION INTRAVENOUS at 04:16

## 2022-09-26 RX ADMIN — ACETAMINOPHEN 488 MG: 325 TABLET, FILM COATED ORAL at 17:51

## 2022-09-26 RX ADMIN — SERTRALINE 100 MG: 100 TABLET, FILM COATED ORAL at 08:57

## 2022-09-26 RX ADMIN — ALLOPURINOL 100 MG: 100 TABLET ORAL at 17:44

## 2022-09-26 RX ADMIN — MAGNESIUM OXIDE TAB 400 MG (241.3 MG ELEMENTAL MG) 400 MG: 400 (241.3 MG) TAB at 17:44

## 2022-09-26 RX ADMIN — Medication 1 TABLET: at 08:58

## 2022-09-26 NOTE — ASSESSMENT & PLAN NOTE
Initial COVID testing negative on 09/22, fevers prompting repeat check on 09/25 which was positive  Currently on the mild pathway  · CXR without acute findings  · Monitor labs, trend CRP  · Hold off on dexamethasone, remdesivir given lack of O2 requirement  · D-dimer elevated at 2 9 - however, patient's illnesses mild and she is currently without oxygen requirement, and she remains on chronic AC with apixaban  Hold off on enoxaparin or high dosed heparin per protocol

## 2022-09-26 NOTE — ASSESSMENT & PLAN NOTE
Sepsis was present on admission and due to UTI - patient with retained ureteral stent  Blood cultures remain negative, but urinary cultures from 09/22 with > 100,000 CFU per mL of pansensitive E coli  Continue current therapy with IV ceftriaxone

## 2022-09-26 NOTE — ASSESSMENT & PLAN NOTE
Eliquis resumed on 09/25 at decreased dosing based upon patient's weight and serum creatinine  Continue beta-blocker with plans on discharging on long-acting formulation

## 2022-09-26 NOTE — PROGRESS NOTES
[address] [facility] [address]  Progress Note - Darryn Fernandes 1945, 65 y o  female MRN: [id number]  Unit/Bed#: 409-01 Encounter: [id number]  Primary Care Provider: Tiffany Nathan DO   Date and time admitted to hospital: 9/21/2022  9:40 PM    * Sepsis due to urinary tract infection [facility])  Assessment & Plan  Sepsis was present on admission and due to UTI - patient with retained ureteral stent  Blood cultures remain negative, but urinary cultures from 09/22 with > 100,000 CFU per mL of pansensitive E coli  Continue current therapy with IV ceftriaxone  Hydroureteronephrosis  Assessment & Plan  The patient was seen in consultation by Urology- no current plan for urological intervention  Continue to treat underlying UTI  As per Urology attending, retained indwelling ureteral stent may be difficult to remove, and may require retrograde and antegrade renal surgery if the proximal coil is fixed with stone formation       The patient will need definitive stone treatment and extraction of the retained left ureteral stent via cystoscopy and laser for extraction of  encrusted/retained stent as well as ureteral/renal stones with temporary stent exchange  This can be done after hospital discharge and after the suspected acute urinary tract infection has cleared and patient has completed antibiotic course  The patient can follow with her urologist at the [facility] that she previously establish care with almost 2 years ago for the initial placement of the stent or referral to [address] [place] [address] Urology on an outpatient basis      If the patient develops worsening creatinine then with recommendations to obtain renal ultrasound to assess for worsening hydronephrosis, and in that instance patient will become a candidate for urological intervention       Acute kidney injury superimposed on chronic kidney disease [facility])  Assessment & Plan  Lab Results   Component Value Date    EGFR 24 09/26/2022    EGFR 23 09/25/2022    EGFR 22 09/24/2022    CREATININE 1 94 (H) 09/26/2022    CREATININE 2 02 (H) 09/25/2022    CREATININE 2 04 (H) 09/24/2022     DARRELL superimposed on CKD, likely in the setting of sepsis, acute blood loss anemia, with concurrent diuretic use  Baseline creatinine of 1 6 to 2, peak value during hospitalization of 3  Current creatinine has improved to 1 94  Continue minimal IV fluids with D5 LR at 50 mL/HR per Nephrology, and continue to hold oral furosemide and ARB  Nephrology consulted and following  Acute blood loss anemia  Assessment & Plan  Required transfusion with PRBC's, with apixaban initially held  EGD 9/23 essentially normal, with apixaban resumed on 09/25  If continued drop in hemoglobin may require colonoscopy  Gastrointestinal bleeding  Assessment & Plan  Evaluation and treatment as above  COVID-19 virus infection  Assessment & Plan  Initial COVID testing negative on 09/22, fevers prompting repeat check on 09/25 which was positive  Currently on the mild pathway  · CXR without acute findings  · Monitor labs, trend CRP  · Hold off on dexamethasone, remdesivir given lack of O2 requirement  · D-dimer elevated at 2 9 - however, patient's illnesses mild and she is currently without oxygen requirement, and she remains on chronic AC with apixaban  Hold off on enoxaparin or high dosed heparin per protocol  Chronic combined systolic and diastolic CHF (congestive heart failure) (HCC)  Assessment & Plan  Wt Readings from Last 3 Encounters:   09/25/22 54 1 kg (119 lb 4 3 oz)   05/31/22 62 8 kg (138 lb 7 2 oz)   11/16/21 62 8 kg (138 lb 7 2 oz)     Current ECHO with LVEF 45%, presence of G1 DD  RV systolic function is normal   Also with mention of moderate MR, moderate PHTN  Cardiomyopathy appears ischemic in nature  Appears euvolemic at this time  · Check daily weights  · Maintain on low-sodium diet  · Furosemide and ARB on hold currently      · Continue BB therapy, with plans for discharge on long-acting formulation  · Insure outpatient follow-up with Cardiology  Paroxysmal atrial fibrillation (HCC)  Assessment & Plan  Eliquis resumed on 09/25 at decreased dosing based upon patient's weight and serum creatinine  Continue beta-blocker with plans on discharging on long-acting formulation  Stroke-like symptoms  Assessment & Plan  No acute abnormality on initial CT of the head  MRI contraindicated secondary to presence of AICD/ppm   Will need outpatient MRI at another [facility] following discharge  Morning lipids and A1c updated  Allergy to statin noted  Continue current anticoagulation with apixaban  Continue to monitor neurological symptoms  Of note, patient does not appear to have any focal symptoms this morning based upon limited exam (due to patient participation), but appears confused  Unsure of baseline as  was not reachable by telephone today  Underlying history of dementia and 'forgetfulness' noted  Question whether altered mental status is secondary to acute metabolic encephalopathy from underlying dementia and current acute illness due to COVID-19 infection with fevers  Essential hypertension  Assessment & Plan  Changed metoprolol tartrate to metoprolol succinate (toprol XL) with the decreased LVEF, which should be continued upon discharge  Continue to hold ARB in the setting of DARRELL  Currently remains hypertensive but not significantly so - will allow for slightly higher blood pressures for continued renal perfusion  Continue monitor blood pressure, with additional antihypertensive regimen as warranted  Hyperkalemia  Assessment & Plan  In the setting of DARRELL with concurrent ARB use - hyperkalemia remains resolved  Acquired hypothyroidism  Assessment & Plan  TSH markedly low at 0 05, although checked during acute illness  Levothyroxine dosing decreased to 125 mcg, with recommendations for repeat TSH in 4-6 weeks      Increased anion gap metabolic acidosis  Assessment & Plan  Resolved  VTE Pharmacologic Prophylaxis: VTE Score: 10 High Risk (Score >/= 5) - Pharmacological DVT Prophylaxis Ordered: apixaban (Eliquis)  Sequential Compression Devices Ordered  Patient Centered Rounds: I performed bedside rounds with nursing staff today  Discussions with Specialists or Other Care Team Provider:  Review of urology note  Education and Discussions with Family / Patient: Attempted to update  () via phone  Left voicemail  Time Spent for Care: 45 minutes  More than 50% of total time spent on counseling and coordination of care as described above  Current Length of Stay: 4 day(s)  Current Patient Status: Inpatient   Certification Statement: The patient will continue to require additional inpatient hospital stay due to Continued monitoring of renal function, hemoglobin, and fevers  Continue treatment of UTI and sepsis  Discharge Plan: Anticipate discharge in 48-72 hrs to rehab facility  Code Status: Level 3 - DNAR and DNI    Subjective:   Patient seen and examined - appears confused this morning  Had an episode of epistaxis overnight successfully treated with nasal packing, unfortunately pulled out by patient  No further episode of epistaxis noted  No other overnight events reported  Had a T-max of 101 2° on , 101° on  at approximately 6:30 p m  currently afebrile  Objective:     Vitals:   Temp (24hrs), Av 9 °F (37 7 °C), Min:99 °F (37 2 °C), Max:101 °F (38 3 °C)    Temp:  [99 °F (37 2 °C)-101 °F (38 3 °C)] 99 6 °F (37 6 °C)  HR:  [] 89  Resp:  [18-20] 20  BP: (141-184)/(72-91) 146/72  SpO2:  [83 %-98 %] 95 %  Body mass index is 19 25 kg/m²  Input and Output Summary (last 24 hours):      Intake/Output Summary (Last 24 hours) at 2022 1549  Last data filed at 2022 0945  Gross per 24 hour   Intake 270 ml   Output 500 ml   Net -230 ml       Physical Exam:   Physical Exam  Constitutional:       General: She is not in acute distress  Appearance: Normal appearance  She is normal weight  She is not ill-appearing or toxic-appearing  HENT:      Mouth/Throat:      Mouth: Mucous membranes are moist    Cardiovascular:      Rate and Rhythm: Normal rate  Rhythm irregular  Heart sounds: No murmur heard  No gallop  Pulmonary:      Effort: Pulmonary effort is normal  No respiratory distress  Breath sounds: Normal breath sounds  No wheezing, rhonchi or rales  Abdominal:      General: Abdomen is flat  Bowel sounds are normal  There is no distension  Palpations: Abdomen is soft  Tenderness: There is no abdominal tenderness  There is no guarding or rebound  Musculoskeletal:         General: No swelling or tenderness  Cervical back: Neck supple  Skin:     General: Skin is warm and dry  Neurological:      General: No focal deficit present  Mental Status: She is alert  She is disoriented     Psychiatric:         Mood and Affect: Mood normal          Behavior: Behavior normal        Additional Data:     Labs:  Results from last 7 days   Lab Units 09/26/22  0514   WBC Thousand/uL 8 28   HEMOGLOBIN g/dL 8 6*   HEMATOCRIT % 29 0*   PLATELETS Thousands/uL 212   NEUTROS PCT % 73   LYMPHS PCT % 14   MONOS PCT % 10   EOS PCT % 1     Results from last 7 days   Lab Units 09/26/22  0514   SODIUM mmol/L 138   POTASSIUM mmol/L 4 3   CHLORIDE mmol/L 103   CO2 mmol/L 22   BUN mg/dL 45*   CREATININE mg/dL 1 94*   ANION GAP mmol/L 13   CALCIUM mg/dL 9 0   ALBUMIN g/dL 2 6*   TOTAL BILIRUBIN mg/dL 0 25   ALK PHOS U/L 131*   ALT U/L 8*   AST U/L 30   GLUCOSE RANDOM mg/dL 87     Results from last 7 days   Lab Units 09/25/22  0658   INR  1 10     Results from last 7 days   Lab Units 09/22/22  2039 09/22/22  0119 09/22/22  0021   POC GLUCOSE mg/dl 138 125 106     Results from last 7 days   Lab Units 09/22/22  0430   HEMOGLOBIN A1C % 5 3     Results from last 7 days   Lab Units 09/26/22  0514 09/25/22  0929 09/25/22  0658 09/23/22  0632 09/22/22  0430 09/22/22  0229 09/22/22  0125   LACTIC ACID mmol/L 1 4 2 0  --   --   --  1 4  --    PROCALCITONIN ng/ml 0 31*  --  0 28* 0 34* 0 27*  --  0 29*       Lines/Drains:  Invasive Devices  Report    Peripheral Intravenous Line  Duration           Peripheral IV 09/26/22 Dorsal (posterior); Right Hand <1 day          Drain  Duration           External Urinary Catheter 3 days                      Imaging: Reviewed radiology reports from this admission including: chest xray, chest CT scan, abdominal/pelvic CT, CT head and ECHO    Recent Cultures (last 7 days):   Results from last 7 days   Lab Units 09/25/22  1057 09/25/22  0928 09/25/22  0909 09/22/22  0229 09/22/22  0128   BLOOD CULTURE  Received in Microbiology Lab  Culture in Progress  Received in Microbiology Lab  Culture in Progress  --  No Growth After 4 Days  No Growth After 4 Days    --    URINE CULTURE   --   --  10,000-19,000 cfu/ml   --  >100,000 cfu/ml Escherichia coli*       Last 24 Hours Medication List:   Current Facility-Administered Medications   Medication Dose Route Frequency Provider Last Rate    acetaminophen  488 mg Oral Q6H PRN Graydon Hence, PA-C      allopurinol  100 mg Oral Daily Edward Gonzales MD      apixaban  2 5 mg Oral BID Homero Ceballos DO      calcium carbonate-vitamin D  1 tablet Oral BID With Meals Graydon Hence, PA-C      cefTRIAXone  1,000 mg Intravenous Q24H Graydon Hence, PA-C 1,000 mg (09/26/22 0420)    dextrose 5% lactated ringer's  50 mL/hr Intravenous Continuous [facility] Anup, DO 50 mL/hr (09/26/22 0416)    fluticasone  2 spray Nasal Daily Graydon Hence, PA-C      labetalol  10 mg Intravenous Q4H PRN Delayfreddie Ceballos,       levothyroxine  125 mcg Oral Early Morning Delayfreddie Ceballos,       magnesium oxide  400 mg Oral BID Justina Stevens MD      metoprolol succinate  50 mg Oral Q12H Marcelene Pleasure SAMINA Ceballos DO      ondansetron  4 mg Intravenous Q4H PRN MD Fuad Turner Lexi Brandon ON 9/27/2022] pantoprazole  20 mg Oral Early Morning Radha Love MD      polyethylene glycol  17 g Oral Daily PRN Radha Love MD      sertraline  100 mg Oral Daily Donnell Nissen, PA-C      sodium chloride (PF)  3 mL Intravenous Q1H PRN Donnell Nissen, PA-C          Today, Patient Was Seen By: Radha Love MD    **Please Note: This note may have been constructed using a voice recognition system  **

## 2022-09-26 NOTE — ASSESSMENT & PLAN NOTE
Lab Results   Component Value Date    EGFR 24 09/26/2022    EGFR 23 09/25/2022    EGFR 22 09/24/2022    CREATININE 1 94 (H) 09/26/2022    CREATININE 2 02 (H) 09/25/2022    CREATININE 2 04 (H) 09/24/2022     DARRELL superimposed on CKD, likely in the setting of sepsis, acute blood loss anemia, with concurrent diuretic use  Baseline creatinine of 1 6 to 2, peak value during hospitalization of 3  Current creatinine has improved to 1 94  Continue minimal IV fluids with D5 LR at 50 mL/HR per Nephrology, and continue to hold oral furosemide and ARB  Nephrology consulted and following

## 2022-09-26 NOTE — PLAN OF CARE
Problem: PAIN - ADULT  Goal: Verbalizes/displays adequate comfort level or baseline comfort level  Description: Interventions:  - Encourage patient to monitor pain and request assistance  - Assess pain using appropriate pain scale  - Administer analgesics based on type and severity of pain and evaluate response  - Implement non-pharmacological measures as appropriate and evaluate response  - Consider cultural and social influences on pain and pain management  - Notify physician/advanced practitioner if interventions unsuccessful or patient reports new pain  Outcome: Progressing     Problem: INFECTION - ADULT  Goal: Absence or prevention of progression during hospitalization  Description: INTERVENTIONS:  - Assess and monitor for signs and symptoms of infection  - Monitor lab/diagnostic results  - Monitor all insertion sites, i e  indwelling lines, tubes, and drains  - Monitor endotracheal if appropriate and nasal secretions for changes in amount and color  - [place] appropriate cooling/warming therapies per order  - Administer medications as ordered  - Instruct and encourage patient and family to use good hand hygiene technique  - Identify and instruct in appropriate isolation precautions for identified infection/condition  Outcome: Progressing  Goal: Absence of fever/infection during neutropenic period  Description: INTERVENTIONS:  - Monitor WBC    Outcome: Progressing     Problem: SAFETY ADULT  Goal: Patient will remain free of falls  Description: INTERVENTIONS:  - Educate patient/family on patient safety including physical limitations  - Instruct patient to call for assistance with activity   - Consult OT/PT to assist with strengthening/mobility   - Keep Call bell within reach  - Keep bed low and locked with side rails adjusted as appropriate  - Keep care items and personal belongings within reach  - Initiate and maintain comfort rounds  - Make Fall Risk Sign visible to staff  - Offer Toileting every 2 Hours, in advance of need  - Initiate/Maintain bed/chair alarm  - Obtain necessary fall risk management equipment: alarms  - Apply yellow socks and bracelet for high fall risk patients  - Consider moving patient to room near nurses station  Outcome: Progressing  Goal: Maintain or return to baseline ADL function  Description: INTERVENTIONS:  -  Assess patient's ability to carry out ADLs; assess patient's baseline for ADL function and identify physical deficits which impact ability to perform ADLs (bathing, care of mouth/teeth, toileting, grooming, dressing, etc )  - Assess/evaluate cause of self-care deficits   - Assess range of motion  - Assess patient's mobility; develop plan if impaired  - Assess patient's need for assistive devices and provide as appropriate  - Encourage maximum independence but intervene and supervise when necessary  - Involve family in performance of ADLs  - Assess for home care needs following discharge   - Consider OT consult to assist with ADL evaluation and planning for discharge  - Provide patient education as appropriate  Outcome: Progressing  Goal: Maintains/Returns to pre admission functional level  Description: INTERVENTIONS:  - Perform BMAT or MOVE assessment daily    - Set and communicate daily mobility goal to care team and patient/family/caregiver  - Collaborate with rehabilitation services on mobility goals if consulted  - Perform Range of Motion 4 times a day  - Reposition patient every 2 hours    - Dangle patient 3-4 times a day  - Stand patient 4 times a day  - Ambulate patient 4 times a day  - Out of bed to chair 3 times a day   - Out of bed for meals 3 times a day  - Out of bed for toileting  - Record patient progress and toleration of activity level   Outcome: Progressing     Problem: DISCHARGE PLANNING  Goal: Discharge to home or other facility with appropriate resources  Description: INTERVENTIONS:  - Identify barriers to discharge w/patient and caregiver  - Arrange for needed discharge resources and transportation as appropriate  - Identify discharge learning needs (meds, wound care, etc )  - Arrange for interpretive services to assist at discharge as needed  - Refer to Case Management Department for coordinating discharge planning if the patient needs post-hospital services based on physician/advanced practitioner order or complex needs related to functional status, cognitive ability, or social support system  Outcome: Progressing     Problem: Knowledge Deficit  Goal: Patient/family/caregiver demonstrates understanding of disease process, treatment plan, medications, and discharge instructions  Description: Complete learning assessment and assess knowledge base    Interventions:  - Provide teaching at level of understanding  - Provide teaching via preferred learning methods  Outcome: Progressing     Problem: NEUROSENSORY - ADULT  Goal: Achieves stable or improved neurological status  Description: INTERVENTIONS  - Monitor and report changes in neurological status  - Monitor vital signs such as temperature, blood pressure, glucose, and any other labs ordered   - Initiate measures to prevent increased intracranial pressure      Outcome: Progressing     Problem: CARDIOVASCULAR - ADULT  Goal: Maintains optimal cardiac output and hemodynamic stability  Description: INTERVENTIONS:  - Monitor I/O, vital signs and rhythm  - Monitor for S/S and trends of decreased cardiac output  - Administer and titrate ordered vasoactive medications to optimize hemodynamic stability  - Assess quality of pulses, skin color and temperature  - Assess for signs of decreased coronary artery perfusion  - Instruct patient to report change in severity of symptoms  Outcome: Progressing     Problem: GENITOURINARY - ADULT  Goal: Maintains or returns to baseline urinary function  Description: INTERVENTIONS:  - Assess urinary function  - Encourage oral fluids to ensure adequate hydration if ordered  - Administer IV fluids as ordered to ensure adequate hydration  - Administer ordered medications as needed  - Offer frequent toileting  - Follow urinary retention protocol if ordered  Outcome: Progressing  Goal: Absence of urinary retention  Description: INTERVENTIONS:  - Assess patient's ability to void and empty bladder  - Monitor I/O  - Bladder scan as needed  - Discuss with physician/AP medications to alleviate retention as needed  - Discuss catheterization for long term situations as appropriate  Outcome: Progressing     Problem: METABOLIC, FLUID AND ELECTROLYTES - ADULT  Goal: Electrolytes maintained within normal limits  Description: INTERVENTIONS:  - Monitor labs and assess patient for signs and symptoms of electrolyte imbalances  - Administer electrolyte replacement as ordered  - Monitor response to electrolyte replacements, including repeat lab results as appropriate  - Instruct patient on fluid and nutrition as appropriate  Outcome: Progressing     Problem: SKIN/TISSUE INTEGRITY - ADULT  Goal: Skin Integrity remains intact(Skin Breakdown Prevention)  Description: Assess:  -Perform Arcadio assessment every shift  -Clean and moisturize skin every 4 houra  -Inspect skin when repositioning, toileting, and assisting with ADLS  -Assess under medical devices such as electrodes every shift  -Assess extremities for adequate circulation and sensation     Bed Management:  -Have minimal linens on bed & keep smooth, unwrinkled  -Change linens as needed when moist or perspiring  -Avoid sitting or lying in one position for more than 2 hours while in bed  -Keep HOB at 30 degrees     Toileting:  -Offer bedside commode  -Assess for incontinence every 2 hours  -Use incontinent care products after each incontinent episode such as breif    Activity:  -Mobilize patient 4 times a day  -Encourage activity and walks on unit  -Encourage or provide ROM exercises   -Turn and reposition patient every 2 Hours  -Use appropriate equipment to lift or move patient in bed  -Instruct/ Assist with weight shifting every 2 hours when out of bed in chair  -Consider limitation of chair time 4 hour intervals    Skin Care:  -Avoid use of baby powder, tape, friction and shearing, hot water or constrictive clothing  -Relieve pressure over bony prominences using alevyn  -Do not massage red bony areas    Next Steps:  -Teach patient strategies to minimize risks such as weight shift  Outcome: Progressing     Problem: MUSCULOSKELETAL - ADULT  Goal: Maintain or return mobility to safest level of function  Description: INTERVENTIONS:  - Assess patient's ability to carry out ADLs; assess patient's baseline for ADL function and identify physical deficits which impact ability to perform ADLs (bathing, care of mouth/teeth, toileting, grooming, dressing, etc )  - Assess/evaluate cause of self-care deficits   - Assess range of motion  - Assess patient's mobility  - Assess patient's need for assistive devices and provide as appropriate  - Encourage maximum independence but intervene and supervise when necessary  - Involve family in performance of ADLs  - Assess for home care needs following discharge   - Consider OT consult to assist with ADL evaluation and planning for discharge  - Provide patient education as appropriate  Outcome: Progressing     Problem: Potential for Falls  Goal: Patient will remain free of falls  Description: INTERVENTIONS:  - Educate patient/family on patient safety including physical limitations  - Instruct patient to call for assistance with activity   - Consult OT/PT to assist with strengthening/mobility   - Keep Call bell within reach  - Keep bed low and locked with side rails adjusted as appropriate  - Keep care items and personal belongings within reach  - Initiate and maintain comfort rounds  - Make Fall Risk Sign visible to staff  - Offer Toileting every 2 Hours, in advance of need  - Initiate/Maintain bed/chair alarm  - Obtain necessary fall risk management equipment: alarms  - Apply yellow socks and bracelet for high fall risk patients  - Consider moving patient to room near nurses station  Outcome: Progressing     Problem: MOBILITY - ADULT  Goal: Maintain or return to baseline ADL function  Description: INTERVENTIONS:  -  Assess patient's ability to carry out ADLs; assess patient's baseline for ADL function and identify physical deficits which impact ability to perform ADLs (bathing, care of mouth/teeth, toileting, grooming, dressing, etc )  - Assess/evaluate cause of self-care deficits   - Assess range of motion  - Assess patient's mobility; develop plan if impaired  - Assess patient's need for assistive devices and provide as appropriate  - Encourage maximum independence but intervene and supervise when necessary  - Involve family in performance of ADLs  - Assess for home care needs following discharge   - Consider OT consult to assist with ADL evaluation and planning for discharge  - Provide patient education as appropriate  Outcome: Progressing  Goal: Maintains/Returns to pre admission functional level  Description: INTERVENTIONS:  - Perform BMAT or MOVE assessment daily    - Set and communicate daily mobility goal to care team and patient/family/caregiver  - Collaborate with rehabilitation services on mobility goals if consulted  - Perform Range of Motion 4 times a day  - Reposition patient every 2 hours    - Dangle patient 3-4 times a day  - Stand patient 4 times a day  - Ambulate patient 4 times a day  - Out of bed to chair 3 times a day   - Out of bed for meals 3 times a day  - Out of bed for toileting  - Record patient progress and toleration of activity level   Outcome: Progressing     Problem: Prexisting or High Potential for Compromised Skin Integrity  Goal: Skin integrity is maintained or improved  Description: INTERVENTIONS:  - Identify patients at risk for skin breakdown  - Assess and monitor skin integrity  - Assess and monitor nutrition and hydration status  - Monitor labs   - Assess for incontinence   - Turn and reposition patient  - Assist with mobility/ambulation  - Relieve pressure over bony prominences  - Avoid friction and shearing  - Provide appropriate hygiene as needed including keeping skin clean and dry  - Evaluate need for skin moisturizer/barrier cream  - Collaborate with interdisciplinary team   - Patient/family teaching  - Consider wound care consult   Outcome: Progressing     Problem: Nutrition/Hydration-ADULT  Goal: Nutrient/Hydration intake appropriate for improving, restoring or maintaining nutritional needs  Description: Monitor and assess patient's nutrition/hydration status for malnutrition  Collaborate with interdisciplinary team and initiate plan and interventions as ordered  Monitor patient's weight and dietary intake as ordered or per policy  Utilize nutrition screening tool and intervene as necessary  Determine patient's food preferences and provide high-protein, high-caloric foods as appropriate       INTERVENTIONS:  - Monitor oral intake, urinary output, labs, and treatment plans  - Assess nutrition and hydration status and recommend course of action  - Evaluate amount of meals eaten  - Assist patient with eating if necessary   - Allow adequate time for meals  - Recommend/ encourage appropriate diets, oral nutritional supplements, and vitamin/mineral supplements  - Order, calculate, and assess calorie counts as needed  - Recommend, monitor, and adjust tube feedings and TPN/PPN based on assessed needs  - Assess need for intravenous fluids  - Provide specific nutrition/hydration education as appropriate  - Include patient/family/caregiver in decisions related to nutrition  Outcome: Progressing     Problem: RESPIRATORY - ADULT  Goal: Achieves optimal ventilation and oxygenation  Description: INTERVENTIONS:  - Assess for changes in respiratory status  - Assess for changes in mentation and behavior  - Position to facilitate oxygenation and minimize respiratory effort  - Oxygen administered by appropriate delivery if ordered  - Initiate smoking cessation education as indicated  - Encourage broncho-pulmonary hygiene including cough, deep breathe, Incentive Spirometry  - Assess the need for suctioning and aspirate as needed  - Assess and instruct to report SOB or any respiratory difficulty  - Respiratory Therapy support as indicated  Outcome: Progressing

## 2022-09-26 NOTE — UTILIZATION REVIEW
Continued Stay Review    Date: 9/26                          Current Patient Class: Inpatient   Current Level of Care: Med/surg    HPI:77 y o  female initially admitted on 9/22      Assessment/Plan: Has had nose bleed for 4 hours  Rhino rocket placed  Found to be COVID + on 9/25  Case management will work on bed search for STR  NOw COVID +  Nephrology consult:  Creat improving  Monitor off sodium bicarb tablets  Hyperkalemia resolved  Remove potassium restriction from diet  Continue with D5LR at 50 ml an hour  Continue to hold lasix  Continue IV abx  Vital Signs:   Date/Time Temp Pulse Resp BP MAP (mmHg) SpO2 O2 Device Patient Position - Orthostatic VS   09/26/22 0856 -- -- -- -- -- -- None (Room air) --   09/26/22 08:38:42 99 6 °F (37 6 °C) 89 20 146/72 97 95 % -- --   09/26/22 0600 -- 109 Abnormal  -- -- -- 83 % Abnormal  -- --   09/26/22 04:15:34 -- 102 -- -- -- 92 % -- --   09/26/22 0400 99 °F (37 2 °C) 101 18 164/76 105 89 % Abnormal  None (Room air) --   09/25/22 22:41:55 99 1 °F (37 3 °C) 96 19 154/78 103 90 % None (Room air) --   09/25/22 19:11:23 -- 98 -- 174/78 Abnormal  110 97 % -- --   09/25/22 1843 -- 101 -- 141/91 108 95 % -- --   09/25/22 18:25:49 101 °F (38 3 °C) Abnormal  100 20 184/83 Abnormal  117 98 % None (Room air) Lying     Pertinent Labs/Diagnostic Results:   9/25 CXR:   No acute cardiopulmonary disease     Results from last 7 days   Lab Units 09/25/22  0810 09/22/22  1504   SARS-COV-2  Positive* Negative     Results from last 7 days   Lab Units 09/26/22  0514 09/25/22  0658 09/24/22  0417 09/23/22  2101 09/23/22  0632   WBC Thousand/uL 8 28 7 46 10 87*  --  9 04   HEMOGLOBIN g/dL 8 6* 8 1* 9 0* 9 1* 9 0*   HEMATOCRIT % 29 0* 25 4* 27 7* 27 5* 27 0*   PLATELETS Thousands/uL 212 211 246  --  182   NEUTROS ABS Thousands/µL 6 10 5 91 8 40*  --  6 58         Results from last 7 days   Lab Units 09/26/22  0514 09/25/22  1700 09/25/22  5014 09/24/22  0417 09/23/22  0152 09/23/22  0041 09/22/22  1220 09/22/22  0430   SODIUM mmol/L 138  --  140 138 138 138   < > 136   POTASSIUM mmol/L 4 3  --  3 9 4 2 5 2 4 6   < > 5 7*   CHLORIDE mmol/L 103  --  105 103 105 105   < > 106   CO2 mmol/L 22  --  25 23 23 24   < > 14*   ANION GAP mmol/L 13  --  10 12 10 9   < > 16*   BUN mg/dL 45*  --  49* 57* 76* 78*   < > 93*   CREATININE mg/dL 1 94*  --  2 02* 2 04* 2 40* 2 45*   < > 2 66*   EGFR ml/min/1 73sq m 24  --  23 22 18 18   < > 16   CALCIUM mg/dL 9 0  --  8 7 9 2 9 4 9 1   < > 10 1   CALCIUM, IONIZED mmol/L  --  1 14  --   --   --   --   --   --    MAGNESIUM mg/dL 1 8  --  1 6 1 7 1 8  --   --  2 3   PHOSPHORUS mg/dL 3 3  --  3 5 4 1 4 8*  --   --  5 2*    < > = values in this interval not displayed       Results from last 7 days   Lab Units 09/26/22  0514 09/25/22  0658 09/24/22  0417 09/23/22  0632 09/22/22  0430   AST U/L 30 20 18 24 18   ALT U/L 8* 9* 7* 6* 9*   ALK PHOS U/L 131* 131* 146* 138* 151*   TOTAL PROTEIN g/dL 6 4 6 9 6 6 6 6 6 8   ALBUMIN g/dL 2 6* 2 6* 2 7* 2 6* 3 0*   TOTAL BILIRUBIN mg/dL 0 25 0 26 0 32 0 45 0 28   BILIRUBIN DIRECT mg/dL  --   --   --  0 09  --      Results from last 7 days   Lab Units 09/22/22 2039 09/22/22  0119 09/22/22  0021   POC GLUCOSE mg/dl 138 125 106     Results from last 7 days   Lab Units 09/26/22  0514 09/25/22  0658 09/24/22  0417 09/23/22  0632 09/23/22  0041 09/22/22  1639 09/22/22  1220 09/22/22  0430 09/22/22  0125 09/21/22  2314   GLUCOSE RANDOM mg/dL 87 94 97 85 119 106 313* 106 91 105         Results from last 7 days   Lab Units 09/22/22  0430   HEMOGLOBIN A1C % 5 3   EAG mg/dl 105       Results from last 7 days   Lab Units 09/22/22  0126   PH SHAKIRA  7 141*   PCO2 SHAKIRA mm Hg 34 8*   PO2 SHAKIRA mm Hg 39 5   HCO3 SHAKIRA mmol/L 11 6*   BASE EXC SHAKIRA mmol/L -15 9   O2 CONTENT SHAKIRA ml/dL 5 1   O2 HGB, VENOUS % 66 2         Results from last 7 days   Lab Units 09/26/22  0514   CK TOTAL U/L 72     Results from last 7 days   Lab Units 09/22/22  0340 09/22/22  0125 09/21/22  2314   HS TNI 0HR ng/L  --   --  24   HS TNI 2HR ng/L  --  30  --    HSTNI D2 ng/L  --  6  --    HS TNI 4HR ng/L 33  --   --    HSTNI D4 ng/L 9  --   --      Results from last 7 days   Lab Units 09/26/22  0514   D-DIMER QUANTITATIVE ug/ml FEU 2 91*     Results from last 7 days   Lab Units 09/25/22  0658 09/21/22  2314   PROTIME seconds 14 5 21 3*   INR  1 10 1 82*   PTT seconds  --  39*     Results from last 7 days   Lab Units 09/25/22  0658   TSH 3RD GENERATON uIU/mL 0 051*     Results from last 7 days   Lab Units 09/26/22  0514 09/25/22  0658 09/23/22  0632 09/22/22  0430 09/22/22  0125   PROCALCITONIN ng/ml 0 31* 0 28* 0 34* 0 27* 0 29*     Results from last 7 days   Lab Units 09/26/22  0514 09/25/22  0929 09/22/22  0229   LACTIC ACID mmol/L 1 4 2 0 1 4       Results from last 7 days   Lab Units 09/22/22  0340   FERRITIN ng/mL 100       Results from last 7 days   Lab Units 09/26/22  0514 09/22/22  0340   CRP mg/L 44 0* 10 8*       Results from last 7 days   Lab Units 09/25/22  0909 09/22/22  0128 09/22/22  0101   CLARITY UA  Clear Clear  --    COLOR UA  Yellow Yellow  --    SPEC GRAV UA  <=1 005 1 015  --    PH UA  6 5 6 0  --    GLUCOSE UA mg/dl Negative Negative  --    KETONES UA mg/dl Negative Negative  --    BLOOD UA  Large* Large*  --    PROTEIN UA mg/dl 30 (1+)* 30 (1+)*  --    NITRITE UA  Negative Positive*  --    BILIRUBIN UA  Negative Negative  --    UROBILINOGEN UA E U /dl 0 2 0 2  --    LEUKOCYTES UA  Moderate* Large*  --    WBC UA /hpf 4-10* Innumerable*  --    RBC UA /hpf 2-4 Innumerable*  --    BACTERIA UA /hpf Moderate* Moderate*  --    EPITHELIAL CELLS WET PREP /hpf Occasional Occasional  --    SODIUM UR   --   --  98   CREATININE UR mg/dL  --   --  23 9     Results from last 7 days   Lab Units 09/25/22  0810 09/22/22  1504   INFLUENZA A PCR  Negative Negative   INFLUENZA B PCR  Negative Negative   RSV PCR  Negative Negative       Results from last 7 days   Lab Units 09/25/22  1057 09/25/22  0928 09/25/22  0909 09/22/22  0229 09/22/22  0128   BLOOD CULTURE  Received in Microbiology Lab  Culture in Progress  Received in Microbiology Lab  Culture in Progress  --  No Growth After 4 Days  No Growth After 4 Days  --    URINE CULTURE   --   --  10,000-19,000 cfu/ml   --  >100,000 cfu/ml Escherichia coli*         Medications:   Scheduled Medications:  apixaban, 2 5 mg, Oral, BID  calcium carbonate-vitamin D, 1 tablet, Oral, BID With Meals  cefTRIAXone, 1,000 mg, Intravenous, Q24H  fluticasone, 2 spray, Nasal, Daily  levothyroxine, 125 mcg, Oral, Early Morning  magnesium oxide, 400 mg, Oral, BID  metoprolol succinate, 50 mg, Oral, Q12H  sertraline, 100 mg, Oral, Daily      Continuous IV Infusions:  dextrose 5% lactated ringer's, 50 mL/hr, Intravenous, Continuous      PRN Meds:  acetaminophen, 488 mg, Oral, Q6H PRN  labetalol, 10 mg, Intravenous, Q4H PRN  sodium chloride (PF), 3 mL, Intravenous, Q1H PRN        Discharge Plan: D    Network Utilization Review Department  ATTENTION: Please call with any questions or concerns to [phone] and carefully listen to the prompts so that you are directed to the right person  All voicemails are confidential   [facility] all requests for admission clinical reviews, approved or denied determinations and any other requests to dedicated fax number below belonging to the campus where the patient is receiving treatment   List of dedicated fax numbers for the Facilities:  [address] [place] [address] DENIALS (Administrative/Medical Necessity) [phone]   [address] [place] [address] (Maternity/NICU/Pediatrics) [address] [facility] [place] [address] [address] [address] [place] [address] [place] [address] [facility] [place] [address] [address] [place] [address] [phone]

## 2022-09-26 NOTE — ASSESSMENT & PLAN NOTE
No acute abnormality on initial CT of the head  MRI contraindicated secondary to presence of AICD/ppm   Will need outpatient MRI at another [facility] following discharge  Morning lipids and A1c updated  Allergy to statin noted  Continue current anticoagulation with apixaban  Continue to monitor neurological symptoms  Of note, patient does not appear to have any focal symptoms this morning based upon limited exam (due to patient participation), but appears confused  Unsure of baseline as  was not reachable by telephone today  Underlying history of dementia and 'forgetfulness' noted  Question whether altered mental status is secondary to acute metabolic encephalopathy from underlying dementia and current acute illness due to COVID-19 infection with fevers

## 2022-09-26 NOTE — ASSESSMENT & PLAN NOTE
TSH markedly low at 0 05, although checked during acute illness  Levothyroxine dosing decreased to 125 mcg, with recommendations for repeat TSH in 4-6 weeks

## 2022-09-26 NOTE — ASSESSMENT & PLAN NOTE
Wt Readings from Last 3 Encounters:   09/25/22 54 1 kg (119 lb 4 3 oz)   05/31/22 62 8 kg (138 lb 7 2 oz)   11/16/21 62 8 kg (138 lb 7 2 oz)     Current ECHO with LVEF 45%, presence of G1 DD  RV systolic function is normal   Also with mention of moderate MR, moderate PHTN  Cardiomyopathy appears ischemic in nature  Appears euvolemic at this time  · Check daily weights  · Maintain on low-sodium diet  · Furosemide and ARB on hold currently  · Continue BB therapy, with plans for discharge on long-acting formulation  · Insure outpatient follow-up with Cardiology

## 2022-09-26 NOTE — PHYSICAL THERAPY NOTE
PHYSICAL THERAPY NOTE          Patient Name: Jaleel Posada Date: 9/26/2022 09/26/22 1147   PT Last Visit   PT Visit Date 09/26/22   Note Type   Note Type Treatment   Pain Assessment   Pain Assessment Tool 0-10   Pain Score No Pain   Restrictions/Precautions   Weight Bearing Precautions Per Order No   Other Precautions Contact/isolation; Airborne/isolation  (Fall Risk; Multiple lines; Cognitive)   General   Response to Previous Treatment Patient unable to report, no changes reported from family or staff   Family/Caregiver Present No   Cognition   Overall Cognitive Status Impaired   Arousal/Participation Alert   Following Commands Follows one step commands without difficulty   Subjective   Subjective Agreeable to therapy  Bed Mobility   Supine to Sit 3  Moderate assistance   Additional items Assist x 1;HOB elevated; Increased time required;Verbal cues;LE management   Additional Comments Increased time to scoot to EOB  Sat EOB with fair sitting balance unsupported   Transfers   Sit to Stand 3  Moderate assistance   Additional items Assist x 2; Increased time required;Verbal cues   Stand to Sit 3  Moderate assistance   Additional items Assist x 2;Armrests; Increased time required;Verbal cues   Stand pivot 2  Maximal assistance   Additional items Assist x 2; Increased time required;Verbal cues   Additional Comments Difficulty with pivot required max x 2 A and cues  Ambulation/Elevation   Gait pattern Not appropriate; Not tested   Balance   Static Sitting Fair   Dynamic Sitting Fair -   Static Standing Poor +   Dynamic Standing Poor +   Endurance Deficit   Endurance Deficit Yes   Activity Tolerance   Activity Tolerance Patient limited by fatigue   Exercises   Heelslides Supine;15 reps;AROM; Bilateral   Hip Flexion Sitting;10 reps;AROM; Bilateral   Hip Abduction Sitting;10 reps;AROM   Hip Adduction Sitting;10 reps;AROM; Bilateral   Knee AROM Long Arc Quad Sitting;10 reps;AROM; Bilateral   Ankle Pumps Supine;15 reps;AROM; Bilateral   Assessment   Prognosis Fair   Problem List   (Decreased strength; Decreased endurance; Impaired balance; Decreased mobility; Impaired judgement; Decreased cognition; Decreased safety awareness)   Assessment Pt  seen for PT treatment session this date with interventions consisting of  therapeutic exercises, bed mobility, transfers  w/ emphasis on improving pt's mobility Pt  Requiring frequent cues for sequence and safety  In comparison to previous session, Pt  With no change in activity tolerance  Pt is in need of continued activity in PT to improve strength balance endurance mobility transfers and ambulation with return to maximize LOF  From PT/mobility standpoint, recommendation at time of d/c would be post acute rehab  in order to promote return to PLOF and independence  The patient's AM-[facility] Basic Mobility Inpatient Short Form Raw Score is 10  A Raw score of less than or equal to 16 suggests the patient may benefit from discharge to post-acute rehabilitation services  Please also refer to physical therapy recommendation for safe DC planning  Goals   LTG Expiration Date 10/06/22   PT Treatment Day 2   Plan   Treatment/Interventions   (Functional transfer training; LE strengthening/ROM; Elevations; Therapeutic exercise;  Endurance training; Bed mobility; Gait training)   Progress Slow progress, decreased activity tolerance   PT Frequency 3-5x/wk   Recommendation   PT Discharge Recommendation Post acute rehabilitation services   AM-PAC Basic Mobility Inpatient   Turning in Bed Without Bedrails 2   Lying on Back to Sitting on Edge of Flat Bed 2   Moving Bed to Chair 2   Standing Up From Chair 2   Walk in Room 1   Climb 3-5 Stairs 1   Basic Mobility Inpatient Raw Score 10   Turning Head Towards Sound 4   Follow Simple Instructions 4   Low Function Basic Mobility Raw Score 18   Low Function Basic Mobility Standardized Score 29 25   Highest Level Of Mobility   -[facility] Goal 4: Move to chair/commode   -[facility] Achieved 4: Move to [employer] Provided Mobility training   Patient Demonstrates verbal understanding;Reinforcement needed   End of Consult   Patient Position at End of Consult Bedside chair;Bed/Chair alarm activated; All needs within reach   End of Consult Comments discussed POC with PT

## 2022-09-26 NOTE — PLAN OF CARE
Problem: PHYSICAL THERAPY ADULT  Goal: Performs mobility at highest level of function for planned discharge setting  See evaluation for individualized goals  Description: Treatment/Interventions: Functional transfer training, LE strengthening/ROM, Elevations, Therapeutic exercise, Endurance training, Bed mobility, Gait training          See flowsheet documentation for full assessment, interventions and recommendations  Outcome: Progressing  Note: Prognosis: Fair  Problem List:  (Decreased strength; Decreased endurance; Impaired balance; Decreased mobility; Impaired judgement; Decreased cognition; Decreased safety awareness)  Assessment: Pt  seen for PT treatment session this date with interventions consisting of  therapeutic exercises, bed mobility, transfers  w/ emphasis on improving pt's mobility Pt  Requiring frequent cues for sequence and safety  In comparison to previous session, Pt  With no change in activity tolerance  Pt is in need of continued activity in PT to improve strength balance endurance mobility transfers and ambulation with return to maximize LOF  From PT/mobility standpoint, recommendation at time of d/c would be post acute rehab  in order to promote return to PLOF and independence  The patient's AM-[facility] Basic Mobility Inpatient Short Form Raw Score is 10  A Raw score of less than or equal to 16 suggests the patient may benefit from discharge to post-acute rehabilitation services  Please also refer to physical therapy recommendation for safe DC planning  PT Discharge Recommendation: Post acute rehabilitation services    See flowsheet documentation for full assessment
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
## 2022-09-26 NOTE — ASSESSMENT & PLAN NOTE
The patient was seen in consultation by Urology- no current plan for urological intervention  Continue to treat underlying UTI  As per Urology attending, retained indwelling ureteral stent may be difficult to remove, and may require retrograde and antegrade renal surgery if the proximal coil is fixed with stone formation       The patient will need definitive stone treatment and extraction of the retained left ureteral stent via cystoscopy and laser for extraction of  encrusted/retained stent as well as ureteral/renal stones with temporary stent exchange  This can be done after hospital discharge and after the suspected acute urinary tract infection has cleared and patient has completed antibiotic course  The patient can follow with her urologist at the [facility] that she previously establish care with almost 2 years ago for the initial placement of the stent or referral to [address] [place] [address] Urology on an outpatient basis      If the patient develops worsening creatinine then with recommendations to obtain renal ultrasound to assess for worsening hydronephrosis, and in that instance patient will become a candidate for urological intervention

## 2022-09-26 NOTE — PLAN OF CARE
Problem: PAIN - ADULT  Goal: Verbalizes/displays adequate comfort level or baseline comfort level  Description: Interventions:  - Encourage patient to monitor pain and request assistance  - Assess pain using appropriate pain scale  - Administer analgesics based on type and severity of pain and evaluate response  - Implement non-pharmacological measures as appropriate and evaluate response  - Consider cultural and social influences on pain and pain management  - Notify physician/advanced practitioner if interventions unsuccessful or patient reports new pain  Outcome: Progressing     Problem: INFECTION - ADULT  Goal: Absence or prevention of progression during hospitalization  Description: INTERVENTIONS:  - Assess and monitor for signs and symptoms of infection  - Monitor lab/diagnostic results  - Monitor all insertion sites, i e  indwelling lines, tubes, and drains  - Monitor endotracheal if appropriate and nasal secretions for changes in amount and color  - [place] appropriate cooling/warming therapies per order  - Administer medications as ordered  - Instruct and encourage patient and family to use good hand hygiene technique  - Identify and instruct in appropriate isolation precautions for identified infection/condition  Outcome: Progressing  Goal: Absence of fever/infection during neutropenic period  Description: INTERVENTIONS:  - Monitor WBC    Outcome: Progressing     Problem: SAFETY ADULT  Goal: Patient will remain free of falls  Description: INTERVENTIONS:  - Educate patient/family on patient safety including physical limitations  - Instruct patient to call for assistance with activity   - Consult OT/PT to assist with strengthening/mobility   - Keep Call bell within reach  - Keep bed low and locked with side rails adjusted as appropriate  - Keep care items and personal belongings within reach  - Initiate and maintain comfort rounds  - Make Fall Risk Sign visible to staff  - Offer Toileting every 2 Hours, in advance of need  - Initiate/Maintain bed/chair alarm  - Obtain necessary fall risk management equipment: alarms  - Apply yellow socks and bracelet for high fall risk patients  - Consider moving patient to room near nurses station  Outcome: Progressing  Goal: Maintain or return to baseline ADL function  Description: INTERVENTIONS:  -  Assess patient's ability to carry out ADLs; assess patient's baseline for ADL function and identify physical deficits which impact ability to perform ADLs (bathing, care of mouth/teeth, toileting, grooming, dressing, etc )  - Assess/evaluate cause of self-care deficits   - Assess range of motion  - Assess patient's mobility; develop plan if impaired  - Assess patient's need for assistive devices and provide as appropriate  - Encourage maximum independence but intervene and supervise when necessary  - Involve family in performance of ADLs  - Assess for home care needs following discharge   - Consider OT consult to assist with ADL evaluation and planning for discharge  - Provide patient education as appropriate  Outcome: Progressing  Goal: Maintains/Returns to pre admission functional level  Description: INTERVENTIONS:  - Perform BMAT or MOVE assessment daily    - Set and communicate daily mobility goal to care team and patient/family/caregiver  - Collaborate with rehabilitation services on mobility goals if consulted  - Perform Range of Motion 4 times a day  - Reposition patient every 2 hours    - Dangle patient 3-4 times a day  - Stand patient 4 times a day  - Ambulate patient 4 times a day  - Out of bed to chair 3 times a day   - Out of bed for meals 3 times a day  - Out of bed for toileting  - Record patient progress and toleration of activity level   Outcome: Progressing     Problem: DISCHARGE PLANNING  Goal: Discharge to home or other facility with appropriate resources  Description: INTERVENTIONS:  - Identify barriers to discharge w/patient and caregiver  - Arrange for needed discharge resources and transportation as appropriate  - Identify discharge learning needs (meds, wound care, etc )  - Arrange for interpretive services to assist at discharge as needed  - Refer to Case Management Department for coordinating discharge planning if the patient needs post-hospital services based on physician/advanced practitioner order or complex needs related to functional status, cognitive ability, or social support system  Outcome: Progressing     Problem: DISCHARGE PLANNING  Goal: Discharge to home or other facility with appropriate resources  Description: INTERVENTIONS:  - Identify barriers to discharge w/patient and caregiver  - Arrange for needed discharge resources and transportation as appropriate  - Identify discharge learning needs (meds, wound care, etc )  - Arrange for interpretive services to assist at discharge as needed  - Refer to Case Management Department for coordinating discharge planning if the patient needs post-hospital services based on physician/advanced practitioner order or complex needs related to functional status, cognitive ability, or social support system  Outcome: Progressing     Problem: Knowledge Deficit  Goal: Patient/family/caregiver demonstrates understanding of disease process, treatment plan, medications, and discharge instructions  Description: Complete learning assessment and assess knowledge base    Interventions:  - Provide teaching at level of understanding  - Provide teaching via preferred learning methods  Outcome: Progressing     Problem: NEUROSENSORY - ADULT  Goal: Achieves stable or improved neurological status  Description: INTERVENTIONS  - Monitor and report changes in neurological status  - Monitor vital signs such as temperature, blood pressure, glucose, and any other labs ordered   - Initiate measures to prevent increased intracranial pressure      Outcome: Progressing     Problem: CARDIOVASCULAR - ADULT  Goal: Maintains optimal cardiac output and hemodynamic stability  Description: INTERVENTIONS:  - Monitor I/O, vital signs and rhythm  - Monitor for S/S and trends of decreased cardiac output  - Administer and titrate ordered vasoactive medications to optimize hemodynamic stability  - Assess quality of pulses, skin color and temperature  - Assess for signs of decreased coronary artery perfusion  - Instruct patient to report change in severity of symptoms  Outcome: Progressing     Problem: GENITOURINARY - ADULT  Goal: Maintains or returns to baseline urinary function  Description: INTERVENTIONS:  - Assess urinary function  - Encourage oral fluids to ensure adequate hydration if ordered  - Administer IV fluids as ordered to ensure adequate hydration  - Administer ordered medications as needed  - Offer frequent toileting  - Follow urinary retention protocol if ordered  Outcome: Progressing  Goal: Absence of urinary retention  Description: INTERVENTIONS:  - Assess patient's ability to void and empty bladder  - Monitor I/O  - Bladder scan as needed  - Discuss with physician/AP medications to alleviate retention as needed  - Discuss catheterization for long term situations as appropriate  Outcome: Progressing     Problem: GENITOURINARY - ADULT  Goal: Maintains or returns to baseline urinary function  Description: INTERVENTIONS:  - Assess urinary function  - Encourage oral fluids to ensure adequate hydration if ordered  - Administer IV fluids as ordered to ensure adequate hydration  - Administer ordered medications as needed  - Offer frequent toileting  - Follow urinary retention protocol if ordered  Outcome: Progressing  Goal: Absence of urinary retention  Description: INTERVENTIONS:  - Assess patient's ability to void and empty bladder  - Monitor I/O  - Bladder scan as needed  - Discuss with physician/AP medications to alleviate retention as needed  - Discuss catheterization for long term situations as appropriate  Outcome: Progressing     Problem: METABOLIC, FLUID AND ELECTROLYTES - ADULT  Goal: Electrolytes maintained within normal limits  Description: INTERVENTIONS:  - Monitor labs and assess patient for signs and symptoms of electrolyte imbalances  - Administer electrolyte replacement as ordered  - Monitor response to electrolyte replacements, including repeat lab results as appropriate  - Instruct patient on fluid and nutrition as appropriate  Outcome: Progressing     Problem: SKIN/TISSUE INTEGRITY - ADULT  Goal: Skin Integrity remains intact(Skin Breakdown Prevention)  Description: Assess:  -Perform Arcadio assessment every shift  -Clean and moisturize skin every 4 houra  -Inspect skin when repositioning, toileting, and assisting with ADLS  -Assess under medical devices such as electrodes every shift  -Assess extremities for adequate circulation and sensation     Bed Management:  -Have minimal linens on bed & keep smooth, unwrinkled  -Change linens as needed when moist or perspiring  -Avoid sitting or lying in one position for more than 2 hours while in bed  -Keep HOB at 30 degrees     Toileting:  -Offer bedside commode  -Assess for incontinence every 2 hours  -Use incontinent care products after each incontinent episode such as breif    Activity:  -Mobilize patient 4 times a day  -Encourage activity and walks on unit  -Encourage or provide ROM exercises   -Turn and reposition patient every 2 Hours  -Use appropriate equipment to lift or move patient in bed  -Instruct/ Assist with weight shifting every 2 hours when out of bed in chair  -Consider limitation of chair time 4 hour intervals    Skin Care:  -Avoid use of baby powder, tape, friction and shearing, hot water or constrictive clothing  -Relieve pressure over bony prominences using alevyn  -Do not massage red bony areas    Next Steps:  -Teach patient strategies to minimize risks such as weight shift  Outcome: Progressing     Problem: MUSCULOSKELETAL - ADULT  Goal: Maintain or return mobility to safest level of function  Description: INTERVENTIONS:  - Assess patient's ability to carry out ADLs; assess patient's baseline for ADL function and identify physical deficits which impact ability to perform ADLs (bathing, care of mouth/teeth, toileting, grooming, dressing, etc )  - Assess/evaluate cause of self-care deficits   - Assess range of motion  - Assess patient's mobility  - Assess patient's need for assistive devices and provide as appropriate  - Encourage maximum independence but intervene and supervise when necessary  - Involve family in performance of ADLs  - Assess for home care needs following discharge   - Consider OT consult to assist with ADL evaluation and planning for discharge  - Provide patient education as appropriate  Outcome: Progressing     Problem: Potential for Falls  Goal: Patient will remain free of falls  Description: INTERVENTIONS:  - Educate patient/family on patient safety including physical limitations  - Instruct patient to call for assistance with activity   - Consult OT/PT to assist with strengthening/mobility   - Keep Call bell within reach  - Keep bed low and locked with side rails adjusted as appropriate  - Keep care items and personal belongings within reach  - Initiate and maintain comfort rounds  - Make Fall Risk Sign visible to staff  - Offer Toileting every 2 Hours, in advance of need  - Initiate/Maintain bed/chair alarm  - Obtain necessary fall risk management equipment: alarms  - Apply yellow socks and bracelet for high fall risk patients  - Consider moving patient to room near nurses station  Outcome: Progressing     Problem: Nutrition/Hydration-ADULT  Goal: Nutrient/Hydration intake appropriate for improving, restoring or maintaining nutritional needs  Description: Monitor and assess patient's nutrition/hydration status for malnutrition  Collaborate with interdisciplinary team and initiate plan and interventions as ordered    Monitor patient's weight and dietary intake as ordered or per policy  Utilize nutrition screening tool and intervene as necessary  Determine patient's food preferences and provide high-protein, high-caloric foods as appropriate       INTERVENTIONS:  - Monitor oral intake, urinary output, labs, and treatment plans  - Assess nutrition and hydration status and recommend course of action  - Evaluate amount of meals eaten  - Assist patient with eating if necessary   - Allow adequate time for meals  - Recommend/ encourage appropriate diets, oral nutritional supplements, and vitamin/mineral supplements  - Order, calculate, and assess calorie counts as needed  - Recommend, monitor, and adjust tube feedings and TPN/PPN based on assessed needs  - Assess need for intravenous fluids  - Provide specific nutrition/hydration education as appropriate  - Include patient/family/caregiver in decisions related to nutrition  Outcome: Progressing     Problem: Prexisting or High Potential for Compromised Skin Integrity  Goal: Skin integrity is maintained or improved  Description: INTERVENTIONS:  - Identify patients at risk for skin breakdown  - Assess and monitor skin integrity  - Assess and monitor nutrition and hydration status  - Monitor labs   - Assess for incontinence   - Turn and reposition patient  - Assist with mobility/ambulation  - Relieve pressure over bony prominences  - Avoid friction and shearing  - Provide appropriate hygiene as needed including keeping skin clean and dry  - Evaluate need for skin moisturizer/barrier cream  - Collaborate with interdisciplinary team   - Patient/family teaching  - Consider wound care consult   Outcome: Progressing     Problem: MOBILITY - ADULT  Goal: Maintain or return to baseline ADL function  Description: INTERVENTIONS:  -  Assess patient's ability to carry out ADLs; assess patient's baseline for ADL function and identify physical deficits which impact ability to perform ADLs (bathing, care of mouth/teeth, toileting, grooming, dressing, etc )  - Assess/evaluate cause of self-care deficits   - Assess range of motion  - Assess patient's mobility; develop plan if impaired  - Assess patient's need for assistive devices and provide as appropriate  - Encourage maximum independence but intervene and supervise when necessary  - Involve family in performance of ADLs  - Assess for home care needs following discharge   - Consider OT consult to assist with ADL evaluation and planning for discharge  - Provide patient education as appropriate  Outcome: Progressing  Goal: Maintains/Returns to pre admission functional level  Description: INTERVENTIONS:  - Perform BMAT or MOVE assessment daily    - Set and communicate daily mobility goal to care team and patient/family/caregiver  - Collaborate with rehabilitation services on mobility goals if consulted  - Perform Range of Motion 4 times a day  - Reposition patient every 2 hours    - Dangle patient 3-4 times a day  - Stand patient 4 times a day  - Ambulate patient 4 times a day  - Out of bed to chair 3 times a day   - Out of bed for meals 3 times a day  - Out of bed for toileting  - Record patient progress and toleration of activity level   Outcome: Progressing

## 2022-09-26 NOTE — OCCUPATIONAL THERAPY NOTE
Occupational Therapy     Patient Name: Leeland Libman Date: 9/26/2022 09/26/22 0338   Note Type   Note Type Cancelled Session   Cancel Reasons Other  (Attempted to see patient for OT treatment session  Pt politely declining at this time, due to working with PT earlier resulting in fatigue   Will re-attempt as able )     Lupe Casiano, OT

## 2022-09-26 NOTE — QUICK NOTE
Patient has had significant epistaxis since roughly 2:00 a m  This morning  I attempted to pack the nose multiple times but the patient continually pulled this out due to discomfort  As well, bleed did not improve despite packing  Decision was made to place rhino rocket  Rapid Rhino rocket placed  Patient is comfortable with rhino rocket in place  Will check in with patient later this morning to assess for improvement

## 2022-09-26 NOTE — ASSESSMENT & PLAN NOTE
Required transfusion with PRBC's, with apixaban initially held  EGD 9/23 essentially normal, with apixaban resumed on 09/25  If continued drop in hemoglobin may require colonoscopy

## 2022-09-26 NOTE — CASE MANAGEMENT
Case Management Progress Note    Patient name Marlon Sevilla  Location [id number] MRN [id number]  : 1945 Date 2022       LOS (days): 4  Geometric Mean LOS (GMLOS) (days): 3 50  Days to GMLOS:-0 8        OBJECTIVE:        Current admission status: Inpatient  Preferred Pharmacy:   [address] [place], [place] [address]carlos alberto [facility]s, [address] [place] [address]  [facility]  [address]  DR Lynda FREEMAN [address]  Phone: [phone] Fax: [phone]    Primary Care Provider: Royce Patrick DO    Primary Insurance: [address] [facility]  Secondary Insurance:     PROGRESS NOTE:Pt tested positive over the weekend for COVID  Will be an issue with pt going to rehab on dc  Will continue to work on bed search for rehab for pt

## 2022-09-26 NOTE — ASSESSMENT & PLAN NOTE
Changed metoprolol tartrate to metoprolol succinate (toprol XL) with the decreased LVEF, which should be continued upon discharge  Continue to hold ARB in the setting of DARRELL  Currently remains hypertensive but not significantly so - will allow for slightly higher blood pressures for continued renal perfusion  Continue monitor blood pressure, with additional antihypertensive regimen as warranted

## 2022-09-26 NOTE — SPEECH THERAPY NOTE
Speech/Language Pathology Progress Note    Patient Name: Filippo Stacy Date: 9/26/2022     Subjective:  "I'm ok "   Patient lying comfortably in bed with minimal initiation verbal expression  Objective:  Clinician provided treatment this afternoon to analyze pt's breath support and vocal volume, as well as ability to functionally communicate wants/needs  Assessment:  Pt demonstrated adequate vocal volume for 100% of vocalizations initiated this date  No cues were necessary to improve volume, as pt exhibited adequate breath support to facilitate volume  Clinician cues were required for initiation of verbal expression in regards to various wants/needs and communication of biographical information  Pt's cognitive status limited patient's ability to accurately provide verbal responses and overall verbal initiation  Plan/Recommendations:  ST services to sign off at this time, as pt is exhibiting adequate vocal volume and verbal expression when prompted  Limited cognitive state and initiation of conversation likely at baseline secondary to dementia

## 2022-09-27 LAB
ANION GAP SERPL CALCULATED.3IONS-SCNC: 11 MMOL/L (ref 4–13)
BACTERIA BLD CULT: NORMAL
BACTERIA BLD CULT: NORMAL
BASOPHILS # BLD AUTO: 0.01 THOUSANDS/ÂΜL (ref 0–0.1)
BASOPHILS NFR BLD AUTO: 0 % (ref 0–1)
BETA-HYDROXYBUTYRATE: 0.2 MMOL/L
BUN SERPL-MCNC: 47 MG/DL (ref 5–25)
CALCIUM SERPL-MCNC: 8.6 MG/DL (ref 8.3–10.1)
CHLORIDE SERPL-SCNC: 103 MMOL/L (ref 96–108)
CO2 SERPL-SCNC: 24 MMOL/L (ref 21–32)
CREAT SERPL-MCNC: 1.86 MG/DL (ref 0.6–1.3)
EOSINOPHIL # BLD AUTO: 0.05 THOUSAND/ÂΜL (ref 0–0.61)
EOSINOPHIL NFR BLD AUTO: 1 % (ref 0–6)
ERYTHROCYTE [DISTWIDTH] IN BLOOD BY AUTOMATED COUNT: 25 % (ref 11.6–15.1)
GFR SERPL CREATININE-BSD FRML MDRD: 25 ML/MIN/1.73SQ M
GLUCOSE SERPL-MCNC: 118 MG/DL (ref 65–140)
GLUCOSE SERPL-MCNC: 78 MG/DL (ref 65–140)
GLUCOSE SERPL-MCNC: 84 MG/DL (ref 65–140)
GLUCOSE SERPL-MCNC: 88 MG/DL (ref 65–140)
GLUCOSE SERPL-MCNC: 99 MG/DL (ref 65–140)
HCT VFR BLD AUTO: 23.9 % (ref 34.8–46.1)
HCT VFR BLD AUTO: 25.3 % (ref 34.8–46.1)
HGB BLD-MCNC: 7.4 G/DL (ref 11.5–15.4)
HGB BLD-MCNC: 7.8 G/DL (ref 11.5–15.4)
IMM GRANULOCYTES # BLD AUTO: 0.04 THOUSAND/UL (ref 0–0.2)
IMM GRANULOCYTES NFR BLD AUTO: 1 % (ref 0–2)
LYMPHOCYTES # BLD AUTO: 0.91 THOUSANDS/ÂΜL (ref 0.6–4.47)
LYMPHOCYTES NFR BLD AUTO: 16 % (ref 14–44)
MAGNESIUM SERPL-MCNC: 1.5 MG/DL (ref 1.6–2.6)
MCH RBC QN AUTO: 26.4 PG (ref 26.8–34.3)
MCHC RBC AUTO-ENTMCNC: 31 G/DL (ref 31.4–37.4)
MCV RBC AUTO: 85 FL (ref 82–98)
MONOCYTES # BLD AUTO: 0.5 THOUSAND/ÂΜL (ref 0.17–1.22)
MONOCYTES NFR BLD AUTO: 9 % (ref 4–12)
NEUTROPHILS # BLD AUTO: 4.15 THOUSANDS/ÂΜL (ref 1.85–7.62)
NEUTS SEG NFR BLD AUTO: 73 % (ref 43–75)
NRBC BLD AUTO-RTO: 0 /100 WBCS
PLATELET # BLD AUTO: 179 THOUSANDS/UL (ref 149–390)
PMV BLD AUTO: 10.4 FL (ref 8.9–12.7)
POTASSIUM SERPL-SCNC: 3.8 MMOL/L (ref 3.5–5.3)
RBC # BLD AUTO: 2.8 MILLION/UL (ref 3.81–5.12)
SODIUM SERPL-SCNC: 138 MMOL/L (ref 135–147)
WBC # BLD AUTO: 5.66 THOUSAND/UL (ref 4.31–10.16)

## 2022-09-27 PROCEDURE — 99233 SBSQ HOSP IP/OBS HIGH 50: CPT | Performed by: INTERNAL MEDICINE

## 2022-09-27 PROCEDURE — 97535 SELF CARE MNGMENT TRAINING: CPT

## 2022-09-27 PROCEDURE — 85025 COMPLETE CBC W/AUTO DIFF WBC: CPT | Performed by: INTERNAL MEDICINE

## 2022-09-27 PROCEDURE — 85018 HEMOGLOBIN: CPT | Performed by: INTERNAL MEDICINE

## 2022-09-27 PROCEDURE — 99232 SBSQ HOSP IP/OBS MODERATE 35: CPT | Performed by: INTERNAL MEDICINE

## 2022-09-27 PROCEDURE — 80048 BASIC METABOLIC PNL TOTAL CA: CPT

## 2022-09-27 PROCEDURE — 97530 THERAPEUTIC ACTIVITIES: CPT

## 2022-09-27 PROCEDURE — 82010 KETONE BODYS QUAN: CPT

## 2022-09-27 PROCEDURE — 85014 HEMATOCRIT: CPT | Performed by: INTERNAL MEDICINE

## 2022-09-27 PROCEDURE — 82948 REAGENT STRIP/BLOOD GLUCOSE: CPT

## 2022-09-27 PROCEDURE — 83735 ASSAY OF MAGNESIUM: CPT | Performed by: INTERNAL MEDICINE

## 2022-09-27 RX ORDER — SODIUM CHLORIDE, SODIUM LACTATE, POTASSIUM CHLORIDE, CALCIUM CHLORIDE 600; 310; 30; 20 MG/100ML; MG/100ML; MG/100ML; MG/100ML
50 INJECTION, SOLUTION INTRAVENOUS CONTINUOUS
Status: DISCONTINUED | OUTPATIENT
Start: 2022-09-27 | End: 2022-09-27

## 2022-09-27 RX ADMIN — MAGNESIUM OXIDE TAB 400 MG (241.3 MG ELEMENTAL MG) 400 MG: 400 (241.3 MG) TAB at 09:19

## 2022-09-27 RX ADMIN — Medication 1 TABLET: at 17:31

## 2022-09-27 RX ADMIN — FLUTICASONE PROPIONATE 2 SPRAY: 50 SPRAY, METERED NASAL at 09:20

## 2022-09-27 RX ADMIN — CEFTRIAXONE 1000 MG: 1 INJECTION, SOLUTION INTRAVENOUS at 03:48

## 2022-09-27 RX ADMIN — DEXTROSE, SODIUM CHLORIDE, SODIUM LACTATE, POTASSIUM CHLORIDE, AND CALCIUM CHLORIDE 50 ML/HR: 5; .6; .31; .03; .02 INJECTION, SOLUTION INTRAVENOUS at 03:49

## 2022-09-27 RX ADMIN — MAGNESIUM OXIDE TAB 400 MG (241.3 MG ELEMENTAL MG) 400 MG: 400 (241.3 MG) TAB at 17:31

## 2022-09-27 RX ADMIN — METOPROLOL SUCCINATE 50 MG: 50 TABLET, FILM COATED, EXTENDED RELEASE ORAL at 20:53

## 2022-09-27 RX ADMIN — ALLOPURINOL 100 MG: 100 TABLET ORAL at 09:19

## 2022-09-27 RX ADMIN — Medication 1 TABLET: at 09:20

## 2022-09-27 RX ADMIN — SERTRALINE 100 MG: 100 TABLET, FILM COATED ORAL at 09:19

## 2022-09-27 RX ADMIN — ACETAMINOPHEN 488 MG: 325 TABLET, FILM COATED ORAL at 11:39

## 2022-09-27 RX ADMIN — LEVOTHYROXINE SODIUM 125 MCG: 125 TABLET ORAL at 05:38

## 2022-09-27 RX ADMIN — METOPROLOL SUCCINATE 50 MG: 50 TABLET, FILM COATED, EXTENDED RELEASE ORAL at 09:19

## 2022-09-27 RX ADMIN — PANTOPRAZOLE SODIUM 20 MG: 20 TABLET, DELAYED RELEASE ORAL at 05:38

## 2022-09-27 NOTE — PLAN OF CARE
Problem: OCCUPATIONAL THERAPY ADULT  Goal: Performs self-care activities at highest level of function for planned discharge setting  See evaluation for individualized goals  Description: Treatment Interventions: ADL retraining, Functional transfer training, UE strengthening/ROM, Endurance training, Patient/family training, Equipment evaluation/education, Activityengagement, Cognitive reorientation          See flowsheet documentation for full assessment, interventions and recommendations  Outcome: Progressing  Note: Limitation: Decreased ADL status, Decreased UE strength, Decreased Safe judgement during ADL, Decreased cognition, Decreased endurance, Decreased self-care trans, Decreased high-level ADLs     Assessment: Patient participated in skilled OT session this date with interventions consisting of therapeutic activities and self-care/ADL training to increase functional endurance/activity tolerance, static/dynamic sitting/standing balance, and overall safety awareness to increase (I) with ADLs/IADLs to return to PLOF  Provided education on energy conservation techniques, deep breathing techniques, fall prevention strategies, and overall safety awareness  Patient agreeable to OT treatment session, upon arrival patient was found supine in bed  Patient requiring verbal cues for safety and verbal cues for pacing through activity steps  Supine>sit: modAx1  Gloria progressing to CGA for sitting balance, unilateral UE support; attempted ADL tasks of washing face/chest/hands  Able to sit EOB ~5 min, becomes anxious with prolonged unsupported sitting  Sit>stand x3 with elevated bed with maxAx1; stand>sit with modAx1 - max cuing for technique and for safety  Dons socks with totalA due to poor balance, strength and cognitive deficits  Sit>supine with modAx2 with max cuing for technique  Patient on RA throughout tx session; O2 remains between 91-97%   Patient continues to be functioning below baseline level, occupational performance remains limited secondary to factors listed above and increased risk for falls and injury  Pt left supine in bed, needs met and call bell within reach; alarm armed  The patient's raw score on the AM-PAC Daily Activity inpatient short form is 12, standardized score is 30 6, less than 39 4  Patients at this level are likely to benefit from discharge to post-acute rehabilitation services  Please refer to the recommendation of the Occupational Therapist for safe discharge planning       OT Discharge Recommendation: Post acute rehabilitation services

## 2022-09-27 NOTE — OCCUPATIONAL THERAPY NOTE
Occupational Therapy Progress Note     Patient Name: Misti Quintana Date: 9/27/2022  Problem List  Principal Problem:    Sepsis due to urinary tract infection [facility])  Active Problems:    Acquired hypothyroidism    Acute kidney injury superimposed on chronic kidney disease (HCC)    Chronic combined systolic and diastolic CHF (congestive heart failure) (HCC)    Paroxysmal atrial fibrillation (HCC)    Essential hypertension    Acute blood loss anemia    Stroke-like symptoms    Hyperkalemia    Gastrointestinal bleeding    Increased anion gap metabolic acidosis    Hydroureteronephrosis    COVID-19 virus infection       09/27/22 1102   OT Last Visit   OT Visit Date 09/27/22   Note Type   Note Type Treatment   Restrictions/Precautions   Weight Bearing Precautions Per Order No   Other Precautions Contact/isolation; Airborne/isolation;Cognitive; Chair Alarm; Bed Alarm;Multiple lines; Fall Risk   Pain Assessment   Pain Assessment Tool 0-10   Pain Score No Pain   ADL   Where Assessed Edge of bed   Grooming Assistance 4  Minimal Assistance   Grooming Deficit Steadying;Supervision/safety; Increased time to complete;Wash/dry hands; Wash/dry face   LB Dressing Assistance 1  Total Assistance   LB Dressing Deficit Setup;Steadying; Requires assistive device for steadying;Verbal cueing;Supervision/safety; Increased time to complete; Don/doff R sock; Don/doff L sock; Thread RLE into underwear; Thread LLE into underwear;Pull up over hips   Bed Mobility   Supine to Sit 3  Moderate assistance   Additional items Assist x 1;HOB elevated; Bedrails; Increased time required;Verbal cues;LE management   Sit to Supine 3  Moderate assistance   Additional items Assist x 2;HOB elevated; Increased time required;Verbal cues;LE management   Additional Comments Sitting balance progressed from requiring [place] to CGA with unilateral UE support   Transfers   Sit to Stand 2  Maximal assistance   Additional items Assist x 1;Bedrails; Increased time required;Verbal cues  (Elevated bed)   Stand to Sit 3  Moderate assistance   Additional items Assist x 1;Bedrails; Increased time required;Verbal cues; Other  (Elevated bed)   Additional Comments 3 STS from bed, little carryover of B UE/LE placement, requires continuous cuing throughout  Functional Mobility   Additional Comments unable   Cognition   Overall Cognitive Status Impaired   Arousal/Participation Responsive   Attention Difficulty attending to directions   Orientation Level Oriented to person;Disoriented to place; Disoriented to time;Disoriented to situation   Memory Decreased recall of precautions;Decreased recall of recent events;Decreased long term memory;Decreased short term memory   Following Commands Follows one step commands with increased time or repetition   Activity Tolerance   Activity Tolerance Patient limited by fatigue;Treatment limited secondary to medical complications (Comment)   Medical Staff Made Aware Yes   Assessment   Assessment Patient participated in skilled OT session this date with interventions consisting of therapeutic activities and self-care/ADL training to increase functional endurance/activity tolerance, static/dynamic sitting/standing balance, and overall safety awareness to increase (I) with ADLs/IADLs to return to PLOF  Provided education on energy conservation techniques, deep breathing techniques, fall prevention strategies, and overall safety awareness  Patient agreeable to OT treatment session, upon arrival patient was found supine in bed  Patient requiring verbal cues for safety and verbal cues for pacing through activity steps  Supine>sit: modAx1  Gloria progressing to CGA for sitting balance, unilateral UE support; attempted ADL tasks of washing face/chest/hands  Able to sit EOB ~5 min, becomes anxious with prolonged unsupported sitting  Sit>stand x3 with elevated bed with maxAx1; stand>sit with modAx1 - max cuing for technique and for safety   Dons socks with totalA due to poor balance, strength and cognitive deficits  Sit>supine with modAx2 with max cuing for technique  Patient on RA throughout tx session; O2 remains between 91-97%  Patient continues to be functioning below baseline level, occupational performance remains limited secondary to factors listed above and increased risk for falls and injury  Pt left supine in bed, needs met and call bell within reach; alarm armed  The patient's raw score on the AM-PAC Daily Activity inpatient short form is 12, standardized score is 30 6, less than 39 4  Patients at this level are likely to benefit from discharge to post-acute rehabilitation services  Please refer to the recommendation of the Occupational Therapist for safe discharge planning  Plan   Treatment Interventions ADL retraining;Functional transfer training;UE strengthening/ROM; Endurance training;Patient/family training;Equipment evaluation/education; Activityengagement;Cognitive reorientation   Goal Expiration Date 10/06/22   OT Treatment Day 1   OT Frequency 3-5x/wk   Recommendation   OT Discharge Recommendation Post acute rehabilitation services   AM-PAC Daily Activity Inpatient   Lower Body Dressing 1   Bathing 2   Toileting 1   Upper Body Dressing 2   Grooming 3   Eating 3   Daily Activity Raw Score 12   Daily Activity Standardized Score (Calc for Raw Score >=11) 30 6   AM-PAC Applied Cognition Inpatient   Following a Speech/Presentation 3   Understanding Ordinary Conversation 3   Taking Medications 1   Remembering Where Things Are Placed or Put Away 1   Remembering List of 4-5 Errands 1   Taking Care of Complicated Tasks 1   Applied Cognition Raw Score 10   Applied Cognition Standardized Score 24 98     Annika Contreras, OT

## 2022-09-27 NOTE — ASSESSMENT & PLAN NOTE
Wt Readings from Last 3 Encounters:   09/27/22 53 7 kg (118 lb 6 2 oz)   05/31/22 62 8 kg (138 lb 7 2 oz)   11/16/21 62 8 kg (138 lb 7 2 oz)     Current ECHO with LVEF 45%, presence of G1 DD  RV systolic function is normal   Also with mention of moderate MR, moderate PHTN  Cardiomyopathy appears ischemic in nature  Appears euvolemic at this time  · Check daily weights - appear inaccurate but largely stable  · Maintain on low-sodium diet  · Furosemide and ARB on hold currently  · Continue BB therapy, with plans for discharge on long-acting formulation  · Insure outpatient follow-up with Cardiology

## 2022-09-27 NOTE — ASSESSMENT & PLAN NOTE
Eliquis resumed on 09/25 at decreased dosing based upon patient's weight and serum creatinine - discontinued today given continued drop in hemoglobin  Continue beta-blocker with plans on discharging on long-acting formulation

## 2022-09-27 NOTE — PLAN OF CARE
Problem: PAIN - ADULT  Goal: Verbalizes/displays adequate comfort level or baseline comfort level  Description: Interventions:  - Encourage patient to monitor pain and request assistance  - Assess pain using appropriate pain scale  - Administer analgesics based on type and severity of pain and evaluate response  - Implement non-pharmacological measures as appropriate and evaluate response  - Consider cultural and social influences on pain and pain management  - Notify physician/advanced practitioner if interventions unsuccessful or patient reports new pain  Outcome: Progressing     Problem: INFECTION - ADULT  Goal: Absence or prevention of progression during hospitalization  Description: INTERVENTIONS:  - Assess and monitor for signs and symptoms of infection  - Monitor lab/diagnostic results  - Monitor all insertion sites, i e  indwelling lines, tubes, and drains  - Monitor endotracheal if appropriate and nasal secretions for changes in amount and color  - [place] appropriate cooling/warming therapies per order  - Administer medications as ordered  - Instruct and encourage patient and family to use good hand hygiene technique  - Identify and instruct in appropriate isolation precautions for identified infection/condition  Outcome: Progressing  Goal: Absence of fever/infection during neutropenic period  Description: INTERVENTIONS:  - Monitor WBC    Outcome: Progressing     Problem: SAFETY ADULT  Goal: Patient will remain free of falls  Description: INTERVENTIONS:  - Educate patient/family on patient safety including physical limitations  - Instruct patient to call for assistance with activity   - Consult OT/PT to assist with strengthening/mobility   - Keep Call bell within reach  - Keep bed low and locked with side rails adjusted as appropriate  - Keep care items and personal belongings within reach  - Initiate and maintain comfort rounds  - Make Fall Risk Sign visible to staff  - Offer Toileting every 2 Hours, in advance of need  - Initiate/Maintain bed/chair alarm  - Obtain necessary fall risk management equipment: alarms  - Apply yellow socks and bracelet for high fall risk patients  - Consider moving patient to room near nurses station  Outcome: Progressing  Goal: Maintain or return to baseline ADL function  Description: INTERVENTIONS:  -  Assess patient's ability to carry out ADLs; assess patient's baseline for ADL function and identify physical deficits which impact ability to perform ADLs (bathing, care of mouth/teeth, toileting, grooming, dressing, etc )  - Assess/evaluate cause of self-care deficits   - Assess range of motion  - Assess patient's mobility; develop plan if impaired  - Assess patient's need for assistive devices and provide as appropriate  - Encourage maximum independence but intervene and supervise when necessary  - Involve family in performance of ADLs  - Assess for home care needs following discharge   - Consider OT consult to assist with ADL evaluation and planning for discharge  - Provide patient education as appropriate  Outcome: Progressing  Goal: Maintains/Returns to pre admission functional level  Description: INTERVENTIONS:  - Perform BMAT or MOVE assessment daily    - Set and communicate daily mobility goal to care team and patient/family/caregiver  - Collaborate with rehabilitation services on mobility goals if consulted  - Perform Range of Motion 4 times a day  - Reposition patient every 2 hours    - Dangle patient 3-4 times a day  - Stand patient 4 times a day  - Ambulate patient 4 times a day  - Out of bed to chair 3 times a day   - Out of bed for meals 3 times a day  - Out of bed for toileting  - Record patient progress and toleration of activity level   Outcome: Progressing     Problem: DISCHARGE PLANNING  Goal: Discharge to home or other facility with appropriate resources  Description: INTERVENTIONS:  - Identify barriers to discharge w/patient and caregiver  - Arrange for needed discharge resources and transportation as appropriate  - Identify discharge learning needs (meds, wound care, etc )  - Arrange for interpretive services to assist at discharge as needed  - Refer to Case Management Department for coordinating discharge planning if the patient needs post-hospital services based on physician/advanced practitioner order or complex needs related to functional status, cognitive ability, or social support system  Outcome: Progressing     Problem: Knowledge Deficit  Goal: Patient/family/caregiver demonstrates understanding of disease process, treatment plan, medications, and discharge instructions  Description: Complete learning assessment and assess knowledge base    Interventions:  - Provide teaching at level of understanding  - Provide teaching via preferred learning methods  Outcome: Progressing     Problem: NEUROSENSORY - ADULT  Goal: Achieves stable or improved neurological status  Description: INTERVENTIONS  - Monitor and report changes in neurological status  - Monitor vital signs such as temperature, blood pressure, glucose, and any other labs ordered   - Initiate measures to prevent increased intracranial pressure      Outcome: Progressing     Problem: CARDIOVASCULAR - ADULT  Goal: Maintains optimal cardiac output and hemodynamic stability  Description: INTERVENTIONS:  - Monitor I/O, vital signs and rhythm  - Monitor for S/S and trends of decreased cardiac output  - Administer and titrate ordered vasoactive medications to optimize hemodynamic stability  - Assess quality of pulses, skin color and temperature  - Assess for signs of decreased coronary artery perfusion  - Instruct patient to report change in severity of symptoms  Outcome: Progressing     Problem: GENITOURINARY - ADULT  Goal: Maintains or returns to baseline urinary function  Description: INTERVENTIONS:  - Assess urinary function  - Encourage oral fluids to ensure adequate hydration if ordered  - Administer IV fluids as ordered to ensure adequate hydration  - Administer ordered medications as needed  - Offer frequent toileting  - Follow urinary retention protocol if ordered  Outcome: Progressing  Goal: Absence of urinary retention  Description: INTERVENTIONS:  - Assess patient's ability to void and empty bladder  - Monitor I/O  - Bladder scan as needed  - Discuss with physician/AP medications to alleviate retention as needed  - Discuss catheterization for long term situations as appropriate  Outcome: Progressing     Problem: METABOLIC, FLUID AND ELECTROLYTES - ADULT  Goal: Electrolytes maintained within normal limits  Description: INTERVENTIONS:  - Monitor labs and assess patient for signs and symptoms of electrolyte imbalances  - Administer electrolyte replacement as ordered  - Monitor response to electrolyte replacements, including repeat lab results as appropriate  - Instruct patient on fluid and nutrition as appropriate  Outcome: Progressing     Problem: SKIN/TISSUE INTEGRITY - ADULT  Goal: Skin Integrity remains intact(Skin Breakdown Prevention)  Description: Assess:  -Perform Arcadio assessment every shift  -Clean and moisturize skin every 4 houra  -Inspect skin when repositioning, toileting, and assisting with ADLS  -Assess under medical devices such as electrodes every shift  -Assess extremities for adequate circulation and sensation     Bed Management:  -Have minimal linens on bed & keep smooth, unwrinkled  -Change linens as needed when moist or perspiring  -Avoid sitting or lying in one position for more than 2 hours while in bed  -Keep HOB at 30 degrees     Toileting:  -Offer bedside commode  -Assess for incontinence every 2 hours  -Use incontinent care products after each incontinent episode such as breif    Activity:  -Mobilize patient 4 times a day  -Encourage activity and walks on unit  -Encourage or provide ROM exercises   -Turn and reposition patient every 2 Hours  -Use appropriate equipment to lift or move patient in bed  -Instruct/ Assist with weight shifting every 2 hours when out of bed in chair  -Consider limitation of chair time 4 hour intervals    Skin Care:  -Avoid use of baby powder, tape, friction and shearing, hot water or constrictive clothing  -Relieve pressure over bony prominences using alevyn  -Do not massage red bony areas    Next Steps:  -Teach patient strategies to minimize risks such as weight shift  Outcome: Progressing     Problem: MUSCULOSKELETAL - ADULT  Goal: Maintain or return mobility to safest level of function  Description: INTERVENTIONS:  - Assess patient's ability to carry out ADLs; assess patient's baseline for ADL function and identify physical deficits which impact ability to perform ADLs (bathing, care of mouth/teeth, toileting, grooming, dressing, etc )  - Assess/evaluate cause of self-care deficits   - Assess range of motion  - Assess patient's mobility  - Assess patient's need for assistive devices and provide as appropriate  - Encourage maximum independence but intervene and supervise when necessary  - Involve family in performance of ADLs  - Assess for home care needs following discharge   - Consider OT consult to assist with ADL evaluation and planning for discharge  - Provide patient education as appropriate  Outcome: Progressing     Problem: Potential for Falls  Goal: Patient will remain free of falls  Description: INTERVENTIONS:  - Educate patient/family on patient safety including physical limitations  - Instruct patient to call for assistance with activity   - Consult OT/PT to assist with strengthening/mobility   - Keep Call bell within reach  - Keep bed low and locked with side rails adjusted as appropriate  - Keep care items and personal belongings within reach  - Initiate and maintain comfort rounds  - Make Fall Risk Sign visible to staff  - Offer Toileting every 2 Hours, in advance of need  - Initiate/Maintain bed/chair alarm  - Obtain necessary fall risk management equipment: alarms  - Apply yellow socks and bracelet for high fall risk patients  - Consider moving patient to room near nurses station  Outcome: Progressing     Problem: MOBILITY - ADULT  Goal: Maintain or return to baseline ADL function  Description: INTERVENTIONS:  -  Assess patient's ability to carry out ADLs; assess patient's baseline for ADL function and identify physical deficits which impact ability to perform ADLs (bathing, care of mouth/teeth, toileting, grooming, dressing, etc )  - Assess/evaluate cause of self-care deficits   - Assess range of motion  - Assess patient's mobility; develop plan if impaired  - Assess patient's need for assistive devices and provide as appropriate  - Encourage maximum independence but intervene and supervise when necessary  - Involve family in performance of ADLs  - Assess for home care needs following discharge   - Consider OT consult to assist with ADL evaluation and planning for discharge  - Provide patient education as appropriate  Outcome: Progressing  Goal: Maintains/Returns to pre admission functional level  Description: INTERVENTIONS:  - Perform BMAT or MOVE assessment daily    - Set and communicate daily mobility goal to care team and patient/family/caregiver  - Collaborate with rehabilitation services on mobility goals if consulted  - Perform Range of Motion 4 times a day  - Reposition patient every 2 hours    - Dangle patient 3-4 times a day  - Stand patient 4 times a day  - Ambulate patient 4 times a day  - Out of bed to chair 3 times a day   - Out of bed for meals 3 times a day  - Out of bed for toileting  - Record patient progress and toleration of activity level   Outcome: Progressing     Problem: Prexisting or High Potential for Compromised Skin Integrity  Goal: Skin integrity is maintained or improved  Description: INTERVENTIONS:  - Identify patients at risk for skin breakdown  - Assess and monitor skin integrity  - Assess and monitor nutrition and hydration status  - Monitor labs   - Assess for incontinence   - Turn and reposition patient  - Assist with mobility/ambulation  - Relieve pressure over bony prominences  - Avoid friction and shearing  - Provide appropriate hygiene as needed including keeping skin clean and dry  - Evaluate need for skin moisturizer/barrier cream  - Collaborate with interdisciplinary team   - Patient/family teaching  - Consider wound care consult   Outcome: Progressing     Problem: Nutrition/Hydration-ADULT  Goal: Nutrient/Hydration intake appropriate for improving, restoring or maintaining nutritional needs  Description: Monitor and assess patient's nutrition/hydration status for malnutrition  Collaborate with interdisciplinary team and initiate plan and interventions as ordered  Monitor patient's weight and dietary intake as ordered or per policy  Utilize nutrition screening tool and intervene as necessary  Determine patient's food preferences and provide high-protein, high-caloric foods as appropriate       INTERVENTIONS:  - Monitor oral intake, urinary output, labs, and treatment plans  - Assess nutrition and hydration status and recommend course of action  - Evaluate amount of meals eaten  - Assist patient with eating if necessary   - Allow adequate time for meals  - Recommend/ encourage appropriate diets, oral nutritional supplements, and vitamin/mineral supplements  - Order, calculate, and assess calorie counts as needed  - Recommend, monitor, and adjust tube feedings and TPN/PPN based on assessed needs  - Assess need for intravenous fluids  - Provide specific nutrition/hydration education as appropriate  - Include patient/family/caregiver in decisions related to nutrition  Outcome: Progressing     Problem: RESPIRATORY - ADULT  Goal: Achieves optimal ventilation and oxygenation  Description: INTERVENTIONS:  - Assess for changes in respiratory status  - Assess for changes in mentation and behavior  - Position to facilitate oxygenation and minimize respiratory effort  - Oxygen administered by appropriate delivery if ordered  - Initiate smoking cessation education as indicated  - Encourage broncho-pulmonary hygiene including cough, deep breathe, Incentive Spirometry  - Assess the need for suctioning and aspirate as needed  - Assess and instruct to report SOB or any respiratory difficulty  - Respiratory Therapy support as indicated  Outcome: Progressing

## 2022-09-27 NOTE — ASSESSMENT & PLAN NOTE
The patient was seen in consultation by Urology- no current plan for urological intervention  Continue to treat underlying UTI  As per Urology attending, retained indwelling ureteral stent may be difficult to remove, and may require retrograde and antegrade renal surgery if the proximal coil is fixed with stone formation       The patient will need definitive stone treatment and extraction of the retained left ureteral stent via cystoscopy and laser for extraction of  encrusted/retained stent as well as ureteral/renal stones with temporary stent exchange  This can be done after hospital discharge and after the suspected acute urinary tract infection has cleared and patient has completed antibiotic course  The patient can follow with her urologist at the [facility] that she previously establish care with almost 2 years ago for the initial placement of the stent or referral to [facility] on an outpatient basis      If the patient develops worsening creatinine then with recommendations to obtain renal ultrasound to assess for worsening hydronephrosis, and in that instance patient will become a candidate for urological intervention

## 2022-09-27 NOTE — PROGRESS NOTES
Progress Note - Nephrology   Delmis Kerr 65 y o  female MRN: [id number]  Unit/Bed#: 409-01 Encounter: [id number]    A/P:  1  DARRELL on CKD  Present with creatinine 3 0 creatinine back to baseline today at 1 8  Etiology multifactorial suspect mainly due to a decreased effective arterial volume, sepsis, acute blood loss anemia, diuretics  She was also evaluated by Urology for hydronephrosis  Continue to hold diuretics and ARB  Gentle isotonic fluids okay for now, still having fevers  She is on room air  Appears euvolemic today moist mucous membranes, no lower extremity edema  2  CKD 4 baseline 1 7-2 primary nephrologist is Dr Lili Jolley  Etiology of CKD secondary potentially secondary to hypertensive nephrosclerosis, cardiorenal, age-related nephrosclerosis    3  Moderate left hydronephrosis with poorly visualized stent  Evaluated by Urology and determine no acute surgical intervention  4  Chronic heart failure reduced ejection fraction  Diuretics have been on hold with acute kidney injury, sepsis, volume depletion  Follow daily weights, low-sodium diet  Weights have been obtained on bed scale previously, so question accuracy  Her weight since 09/22 appears to be stable 53 7     5  Anemia of renal disease, acute blood loss anemia  She had stroke-like symptoms on admission  Her head CT was negative  MRI was contraindicated  Her baseline has a history of dementia and forgetfulness  Hold PAUL for now and decide if benefits outweigh risks  Status post EGD 9/23 which is normal   Apixaban resume  May require colonoscopy  Follow up reason for today's visit:     Acute kidney injury on CKD    Subjective:   Patient seen examined today  Denies chest pain, shortness of breath, nausea, vomiting, diarrhea  She reports tolerating a diet  Denies urinating without issue  She may not be really reliable historian times  Lab error noted this morning  T-max 100 9°    A complete 10 point review of systems was performed and is otherwise negative  Objective:     Vitals: Blood pressure 149/59, pulse 76, temperature 100 °F (37 8 °C), temperature source Oral, resp  rate 21, height 5' 6" (1 676 m), weight 53 7 kg (118 lb 6 2 oz), SpO2 95 %  ,Body mass index is 19 11 kg/m²  Weight (last 2 days)     Date/Time Weight    09/27/22 0600 53 7 (118 39)    09/25/22 0600 54 1 (119 27)            Intake/Output Summary (Last 24 hours) at 9/27/2022 1114  Last data filed at 9/27/2022 0836  Gross per 24 hour   Intake 450 ml   Output 200 ml   Net 250 ml     I/O last 3 completed shifts: In: 300 [P O :300]  Out: 700 [Urine:700]         Physical Exam: /59 (BP Location: Right arm)   Pulse 76   Temp 100 °F (37 8 °C) (Oral)   Resp 21   Ht 5' 6" (1 676 m)   Wt 53 7 kg (118 lb 6 2 oz)   SpO2 95%   BMI 19 11 kg/m²     General Appearance:    Alert, cooperative, no distress, appears stated age   Head:    Normocephalic, without obvious abnormality, atraumatic   Eyes:    Conjunctiva/corneas clear   Ears:    Normal external ears   Nose:   Nares normal, septum midline, mucosa normal, no drainage    or sinus tenderness   Throat:   Lips, mucosa, and tongue normal    Neck: Supple    Back:      Lungs:     Clear to auscultation bilaterally, respirations unlabored   Chest wall:    No tenderness or deformity   Heart:    Regular rate and rhythm, S1 and S2 normal, no murmur, rub   or gallop   Abdomen:     Soft, non-tender, bowel sounds active   Extremities:   Extremities normal, atraumatic, no cyanosis or edema   Skin:   bruising BL UE    Lymph nodes:   Cervical normal   Neurologic:   CNII-XII intact, AAOX2 person and place             Lab, Imaging and other studies: I have personally reviewed pertinent labs    CBC:   Lab Results   Component Value Date    WBC 5 66 09/27/2022    HGB 7 8 (L) 09/27/2022    HCT 25 3 (L) 09/27/2022    MCV 85 09/27/2022     09/27/2022    MCH 26 4 (L) 09/27/2022    MCHC 31 0 (L) 09/27/2022    RDW 25 0 (H) 09/27/2022    MPV 10 4 09/27/2022    NRBC 0 09/27/2022     CMP:   Lab Results   Component Value Date    K 3 8 09/27/2022     09/27/2022    CO2 24 09/27/2022    BUN 47 (H) 09/27/2022    CREATININE 1 86 (H) 09/27/2022    CALCIUM 8 6 09/27/2022    AST  09/27/2022      Comment:      Specimen collection should occur prior to Sulfasalazine administration due to the potential for falsely depressed results  This is a corrected result  Previous result was 28 U/L on 9/27/2022 at 477 5620 EDT    ALT  09/27/2022      Comment:      Specimen collection should occur prior to Sulfasalazine administration due to the potential for falsely depressed results  This is a corrected result  Previous result was 8 U/L on 9/27/2022 at 0721 EDT    [facility]  09/27/2022      Comment: This is a corrected result  Previous result was 94 U/L on 9/27/2022 at 0721 EDT    EGFR 25 09/27/2022         Results from last 7 days   Lab Units 09/27/22  0822 09/26/22  0514 09/25/22  0658 09/24/22  0417   POTASSIUM mmol/L 3 8 4 3 3 9 4 2   CHLORIDE mmol/L 103 103 105 103   CO2 mmol/L 24 22 25 23   BUN mg/dL 47* 45* 49* 57*   CREATININE mg/dL 1 86* 1 94* 2 02* 2 04*   CALCIUM mg/dL 8 6 9 0 8 7 9 2   ALK PHOS U/L  --  131* 131* 146*   ALT U/L  --  8* 9* 7*   AST U/L  --  30 20 18         Phosphorus: No results found for: PHOS  Magnesium:   Lab Results   Component Value Date    MG 1 5 (L) 09/27/2022     Urinalysis: No results found for: COLORU, CLARITYU, SPECGRAV, PHUR, LEUKOCYTESUR, NITRITE, PROTEINUA, GLUCOSEU, KETONESU, BILIRUBINUR, BLOODU  Ionized Calcium: No results found for: CAION  Coagulation: No results found for: PT, INR, APTT  Troponin: No results found for: TROPONINI  ABG: No results found for: PHART, VUM9RZN, PO2ART, UKH9EZR, Y2MXKPBT, BEART, SOURCE  Radiology review:     IMAGING  Procedure: XR chest portable    Result Date: 9/25/2022  Narrative: CHEST INDICATION:   fever, sepsis   COMPARISON:  5/31/2010 EXAM PERFORMED/VIEWS:  XR CHEST PORTABLE FINDINGS:  Right multilead pacer as before  Cardiomediastinal silhouette appears unremarkable  The lungs are clear  No pneumothorax or pleural effusion  Osseous structures appear within normal limits for patient age  Impression: No acute cardiopulmonary disease   Workstation performed: [id number]       Current Facility-Administered Medications   Medication Dose Route Frequency    acetaminophen (TYLENOL) tablet 488 mg  488 mg Oral Q6H PRN    allopurinol (ZYLOPRIM) tablet 100 mg  100 mg Oral Daily    calcium carbonate-vitamin D (OSCAL-D) 500 mg-200 units per tablet 1 tablet  1 tablet Oral BID With Meals    cefTRIAXone (ROCEPHIN) IVPB (premix in dextrose) 1,000 mg 50 mL  1,000 mg Intravenous Q24H    fluticasone (FLONASE) 50 mcg/act nasal spray 2 spray  2 spray Nasal Daily    labetalol (NORMODYNE) injection 10 mg  10 mg Intravenous Q4H PRN    levothyroxine tablet 125 mcg  125 mcg Oral Early Morning    magnesium oxide (MAG-OX) tablet 400 mg  400 mg Oral BID    metoprolol succinate (TOPROL-XL) 24 hr tablet 50 mg  50 mg Oral Q12H    ondansetron (ZOFRAN) injection 4 mg  4 mg Intravenous Q4H PRN    pantoprazole (PROTONIX) EC tablet 20 mg  20 mg Oral Early Morning    polyethylene glycol (MIRALAX) packet 17 g  17 g Oral Daily PRN    sertraline (ZOLOFT) tablet 100 mg  100 mg Oral Daily    sodium chloride (PF) 0 9 % injection 3 mL  3 mL Intravenous Q1H PRN     Medications Discontinued During This Encounter   Medication Reason    calcium gluconate 3 g in sodium chloride 0 9 % 100 mL IVPB     sodium bicarbonate tablet 650 mg     metoprolol tartrate (LOPRESSOR) tablet 200 mg     aspirin chewable tablet 81 mg     sodium bicarbonate 150 mEq in dextrose 5 % 1,000 mL infusion     lactated ringers infusion     metoprolol tartrate (LOPRESSOR) tablet 50 mg     metoprolol succinate (TOPROL-XL) 24 hr tablet 50 mg     levothyroxine tablet 150 mcg     heparin (porcine) subcutaneous injection 5,000 Units  magnesium sulfate IVPB (premix) SOLN 1 g     dextrose 5 % in lactated Ringer's infusion     lactated ringers infusion     insulin regular (HumuLIN R,NovoLIN R) 1 Units/mL in sodium chloride 0 9 % 100 mL infusion     apixaban (ELIQUIS) tablet 2 5 mg        DO Hernan      This progress note was produced in part using a dictation device which may document imprecise wording from author's original intent

## 2022-09-27 NOTE — PROGRESS NOTES
Progress Note- Shelia Tapia 65 y o  female MRN: [id number]    Unit/Bed#: 409-01 Encounter: [id number]      Assessment and Plan:    Izzy Schaeffer is a 16-year-old female with a past medical history of hypothyroidism, CHF, AFib and CVA on Eliquis, hypertension, CAD and dementia that presented to the emergency department with dizziness and some mild nausea  1  Anemia  2  Occult GI bleeding  3  Rectal bleeding    Patient was found to have a hemoglobin of 6 1 on admission  She received a unit of packed red blood cells with a mild increase to 7 5 but had further decrease to 6 4  She received a 2nd unit of packed red blood cells with an increase to 9 0  She has dropped to 7 8 this morning  She did undergo an EGD last week that revealed a normal appearing stomach, duodenum and esophagus with a medium sliding hiatal hernia and greater than 10 polyps measuring smaller than 5 mm in the stomach  Today she denies any black or bloody stools, nausea, vomiting or abdominal pain  Would recommend colonoscopy given continued drop in hemoglobin to evaluate for other sources of GI bleeding such as but not limited to AVMs, bleeding polyp, diverticular bleed or malignancy  - colonoscopy on Friday   - clear liquids Thursday   - bowel prep Thursday evening   - trend H&H   - transfuse as needed   - monitor stool color and consistency  ______________________________________________________________________    Subjective:     Patient states that she is feeling improved  She denies any nausea, vomiting, abdominal pain, black or bloody stools      Medication Administration - last 24 hours from 09/26/2022 1238 to 09/27/2022 1238       Date/Time Order Dose Route Action Action by     09/27/2022 1139 acetaminophen (TYLENOL) tablet 488 mg 488 mg Oral Given Nieves Clau, RN     09/26/2022 1751 acetaminophen (TYLENOL) tablet 488 mg 488 mg Oral Given Nieves Clau, RN     09/27/2022 0920 calcium carbonate-vitamin D (OSCAL-D) 500 mg-200 units per tablet 1 tablet 1 tablet Oral Given Fercho Borges RN     09/26/2022 1743 calcium carbonate-vitamin D (OSCAL-D) 500 mg-200 units per tablet 1 tablet 1 tablet Oral Given Fercho Borges RN     09/27/2022 0920 fluticasone (FLONASE) 50 mcg/act nasal spray 2 spray 2 spray Nasal Given Fercho Borges RN     09/27/2022 0919 sertraline (ZOLOFT) tablet 100 mg 100 mg Oral Given Fercho Borges RN     09/27/2022 0348 cefTRIAXone (ROCEPHIN) IVPB (premix in dextrose) 1,000 mg 50 mL 1,000 mg Intravenous [address] Genevieve Sahu RN     09/27/2022 0700 dextrose 5 % in lactated Ringer's infusion 0 mL/hr Intravenous Stopped Fercho Borges RN     09/27/2022 0349 dextrose 5 % in lactated Ringer's infusion 50 mL/hr Intravenous [facility] [address]     09/27/2022 0919 metoprolol succinate (TOPROL-XL) 24 hr tablet 50 mg 50 mg Oral Given Fercho Borges RN     09/26/2022 2120 metoprolol succinate (TOPROL-XL) 24 hr tablet 50 mg 50 mg Oral Given Genevieve Sahu RN     09/27/2022 7039 levothyroxine tablet 125 mcg 125 mcg Oral Given Genevieve Sahu RN     09/26/2022 1743 apixaban (ELIQUIS) tablet 2 5 mg 2 5 mg Oral Given Fercho Borges RN     09/27/2022 0919 magnesium oxide (MAG-OX) tablet 400 mg 400 mg Oral Given Fercho Borges RN     09/26/2022 1744 magnesium oxide (MAG-OX) tablet 400 mg 400 mg Oral Given Fercho Borges RN     09/26/2022 1407 magnesium oxide (MAG-OX) tablet 400 mg 400 mg Oral Given Fercho Borges RN     09/27/2022 0538 pantoprazole (PROTONIX) EC tablet 20 mg 20 mg Oral Given Genevieve Sahu RN     09/27/2022 0919 allopurinol (ZYLOPRIM) tablet 100 mg 100 mg Oral Given Fercho Borges RN     09/26/2022 1744 allopurinol (ZYLOPRIM) tablet 100 mg 100 mg Oral Given Fercho Borges RN     09/27/2022 0700 lactated ringers infusion 50 mL/hr Intravenous Not Given Fercho Borges RN     09/27/2022 0700 insulin regular (HumuLIN R,NovoLIN R) 1 Units/mL in sodium chloride 0 9 % 100 mL infusion 0 3 Units/hr Intravenous Not Given Marj Parsons RN          Objective:     Vitals: Blood pressure 161/70, pulse 94, temperature (!) 100 6 °F (38 1 °C), resp  rate 21, height 5' 6" (1 676 m), weight 53 7 kg (118 lb 6 2 oz), SpO2 93 %  ,Body mass index is 19 11 kg/m²  Intake/Output Summary (Last 24 hours) at 9/27/2022 1238  Last data filed at 9/27/2022 0836  Gross per 24 hour   Intake 4100 83 ml   Output 300 ml   Net 3800 83 ml       Physical Exam:   PHYSICAL EXAMINATION:    General Appearance:   Alert, cooperative, no distress   HEENT:  Normocephalic, atraumatic, anicteric  Neck supple, symmetrical, trachea midline  Lungs:   Equal chest rise and unlabored breathing, normal effort, no coughing  Cardiovascular:   No visualized JVD  Abdomen:   No abdominal distension  Skin:   No jaundice, rashes, or lesions  Musculoskeletal:   Normal range of motion visualized  Psych:  Normal affect and normal insight  Neuro:  Alert and appropriate  Invasive Devices  Report    Peripheral Intravenous Line  Duration           Peripheral IV 09/26/22 Dorsal (posterior); Right Hand 1 day          Drain  Duration           External Urinary Catheter 4 days                Lab Results:  No results displayed because visit has over 200 results  Imaging Studies: I have personally reviewed pertinent imaging studies

## 2022-09-27 NOTE — TELEPHONE ENCOUNTER
Patient remains admitted to hospital  Will monitor for discharge, then contact to schedule   Patient seen by [address] [facility] in the past

## 2022-09-27 NOTE — ASSESSMENT & PLAN NOTE
Sepsis was present on admission and due to UTI - patient with retained ureteral stent  Blood cultures remain negative, but urinary cultures from 09/22 with > 100,000 CFU per mL of pansensitive E coli  Continue current therapy with IV ceftriaxone, currently day #5

## 2022-09-27 NOTE — ASSESSMENT & PLAN NOTE
Initial COVID testing negative on 09/22, fevers prompting repeat check on 09/25 which was positive  Currently on the mild pathway  · CXR without acute findings  · Monitor labs, trend CRP  · Hold off on dexamethasone, remdesivir given continued lack of O2 requirement  · D-dimer elevated at 2 9 - however, patient's illnesses is mild and she is currently without oxygen requirement  Hold off on enoxaparin or high dosed heparin per protocol

## 2022-09-27 NOTE — ASSESSMENT & PLAN NOTE
Required transfusion with 2 units of PRBC's, with apixaban initially held  EGD 9/23 essentially normal, with note of a medium sliding hiatal hernia and numerous polyps measuring less than 5 mm in the stomach  Patient's apixaban was resumed on 09/25  Patient's hemoglobin had responded to a value of 9 following the 2nd unit of PRBCs - has continued to slowly drop, down to a value of 7 8 this morning  Tentatively for plans for colonoscopy on Friday  Continue to monitor H&H, transfuse as needed  Eliquis placed on hold again this morning

## 2022-09-27 NOTE — PLAN OF CARE
Problem: PAIN - ADULT  Goal: Verbalizes/displays adequate comfort level or baseline comfort level  Description: Interventions:  - Encourage patient to monitor pain and request assistance  - Assess pain using appropriate pain scale  - Administer analgesics based on type and severity of pain and evaluate response  - Implement non-pharmacological measures as appropriate and evaluate response  - Consider cultural and social influences on pain and pain management  - Notify physician/advanced practitioner if interventions unsuccessful or patient reports new pain  Outcome: Progressing     Problem: INFECTION - ADULT  Goal: Absence or prevention of progression during hospitalization  Description: INTERVENTIONS:  - Assess and monitor for signs and symptoms of infection  - Monitor lab/diagnostic results  - Monitor all insertion sites, i e  indwelling lines, tubes, and drains  - Monitor endotracheal if appropriate and nasal secretions for changes in amount and color  - [place] appropriate cooling/warming therapies per order  - Administer medications as ordered  - Instruct and encourage patient and family to use good hand hygiene technique  - Identify and instruct in appropriate isolation precautions for identified infection/condition  Outcome: Progressing  Goal: Absence of fever/infection during neutropenic period  Description: INTERVENTIONS:  - Monitor WBC    Outcome: Progressing     Problem: SAFETY ADULT  Goal: Patient will remain free of falls  Description: INTERVENTIONS:  - Educate patient/family on patient safety including physical limitations  - Instruct patient to call for assistance with activity   - Consult OT/PT to assist with strengthening/mobility   - Keep Call bell within reach  - Keep bed low and locked with side rails adjusted as appropriate  - Keep care items and personal belongings within reach  - Initiate and maintain comfort rounds  - Make Fall Risk Sign visible to staff  - Offer Toileting every 2 Hours, in advance of need  - Initiate/Maintain bed/chair alarm  - Obtain necessary fall risk management equipment: alarms  - Apply yellow socks and bracelet for high fall risk patients  - Consider moving patient to room near nurses station  Outcome: Progressing  Goal: Maintain or return to baseline ADL function  Description: INTERVENTIONS:  -  Assess patient's ability to carry out ADLs; assess patient's baseline for ADL function and identify physical deficits which impact ability to perform ADLs (bathing, care of mouth/teeth, toileting, grooming, dressing, etc )  - Assess/evaluate cause of self-care deficits   - Assess range of motion  - Assess patient's mobility; develop plan if impaired  - Assess patient's need for assistive devices and provide as appropriate  - Encourage maximum independence but intervene and supervise when necessary  - Involve family in performance of ADLs  - Assess for home care needs following discharge   - Consider OT consult to assist with ADL evaluation and planning for discharge  - Provide patient education as appropriate  Outcome: Progressing  Goal: Maintains/Returns to pre admission functional level  Description: INTERVENTIONS:  - Perform BMAT or MOVE assessment daily    - Set and communicate daily mobility goal to care team and patient/family/caregiver  - Collaborate with rehabilitation services on mobility goals if consulted  - Perform Range of Motion 4 times a day  - Reposition patient every 2 hours    - Dangle patient 3-4 times a day  - Stand patient 4 times a day  - Ambulate patient 4 times a day  - Out of bed to chair 3 times a day   - Out of bed for meals 3 times a day  - Out of bed for toileting  - Record patient progress and toleration of activity level   Outcome: Progressing     Problem: DISCHARGE PLANNING  Goal: Discharge to home or other facility with appropriate resources  Description: INTERVENTIONS:  - Identify barriers to discharge w/patient and caregiver  - Arrange for needed discharge resources and transportation as appropriate  - Identify discharge learning needs (meds, wound care, etc )  - Arrange for interpretive services to assist at discharge as needed  - Refer to Case Management Department for coordinating discharge planning if the patient needs post-hospital services based on physician/advanced practitioner order or complex needs related to functional status, cognitive ability, or social support system  Outcome: Progressing     Problem: Knowledge Deficit  Goal: Patient/family/caregiver demonstrates understanding of disease process, treatment plan, medications, and discharge instructions  Description: Complete learning assessment and assess knowledge base    Interventions:  - Provide teaching at level of understanding  - Provide teaching via preferred learning methods  Outcome: Progressing     Problem: NEUROSENSORY - ADULT  Goal: Achieves stable or improved neurological status  Description: INTERVENTIONS  - Monitor and report changes in neurological status  - Monitor vital signs such as temperature, blood pressure, glucose, and any other labs ordered   - Initiate measures to prevent increased intracranial pressure      Outcome: Progressing     Problem: CARDIOVASCULAR - ADULT  Goal: Maintains optimal cardiac output and hemodynamic stability  Description: INTERVENTIONS:  - Monitor I/O, vital signs and rhythm  - Monitor for S/S and trends of decreased cardiac output  - Administer and titrate ordered vasoactive medications to optimize hemodynamic stability  - Assess quality of pulses, skin color and temperature  - Assess for signs of decreased coronary artery perfusion  - Instruct patient to report change in severity of symptoms  Outcome: Progressing     Problem: GENITOURINARY - ADULT  Goal: Maintains or returns to baseline urinary function  Description: INTERVENTIONS:  - Assess urinary function  - Encourage oral fluids to ensure adequate hydration if ordered  - Administer IV fluids as ordered to ensure adequate hydration  - Administer ordered medications as needed  - Offer frequent toileting  - Follow urinary retention protocol if ordered  Outcome: Progressing  Goal: Absence of urinary retention  Description: INTERVENTIONS:  - Assess patient's ability to void and empty bladder  - Monitor I/O  - Bladder scan as needed  - Discuss with physician/AP medications to alleviate retention as needed  - Discuss catheterization for long term situations as appropriate  Outcome: Progressing     Problem: METABOLIC, FLUID AND ELECTROLYTES - ADULT  Goal: Electrolytes maintained within normal limits  Description: INTERVENTIONS:  - Monitor labs and assess patient for signs and symptoms of electrolyte imbalances  - Administer electrolyte replacement as ordered  - Monitor response to electrolyte replacements, including repeat lab results as appropriate  - Instruct patient on fluid and nutrition as appropriate  Outcome: Progressing     Problem: SKIN/TISSUE INTEGRITY - ADULT  Goal: Skin Integrity remains intact(Skin Breakdown Prevention)  Description: Assess:  -Perform Arcadio assessment every shift  -Clean and moisturize skin every 4 houra  -Inspect skin when repositioning, toileting, and assisting with ADLS  -Assess under medical devices such as electrodes every shift  -Assess extremities for adequate circulation and sensation     Bed Management:  -Have minimal linens on bed & keep smooth, unwrinkled  -Change linens as needed when moist or perspiring  -Avoid sitting or lying in one position for more than 2 hours while in bed  -Keep HOB at 30 degrees     Toileting:  -Offer bedside commode  -Assess for incontinence every 2 hours  -Use incontinent care products after each incontinent episode such as breif    Activity:  -Mobilize patient 4 times a day  -Encourage activity and walks on unit  -Encourage or provide ROM exercises   -Turn and reposition patient every 2 Hours  -Use appropriate equipment to lift or move patient in bed  -Instruct/ Assist with weight shifting every 2 hours when out of bed in chair  -Consider limitation of chair time 4 hour intervals    Skin Care:  -Avoid use of baby powder, tape, friction and shearing, hot water or constrictive clothing  -Relieve pressure over bony prominences using alevyn  -Do not massage red bony areas    Next Steps:  -Teach patient strategies to minimize risks such as weight shift  Outcome: Progressing     Problem: MUSCULOSKELETAL - ADULT  Goal: Maintain or return mobility to safest level of function  Description: INTERVENTIONS:  - Assess patient's ability to carry out ADLs; assess patient's baseline for ADL function and identify physical deficits which impact ability to perform ADLs (bathing, care of mouth/teeth, toileting, grooming, dressing, etc )  - Assess/evaluate cause of self-care deficits   - Assess range of motion  - Assess patient's mobility  - Assess patient's need for assistive devices and provide as appropriate  - Encourage maximum independence but intervene and supervise when necessary  - Involve family in performance of ADLs  - Assess for home care needs following discharge   - Consider OT consult to assist with ADL evaluation and planning for discharge  - Provide patient education as appropriate  Outcome: Progressing     Problem: RESPIRATORY - ADULT  Goal: Achieves optimal ventilation and oxygenation  Description: INTERVENTIONS:  - Assess for changes in respiratory status  - Assess for changes in mentation and behavior  - Position to facilitate oxygenation and minimize respiratory effort  - Oxygen administered by appropriate delivery if ordered  - Initiate smoking cessation education as indicated  - Encourage broncho-pulmonary hygiene including cough, deep breathe, Incentive Spirometry  - Assess the need for suctioning and aspirate as needed  - Assess and instruct to report SOB or any respiratory difficulty  - Respiratory Therapy support as indicated  Outcome: Progressing     Problem: Potential for Falls  Goal: Patient will remain free of falls  Description: INTERVENTIONS:  - Educate patient/family on patient safety including physical limitations  - Instruct patient to call for assistance with activity   - Consult OT/PT to assist with strengthening/mobility   - Keep Call bell within reach  - Keep bed low and locked with side rails adjusted as appropriate  - Keep care items and personal belongings within reach  - Initiate and maintain comfort rounds  - Make Fall Risk Sign visible to staff  - Offer Toileting every 2 Hours, in advance of need  - Initiate/Maintain bed/chair alarm  - Obtain necessary fall risk management equipment: alarms  - Apply yellow socks and bracelet for high fall risk patients  - Consider moving patient to room near nurses station  Outcome: Progressing     Problem: MOBILITY - ADULT  Goal: Maintain or return to baseline ADL function  Description: INTERVENTIONS:  -  Assess patient's ability to carry out ADLs; assess patient's baseline for ADL function and identify physical deficits which impact ability to perform ADLs (bathing, care of mouth/teeth, toileting, grooming, dressing, etc )  - Assess/evaluate cause of self-care deficits   - Assess range of motion  - Assess patient's mobility; develop plan if impaired  - Assess patient's need for assistive devices and provide as appropriate  - Encourage maximum independence but intervene and supervise when necessary  - Involve family in performance of ADLs  - Assess for home care needs following discharge   - Consider OT consult to assist with ADL evaluation and planning for discharge  - Provide patient education as appropriate  Outcome: Progressing  Goal: Maintains/Returns to pre admission functional level  Description: INTERVENTIONS:  - Perform BMAT or MOVE assessment daily    - Set and communicate daily mobility goal to care team and patient/family/caregiver     - Collaborate with rehabilitation services on mobility goals if consulted  - Perform Range of Motion 4 times a day  - Reposition patient every 2 hours  - Dangle patient 3-4 times a day  - Stand patient 4 times a day  - Ambulate patient 4 times a day  - Out of bed to chair 3 times a day   - Out of bed for meals 3 times a day  - Out of bed for toileting  - Record patient progress and toleration of activity level   Outcome: Progressing     Problem: Prexisting or High Potential for Compromised Skin Integrity  Goal: Skin integrity is maintained or improved  Description: INTERVENTIONS:  - Identify patients at risk for skin breakdown  - Assess and monitor skin integrity  - Assess and monitor nutrition and hydration status  - Monitor labs   - Assess for incontinence   - Turn and reposition patient  - Assist with mobility/ambulation  - Relieve pressure over bony prominences  - Avoid friction and shearing  - Provide appropriate hygiene as needed including keeping skin clean and dry  - Evaluate need for skin moisturizer/barrier cream  - Collaborate with interdisciplinary team   - Patient/family teaching  - Consider wound care consult   Outcome: Progressing     Problem: Nutrition/Hydration-ADULT  Goal: Nutrient/Hydration intake appropriate for improving, restoring or maintaining nutritional needs  Description: Monitor and assess patient's nutrition/hydration status for malnutrition  Collaborate with interdisciplinary team and initiate plan and interventions as ordered  Monitor patient's weight and dietary intake as ordered or per policy  Utilize nutrition screening tool and intervene as necessary  Determine patient's food preferences and provide high-protein, high-caloric foods as appropriate       INTERVENTIONS:  - Monitor oral intake, urinary output, labs, and treatment plans  - Assess nutrition and hydration status and recommend course of action  - Evaluate amount of meals eaten  - Assist patient with eating if necessary   - Allow adequate time for meals  - Recommend/ encourage appropriate diets, oral nutritional supplements, and vitamin/mineral supplements  - Order, calculate, and assess calorie counts as needed  - Recommend, monitor, and adjust tube feedings and TPN/PPN based on assessed needs  - Assess need for intravenous fluids  - Provide specific nutrition/hydration education as appropriate  - Include patient/family/caregiver in decisions related to nutrition  Outcome: Progressing

## 2022-09-27 NOTE — ASSESSMENT & PLAN NOTE
Lab Results   Component Value Date    EGFR 25 09/27/2022    EGFR  09/27/2022      Comment: This is a corrected result  Previous result was 28 ml/min/1 73sq m on 9/27/2022 at 0721 EDT    EGFR 24 09/26/2022    CREATININE 1 86 (H) 09/27/2022    CREATININE  09/27/2022      Comment:      Standardized to IDMS reference method  This is a corrected result  Previous result was 1 71 mg/dL on 9/27/2022 at 0721 EDT    CREATININE 1 94 (H) 09/26/2022     DARRELL superimposed on CKD, likely in the setting of sepsis, acute blood loss anemia, with concurrent diuretic use  Baseline creatinine of 1 6 to 2, peak value during hospitalization of 3  Current creatinine has improved to 1 8  Discontinue IV fluids, but continue to hold oral furosemide and ARB  Nephrology consulted and following

## 2022-09-27 NOTE — PROGRESS NOTES
[address] [facility] [address] [place]  Progress Note - Kacie Lua 1945, 65 y o  female MRN: [id number]  Unit/Bed#: 409-01 Encounter: [id number]  Primary Care Provider: Junior Holden DO   Date and time admitted to hospital: 9/21/2022  9:40 PM    * Sepsis due to urinary tract infection [facility])  Assessment & Plan  Sepsis was present on admission and due to UTI - patient with retained ureteral stent  Blood cultures remain negative, but urinary cultures from 09/22 with > 100,000 CFU per mL of pansensitive E coli  Continue current therapy with IV ceftriaxone, currently day #5  Hydroureteronephrosis  Assessment & Plan  The patient was seen in consultation by Urology- no current plan for urological intervention  Continue to treat underlying UTI  As per Urology attending, retained indwelling ureteral stent may be difficult to remove, and may require retrograde and antegrade renal surgery if the proximal coil is fixed with stone formation       The patient will need definitive stone treatment and extraction of the retained left ureteral stent via cystoscopy and laser for extraction of  encrusted/retained stent as well as ureteral/renal stones with temporary stent exchange  This can be done after hospital discharge and after the suspected acute urinary tract infection has cleared and patient has completed antibiotic course  The patient can follow with her urologist at the [facility] that she previously establish care with almost 2 years ago for the initial placement of the stent or referral to [facility] on an outpatient basis      If the patient develops worsening creatinine then with recommendations to obtain renal ultrasound to assess for worsening hydronephrosis, and in that instance patient will become a candidate for urological intervention       Acute kidney injury superimposed on chronic kidney disease [facility])  Assessment & Plan  Lab Results   Component Value Date    EGFR 25 09/27/2022 EGFR  09/27/2022      Comment: This is a corrected result  Previous result was 28 ml/min/1 73sq m on 9/27/2022 at 0721 EDT    EGFR 24 09/26/2022    CREATININE 1 86 (H) 09/27/2022    CREATININE  09/27/2022      Comment:      Standardized to IDMS reference method  This is a corrected result  Previous result was 1 71 mg/dL on 9/27/2022 at 0721 EDT    CREATININE 1 94 (H) 09/26/2022     DARRELL superimposed on CKD, likely in the setting of sepsis, acute blood loss anemia, with concurrent diuretic use  Baseline creatinine of 1 6 to 2, peak value during hospitalization of 3  Current creatinine has improved to 1 8  Discontinue IV fluids, but continue to hold oral furosemide and ARB  Nephrology consulted and following  Acute blood loss anemia  Assessment & Plan  Required transfusion with 2 units of PRBC's, with apixaban initially held  EGD 9/23 essentially normal, with note of a medium sliding hiatal hernia and numerous polyps measuring less than 5 mm in the stomach  Patient's apixaban was resumed on 09/25  Patient's hemoglobin had responded to a value of 9 following the 2nd unit of PRBCs - has continued to slowly drop, down to a value of 7 8 this morning  Tentatively for plans for colonoscopy on Friday  Continue to monitor H&H, transfuse as needed  Eliquis placed on hold again this morning  Gastrointestinal bleeding  Assessment & Plan  Evaluation and treatment as above  COVID-19 virus infection  Assessment & Plan  Initial COVID testing negative on 09/22, fevers prompting repeat check on 09/25 which was positive  Currently on the mild pathway  · CXR without acute findings  · Monitor labs, trend CRP  · Hold off on dexamethasone, remdesivir given continued lack of O2 requirement  · D-dimer elevated at 2 9 - however, patient's illnesses is mild and she is currently without oxygen requirement  Hold off on enoxaparin or high dosed heparin per protocol      Chronic combined systolic and diastolic CHF (congestive heart failure) (HCC)  Assessment & Plan  Wt Readings from Last 3 Encounters:   09/27/22 53 7 kg (118 lb 6 2 oz)   05/31/22 62 8 kg (138 lb 7 2 oz)   11/16/21 62 8 kg (138 lb 7 2 oz)     Current ECHO with LVEF 45%, presence of G1 DD  RV systolic function is normal   Also with mention of moderate MR, moderate PHTN  Cardiomyopathy appears ischemic in nature  Appears euvolemic at this time  · Check daily weights - appear inaccurate but largely stable  · Maintain on low-sodium diet  · Furosemide and ARB on hold currently  · Continue BB therapy, with plans for discharge on long-acting formulation  · Insure outpatient follow-up with Cardiology  Paroxysmal atrial fibrillation (HCC)  Assessment & Plan  Eliquis resumed on 09/25 at decreased dosing based upon patient's weight and serum creatinine - discontinued today given continued drop in hemoglobin  Continue beta-blocker with plans on discharging on long-acting formulation  Stroke-like symptoms  Assessment & Plan  No acute abnormality on initial CT of the head  MRI contraindicated secondary to presence of AICD/ppm   Will need outpatient MRI at another [facility] following discharge  Morning lipids and A1c updated  Allergy to statin noted  Continue current anticoagulation with apixaban  Continue to monitor neurological symptoms  Of note, patient does not appear to have any focal symptoms this morning based upon limited exam (due to patient participation), but appears confused  Unsure of baseline as  was not reachable by telephone today  Underlying history of dementia and 'forgetfulness' noted  Question whether altered mental status is secondary to acute metabolic encephalopathy from underlying dementia and current acute illness due to COVID-19 infection with fevers - mental status does appear slightly improved today with down trending fevers        Essential hypertension  Assessment & Plan  Changed metoprolol tartrate to metoprolol succinate (toprol XL) with the decreased LVEF, which should be continued upon discharge  Continue to hold ARB in the setting of DARRELL  Currently remains hypertensive but not significantly so - will allow for slightly higher blood pressures for continued renal perfusion  Continue monitor blood pressure, with additional antihypertensive regimen as warranted  Hyperkalemia  Assessment & Plan  In the setting of DARRELL with concurrent ARB use - hyperkalemia remains resolved  Acquired hypothyroidism  Assessment & Plan  TSH markedly low at 0 05, although checked during acute illness  Levothyroxine dosing decreased to 125 mcg, with recommendations for repeat TSH in 4-6 weeks  Increased anion gap metabolic acidosis  Assessment & Plan  Resolved  VTE Pharmacologic Prophylaxis: VTE Score: 10 High Risk (Score >/= 5) - Pharmacological DVT Prophylaxis Ordered: apixaban (Eliquis)  Sequential Compression Devices Ordered      Patient Centered Rounds: I performed bedside rounds with nursing staff today  Discussions with Specialists or Other Care Team Provider:  Review of urology note      Education and Discussions with Family / Patient: Updated  () via phone         Time Spent for Care: 45 minutes  More than 50% of total time spent on counseling and coordination of care as described above      Current Length of Stay: 6 day(s)  Current Patient Status: Inpatient   Certification Statement: The patient will continue to require additional inpatient hospital stay due to Continued monitoring of renal function, hemoglobin, and fevers  Continue treatment of UTI and sepsis  Discharge Plan: Anticipate discharge in 48-72 hrs to rehab facility      Code Status: Level 3 - DNAR and DNI    Subjective:   Patient seen and examined - appears confused this morning again but slightly improved since prior  No further episode of epistaxis  No overnight events reported    Had a T-max of 101 2° on , 101° on  at approximately 6:30 p m , and 100 6° today  Objective:     Vitals:   Temp (24hrs), Av °F (37 8 °C), Min:99 2 °F (37 3 °C), Max:100 9 °F (38 3 °C)    Temp:  [99 2 °F (37 3 °C)-100 9 °F (38 3 °C)] 100 6 °F (38 1 °C)  HR:  [76-96] 94  Resp:  [20-21] 21  BP: (139-163)/(57-74) 161/70  SpO2:  [91 %-97 %] 93 %  Body mass index is 19 11 kg/m²  Input and Output Summary (last 24 hours): Intake/Output Summary (Last 24 hours) at 2022 1322  Last data filed at 2022 1241  Gross per 24 hour   Intake 4340 83 ml   Output 300 ml   Net 4040 83 ml       Physical Exam:   Vital signs reviewed  Constitutional:       General: She is not in acute distress  Appearance: Normal appearance  She is normal weight  She is not ill-appearing or toxic-appearing  HENT:      Mouth/Throat:      Mouth: Mucous membranes are moist    Cardiovascular:      Rate and Rhythm: Normal rate  Rhythm irregular  Heart sounds: No murmur heard  No gallop  Pulmonary:      Effort: Pulmonary effort is normal  No respiratory distress  Breath sounds: Normal breath sounds  No wheezing, rhonchi or rales  Abdominal:      General: Abdomen is flat  Bowel sounds are normal  There is no distension  Palpations: Abdomen is soft  Tenderness: There is no abdominal tenderness  There is no guarding or rebound  Musculoskeletal:         General: No swelling or tenderness  Cervical back: Neck supple  Skin:     General: Skin is warm and dry  Neurological:      General: No focal deficit present  Mental Status: She is alert  She is disoriented     Psychiatric:         Mood and Affect: Mood normal          Behavior: Behavior normal      Additional Data:     Labs:  Results from last 7 days   Lab Units 22  0900 22  0725   WBC Thousand/uL  --  5 66   HEMOGLOBIN g/dL 7 8* 7 4*   HEMATOCRIT % 25 3* 23 9*   PLATELETS Thousands/uL  --  179   NEUTROS PCT %  --  73   LYMPHS PCT %  --  16   MONOS PCT %  --  9   EOS PCT %  --  1     Results from last 7 days   Lab Units 09/27/22  0822 09/26/22  0514   SODIUM mmol/L 138 138   POTASSIUM mmol/L 3 8 4 3   CHLORIDE mmol/L 103 103   CO2 mmol/L 24 22   BUN mg/dL 47* 45*   CREATININE mg/dL 1 86* 1 94*   ANION GAP mmol/L 11 13   CALCIUM mg/dL 8 6 9 0   ALBUMIN g/dL  --  2 6*   TOTAL BILIRUBIN mg/dL  --  0 25   ALK PHOS U/L  --  131*   ALT U/L  --  8*   AST U/L  --  30   GLUCOSE RANDOM mg/dL 84 87     Results from last 7 days   Lab Units 09/25/22  0658   INR  1 10     Results from last 7 days   Lab Units 09/27/22  1023 09/27/22  0825 09/27/22  0743 09/22/22  2039 09/22/22  0119 09/22/22  0021   POC GLUCOSE mg/dl 99 88 118 138 125 106     Results from last 7 days   Lab Units 09/22/22  0430   HEMOGLOBIN A1C % 5 3     Results from last 7 days   Lab Units 09/26/22  0514 09/25/22  0929 09/25/22  0658 09/23/22  0632 09/22/22  0430 09/22/22  0229 09/22/22  0125   LACTIC ACID mmol/L 1 4 2 0  --   --   --  1 4  --    PROCALCITONIN ng/ml 0 31*  --  0 28* 0 34* 0 27*  --  0 29*       Lines/Drains:  Invasive Devices  Report    Peripheral Intravenous Line  Duration           Peripheral IV 09/26/22 Dorsal (posterior); Right Hand 1 day          Drain  Duration           External Urinary Catheter 4 days              Imaging: No pertinent imaging reviewed  Recent Cultures (last 7 days):   Results from last 7 days   Lab Units 09/25/22  1057 09/25/22  0928 09/25/22  0909 09/22/22  0229 09/22/22  0128   BLOOD CULTURE  No Growth at 24 hrs  No Growth at 24 hrs   --  No Growth After 5 Days  No Growth After 5 Days    --    URINE CULTURE   --   --  10,000-19,000 cfu/ml   --  >100,000 cfu/ml Escherichia coli*       Last 24 Hours Medication List:   Current Facility-Administered Medications   Medication Dose Route Frequency Provider Last Rate    acetaminophen  488 mg Oral Q6H PRN Fariba Enriquez PA-C      allopurinol  100 mg Oral Daily Lisa Murcia MD      calcium carbonate-vitamin D  1 tablet Oral BID With Meals Spike Se, PA-C      cefTRIAXone  1,000 mg Intravenous Q24H Spike Se, PA-C 1,000 mg (09/27/22 0348)    fluticasone  2 spray Nasal Daily Spike Se, PA-C      labetalol  10 mg Intravenous Q4H PRN Italo Ceballos,       levothyroxine  125 mcg Oral Early Morning Italo Ceballos,       magnesium oxide  400 mg Oral BID Bradly Gray MD      metoprolol succinate  50 mg Oral Q12H Yann Ceballos,       ondansetron  4 mg Intravenous Q4H PRN Candice Montoya MD      pantoprazole  20 mg Oral Early Morning Candice Montoya MD      polyethylene glycol  17 g Oral Daily PRN Candice Montoya MD      sertraline  100 mg Oral Daily Spike Se, PA-C      sodium chloride (PF)  3 mL Intravenous Q1H PRN Spike Se, PA-C          Today, Patient Was Seen By: Candice Montoya MD    **Please Note: This note may have been constructed using a voice recognition system  **

## 2022-09-27 NOTE — ASSESSMENT & PLAN NOTE
No acute abnormality on initial CT of the head  MRI contraindicated secondary to presence of AICD/ppm   Will need outpatient MRI at another [facility] following discharge  Morning lipids and A1c updated  Allergy to statin noted  Continue current anticoagulation with apixaban  Continue to monitor neurological symptoms  Of note, patient does not appear to have any focal symptoms this morning based upon limited exam (due to patient participation), but appears confused  Unsure of baseline as  was not reachable by telephone today  Underlying history of dementia and 'forgetfulness' noted  Question whether altered mental status is secondary to acute metabolic encephalopathy from underlying dementia and current acute illness due to COVID-19 infection with fevers - mental status does appear slightly improved today with down trending fevers

## 2022-09-28 LAB
ABO GROUP BLD: NORMAL
ALBUMIN SERPL BCP-MCNC: 2.2 G/DL (ref 3.5–5)
ALP SERPL-CCNC: 106 U/L (ref 46–116)
ALT SERPL W P-5'-P-CCNC: 11 U/L (ref 12–78)
ANION GAP SERPL CALCULATED.3IONS-SCNC: 12 MMOL/L (ref 4–13)
ANION GAP SERPL CALCULATED.3IONS-SCNC: 13 MMOL/L (ref 4–13)
AST SERPL W P-5'-P-CCNC: 31 U/L (ref 5–45)
BASOPHILS # BLD AUTO: 0.01 THOUSANDS/ÂΜL (ref 0–0.1)
BASOPHILS NFR BLD AUTO: 0 % (ref 0–1)
BILIRUB SERPL-MCNC: 0.22 MG/DL (ref 0.2–1)
BLD GP AB SCN SERPL QL: NEGATIVE
BUN SERPL-MCNC: 43 MG/DL (ref 5–25)
BUN SERPL-MCNC: 48 MG/DL (ref 5–25)
CALCIUM ALBUM COR SERPL-MCNC: 8.9 MG/DL (ref 8.3–10.1)
CALCIUM SERPL-MCNC: 7.5 MG/DL (ref 8.3–10.1)
CALCIUM SERPL-MCNC: 8.4 MG/DL (ref 8.3–10.1)
CHLORIDE SERPL-SCNC: 102 MMOL/L (ref 96–108)
CHLORIDE SERPL-SCNC: 108 MMOL/L (ref 96–108)
CO2 SERPL-SCNC: 20 MMOL/L (ref 21–32)
CO2 SERPL-SCNC: 22 MMOL/L (ref 21–32)
CREAT SERPL-MCNC: 1.72 MG/DL (ref 0.6–1.3)
CREAT SERPL-MCNC: 1.79 MG/DL (ref 0.6–1.3)
EOSINOPHIL # BLD AUTO: 0.03 THOUSAND/ÂΜL (ref 0–0.61)
EOSINOPHIL NFR BLD AUTO: 1 % (ref 0–6)
ERYTHROCYTE [DISTWIDTH] IN BLOOD BY AUTOMATED COUNT: 24.9 % (ref 11.6–15.1)
GFR SERPL CREATININE-BSD FRML MDRD: 26 ML/MIN/1.73SQ M
GFR SERPL CREATININE-BSD FRML MDRD: 28 ML/MIN/1.73SQ M
GLUCOSE SERPL-MCNC: 106 MG/DL (ref 65–140)
GLUCOSE SERPL-MCNC: 71 MG/DL (ref 65–140)
HCT VFR BLD AUTO: 22.1 % (ref 34.8–46.1)
HGB BLD-MCNC: 6.8 G/DL (ref 11.5–15.4)
IMM GRANULOCYTES # BLD AUTO: 0.02 THOUSAND/UL (ref 0–0.2)
IMM GRANULOCYTES NFR BLD AUTO: 0 % (ref 0–2)
LYMPHOCYTES # BLD AUTO: 1.03 THOUSANDS/ÂΜL (ref 0.6–4.47)
LYMPHOCYTES NFR BLD AUTO: 21 % (ref 14–44)
MAGNESIUM SERPL-MCNC: 1.7 MG/DL (ref 1.6–2.6)
MCH RBC QN AUTO: 26.6 PG (ref 26.8–34.3)
MCHC RBC AUTO-ENTMCNC: 30.8 G/DL (ref 31.4–37.4)
MCV RBC AUTO: 86 FL (ref 82–98)
MONOCYTES # BLD AUTO: 0.36 THOUSAND/ÂΜL (ref 0.17–1.22)
MONOCYTES NFR BLD AUTO: 7 % (ref 4–12)
NEUTROPHILS # BLD AUTO: 3.54 THOUSANDS/ÂΜL (ref 1.85–7.62)
NEUTS SEG NFR BLD AUTO: 71 % (ref 43–75)
NRBC BLD AUTO-RTO: 0 /100 WBCS
PLATELET # BLD AUTO: 159 THOUSANDS/UL (ref 149–390)
PMV BLD AUTO: 10.9 FL (ref 8.9–12.7)
POTASSIUM SERPL-SCNC: 3.5 MMOL/L (ref 3.5–5.3)
POTASSIUM SERPL-SCNC: 4.4 MMOL/L (ref 3.5–5.3)
PROT SERPL-MCNC: 5.9 G/DL (ref 6.4–8.4)
RBC # BLD AUTO: 2.56 MILLION/UL (ref 3.81–5.12)
RH BLD: POSITIVE
SODIUM SERPL-SCNC: 137 MMOL/L (ref 135–147)
SODIUM SERPL-SCNC: 140 MMOL/L (ref 135–147)
SPECIMEN EXPIRATION DATE: NORMAL
WBC # BLD AUTO: 4.99 THOUSAND/UL (ref 4.31–10.16)

## 2022-09-28 PROCEDURE — 99233 SBSQ HOSP IP/OBS HIGH 50: CPT | Performed by: INTERNAL MEDICINE

## 2022-09-28 PROCEDURE — 86850 RBC ANTIBODY SCREEN: CPT

## 2022-09-28 PROCEDURE — 83735 ASSAY OF MAGNESIUM: CPT | Performed by: INTERNAL MEDICINE

## 2022-09-28 PROCEDURE — 80048 BASIC METABOLIC PNL TOTAL CA: CPT | Performed by: INTERNAL MEDICINE

## 2022-09-28 PROCEDURE — 99232 SBSQ HOSP IP/OBS MODERATE 35: CPT | Performed by: INTERNAL MEDICINE

## 2022-09-28 PROCEDURE — 86901 BLOOD TYPING SEROLOGIC RH(D): CPT

## 2022-09-28 PROCEDURE — 80053 COMPREHEN METABOLIC PANEL: CPT | Performed by: INTERNAL MEDICINE

## 2022-09-28 PROCEDURE — 85025 COMPLETE CBC W/AUTO DIFF WBC: CPT | Performed by: INTERNAL MEDICINE

## 2022-09-28 PROCEDURE — P9016 RBC LEUKOCYTES REDUCED: HCPCS

## 2022-09-28 PROCEDURE — 86923 COMPATIBILITY TEST ELECTRIC: CPT

## 2022-09-28 PROCEDURE — 86900 BLOOD TYPING SEROLOGIC ABO: CPT

## 2022-09-28 PROCEDURE — 97530 THERAPEUTIC ACTIVITIES: CPT

## 2022-09-28 RX ORDER — ACETAMINOPHEN 325 MG/1
650 TABLET ORAL EVERY 6 HOURS PRN
Status: DISCONTINUED | OUTPATIENT
Start: 2022-09-28 | End: 2022-10-04 | Stop reason: HOSPADM

## 2022-09-28 RX ORDER — POTASSIUM CHLORIDE 20 MEQ/1
40 TABLET, EXTENDED RELEASE ORAL ONCE
Status: COMPLETED | OUTPATIENT
Start: 2022-09-28 | End: 2022-09-28

## 2022-09-28 RX ORDER — POTASSIUM CHLORIDE 750 MG/1
10 TABLET, EXTENDED RELEASE ORAL ONCE
Status: COMPLETED | OUTPATIENT
Start: 2022-09-28 | End: 2022-09-28

## 2022-09-28 RX ADMIN — POTASSIUM CHLORIDE 40 MEQ: 1500 TABLET, EXTENDED RELEASE ORAL at 09:35

## 2022-09-28 RX ADMIN — METOPROLOL SUCCINATE 50 MG: 50 TABLET, FILM COATED, EXTENDED RELEASE ORAL at 09:35

## 2022-09-28 RX ADMIN — CEFTRIAXONE 1000 MG: 1 INJECTION, SOLUTION INTRAVENOUS at 04:48

## 2022-09-28 RX ADMIN — METOPROLOL SUCCINATE 50 MG: 50 TABLET, FILM COATED, EXTENDED RELEASE ORAL at 21:14

## 2022-09-28 RX ADMIN — Medication 1 TABLET: at 18:04

## 2022-09-28 RX ADMIN — POTASSIUM CHLORIDE 10 MEQ: 750 TABLET, EXTENDED RELEASE ORAL at 18:03

## 2022-09-28 RX ADMIN — ACETAMINOPHEN 650 MG: 325 TABLET, FILM COATED ORAL at 22:16

## 2022-09-28 RX ADMIN — Medication 1 TABLET: at 09:35

## 2022-09-28 RX ADMIN — ALLOPURINOL 100 MG: 100 TABLET ORAL at 09:35

## 2022-09-28 RX ADMIN — PANTOPRAZOLE SODIUM 20 MG: 20 TABLET, DELAYED RELEASE ORAL at 05:16

## 2022-09-28 RX ADMIN — FLUTICASONE PROPIONATE 2 SPRAY: 50 SPRAY, METERED NASAL at 09:36

## 2022-09-28 RX ADMIN — Medication 800 MG: at 09:35

## 2022-09-28 RX ADMIN — LEVOTHYROXINE SODIUM 125 MCG: 125 TABLET ORAL at 05:16

## 2022-09-28 RX ADMIN — MAGNESIUM OXIDE TAB 400 MG (241.3 MG ELEMENTAL MG) 400 MG: 400 (241.3 MG) TAB at 18:03

## 2022-09-28 RX ADMIN — SERTRALINE 100 MG: 100 TABLET, FILM COATED ORAL at 09:35

## 2022-09-28 NOTE — PROGRESS NOTES
[address] [facility] [address]  Progress Note - Kaity Smalls 1945, 65 y o  female MRN: [id number]  Unit/Bed#: 409-01 Encounter: [id number]  Primary Care Provider: Demario Wu DO   Date and time admitted to hospital: 9/21/2022  9:40 PM    * Sepsis due to urinary tract infection [facility])  Assessment & Plan  Sepsis was present on admission and due to UTI - patient with retained ureteral stent  Blood cultures remain negative, but urinary cultures from 09/22 with > 100,000 CFU per mL of pansensitive E coli  Continue current therapy with IV ceftriaxone, currently day #6  Hydroureteronephrosis  Assessment & Plan  The patient was seen in consultation by Urology- no current plan for urological intervention  Continue to treat underlying UTI  As per Urology attending, retained indwelling ureteral stent may be difficult to remove, and may require retrograde and antegrade renal surgery if the proximal coil is fixed with stone formation       The patient will need definitive stone treatment and extraction of the retained left ureteral stent via cystoscopy and laser for extraction of  encrusted/retained stent as well as ureteral/renal stones with temporary stent exchange  This can be done after hospital discharge and after the suspected acute urinary tract infection has cleared and patient has completed antibiotic course  The patient can follow with her urologist at the [facility] that she previously establish care with almost 2 years ago for the initial placement of the stent or referral to [address] [place] [address] Urology on an outpatient basis      If the patient develops worsening creatinine then with recommendations to obtain renal ultrasound to assess for worsening hydronephrosis, and in that instance patient will become a candidate for urological intervention       Acute kidney injury superimposed on chronic kidney disease [facility])  Assessment & Plan  Lab Results   Component Value Date    EGFR 28 09/28/2022 EGFR 25 09/27/2022    EGFR  09/27/2022      Comment: This is a corrected result  Previous result was 28 ml/min/1 73sq m on 9/27/2022 at 0721 EDT    CREATININE 1 72 (H) 09/28/2022    CREATININE 1 86 (H) 09/27/2022    CREATININE  09/27/2022      Comment:      Standardized to IDMS reference method  This is a corrected result  Previous result was 1 71 mg/dL on 9/27/2022 at 0721 EDT     DARRELL superimposed on CKD, likely in the setting of sepsis, acute blood loss anemia, with concurrent diuretic use  Baseline creatinine of 1 6 to 2, peak value during hospitalization of 3  Current creatinine has improved to 1 7  Discontinued IV fluids previously  Will receive blood products today  Continue to hold oral furosemide and ARB  Nephrology consulted and following  Acute blood loss anemia  Assessment & Plan  Required transfusion with 2 units of PRBC's, with apixaban initially held  EGD 9/23 essentially normal, with note of a medium sliding hiatal hernia and numerous polyps measuring less than 5 mm in the stomach  Patient's apixaban was resumed on 09/25  Patient's hemoglobin had responded to a value of 9 following the 2nd unit of PRBCs - has continued to slowly drop, down to a value of 7 8 yesterday, 6 8 this morning  · Eliquis was discontinued  · Plans for colonoscopy on Friday  · Orders in for repeat transfusion today, with repeat H&H following  Gastrointestinal bleeding  Assessment & Plan  Evaluation and treatment as above  COVID-19 virus infection  Assessment & Plan  Initial COVID testing negative on 09/22, fevers prompting repeat check on 09/25 which was positive  Currently on the mild pathway  · CXR without acute findings  · Monitor labs, trend CRP  · Hold off on dexamethasone, remdesivir given continued lack of O2 requirement  · D-dimer elevated at 2 9 - however, patient's illnesses is mild and she is currently without oxygen requirement    Continue to hold off on enoxaparin or high dosed heparin per protocol, as well as due to ongoing GI bleeding  Chronic combined systolic and diastolic CHF (congestive heart failure) (HCC)  Assessment & Plan  Wt Readings from Last 3 Encounters:   09/28/22 53 3 kg (117 lb 8 1 oz)   05/31/22 62 8 kg (138 lb 7 2 oz)   11/16/21 62 8 kg (138 lb 7 2 oz)     Current ECHO with LVEF 45%, presence of G1 DD  RV systolic function is normal   Also with mention of moderate MR, moderate PHTN  Cardiomyopathy appears ischemic in nature  Appears euvolemic at this time  · Check daily weights - appear inaccurate  · Maintain on low-sodium diet  · Furosemide and ARB on hold currently  · Continue BB therapy, with plans for discharge on long-acting formulation  · Continues to examine euvolemic  · Insure outpatient follow-up with Cardiology  Paroxysmal atrial fibrillation (HCC)  Assessment & Plan  Eliquis resumed on 09/25 at decreased dosing based upon patient's weight and serum creatinine - discontinued today given continued drop in hemoglobin  Continue beta-blocker with plans on discharging on long-acting formulation  Stroke-like symptoms  Assessment & Plan  No acute abnormality on initial CT of the head  MRI contraindicated secondary to presence of AICD/ppm   Will need outpatient MRI at another [facility] following discharge  Morning lipids and A1c updated  Allergy to statin noted  Continue current anticoagulation with apixaban  Continue to monitor neurological symptoms  Of note, patient does not appear to have any focal symptoms this morning based upon limited exam (due to patient participation), but appears confused  Unsure of baseline as  was not reachable by telephone today  Underlying history of dementia and 'forgetfulness' noted    Question whether altered mental status is secondary to acute metabolic encephalopathy from underlying dementia and current acute illness due to COVID-19 infection with fevers - mental status continues to appear improved today  Essential hypertension  Assessment & Plan  Changed metoprolol tartrate to metoprolol succinate (toprol XL) with the decreased LVEF, which should be continued upon discharge  Continue to hold ARB in the setting of DARRELL  Currently remains hypertensive but not significantly so - will allow for slightly higher blood pressures for continued renal perfusion  Continue monitor blood pressure, with additional antihypertensive regimen as warranted  Hyperkalemia  Assessment & Plan  In the setting of DARRELL with concurrent ARB use - hyperkalemia remains resolved  Acquired hypothyroidism  Assessment & Plan  TSH markedly low at 0 05, although checked during acute illness  Levothyroxine dosing decreased to 125 mcg, with recommendations for repeat TSH in 4-6 weeks  Increased anion gap metabolic acidosis  Assessment & Plan  Resolved  VTE Pharmacologic Prophylaxis: VTE Score: 10 High Risk (Score >/= 5) - Pharmacological DVT Prophylaxis Contraindicated  Sequential Compression Devices Ordered      Patient Centered Rounds: I performed bedside rounds with nursing staff today  Discussions with Specialists or Other Care Team Provider:  Review of urology note      Education and Discussions with Family / Patient: Updated  () via phone         Time Spent for Care: 30 minutes  More than 50% of total time spent on counseling and coordination of care as described above      Current Length of Stay: 6 day(s)  Current Patient Status: Inpatient   Certification Statement: The patient will continue to require additional inpatient hospital stay due to Continued monitoring of renal function, hemoglobin, and fevers   Continue treatment of UTI and sepsis    Discharge Plan: Anticipate discharge in 48-72 hrs to rehab facility      Code Status: Level 3 - DNAR and DNI    Subjective:   Patient seen and examined - continues to have fevers but overall downtrending from a max of 101 2° on 09/24 down to 100 2° today  Hemoglobin continues to drop with a value of 6 8 this morning  No overnight events reported  Objective:     Vitals:   Temp (24hrs), Av 5 °F (37 5 °C), Min:98 9 °F (37 2 °C), Max:100 2 °F (37 9 °C)    Temp:  [98 9 °F (37 2 °C)-100 2 °F (37 9 °C)] 100 2 °F (37 9 °C)  HR:  [77-87] 77  Resp:  [15-21] 18  BP: (145-159)/(60-71) 145/60  SpO2:  [94 %-97 %] 94 %  Body mass index is 18 97 kg/m²  Input and Output Summary (last 24 hours): Intake/Output Summary (Last 24 hours) at 2022 1336  Last data filed at 2022 1248  Gross per 24 hour   Intake 360 ml   Output 300 ml   Net 60 ml       Physical Exam:   Vital signs reviewed  Constitutional:       General: She is not in acute distress      Appearance: Normal appearance  She is normal weight  She is not ill-appearing or toxic-appearing  HENT:      Mouth/Throat:      Mouth: Mucous membranes are moist    Cardiovascular:      Rate and Rhythm: Normal rate  Rhythm irregular       Heart sounds: No murmur heard     No gallop  Pulmonary:      Effort: Pulmonary effort is normal  No respiratory distress       Breath sounds: Normal breath sounds  No wheezing, rhonchi or rales  Abdominal:      General: Abdomen is flat  Bowel sounds are normal  There is no distension       Palpations: Abdomen is soft       Tenderness: There is no abdominal tenderness  There is no guarding or rebound  Musculoskeletal:         General: No swelling or tenderness       Cervical back: Neck supple  Skin:     General: Skin is warm and dry  Neurological:      General: No focal deficit present       Mental Status: She is alert  She is disoriented  Comments: Improving mental status    Psychiatric:         Mood and Affect: Mood normal          Behavior: Behavior normal      Additional Data:     Labs:  Results from last 7 days   Lab Units 22  0513   WBC Thousand/uL 4 99   HEMOGLOBIN g/dL 6 8*   HEMATOCRIT % 22 1*   PLATELETS Thousands/uL 159   NEUTROS PCT % 71   LYMPHS PCT % 21   MONOS PCT % 7   EOS PCT % 1     Results from last 7 days   Lab Units 09/28/22  0513   SODIUM mmol/L 140   POTASSIUM mmol/L 3 5   CHLORIDE mmol/L 108   CO2 mmol/L 20*   BUN mg/dL 43*   CREATININE mg/dL 1 72*   ANION GAP mmol/L 12   CALCIUM mg/dL 7 5*   ALBUMIN g/dL 2 2*   TOTAL BILIRUBIN mg/dL 0 22   ALK PHOS U/L 106   ALT U/L 11*   AST U/L 31   GLUCOSE RANDOM mg/dL 71     Results from last 7 days   Lab Units 09/25/22  0658   INR  1 10     Results from last 7 days   Lab Units 09/27/22  1658 09/27/22  1023 09/27/22  0825 09/27/22  0743 09/22/22  2039 09/22/22  0119 09/22/22  0021   POC GLUCOSE mg/dl 78 99 88 118 138 125 106     Results from last 7 days   Lab Units 09/22/22  0430   HEMOGLOBIN A1C % 5 3     Results from last 7 days   Lab Units 09/26/22  0514 09/25/22  0929 09/25/22  0658 09/23/22  0632 09/22/22  0430 09/22/22  0229 09/22/22  0125   LACTIC ACID mmol/L 1 4 2 0  --   --   --  1 4  --    PROCALCITONIN ng/ml 0 31*  --  0 28* 0 34* 0 27*  --  0 29*       Lines/Drains:  Invasive Devices  Report    Peripheral Intravenous Line  Duration           Peripheral IV 09/28/22 Dorsal (posterior); Right Hand <1 day          Drain  Duration           External Urinary Catheter 5 days              Imaging: No pertinent imaging reviewed  Recent Cultures (last 7 days):   Results from last 7 days   Lab Units 09/25/22  1057 09/25/22  0928 09/25/22  0909 09/22/22  0229 09/22/22  0128   BLOOD CULTURE  No Growth at 48 hrs  No Growth at 48 hrs  --  No Growth After 5 Days  No Growth After 5 Days    --    URINE CULTURE   --   --  10,000-19,000 cfu/ml   --  >100,000 cfu/ml Escherichia coli*       Last 24 Hours Medication List:   Current Facility-Administered Medications   Medication Dose Route Frequency Provider Last Rate    acetaminophen  488 mg Oral Q6H PRN Voncille Blow, PA-C      allopurinol  100 mg Oral Daily Lisa Murcia MD      calcium carbonate-vitamin D  1 tablet Oral BID With Meals Ernesto Bruce      cefTRIAXone  1,000 mg Intravenous Q24H Reed Castillo PA-C 1,000 mg (09/28/22 0448)    fluticasone  2 spray Nasal Daily Reed Castillo PA-C      labetalol  10 mg Intravenous Q4H PRN Sena Ceballos,       levothyroxine  125 mcg Oral Early Morning Sena Ceballos,       magnesium oxide  400 mg Oral BID Richi Schaeffer MD      metoprolol succinate  50 mg Oral Q12H Yann Ceballos,       ondansetron  4 mg Intravenous Q4H PRN Andres Wallace MD      pantoprazole  20 mg Oral Early Morning MD Reno Peralta ON 9/29/2022] polyethylene glycol  4,000 mL Oral Once KARLA Man      polyethylene glycol  17 g Oral Daily PRN Andres Wallace MD      potassium chloride  10 mEq Oral Once Andres Wallace MD      sertraline  100 mg Oral Daily Reed Castillo PA-C      sodium chloride (PF)  3 mL Intravenous Q1H PRN Reed Castillo PA-C          Today, Patient Was Seen By: Andres Wallace    **Please Note: This note may have been constructed using a voice recognition system  **

## 2022-09-28 NOTE — PLAN OF CARE
Problem: PAIN - ADULT  Goal: Verbalizes/displays adequate comfort level or baseline comfort level  Description: Interventions:  - Encourage patient to monitor pain and request assistance  - Assess pain using appropriate pain scale  - Administer analgesics based on type and severity of pain and evaluate response  - Implement non-pharmacological measures as appropriate and evaluate response  - Consider cultural and social influences on pain and pain management  - Notify physician/advanced practitioner if interventions unsuccessful or patient reports new pain  Outcome: Progressing     Problem: INFECTION - ADULT  Goal: Absence or prevention of progression during hospitalization  Description: INTERVENTIONS:  - Assess and monitor for signs and symptoms of infection  - Monitor lab/diagnostic results  - Monitor all insertion sites, i e  indwelling lines, tubes, and drains  - Monitor endotracheal if appropriate and nasal secretions for changes in amount and color  - [place] appropriate cooling/warming therapies per order  - Administer medications as ordered  - Instruct and encourage patient and family to use good hand hygiene technique  - Identify and instruct in appropriate isolation precautions for identified infection/condition  Outcome: Progressing  Goal: Absence of fever/infection during neutropenic period  Description: INTERVENTIONS:  - Monitor WBC    Outcome: Progressing     Problem: SAFETY ADULT  Goal: Patient will remain free of falls  Description: INTERVENTIONS:  - Educate patient/family on patient safety including physical limitations  - Instruct patient to call for assistance with activity   - Consult OT/PT to assist with strengthening/mobility   - Keep Call bell within reach  - Keep bed low and locked with side rails adjusted as appropriate  - Keep care items and personal belongings within reach  - Initiate and maintain comfort rounds  - Make Fall Risk Sign visible to staff  - Offer Toileting every 2 Hours, in advance of need  - Initiate/Maintain bed/chair alarm  - Obtain necessary fall risk management equipment: alarms  - Apply yellow socks and bracelet for high fall risk patients  - Consider moving patient to room near nurses station  Outcome: Progressing  Goal: Maintain or return to baseline ADL function  Description: INTERVENTIONS:  -  Assess patient's ability to carry out ADLs; assess patient's baseline for ADL function and identify physical deficits which impact ability to perform ADLs (bathing, care of mouth/teeth, toileting, grooming, dressing, etc )  - Assess/evaluate cause of self-care deficits   - Assess range of motion  - Assess patient's mobility; develop plan if impaired  - Assess patient's need for assistive devices and provide as appropriate  - Encourage maximum independence but intervene and supervise when necessary  - Involve family in performance of ADLs  - Assess for home care needs following discharge   - Consider OT consult to assist with ADL evaluation and planning for discharge  - Provide patient education as appropriate  Outcome: Progressing  Goal: Maintains/Returns to pre admission functional level  Description: INTERVENTIONS:  - Perform BMAT or MOVE assessment daily    - Set and communicate daily mobility goal to care team and patient/family/caregiver  - Collaborate with rehabilitation services on mobility goals if consulted  - Perform Range of Motion 4 times a day  - Reposition patient every 2 hours    - Dangle patient 3-4 times a day  - Stand patient 4 times a day  - Ambulate patient 4 times a day  - Out of bed to chair 3 times a day   - Out of bed for meals 3 times a day  - Out of bed for toileting  - Record patient progress and toleration of activity level   Outcome: Progressing     Problem: DISCHARGE PLANNING  Goal: Discharge to home or other facility with appropriate resources  Description: INTERVENTIONS:  - Identify barriers to discharge w/patient and caregiver  - Arrange for needed discharge resources and transportation as appropriate  - Identify discharge learning needs (meds, wound care, etc )  - Arrange for interpretive services to assist at discharge as needed  - Refer to Case Management Department for coordinating discharge planning if the patient needs post-hospital services based on physician/advanced practitioner order or complex needs related to functional status, cognitive ability, or social support system  Outcome: Progressing     Problem: DISCHARGE PLANNING  Goal: Discharge to home or other facility with appropriate resources  Description: INTERVENTIONS:  - Identify barriers to discharge w/patient and caregiver  - Arrange for needed discharge resources and transportation as appropriate  - Identify discharge learning needs (meds, wound care, etc )  - Arrange for interpretive services to assist at discharge as needed  - Refer to Case Management Department for coordinating discharge planning if the patient needs post-hospital services based on physician/advanced practitioner order or complex needs related to functional status, cognitive ability, or social support system  Outcome: Progressing     Problem: Knowledge Deficit  Goal: Patient/family/caregiver demonstrates understanding of disease process, treatment plan, medications, and discharge instructions  Description: Complete learning assessment and assess knowledge base    Interventions:  - Provide teaching at level of understanding  - Provide teaching via preferred learning methods  Outcome: Progressing     Problem: NEUROSENSORY - ADULT  Goal: Achieves stable or improved neurological status  Description: INTERVENTIONS  - Monitor and report changes in neurological status  - Monitor vital signs such as temperature, blood pressure, glucose, and any other labs ordered   - Initiate measures to prevent increased intracranial pressure      Outcome: Progressing     Problem: CARDIOVASCULAR - ADULT  Goal: Maintains optimal cardiac output and hemodynamic stability  Description: INTERVENTIONS:  - Monitor I/O, vital signs and rhythm  - Monitor for S/S and trends of decreased cardiac output  - Administer and titrate ordered vasoactive medications to optimize hemodynamic stability  - Assess quality of pulses, skin color and temperature  - Assess for signs of decreased coronary artery perfusion  - Instruct patient to report change in severity of symptoms  Outcome: Progressing     Problem: GENITOURINARY - ADULT  Goal: Maintains or returns to baseline urinary function  Description: INTERVENTIONS:  - Assess urinary function  - Encourage oral fluids to ensure adequate hydration if ordered  - Administer IV fluids as ordered to ensure adequate hydration  - Administer ordered medications as needed  - Offer frequent toileting  - Follow urinary retention protocol if ordered  Outcome: Progressing  Goal: Absence of urinary retention  Description: INTERVENTIONS:  - Assess patient's ability to void and empty bladder  - Monitor I/O  - Bladder scan as needed  - Discuss with physician/AP medications to alleviate retention as needed  - Discuss catheterization for long term situations as appropriate  Outcome: Progressing     Problem: METABOLIC, FLUID AND ELECTROLYTES - ADULT  Goal: Electrolytes maintained within normal limits  Description: INTERVENTIONS:  - Monitor labs and assess patient for signs and symptoms of electrolyte imbalances  - Administer electrolyte replacement as ordered  - Monitor response to electrolyte replacements, including repeat lab results as appropriate  - Instruct patient on fluid and nutrition as appropriate  Outcome: Progressing     Problem: SKIN/TISSUE INTEGRITY - ADULT  Goal: Skin Integrity remains intact(Skin Breakdown Prevention)  Description: Assess:  -Perform [place] assessment every shift  -Clean and moisturize skin every 4 houra  -Inspect skin when repositioning, toileting, and assisting with ADLS  -Assess under medical devices such as electrodes every shift  -Assess extremities for adequate circulation and sensation     Bed Management:  -Have minimal linens on bed & keep smooth, unwrinkled  -Change linens as needed when moist or perspiring  -Avoid sitting or lying in one position for more than 2 hours while in bed  -Keep HOB at 30 degrees     Toileting:  -Offer bedside commode  -Assess for incontinence every 2 hours  -Use incontinent care products after each incontinent episode such as breif    Activity:  -Mobilize patient 4 times a day  -Encourage activity and walks on unit  -Encourage or provide ROM exercises   -Turn and reposition patient every 2 Hours  -Use appropriate equipment to lift or move patient in bed  -Instruct/ Assist with weight shifting every 2 hours when out of bed in chair  -Consider limitation of chair time 4 hour intervals    Skin Care:  -Avoid use of baby powder, tape, friction and shearing, hot water or constrictive clothing  -Relieve pressure over bony prominences using alevyn  -Do not massage red bony areas    Next Steps:  -Teach patient strategies to minimize risks such as weight shift  Outcome: Progressing     Problem: MUSCULOSKELETAL - ADULT  Goal: Maintain or return mobility to safest level of function  Description: INTERVENTIONS:  - Assess patient's ability to carry out ADLs; assess patient's baseline for ADL function and identify physical deficits which impact ability to perform ADLs (bathing, care of mouth/teeth, toileting, grooming, dressing, etc )  - Assess/evaluate cause of self-care deficits   - Assess range of motion  - Assess patient's mobility  - Assess patient's need for assistive devices and provide as appropriate  - Encourage maximum independence but intervene and supervise when necessary  - Involve family in performance of ADLs  - Assess for home care needs following discharge   - Consider OT consult to assist with ADL evaluation and planning for discharge  - Provide patient education as appropriate  Outcome: Progressing     Problem: RESPIRATORY - ADULT  Goal: Achieves optimal ventilation and oxygenation  Description: INTERVENTIONS:  - Assess for changes in respiratory status  - Assess for changes in mentation and behavior  - Position to facilitate oxygenation and minimize respiratory effort  - Oxygen administered by appropriate delivery if ordered  - Initiate smoking cessation education as indicated  - Encourage broncho-pulmonary hygiene including cough, deep breathe, Incentive Spirometry  - Assess the need for suctioning and aspirate as needed  - Assess and instruct to report SOB or any respiratory difficulty  - Respiratory Therapy support as indicated  Outcome: Progressing     Problem: Potential for Falls  Goal: Patient will remain free of falls  Description: INTERVENTIONS:  - Educate patient/family on patient safety including physical limitations  - Instruct patient to call for assistance with activity   - Consult OT/PT to assist with strengthening/mobility   - Keep Call bell within reach  - Keep bed low and locked with side rails adjusted as appropriate  - Keep care items and personal belongings within reach  - Initiate and maintain comfort rounds  - Make Fall Risk Sign visible to staff  - Offer Toileting every 2 Hours, in advance of need  - Initiate/Maintain bed/chair alarm  - Obtain necessary fall risk management equipment: alarms  - Apply yellow socks and bracelet for high fall risk patients  - Consider moving patient to room near nurses station  Outcome: Progressing     Problem: MOBILITY - ADULT  Goal: Maintain or return to baseline ADL function  Description: INTERVENTIONS:  -  Assess patient's ability to carry out ADLs; assess patient's baseline for ADL function and identify physical deficits which impact ability to perform ADLs (bathing, care of mouth/teeth, toileting, grooming, dressing, etc )  - Assess/evaluate cause of self-care deficits   - Assess range of motion  - Assess patient's mobility; develop plan if impaired  - Assess patient's need for assistive devices and provide as appropriate  - Encourage maximum independence but intervene and supervise when necessary  - Involve family in performance of ADLs  - Assess for home care needs following discharge   - Consider OT consult to assist with ADL evaluation and planning for discharge  - Provide patient education as appropriate  Outcome: Progressing  Goal: Maintains/Returns to pre admission functional level  Description: INTERVENTIONS:  - Perform BMAT or MOVE assessment daily    - Set and communicate daily mobility goal to care team and patient/family/caregiver  - Collaborate with rehabilitation services on mobility goals if consulted  - Perform Range of Motion 4 times a day  - Reposition patient every 2 hours    - Dangle patient 3-4 times a day  - Stand patient 4 times a day  - Ambulate patient 4 times a day  - Out of bed to chair 3 times a day   - Out of bed for meals 3 times a day  - Out of bed for toileting  - Record patient progress and toleration of activity level   Outcome: Progressing     Problem: Prexisting or High Potential for Compromised Skin Integrity  Goal: Skin integrity is maintained or improved  Description: INTERVENTIONS:  - Identify patients at risk for skin breakdown  - Assess and monitor skin integrity  - Assess and monitor nutrition and hydration status  - Monitor labs   - Assess for incontinence   - Turn and reposition patient  - Assist with mobility/ambulation  - Relieve pressure over bony prominences  - Avoid friction and shearing  - Provide appropriate hygiene as needed including keeping skin clean and dry  - Evaluate need for skin moisturizer/barrier cream  - Collaborate with interdisciplinary team   - Patient/family teaching  - Consider wound care consult   Outcome: Progressing     Problem: Nutrition/Hydration-ADULT  Goal: Nutrient/Hydration intake appropriate for improving, restoring or maintaining nutritional needs  Description: Monitor and assess patient's nutrition/hydration status for malnutrition  Collaborate with interdisciplinary team and initiate plan and interventions as ordered  Monitor patient's weight and dietary intake as ordered or per policy  Utilize nutrition screening tool and intervene as necessary  Determine patient's food preferences and provide high-protein, high-caloric foods as appropriate       INTERVENTIONS:  - Monitor oral intake, urinary output, labs, and treatment plans  - Assess nutrition and hydration status and recommend course of action  - Evaluate amount of meals eaten  - Assist patient with eating if necessary   - Allow adequate time for meals  - Recommend/ encourage appropriate diets, oral nutritional supplements, and vitamin/mineral supplements  - Order, calculate, and assess calorie counts as needed  - Recommend, monitor, and adjust tube feedings and TPN/PPN based on assessed needs  - Assess need for intravenous fluids  - Provide specific nutrition/hydration education as appropriate  - Include patient/family/caregiver in decisions related to nutrition  Outcome: Progressing

## 2022-09-28 NOTE — ASSESSMENT & PLAN NOTE
Initial COVID testing negative on 09/22, fevers prompting repeat check on 09/25 which was positive  Currently on the mild pathway  · CXR without acute findings  · Monitor labs, trend CRP  · Hold off on dexamethasone, remdesivir given continued lack of O2 requirement  · D-dimer elevated at 2 9 - however, patient's illnesses is mild and she is currently without oxygen requirement  Continue to hold off on enoxaparin or high dosed heparin per protocol, as well as due to ongoing GI bleeding

## 2022-09-28 NOTE — PLAN OF CARE
Problem: PHYSICAL THERAPY ADULT  Goal: Performs mobility at highest level of function for planned discharge setting  See evaluation for individualized goals  Description: Treatment/Interventions: Functional transfer training, LE strengthening/ROM, Elevations, Therapeutic exercise, Endurance training, Bed mobility, Gait training          See flowsheet documentation for full assessment, interventions and recommendations  Outcome: Progressing  Note: Prognosis: Fair  Problem List:  (Decreased strength; Decreased endurance; Impaired balance; Decreased mobility; Impaired judgement; Decreased cognition; Decreased safety awareness)  Assessment: Pt  seen for PT treatment session this date with interventions consisting of  therapeutic exercises, bed mobility, transfers w/ emphasis on improving pt's mobility  Pt  Requiring frequent  cues for sequence and safety  In comparison to previous session, Pt  With  Min improvements in activity tolerance  Limited by weakness and fatigue  Continues with posterior L  lateral lean sitting  Supported and unsupported  Appears slightly less confused today and more conversational   Pt is in need of continued activity in PT to improve strength balance endurance mobility transfers and ambulation with return to maximize LOF  From PT/mobility standpoint, recommendation at time of d/c would be post acute rehab  in order to promote return to PLOF and independence  The patient's AM-PAC Basic Mobility Inpatient Short Form Raw Score is 10  A Raw score of less than or equal to 16 suggests the patient may benefit from discharge to post-acute rehabilitation services  Please also refer to physical therapy recommendation for safe DC planning  PT Discharge Recommendation: Post acute rehabilitation services    See flowsheet documentation for full assessment

## 2022-09-28 NOTE — PROGRESS NOTES
Progress Note - Nephrology   Angelia Diaz 65 y o  female MRN: [id number]  Unit/Bed#: 409-01 Encounter: [id number]    A/P:  1  Acute kidney injury on CKD, creatinine back to baseline  She presented with a creatinine 3 0  Etiology multifactorial with decreased effective arterial volume, sepsis, acute blood loss anemia, diuretics  She was also evaluated by Urology for hydronephrosis  Would continue to hold diuretics and ARB  She is still having intermittent fevers and she had a drop in her hemoglobin today  Agree with transfusion for intravascular volume  Appears euvolemic at present  She is to undergo colonoscopy on Friday  Hold intravenous fluids for now, may need gentle fluids during the colonoscopy process  Follow volume status daily  Her creatinine today was 1 7, she had a drop in calcium, arising chloride and sodium  Question if this is a saline drop would repeat prior to any changes  2  CKD 4 baseline 1 7-2 primary nephrologist Dr Shayy Ying  Etiology of CKD secondary to hypertensive nephrosclerosis, cardiorenal, age-related nephrosclerosis  3  Moderate left hydronephrosis but poorly visualized stent  Evaluated by Urology no acute surgical intervention at this point  Will need outpatient Urology follow-up  4  Chronic heart failure with reduced ejection fraction  Continue to hold diuretics  Follow daily weights and low-sodium diet  Her weight is continue be stable she is 53 3 kg from 53 7 kg on 09/27     5  Sepsis due to UTI, blood cultures negative  Urine cultures pansensitive E coli  She is still having intermittent fevers  6  Anemia, occult GI bleeding, rectal bleeding  Plan for colonoscopy on Friday  Clear liquids tomorrow  Bowel prep tomorrow evening  GI following  Hold off on intravenous fluid for now and received volume resuscitation in form of packed red blood cells  Trend hemoglobin  7  Stroke-like symptoms on admission, CT of the head was unrevealing    MRI was contraindicated  Perhaps maybe related to acute metabolic encephalopathy with COVID and underlying dementia  Will have to weigh risk benefit evaluation of an PAUL given her CKD  T sat 26 and ferritin 100 consistent with a mild iron deficiency anemia  She is to receive packed red blood cells today  Follow up reason for today's visit:     Acute kidney injury    Subjective:   Patient seen examined today  Denies any chest pain, shortness of breath  She is a poor historian  Denies any nausea, vomiting, abdominal pain  She had 2 units of packed red blood cells during her admission  She did drop to 6 8 this morning  Her Eliquis is on hold and she is scheduled colonoscopy for Friday  BP stable  T-max 100 6  A complete 10 point review of systems was performed and is otherwise negative  Objective:     Vitals: Blood pressure 145/60, pulse 77, temperature 100 2 °F (37 9 °C), temperature source Oral, resp  rate 18, height 5' 6" (1 676 m), weight 53 3 kg (117 lb 8 1 oz), SpO2 94 %  ,Body mass index is 18 97 kg/m²  Weight (last 2 days)     Date/Time Weight    09/28/22 0600 53 3 (117 51)    09/27/22 0600 53 7 (118 39)            Intake/Output Summary (Last 24 hours) at 9/28/2022 1111  Last data filed at 9/28/2022 0533  Gross per 24 hour   Intake 480 ml   Output 300 ml   Net 180 ml     I/O last 3 completed shifts: In: 4370 8 [P O :720;  I V :3650 8]  Out: 600 [Urine:600]         Physical Exam: /60 (BP Location: Right arm)   Pulse 77   Temp 100 2 °F (37 9 °C) (Oral)   Resp 18   Ht 5' 6" (1 676 m)   Wt 53 3 kg (117 lb 8 1 oz)   SpO2 94%   BMI 18 97 kg/m²     General Appearance:    Alert, cooperative, no distress, appears stated age   Head:    Normocephalic, without obvious abnormality, atraumatic   Eyes:    Conjunctiva/corneas clear   Ears:    Normal external ears   Nose:   Nares normal, septum midline, mucosa normal, no drainage    or sinus tenderness   Throat:   Lips, mucosa, and tongue normal; teeth and gums normal   Neck:   Supple       Lungs:     Clear to auscultation bilaterally, respirations unlabored   Chest wall:    No tenderness or deformity   Heart:    Regular rate and rhythm, S1 and S2 normal, no murmur, rub   or gallop   Abdomen:     Soft, non-tender, bowel sounds active   Extremities:   Extremities normal, atraumatic, no cyanosis or edema   Skin:   Bruising bilateral upper extremity   Lymph nodes:   Cervical normal   Neurologic:   CNII-XII intact            Lab, Imaging and other studies: I have personally reviewed pertinent labs  CBC:   Lab Results   Component Value Date    WBC 4 99 09/28/2022    HGB 6 8 (LL) 09/28/2022    HCT 22 1 (L) 09/28/2022    MCV 86 09/28/2022     09/28/2022    MCH 26 6 (L) 09/28/2022    MCHC 30 8 (L) 09/28/2022    RDW 24 9 (H) 09/28/2022    MPV 10 9 09/28/2022    NRBC 0 09/28/2022     CMP:   Lab Results   Component Value Date    K 3 5 09/28/2022     09/28/2022    CO2 20 (L) 09/28/2022    BUN 43 (H) 09/28/2022    CREATININE 1 72 (H) 09/28/2022    CALCIUM 7 5 (L) 09/28/2022    AST 31 09/28/2022    ALT 11 (L) 09/28/2022    ALKPHOS 106 09/28/2022    EGFR 28 09/28/2022           Results from last 7 days   Lab Units 09/28/22  0513 09/27/22  0822 09/26/22  0514 09/25/22  0658   POTASSIUM mmol/L 3 5 3 8 4 3 3 9   CHLORIDE mmol/L 108 103 103 105   CO2 mmol/L 20* 24 22 25   BUN mg/dL 43* 47* 45* 49*   CREATININE mg/dL 1 72* 1 86* 1 94* 2 02*   CALCIUM mg/dL 7 5* 8 6 9 0 8 7   ALK PHOS U/L 106  --  131* 131*   ALT U/L 11*  --  8* 9*   AST U/L 31  --  30 20         Phosphorus: No results found for: PHOS  Magnesium:   Lab Results   Component Value Date    MG 1 7 09/28/2022     Urinalysis: No results found for: COLORU, CLARITYU, SPECGRAV, PHUR, LEUKOCYTESUR, NITRITE, PROTEINUA, GLUCOSEU, KETONESU, BILIRUBINUR, BLOODU  Ionized Calcium: No results found for: CAION  Coagulation: No results found for: PT, INR, APTT  Troponin: No results found for: TROPONINI  ABG: No results found for: PHART, AKD4MQP, PO2ART, YAO5FGQ, A7EONQWZ, BEART, SOURCE  Radiology review:     IMAGING  No results found      Current Facility-Administered Medications   Medication Dose Route Frequency    acetaminophen (TYLENOL) tablet 488 mg  488 mg Oral Q6H PRN    allopurinol (ZYLOPRIM) tablet 100 mg  100 mg Oral Daily    calcium carbonate-vitamin D (OSCAL-D) 500 mg-200 units per tablet 1 tablet  1 tablet Oral BID With Meals    cefTRIAXone (ROCEPHIN) IVPB (premix in dextrose) 1,000 mg 50 mL  1,000 mg Intravenous Q24H    fluticasone (FLONASE) 50 mcg/act nasal spray 2 spray  2 spray Nasal Daily    labetalol (NORMODYNE) injection 10 mg  10 mg Intravenous Q4H PRN    levothyroxine tablet 125 mcg  125 mcg Oral Early Morning    magnesium oxide (MAG-OX) tablet 400 mg  400 mg Oral BID    metoprolol succinate (TOPROL-XL) 24 hr tablet 50 mg  50 mg Oral Q12H    ondansetron (ZOFRAN) injection 4 mg  4 mg Intravenous Q4H PRN    pantoprazole (PROTONIX) EC tablet 20 mg  20 mg Oral Early Morning    [START ON 9/29/2022] polyethylene glycol (GOLYTELY) bowel prep 4,000 mL  4,000 mL Oral Once    polyethylene glycol (MIRALAX) packet 17 g  17 g Oral Daily PRN    potassium chloride (K-DUR,KLOR-CON) CR tablet 10 mEq  10 mEq Oral Once    sertraline (ZOLOFT) tablet 100 mg  100 mg Oral Daily    sodium chloride (PF) 0 9 % injection 3 mL  3 mL Intravenous Q1H PRN     Medications Discontinued During This Encounter   Medication Reason    calcium gluconate 3 g in sodium chloride 0 9 % 100 mL IVPB     sodium bicarbonate tablet 650 mg     metoprolol tartrate (LOPRESSOR) tablet 200 mg     aspirin chewable tablet 81 mg     sodium bicarbonate 150 mEq in dextrose 5 % 1,000 mL infusion     lactated ringers infusion     metoprolol tartrate (LOPRESSOR) tablet 50 mg     metoprolol succinate (TOPROL-XL) 24 hr tablet 50 mg     levothyroxine tablet 150 mcg     heparin (porcine) subcutaneous injection 5,000 Units     magnesium sulfate IVPB (premix) SOLN 1 g     dextrose 5 % in lactated Ringer's infusion     lactated ringers infusion     insulin regular (HumuLIN R,NovoLIN R) 1 Units/mL in sodium chloride 0 9 % 100 mL infusion     apixaban (ELIQUIS) tablet 2 5 mg        [place]      This progress note was produced in part using a dictation device which may document imprecise wording from author's original intent

## 2022-09-28 NOTE — ASSESSMENT & PLAN NOTE
No acute abnormality on initial CT of the head  MRI contraindicated secondary to presence of AICD/ppm   Will need outpatient MRI at another [facility] following discharge  Morning lipids and A1c updated  Allergy to statin noted  Continue current anticoagulation with apixaban  Continue to monitor neurological symptoms  Of note, patient does not appear to have any focal symptoms this morning based upon limited exam (due to patient participation), but appears confused  Unsure of baseline as  was not reachable by telephone today  Underlying history of dementia and 'forgetfulness' noted  Question whether altered mental status is secondary to acute metabolic encephalopathy from underlying dementia and current acute illness due to COVID-19 infection with fevers - mental status continues to appear improved today

## 2022-09-28 NOTE — ASSESSMENT & PLAN NOTE
The patient was seen in consultation by Urology- no current plan for urological intervention  Continue to treat underlying UTI  As per Urology attending, retained indwelling ureteral stent may be difficult to remove, and may require retrograde and antegrade renal surgery if the proximal coil is fixed with stone formation       The patient will need definitive stone treatment and extraction of the retained left ureteral stent via cystoscopy and laser for extraction of  encrusted/retained stent as well as ureteral/renal stones with temporary stent exchange  This can be done after hospital discharge and after the suspected acute urinary tract infection has cleared and patient has completed antibiotic course  The patient can follow with her urologist at the [facility] that she previously establish care with almost 2 years ago for the initial placement of the stent or referral to [facility] Urology on an outpatient basis      If the patient develops worsening creatinine then with recommendations to obtain renal ultrasound to assess for worsening hydronephrosis, and in that instance patient will become a candidate for urological intervention

## 2022-09-28 NOTE — ASSESSMENT & PLAN NOTE
Sepsis was present on admission and due to UTI - patient with retained ureteral stent  Blood cultures remain negative, but urinary cultures from 09/22 with > 100,000 CFU per mL of pansensitive E coli  Continue current therapy with IV ceftriaxone, currently day #6

## 2022-09-28 NOTE — ASSESSMENT & PLAN NOTE
Wt Readings from Last 3 Encounters:   09/28/22 53 3 kg (117 lb 8 1 oz)   05/31/22 62 8 kg (138 lb 7 2 oz)   11/16/21 62 8 kg (138 lb 7 2 oz)     Current ECHO with LVEF 45%, presence of G1 DD  RV systolic function is normal   Also with mention of moderate MR, moderate PHTN  Cardiomyopathy appears ischemic in nature  Appears euvolemic at this time  · Check daily weights - appear inaccurate  · Maintain on low-sodium diet  · Furosemide and ARB on hold currently  · Continue BB therapy, with plans for discharge on long-acting formulation  · Continues to examine euvolemic  · Insure outpatient follow-up with Cardiology

## 2022-09-28 NOTE — PHYSICAL THERAPY NOTE
PHYSICAL THERAPY NOTE          Patient Name: Valentina Dalton Date: 9/28/2022 09/28/22 0908   PT Last Visit   PT Visit Date 09/28/22   Note Type   Note Type Treatment   Pain Assessment   Pain Assessment Tool 0-10   Pain Score No Pain   Restrictions/Precautions   Weight Bearing Precautions Per Order No   Other Precautions   (Fall Risk; Multiple lines; Cognitive)   General   Response to Previous Treatment Patient unable to report, no changes reported from family or staff   Family/Caregiver Present No   Cognition   Overall Cognitive Status Impaired   Arousal/Participation Alert; Cooperative   Following Commands Follows one step commands with increased time or repetition   Subjective   Subjective Pt agreeable to therapy  Bed Mobility   Supine to Sit; rolling R?L 2  Maximal assistance   Additional items Assist x 2; Increased time required;Verbal cues;LE management  Rolling for brief change and hygiene  Additional Comments Sat EOB with min A and Tends to lean left and posteriorly  Requires mod A to correct  Transfers   Sit to Stand 3  Moderate assistance   Additional items Assist x 2; Increased time required;Verbal cues   Stand to Sit 3  Moderate assistance   Additional items Assist x 2; Increased time required;Verbal cues   Stand pivot 2  Maximal assistance   Additional items Assist x 2; Increased time required;Verbal cues   Additional Comments SPT OOB to recliner no device used   Ambulation/Elevation   Gait pattern Not appropriate; Not tested   Balance   Static Sitting Fair -   Dynamic Sitting Fair -   Static Standing Poor +   Dynamic Standing Poor +   Endurance Deficit   Endurance Deficit Yes   Activity Tolerance   Activity Tolerance Patient limited by fatigue   Exercises   Heelslides Supine;10 reps   Hip Flexion Sitting;10 reps   Knee AROM Long Arc Quad Sitting;15 reps   Ankle Pumps Supine;Sitting;15 reps   Assessment   Prognosis Fair   Problem List   (Decreased strength; Decreased endurance; Impaired balance; Decreased mobility; Impaired judgement; Decreased cognition; Decreased safety awareness)   Assessment Pt  seen for PT treatment session this date with interventions consisting of  therapeutic exercises, bed mobility, transfers w/ emphasis on improving pt's mobility  Pt  Requiring frequent  cues for sequence and safety  In comparison to previous session, Pt  With  Min improvements in activity tolerance  Limited by weakness and fatigue  Continues with posterior L  lateral lean sitting  Supported and unsupported  Appears slightly less confused today and more conversational   Pt is in need of continued activity in PT to improve strength balance endurance mobility transfers and ambulation with return to maximize LOF  From PT/mobility standpoint, recommendation at time of d/c would be post acute rehab  in order to promote return to PLOF and independence  The patient's AM-[facility] Basic Mobility Inpatient Short Form Raw Score is 10  A Raw score of less than or equal to 16 suggests the patient may benefit from discharge to post-acute rehabilitation services  Please also refer to physical therapy recommendation for safe DC planning  Goals   LTG Expiration Date 10/06/22   Plan   Treatment/Interventions   (Functional transfer training; LE strengthening/ROM; Elevations; Therapeutic exercise;  Endurance training; Bed mobility; Gait training)   Progress Slow progress, decreased activity tolerance   PT Frequency 3-5x/wk   Recommendation   PT Discharge Recommendation Post acute rehabilitation services   AM-PAC Basic Mobility Inpatient   Turning in Bed Without Bedrails 2   Lying on Back to Sitting on Edge of Flat Bed 2   Moving Bed to Chair 2   Standing Up From Chair 2   Walk in Room 1   Climb 3-5 Stairs 1   Basic Mobility Inpatient Raw Score 10   Turning Head Towards Sound 4   Follow Simple Instructions 3   Low Function Basic Mobility Raw Score 17 Low Function Basic Mobility Standardized Score 27 46   Highest Level Of Mobility   -HLM Goal 4: Move to chair/commode   -HLM Achieved 4: Move to chair/commode   Education   Education Provided Mobility training   Patient Reinforcement needed;Demonstrates verbal understanding   End of Consult   Patient Position at End of Consult Bedside chair;Bed/Chair alarm activated; All needs within reach   End of Consult Comments discussed POC with PT

## 2022-09-28 NOTE — PROGRESS NOTES
Progress Note- Kacie Lua 65 y o  female MRN: [id number]    Unit/Bed#: 409-01 Encounter: [id number]      Assessment and Plan:    Gladys Alcazar a 65-year-old female with a past medical history of hypothyroidism, CHF, AFib and CVA on Eliquis, hypertension, CAD and dementia that presented to the emergency department with dizziness and some mild nausea      1  Anemia  2  Occult GI bleeding  3  Rectal bleeding    Patient underwent EGD last week that revealed a medium sliding hiatal hernia and greater than 10 polyps in the stomach but otherwise normal   She denies any further black or bloody stools  She denies any nausea, vomiting or abdominal pain  Patient initial hemoglobin on admission 6 1  She received 1 unit packed red blood cells with an increase to 7 5 with further decrease to 6 4  She received a 2nd unit with an increase to 9 0  She has dropped to 6 8 this morning and is to receive 1/3 unit of packed red blood cells  Her Eliquis is on hold and she is scheduled for colonoscopy on Friday to evaluate for other sources of GI bleeding      - colonoscopy on Friday   - clear liquids tomorrow   - bowel prep tomorrow evening   - monitor stool color and consistency  - trend H&H   - transfuse as needed     ______________________________________________________________________    Subjective:     Patient denies any black or bloody stools      Medication Administration - last 24 hours from 09/27/2022 1003 to 09/28/2022 1003       Date/Time Order Dose Route Action Action by     09/27/2022 1139 acetaminophen (TYLENOL) tablet 488 mg 488 mg Oral Given Deyanne Baumgarten, RN     09/28/2022 0935 calcium carbonate-vitamin D (OSCAL-D) 500 mg-200 units per tablet 1 tablet 1 tablet Oral Given Jenny Dahl, SAE     09/27/2022 1731 calcium carbonate-vitamin D (OSCAL-D) 500 mg-200 units per tablet 1 tablet 1 tablet Oral Given Deyanne Baumgarten, RN     09/28/2022 0936 fluticasone (FLONASE) 50 mcg/act nasal spray 2 spray 2 spray Nasal Given Nancy Su, RN     09/28/2022 0935 sertraline (ZOLOFT) tablet 100 mg 100 mg Oral Given Nancy Su, RN     09/28/2022 0448 cefTRIAXone (ROCEPHIN) IVPB (premix in dextrose) 1,000 mg 50 mL 1,000 mg Intravenous [address] [facility], RN     09/28/2022 0935 metoprolol succinate (TOPROL-XL) 24 hr tablet 50 mg 50 mg Oral Given Nancy Su, RN     09/27/2022 2053 metoprolol succinate (TOPROL-XL) 24 hr tablet 50 mg 50 mg Oral Given [facility] [employer], RN     09/28/2022 0516 levothyroxine tablet 125 mcg 125 mcg Oral Given [place] [employer], RN     09/28/2022 3491 magnesium oxide (MAG-OX) tablet 400 mg 0 mg Oral Hold Lexie Bowden, Pharmacist     09/27/2022 1731 magnesium oxide (MAG-OX) tablet 400 mg 400 mg Oral Given Kevin Franco, RN     09/28/2022 0516 pantoprazole (PROTONIX) EC tablet 20 mg 20 mg Oral Given Bernestine [employer], RN     09/28/2022 0935 allopurinol (ZYLOPRIM) tablet 100 mg 100 mg Oral Given Nancy Su, RN     09/28/2022 0935 potassium chloride (K-DUR,KLOR-CON) CR tablet 40 mEq 40 mEq Oral Given Nancy Su, RN     09/28/2022 0935 magnesium oxide (MAG-OX) tablet 800 mg 800 mg Oral Given Nancy Su, SAE          Objective:     Vitals: Blood pressure 145/60, pulse 77, temperature 100 2 °F (37 9 °C), temperature source Oral, resp  rate 18, height 5' 6" (1 676 m), weight 53 3 kg (117 lb 8 1 oz), SpO2 94 %  ,Body mass index is 18 97 kg/m²  Intake/Output Summary (Last 24 hours) at 9/28/2022 1003  Last data filed at 9/28/2022 0533  Gross per 24 hour   Intake 480 ml   Output 300 ml   Net 180 ml       Physical Exam:   PHYSICAL EXAMINATION:    General Appearance:   Alert, cooperative, no distress   HEENT:  Normocephalic, atraumatic, anicteric  Neck supple, symmetrical, trachea midline  Lungs:   Equal chest rise and unlabored breathing, normal effort, no coughing  Cardiovascular:   No visualized JVD  Abdomen:   No abdominal distension  Skin:   No jaundice, rashes, or lesions  Musculoskeletal:   Normal range of motion visualized  Psych:  Normal affect and normal insight  Neuro:  Alert and appropriate  Invasive Devices  Report    Peripheral Intravenous Line  Duration           Peripheral IV 09/28/22 Dorsal (posterior); Right Hand <1 day          Drain  Duration           External Urinary Catheter 5 days                Lab Results:  No results displayed because visit has over 200 results  Imaging Studies: I have personally reviewed pertinent imaging studies

## 2022-09-28 NOTE — PLAN OF CARE
Problem: PAIN - ADULT  Goal: Verbalizes/displays adequate comfort level or baseline comfort level  Description: Interventions:  - Encourage patient to monitor pain and request assistance  - Assess pain using appropriate pain scale  - Administer analgesics based on type and severity of pain and evaluate response  - Implement non-pharmacological measures as appropriate and evaluate response  - Consider cultural and social influences on pain and pain management  - Notify physician/advanced practitioner if interventions unsuccessful or patient reports new pain  Outcome: Progressing     Problem: INFECTION - ADULT  Goal: Absence or prevention of progression during hospitalization  Description: INTERVENTIONS:  - Assess and monitor for signs and symptoms of infection  - Monitor lab/diagnostic results  - Monitor all insertion sites, i e  indwelling lines, tubes, and drains  - Monitor endotracheal if appropriate and nasal secretions for changes in amount and color  - [place] appropriate cooling/warming therapies per order  - Administer medications as ordered  - Instruct and encourage patient and family to use good hand hygiene technique  - Identify and instruct in appropriate isolation precautions for identified infection/condition  Outcome: Progressing  Goal: Absence of fever/infection during neutropenic period  Description: INTERVENTIONS:  - Monitor WBC    Outcome: Progressing     Problem: SAFETY ADULT  Goal: Patient will remain free of falls  Description: INTERVENTIONS:  - Educate patient/family on patient safety including physical limitations  - Instruct patient to call for assistance with activity   - Consult OT/PT to assist with strengthening/mobility   - Keep Call bell within reach  - Keep bed low and locked with side rails adjusted as appropriate  - Keep care items and personal belongings within reach  - Initiate and maintain comfort rounds  - Make Fall Risk Sign visible to staff  - Offer Toileting every 2 Hours, in advance of need  - Initiate/Maintain bed/chair alarm  - Obtain necessary fall risk management equipment: alarms  - Apply yellow socks and bracelet for high fall risk patients  - Consider moving patient to room near nurses station  Outcome: Progressing  Goal: Maintain or return to baseline ADL function  Description: INTERVENTIONS:  -  Assess patient's ability to carry out ADLs; assess patient's baseline for ADL function and identify physical deficits which impact ability to perform ADLs (bathing, care of mouth/teeth, toileting, grooming, dressing, etc )  - Assess/evaluate cause of self-care deficits   - Assess range of motion  - Assess patient's mobility; develop plan if impaired  - Assess patient's need for assistive devices and provide as appropriate  - Encourage maximum independence but intervene and supervise when necessary  - Involve family in performance of ADLs  - Assess for home care needs following discharge   - Consider OT consult to assist with ADL evaluation and planning for discharge  - Provide patient education as appropriate  Outcome: Progressing  Goal: Maintains/Returns to pre admission functional level  Description: INTERVENTIONS:  - Perform BMAT or MOVE assessment daily    - Set and communicate daily mobility goal to care team and patient/family/caregiver  - Collaborate with rehabilitation services on mobility goals if consulted  - Perform Range of Motion 4 times a day  - Reposition patient every 2 hours    - Dangle patient 3-4 times a day  - Stand patient 4 times a day  - Ambulate patient 4 times a day  - Out of bed to chair 3 times a day   - Out of bed for meals 3 times a day  - Out of bed for toileting  - Record patient progress and toleration of activity level   Outcome: Progressing     Problem: DISCHARGE PLANNING  Goal: Discharge to home or other facility with appropriate resources  Description: INTERVENTIONS:  - Identify barriers to discharge w/patient and caregiver  - Arrange for needed discharge resources and transportation as appropriate  - Identify discharge learning needs (meds, wound care, etc )  - Arrange for interpretive services to assist at discharge as needed  - Refer to Case Management Department for coordinating discharge planning if the patient needs post-hospital services based on physician/advanced practitioner order or complex needs related to functional status, cognitive ability, or social support system  Outcome: Progressing     Problem: Knowledge Deficit  Goal: Patient/family/caregiver demonstrates understanding of disease process, treatment plan, medications, and discharge instructions  Description: Complete learning assessment and assess knowledge base    Interventions:  - Provide teaching at level of understanding  - Provide teaching via preferred learning methods  Outcome: Progressing     Problem: NEUROSENSORY - ADULT  Goal: Achieves stable or improved neurological status  Description: INTERVENTIONS  - Monitor and report changes in neurological status  - Monitor vital signs such as temperature, blood pressure, glucose, and any other labs ordered   - Initiate measures to prevent increased intracranial pressure      Outcome: Progressing     Problem: CARDIOVASCULAR - ADULT  Goal: Maintains optimal cardiac output and hemodynamic stability  Description: INTERVENTIONS:  - Monitor I/O, vital signs and rhythm  - Monitor for S/S and trends of decreased cardiac output  - Administer and titrate ordered vasoactive medications to optimize hemodynamic stability  - Assess quality of pulses, skin color and temperature  - Assess for signs of decreased coronary artery perfusion  - Instruct patient to report change in severity of symptoms  Outcome: Progressing     Problem: GENITOURINARY - ADULT  Goal: Maintains or returns to baseline urinary function  Description: INTERVENTIONS:  - Assess urinary function  - Encourage oral fluids to ensure adequate hydration if ordered  - Administer IV fluids as ordered to ensure adequate hydration  - Administer ordered medications as needed  - Offer frequent toileting  - Follow urinary retention protocol if ordered  Outcome: Progressing  Goal: Absence of urinary retention  Description: INTERVENTIONS:  - Assess patient's ability to void and empty bladder  - Monitor I/O  - Bladder scan as needed  - Discuss with physician/AP medications to alleviate retention as needed  - Discuss catheterization for long term situations as appropriate  Outcome: Progressing     Problem: METABOLIC, FLUID AND ELECTROLYTES - ADULT  Goal: Electrolytes maintained within normal limits  Description: INTERVENTIONS:  - Monitor labs and assess patient for signs and symptoms of electrolyte imbalances  - Administer electrolyte replacement as ordered  - Monitor response to electrolyte replacements, including repeat lab results as appropriate  - Instruct patient on fluid and nutrition as appropriate  Outcome: Progressing     Problem: SKIN/TISSUE INTEGRITY - ADULT  Goal: Skin Integrity remains intact(Skin Breakdown Prevention)  Description: Assess:  -Perform [place] assessment every shift  -Clean and moisturize skin every 4 houra  -Inspect skin when repositioning, toileting, and assisting with ADLS  -Assess under medical devices such as electrodes every shift  -Assess extremities for adequate circulation and sensation     Bed Management:  -Have minimal linens on bed & keep smooth, unwrinkled  -Change linens as needed when moist or perspiring  -Avoid sitting or lying in one position for more than 2 hours while in bed  -Keep HOB at 30 degrees     Toileting:  -Offer bedside commode  -Assess for incontinence every 2 hours  -Use incontinent care products after each incontinent episode such as breif    Activity:  -Mobilize patient 4 times a day  -Encourage activity and walks on unit  -Encourage or provide ROM exercises   -Turn and reposition patient every 2 Hours  -Use appropriate equipment to lift or move patient in bed  -Instruct/ Assist with weight shifting every 2 hours when out of bed in chair  -Consider limitation of chair time 4 hour intervals    Skin Care:  -Avoid use of baby powder, tape, friction and shearing, hot water or constrictive clothing  -Relieve pressure over bony prominences using alevyn  -Do not massage red bony areas    Next Steps:  -Teach patient strategies to minimize risks such as weight shift  Outcome: Progressing     Problem: MUSCULOSKELETAL - ADULT  Goal: Maintain or return mobility to safest level of function  Description: INTERVENTIONS:  - Assess patient's ability to carry out ADLs; assess patient's baseline for ADL function and identify physical deficits which impact ability to perform ADLs (bathing, care of mouth/teeth, toileting, grooming, dressing, etc )  - Assess/evaluate cause of self-care deficits   - Assess range of motion  - Assess patient's mobility  - Assess patient's need for assistive devices and provide as appropriate  - Encourage maximum independence but intervene and supervise when necessary  - Involve family in performance of ADLs  - Assess for home care needs following discharge   - Consider OT consult to assist with ADL evaluation and planning for discharge  - Provide patient education as appropriate  Outcome: Progressing     Problem: RESPIRATORY - ADULT  Goal: Achieves optimal ventilation and oxygenation  Description: INTERVENTIONS:  - Assess for changes in respiratory status  - Assess for changes in mentation and behavior  - Position to facilitate oxygenation and minimize respiratory effort  - Oxygen administered by appropriate delivery if ordered  - Initiate smoking cessation education as indicated  - Encourage broncho-pulmonary hygiene including cough, deep breathe, Incentive Spirometry  - Assess the need for suctioning and aspirate as needed  - Assess and instruct to report SOB or any respiratory difficulty  - Respiratory Therapy support as indicated  Outcome: Progressing     Problem: Potential for Falls  Goal: Patient will remain free of falls  Description: INTERVENTIONS:  - Educate patient/family on patient safety including physical limitations  - Instruct patient to call for assistance with activity   - Consult OT/PT to assist with strengthening/mobility   - Keep Call bell within reach  - Keep bed low and locked with side rails adjusted as appropriate  - Keep care items and personal belongings within reach  - Initiate and maintain comfort rounds  - Make Fall Risk Sign visible to staff  - Offer Toileting every 2 Hours, in advance of need  - Initiate/Maintain bed/chair alarm  - Obtain necessary fall risk management equipment: alarms  - Apply yellow socks and bracelet for high fall risk patients  - Consider moving patient to room near nurses station  Outcome: Progressing

## 2022-09-28 NOTE — ASSESSMENT & PLAN NOTE
Required transfusion with 2 units of PRBC's, with apixaban initially held  EGD 9/23 essentially normal, with note of a medium sliding hiatal hernia and numerous polyps measuring less than 5 mm in the stomach  Patient's apixaban was resumed on 09/25  Patient's hemoglobin had responded to a value of 9 following the 2nd unit of PRBCs - has continued to slowly drop, down to a value of 7 8 yesterday, 6 8 this morning  · Eliquis was discontinued  · Plans for colonoscopy on Friday  · Orders in for repeat transfusion today, with repeat H&H following

## 2022-09-28 NOTE — ASSESSMENT & PLAN NOTE
Lab Results   Component Value Date    EGFR 28 09/28/2022    EGFR 25 09/27/2022    EGFR  09/27/2022      Comment: This is a corrected result  Previous result was 28 ml/min/1 73sq m on 9/27/2022 at 0721 EDT    CREATININE 1 72 (H) 09/28/2022    CREATININE 1 86 (H) 09/27/2022    CREATININE  09/27/2022      Comment:      Standardized to IDMS reference method  This is a corrected result  Previous result was 1 71 mg/dL on 9/27/2022 at 0721 EDT     DARRELL superimposed on CKD, likely in the setting of sepsis, acute blood loss anemia, with concurrent diuretic use  Baseline creatinine of 1 6 to 2, peak value during hospitalization of 3  Current creatinine has improved to 1 7  Discontinued IV fluids previously  Will receive blood products today  Continue to hold oral furosemide and ARB  Nephrology consulted and following

## 2022-09-29 PROBLEM — E87.5 HYPERKALEMIA: Status: RESOLVED | Noted: 2022-09-22 | Resolved: 2022-09-29

## 2022-09-29 PROBLEM — E87.29 INCREASED ANION GAP METABOLIC ACIDOSIS: Status: RESOLVED | Noted: 2022-09-22 | Resolved: 2022-09-29

## 2022-09-29 LAB
ABO GROUP BLD BPU: NORMAL
ABO GROUP BLD BPU: NORMAL
ALBUMIN SERPL BCP-MCNC: 2.2 G/DL (ref 3.5–5)
ALP SERPL-CCNC: 105 U/L (ref 46–116)
ALT SERPL W P-5'-P-CCNC: 11 U/L (ref 12–78)
ANION GAP SERPL CALCULATED.3IONS-SCNC: 11 MMOL/L (ref 4–13)
AST SERPL W P-5'-P-CCNC: 34 U/L (ref 5–45)
BASOPHILS # BLD AUTO: 0.02 THOUSANDS/ÂΜL (ref 0–0.1)
BASOPHILS NFR BLD AUTO: 0 % (ref 0–1)
BILIRUB SERPL-MCNC: 0.5 MG/DL (ref 0.2–1)
BPU ID: NORMAL
BPU ID: NORMAL
BUN SERPL-MCNC: 48 MG/DL (ref 5–25)
CALCIUM ALBUM COR SERPL-MCNC: 9.7 MG/DL (ref 8.3–10.1)
CALCIUM SERPL-MCNC: 8.3 MG/DL (ref 8.3–10.1)
CHLORIDE SERPL-SCNC: 104 MMOL/L (ref 96–108)
CO2 SERPL-SCNC: 22 MMOL/L (ref 21–32)
CREAT SERPL-MCNC: 1.8 MG/DL (ref 0.6–1.3)
CROSSMATCH: NORMAL
CROSSMATCH: NORMAL
EOSINOPHIL # BLD AUTO: 0.08 THOUSAND/ÂΜL (ref 0–0.61)
EOSINOPHIL NFR BLD AUTO: 2 % (ref 0–6)
ERYTHROCYTE [DISTWIDTH] IN BLOOD BY AUTOMATED COUNT: 22 % (ref 11.6–15.1)
GFR SERPL CREATININE-BSD FRML MDRD: 26 ML/MIN/1.73SQ M
GLUCOSE SERPL-MCNC: 86 MG/DL (ref 65–140)
HCT VFR BLD AUTO: 30.6 % (ref 34.8–46.1)
HGB BLD-MCNC: 10 G/DL (ref 11.5–15.4)
IMM GRANULOCYTES # BLD AUTO: 0.03 THOUSAND/UL (ref 0–0.2)
IMM GRANULOCYTES NFR BLD AUTO: 1 % (ref 0–2)
LYMPHOCYTES # BLD AUTO: 1.01 THOUSANDS/ÂΜL (ref 0.6–4.47)
LYMPHOCYTES NFR BLD AUTO: 19 % (ref 14–44)
MAGNESIUM SERPL-MCNC: 2.1 MG/DL (ref 1.6–2.6)
MCH RBC QN AUTO: 27 PG (ref 26.8–34.3)
MCHC RBC AUTO-ENTMCNC: 32.7 G/DL (ref 31.4–37.4)
MCV RBC AUTO: 83 FL (ref 82–98)
MONOCYTES # BLD AUTO: 0.43 THOUSAND/ÂΜL (ref 0.17–1.22)
MONOCYTES NFR BLD AUTO: 8 % (ref 4–12)
NEUTROPHILS # BLD AUTO: 3.65 THOUSANDS/ÂΜL (ref 1.85–7.62)
NEUTS SEG NFR BLD AUTO: 70 % (ref 43–75)
NRBC BLD AUTO-RTO: 0 /100 WBCS
PLATELET # BLD AUTO: 172 THOUSANDS/UL (ref 149–390)
PMV BLD AUTO: 11.1 FL (ref 8.9–12.7)
POTASSIUM SERPL-SCNC: 4.5 MMOL/L (ref 3.5–5.3)
PROT SERPL-MCNC: 5.8 G/DL (ref 6.4–8.4)
RBC # BLD AUTO: 3.7 MILLION/UL (ref 3.81–5.12)
SODIUM SERPL-SCNC: 137 MMOL/L (ref 135–147)
UNIT DISPENSE STATUS: NORMAL
UNIT DISPENSE STATUS: NORMAL
UNIT PRODUCT CODE: NORMAL
UNIT PRODUCT CODE: NORMAL
UNIT PRODUCT VOLUME: 300 ML
UNIT PRODUCT VOLUME: 300 ML
UNIT RH: NORMAL
UNIT RH: NORMAL
WBC # BLD AUTO: 5.22 THOUSAND/UL (ref 4.31–10.16)

## 2022-09-29 PROCEDURE — 85025 COMPLETE CBC W/AUTO DIFF WBC: CPT | Performed by: INTERNAL MEDICINE

## 2022-09-29 PROCEDURE — 97530 THERAPEUTIC ACTIVITIES: CPT

## 2022-09-29 PROCEDURE — 83735 ASSAY OF MAGNESIUM: CPT | Performed by: INTERNAL MEDICINE

## 2022-09-29 PROCEDURE — 80053 COMPREHEN METABOLIC PANEL: CPT | Performed by: INTERNAL MEDICINE

## 2022-09-29 PROCEDURE — 97535 SELF CARE MNGMENT TRAINING: CPT

## 2022-09-29 PROCEDURE — 99233 SBSQ HOSP IP/OBS HIGH 50: CPT | Performed by: INTERNAL MEDICINE

## 2022-09-29 RX ORDER — FUROSEMIDE 10 MG/ML
40 INJECTION INTRAMUSCULAR; INTRAVENOUS ONCE
Status: COMPLETED | OUTPATIENT
Start: 2022-09-29 | End: 2022-09-29

## 2022-09-29 RX ADMIN — MAGNESIUM OXIDE TAB 400 MG (241.3 MG ELEMENTAL MG) 400 MG: 400 (241.3 MG) TAB at 08:04

## 2022-09-29 RX ADMIN — FUROSEMIDE 40 MG: 10 INJECTION, SOLUTION INTRAMUSCULAR; INTRAVENOUS at 14:00

## 2022-09-29 RX ADMIN — MAGNESIUM OXIDE TAB 400 MG (241.3 MG ELEMENTAL MG) 400 MG: 400 (241.3 MG) TAB at 18:01

## 2022-09-29 RX ADMIN — ALLOPURINOL 100 MG: 100 TABLET ORAL at 08:04

## 2022-09-29 RX ADMIN — FLUTICASONE PROPIONATE 2 SPRAY: 50 SPRAY, METERED NASAL at 08:19

## 2022-09-29 RX ADMIN — METOPROLOL SUCCINATE 50 MG: 50 TABLET, FILM COATED, EXTENDED RELEASE ORAL at 08:03

## 2022-09-29 RX ADMIN — CEFTRIAXONE 1000 MG: 1 INJECTION, SOLUTION INTRAVENOUS at 04:15

## 2022-09-29 RX ADMIN — PANTOPRAZOLE SODIUM 20 MG: 20 TABLET, DELAYED RELEASE ORAL at 05:28

## 2022-09-29 RX ADMIN — POLYETHYLENE GLYCOL 3350, SODIUM SULFATE ANHYDROUS, SODIUM BICARBONATE, SODIUM CHLORIDE, POTASSIUM CHLORIDE 4000 ML: 236; 22.74; 6.74; 5.86; 2.97 POWDER, FOR SOLUTION ORAL at 16:15

## 2022-09-29 RX ADMIN — Medication 1 TABLET: at 08:04

## 2022-09-29 RX ADMIN — LEVOTHYROXINE SODIUM 125 MCG: 125 TABLET ORAL at 05:28

## 2022-09-29 RX ADMIN — Medication 1 TABLET: at 16:15

## 2022-09-29 RX ADMIN — METOPROLOL SUCCINATE 50 MG: 50 TABLET, FILM COATED, EXTENDED RELEASE ORAL at 20:46

## 2022-09-29 RX ADMIN — SERTRALINE 100 MG: 100 TABLET, FILM COATED ORAL at 08:04

## 2022-09-29 NOTE — PLAN OF CARE
Problem: OCCUPATIONAL THERAPY ADULT  Goal: Performs self-care activities at highest level of function for planned discharge setting  See evaluation for individualized goals  Description: Treatment Interventions: ADL retraining, Functional transfer training, UE strengthening/ROM, Endurance training, Patient/family training, Equipment evaluation/education, Activityengagement, Cognitive reorientation          See flowsheet documentation for full assessment, interventions and recommendations  Outcome: Progressing  Note: Limitation: Decreased ADL status, Decreased UE strength, Decreased Safe judgement during ADL, Decreased cognition, Decreased endurance, Decreased self-care trans, Decreased high-level ADLs     Assessment: Patient participated in Skilled OT session this date with interventions consisting of ADL re training with the use of correct body mechnaics, safety awareness and fall prevention techniques,  therapeutic activities to: increase activity tolerance, increase standing tolerance time with unilateral UE support to complete sink level ADLs, increase cardiovascular endurance , increase dynamic sit/ stand balance during functional activity , increase postural control, increase trunk control and increase OOB/ sitting tolerance   Patient agreeable to OT treatment session, upon arrival patient was found seated OOB to Chair and supine in bed  Patient requiring frequent re direction, verbal cues for safety, verbal cues for correct technique, verbal cues for pacing thru activity steps, cognitive assistance to anticipate next step, one step directives and frequent rest periods  Patient continues to be functioning below baseline level, occupational performance remains limited secondary to factors listed above and increased risk for falls and injury  From OT standpoint, recommendation at time of d/c would be Post acute rehabilitation services    The patient's raw score on the AM-PAC Daily Activity inpatient short form is 13, standardized score is 32 03, less than 39 4  Patients at this level are likely to benefit from discharge to post-acute rehabilitation services  Please refer to the recommendation of the Occupational Therapist for safe discharge planning  Pt benefited from co-treatment of skilled OT and PT therapists in order to most appropriately address functional deficits d/t extensive assistance required for safe functional mobility, decreased activity tolerance, and regression from functioning level prior to admission and/or onset of present illness  OT/PT objectives were addressed separately; please see PT note for specific goal areas targeted       OT Discharge Recommendation: Post acute rehabilitation services

## 2022-09-29 NOTE — ASSESSMENT & PLAN NOTE
Sepsis was present on admission and due to UTI - patient with retained ureteral stent  Blood cultures remain negative, but urinary cultures from 09/22 with > 100,000 CFU per mL of pansensitive E coli  Concluding 7 day course of IV ceftriaxone today

## 2022-09-29 NOTE — ASSESSMENT & PLAN NOTE
No acute abnormality on initial CT of the head  MRI contraindicated secondary to presence of AICD/ppm   Will need outpatient MRI at another [facility] following discharge  Morning lipids and A1c updated  Allergy to statin noted  Anticoagulation with apixaban currently on hold  Continue to monitor neurological symptoms  Of note, patient does not appear to have any focal symptoms again this morning based upon exam, with improvement in confusion  Underlying history of dementia and 'forgetfulness' noted  Question whether altered mental status is secondary to acute metabolic encephalopathy from underlying dementia and current acute illness due to COVID-19 infection with fevers - mental status continues to appear improved today, with patient aware of place and year

## 2022-09-29 NOTE — PLAN OF CARE
Problem: PHYSICAL THERAPY ADULT  Goal: Performs mobility at highest level of function for planned discharge setting  See evaluation for individualized goals  Description: Treatment/Interventions: Functional transfer training, LE strengthening/ROM, Elevations, Therapeutic exercise, Endurance training, Bed mobility, Gait training          See flowsheet documentation for full assessment, interventions and recommendations  Outcome: Progressing  Note: Prognosis: Guarded  Problem List: Decreased strength, Decreased endurance, Impaired balance, Decreased mobility, Decreased cognition, Impaired judgement, Decreased safety awareness  Assessment: Patient seen this date for PT treatment session to increase level of mobility and functional activity tolerance  This date, pt able to perform all functional mobility with Mod A  x 2, RW, and increased time  Occasional verbal cuing provided for safety awareness and sequencing  Pt with significant difficulty following cues to move B LEs to turn during SPT, so ambulation not appropriate or attempted this date  HR and SpO2 remained WFL on RA throughout  No true LOB experienced  The patient's AM-PAC Basic Mobility Inpatient Short Form Raw Score is 10  A Raw score of less than or equal to 16 suggests the patient may benefit from discharge to post-acute rehabilitation services  Please also refer to the recommendation of the Physical Therapist for safe discharge planning  Co treatment with OT secondary to complex medical condition of pt, possible A of 2 required to achieve and maintain transitional movements, requiring the need of skilled therapeutic intervention of 2 therapists to achieve delivery of services  D/c recommendation continues to be post-acute rehabilitation services  PT Discharge Recommendation: Post acute rehabilitation services    See flowsheet documentation for full assessment
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
## 2022-09-29 NOTE — ASSESSMENT & PLAN NOTE
Please prescribe her a course of metrogel.  Thanks,  Zaina   TSH markedly low at 0 05, although checked during acute illness  Levothyroxine dosing decreased to 125 mcg, with recommendations for repeat TSH in 4-6 weeks

## 2022-09-29 NOTE — PLAN OF CARE
Problem: PAIN - ADULT  Goal: Verbalizes/displays adequate comfort level or baseline comfort level  Description: Interventions:  - Encourage patient to monitor pain and request assistance  - Assess pain using appropriate pain scale  - Administer analgesics based on type and severity of pain and evaluate response  - Implement non-pharmacological measures as appropriate and evaluate response  - Consider cultural and social influences on pain and pain management  - Notify physician/advanced practitioner if interventions unsuccessful or patient reports new pain  Outcome: Progressing     Problem: INFECTION - ADULT  Goal: Absence or prevention of progression during hospitalization  Description: INTERVENTIONS:  - Assess and monitor for signs and symptoms of infection  - Monitor lab/diagnostic results  - Monitor all insertion sites, i e  indwelling lines, tubes, and drains  - Monitor endotracheal if appropriate and nasal secretions for changes in amount and color  - [place] appropriate cooling/warming therapies per order  - Administer medications as ordered  - Instruct and encourage patient and family to use good hand hygiene technique  - Identify and instruct in appropriate isolation precautions for identified infection/condition  Outcome: Progressing  Goal: Absence of fever/infection during neutropenic period  Description: INTERVENTIONS:  - Monitor WBC    Outcome: Progressing     Problem: SAFETY ADULT  Goal: Patient will remain free of falls  Description: INTERVENTIONS:  - Educate patient/family on patient safety including physical limitations  - Instruct patient to call for assistance with activity   - Consult OT/PT to assist with strengthening/mobility   - Keep Call bell within reach  - Keep bed low and locked with side rails adjusted as appropriate  - Keep care items and personal belongings within reach  - Initiate and maintain comfort rounds  - Make Fall Risk Sign visible to staff  - Offer Toileting every 2 Hours, in advance of need  - Initiate/Maintain bed/chair alarm  - Obtain necessary fall risk management equipment: alarms  - Apply yellow socks and bracelet for high fall risk patients  - Consider moving patient to room near nurses station  Outcome: Progressing  Goal: Maintain or return to baseline ADL function  Description: INTERVENTIONS:  -  Assess patient's ability to carry out ADLs; assess patient's baseline for ADL function and identify physical deficits which impact ability to perform ADLs (bathing, care of mouth/teeth, toileting, grooming, dressing, etc )  - Assess/evaluate cause of self-care deficits   - Assess range of motion  - Assess patient's mobility; develop plan if impaired  - Assess patient's need for assistive devices and provide as appropriate  - Encourage maximum independence but intervene and supervise when necessary  - Involve family in performance of ADLs  - Assess for home care needs following discharge   - Consider OT consult to assist with ADL evaluation and planning for discharge  - Provide patient education as appropriate  Outcome: Progressing  Goal: Maintains/Returns to pre admission functional level  Description: INTERVENTIONS:  - Perform BMAT or MOVE assessment daily    - Set and communicate daily mobility goal to care team and patient/family/caregiver  - Collaborate with rehabilitation services on mobility goals if consulted  - Perform Range of Motion 4 times a day  - Reposition patient every 2 hours    - Dangle patient 3-4 times a day  - Stand patient 4 times a day  - Ambulate patient 4 times a day  - Out of bed to chair 3 times a day   - Out of bed for meals 3 times a day  - Out of bed for toileting  - Record patient progress and toleration of activity level   Outcome: Progressing     Problem: DISCHARGE PLANNING  Goal: Discharge to home or other facility with appropriate resources  Description: INTERVENTIONS:  - Identify barriers to discharge w/patient and caregiver  - Arrange for needed discharge resources and transportation as appropriate  - Identify discharge learning needs (meds, wound care, etc )  - Arrange for interpretive services to assist at discharge as needed  - Refer to Case Management Department for coordinating discharge planning if the patient needs post-hospital services based on physician/advanced practitioner order or complex needs related to functional status, cognitive ability, or social support system  Outcome: Progressing     Problem: Knowledge Deficit  Goal: Patient/family/caregiver demonstrates understanding of disease process, treatment plan, medications, and discharge instructions  Description: Complete learning assessment and assess knowledge base    Interventions:  - Provide teaching at level of understanding  - Provide teaching via preferred learning methods  Outcome: Progressing     Problem: NEUROSENSORY - ADULT  Goal: Achieves stable or improved neurological status  Description: INTERVENTIONS  - Monitor and report changes in neurological status  - Monitor vital signs such as temperature, blood pressure, glucose, and any other labs ordered   - Initiate measures to prevent increased intracranial pressure      Outcome: Progressing     Problem: CARDIOVASCULAR - ADULT  Goal: Maintains optimal cardiac output and hemodynamic stability  Description: INTERVENTIONS:  - Monitor I/O, vital signs and rhythm  - Monitor for S/S and trends of decreased cardiac output  - Administer and titrate ordered vasoactive medications to optimize hemodynamic stability  - Assess quality of pulses, skin color and temperature  - Assess for signs of decreased coronary artery perfusion  - Instruct patient to report change in severity of symptoms  Outcome: Progressing     Problem: GENITOURINARY - ADULT  Goal: Maintains or returns to baseline urinary function  Description: INTERVENTIONS:  - Assess urinary function  - Encourage oral fluids to ensure adequate hydration if ordered  - Administer IV fluids as ordered to ensure adequate hydration  - Administer ordered medications as needed  - Offer frequent toileting  - Follow urinary retention protocol if ordered  Outcome: Progressing  Goal: Absence of urinary retention  Description: INTERVENTIONS:  - Assess patient's ability to void and empty bladder  - Monitor I/O  - Bladder scan as needed  - Discuss with physician/AP medications to alleviate retention as needed  - Discuss catheterization for long term situations as appropriate  Outcome: Progressing     Problem: METABOLIC, FLUID AND ELECTROLYTES - ADULT  Goal: Electrolytes maintained within normal limits  Description: INTERVENTIONS:  - Monitor labs and assess patient for signs and symptoms of electrolyte imbalances  - Administer electrolyte replacement as ordered  - Monitor response to electrolyte replacements, including repeat lab results as appropriate  - Instruct patient on fluid and nutrition as appropriate  Outcome: Progressing     Problem: SKIN/TISSUE INTEGRITY - ADULT  Goal: Skin Integrity remains intact(Skin Breakdown Prevention)  Description: Assess:  -Perform [place] assessment every shift  -Clean and moisturize skin every 4 houra  -Inspect skin when repositioning, toileting, and assisting with ADLS  -Assess under medical devices such as electrodes every shift  -Assess extremities for adequate circulation and sensation     Bed Management:  -Have minimal linens on bed & keep smooth, unwrinkled  -Change linens as needed when moist or perspiring  -Avoid sitting or lying in one position for more than 2 hours while in bed  -Keep HOB at 30 degrees     Toileting:  -Offer bedside commode  -Assess for incontinence every 2 hours  -Use incontinent care products after each incontinent episode such as breif    Activity:  -Mobilize patient 4 times a day  -Encourage activity and walks on unit  -Encourage or provide ROM exercises   -Turn and reposition patient every 2 Hours  -Use appropriate equipment to lift or move patient in bed  -Instruct/ Assist with weight shifting every 2 hours when out of bed in chair  -Consider limitation of chair time 4 hour intervals    Skin Care:  -Avoid use of baby powder, tape, friction and shearing, hot water or constrictive clothing  -Relieve pressure over bony prominences using alevyn  -Do not massage red bony areas    Next Steps:  -Teach patient strategies to minimize risks such as weight shift  Outcome: Progressing     Problem: MUSCULOSKELETAL - ADULT  Goal: Maintain or return mobility to safest level of function  Description: INTERVENTIONS:  - Assess patient's ability to carry out ADLs; assess patient's baseline for ADL function and identify physical deficits which impact ability to perform ADLs (bathing, care of mouth/teeth, toileting, grooming, dressing, etc )  - Assess/evaluate cause of self-care deficits   - Assess range of motion  - Assess patient's mobility  - Assess patient's need for assistive devices and provide as appropriate  - Encourage maximum independence but intervene and supervise when necessary  - Involve family in performance of ADLs  - Assess for home care needs following discharge   - Consider OT consult to assist with ADL evaluation and planning for discharge  - Provide patient education as appropriate  Outcome: Progressing     Problem: Potential for Falls  Goal: Patient will remain free of falls  Description: INTERVENTIONS:  - Educate patient/family on patient safety including physical limitations  - Instruct patient to call for assistance with activity   - Consult OT/PT to assist with strengthening/mobility   - Keep Call bell within reach  - Keep bed low and locked with side rails adjusted as appropriate  - Keep care items and personal belongings within reach  - Initiate and maintain comfort rounds  - Make Fall Risk Sign visible to staff  - Offer Toileting every 2 Hours, in advance of need  - Initiate/Maintain bed/chair alarm  - Obtain necessary fall risk management equipment: alarms  - Apply yellow socks and bracelet for high fall risk patients  - Consider moving patient to room near nurses station  Outcome: Progressing     Problem: MOBILITY - ADULT  Goal: Maintain or return to baseline ADL function  Description: INTERVENTIONS:  -  Assess patient's ability to carry out ADLs; assess patient's baseline for ADL function and identify physical deficits which impact ability to perform ADLs (bathing, care of mouth/teeth, toileting, grooming, dressing, etc )  - Assess/evaluate cause of self-care deficits   - Assess range of motion  - Assess patient's mobility; develop plan if impaired  - Assess patient's need for assistive devices and provide as appropriate  - Encourage maximum independence but intervene and supervise when necessary  - Involve family in performance of ADLs  - Assess for home care needs following discharge   - Consider OT consult to assist with ADL evaluation and planning for discharge  - Provide patient education as appropriate  Outcome: Progressing  Goal: Maintains/Returns to pre admission functional level  Description: INTERVENTIONS:  - Perform BMAT or MOVE assessment daily    - Set and communicate daily mobility goal to care team and patient/family/caregiver  - Collaborate with rehabilitation services on mobility goals if consulted  - Perform Range of Motion 4 times a day  - Reposition patient every 2 hours    - Dangle patient 3-4 times a day  - Stand patient 4 times a day  - Ambulate patient 4 times a day  - Out of bed to chair 3 times a day   - Out of bed for meals 3 times a day  - Out of bed for toileting  - Record patient progress and toleration of activity level   Outcome: Progressing     Problem: Prexisting or High Potential for Compromised Skin Integrity  Goal: Skin integrity is maintained or improved  Description: INTERVENTIONS:  - Identify patients at risk for skin breakdown  - Assess and monitor skin integrity  - Assess and monitor nutrition and hydration status  - Monitor labs   - Assess for incontinence   - Turn and reposition patient  - Assist with mobility/ambulation  - Relieve pressure over bony prominences  - Avoid friction and shearing  - Provide appropriate hygiene as needed including keeping skin clean and dry  - Evaluate need for skin moisturizer/barrier cream  - Collaborate with interdisciplinary team   - Patient/family teaching  - Consider wound care consult   Outcome: Progressing     Problem: Nutrition/Hydration-ADULT  Goal: Nutrient/Hydration intake appropriate for improving, restoring or maintaining nutritional needs  Description: Monitor and assess patient's nutrition/hydration status for malnutrition  Collaborate with interdisciplinary team and initiate plan and interventions as ordered  Monitor patient's weight and dietary intake as ordered or per policy  Utilize nutrition screening tool and intervene as necessary  Determine patient's food preferences and provide high-protein, high-caloric foods as appropriate       INTERVENTIONS:  - Monitor oral intake, urinary output, labs, and treatment plans  - Assess nutrition and hydration status and recommend course of action  - Evaluate amount of meals eaten  - Assist patient with eating if necessary   - Allow adequate time for meals  - Recommend/ encourage appropriate diets, oral nutritional supplements, and vitamin/mineral supplements  - Order, calculate, and assess calorie counts as needed  - Recommend, monitor, and adjust tube feedings and TPN/PPN based on assessed needs  - Assess need for intravenous fluids  - Provide specific nutrition/hydration education as appropriate  - Include patient/family/caregiver in decisions related to nutrition  Outcome: Progressing     Problem: RESPIRATORY - ADULT  Goal: Achieves optimal ventilation and oxygenation  Description: INTERVENTIONS:  - Assess for changes in respiratory status  - Assess for changes in mentation and behavior  - Position to facilitate oxygenation and minimize respiratory effort  - Oxygen administered by appropriate delivery if ordered  - Initiate smoking cessation education as indicated  - Encourage broncho-pulmonary hygiene including cough, deep breathe, Incentive Spirometry  - Assess the need for suctioning and aspirate as needed  - Assess and instruct to report SOB or any respiratory difficulty  - Respiratory Therapy support as indicated  Outcome: Progressing

## 2022-09-29 NOTE — ASSESSMENT & PLAN NOTE
Wt Readings from Last 3 Encounters:   09/29/22 56 3 kg (124 lb 1 9 oz)   05/31/22 62 8 kg (138 lb 7 2 oz)   11/16/21 62 8 kg (138 lb 7 2 oz)     Current ECHO with LVEF 45%, presence of G1 DD  RV systolic function is normal   Also with mention of moderate MR, moderate PHTN  Cardiomyopathy appears ischemic in nature  · Check daily weights - increased today  · Maintain on low-sodium diet  · Furosemide and ARB remain hold as above  · Given current weight gain and net positive I/O's, may benefit from a 1 time IV dose of furosemide  · Continue BB therapy, with plans for discharge on long-acting formulation  · Insure outpatient follow-up with Cardiology

## 2022-09-29 NOTE — ASSESSMENT & PLAN NOTE
Lab Results   Component Value Date    EGFR 26 09/29/2022    EGFR 26 09/28/2022    EGFR 28 09/28/2022    CREATININE 1 80 (H) 09/29/2022    CREATININE 1 79 (H) 09/28/2022    CREATININE 1 72 (H) 09/28/2022     DARRELL superimposed on CKD, likely in the setting of sepsis, acute blood loss anemia, with concurrent diuretic use  Baseline creatinine of 1 6 to 2, peak value during hospitalization of 3  Current creatinine has improved to 1 8 and remains stable  Discontinued IV fluids previously  Received blood products again yesterday  Continue to hold oral furosemide and ARB despite recovery as patient is undergoing prep for colonoscopy, clear liquid diet and NPO status  However, given net positive I/O's and weight gain, will benefit from 1 time dose of IV furosemide today  Nephrology consulted and following

## 2022-09-29 NOTE — ASSESSMENT & PLAN NOTE
The patient was seen in consultation by Urology- no current plan for urological intervention  Continue to treat underlying UTI  As per Urology attending, retained indwelling ureteral stent may be difficult to remove, and may require retrograde and antegrade renal surgery if the proximal coil is fixed with stone formation       The patient will need definitive stone treatment and extraction of the retained left ureteral stent via cystoscopy and laser for extraction of  encrusted/retained stent as well as ureteral/renal stones with temporary stent exchange  This can be done after hospital discharge and after the suspected acute urinary tract infection has cleared and patient has completed antibiotic course  The patient can follow with her urologist at the [facility] that she previously establish care with almost 2 years ago for the initial placement of the stent or referral to [facility] on an outpatient basis      If the patient develops worsening creatinine then with recommendations to obtain renal ultrasound to assess for worsening hydronephrosis, and in that instance patient will become a candidate for urological intervention

## 2022-09-29 NOTE — PLAN OF CARE
Problem: PAIN - ADULT  Goal: Verbalizes/displays adequate comfort level or baseline comfort level  Description: Interventions:  - Encourage patient to monitor pain and request assistance  - Assess pain using appropriate pain scale  - Administer analgesics based on type and severity of pain and evaluate response  - Implement non-pharmacological measures as appropriate and evaluate response  - Consider cultural and social influences on pain and pain management  - Notify physician/advanced practitioner if interventions unsuccessful or patient reports new pain  9/29/2022 0909 by Javon Newell  Outcome: Progressing  9/29/2022 0908 by Javon Newell  Outcome: Progressing     Problem: INFECTION - ADULT  Goal: Absence or prevention of progression during hospitalization  Description: INTERVENTIONS:  - Assess and monitor for signs and symptoms of infection  - Monitor lab/diagnostic results  - Monitor all insertion sites, i e  indwelling lines, tubes, and drains  - Monitor endotracheal if appropriate and nasal secretions for changes in amount and color  - [place] appropriate cooling/warming therapies per order  - Administer medications as ordered  - Instruct and encourage patient and family to use good hand hygiene technique  - Identify and instruct in appropriate isolation precautions for identified infection/condition  9/29/2022 0909 by Javon Newell  Outcome: Progressing  9/29/2022 0908 by Javon Newell  Outcome: Progressing  Goal: Absence of fever/infection during neutropenic period  Description: INTERVENTIONS:  - Monitor WBC    9/29/2022 0909 by Javon Newell  Outcome: Progressing  9/29/2022 0908 by Javon Newell  Outcome: Progressing     Problem: SAFETY ADULT  Goal: Patient will remain free of falls  Description: INTERVENTIONS:  - Educate patient/family on patient safety including physical limitations  - Instruct patient to call for assistance with activity   - Consult OT/PT to assist with strengthening/mobility - Keep Call bell within reach  - Keep bed low and locked with side rails adjusted as appropriate  - Keep care items and personal belongings within reach  - Initiate and maintain comfort rounds  - Make Fall Risk Sign visible to staff  - Offer Toileting every 2 Hours, in advance of need  - Initiate/Maintain bed/chair alarm  - Obtain necessary fall risk management equipment: alarms  - Apply yellow socks and bracelet for high fall risk patients  - Consider moving patient to room near nurses station  9/29/2022 0909 by Mikaela Adorno  Outcome: Progressing  9/29/2022 0908 by Mikaela Adorno  Outcome: Progressing  Goal: Maintain or return to baseline ADL function  Description: INTERVENTIONS:  -  Assess patient's ability to carry out ADLs; assess patient's baseline for ADL function and identify physical deficits which impact ability to perform ADLs (bathing, care of mouth/teeth, toileting, grooming, dressing, etc )  - Assess/evaluate cause of self-care deficits   - Assess range of motion  - Assess patient's mobility; develop plan if impaired  - Assess patient's need for assistive devices and provide as appropriate  - Encourage maximum independence but intervene and supervise when necessary  - Involve family in performance of ADLs  - Assess for home care needs following discharge   - Consider OT consult to assist with ADL evaluation and planning for discharge  - Provide patient education as appropriate  9/29/2022 0909 by Mikaela Adorno  Outcome: Progressing  9/29/2022 0908 by Mikaela Adorno  Outcome: Progressing  Goal: Maintains/Returns to pre admission functional level  Description: INTERVENTIONS:  - Perform BMAT or MOVE assessment daily    - Set and communicate daily mobility goal to care team and patient/family/caregiver  - Collaborate with rehabilitation services on mobility goals if consulted  - Perform Range of Motion 4 times a day  - Reposition patient every 2 hours    - Dangle patient 3-4 times a day  - Stand patient 4 times a day  - Ambulate patient 4 times a day  - Out of bed to chair 3 times a day   - Out of bed for meals 3 times a day  - Out of bed for toileting  - Record patient progress and toleration of activity level   9/29/2022 0909 by Roberto Kwan  Outcome: Progressing  9/29/2022 0908 by Roberto Kwan  Outcome: Progressing     Problem: DISCHARGE PLANNING  Goal: Discharge to home or other facility with appropriate resources  Description: INTERVENTIONS:  - Identify barriers to discharge w/patient and caregiver  - Arrange for needed discharge resources and transportation as appropriate  - Identify discharge learning needs (meds, wound care, etc )  - Arrange for interpretive services to assist at discharge as needed  - Refer to Case Management Department for coordinating discharge planning if the patient needs post-hospital services based on physician/advanced practitioner order or complex needs related to functional status, cognitive ability, or social support system  9/29/2022 0909 by Roberto Kwan  Outcome: Progressing  9/29/2022 0908 by Roberto Kwan  Outcome: Progressing     Problem: Knowledge Deficit  Goal: Patient/family/caregiver demonstrates understanding of disease process, treatment plan, medications, and discharge instructions  Description: Complete learning assessment and assess knowledge base    Interventions:  - Provide teaching at level of understanding  - Provide teaching via preferred learning methods  9/29/2022 0909 by Roberto Kwan  Outcome: Progressing  9/29/2022 0908 by Roberto Kwan  Outcome: Progressing     Problem: NEUROSENSORY - ADULT  Goal: Achieves stable or improved neurological status  Description: INTERVENTIONS  - Monitor and report changes in neurological status  - Monitor vital signs such as temperature, blood pressure, glucose, and any other labs ordered   - Initiate measures to prevent increased intracranial pressure      9/29/2022 0909 by Roberto Kwan  Outcome: Progressing  9/29/2022 0908 by Luiza Robertson  Outcome: Progressing     Problem: CARDIOVASCULAR - ADULT  Goal: Maintains optimal cardiac output and hemodynamic stability  Description: INTERVENTIONS:  - Monitor I/O, vital signs and rhythm  - Monitor for S/S and trends of decreased cardiac output  - Administer and titrate ordered vasoactive medications to optimize hemodynamic stability  - Assess quality of pulses, skin color and temperature  - Assess for signs of decreased coronary artery perfusion  - Instruct patient to report change in severity of symptoms  9/29/2022 0909 by Luiza Robertson  Outcome: Progressing  9/29/2022 0908 by Luiza Robertson  Outcome: Progressing     Problem: GENITOURINARY - ADULT  Goal: Maintains or returns to baseline urinary function  Description: INTERVENTIONS:  - Assess urinary function  - Encourage oral fluids to ensure adequate hydration if ordered  - Administer IV fluids as ordered to ensure adequate hydration  - Administer ordered medications as needed  - Offer frequent toileting  - Follow urinary retention protocol if ordered  9/29/2022 0909 by Luiza Robertson  Outcome: Progressing  9/29/2022 0908 by Luiza Robertson  Outcome: Progressing  Goal: Absence of urinary retention  Description: INTERVENTIONS:  - Assess patient's ability to void and empty bladder  - Monitor I/O  - Bladder scan as needed  - Discuss with physician/AP medications to alleviate retention as needed  - Discuss catheterization for long term situations as appropriate  9/29/2022 0909 by Luiza Robertson  Outcome: Progressing  9/29/2022 0908 by Luiza Robertson  Outcome: Progressing     Problem: METABOLIC, FLUID AND ELECTROLYTES - ADULT  Goal: Electrolytes maintained within normal limits  Description: INTERVENTIONS:  - Monitor labs and assess patient for signs and symptoms of electrolyte imbalances  - Administer electrolyte replacement as ordered  - Monitor response to electrolyte replacements, including repeat lab results as appropriate  - Instruct patient on fluid and nutrition as appropriate  9/29/2022 0909 by Rock Mosquera  Outcome: Progressing  9/29/2022 0908 by Rock Mosquera  Outcome: Progressing     Problem: SKIN/TISSUE INTEGRITY - ADULT  Goal: Skin Integrity remains intact(Skin Breakdown Prevention)  Description: Assess:  -Perform [place] assessment every shift  -Clean and moisturize skin every 4 houra  -Inspect skin when repositioning, toileting, and assisting with ADLS  -Assess under medical devices such as electrodes every shift  -Assess extremities for adequate circulation and sensation     Bed Management:  -Have minimal linens on bed & keep smooth, unwrinkled  -Change linens as needed when moist or perspiring  -Avoid sitting or lying in one position for more than 2 hours while in bed  -Keep HOB at 30 degrees     Toileting:  -Offer bedside commode  -Assess for incontinence every 2 hours  -Use incontinent care products after each incontinent episode such as breif    Activity:  -Mobilize patient 4 times a day  -Encourage activity and walks on unit  -Encourage or provide ROM exercises   -Turn and reposition patient every 2 Hours  -Use appropriate equipment to lift or move patient in bed  -Instruct/ Assist with weight shifting every 2 hours when out of bed in chair  -Consider limitation of chair time 4 hour intervals    Skin Care:  -Avoid use of baby powder, tape, friction and shearing, hot water or constrictive clothing  -Relieve pressure over bony prominences using alevyn  -Do not massage red bony areas    Next Steps:  -Teach patient strategies to minimize risks such as weight shift  9/29/2022 0909 by Rock Mosquera  Outcome: Progressing  9/29/2022 0908 by Rock Mosquera  Outcome: Progressing     Problem: MUSCULOSKELETAL - ADULT  Goal: Maintain or return mobility to safest level of function  Description: INTERVENTIONS:  - Assess patient's ability to carry out ADLs; assess patient's baseline for ADL function and identify physical deficits which impact ability to perform ADLs (bathing, care of mouth/teeth, toileting, grooming, dressing, etc )  - Assess/evaluate cause of self-care deficits   - Assess range of motion  - Assess patient's mobility  - Assess patient's need for assistive devices and provide as appropriate  - Encourage maximum independence but intervene and supervise when necessary  - Involve family in performance of ADLs  - Assess for home care needs following discharge   - Consider OT consult to assist with ADL evaluation and planning for discharge  - Provide patient education as appropriate  9/29/2022 0909 by Arvind Leong  Outcome: Progressing  9/29/2022 0908 by Arvind Leong  Outcome: Progressing     Problem: Potential for Falls  Goal: Patient will remain free of falls  Description: INTERVENTIONS:  - Educate patient/family on patient safety including physical limitations  - Instruct patient to call for assistance with activity   - Consult OT/PT to assist with strengthening/mobility   - Keep Call bell within reach  - Keep bed low and locked with side rails adjusted as appropriate  - Keep care items and personal belongings within reach  - Initiate and maintain comfort rounds  - Make Fall Risk Sign visible to staff  - Offer Toileting every 2 Hours, in advance of need  - Initiate/Maintain bed/chair alarm  - Obtain necessary fall risk management equipment: alarms  - Apply yellow socks and bracelet for high fall risk patients  - Consider moving patient to room near nurses station  9/29/2022 0909 by Arvind Leong  Outcome: Progressing  9/29/2022 0908 by Arvind Leong  Outcome: Progressing     Problem: MOBILITY - ADULT  Goal: Maintain or return to baseline ADL function  Description: INTERVENTIONS:  -  Assess patient's ability to carry out ADLs; assess patient's baseline for ADL function and identify physical deficits which impact ability to perform ADLs (bathing, care of mouth/teeth, toileting, grooming, dressing, etc )  - Assess/evaluate cause of self-care deficits   - Assess range of motion  - Assess patient's mobility; develop plan if impaired  - Assess patient's need for assistive devices and provide as appropriate  - Encourage maximum independence but intervene and supervise when necessary  - Involve family in performance of ADLs  - Assess for home care needs following discharge   - Consider OT consult to assist with ADL evaluation and planning for discharge  - Provide patient education as appropriate  9/29/2022 0909 by Dustin Bazan  Outcome: Progressing  9/29/2022 0908 by Dustin Bazan  Outcome: Progressing  Goal: Maintains/Returns to pre admission functional level  Description: INTERVENTIONS:  - Perform BMAT or MOVE assessment daily    - Set and communicate daily mobility goal to care team and patient/family/caregiver  - Collaborate with rehabilitation services on mobility goals if consulted  - Perform Range of Motion 4 times a day  - Reposition patient every 2 hours    - Dangle patient 3-4 times a day  - Stand patient 4 times a day  - Ambulate patient 4 times a day  - Out of bed to chair 3 times a day   - Out of bed for meals 3 times a day  - Out of bed for toileting  - Record patient progress and toleration of activity level   9/29/2022 0909 by Dustin Bazan  Outcome: Progressing  9/29/2022 0908 by Dustin Bazan  Outcome: Progressing     Problem: Prexisting or High Potential for Compromised Skin Integrity  Goal: Skin integrity is maintained or improved  Description: INTERVENTIONS:  - Identify patients at risk for skin breakdown  - Assess and monitor skin integrity  - Assess and monitor nutrition and hydration status  - Monitor labs   - Assess for incontinence   - Turn and reposition patient  - Assist with mobility/ambulation  - Relieve pressure over bony prominences  - Avoid friction and shearing  - Provide appropriate hygiene as needed including keeping skin clean and dry  - Evaluate need for skin moisturizer/barrier cream  - Collaborate with interdisciplinary team   - Patient/family teaching  - Consider wound care consult   9/29/2022 0909 by Arpan Estrada  Outcome: Progressing  9/29/2022 0908 by Arpan Estrada  Outcome: Progressing     Problem: Nutrition/Hydration-ADULT  Goal: Nutrient/Hydration intake appropriate for improving, restoring or maintaining nutritional needs  Description: Monitor and assess patient's nutrition/hydration status for malnutrition  Collaborate with interdisciplinary team and initiate plan and interventions as ordered  Monitor patient's weight and dietary intake as ordered or per policy  Utilize nutrition screening tool and intervene as necessary  Determine patient's food preferences and provide high-protein, high-caloric foods as appropriate       INTERVENTIONS:  - Monitor oral intake, urinary output, labs, and treatment plans  - Assess nutrition and hydration status and recommend course of action  - Evaluate amount of meals eaten  - Assist patient with eating if necessary   - Allow adequate time for meals  - Recommend/ encourage appropriate diets, oral nutritional supplements, and vitamin/mineral supplements  - Order, calculate, and assess calorie counts as needed  - Recommend, monitor, and adjust tube feedings and TPN/PPN based on assessed needs  - Assess need for intravenous fluids  - Provide specific nutrition/hydration education as appropriate  - Include patient/family/caregiver in decisions related to nutrition  9/29/2022 0909 by Arpan Estrada  Outcome: Progressing  9/29/2022 0908 by Arpan Estrada  Outcome: Progressing     Problem: RESPIRATORY - ADULT  Goal: Achieves optimal ventilation and oxygenation  Description: INTERVENTIONS:  - Assess for changes in respiratory status  - Assess for changes in mentation and behavior  - Position to facilitate oxygenation and minimize respiratory effort  - Oxygen administered by appropriate delivery if ordered  - Initiate smoking cessation education as indicated  - Encourage broncho-pulmonary hygiene including cough, deep breathe, Incentive Spirometry  - Assess the need for suctioning and aspirate as needed  - Assess and instruct to report SOB or any respiratory difficulty  - Respiratory Therapy support as indicated  9/29/2022 0909 by Jonathan Dan  Outcome: Progressing  9/29/2022 0908 by Jonathan Dan  Outcome: Progressing

## 2022-09-29 NOTE — ASSESSMENT & PLAN NOTE
Eliquis resumed on 09/25 at decreased dosing based upon patient's weight and serum creatinine - discontinued given continued drop in hemoglobin  Continue beta-blocker with plans on discharging on long-acting formulation

## 2022-09-29 NOTE — CASE MANAGEMENT
Case Management Progress Note    Patient name Christian Falling  Location /070-21 MRN [id number]  : 1945 Date 2022       LOS (days): 7  Geometric Mean LOS (GMLOS) (days): 4 80  Days to GMLOS:-2 7        OBJECTIVE:        Current admission status: Inpatient  Preferred Pharmacy:   [address] [place] [address] [address], [address] [place] [address]  [address]  [address]  [address] J[place] [id number]  Phone: [phone] Fax: [phone]    Primary Care Provider: Nanette Sherman DO    Primary Insurance: Nabeel Villegas [facility]  Secondary Insurance:     PROGRESS NOTE:Pt still in need of STR on dc  Per SNF's, pt needs to be 10 days out COVID  (COVID positive on )

## 2022-09-29 NOTE — OCCUPATIONAL THERAPY NOTE
Occupational Therapy Progress Note     Patient Name: Jaleel Posada Date: 9/29/2022  Problem List  Principal Problem:    Sepsis due to urinary tract infection [facility])  Active Problems:    Acquired hypothyroidism    Acute kidney injury superimposed on chronic kidney disease ([facility] )    Chronic combined systolic and diastolic CHF (congestive heart failure) ([facility])    Paroxysmal atrial fibrillation ([facility] )    Essential hypertension    Acute blood loss anemia    Stroke-like symptoms    Gastrointestinal bleeding    Hydroureteronephrosis    COVID-19 virus infection              09/29/22 1012   OT Last Visit   OT Visit Date 09/29/22   Note Type   Note Type Treatment   Restrictions/Precautions   Weight Bearing Precautions Per Order No   Other Precautions Contact/isolation; Airborne/isolation; Chair Alarm; Restraints;Multiple lines;Telemetry; Fall Risk   Pain Assessment   Pain Score No Pain   ADL   Where Assessed Edge of bed   Grooming Assistance 3  Moderate Assistance   Grooming Deficit Teeth care;Denture care;Brushing hair   Grooming Comments min (A) for back of heair; mod (A) for denture care and applying fixadent   LB Dressing Assistance 2  Maximal Assistance   LB Dressing Deficit Thread RLE into underwear; Thread LLE into underwear;Pull up over hips; Increased time to complete   Toileting Assistance  2  Maximal Assistance   Toileting Deficit Clothing management up; Increased time to complete;Clothing management down;Perineal hygiene   Toileting Comments pt incontinent of urine and bowel during session; requires max (A) due to requiring B UEs stability on RW   Bed Mobility   Supine to Sit 3  Moderate assistance   Additional items Assist x 2; Increased time required; Bedrails;LE management;Verbal cues   Sit to Supine   (seated in chair at end of session)   Additional Comments pt on RA during session; SpO2 WFL with no complaints of SOB   Transfers   Sit to Stand 3  Moderate assistance   Additional items Assist x 2; Increased time required;Verbal cues  (RW)   Stand to Sit 3  Moderate assistance   Additional items Assist x 2; Increased time required;Verbal cues  (RW)   Stand pivot 3  Moderate assistance   Additional items Assist x 2; Increased time required;Verbal cues  (RW)   Additional Comments pt stands for ~5 minutes during magali hygiene with minimal posterior lean and cues for stability; no significant LOB; performs SPT to chair with mod (A) level and consistent cues both verbally and physically   Functional Mobility   Additional Comments deferred due to current level of mobility   Cognition   Overall Cognitive Status Impaired   Arousal/Participation Cooperative   Attention Attends with cues to redirect   Orientation Level Oriented to person   Memory Decreased long term memory;Decreased short term memory;Decreased recall of recent events   Following Commands Follows one step commands with increased time or repetition   Comments pt requires simple commands   Activity Tolerance   Activity Tolerance Patient limited by fatigue;Patient limited by pain   Assessment   Assessment Patient participated in Skilled OT session this date with interventions consisting of ADL re training with the use of correct body mechnaics, safety awareness and fall prevention techniques,  therapeutic activities to: increase activity tolerance, increase standing tolerance time with unilateral UE support to complete sink level ADLs, increase cardiovascular endurance , increase dynamic sit/ stand balance during functional activity , increase postural control, increase trunk control and increase OOB/ sitting tolerance   Patient agreeable to OT treatment session, upon arrival patient was found seated OOB to Chair and supine in bed  Patient requiring frequent re direction, verbal cues for safety, verbal cues for correct technique, verbal cues for pacing thru activity steps, cognitive assistance to anticipate next step, one step directives and frequent rest periods   Patient continues to be functioning below baseline level, occupational performance remains limited secondary to factors listed above and increased risk for falls and injury  From OT standpoint, recommendation at time of d/c would be Post acute rehabilitation services  The patient's raw score on the AM-PAC Daily Activity inpatient short form is 13, standardized score is 32 03, less than 39 4  Patients at this level are likely to benefit from discharge to post-acute rehabilitation services  Please refer to the recommendation of the Occupational Therapist for safe discharge planning  Pt benefited from co-treatment of skilled OT and PT therapists in order to most appropriately address functional deficits d/t extensive assistance required for safe functional mobility, decreased activity tolerance, and regression from functioning level prior to admission and/or onset of present illness  OT/PT objectives were addressed separately; please see PT note for specific goal areas targeted     Plan   Goal Expiration Date 10/06/22   OT Treatment Day 2   OT Frequency 3-5x/wk   Recommendation   OT Discharge Recommendation Post acute rehabilitation services   AM-PAC Daily Activity Inpatient   Lower Body Dressing 1   Bathing 2   Toileting 2   Upper Body Dressing 2   Grooming 3   Eating 3   Daily Activity Raw Score 13   Daily Activity Standardized Score (Calc for Raw Score >=11) 32 03   AM-PAC Applied Cognition Inpatient   Following a Speech/Presentation 3   Understanding Ordinary Conversation 2   Taking Medications 1   Remembering Where Things Are Placed or Put Away 1   Remembering List of 4-5 Errands 1   Taking Care of Complicated Tasks 1   Applied Cognition Raw Score 9   Applied Cognition Standardized Score 22 48

## 2022-09-29 NOTE — PROGRESS NOTES
[facility] [address] [place] [address]  Progress Note - Donny Reynoso 1945, 65 y o  female MRN: [id number]  Unit/Bed#: 420-01 Encounter: [id number]  Primary Care Provider: Sabiha Amato DO   Date and time admitted to hospital: 9/21/2022  9:40 PM    * Sepsis due to urinary tract infection [facility])  Assessment & Plan  Sepsis was present on admission and due to UTI - patient with retained ureteral stent  Blood cultures remain negative, but urinary cultures from 09/22 with > 100,000 CFU per mL of pansensitive E coli  Concluding 7 day course of IV ceftriaxone today  Hydroureteronephrosis  Assessment & Plan  The patient was seen in consultation by Urology- no current plan for urological intervention  Continue to treat underlying UTI  As per Urology attending, retained indwelling ureteral stent may be difficult to remove, and may require retrograde and antegrade renal surgery if the proximal coil is fixed with stone formation       The patient will need definitive stone treatment and extraction of the retained left ureteral stent via cystoscopy and laser for extraction of  encrusted/retained stent as well as ureteral/renal stones with temporary stent exchange  This can be done after hospital discharge and after the suspected acute urinary tract infection has cleared and patient has completed antibiotic course  The patient can follow with her urologist at the [facility] that she previously establish care with almost 2 years ago for the initial placement of the stent or referral to [facility] Urology on an outpatient basis      If the patient develops worsening creatinine then with recommendations to obtain renal ultrasound to assess for worsening hydronephrosis, and in that instance patient will become a candidate for urological intervention       Acute kidney injury superimposed on chronic kidney disease [facility])  Assessment & Plan  Lab Results   Component Value Date    EGFR 26 09/29/2022    EGFR 26 09/28/2022    EGFR 28 09/28/2022    CREATININE 1 80 (H) 09/29/2022    CREATININE 1 79 (H) 09/28/2022    CREATININE 1 72 (H) 09/28/2022     DARRELL superimposed on CKD, likely in the setting of sepsis, acute blood loss anemia, with concurrent diuretic use  Baseline creatinine of 1 6 to 2, peak value during hospitalization of 3  Current creatinine has improved to 1 8 and remains stable  Discontinued IV fluids previously  Received blood products again yesterday  Continue to hold oral furosemide and ARB despite recovery as patient is undergoing prep for colonoscopy, clear liquid diet and NPO status  However, given net positive I/O's and weight gain, will benefit from 1 time dose of IV furosemide today  Nephrology consulted and following  Acute blood loss anemia  Assessment & Plan  Required transfusion with 2 units of PRBC's, with apixaban initially held  EGD 9/23 essentially normal, with note of a medium sliding hiatal hernia and numerous polyps measuring less than 5 mm in the stomach  Patient's apixaban was resumed on 09/25  Patient's hemoglobin had responded to a value of 9 following the 2nd unit of PRBCs - has continued to slowly drop, down to a value of  6 8 on the morning 9/28  · Eliquis was discontinued previously  · Plans for colonoscopy on Friday  · Clear liquid diet today followed by prep to be ordered by GI  · Received 2 additional units of PRBCs with appropriate response this morning  Gastrointestinal bleeding  Assessment & Plan  Evaluation and treatment as above  COVID-19 virus infection  Assessment & Plan  Initial COVID testing negative on 09/22, fevers prompting repeat check on 09/25 which was positive  Currently on the mild pathway  · CXR without acute findings  · Monitor labs, trend CRP  · Hold off on dexamethasone, remdesivir given continued lack of O2 requirement    · D-dimer elevated at 2 9 - however, patient's illnesses is mild and she is currently without oxygen requirement  Continue to hold off on enoxaparin or high dosed heparin per protocol, as well as due to ongoing GI bleeding  Chronic combined systolic and diastolic CHF (congestive heart failure) (HCC)  Assessment & Plan  Wt Readings from Last 3 Encounters:   09/29/22 56 3 kg (124 lb 1 9 oz)   05/31/22 62 8 kg (138 lb 7 2 oz)   11/16/21 62 8 kg (138 lb 7 2 oz)     Current ECHO with LVEF 45%, presence of G1 DD  RV systolic function is normal   Also with mention of moderate MR, moderate PHTN  Cardiomyopathy appears ischemic in nature  · Check daily weights - increased today  · Maintain on low-sodium diet  · Furosemide and ARB remain hold as above  · Given current weight gain and net positive I/O's, may benefit from a 1 time IV dose of furosemide  · Continue BB therapy, with plans for discharge on long-acting formulation  · Insure outpatient follow-up with Cardiology  Paroxysmal atrial fibrillation (HCC)  Assessment & Plan  Eliquis resumed on 09/25 at decreased dosing based upon patient's weight and serum creatinine - discontinued given continued drop in hemoglobin  Continue beta-blocker with plans on discharging on long-acting formulation  Stroke-like symptoms  Assessment & Plan  No acute abnormality on initial CT of the head  MRI contraindicated secondary to presence of AICD/ppm   Will need outpatient MRI at another [facility] following discharge  Morning lipids and A1c updated  Allergy to statin noted  Anticoagulation with apixaban currently on hold  Continue to monitor neurological symptoms  Of note, patient does not appear to have any focal symptoms again this morning based upon exam, with improvement in confusion  Underlying history of dementia and 'forgetfulness' noted    Question whether altered mental status is secondary to acute metabolic encephalopathy from underlying dementia and current acute illness due to COVID-19 infection with fevers - mental status continues to appear improved today, with patient aware of place and year  Essential hypertension  Assessment & Plan  Changed metoprolol tartrate to metoprolol succinate (toprol XL) with the decreased LVEF, which should be continued upon discharge  Continue to hold ARB in the setting of DARRELL  Currently remains hypertensive but not significantly so - will allow for slightly higher blood pressures for continued renal perfusion  Continue monitor blood pressure, with additional antihypertensive regimen as warranted  Hyperkalemia  Assessment & Plan  In the setting of DARRELL with concurrent ARB use - hyperkalemia remains resolved  Acquired hypothyroidism  Assessment & Plan  TSH markedly low at 0 05, although checked during acute illness  Levothyroxine dosing decreased to 125 mcg, with recommendations for repeat TSH in 4-6 weeks  Increased anion gap metabolic acidosis  Assessment & Plan  Resolved  VTE Pharmacologic Prophylaxis: VTE Score: 10 High Risk (Score >/= 5) - Pharmacological DVT Prophylaxis Contraindicated  Sequential Compression Devices Ordered      Patient Centered Rounds: I performed bedside rounds with nursing staff today  Discussions with Specialists or Other Care Team Provider:  Review of urology note      Education and Discussions with Family / Patient: Updated  () via phone  Susan Vargas     Time Spent for Care: 30 minutes  More than 50% of total time spent on counseling and coordination of care as described above      Current Length of Stay: 8 day(s)  Current Patient Status: Inpatient   Certification Statement: The patient will continue to require additional inpatient hospital stay due to need for further evaluation of potential GI bleeding with colonoscopy  Patient also needs to be 10 days out from COVID diagnosis prior to placement to rehab facility    Discharge Plan: Anticipate discharge in >72 hrs to rehab facility      Code Status: Level 3 - DNAR and DNI    Subjective:   Patient seen and examined - appears significantly improved from a mental status standpoint, last fever reported as last night but occurred following blood transfusion  Prior to that patient had down trending fever curve overall  No overnight events reported  Objective:     Vitals:   Temp (24hrs), Av 9 °F (37 2 °C), Min:97 7 °F (36 5 °C), Max:100 6 °F (38 1 °C)    Temp:  [97 7 °F (36 5 °C)-100 6 °F (38 1 °C)] 97 7 °F (36 5 °C)  HR:  [61-90] 61  Resp:  [18-20] 20  BP: (114-172)/(48-87) 124/55  SpO2:  [91 %-98 %] 96 %  Body mass index is 20 03 kg/m²  Input and Output Summary (last 24 hours): Intake/Output Summary (Last 24 hours) at 2022 1209  Last data filed at 2022 0847  Gross per 24 hour   Intake 1180 ml   Output 775 ml   Net 405 ml       Physical Exam:   Vital signs reviewed  Constitutional:       General: She is not in acute distress      Appearance: Normal appearance  She is normal weight  She is not ill-appearing or toxic-appearing  HENT:      Mouth/Throat:      Mouth: Mucous membranes are moist    Cardiovascular:      Rate and Rhythm: Normal rate  Rhythm irregular       Heart sounds: No murmur heard     No gallop  Pulmonary:      Effort: Pulmonary effort is normal  No respiratory distress       Breath sounds: Normal breath sounds  No wheezing, rhonchi or rales  Abdominal:      General: Abdomen is flat  Bowel sounds are normal  There is no distension       Palpations: Abdomen is soft       Tenderness: There is no abdominal tenderness  There is no guarding or rebound  Musculoskeletal:         General: No swelling or tenderness       Cervical back: Neck supple  Skin:     General: Skin is warm and dry  Neurological:      General: No focal deficit present       Mental Status: She is alert  She is disoriented  Comments:  Continued improvement in mental status    Psychiatric: Edythe Celestino and Affect: Mood normal          Behavior: Behavior normal      Additional Data:     Labs:  Results from last 7 days   Lab Units 09/29/22  0545   WBC Thousand/uL 5 22   HEMOGLOBIN g/dL 10 0*   HEMATOCRIT % 30 6*   PLATELETS Thousands/uL 172   NEUTROS PCT % 70   LYMPHS PCT % 19   MONOS PCT % 8   EOS PCT % 2     Results from last 7 days   Lab Units 09/29/22  0545   SODIUM mmol/L 137   POTASSIUM mmol/L 4 5   CHLORIDE mmol/L 104   CO2 mmol/L 22   BUN mg/dL 48*   CREATININE mg/dL 1 80*   ANION GAP mmol/L 11   CALCIUM mg/dL 8 3   ALBUMIN g/dL 2 2*   TOTAL BILIRUBIN mg/dL 0 50   ALK PHOS U/L 105   ALT U/L 11*   AST U/L 34   GLUCOSE RANDOM mg/dL 86     Results from last 7 days   Lab Units 09/25/22  0658   INR  1 10     Results from last 7 days   Lab Units 09/27/22  1658 09/27/22  1023 09/27/22  0825 09/27/22  0743 09/22/22  2039   POC GLUCOSE mg/dl 78 99 88 118 138         Results from last 7 days   Lab Units 09/26/22  0514 09/25/22  0929 09/25/22  0658 09/23/22  0632   LACTIC ACID mmol/L 1 4 2 0  --   --    PROCALCITONIN ng/ml 0 31*  --  0 28* 0 34*       Lines/Drains:  Invasive Devices  Report    Peripheral Intravenous Line  Duration           Peripheral IV 09/28/22 Dorsal (posterior); Right Hand 1 day    Peripheral IV 09/28/22 Right;Upper;Ventral (anterior) Arm <1 day    Peripheral IV 09/29/22 Dorsal (posterior); Right Forearm <1 day          Drain  Duration           External Urinary Catheter 6 days              Imaging: No pertinent imaging reviewed  Recent Cultures (last 7 days):   Results from last 7 days   Lab Units 09/25/22  1057 09/25/22  0928 09/25/22  0909   BLOOD CULTURE  No Growth at 72 hrs   No Growth at 72 hrs   --    URINE CULTURE   --   --  10,000-19,000 cfu/ml        Last 24 Hours Medication List:   Current Facility-Administered Medications   Medication Dose Route Frequency Provider Last Rate    acetaminophen  650 mg Oral Q6H PRN Rubens Gordillo MD      allopurinol  100 mg Oral Daily Radha Love MD      calcium carbonate-vitamin D  1 tablet Oral BID With Meals Donnell Nissen, PA-C      fluticasone  2 spray Nasal Daily Delaney Barragan PA-C      furosemide  40 mg Intravenous Once Shaheen Chu MD      labetalol  10 mg Intravenous Q4H PRN Diogo Ceballos DO      levothyroxine  125 mcg Oral Early Morning Diogo Ceballos DO      magnesium oxide  400 mg Oral BID Beatrice Ho MD      metoprolol succinate  50 mg Oral Q12H [employer],       ondansetron  4 mg Intravenous Q4H PRN Shaheen Chu MD      pantoprazole  20 mg Oral Early Morning Shaheen Chu MD      polyethylene glycol  4,000 mL Oral Once KARLA Tamez      polyethylene glycol  17 g Oral Daily PRN Shaheen Chu MD      sertraline  100 mg Oral Daily Delaney Barragan PA-C      sodium chloride (PF)  3 mL Intravenous Q1H PRN Delaney Barragan PA-C          Today, Patient Was Seen By: Shaheen Chu    **Please Note: This note may have been constructed using a voice recognition system  **

## 2022-09-29 NOTE — ASSESSMENT & PLAN NOTE
Required transfusion with 2 units of PRBC's, with apixaban initially held  EGD 9/23 essentially normal, with note of a medium sliding hiatal hernia and numerous polyps measuring less than 5 mm in the stomach  Patient's apixaban was resumed on 09/25  Patient's hemoglobin had responded to a value of 9 following the 2nd unit of PRBCs - has continued to slowly drop, down to a value of  6 8 on the morning 9/28  · Eliquis was discontinued previously  · Plans for colonoscopy on Friday  · Clear liquid diet today followed by prep to be ordered by GI  · Received 2 additional units of PRBCs with appropriate response this morning

## 2022-09-30 ENCOUNTER — ANESTHESIA (INPATIENT)
Dept: PERIOP | Facility: HOSPITAL | Age: 77
DRG: 871 | End: 2022-09-30
Payer: COMMERCIAL

## 2022-09-30 ENCOUNTER — ANESTHESIA EVENT (INPATIENT)
Dept: PERIOP | Facility: HOSPITAL | Age: 77
DRG: 871 | End: 2022-09-30
Payer: COMMERCIAL

## 2022-09-30 ENCOUNTER — APPOINTMENT (OUTPATIENT)
Dept: PERIOP | Facility: HOSPITAL | Age: 77
DRG: 871 | End: 2022-09-30
Payer: COMMERCIAL

## 2022-09-30 LAB
ALBUMIN SERPL BCP-MCNC: 2.3 G/DL (ref 3.5–5)
ALP SERPL-CCNC: 119 U/L (ref 46–116)
ALT SERPL W P-5'-P-CCNC: 14 U/L (ref 12–78)
ANION GAP SERPL CALCULATED.3IONS-SCNC: 12 MMOL/L (ref 4–13)
AST SERPL W P-5'-P-CCNC: 36 U/L (ref 5–45)
BACTERIA BLD CULT: NORMAL
BACTERIA BLD CULT: NORMAL
BASOPHILS # BLD AUTO: 0.03 THOUSANDS/ÂΜL (ref 0–0.1)
BASOPHILS NFR BLD AUTO: 1 % (ref 0–1)
BILIRUB SERPL-MCNC: 0.38 MG/DL (ref 0.2–1)
BUN SERPL-MCNC: 46 MG/DL (ref 5–25)
CALCIUM ALBUM COR SERPL-MCNC: 10.2 MG/DL (ref 8.3–10.1)
CALCIUM SERPL-MCNC: 8.8 MG/DL (ref 8.3–10.1)
CHLORIDE SERPL-SCNC: 104 MMOL/L (ref 96–108)
CO2 SERPL-SCNC: 24 MMOL/L (ref 21–32)
CREAT SERPL-MCNC: 1.77 MG/DL (ref 0.6–1.3)
EOSINOPHIL # BLD AUTO: 0.07 THOUSAND/ÂΜL (ref 0–0.61)
EOSINOPHIL NFR BLD AUTO: 1 % (ref 0–6)
ERYTHROCYTE [DISTWIDTH] IN BLOOD BY AUTOMATED COUNT: 22.7 % (ref 11.6–15.1)
GFR SERPL CREATININE-BSD FRML MDRD: 27 ML/MIN/1.73SQ M
GLUCOSE SERPL-MCNC: 76 MG/DL (ref 65–140)
HCT VFR BLD AUTO: 32.8 % (ref 34.8–46.1)
HGB BLD-MCNC: 10.6 G/DL (ref 11.5–15.4)
IMM GRANULOCYTES # BLD AUTO: 0.04 THOUSAND/UL (ref 0–0.2)
IMM GRANULOCYTES NFR BLD AUTO: 1 % (ref 0–2)
LYMPHOCYTES # BLD AUTO: 1.05 THOUSANDS/ÂΜL (ref 0.6–4.47)
LYMPHOCYTES NFR BLD AUTO: 16 % (ref 14–44)
MAGNESIUM SERPL-MCNC: 2.1 MG/DL (ref 1.6–2.6)
MCH RBC QN AUTO: 26.6 PG (ref 26.8–34.3)
MCHC RBC AUTO-ENTMCNC: 32.3 G/DL (ref 31.4–37.4)
MCV RBC AUTO: 82 FL (ref 82–98)
MONOCYTES # BLD AUTO: 0.47 THOUSAND/ÂΜL (ref 0.17–1.22)
MONOCYTES NFR BLD AUTO: 7 % (ref 4–12)
NEUTROPHILS # BLD AUTO: 4.83 THOUSANDS/ÂΜL (ref 1.85–7.62)
NEUTS SEG NFR BLD AUTO: 74 % (ref 43–75)
NRBC BLD AUTO-RTO: 0 /100 WBCS
PLATELET # BLD AUTO: 174 THOUSANDS/UL (ref 149–390)
PMV BLD AUTO: 10.7 FL (ref 8.9–12.7)
POTASSIUM SERPL-SCNC: 4 MMOL/L (ref 3.5–5.3)
PROT SERPL-MCNC: 6.3 G/DL (ref 6.4–8.4)
RBC # BLD AUTO: 3.98 MILLION/UL (ref 3.81–5.12)
SODIUM SERPL-SCNC: 140 MMOL/L (ref 135–147)
WBC # BLD AUTO: 6.49 THOUSAND/UL (ref 4.31–10.16)

## 2022-09-30 PROCEDURE — 0DJD8ZZ INSPECTION OF LOWER INTESTINAL TRACT, VIA NATURAL OR ARTIFICIAL OPENING ENDOSCOPIC: ICD-10-PCS | Performed by: INTERNAL MEDICINE

## 2022-09-30 PROCEDURE — 97110 THERAPEUTIC EXERCISES: CPT

## 2022-09-30 PROCEDURE — 80053 COMPREHEN METABOLIC PANEL: CPT | Performed by: INTERNAL MEDICINE

## 2022-09-30 PROCEDURE — 99233 SBSQ HOSP IP/OBS HIGH 50: CPT | Performed by: INTERNAL MEDICINE

## 2022-09-30 PROCEDURE — 85025 COMPLETE CBC W/AUTO DIFF WBC: CPT | Performed by: INTERNAL MEDICINE

## 2022-09-30 PROCEDURE — 99232 SBSQ HOSP IP/OBS MODERATE 35: CPT | Performed by: NURSE PRACTITIONER

## 2022-09-30 PROCEDURE — 83735 ASSAY OF MAGNESIUM: CPT | Performed by: INTERNAL MEDICINE

## 2022-09-30 PROCEDURE — 45330 DIAGNOSTIC SIGMOIDOSCOPY: CPT | Performed by: INTERNAL MEDICINE

## 2022-09-30 RX ORDER — PROPOFOL 10 MG/ML
INJECTION, EMULSION INTRAVENOUS AS NEEDED
Status: DISCONTINUED | OUTPATIENT
Start: 2022-09-30 | End: 2022-09-30

## 2022-09-30 RX ORDER — MAGNESIUM SULFATE HEPTAHYDRATE 40 MG/ML
2 INJECTION, SOLUTION INTRAVENOUS ONCE
Status: DISCONTINUED | OUTPATIENT
Start: 2022-09-30 | End: 2022-09-30

## 2022-09-30 RX ORDER — MAGNESIUM SULFATE HEPTAHYDRATE 40 MG/ML
INJECTION, SOLUTION INTRAVENOUS AS NEEDED
Status: DISCONTINUED | OUTPATIENT
Start: 2022-09-30 | End: 2022-09-30

## 2022-09-30 RX ORDER — HEPARIN SODIUM 5000 [USP'U]/ML
5000 INJECTION, SOLUTION INTRAVENOUS; SUBCUTANEOUS EVERY 8 HOURS SCHEDULED
Status: DISCONTINUED | OUTPATIENT
Start: 2022-09-30 | End: 2022-10-04 | Stop reason: HOSPADM

## 2022-09-30 RX ORDER — EPHEDRINE SULFATE 50 MG/ML
INJECTION INTRAVENOUS AS NEEDED
Status: DISCONTINUED | OUTPATIENT
Start: 2022-09-30 | End: 2022-09-30

## 2022-09-30 RX ORDER — SODIUM CHLORIDE, SODIUM LACTATE, POTASSIUM CHLORIDE, CALCIUM CHLORIDE 600; 310; 30; 20 MG/100ML; MG/100ML; MG/100ML; MG/100ML
INJECTION, SOLUTION INTRAVENOUS CONTINUOUS PRN
Status: DISCONTINUED | OUTPATIENT
Start: 2022-09-30 | End: 2022-09-30

## 2022-09-30 RX ORDER — ESMOLOL HYDROCHLORIDE 10 MG/ML
INJECTION INTRAVENOUS AS NEEDED
Status: DISCONTINUED | OUTPATIENT
Start: 2022-09-30 | End: 2022-09-30

## 2022-09-30 RX ADMIN — SODIUM CHLORIDE, SODIUM LACTATE, POTASSIUM CHLORIDE, AND CALCIUM CHLORIDE: .6; .31; .03; .02 INJECTION, SOLUTION INTRAVENOUS at 16:30

## 2022-09-30 RX ADMIN — ESMOLOL HYDROCHLORIDE 10 MG: 10 INJECTION, SOLUTION INTRAVENOUS at 16:49

## 2022-09-30 RX ADMIN — ESMOLOL HYDROCHLORIDE 10 MG: 10 INJECTION, SOLUTION INTRAVENOUS at 16:51

## 2022-09-30 RX ADMIN — MAGNESIUM OXIDE TAB 400 MG (241.3 MG ELEMENTAL MG) 400 MG: 400 (241.3 MG) TAB at 21:53

## 2022-09-30 RX ADMIN — ESMOLOL HYDROCHLORIDE 20 MG: 10 INJECTION, SOLUTION INTRAVENOUS at 16:53

## 2022-09-30 RX ADMIN — PROPOFOL 80 MG: 10 INJECTION, EMULSION INTRAVENOUS at 16:38

## 2022-09-30 RX ADMIN — MAGNESIUM SULFATE HEPTAHYDRATE 2 G: 40 INJECTION, SOLUTION INTRAVENOUS at 16:57

## 2022-09-30 RX ADMIN — HEPARIN SODIUM 5000 UNITS: 5000 INJECTION INTRAVENOUS; SUBCUTANEOUS at 21:53

## 2022-09-30 RX ADMIN — LEVOTHYROXINE SODIUM 125 MCG: 125 TABLET ORAL at 05:00

## 2022-09-30 RX ADMIN — FLUTICASONE PROPIONATE 2 SPRAY: 50 SPRAY, METERED NASAL at 09:42

## 2022-09-30 RX ADMIN — PANTOPRAZOLE SODIUM 20 MG: 20 TABLET, DELAYED RELEASE ORAL at 05:00

## 2022-09-30 RX ADMIN — METOPROLOL SUCCINATE 50 MG: 50 TABLET, FILM COATED, EXTENDED RELEASE ORAL at 21:53

## 2022-09-30 NOTE — PLAN OF CARE
Problem: OCCUPATIONAL THERAPY ADULT  Goal: Performs self-care activities at highest level of function for planned discharge setting  See evaluation for individualized goals  Description: Treatment Interventions: ADL retraining, Functional transfer training, UE strengthening/ROM, Endurance training, Patient/family training, Equipment evaluation/education, Activityengagement, Cognitive reorientation          See flowsheet documentation for full assessment, interventions and recommendations  Outcome: Progressing  Note: Limitation: Decreased ADL status, Decreased UE strength, Decreased Safe judgement during ADL, Decreased cognition, Decreased endurance, Decreased self-care trans, Decreased high-level ADLs     Assessment: Patient participated in Skilled OT session this date with interventions consisting of Energy Conservation techniques, Work simplification skills  and therapeutic exercise to: increase functional use of BUEs, increase BUE muscle strength    Patient agreeable to OT treatment session, upon arrival patient was found supine in bed  Patient requiring frequent re direction and verbal cues for correct technique  Patient continues to be functioning below baseline level, occupational performance remains limited secondary to factors listed above and increased risk for falls and injury  From OT standpoint, recommendation at time of d/c would be Post acute rehabilitation services  The patient's raw score on the AM-PAC Daily Activity inpatient short form is 13, standardized score is 32 03, less than 39 4  Patients at this level are likely to benefit from discharge to post-acute rehabilitation services  Please refer to the recommendation of the Occupational Therapist for safe discharge planning       OT Discharge Recommendation: Post acute rehabilitation services

## 2022-09-30 NOTE — OCCUPATIONAL THERAPY NOTE
Occupational Therapy         Patient Name: Chanda Barillas Date: 9/30/2022 09/30/22 1104   OT Last Visit   OT Visit Date 09/30/22   Note Type   Note Type Treatment   Restrictions/Precautions   Weight Bearing Precautions Per Order No   Other Precautions Contact/isolation; Airborne/isolation;Cognitive; Bed Alarm;Multiple lines;Telemetry; Fall Risk   Pain Assessment   Pain Assessment Tool 0-10   Pain Score No Pain   Transfers   Additional Comments Pt remains in bed during session due to being transferred to OR in near future - per PCA  Therapeutic Exercise - ROM   UE-ROM Yes   ROM- Right Upper Extremities   R Shoulder AROM;ABduction;Horizontal ABduction; Flexion   R Elbow AROM;Elbow flexion;Elbow extension   R Wrist AROM; Wrist flexion;Wrist extension   R Hand AROM; Thumb; Index finger; Long finger;Ring finger;Little finger   R Weight/Reps/Sets 2 x 15   RUE ROM Comment therapeutic rest periods between sets due to fatigue; visual cues for proper performance   ROM - Left Upper Extremities    L Shoulder AROM; Flexion;ABduction;Horizontal ABduction   L Elbow AROM;Elbow flexion;Elbow extension   L Wrist AROM; Wrist flexion;Wrist extension   L Hand AROM; Thumb; Index finger; Long finger;Ring finger;Little finger   L Weight/Reps/Sets 2 x 15   LUE ROM Comment therapeutic rest periods between sets due to fatigue; visual cues for proper performance   Cognition   Overall Cognitive Status Impaired   Arousal/Participation Alert; Cooperative   Attention Attends with cues to redirect   Orientation Level Oriented to person;Oriented to place; Disoriented to time;Disoriented to situation   Memory Decreased long term memory   Following Commands Follows one step commands with increased time or repetition   Activity Tolerance   Activity Tolerance Patient limited by fatigue   Assessment   Assessment Patient participated in Skilled OT session this date with interventions consisting of Energy Conservation techniques, Work simplification skills  and therapeutic exercise to: increase functional use of BUEs, increase BUE muscle strength    Patient agreeable to OT treatment session, upon arrival patient was found supine in bed  Patient requiring frequent re direction and verbal cues for correct technique  Patient continues to be functioning below baseline level, occupational performance remains limited secondary to factors listed above and increased risk for falls and injury  From OT standpoint, recommendation at time of d/c would be Post acute rehabilitation services  The patient's raw score on the AM-PAC Daily Activity inpatient short form is 13, standardized score is 32 03, less than 39 4  Patients at this level are likely to benefit from discharge to post-acute rehabilitation services  Please refer to the recommendation of the Occupational Therapist for safe discharge planning  Plan   Treatment Interventions UE strengthening/ROM; Endurance training;Energy conservation   Goal Expiration Date 10/06/22   OT Treatment Day 3   OT Frequency 3-5x/wk   Recommendation   OT Discharge Recommendation Post acute rehabilitation services   AM-[facility] Daily Activity Inpatient   Lower Body Dressing 1   Bathing 2   Toileting 2   Upper Body Dressing 2   Grooming 3   Eating 3   Daily Activity Raw Score 13   Daily Activity Standardized Score (Calc for Raw Score >=11) 32 03   AM-PAC Applied Cognition Inpatient   Following a Speech/Presentation 3   Understanding Ordinary Conversation 2   Taking Medications 1   Remembering Where Things Are Placed or Put Away 1   Remembering List of 4-5 Errands 1   Taking Care of Complicated Tasks 1   Applied Cognition Raw Score 9   Applied Cognition Standardized Score 22 48     Pt will benefit from continued OT services in order to maximize (I) c ADL performance, FM c RW, and improve overall endurance/strength required to complete functional tasks in preparation for d/c      Pt benefited from co-treatment of skilled OT and PTA in order to most appropriately address functional deficits d/t extensive assistance required for safe functional mobility, decreased activity tolerance, and regression from functioning level prior to admission and/or onset of present illness  OT/PT objectives were addressed separately; please see PT note for specific goal areas targeted  Pt left supine in bed at end of session; all needs within reach; all lines intact      Georgi Sanchez

## 2022-09-30 NOTE — ASSESSMENT & PLAN NOTE
Lab Results   Component Value Date    EGFR 27 09/30/2022    EGFR 26 09/29/2022    EGFR 26 09/28/2022    CREATININE 1 77 (H) 09/30/2022    CREATININE 1 80 (H) 09/29/2022    CREATININE 1 79 (H) 09/28/2022     DARRELL superimposed on CKD, likely in the setting of sepsis, acute blood loss anemia, with concurrent diuretic use  Baseline creatinine of 1 6 to 2, peak value during hospitalization of 3  Current creatinine has improved to 1 7 and remains stable  Discontinued IV fluids previously  Received blood products  Continue to hold oral furosemide and ARB despite recovery as patient underwent prep for colonoscopy along with clear liquid diet and NPO status  However, given net positive I/O's and weight gain, did receive a 1 time dose of IV furosemide yesterday  Nephrology consulted and following

## 2022-09-30 NOTE — ASSESSMENT & PLAN NOTE
Changed metoprolol tartrate to metoprolol succinate (toprol XL) with the decreased LVEF, which should be continued upon discharge  Continue to hold ARB in the setting of DARRELL  Currently remains hypertensive but not significantly so - will allow for slightly higher blood pressures for continued renal perfusion  Continue to monitor blood pressure, with additional antihypertensive regimen as warranted

## 2022-09-30 NOTE — ASSESSMENT & PLAN NOTE
Wt Readings from Last 3 Encounters:   09/30/22 54 7 kg (120 lb 9 5 oz)   05/31/22 62 8 kg (138 lb 7 2 oz)   11/16/21 62 8 kg (138 lb 7 2 oz)     Current ECHO with LVEF 45%, presence of G1 DD  RV systolic function is normal   Also with mention of moderate MR, moderate PHTN  Cardiomyopathy appears ischemic in nature  · Check daily weights  · Maintain on low-sodium diet  · Furosemide and ARB remain hold as above  · Given weight gain and net positive I/O's received day 1 time IV dose of furosemide on 09/29  · Continue BB therapy, with plans for discharge on long-acting formulation  · Insure outpatient follow-up with Cardiology

## 2022-09-30 NOTE — PLAN OF CARE
Problem: PAIN - ADULT  Goal: Verbalizes/displays adequate comfort level or baseline comfort level  Description: Interventions:  - Encourage patient to monitor pain and request assistance  - Assess pain using appropriate pain scale  - Administer analgesics based on type and severity of pain and evaluate response  - Implement non-pharmacological measures as appropriate and evaluate response  - Consider cultural and social influences on pain and pain management  - Notify physician/advanced practitioner if interventions unsuccessful or patient reports new pain  Outcome: Progressing     Problem: INFECTION - ADULT  Goal: Absence or prevention of progression during hospitalization  Description: INTERVENTIONS:  - Assess and monitor for signs and symptoms of infection  - Monitor lab/diagnostic results  - Monitor all insertion sites, i e  indwelling lines, tubes, and drains  - Monitor endotracheal if appropriate and nasal secretions for changes in amount and color  - [place] appropriate cooling/warming therapies per order  - Administer medications as ordered  - Instruct and encourage patient and family to use good hand hygiene technique  - Identify and instruct in appropriate isolation precautions for identified infection/condition  Outcome: Progressing  Goal: Absence of fever/infection during neutropenic period  Description: INTERVENTIONS:  - Monitor WBC    Outcome: Progressing     Problem: SAFETY ADULT  Goal: Patient will remain free of falls  Description: INTERVENTIONS:  - Educate patient/family on patient safety including physical limitations  - Instruct patient to call for assistance with activity   - Consult OT/PT to assist with strengthening/mobility   - Keep Call bell within reach  - Keep bed low and locked with side rails adjusted as appropriate  - Keep care items and personal belongings within reach  - Initiate and maintain comfort rounds  - Make Fall Risk Sign visible to staff  - Offer Toileting every 2 Hours, in advance of need  - Initiate/Maintain bed/chair alarm  - Obtain necessary fall risk management equipment: alarms  - Apply yellow socks and bracelet for high fall risk patients  - Consider moving patient to room near nurses station  Outcome: Progressing  Goal: Maintain or return to baseline ADL function  Description: INTERVENTIONS:  -  Assess patient's ability to carry out ADLs; assess patient's baseline for ADL function and identify physical deficits which impact ability to perform ADLs (bathing, care of mouth/teeth, toileting, grooming, dressing, etc )  - Assess/evaluate cause of self-care deficits   - Assess range of motion  - Assess patient's mobility; develop plan if impaired  - Assess patient's need for assistive devices and provide as appropriate  - Encourage maximum independence but intervene and supervise when necessary  - Involve family in performance of ADLs  - Assess for home care needs following discharge   - Consider OT consult to assist with ADL evaluation and planning for discharge  - Provide patient education as appropriate  Outcome: Progressing  Goal: Maintains/Returns to pre admission functional level  Description: INTERVENTIONS:  - Perform BMAT or MOVE assessment daily    - Set and communicate daily mobility goal to care team and patient/family/caregiver  - Collaborate with rehabilitation services on mobility goals if consulted  - Perform Range of Motion 4 times a day  - Reposition patient every 2 hours    - Dangle patient 3-4 times a day  - Stand patient 4 times a day  - Ambulate patient 4 times a day  - Out of bed to chair 3 times a day   - Out of bed for meals 3 times a day  - Out of bed for toileting  - Record patient progress and toleration of activity level   Outcome: Progressing     Problem: DISCHARGE PLANNING  Goal: Discharge to home or other facility with appropriate resources  Description: INTERVENTIONS:  - Identify barriers to discharge w/patient and caregiver  - Arrange for needed discharge resources and transportation as appropriate  - Identify discharge learning needs (meds, wound care, etc )  - Arrange for interpretive services to assist at discharge as needed  - Refer to Case Management Department for coordinating discharge planning if the patient needs post-hospital services based on physician/advanced practitioner order or complex needs related to functional status, cognitive ability, or social support system  Outcome: Progressing     Problem: Knowledge Deficit  Goal: Patient/family/caregiver demonstrates understanding of disease process, treatment plan, medications, and discharge instructions  Description: Complete learning assessment and assess knowledge base    Interventions:  - Provide teaching at level of understanding  - Provide teaching via preferred learning methods  Outcome: Progressing     Problem: NEUROSENSORY - ADULT  Goal: Achieves stable or improved neurological status  Description: INTERVENTIONS  - Monitor and report changes in neurological status  - Monitor vital signs such as temperature, blood pressure, glucose, and any other labs ordered   - Initiate measures to prevent increased intracranial pressure      Outcome: Progressing     Problem: CARDIOVASCULAR - ADULT  Goal: Maintains optimal cardiac output and hemodynamic stability  Description: INTERVENTIONS:  - Monitor I/O, vital signs and rhythm  - Monitor for S/S and trends of decreased cardiac output  - Administer and titrate ordered vasoactive medications to optimize hemodynamic stability  - Assess quality of pulses, skin color and temperature  - Assess for signs of decreased coronary artery perfusion  - Instruct patient to report change in severity of symptoms  Outcome: Progressing     Problem: GENITOURINARY - ADULT  Goal: Maintains or returns to baseline urinary function  Description: INTERVENTIONS:  - Assess urinary function  - Encourage oral fluids to ensure adequate hydration if ordered  - Administer IV fluids as ordered to ensure adequate hydration  - Administer ordered medications as needed  - Offer frequent toileting  - Follow urinary retention protocol if ordered  Outcome: Progressing  Goal: Absence of urinary retention  Description: INTERVENTIONS:  - Assess patient's ability to void and empty bladder  - Monitor I/O  - Bladder scan as needed  - Discuss with physician/AP medications to alleviate retention as needed  - Discuss catheterization for long term situations as appropriate  Outcome: Progressing     Problem: METABOLIC, FLUID AND ELECTROLYTES - ADULT  Goal: Electrolytes maintained within normal limits  Description: INTERVENTIONS:  - Monitor labs and assess patient for signs and symptoms of electrolyte imbalances  - Administer electrolyte replacement as ordered  - Monitor response to electrolyte replacements, including repeat lab results as appropriate  - Instruct patient on fluid and nutrition as appropriate  Outcome: Progressing     Problem: SKIN/TISSUE INTEGRITY - ADULT  Goal: Skin Integrity remains intact(Skin Breakdown Prevention)  Description: Assess:  -Perform [place]cadio assessment every shift  -Clean and moisturize skin every 4 houra  -Inspect skin when repositioning, toileting, and assisting with ADLS  -Assess under medical devices such as electrodes every shift  -Assess extremities for adequate circulation and sensation     Bed Management:  -Have minimal linens on bed & keep smooth, unwrinkled  -Change linens as needed when moist or perspiring  -Avoid sitting or lying in one position for more than 2 hours while in bed  -Keep HOB at 30 degrees     Toileting:  -Offer bedside commode  -Assess for incontinence every 2 hours  -Use incontinent care products after each incontinent episode such as breif    Activity:  -Mobilize patient 4 times a day  -Encourage activity and walks on unit  -Encourage or provide ROM exercises   -Turn and reposition patient every 2 Hours  -Use appropriate equipment to lift or move patient in bed  -Instruct/ Assist with weight shifting every 2 hours when out of bed in chair  -Consider limitation of chair time 4 hour intervals    Skin Care:  -Avoid use of baby powder, tape, friction and shearing, hot water or constrictive clothing  -Relieve pressure over bony prominences using alevyn  -Do not massage red bony areas    Next Steps:  -Teach patient strategies to minimize risks such as weight shift  Outcome: Progressing     Problem: MUSCULOSKELETAL - ADULT  Goal: Maintain or return mobility to safest level of function  Description: INTERVENTIONS:  - Assess patient's ability to carry out ADLs; assess patient's baseline for ADL function and identify physical deficits which impact ability to perform ADLs (bathing, care of mouth/teeth, toileting, grooming, dressing, etc )  - Assess/evaluate cause of self-care deficits   - Assess range of motion  - Assess patient's mobility  - Assess patient's need for assistive devices and provide as appropriate  - Encourage maximum independence but intervene and supervise when necessary  - Involve family in performance of ADLs  - Assess for home care needs following discharge   - Consider OT consult to assist with ADL evaluation and planning for discharge  - Provide patient education as appropriate  Outcome: Progressing     Problem: Potential for Falls  Goal: Patient will remain free of falls  Description: INTERVENTIONS:  - Educate patient/family on patient safety including physical limitations  - Instruct patient to call for assistance with activity   - Consult OT/PT to assist with strengthening/mobility   - Keep Call bell within reach  - Keep bed low and locked with side rails adjusted as appropriate  - Keep care items and personal belongings within reach  - Initiate and maintain comfort rounds  - Make Fall Risk Sign visible to staff  - Offer Toileting every 2 Hours, in advance of need  - Initiate/Maintain bed/chair alarm  - Obtain necessary fall risk management equipment: alarms  - Apply yellow socks and bracelet for high fall risk patients  - Consider moving patient to room near nurses station  Outcome: Progressing     Problem: MOBILITY - ADULT  Goal: Maintain or return to baseline ADL function  Description: INTERVENTIONS:  -  Assess patient's ability to carry out ADLs; assess patient's baseline for ADL function and identify physical deficits which impact ability to perform ADLs (bathing, care of mouth/teeth, toileting, grooming, dressing, etc )  - Assess/evaluate cause of self-care deficits   - Assess range of motion  - Assess patient's mobility; develop plan if impaired  - Assess patient's need for assistive devices and provide as appropriate  - Encourage maximum independence but intervene and supervise when necessary  - Involve family in performance of ADLs  - Assess for home care needs following discharge   - Consider OT consult to assist with ADL evaluation and planning for discharge  - Provide patient education as appropriate  Outcome: Progressing  Goal: Maintains/Returns to pre admission functional level  Description: INTERVENTIONS:  - Perform BMAT or MOVE assessment daily    - Set and communicate daily mobility goal to care team and patient/family/caregiver  - Collaborate with rehabilitation services on mobility goals if consulted  - Perform Range of Motion 4 times a day  - Reposition patient every 2 hours    - Dangle patient 3-4 times a day  - Stand patient 4 times a day  - Ambulate patient 4 times a day  - Out of bed to chair 3 times a day   - Out of bed for meals 3 times a day  - Out of bed for toileting  - Record patient progress and toleration of activity level   Outcome: Progressing     Problem: Prexisting or High Potential for Compromised Skin Integrity  Goal: Skin integrity is maintained or improved  Description: INTERVENTIONS:  - Identify patients at risk for skin breakdown  - Assess and monitor skin integrity  - Assess and monitor nutrition and hydration status  - Monitor labs   - Assess for incontinence   - Turn and reposition patient  - Assist with mobility/ambulation  - Relieve pressure over bony prominences  - Avoid friction and shearing  - Provide appropriate hygiene as needed including keeping skin clean and dry  - Evaluate need for skin moisturizer/barrier cream  - Collaborate with interdisciplinary team   - Patient/family teaching  - Consider wound care consult   Outcome: Progressing     Problem: Nutrition/Hydration-ADULT  Goal: Nutrient/Hydration intake appropriate for improving, restoring or maintaining nutritional needs  Description: Monitor and assess patient's nutrition/hydration status for malnutrition  Collaborate with interdisciplinary team and initiate plan and interventions as ordered  Monitor patient's weight and dietary intake as ordered or per policy  Utilize nutrition screening tool and intervene as necessary  Determine patient's food preferences and provide high-protein, high-caloric foods as appropriate       INTERVENTIONS:  - Monitor oral intake, urinary output, labs, and treatment plans  - Assess nutrition and hydration status and recommend course of action  - Evaluate amount of meals eaten  - Assist patient with eating if necessary   - Allow adequate time for meals  - Recommend/ encourage appropriate diets, oral nutritional supplements, and vitamin/mineral supplements  - Order, calculate, and assess calorie counts as needed  - Recommend, monitor, and adjust tube feedings and TPN/PPN based on assessed needs  - Assess need for intravenous fluids  - Provide specific nutrition/hydration education as appropriate  - Include patient/family/caregiver in decisions related to nutrition  Outcome: Progressing     Problem: RESPIRATORY - ADULT  Goal: Achieves optimal ventilation and oxygenation  Description: INTERVENTIONS:  - Assess for changes in respiratory status  - Assess for changes in mentation and behavior  - Position to facilitate oxygenation and minimize respiratory effort  - Oxygen administered by appropriate delivery if ordered  - Initiate smoking cessation education as indicated  - Encourage broncho-pulmonary hygiene including cough, deep breathe, Incentive Spirometry  - Assess the need for suctioning and aspirate as needed  - Assess and instruct to report SOB or any respiratory difficulty  - Respiratory Therapy support as indicated  Outcome: Progressing

## 2022-09-30 NOTE — ASSESSMENT & PLAN NOTE
EGD 9/23 essentially normal, with note of a medium sliding hiatal hernia and numerous polyps measuring less than 5 mm in the stomach  Colonoscopy today with severe diverticulosis, fixed sigmoid loop inhibiting advancement of colonoscope  Large external hemorrhoids and a polyp which was not removed were also noted  Patient's  report same experience on patient's prior colonoscopy attempt  Patient's hemoglobin had responded to a value of 9 following initial 2 units of PRBCs - had continued to slowly drop with resumption of Eliquis, down to a value of  6 8 on the morning 9/28 requiring 2 additional units  · Eliquis remains on hold  · Colonoscopy as above  · Resume diet  · Repeat colonoscopy in 3 months, and if incomplete recommendations for CT colonography  · If recurrent rectal bleeding recommendations are for consulting Colorectal surgery for hemorrhoidectomy

## 2022-09-30 NOTE — ANESTHESIA PREPROCEDURE EVALUATION
Procedure:  COLONOSCOPY    Relevant Problems   CARDIO   (+) Aortic atherosclerosis (HCC)   (+) CAD (coronary artery disease)   (+) Congestive heart disease (HCC)   (+) Essential hypertension   (+) History of PTCA   (+) Hypercholesterolemia   (+) Mitral valve insufficiency   (+) Paroxysmal atrial fibrillation (HCC)      ENDO   (+) Acquired hypothyroidism      GI/HEPATIC   (+) Gastroesophageal reflux disease without esophagitis   (+) Gastrointestinal bleeding   (+) Hiatal hernia   (+) Rectal bleeding      /RENAL   (+) Acute kidney injury superimposed on chronic kidney disease (HCC)   (+) Benign hypertensive renal disease   (+) Hydroureteronephrosis   (+) Renal calculus      HEMATOLOGY   (+) Acute blood loss anemia   (+) Anemia due to chronic kidney disease   (+) Anemia in chronic kidney disease   (+) Iron deficiency anemia      NEURO/PSYCH   (+) Dementia (HCC)   (+) Hx of fall             Anesthesia Plan  ASA Score- 4 Emergent    Anesthesia Type- IV sedation with anesthesia with ASA Monitors  Additional Monitors:   Airway Plan:           Plan Factors-    Chart reviewed  Induction- intravenous  Postoperative Plan-     Informed Consent- Anesthetic plan and risks discussed with patient  I personally reviewed this patient with the CRNA  Discussed and agreed on the Anesthesia Plan with the CRNA  Rafael Ramos

## 2022-09-30 NOTE — ASSESSMENT & PLAN NOTE
No acute abnormality on initial CT of the head  MRI contraindicated secondary to presence of AICD/ppm   Will need outpatient MRI at another [facility] following discharge  Morning lipids and A1c updated  Allergy to statin noted  Anticoagulation with apixaban currently on hold  Continue to monitor neurological symptoms  Of note, patient does not appear to have any focal symptoms again this morning based upon exam, with improvement in confusion  Underlying history of dementia and 'forgetfulness' noted  Question whether altered mental status is secondary to acute metabolic encephalopathy from underlying dementia and current acute illness due to COVID-19 infection with fevers - mental status continues to appear improved and is likely nearing patient's baseline
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
## 2022-09-30 NOTE — TELEPHONE ENCOUNTER
Patient remains admitted to the hospital  Currently holding 10/21/22 @ 2:00 pm with Lara Meza in [place] if patient wishes to transfer care to [facility]   Will continue to follow and contact patient after discharged

## 2022-09-30 NOTE — ASSESSMENT & PLAN NOTE
Initial COVID testing negative on 09/22, fevers prompting repeat check on 09/25 which was positive  Currently on the mild pathway  · CXR without acute findings  · Monitor labs, trend CRP  · Hold off on dexamethasone, remdesivir given continued lack of O2 requirement  · D-dimer elevated at 2 9 - however, patient's illnesses is mild and she is currently without oxygen requirement  Maintain on SC heparin but avoid full anticoagulation as per protocol, as well as due to GI bleeding while on anticoagulation

## 2022-09-30 NOTE — ANESTHESIA POSTPROCEDURE EVALUATION
Post-Op Assessment Note    CV Status:  Stable    Pain management: adequate     Mental Status:  Arousable, sleepy and lethargic   Hydration Status:  Stable   PONV Controlled:  None   Airway Patency:  Patent      Post Op Vitals Reviewed: Yes      Staff: Anesthesiologist, CRNA         No complications documented      BP   144/62   Temp      Pulse  83   Resp   18   SpO2   98

## 2022-09-30 NOTE — PHYSICAL THERAPY NOTE
PHYSICAL THERAPY NOTE          Patient Name: Shona Wilson Date: 9/30/2022 09/30/22 1055   PT Last Visit   PT Visit Date 09/30/22   Note Type   Note Type Treatment   Pain Assessment   Pain Assessment Tool 0-10   Pain Score No Pain   Restrictions/Precautions   Weight Bearing Precautions Per Order No   Other Precautions   (Contact/isolation; Airborne/isolation; Cognitive; Bed Alarm; Multiple lines; Telemetry; Fall Risk)   General   Chart Reviewed Yes   Response to Previous Treatment Patient unable to report, no changes reported from family or staff   Family/Caregiver Present No   Cognition   Overall Cognitive Status Impaired   Arousal/Participation Alert; Cooperative   Following Commands Follows one step commands with increased time or repetition   Subjective   Subjective Agreeable to therapy  Bed Mobility   Additional Comments deferred OOB as pt awaiting colonoscopy procedure   Endurance Deficit   Endurance Deficit Yes   Activity Tolerance   Activity Tolerance Patient limited by fatigue   Exercises   Heelslides Supine;15 reps;AROM;AAROM; Bilateral   Hip Flexion Supine;10 reps;AROM;AAROM; Bilateral   Hip Abduction Supine;15 reps;AAROM;AROM; Bilateral   Hip Adduction Supine;15 reps;AAROM;AROM; Bilateral   Knee AROM Short Arc Quad Supine;15 reps;AROM; Bilateral   Ankle Pumps Supine;20 reps;AROM; Bilateral   Assessment   Prognosis Good   Problem List   (Decreased strength; Decreased endurance; Impaired balance; Decreased mobility; Impaired judgement; Decreased cognition; Decreased safety awareness)   Assessment Pt  seen for PT treatment session this date with interventions consisting of  therapeutic exercises to improve strength, endurance and functional mobility  In comparison to previous session, Pt  With improvements in activity tolerance  Demonstrates improving endurance and strength ans noted by exercise performance   Vitals WFL's    Pt is in need of continued activity in PT to improve strength balance endurance mobility transfers and ambulation with return to maximize LOF  From PT/mobility standpoint, recommendation at time of d/c would be post acute rehab in order to promote return to PLOF and independence  The patient's AM-PAC Basic Mobility Inpatient Short Form Raw Score is 11  A Raw score of less than or equal to 16 suggests the patient may benefit from discharge to post-acute rehabilitation services  Please also refer to physical therapy recommendation for safe DC planning  Goals   LTG Expiration Date 10/06/22   Plan   Treatment/Interventions   (Functional transfer training; LE strengthening/ROM; Elevations; Therapeutic exercise; Endurance training; Bed mobility; Gait training)   Progress Slow progress, decreased activity tolerance   PT Frequency 3-5x/wk   Recommendation   PT Discharge Recommendation Post acute rehabilitation services   AM-PAC Basic Mobility Inpatient   Turning in Bed Without Bedrails 3   Lying on Back to Sitting on Edge of Flat Bed 2   Moving Bed to Chair 2   Standing Up From Chair 2   Walk in Room 1   Climb 3-5 Stairs 1   Basic Mobility Inpatient Raw Score 11   Basic Mobility Standardized Score 30 25   Turning Head Towards Sound 4   Follow Simple Instructions 3   Low Function Basic Mobility Raw Score 18   Low Function Basic Mobility Standardized Score 29 25   Highest Level Of Mobility   JH-HLM Goal 4: Move to chair/commode   JH-HLM Achieved 2: Bed activities/Dependent transfer   End of Consult   Patient Position at End of Consult Supine;Bed/Chair alarm activated; All needs within reach   End of Consult Comments discussed POC with PT

## 2022-09-30 NOTE — PLAN OF CARE
Problem: PAIN - ADULT  Goal: Verbalizes/displays adequate comfort level or baseline comfort level  Description: Interventions:  - Encourage patient to monitor pain and request assistance  - Assess pain using appropriate pain scale  - Administer analgesics based on type and severity of pain and evaluate response  - Implement non-pharmacological measures as appropriate and evaluate response  - Consider cultural and social influences on pain and pain management  - Notify physician/advanced practitioner if interventions unsuccessful or patient reports new pain  Outcome: Progressing     Problem: INFECTION - ADULT  Goal: Absence or prevention of progression during hospitalization  Description: INTERVENTIONS:  - Assess and monitor for signs and symptoms of infection  - Monitor lab/diagnostic results  - Monitor all insertion sites, i e  indwelling lines, tubes, and drains  - Monitor endotracheal if appropriate and nasal secretions for changes in amount and color  - [place] appropriate cooling/warming therapies per order  - Administer medications as ordered  - Instruct and encourage patient and family to use good hand hygiene technique  - Identify and instruct in appropriate isolation precautions for identified infection/condition  Outcome: Progressing  Goal: Absence of fever/infection during neutropenic period  Description: INTERVENTIONS:  - Monitor WBC    Outcome: Progressing     Problem: SAFETY ADULT  Goal: Patient will remain free of falls  Description: INTERVENTIONS:  - Educate patient/family on patient safety including physical limitations  - Instruct patient to call for assistance with activity   - Consult OT/PT to assist with strengthening/mobility   - Keep Call bell within reach  - Keep bed low and locked with side rails adjusted as appropriate  - Keep care items and personal belongings within reach  - Initiate and maintain comfort rounds  - Make Fall Risk Sign visible to staff  - Offer Toileting every 2 Hours, in advance of need  - Initiate/Maintain bed/chair alarm  - Obtain necessary fall risk management equipment: alarms  - Apply yellow socks and bracelet for high fall risk patients  - Consider moving patient to room near nurses station  Outcome: Progressing  Goal: Maintain or return to baseline ADL function  Description: INTERVENTIONS:  -  Assess patient's ability to carry out ADLs; assess patient's baseline for ADL function and identify physical deficits which impact ability to perform ADLs (bathing, care of mouth/teeth, toileting, grooming, dressing, etc )  - Assess/evaluate cause of self-care deficits   - Assess range of motion  - Assess patient's mobility; develop plan if impaired  - Assess patient's need for assistive devices and provide as appropriate  - Encourage maximum independence but intervene and supervise when necessary  - Involve family in performance of ADLs  - Assess for home care needs following discharge   - Consider OT consult to assist with ADL evaluation and planning for discharge  - Provide patient education as appropriate  Outcome: Progressing  Goal: Maintains/Returns to pre admission functional level  Description: INTERVENTIONS:  - Perform BMAT or MOVE assessment daily    - Set and communicate daily mobility goal to care team and patient/family/caregiver  - Collaborate with rehabilitation services on mobility goals if consulted  - Perform Range of Motion 4 times a day  - Reposition patient every 2 hours    - Dangle patient 3-4 times a day  - Stand patient 4 times a day  - Ambulate patient 4 times a day  - Out of bed to chair 3 times a day   - Out of bed for meals 3 times a day  - Out of bed for toileting  - Record patient progress and toleration of activity level   Outcome: Progressing     Problem: DISCHARGE PLANNING  Goal: Discharge to home or other facility with appropriate resources  Description: INTERVENTIONS:  - Identify barriers to discharge w/patient and caregiver  - Arrange for needed discharge resources and transportation as appropriate  - Identify discharge learning needs (meds, wound care, etc )  - Arrange for interpretive services to assist at discharge as needed  - Refer to Case Management Department for coordinating discharge planning if the patient needs post-hospital services based on physician/advanced practitioner order or complex needs related to functional status, cognitive ability, or social support system  Outcome: Progressing     Problem: Knowledge Deficit  Goal: Patient/family/caregiver demonstrates understanding of disease process, treatment plan, medications, and discharge instructions  Description: Complete learning assessment and assess knowledge base    Interventions:  - Provide teaching at level of understanding  - Provide teaching via preferred learning methods  Outcome: Progressing     Problem: NEUROSENSORY - ADULT  Goal: Achieves stable or improved neurological status  Description: INTERVENTIONS  - Monitor and report changes in neurological status  - Monitor vital signs such as temperature, blood pressure, glucose, and any other labs ordered   - Initiate measures to prevent increased intracranial pressure      Outcome: Progressing     Problem: CARDIOVASCULAR - ADULT  Goal: Maintains optimal cardiac output and hemodynamic stability  Description: INTERVENTIONS:  - Monitor I/O, vital signs and rhythm  - Monitor for S/S and trends of decreased cardiac output  - Administer and titrate ordered vasoactive medications to optimize hemodynamic stability  - Assess quality of pulses, skin color and temperature  - Assess for signs of decreased coronary artery perfusion  - Instruct patient to report change in severity of symptoms  Outcome: Progressing     Problem: GENITOURINARY - ADULT  Goal: Maintains or returns to baseline urinary function  Description: INTERVENTIONS:  - Assess urinary function  - Encourage oral fluids to ensure adequate hydration if ordered  - Administer IV fluids as ordered to ensure adequate hydration  - Administer ordered medications as needed  - Offer frequent toileting  - Follow urinary retention protocol if ordered  Outcome: Progressing  Goal: Absence of urinary retention  Description: INTERVENTIONS:  - Assess patient's ability to void and empty bladder  - Monitor I/O  - Bladder scan as needed  - Discuss with physician/AP medications to alleviate retention as needed  - Discuss catheterization for long term situations as appropriate  Outcome: Progressing     Problem: METABOLIC, FLUID AND ELECTROLYTES - ADULT  Goal: Electrolytes maintained within normal limits  Description: INTERVENTIONS:  - Monitor labs and assess patient for signs and symptoms of electrolyte imbalances  - Administer electrolyte replacement as ordered  - Monitor response to electrolyte replacements, including repeat lab results as appropriate  - Instruct patient on fluid and nutrition as appropriate  Outcome: Progressing     Problem: SKIN/TISSUE INTEGRITY - ADULT  Goal: Skin Integrity remains intact(Skin Breakdown Prevention)  Description: Assess:  -Perform Arcadio assessment every shift  -Clean and moisturize skin every 4 houra  -Inspect skin when repositioning, toileting, and assisting with ADLS  -Assess under medical devices such as electrodes every shift  -Assess extremities for adequate circulation and sensation     Bed Management:  -Have minimal linens on bed & keep smooth, unwrinkled  -Change linens as needed when moist or perspiring  -Avoid sitting or lying in one position for more than 2 hours while in bed  -Keep HOB at 30 degrees     Toileting:  -Offer bedside commode  -Assess for incontinence every 2 hours  -Use incontinent care products after each incontinent episode such as breif    Activity:  -Mobilize patient 4 times a day  -Encourage activity and walks on unit  -Encourage or provide ROM exercises   -Turn and reposition patient every 2 Hours  -Use appropriate equipment to lift or move patient in bed  -Instruct/ Assist with weight shifting every 2 hours when out of bed in chair  -Consider limitation of chair time 4 hour intervals    Skin Care:  -Avoid use of baby powder, tape, friction and shearing, hot water or constrictive clothing  -Relieve pressure over bony prominences using alevyn  -Do not massage red bony areas    Next Steps:  -Teach patient strategies to minimize risks such as weight shift  Outcome: Progressing     Problem: MUSCULOSKELETAL - ADULT  Goal: Maintain or return mobility to safest level of function  Description: INTERVENTIONS:  - Assess patient's ability to carry out ADLs; assess patient's baseline for ADL function and identify physical deficits which impact ability to perform ADLs (bathing, care of mouth/teeth, toileting, grooming, dressing, etc )  - Assess/evaluate cause of self-care deficits   - Assess range of motion  - Assess patient's mobility  - Assess patient's need for assistive devices and provide as appropriate  - Encourage maximum independence but intervene and supervise when necessary  - Involve family in performance of ADLs  - Assess for home care needs following discharge   - Consider OT consult to assist with ADL evaluation and planning for discharge  - Provide patient education as appropriate  Outcome: Progressing     Problem: Potential for Falls  Goal: Patient will remain free of falls  Description: INTERVENTIONS:  - Educate patient/family on patient safety including physical limitations  - Instruct patient to call for assistance with activity   - Consult OT/PT to assist with strengthening/mobility   - Keep Call bell within reach  - Keep bed low and locked with side rails adjusted as appropriate  - Keep care items and personal belongings within reach  - Initiate and maintain comfort rounds  - Make Fall Risk Sign visible to staff  - Offer Toileting every 2 Hours, in advance of need  - Initiate/Maintain bed/chair alarm  - Obtain necessary fall risk management equipment: alarms  - Apply yellow socks and bracelet for high fall risk patients  - Consider moving patient to room near nurses station  Outcome: Progressing     Problem: MOBILITY - ADULT  Goal: Maintain or return to baseline ADL function  Description: INTERVENTIONS:  -  Assess patient's ability to carry out ADLs; assess patient's baseline for ADL function and identify physical deficits which impact ability to perform ADLs (bathing, care of mouth/teeth, toileting, grooming, dressing, etc )  - Assess/evaluate cause of self-care deficits   - Assess range of motion  - Assess patient's mobility; develop plan if impaired  - Assess patient's need for assistive devices and provide as appropriate  - Encourage maximum independence but intervene and supervise when necessary  - Involve family in performance of ADLs  - Assess for home care needs following discharge   - Consider OT consult to assist with ADL evaluation and planning for discharge  - Provide patient education as appropriate  Outcome: Progressing  Goal: Maintains/Returns to pre admission functional level  Description: INTERVENTIONS:  - Perform BMAT or MOVE assessment daily    - Set and communicate daily mobility goal to care team and patient/family/caregiver  - Collaborate with rehabilitation services on mobility goals if consulted  - Perform Range of Motion 4 times a day  - Reposition patient every 2 hours    - Dangle patient 3-4 times a day  - Stand patient 4 times a day  - Ambulate patient 4 times a day  - Out of bed to chair 3 times a day   - Out of bed for meals 3 times a day  - Out of bed for toileting  - Record patient progress and toleration of activity level   Outcome: Progressing     Problem: Prexisting or High Potential for Compromised Skin Integrity  Goal: Skin integrity is maintained or improved  Description: INTERVENTIONS:  - Identify patients at risk for skin breakdown  - Assess and monitor skin integrity  - Assess and monitor nutrition and hydration status  - Monitor labs   - Assess for incontinence   - Turn and reposition patient  - Assist with mobility/ambulation  - Relieve pressure over bony prominences  - Avoid friction and shearing  - Provide appropriate hygiene as needed including keeping skin clean and dry  - Evaluate need for skin moisturizer/barrier cream  - Collaborate with interdisciplinary team   - Patient/family teaching  - Consider wound care consult   Outcome: Progressing     Problem: Nutrition/Hydration-ADULT  Goal: Nutrient/Hydration intake appropriate for improving, restoring or maintaining nutritional needs  Description: Monitor and assess patient's nutrition/hydration status for malnutrition  Collaborate with interdisciplinary team and initiate plan and interventions as ordered  Monitor patient's weight and dietary intake as ordered or per policy  Utilize nutrition screening tool and intervene as necessary  Determine patient's food preferences and provide high-protein, high-caloric foods as appropriate       INTERVENTIONS:  - Monitor oral intake, urinary output, labs, and treatment plans  - Assess nutrition and hydration status and recommend course of action  - Evaluate amount of meals eaten  - Assist patient with eating if necessary   - Allow adequate time for meals  - Recommend/ encourage appropriate diets, oral nutritional supplements, and vitamin/mineral supplements  - Order, calculate, and assess calorie counts as needed  - Recommend, monitor, and adjust tube feedings and TPN/PPN based on assessed needs  - Assess need for intravenous fluids  - Provide specific nutrition/hydration education as appropriate  - Include patient/family/caregiver in decisions related to nutrition  Outcome: Progressing     Problem: RESPIRATORY - ADULT  Goal: Achieves optimal ventilation and oxygenation  Description: INTERVENTIONS:  - Assess for changes in respiratory status  - Assess for changes in mentation and behavior  - Position to facilitate oxygenation and minimize respiratory effort  - Oxygen administered by appropriate delivery if ordered  - Initiate smoking cessation education as indicated  - Encourage broncho-pulmonary hygiene including cough, deep breathe, Incentive Spirometry  - Assess the need for suctioning and aspirate as needed  - Assess and instruct to report SOB or any respiratory difficulty  - Respiratory Therapy support as indicated  Outcome: Progressing

## 2022-09-30 NOTE — ASSESSMENT & PLAN NOTE
The patient was seen in consultation by Urology- no current plan for urological intervention  Continue to treat underlying UTI  As per Urology attending, retained indwelling ureteral stent may be difficult to remove, and may require retrograde and antegrade renal surgery if the proximal coil is fixed with stone formation       The patient will need definitive stone treatment and extraction of the retained left ureteral stent via cystoscopy and laser for extraction of  encrusted/retained stent as well as ureteral/renal stones with temporary stent exchange  This can be done after hospital discharge and after the suspected acute urinary tract infection has cleared and patient has completed antibiotic course  The patient can follow with her urologist at the [facility] that she previously establish care with almost 2 years ago for the initial placement of the stent or referral to [facility] on an outpatient basis      If the patient develops worsening creatinine then with recommendations to obtain renal ultrasound to assess for worsening hydronephrosis, and in that instance patient will become a candidate for urological intervention

## 2022-09-30 NOTE — PLAN OF CARE
Problem: PHYSICAL THERAPY ADULT  Goal: Performs mobility at highest level of function for planned discharge setting  See evaluation for individualized goals  Description: Treatment/Interventions: Functional transfer training, LE strengthening/ROM, Elevations, Therapeutic exercise, Endurance training, Bed mobility, Gait training          See flowsheet documentation for full assessment, interventions and recommendations  Outcome: Progressing  Note: Prognosis: Good  Problem List:  (Decreased strength; Decreased endurance; Impaired balance; Decreased mobility; Impaired judgement; Decreased cognition; Decreased safety awareness)  Assessment: Pt  seen for PT treatment session this date with interventions consisting of  therapeutic exercises to improve strength, endurance and functional mobility  In comparison to previous session, Pt  With improvements in activity tolerance  Demonstrates improving endurance and strength ans noted by exercise performance  Vitals WFL's  Pt is in need of continued activity in PT to improve strength balance endurance mobility transfers and ambulation with return to maximize LOF  From PT/mobility standpoint, recommendation at time of d/c would be post acute rehab in order to promote return to PLOF and independence  The patient's AM-PAC Basic Mobility Inpatient Short Form Raw Score is 11  A Raw score of less than or equal to 16 suggests the patient may benefit from discharge to post-acute rehabilitation services  Please also refer to physical therapy recommendation for safe DC planning  PT Discharge Recommendation: Post acute rehabilitation services    See flowsheet documentation for full assessment

## 2022-09-30 NOTE — PROGRESS NOTES
[address] [facility] [address]  Progress Note - Gina Ac 1945, 65 y o  female MRN: [id number]  Unit/Bed#: 420-01 Encounter: [id number]  Primary Care Provider: Electa Koyanagi, DO   Date and time admitted to hospital: 9/21/2022  9:40 PM    * Sepsis due to urinary tract infection [facility])  Assessment & Plan  Sepsis was present on admission and due to UTI - patient with retained ureteral stent  Blood cultures remain negative, but urinary cultures from 09/22 with > 100,000 CFU per mL of pansensitive E coli  Concluded a 7 day course of IV ceftriaxone  Hydroureteronephrosis  Assessment & Plan  The patient was seen in consultation by Urology- no current plan for urological intervention  Continue to treat underlying UTI  As per Urology attending, retained indwelling ureteral stent may be difficult to remove, and may require retrograde and antegrade renal surgery if the proximal coil is fixed with stone formation       The patient will need definitive stone treatment and extraction of the retained left ureteral stent via cystoscopy and laser for extraction of  encrusted/retained stent as well as ureteral/renal stones with temporary stent exchange  This can be done after hospital discharge and after the suspected acute urinary tract infection has cleared and patient has completed antibiotic course  The patient can follow with her urologist at the [facility] that she previously establish care with almost 2 years ago for the initial placement of the stent or referral to [address] [place] [address] Urology on an outpatient basis      If the patient develops worsening creatinine then with recommendations to obtain renal ultrasound to assess for worsening hydronephrosis, and in that instance patient will become a candidate for urological intervention       Acute kidney injury superimposed on chronic kidney disease [facility])  Assessment & Plan  Lab Results   Component Value Date    EGFR 27 09/30/2022    EGFR 26 09/29/2022 EGFR 26 09/28/2022    CREATININE 1 77 (H) 09/30/2022    CREATININE 1 80 (H) 09/29/2022    CREATININE 1 79 (H) 09/28/2022     DARRELL superimposed on CKD, likely in the setting of sepsis, acute blood loss anemia, with concurrent diuretic use  Baseline creatinine of 1 6 to 2, peak value during hospitalization of 3  Current creatinine has improved to 1 7 and remains stable  Discontinued IV fluids previously  Received blood products  Continue to hold oral furosemide and ARB despite recovery as patient underwent prep for colonoscopy along with clear liquid diet and NPO status  However, given net positive I/O's and weight gain, did receive a 1 time dose of IV furosemide yesterday  Nephrology consulted and following  Acute blood loss anemia  Assessment & Plan  EGD 9/23 essentially normal, with note of a medium sliding hiatal hernia and numerous polyps measuring less than 5 mm in the stomach  Colonoscopy today with severe diverticulosis, fixed sigmoid loop inhibiting advancement of colonoscope  Large external hemorrhoids and a polyp which was not removed were also noted  Patient's hemoglobin had responded to a value of 9 following initial 2 units of PRBCs - had continued to slowly drop with resumption of Eliquis, down to a value of  6 8 on the morning 9/28 requiring 2 additional units  · Eliquis remains on hold  · Colonoscopy as above  · Resume diet  · Repeat colonoscopy in 3 months, and if incomplete recommendations for CT colonography  · If recurrent rectal bleeding recommendations are for consulting Colorectal surgery for hemorrhoidectomy  Gastrointestinal bleeding  Assessment & Plan  Evaluation and treatment as above  COVID-19 virus infection  Assessment & Plan  Initial COVID testing negative on 09/22, fevers prompting repeat check on 09/25 which was positive  Currently on the mild pathway  · CXR without acute findings  · Monitor labs, trend CRP    · Hold off on dexamethasone, remdesivir given continued lack of O2 requirement  · D-dimer elevated at 2 9 - however, patient's illnesses is mild and she is currently without oxygen requirement  Maintain on SC heparin but avoid full anticoagulation as per protocol, as well as due to GI bleeding while on anticoagulation  Chronic combined systolic and diastolic CHF (congestive heart failure) (HCC)  Assessment & Plan  Wt Readings from Last 3 Encounters:   09/30/22 54 7 kg (120 lb 9 5 oz)   05/31/22 62 8 kg (138 lb 7 2 oz)   11/16/21 62 8 kg (138 lb 7 2 oz)     Current ECHO with LVEF 45%, presence of G1 DD  RV systolic function is normal   Also with mention of moderate MR, moderate PHTN  Cardiomyopathy appears ischemic in nature  · Check daily weights  · Maintain on low-sodium diet  · Furosemide and ARB remain hold as above  · Given weight gain and net positive I/O's received day 1 time IV dose of furosemide on 09/29  · Continue BB therapy, with plans for discharge on long-acting formulation  · Insure outpatient follow-up with Cardiology  Paroxysmal atrial fibrillation (HCC)  Assessment & Plan  Eliquis resumed on 09/25 at decreased dosing based upon patient's weight and serum creatinine - discontinued given continued drop in hemoglobin  Continue beta-blocker with plans on discharging on long-acting formulation  Stroke-like symptoms  Assessment & Plan  No acute abnormality on initial CT of the head  MRI contraindicated secondary to presence of AICD/ppm   Will need outpatient MRI at another [facility] following discharge  Morning lipids and A1c updated  Allergy to statin noted  Anticoagulation with apixaban currently on hold  Continue to monitor neurological symptoms  Of note, patient does not appear to have any focal symptoms again this morning based upon exam, with improvement in confusion  Underlying history of dementia and 'forgetfulness' noted    Question whether altered mental status is secondary to acute metabolic encephalopathy from underlying dementia and current acute illness due to COVID-19 infection with fevers - mental status continues to appear improved and is likely nearing patient's baseline  Essential hypertension  Assessment & Plan  Changed metoprolol tartrate to metoprolol succinate (toprol XL) with the decreased LVEF, which should be continued upon discharge  Continue to hold ARB in the setting of DARRELL  Currently remains hypertensive but not significantly so - will allow for slightly higher blood pressures for continued renal perfusion  Continue to monitor blood pressure, with additional antihypertensive regimen as warranted  Acquired hypothyroidism  Assessment & Plan  TSH markedly low at 0 05, although checked during acute illness  Levothyroxine dosing decreased to 125 mcg, with recommendations for repeat TSH in 4-6 weeks  VTE Pharmacologic Prophylaxis: VTE Score: 10 High Risk (Score >/= 5) - Pharmacological DVT Prophylaxis Contraindicated  Sequential Compression Devices Ordered      Patient Centered Rounds: I performed bedside rounds with nursing staff today  Discussions with Specialists or Other Care Team Provider:  Review of urology note      Education and Discussions with Family / Patient: Updated  () via phone  Alanis Kapoor     Time Spent for Care: 30 minutes  More than 50% of total time spent on counseling and coordination of care as described above      Current Length of Stay: 9 day(s)  Current Patient Status: Inpatient   Certification Statement: The patient will continue to require additional inpatient hospital stay due to need for further evaluation of potential GI bleeding with colonoscopy  Patient also needs to be 10 days out from COVID diagnosis prior to placement to rehab facility  Discharge Plan: Anticipate discharge in >72 hrs to rehab facility      Code Status: Level 3 - DNAR and DNI     Subjective:   Patient has no acute complaints this morning   Hemoglobin remains stable  No overnight events reported  Remains afebrile  Objective:     Vitals:   Temp (24hrs), Av 8 °F (36 6 °C), Min:97 4 °F (36 3 °C), Max:98 3 °F (36 8 °C)    Temp:  [97 4 °F (36 3 °C)-98 3 °F (36 8 °C)] 97 4 °F (36 3 °C)  HR:  [78-94] 78  Resp:  [15-20] 16  BP: (112-151)/(56-92) 141/62  SpO2:  [90 %-99 %] 93 %  Body mass index is 19 46 kg/m²  Input and Output Summary (last 24 hours): Intake/Output Summary (Last 24 hours) at 2022 193  Last data filed at 2022 1644  Gross per 24 hour   Intake 920 ml   Output --   Net 920 ml       Physical Exam:   Vital signs reviewed  Constitutional:       General: She is not in acute distress      Appearance: Normal appearance  She is normal weight  She is not ill-appearing or toxic-appearing  HENT:      Mouth/Throat:      Mouth: Mucous membranes are moist    Cardiovascular:      Rate and Rhythm: Normal rate  Rhythm irregular       Heart sounds: No murmur heard     No gallop  Pulmonary:      Effort: Pulmonary effort is normal  No respiratory distress       Breath sounds: Normal breath sounds  No wheezing, rhonchi or rales  Abdominal:      General: Abdomen is flat  Bowel sounds are normal  There is no distension       Palpations: Abdomen is soft       Tenderness: There is no abdominal tenderness  There is no guarding or rebound  Musculoskeletal:         General: No swelling or tenderness       Cervical back: Neck supple  Skin:     General: Skin is warm and dry  Neurological:      General: No focal deficit present       Mental Status: She is alert  She is disoriented       Comments:  Continued improvement in mental status    Psychiatric:         Mood and Affect: Mood normal          Behavior: Behavior normal      Additional Data:     Labs:  Results from last 7 days   Lab Units 22  0424   WBC Thousand/uL 6 49   HEMOGLOBIN g/dL 10 6*   HEMATOCRIT % 32 8*   PLATELETS Thousands/uL 174   NEUTROS PCT % 74   LYMPHS PCT % 16   MONOS PCT % 7   EOS PCT % 1     Results from last 7 days   Lab Units 09/30/22  0424   SODIUM mmol/L 140   POTASSIUM mmol/L 4 0   CHLORIDE mmol/L 104   CO2 mmol/L 24   BUN mg/dL 46*   CREATININE mg/dL 1 77*   ANION GAP mmol/L 12   CALCIUM mg/dL 8 8   ALBUMIN g/dL 2 3*   TOTAL BILIRUBIN mg/dL 0 38   ALK PHOS U/L 119*   ALT U/L 14   AST U/L 36   GLUCOSE RANDOM mg/dL 76     Results from last 7 days   Lab Units 09/25/22  0658   INR  1 10     Results from last 7 days   Lab Units 09/27/22  1658 09/27/22  1023 09/27/22  0825 09/27/22  0743   POC GLUCOSE mg/dl 78 99 88 118         Results from last 7 days   Lab Units 09/26/22  0514 09/25/22  0929 09/25/22  0658   LACTIC ACID mmol/L 1 4 2 0  --    PROCALCITONIN ng/ml 0 31*  --  0 28*       Lines/Drains:  Invasive Devices  Report    Peripheral Intravenous Line  Duration           Peripheral IV 09/28/22 Right;Upper;Ventral (anterior) Arm 2 days    Peripheral IV 09/29/22 Dorsal (posterior); Right Forearm 1 day          Drain  Duration           External Urinary Catheter 7 days                Imaging: No pertinent imaging reviewed  Recent Cultures (last 7 days):   Results from last 7 days   Lab Units 09/25/22  1057 09/25/22  0928 09/25/22  0909   BLOOD CULTURE  No Growth After 5 Days  No Growth After 5 Days    --    URINE CULTURE   --   --  10,000-19,000 cfu/ml        Last 24 Hours Medication List:   Current Facility-Administered Medications   Medication Dose Route Frequency Provider Last Rate    acetaminophen  650 mg Oral Q6H PRN Evelyn La MD      allopurinol  100 mg Oral Daily Evelyn La MD      calcium carbonate-vitamin D  1 tablet Oral BID With Meals Evelyn La MD      fluticasone  2 spray Nasal Daily Evelyn La MD      heparin (porcine)  5,000 Units Subcutaneous [facility] Ollie Trejo MD      labetalol  10 mg Intravenous Q4H PRN Evelyn La MD      levothyroxine  125 mcg Oral Early Morning Evelyn La MD      magnesium oxide  400 mg Oral BID Evelyn La MD      metoprolol succinate  50 mg Oral Q12H [place] Grover MD      ondansetron  4 mg Intravenous Q4H PRN Shanell Grover MD      pantoprazole  20 mg Oral Early Morning Shanellelias Grover MD      polyethylene glycol  17 g Oral Daily PRN [place] Grover MD      sertraline  100 mg Oral Daily [place] Grover MD      sodium chloride (PF)  3 mL Intravenous Q1H PRN [place] Grover MD          Today, Patient Was Seen By: Roger Gil    **Please Note: This note may have been constructed using a voice recognition system  **

## 2022-09-30 NOTE — ASSESSMENT & PLAN NOTE
Sepsis was present on admission and due to UTI - patient with retained ureteral stent  Blood cultures remain negative, but urinary cultures from 09/22 with > 100,000 CFU per mL of pansensitive E coli  Concluded a 7 day course of IV ceftriaxone

## 2022-10-01 LAB
ALBUMIN SERPL BCP-MCNC: 2.2 G/DL (ref 3.5–5)
ALP SERPL-CCNC: 110 U/L (ref 46–116)
ALT SERPL W P-5'-P-CCNC: 12 U/L (ref 12–78)
ANION GAP SERPL CALCULATED.3IONS-SCNC: 14 MMOL/L (ref 4–13)
AST SERPL W P-5'-P-CCNC: 29 U/L (ref 5–45)
BASOPHILS # BLD AUTO: 0.02 THOUSANDS/ÂΜL (ref 0–0.1)
BASOPHILS NFR BLD AUTO: 0 % (ref 0–1)
BILIRUB SERPL-MCNC: 0.4 MG/DL (ref 0.2–1)
BUN SERPL-MCNC: 47 MG/DL (ref 5–25)
CALCIUM ALBUM COR SERPL-MCNC: 9.9 MG/DL (ref 8.3–10.1)
CALCIUM SERPL-MCNC: 8.5 MG/DL (ref 8.3–10.1)
CHLORIDE SERPL-SCNC: 108 MMOL/L (ref 96–108)
CO2 SERPL-SCNC: 22 MMOL/L (ref 21–32)
CREAT SERPL-MCNC: 1.71 MG/DL (ref 0.6–1.3)
EOSINOPHIL # BLD AUTO: 0.06 THOUSAND/ÂΜL (ref 0–0.61)
EOSINOPHIL NFR BLD AUTO: 1 % (ref 0–6)
ERYTHROCYTE [DISTWIDTH] IN BLOOD BY AUTOMATED COUNT: 22.1 % (ref 11.6–15.1)
GFR SERPL CREATININE-BSD FRML MDRD: 28 ML/MIN/1.73SQ M
GLUCOSE SERPL-MCNC: 120 MG/DL (ref 65–140)
GLUCOSE SERPL-MCNC: 121 MG/DL (ref 65–140)
GLUCOSE SERPL-MCNC: 70 MG/DL (ref 65–140)
HCT VFR BLD AUTO: 31.9 % (ref 34.8–46.1)
HGB BLD-MCNC: 10.2 G/DL (ref 11.5–15.4)
IMM GRANULOCYTES # BLD AUTO: 0.02 THOUSAND/UL (ref 0–0.2)
IMM GRANULOCYTES NFR BLD AUTO: 0 % (ref 0–2)
LYMPHOCYTES # BLD AUTO: 0.81 THOUSANDS/ÂΜL (ref 0.6–4.47)
LYMPHOCYTES NFR BLD AUTO: 16 % (ref 14–44)
MAGNESIUM SERPL-MCNC: 2.5 MG/DL (ref 1.6–2.6)
MCH RBC QN AUTO: 26.8 PG (ref 26.8–34.3)
MCHC RBC AUTO-ENTMCNC: 32 G/DL (ref 31.4–37.4)
MCV RBC AUTO: 84 FL (ref 82–98)
MONOCYTES # BLD AUTO: 0.36 THOUSAND/ÂΜL (ref 0.17–1.22)
MONOCYTES NFR BLD AUTO: 7 % (ref 4–12)
NEUTROPHILS # BLD AUTO: 3.72 THOUSANDS/ÂΜL (ref 1.85–7.62)
NEUTS SEG NFR BLD AUTO: 76 % (ref 43–75)
NRBC BLD AUTO-RTO: 0 /100 WBCS
PLATELET # BLD AUTO: 162 THOUSANDS/UL (ref 149–390)
PMV BLD AUTO: 10.8 FL (ref 8.9–12.7)
POTASSIUM SERPL-SCNC: 3.7 MMOL/L (ref 3.5–5.3)
PROT SERPL-MCNC: 6.2 G/DL (ref 6.4–8.4)
RBC # BLD AUTO: 3.8 MILLION/UL (ref 3.81–5.12)
SODIUM SERPL-SCNC: 144 MMOL/L (ref 135–147)
WBC # BLD AUTO: 4.99 THOUSAND/UL (ref 4.31–10.16)

## 2022-10-01 PROCEDURE — 99232 SBSQ HOSP IP/OBS MODERATE 35: CPT | Performed by: NURSE PRACTITIONER

## 2022-10-01 PROCEDURE — 82948 REAGENT STRIP/BLOOD GLUCOSE: CPT

## 2022-10-01 PROCEDURE — 80053 COMPREHEN METABOLIC PANEL: CPT | Performed by: INTERNAL MEDICINE

## 2022-10-01 PROCEDURE — 83735 ASSAY OF MAGNESIUM: CPT | Performed by: INTERNAL MEDICINE

## 2022-10-01 PROCEDURE — 85025 COMPLETE CBC W/AUTO DIFF WBC: CPT | Performed by: INTERNAL MEDICINE

## 2022-10-01 PROCEDURE — 99233 SBSQ HOSP IP/OBS HIGH 50: CPT | Performed by: INTERNAL MEDICINE

## 2022-10-01 RX ADMIN — METOPROLOL SUCCINATE 50 MG: 50 TABLET, FILM COATED, EXTENDED RELEASE ORAL at 21:00

## 2022-10-01 RX ADMIN — PANTOPRAZOLE SODIUM 20 MG: 20 TABLET, DELAYED RELEASE ORAL at 05:16

## 2022-10-01 RX ADMIN — Medication 1 TABLET: at 15:35

## 2022-10-01 RX ADMIN — HEPARIN SODIUM 5000 UNITS: 5000 INJECTION INTRAVENOUS; SUBCUTANEOUS at 21:00

## 2022-10-01 RX ADMIN — Medication 1 TABLET: at 09:09

## 2022-10-01 RX ADMIN — HEPARIN SODIUM 5000 UNITS: 5000 INJECTION INTRAVENOUS; SUBCUTANEOUS at 15:36

## 2022-10-01 RX ADMIN — SERTRALINE 100 MG: 100 TABLET, FILM COATED ORAL at 09:07

## 2022-10-01 RX ADMIN — MAGNESIUM OXIDE TAB 400 MG (241.3 MG ELEMENTAL MG) 400 MG: 400 (241.3 MG) TAB at 09:06

## 2022-10-01 RX ADMIN — MAGNESIUM OXIDE TAB 400 MG (241.3 MG ELEMENTAL MG) 400 MG: 400 (241.3 MG) TAB at 17:53

## 2022-10-01 RX ADMIN — LEVOTHYROXINE SODIUM 125 MCG: 125 TABLET ORAL at 05:16

## 2022-10-01 RX ADMIN — FLUTICASONE PROPIONATE 2 SPRAY: 50 SPRAY, METERED NASAL at 09:11

## 2022-10-01 RX ADMIN — HEPARIN SODIUM 5000 UNITS: 5000 INJECTION INTRAVENOUS; SUBCUTANEOUS at 05:16

## 2022-10-01 RX ADMIN — METOPROLOL SUCCINATE 50 MG: 50 TABLET, FILM COATED, EXTENDED RELEASE ORAL at 09:06

## 2022-10-01 RX ADMIN — ALLOPURINOL 100 MG: 100 TABLET ORAL at 09:06

## 2022-10-01 RX ADMIN — POTASSIUM CHLORIDE 30 MEQ: 1500 TABLET, EXTENDED RELEASE ORAL at 09:06

## 2022-10-01 NOTE — PROGRESS NOTES
[address] [facility] [address] [place]  Progress Note - Darryn Fernandes 1945, 65 y o  female MRN: [id number]  Unit/Bed#: 420-01 Encounter: [id number]  Primary Care Provider: Tiffany Nathan DO   Date and time admitted to hospital: 9/21/2022  9:40 PM    * Sepsis due to urinary tract infection [facility])  Assessment & Plan  Sepsis was present on admission and due to UTI - patient with retained ureteral stent  Blood cultures remain negative, but urinary cultures from 09/22 with > 100,000 CFU per mL of pansensitive E coli  Concluded a 7 day course of IV ceftriaxone  Hydroureteronephrosis  Assessment & Plan  The patient was seen in consultation by Urology- no current plan for urological intervention  Continue to treat underlying UTI  As per Urology attending, retained indwelling ureteral stent may be difficult to remove, and may require retrograde and antegrade renal surgery if the proximal coil is fixed with stone formation       The patient will need definitive stone treatment and extraction of the retained left ureteral stent via cystoscopy and laser for extraction of  encrusted/retained stent as well as ureteral/renal stones with temporary stent exchange  This can be done after hospital discharge and after the suspected acute urinary tract infection has cleared and patient has completed antibiotic course  The patient can follow with her urologist at the [facility] that she previously establish care with almost 2 years ago for the initial placement of the stent or referral to [address] [place] [address] Urology on an outpatient basis      If the patient develops worsening creatinine then with recommendations to obtain renal ultrasound to assess for worsening hydronephrosis, and in that instance patient will become a candidate for urological intervention       Acute kidney injury superimposed on chronic kidney disease [facility])  Assessment & Plan  Lab Results   Component Value Date    EGFR 28 10/01/2022    EGFR 27 09/30/2022 EGFR 26 09/29/2022    CREATININE 1 71 (H) 10/01/2022    CREATININE 1 77 (H) 09/30/2022    CREATININE 1 80 (H) 09/29/2022     DARRELL superimposed on CKD, likely in the setting of sepsis, acute blood loss anemia, with concurrent diuretic use  Baseline creatinine of 1 6 to 2, peak value during hospitalization of 3  Current creatinine has improved to 1 7 and remains stable  Discontinued IV fluids previously  Received blood products  Continue to hold oral furosemide and ARB despite recovery as patient underwent prep for colonoscopy along with clear liquid diet and NPO status yesterday, examining euvolemic  Nephrology consulted and following  Acute blood loss anemia  Assessment & Plan  EGD 9/23 essentially normal, with note of a medium sliding hiatal hernia and numerous polyps measuring less than 5 mm in the stomach  Colonoscopy today with severe diverticulosis, fixed sigmoid loop inhibiting advancement of colonoscope  Large external hemorrhoids and a polyp which was not removed were also noted  Patient's  report same experience on patient's prior colonoscopy attempt  Patient's hemoglobin had responded to a value of 9 following initial 2 units of PRBCs - had continued to slowly drop with resumption of Eliquis, down to a value of  6 8 on the morning 9/28 requiring 2 additional units  · Eliquis remains on hold  · Colonoscopy as above  · Resume diet  · Repeat colonoscopy in 3 months, and if incomplete recommendations for CT colonography  · If recurrent rectal bleeding recommendations are for consulting Colorectal surgery for hemorrhoidectomy  Gastrointestinal bleeding  Assessment & Plan  Evaluation and treatment as above  COVID-19 virus infection  Assessment & Plan  Initial COVID testing negative on 09/22, fevers prompting repeat check on 09/25 which was positive  Currently on the mild pathway  · CXR without acute findings  · Monitor labs, trend CRP    · Hold off on dexamethasone, remdesivir given continued lack of O2 requirement  · D-dimer elevated at 2 9 - however, patient's illnesses is mild and she is currently without oxygen requirement  Maintain on SC heparin but avoid full anticoagulation as per protocol, as well as due to GI bleeding while on anticoagulation  Chronic combined systolic and diastolic CHF (congestive heart failure) (HCC)  Assessment & Plan  Wt Readings from Last 3 Encounters:   10/01/22 53 9 kg (118 lb 13 3 oz)   05/31/22 62 8 kg (138 lb 7 2 oz)   11/16/21 62 8 kg (138 lb 7 2 oz)     Current ECHO with LVEF 45%, presence of G1 DD  RV systolic function is normal   Also with mention of moderate MR, moderate PHTN  Cardiomyopathy appears ischemic in nature  · Check daily weights - currently stable  · Maintain on low-sodium diet  · Furosemide and ARB remain hold as above  · Given weight gain and net positive I/O's received a 1 time IV dose of furosemide on 09/29  · Continue BB therapy, with plans for discharge on long-acting formulation  · Insure outpatient follow-up with Cardiology  Paroxysmal atrial fibrillation (HCC)  Assessment & Plan  Eliquis resumed on 09/25 at decreased dosing based upon patient's weight and serum creatinine - discontinued given continued drop in hemoglobin  Continue beta-blocker with plans on discharging on long-acting formulation  Stroke-like symptoms  Assessment & Plan  No acute abnormality on initial CT of the head  MRI contraindicated secondary to presence of AICD/ppm   Will need outpatient MRI at another [facility] following discharge  Morning lipids and A1c updated  Allergy to statin noted  Anticoagulation with apixaban currently on hold  Continue to monitor neurological symptoms  Of note, patient did not appear to have any focal symptoms earlier this week or currently, with improvement in confusion  Underlying history of dementia and 'forgetfulness' noted    Question whether altered mental status is secondary to acute metabolic encephalopathy from underlying dementia and current acute illness due to COVID-19 infection with fevers - mental status continues to appear improved and is likely nearing patient's baseline  Essential hypertension  Assessment & Plan  Changed metoprolol tartrate to metoprolol succinate (toprol XL) with the decreased LVEF, which should be continued upon discharge  Continue to hold ARB in the setting of DARRELL  Currently remains hypertensive but not significantly so - will allow for slightly higher blood pressures for continued renal perfusion  Continue to monitor blood pressure, with additional antihypertensive regimen as warranted  Acquired hypothyroidism  Assessment & Plan  TSH markedly low at 0 05, although checked during acute illness  Levothyroxine dosing decreased to 125 mcg, with recommendations for repeat TSH in 4-6 weeks  VTE Pharmacologic Prophylaxis: VTE Score: 10 High Risk (Score >/= 5) - Pharmacological DVT Prophylaxis Contraindicated  Sequential Compression Devices Ordered      Patient Centered Rounds: I performed bedside rounds with nursing staff today  Discussions with Specialists or Other Care Team Provider:  Review of urology note      Education and Discussions with Family / Patient: Updated  () via phone previously  Kae Yuen     Time Spent for Care: 30 minutes  More than 50% of total time spent on counseling and coordination of care as described above      Current Length of Stay: 10 day(s)  Current Patient Status: Inpatient   Certification Statement: The patient will continue to require additional inpatient hospital stay due to need for further evaluation of potential GI bleeding with colonoscopy   Patient also needs to be 10 days out from COVID diagnosis prior to placement to rehab facility  Discharge Plan: Anticipate discharge in >72 hrs to rehab facility    Current placement limited due to COVID positivity      Code Status: Level 3 - DNAR and DNI    Subjective:   Patient seen and examined - continues to have no complaints  Underwent colonoscopy yesterday, limited secondary to a fixed sigmoid loop inhibiting advancement of colonoscope  GI blood loss was attributed to large external hemorrhoids noted at time of colonoscopy  Hemoglobin remains stable today  No overnight events reported  Remains afebrile  Objective:     Vitals:   Temp (24hrs), Av 4 °F (36 3 °C), Min:97 3 °F (36 3 °C), Max:97 5 °F (36 4 °C)    Temp:  [97 3 °F (36 3 °C)-97 5 °F (36 4 °C)] 97 3 °F (36 3 °C)  HR:  [68-83] 68  Resp:  [15-18] 16  BP: (138-169)/(60-67) 169/66  SpO2:  [92 %-99 %] 98 %  Body mass index is 19 18 kg/m²  Input and Output Summary (last 24 hours): Intake/Output Summary (Last 24 hours) at 10/1/2022 1435  Last data filed at 10/1/2022 1339  Gross per 24 hour   Intake 860 ml   Output 450 ml   Net 410 ml       Physical Exam:   Vital signs reviewed  Constitutional:       General: She is not in acute distress      Appearance: Normal appearance  She is normal weight  She is not ill-appearing or toxic-appearing  HENT:      Mouth/Throat:      Mouth: Mucous membranes are moist    Cardiovascular:      Rate and Rhythm: Normal rate  Rhythm irregular       Heart sounds: No murmur heard     No gallop  Pulmonary:      Effort: Pulmonary effort is normal  No respiratory distress       Breath sounds: Normal breath sounds  No wheezing, rhonchi or rales  Comments:  Minimal bibasilar crackles  Abdominal:      General: Abdomen is flat  Bowel sounds are normal  There is no distension       Palpations: Abdomen is soft       Tenderness: There is no abdominal tenderness  There is no guarding or rebound  Musculoskeletal:         General: No swelling or tenderness       Cervical back: Neck supple  Skin:     General: Skin is warm and dry  Neurological:      General: No focal deficit present       Mental Status: She is alert   She is disoriented       Comments:  Mental status improved overall, remains stable today  Psychiatric:         Mood and Affect: Mood normal          Behavior: Behavior normal      Additional Data:     Labs:  Results from last 7 days   Lab Units 10/01/22  0459   WBC Thousand/uL 4 99   HEMOGLOBIN g/dL 10 2*   HEMATOCRIT % 31 9*   PLATELETS Thousands/uL 162   NEUTROS PCT % 76*   LYMPHS PCT % 16   MONOS PCT % 7   EOS PCT % 1     Results from last 7 days   Lab Units 10/01/22  0459   SODIUM mmol/L 144   POTASSIUM mmol/L 3 7   CHLORIDE mmol/L 108   CO2 mmol/L 22   BUN mg/dL 47*   CREATININE mg/dL 1 71*   ANION GAP mmol/L 14*   CALCIUM mg/dL 8 5   ALBUMIN g/dL 2 2*   TOTAL BILIRUBIN mg/dL 0 40   ALK PHOS U/L 110   ALT U/L 12   AST U/L 29   GLUCOSE RANDOM mg/dL 70     Results from last 7 days   Lab Units 09/25/22  0658   INR  1 10     Results from last 7 days   Lab Units 09/27/22  1658 09/27/22  1023 09/27/22  0825 09/27/22  0743   POC GLUCOSE mg/dl 78 99 88 118         Results from last 7 days   Lab Units 09/26/22  0514 09/25/22  0929 09/25/22  0658   LACTIC ACID mmol/L 1 4 2 0  --    PROCALCITONIN ng/ml 0 31*  --  0 28*       Lines/Drains:  Invasive Devices  Report    Peripheral Intravenous Line  Duration           Peripheral IV 09/28/22 Right;Upper;Ventral (anterior) Arm 2 days    Peripheral IV 09/29/22 Dorsal (posterior); Right Forearm 2 days          Drain  Duration           External Urinary Catheter 8 days                Imaging: No pertinent imaging reviewed  Recent Cultures (last 7 days):   Results from last 7 days   Lab Units 09/25/22  1057 09/25/22  0928 09/25/22  0909   BLOOD CULTURE  No Growth After 5 Days  No Growth After 5 Days    --    URINE CULTURE   --   --  10,000-19,000 cfu/ml        Last 24 Hours Medication List:   Current Facility-Administered Medications   Medication Dose Route Frequency Provider Last Rate    acetaminophen  650 mg Oral Q6H PRN Monroe Troy MD      allopurinol  100 mg Oral Daily Ban Kenyon Robert Diamond MD      calcium carbonate-vitamin D  1 tablet Oral BID With Meals Nancy Thomas MD      fluticasone  2 spray Nasal Daily Nancy Thomas MD      heparin (porcine)  5,000 Units Subcutaneous [facility] Rashi Hernandez MD      labetalol  10 mg Intravenous Q4H PRN Nancy Thomas, MD      levothyroxine  125 mcg Oral Early Morning Nancy Thomas MD      magnesium oxide  400 mg Oral BID Nancy Thomas MD      metoprolol succinate  50 mg Oral Q12H Nancy Thomas MD      ondansetron  4 mg Intravenous Q4H PRN Nancy Thomas, MD      pantoprazole  20 mg Oral Early Morning Nancy Thomas MD      polyethylene glycol  17 g Oral Daily PRN Nancy Thomas MD      sertraline  100 mg Oral Daily Nancy Thomas MD      sodium chloride (PF)  3 mL Intravenous Q1H PRN Nancy Thomas, MD          Today, Patient Was Seen By: Rashi Hernandez    **Please Note: This note may have been constructed using a voice recognition system  **
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
## 2022-10-01 NOTE — ASSESSMENT & PLAN NOTE
The patient was seen in consultation by Urology- no current plan for urological intervention  Continue to treat underlying UTI  As per Urology attending, retained indwelling ureteral stent may be difficult to remove, and may require retrograde and antegrade renal surgery if the proximal coil is fixed with stone formation       The patient will need definitive stone treatment and extraction of the retained left ureteral stent via cystoscopy and laser for extraction of  encrusted/retained stent as well as ureteral/renal stones with temporary stent exchange  This can be done after hospital discharge and after the suspected acute urinary tract infection has cleared and patient has completed antibiotic course  The patient can follow with her urologist at the [facility] that she previously establish care with almost 2 years ago for the initial placement of the stent or referral to [facility] Urology on an outpatient basis      If the patient develops worsening creatinine then with recommendations to obtain renal ultrasound to assess for worsening hydronephrosis, and in that instance patient will become a candidate for urological intervention

## 2022-10-01 NOTE — ASSESSMENT & PLAN NOTE
Lab Results   Component Value Date    EGFR 28 10/01/2022    EGFR 27 09/30/2022    EGFR 26 09/29/2022    CREATININE 1 71 (H) 10/01/2022    CREATININE 1 77 (H) 09/30/2022    CREATININE 1 80 (H) 09/29/2022     DARRELL superimposed on CKD, likely in the setting of sepsis, acute blood loss anemia, with concurrent diuretic use  Baseline creatinine of 1 6 to 2, peak value during hospitalization of 3  Current creatinine has improved to 1 7 and remains stable  Discontinued IV fluids previously  Received blood products  Continue to hold oral furosemide and ARB despite recovery as patient underwent prep for colonoscopy along with clear liquid diet and NPO status yesterday, examining euvolemic  Nephrology consulted and following

## 2022-10-01 NOTE — ASSESSMENT & PLAN NOTE
No acute abnormality on initial CT of the head  MRI contraindicated secondary to presence of AICD/ppm   Will need outpatient MRI at another [facility] following discharge  Morning lipids and A1c updated  Allergy to statin noted  Anticoagulation with apixaban currently on hold  Continue to monitor neurological symptoms  Of note, patient did not appear to have any focal symptoms earlier this week or currently, with improvement in confusion  Underlying history of dementia and 'forgetfulness' noted  Question whether altered mental status is secondary to acute metabolic encephalopathy from underlying dementia and current acute illness due to COVID-19 infection with fevers - mental status continues to appear improved and is likely nearing patient's baseline

## 2022-10-01 NOTE — PLAN OF CARE
Problem: PAIN - ADULT  Goal: Verbalizes/displays adequate comfort level or baseline comfort level  Description: Interventions:  - Encourage patient to monitor pain and request assistance  - Assess pain using appropriate pain scale  - Administer analgesics based on type and severity of pain and evaluate response  - Implement non-pharmacological measures as appropriate and evaluate response  - Consider cultural and social influences on pain and pain management  - Notify physician/advanced practitioner if interventions unsuccessful or patient reports new pain  Outcome: Progressing     Problem: INFECTION - ADULT  Goal: Absence or prevention of progression during hospitalization  Description: INTERVENTIONS:  - Assess and monitor for signs and symptoms of infection  - Monitor lab/diagnostic results  - Monitor all insertion sites, i e  indwelling lines, tubes, and drains  - Monitor endotracheal if appropriate and nasal secretions for changes in amount and color  - [place] appropriate cooling/warming therapies per order  - Administer medications as ordered  - Instruct and encourage patient and family to use good hand hygiene technique  - Identify and instruct in appropriate isolation precautions for identified infection/condition  Outcome: Progressing  Goal: Absence of fever/infection during neutropenic period  Description: INTERVENTIONS:  - Monitor WBC    Outcome: Progressing     Problem: SAFETY ADULT  Goal: Patient will remain free of falls  Description: INTERVENTIONS:  - Educate patient/family on patient safety including physical limitations  - Instruct patient to call for assistance with activity   - Consult OT/PT to assist with strengthening/mobility   - Keep Call bell within reach  - Keep bed low and locked with side rails adjusted as appropriate  - Keep care items and personal belongings within reach  - Initiate and maintain comfort rounds  - Make Fall Risk Sign visible to staff  - Offer Toileting every 2 Hours, in advance of need  - Initiate/Maintain bed/chair alarm  - Obtain necessary fall risk management equipment: alarms  - Apply yellow socks and bracelet for high fall risk patients  - Consider moving patient to room near nurses station  Outcome: Progressing  Goal: Maintain or return to baseline ADL function  Description: INTERVENTIONS:  -  Assess patient's ability to carry out ADLs; assess patient's baseline for ADL function and identify physical deficits which impact ability to perform ADLs (bathing, care of mouth/teeth, toileting, grooming, dressing, etc )  - Assess/evaluate cause of self-care deficits   - Assess range of motion  - Assess patient's mobility; develop plan if impaired  - Assess patient's need for assistive devices and provide as appropriate  - Encourage maximum independence but intervene and supervise when necessary  - Involve family in performance of ADLs  - Assess for home care needs following discharge   - Consider OT consult to assist with ADL evaluation and planning for discharge  - Provide patient education as appropriate  Outcome: Progressing  Goal: Maintains/Returns to pre admission functional level  Description: INTERVENTIONS:  - Perform BMAT or MOVE assessment daily    - Set and communicate daily mobility goal to care team and patient/family/caregiver  - Collaborate with rehabilitation services on mobility goals if consulted  - Perform Range of Motion 4 times a day  - Reposition patient every 2 hours    - Dangle patient 3-4 times a day  - Stand patient 4 times a day  - Ambulate patient 4 times a day  - Out of bed to chair 3 times a day   - Out of bed for meals 3 times a day  - Out of bed for toileting  - Record patient progress and toleration of activity level   Outcome: Progressing     Problem: DISCHARGE PLANNING  Goal: Discharge to home or other facility with appropriate resources  Description: INTERVENTIONS:  - Identify barriers to discharge w/patient and caregiver  - Arrange for needed discharge resources and transportation as appropriate  - Identify discharge learning needs (meds, wound care, etc )  - Arrange for interpretive services to assist at discharge as needed  - Refer to Case Management Department for coordinating discharge planning if the patient needs post-hospital services based on physician/advanced practitioner order or complex needs related to functional status, cognitive ability, or social support system  Outcome: Progressing     Problem: Knowledge Deficit  Goal: Patient/family/caregiver demonstrates understanding of disease process, treatment plan, medications, and discharge instructions  Description: Complete learning assessment and assess knowledge base    Interventions:  - Provide teaching at level of understanding  - Provide teaching via preferred learning methods  Outcome: Progressing     Problem: NEUROSENSORY - ADULT  Goal: Achieves stable or improved neurological status  Description: INTERVENTIONS  - Monitor and report changes in neurological status  - Monitor vital signs such as temperature, blood pressure, glucose, and any other labs ordered   - Initiate measures to prevent increased intracranial pressure      Outcome: Progressing     Problem: CARDIOVASCULAR - ADULT  Goal: Maintains optimal cardiac output and hemodynamic stability  Description: INTERVENTIONS:  - Monitor I/O, vital signs and rhythm  - Monitor for S/S and trends of decreased cardiac output  - Administer and titrate ordered vasoactive medications to optimize hemodynamic stability  - Assess quality of pulses, skin color and temperature  - Assess for signs of decreased coronary artery perfusion  - Instruct patient to report change in severity of symptoms  Outcome: Progressing     Problem: GENITOURINARY - ADULT  Goal: Maintains or returns to baseline urinary function  Description: INTERVENTIONS:  - Assess urinary function  - Encourage oral fluids to ensure adequate hydration if ordered  - Administer IV fluids as ordered to ensure adequate hydration  - Administer ordered medications as needed  - Offer frequent toileting  - Follow urinary retention protocol if ordered  Outcome: Progressing  Goal: Absence of urinary retention  Description: INTERVENTIONS:  - Assess patient's ability to void and empty bladder  - Monitor I/O  - Bladder scan as needed  - Discuss with physician/AP medications to alleviate retention as needed  - Discuss catheterization for long term situations as appropriate  Outcome: Progressing     Problem: METABOLIC, FLUID AND ELECTROLYTES - ADULT  Goal: Electrolytes maintained within normal limits  Description: INTERVENTIONS:  - Monitor labs and assess patient for signs and symptoms of electrolyte imbalances  - Administer electrolyte replacement as ordered  - Monitor response to electrolyte replacements, including repeat lab results as appropriate  - Instruct patient on fluid and nutrition as appropriate  Outcome: Progressing     Problem: SKIN/TISSUE INTEGRITY - ADULT  Goal: Skin Integrity remains intact(Skin Breakdown Prevention)  Description: Assess:  -Perform [place] assessment every shift  -Clean and moisturize skin every 4 houra  -Inspect skin when repositioning, toileting, and assisting with ADLS  -Assess under medical devices such as electrodes every shift  -Assess extremities for adequate circulation and sensation     Bed Management:  -Have minimal linens on bed & keep smooth, unwrinkled  -Change linens as needed when moist or perspiring  -Avoid sitting or lying in one position for more than 2 hours while in bed  -Keep HOB at 30 degrees     Toileting:  -Offer bedside commode  -Assess for incontinence every 2 hours  -Use incontinent care products after each incontinent episode such as breif    Activity:  -Mobilize patient 4 times a day  -Encourage activity and walks on unit  -Encourage or provide ROM exercises   -Turn and reposition patient every 2 Hours  -Use appropriate equipment to lift or move patient in bed  -Instruct/ Assist with weight shifting every 2 hours when out of bed in chair  -Consider limitation of chair time 4 hour intervals    Skin Care:  -Avoid use of baby powder, tape, friction and shearing, hot water or constrictive clothing  -Relieve pressure over bony prominences using alevyn  -Do not massage red bony areas    Next Steps:  -Teach patient strategies to minimize risks such as weight shift  Outcome: Progressing     Problem: MUSCULOSKELETAL - ADULT  Goal: Maintain or return mobility to safest level of function  Description: INTERVENTIONS:  - Assess patient's ability to carry out ADLs; assess patient's baseline for ADL function and identify physical deficits which impact ability to perform ADLs (bathing, care of mouth/teeth, toileting, grooming, dressing, etc )  - Assess/evaluate cause of self-care deficits   - Assess range of motion  - Assess patient's mobility  - Assess patient's need for assistive devices and provide as appropriate  - Encourage maximum independence but intervene and supervise when necessary  - Involve family in performance of ADLs  - Assess for home care needs following discharge   - Consider OT consult to assist with ADL evaluation and planning for discharge  - Provide patient education as appropriate  Outcome: Progressing     Problem: Potential for Falls  Goal: Patient will remain free of falls  Description: INTERVENTIONS:  - Educate patient/family on patient safety including physical limitations  - Instruct patient to call for assistance with activity   - Consult OT/PT to assist with strengthening/mobility   - Keep Call bell within reach  - Keep bed low and locked with side rails adjusted as appropriate  - Keep care items and personal belongings within reach  - Initiate and maintain comfort rounds  - Make Fall Risk Sign visible to staff  - Offer Toileting every 2 Hours, in advance of need  - Initiate/Maintain bed/chair alarm  - Obtain necessary fall risk management equipment: alarms  - Apply yellow socks and bracelet for high fall risk patients  - Consider moving patient to room near nurses station  Outcome: Progressing     Problem: MOBILITY - ADULT  Goal: Maintain or return to baseline ADL function  Description: INTERVENTIONS:  -  Assess patient's ability to carry out ADLs; assess patient's baseline for ADL function and identify physical deficits which impact ability to perform ADLs (bathing, care of mouth/teeth, toileting, grooming, dressing, etc )  - Assess/evaluate cause of self-care deficits   - Assess range of motion  - Assess patient's mobility; develop plan if impaired  - Assess patient's need for assistive devices and provide as appropriate  - Encourage maximum independence but intervene and supervise when necessary  - Involve family in performance of ADLs  - Assess for home care needs following discharge   - Consider OT consult to assist with ADL evaluation and planning for discharge  - Provide patient education as appropriate  Outcome: Progressing  Goal: Maintains/Returns to pre admission functional level  Description: INTERVENTIONS:  - Perform BMAT or MOVE assessment daily    - Set and communicate daily mobility goal to care team and patient/family/caregiver  - Collaborate with rehabilitation services on mobility goals if consulted  - Perform Range of Motion 4 times a day  - Reposition patient every 2 hours    - Dangle patient 3-4 times a day  - Stand patient 4 times a day  - Ambulate patient 4 times a day  - Out of bed to chair 3 times a day   - Out of bed for meals 3 times a day  - Out of bed for toileting  - Record patient progress and toleration of activity level   Outcome: Progressing     Problem: Prexisting or High Potential for Compromised Skin Integrity  Goal: Skin integrity is maintained or improved  Description: INTERVENTIONS:  - Identify patients at risk for skin breakdown  - Assess and monitor skin integrity  - Assess and monitor nutrition and hydration status  - Monitor labs   - Assess for incontinence   - Turn and reposition patient  - Assist with mobility/ambulation  - Relieve pressure over bony prominences  - Avoid friction and shearing  - Provide appropriate hygiene as needed including keeping skin clean and dry  - Evaluate need for skin moisturizer/barrier cream  - Collaborate with interdisciplinary team   - Patient/family teaching  - Consider wound care consult   Outcome: Progressing     Problem: Nutrition/Hydration-ADULT  Goal: Nutrient/Hydration intake appropriate for improving, restoring or maintaining nutritional needs  Description: Monitor and assess patient's nutrition/hydration status for malnutrition  Collaborate with interdisciplinary team and initiate plan and interventions as ordered  Monitor patient's weight and dietary intake as ordered or per policy  Utilize nutrition screening tool and intervene as necessary  Determine patient's food preferences and provide high-protein, high-caloric foods as appropriate       INTERVENTIONS:  - Monitor oral intake, urinary output, labs, and treatment plans  - Assess nutrition and hydration status and recommend course of action  - Evaluate amount of meals eaten  - Assist patient with eating if necessary   - Allow adequate time for meals  - Recommend/ encourage appropriate diets, oral nutritional supplements, and vitamin/mineral supplements  - Order, calculate, and assess calorie counts as needed  - Recommend, monitor, and adjust tube feedings and TPN/PPN based on assessed needs  - Assess need for intravenous fluids  - Provide specific nutrition/hydration education as appropriate  - Include patient/family/caregiver in decisions related to nutrition  Outcome: Progressing     Problem: RESPIRATORY - ADULT  Goal: Achieves optimal ventilation and oxygenation  Description: INTERVENTIONS:  - Assess for changes in respiratory status  - Assess for changes in mentation and behavior  - Position to facilitate oxygenation and minimize respiratory effort  - Oxygen administered by appropriate delivery if ordered  - Initiate smoking cessation education as indicated  - Encourage broncho-pulmonary hygiene including cough, deep breathe, Incentive Spirometry  - Assess the need for suctioning and aspirate as needed  - Assess and instruct to report SOB or any respiratory difficulty  - Respiratory Therapy support as indicated  Outcome: Progressing

## 2022-10-01 NOTE — PLAN OF CARE
Problem: PAIN - ADULT  Goal: Verbalizes/displays adequate comfort level or baseline comfort level  Description: Interventions:  - Encourage patient to monitor pain and request assistance  - Assess pain using appropriate pain scale  - Administer analgesics based on type and severity of pain and evaluate response  - Implement non-pharmacological measures as appropriate and evaluate response  - Consider cultural and social influences on pain and pain management  - Notify physician/advanced practitioner if interventions unsuccessful or patient reports new pain  Outcome: Progressing     Problem: INFECTION - ADULT  Goal: Absence or prevention of progression during hospitalization  Description: INTERVENTIONS:  - Assess and monitor for signs and symptoms of infection  - Monitor lab/diagnostic results  - Monitor all insertion sites, i e  indwelling lines, tubes, and drains  - Monitor endotracheal if appropriate and nasal secretions for changes in amount and color  - Lannon appropriate cooling/warming therapies per order  - Administer medications as ordered  - Instruct and encourage patient and family to use good hand hygiene technique  - Identify and instruct in appropriate isolation precautions for identified infection/condition  Outcome: Progressing     Problem: SAFETY ADULT  Goal: Patient will remain free of falls  Description: INTERVENTIONS:  - Educate patient/family on patient safety including physical limitations  - Instruct patient to call for assistance with activity   - Consult OT/PT to assist with strengthening/mobility   - Keep Call bell within reach  - Keep bed low and locked with side rails adjusted as appropriate  - Keep care items and personal belongings within reach  - Initiate and maintain comfort rounds  - Make Fall Risk Sign visible to staff  - Offer Toileting every 2 Hours, in advance of need  - Initiate/Maintain bed/chair alarm  - Obtain necessary fall risk management equipment: alarms  - Apply yellow socks and bracelet for high fall risk patients  - Consider moving patient to room near nurses station  Outcome: Progressing  Goal: Maintain or return to baseline ADL function  Description: INTERVENTIONS:  -  Assess patient's ability to carry out ADLs; assess patient's baseline for ADL function and identify physical deficits which impact ability to perform ADLs (bathing, care of mouth/teeth, toileting, grooming, dressing, etc )  - Assess/evaluate cause of self-care deficits   - Assess range of motion  - Assess patient's mobility; develop plan if impaired  - Assess patient's need for assistive devices and provide as appropriate  - Encourage maximum independence but intervene and supervise when necessary  - Involve family in performance of ADLs  - Assess for home care needs following discharge   - Consider OT consult to assist with ADL evaluation and planning for discharge  - Provide patient education as appropriate  Outcome: Progressing  Goal: Maintains/Returns to pre admission functional level  Description: INTERVENTIONS:  - Perform BMAT or MOVE assessment daily    - Set and communicate daily mobility goal to care team and patient/family/caregiver  - Collaborate with rehabilitation services on mobility goals if consulted  - Perform Range of Motion 4 times a day  - Reposition patient every 2 hours    - Dangle patient 3-4 times a day  - Stand patient 4 times a day  - Ambulate patient 4 times a day  - Out of bed to chair 3 times a day   - Out of bed for meals 3 times a day  - Out of bed for toileting  - Record patient progress and toleration of activity level   Outcome: Progressing     Problem: DISCHARGE PLANNING  Goal: Discharge to home or other facility with appropriate resources  Description: INTERVENTIONS:  - Identify barriers to discharge w/patient and caregiver  - Arrange for needed discharge resources and transportation as appropriate  - Identify discharge learning needs (meds, wound care, etc )  - Arrange for interpretive services to assist at discharge as needed  - Refer to Case Management Department for coordinating discharge planning if the patient needs post-hospital services based on physician/advanced practitioner order or complex needs related to functional status, cognitive ability, or social support system  Outcome: Progressing     Problem: Knowledge Deficit  Goal: Patient/family/caregiver demonstrates understanding of disease process, treatment plan, medications, and discharge instructions  Description: Complete learning assessment and assess knowledge base    Interventions:  - Provide teaching at level of understanding  - Provide teaching via preferred learning methods  Outcome: Progressing     Problem: CARDIOVASCULAR - ADULT  Goal: Maintains optimal cardiac output and hemodynamic stability  Description: INTERVENTIONS:  - Monitor I/O, vital signs and rhythm  - Monitor for S/S and trends of decreased cardiac output  - Administer and titrate ordered vasoactive medications to optimize hemodynamic stability  - Assess quality of pulses, skin color and temperature  - Assess for signs of decreased coronary artery perfusion  - Instruct patient to report change in severity of symptoms  Outcome: Progressing     Problem: GENITOURINARY - ADULT  Goal: Maintains or returns to baseline urinary function  Description: INTERVENTIONS:  - Assess urinary function  - Encourage oral fluids to ensure adequate hydration if ordered  - Administer IV fluids as ordered to ensure adequate hydration  - Administer ordered medications as needed  - Offer frequent toileting  - Follow urinary retention protocol if ordered  Outcome: Progressing     Problem: METABOLIC, FLUID AND ELECTROLYTES - ADULT  Goal: Electrolytes maintained within normal limits  Description: INTERVENTIONS:  - Monitor labs and assess patient for signs and symptoms of electrolyte imbalances  - Administer electrolyte replacement as ordered  - Monitor response to electrolyte replacements, including repeat lab results as appropriate  - Instruct patient on fluid and nutrition as appropriate  Outcome: Progressing     Problem: SKIN/TISSUE INTEGRITY - ADULT  Goal: Skin Integrity remains intact(Skin Breakdown Prevention)  Description: Assess:  -Perform Arcadio assessment every shift  -Clean and moisturize skin every 4 houra  -Inspect skin when repositioning, toileting, and assisting with ADLS  -Assess under medical devices such as electrodes every shift  -Assess extremities for adequate circulation and sensation     Bed Management:  -Have minimal linens on bed & keep smooth, unwrinkled  -Change linens as needed when moist or perspiring  -Avoid sitting or lying in one position for more than 2 hours while in bed  -Keep HOB at 30 degrees     Toileting:  -Offer bedside commode  -Assess for incontinence every 2 hours  -Use incontinent care products after each incontinent episode such as breif    Activity:  -Mobilize patient 4 times a day  -Encourage activity and walks on unit  -Encourage or provide ROM exercises   -Turn and reposition patient every 2 Hours  -Use appropriate equipment to lift or move patient in bed  -Instruct/ Assist with weight shifting every 2 hours when out of bed in chair  -Consider limitation of chair time 4 hour intervals    Skin Care:  -Avoid use of baby powder, tape, friction and shearing, hot water or constrictive clothing  -Relieve pressure over bony prominences using alevyn  -Do not massage red bony areas    Next Steps:  -Teach patient strategies to minimize risks such as weight shift  Outcome: Progressing     Problem: MUSCULOSKELETAL - ADULT  Goal: Maintain or return mobility to safest level of function  Description: INTERVENTIONS:  - Assess patient's ability to carry out ADLs; assess patient's baseline for ADL function and identify physical deficits which impact ability to perform ADLs (bathing, care of mouth/teeth, toileting, grooming, dressing, etc )  - Assess/evaluate cause of self-care deficits   - Assess range of motion  - Assess patient's mobility  - Assess patient's need for assistive devices and provide as appropriate  - Encourage maximum independence but intervene and supervise when necessary  - Involve family in performance of ADLs  - Assess for home care needs following discharge   - Consider OT consult to assist with ADL evaluation and planning for discharge  - Provide patient education as appropriate  Outcome: Progressing     Problem: Potential for Falls  Goal: Patient will remain free of falls  Description: INTERVENTIONS:  - Educate patient/family on patient safety including physical limitations  - Instruct patient to call for assistance with activity   - Consult OT/PT to assist with strengthening/mobility   - Keep Call bell within reach  - Keep bed low and locked with side rails adjusted as appropriate  - Keep care items and personal belongings within reach  - Initiate and maintain comfort rounds  - Make Fall Risk Sign visible to staff  - Offer Toileting every 2 Hours, in advance of need  - Initiate/Maintain bed/chair alarm  - Obtain necessary fall risk management equipment: alarms  - Apply yellow socks and bracelet for high fall risk patients  - Consider moving patient to room near nurses station  Outcome: Progressing     Problem: MOBILITY - ADULT  Goal: Maintain or return to baseline ADL function  Description: INTERVENTIONS:  -  Assess patient's ability to carry out ADLs; assess patient's baseline for ADL function and identify physical deficits which impact ability to perform ADLs (bathing, care of mouth/teeth, toileting, grooming, dressing, etc )  - Assess/evaluate cause of self-care deficits   - Assess range of motion  - Assess patient's mobility; develop plan if impaired  - Assess patient's need for assistive devices and provide as appropriate  - Encourage maximum independence but intervene and supervise when necessary  - Involve family in performance of ADLs  - Assess for home care needs following discharge   - Consider OT consult to assist with ADL evaluation and planning for discharge  - Provide patient education as appropriate  Outcome: Progressing  Goal: Maintains/Returns to pre admission functional level  Description: INTERVENTIONS:  - Perform BMAT or MOVE assessment daily    - Set and communicate daily mobility goal to care team and patient/family/caregiver  - Collaborate with rehabilitation services on mobility goals if consulted  - Perform Range of Motion 4 times a day  - Reposition patient every 2 hours  - Dangle patient 3-4 times a day  - Stand patient 4 times a day  - Ambulate patient 4 times a day  - Out of bed to chair 3 times a day   - Out of bed for meals 3 times a day  - Out of bed for toileting  - Record patient progress and toleration of activity level   Outcome: Progressing     Problem: Prexisting or High Potential for Compromised Skin Integrity  Goal: Skin integrity is maintained or improved  Description: INTERVENTIONS:  - Identify patients at risk for skin breakdown  - Assess and monitor skin integrity  - Assess and monitor nutrition and hydration status  - Monitor labs   - Assess for incontinence   - Turn and reposition patient  - Assist with mobility/ambulation  - Relieve pressure over bony prominences  - Avoid friction and shearing  - Provide appropriate hygiene as needed including keeping skin clean and dry  - Evaluate need for skin moisturizer/barrier cream  - Collaborate with interdisciplinary team   - Patient/family teaching  - Consider wound care consult   Outcome: Progressing     Problem: Nutrition/Hydration-ADULT  Goal: Nutrient/Hydration intake appropriate for improving, restoring or maintaining nutritional needs  Description: Monitor and assess patient's nutrition/hydration status for malnutrition  Collaborate with interdisciplinary team and initiate plan and interventions as ordered    Monitor patient's weight and dietary intake as ordered or per policy  Utilize nutrition screening tool and intervene as necessary  Determine patient's food preferences and provide high-protein, high-caloric foods as appropriate       INTERVENTIONS:  - Monitor oral intake, urinary output, labs, and treatment plans  - Assess nutrition and hydration status and recommend course of action  - Evaluate amount of meals eaten  - Assist patient with eating if necessary   - Allow adequate time for meals  - Recommend/ encourage appropriate diets, oral nutritional supplements, and vitamin/mineral supplements  - Order, calculate, and assess calorie counts as needed  - Recommend, monitor, and adjust tube feedings and TPN/PPN based on assessed needs  - Assess need for intravenous fluids  - Provide specific nutrition/hydration education as appropriate  - Include patient/family/caregiver in decisions related to nutrition  Outcome: Progressing     Problem: RESPIRATORY - ADULT  Goal: Achieves optimal ventilation and oxygenation  Description: INTERVENTIONS:  - Assess for changes in respiratory status  - Assess for changes in mentation and behavior  - Position to facilitate oxygenation and minimize respiratory effort  - Oxygen administered by appropriate delivery if ordered  - Initiate smoking cessation education as indicated  - Encourage broncho-pulmonary hygiene including cough, deep breathe, Incentive Spirometry  - Assess the need for suctioning and aspirate as needed  - Assess and instruct to report SOB or any respiratory difficulty  - Respiratory Therapy support as indicated  Outcome: Progressing

## 2022-10-01 NOTE — ASSESSMENT & PLAN NOTE
Wt Readings from Last 3 Encounters:   10/01/22 53 9 kg (118 lb 13 3 oz)   05/31/22 62 8 kg (138 lb 7 2 oz)   11/16/21 62 8 kg (138 lb 7 2 oz)     Current ECHO with LVEF 45%, presence of G1 DD  RV systolic function is normal   Also with mention of moderate MR, moderate PHTN  Cardiomyopathy appears ischemic in nature  · Check daily weights - currently stable  · Maintain on low-sodium diet  · Furosemide and ARB remain hold as above  · Given weight gain and net positive I/O's received a 1 time IV dose of furosemide on 09/29  · Continue BB therapy, with plans for discharge on long-acting formulation  · Insure outpatient follow-up with Cardiology

## 2022-10-02 LAB
ALBUMIN SERPL BCP-MCNC: 2.2 G/DL (ref 3.5–5)
ALP SERPL-CCNC: 111 U/L (ref 46–116)
ALT SERPL W P-5'-P-CCNC: 12 U/L (ref 12–78)
ANION GAP SERPL CALCULATED.3IONS-SCNC: 11 MMOL/L (ref 4–13)
AST SERPL W P-5'-P-CCNC: 26 U/L (ref 5–45)
BASOPHILS # BLD AUTO: 0.02 THOUSANDS/ÂΜL (ref 0–0.1)
BASOPHILS NFR BLD AUTO: 0 % (ref 0–1)
BILIRUB SERPL-MCNC: 0.3 MG/DL (ref 0.2–1)
BUN SERPL-MCNC: 49 MG/DL (ref 5–25)
CALCIUM ALBUM COR SERPL-MCNC: 10.1 MG/DL (ref 8.3–10.1)
CALCIUM SERPL-MCNC: 8.7 MG/DL (ref 8.3–10.1)
CHLORIDE SERPL-SCNC: 105 MMOL/L (ref 96–108)
CO2 SERPL-SCNC: 23 MMOL/L (ref 21–32)
CREAT SERPL-MCNC: 1.69 MG/DL (ref 0.6–1.3)
EOSINOPHIL # BLD AUTO: 0.08 THOUSAND/ÂΜL (ref 0–0.61)
EOSINOPHIL NFR BLD AUTO: 1 % (ref 0–6)
ERYTHROCYTE [DISTWIDTH] IN BLOOD BY AUTOMATED COUNT: 22.2 % (ref 11.6–15.1)
GFR SERPL CREATININE-BSD FRML MDRD: 28 ML/MIN/1.73SQ M
GLUCOSE SERPL-MCNC: 96 MG/DL (ref 65–140)
HCT VFR BLD AUTO: 30.3 % (ref 34.8–46.1)
HGB BLD-MCNC: 9.7 G/DL (ref 11.5–15.4)
IMM GRANULOCYTES # BLD AUTO: 0.02 THOUSAND/UL (ref 0–0.2)
IMM GRANULOCYTES NFR BLD AUTO: 0 % (ref 0–2)
LYMPHOCYTES # BLD AUTO: 1.1 THOUSANDS/ÂΜL (ref 0.6–4.47)
LYMPHOCYTES NFR BLD AUTO: 17 % (ref 14–44)
MAGNESIUM SERPL-MCNC: 2.3 MG/DL (ref 1.6–2.6)
MCH RBC QN AUTO: 26.9 PG (ref 26.8–34.3)
MCHC RBC AUTO-ENTMCNC: 32 G/DL (ref 31.4–37.4)
MCV RBC AUTO: 84 FL (ref 82–98)
MONOCYTES # BLD AUTO: 0.47 THOUSAND/ÂΜL (ref 0.17–1.22)
MONOCYTES NFR BLD AUTO: 7 % (ref 4–12)
NEUTROPHILS # BLD AUTO: 4.84 THOUSANDS/ÂΜL (ref 1.85–7.62)
NEUTS SEG NFR BLD AUTO: 75 % (ref 43–75)
NRBC BLD AUTO-RTO: 0 /100 WBCS
PLATELET # BLD AUTO: 199 THOUSANDS/UL (ref 149–390)
PMV BLD AUTO: 10.9 FL (ref 8.9–12.7)
POTASSIUM SERPL-SCNC: 4.2 MMOL/L (ref 3.5–5.3)
PROT SERPL-MCNC: 5.9 G/DL (ref 6.4–8.4)
RBC # BLD AUTO: 3.6 MILLION/UL (ref 3.81–5.12)
SODIUM SERPL-SCNC: 139 MMOL/L (ref 135–147)
WBC # BLD AUTO: 6.53 THOUSAND/UL (ref 4.31–10.16)

## 2022-10-02 PROCEDURE — 85025 COMPLETE CBC W/AUTO DIFF WBC: CPT | Performed by: INTERNAL MEDICINE

## 2022-10-02 PROCEDURE — 99233 SBSQ HOSP IP/OBS HIGH 50: CPT | Performed by: INTERNAL MEDICINE

## 2022-10-02 PROCEDURE — 80053 COMPREHEN METABOLIC PANEL: CPT | Performed by: INTERNAL MEDICINE

## 2022-10-02 PROCEDURE — 99232 SBSQ HOSP IP/OBS MODERATE 35: CPT | Performed by: NURSE PRACTITIONER

## 2022-10-02 PROCEDURE — 83735 ASSAY OF MAGNESIUM: CPT | Performed by: INTERNAL MEDICINE

## 2022-10-02 RX ORDER — AMLODIPINE BESYLATE 5 MG/1
5 TABLET ORAL DAILY
Status: DISCONTINUED | OUTPATIENT
Start: 2022-10-02 | End: 2022-10-04

## 2022-10-02 RX ADMIN — FLUTICASONE PROPIONATE 2 SPRAY: 50 SPRAY, METERED NASAL at 08:54

## 2022-10-02 RX ADMIN — Medication 1 TABLET: at 08:54

## 2022-10-02 RX ADMIN — LABETALOL HYDROCHLORIDE 10 MG: 5 INJECTION, SOLUTION INTRAVENOUS at 01:42

## 2022-10-02 RX ADMIN — LEVOTHYROXINE SODIUM 125 MCG: 125 TABLET ORAL at 05:46

## 2022-10-02 RX ADMIN — HEPARIN SODIUM 5000 UNITS: 5000 INJECTION INTRAVENOUS; SUBCUTANEOUS at 15:37

## 2022-10-02 RX ADMIN — SERTRALINE 100 MG: 100 TABLET, FILM COATED ORAL at 08:55

## 2022-10-02 RX ADMIN — METOPROLOL SUCCINATE 50 MG: 50 TABLET, FILM COATED, EXTENDED RELEASE ORAL at 22:09

## 2022-10-02 RX ADMIN — MAGNESIUM OXIDE TAB 400 MG (241.3 MG ELEMENTAL MG) 400 MG: 400 (241.3 MG) TAB at 18:05

## 2022-10-02 RX ADMIN — HEPARIN SODIUM 5000 UNITS: 5000 INJECTION INTRAVENOUS; SUBCUTANEOUS at 22:09

## 2022-10-02 RX ADMIN — MAGNESIUM OXIDE TAB 400 MG (241.3 MG ELEMENTAL MG) 400 MG: 400 (241.3 MG) TAB at 08:55

## 2022-10-02 RX ADMIN — ALLOPURINOL 100 MG: 100 TABLET ORAL at 08:54

## 2022-10-02 RX ADMIN — PANTOPRAZOLE SODIUM 20 MG: 20 TABLET, DELAYED RELEASE ORAL at 05:46

## 2022-10-02 RX ADMIN — ACETAMINOPHEN 650 MG: 325 TABLET, FILM COATED ORAL at 01:42

## 2022-10-02 RX ADMIN — HEPARIN SODIUM 5000 UNITS: 5000 INJECTION INTRAVENOUS; SUBCUTANEOUS at 05:46

## 2022-10-02 RX ADMIN — AMLODIPINE BESYLATE 5 MG: 5 TABLET ORAL at 15:37

## 2022-10-02 RX ADMIN — Medication 1 TABLET: at 15:37

## 2022-10-02 RX ADMIN — METOPROLOL SUCCINATE 50 MG: 50 TABLET, FILM COATED, EXTENDED RELEASE ORAL at 08:55

## 2022-10-02 NOTE — ASSESSMENT & PLAN NOTE
EGD 9/23 essentially normal, with note of a medium sliding hiatal hernia and numerous polyps measuring less than 5 mm in the stomach  Colonoscopy today with severe diverticulosis, fixed sigmoid loop inhibiting advancement of colonoscope  Large external hemorrhoids and a polyp which was not removed were also noted  Patient's  report same experience on patient's prior colonoscopy attempt  Patient's hemoglobin had responded to a value of 9 following initial 2 units of PRBCs - had continued to slowly drop with resumption of Eliquis, down to a value of  6 8 on the morning 9/28 requiring 2 additional units  · Eliquis remains on hold  · Colonoscopy as above  · Resumed diet  · Repeat colonoscopy in 3 months, and if incomplete recommendations for CT colonography  · If recurrent rectal bleeding recommendations are for consulting Colorectal surgery for hemorrhoidectomy

## 2022-10-02 NOTE — ASSESSMENT & PLAN NOTE
Wt Readings from Last 3 Encounters:   10/02/22 53 6 kg (118 lb 2 7 oz)   05/31/22 62 8 kg (138 lb 7 2 oz)   11/16/21 62 8 kg (138 lb 7 2 oz)     Current ECHO with LVEF 45%, presence of G1 DD  RV systolic function is normal   Also with mention of moderate MR, moderate PHTN  Cardiomyopathy appears ischemic in nature  · Check daily weights - currently stable  · Maintain on low-sodium diet  · Furosemide and ARB remain hold as above  · Given weight gain and net positive I/O's received a 1 time IV dose of furosemide on 09/29  · Continue BB therapy, with plans for discharge on long-acting formulation  · Insure outpatient follow-up with Cardiology

## 2022-10-02 NOTE — ASSESSMENT & PLAN NOTE
The patient was seen in consultation by Urology- no current plan for urological intervention  Continue to treat underlying UTI  As per Urology attending, retained indwelling ureteral stent may be difficult to remove, and may require retrograde and antegrade renal surgery if the proximal coil is fixed with stone formation       The patient will need definitive stone treatment and extraction of the retained left ureteral stent via cystoscopy and laser for extraction of  encrusted/retained stent as well as ureteral/renal stones with temporary stent exchange  This can be done after hospital discharge and after the suspected acute urinary tract infection has cleared and patient has completed antibiotic course  The patient can follow with her urologist at the [facility] that she previously establish care with almost 2 years ago for the initial placement of the stent or referral to [facility] on an outpatient basis      If the patient develops worsening creatinine then with recommendations to obtain renal ultrasound to assess for worsening hydronephrosis, and in that instance patient will become a candidate for urological intervention

## 2022-10-02 NOTE — ASSESSMENT & PLAN NOTE
Lab Results   Component Value Date    EGFR 28 10/02/2022    EGFR 28 10/01/2022    EGFR 27 09/30/2022    CREATININE 1 69 (H) 10/02/2022    CREATININE 1 71 (H) 10/01/2022    CREATININE 1 77 (H) 09/30/2022     DARRELL superimposed on CKD, likely in the setting of sepsis, acute blood loss anemia, with concurrent diuretic use  Baseline creatinine of 1 6 to 2, peak value during hospitalization of 3  Current creatinine has improved to 1 69 and remains stable  Discontinued IV fluids previously  Received blood products  Continue to hold oral furosemide and ARB despite recovery as patient underwent prep for colonoscopy along with clear liquid diet and NPO status yesterday, examining euvolemic, with stable weights  Nephrology consulted and following

## 2022-10-02 NOTE — PROGRESS NOTES
[address] [facility] [address] [place]  Progress Note - Anna Brown 1945, 65 y o  female MRN: [id number]  Unit/Bed#: 420-01 Encounter: [id number]  Primary Care Provider: Kaya Sky DO   Date and time admitted to hospital: 9/21/2022  9:40 PM    * Sepsis due to urinary tract infection [facility])  Assessment & Plan  Sepsis was present on admission and due to UTI - patient with retained ureteral stent  Blood cultures remain negative, but urinary cultures from 09/22 with > 100,000 CFU per mL of pansensitive E coli  Concluded a 7 day course of IV ceftriaxone  Hydroureteronephrosis  Assessment & Plan  The patient was seen in consultation by Urology- no current plan for urological intervention  Continue to treat underlying UTI  As per Urology attending, retained indwelling ureteral stent may be difficult to remove, and may require retrograde and antegrade renal surgery if the proximal coil is fixed with stone formation       The patient will need definitive stone treatment and extraction of the retained left ureteral stent via cystoscopy and laser for extraction of  encrusted/retained stent as well as ureteral/renal stones with temporary stent exchange  This can be done after hospital discharge and after the suspected acute urinary tract infection has cleared and patient has completed antibiotic course  The patient can follow with her urologist at the [facility] that she previously establish care with almost 2 years ago for the initial placement of the stent or referral to [facility] on an outpatient basis      If the patient develops worsening creatinine then with recommendations to obtain renal ultrasound to assess for worsening hydronephrosis, and in that instance patient will become a candidate for urological intervention       Acute kidney injury superimposed on chronic kidney disease [facility])  Assessment & Plan  Lab Results   Component Value Date    EGFR 28 10/02/2022    EGFR 28 10/01/2022 EGFR 27 09/30/2022    CREATININE 1 69 (H) 10/02/2022    CREATININE 1 71 (H) 10/01/2022    CREATININE 1 77 (H) 09/30/2022     DARRELL superimposed on CKD, likely in the setting of sepsis, acute blood loss anemia, with concurrent diuretic use  Baseline creatinine of 1 6 to 2, peak value during hospitalization of 3  Current creatinine has improved to 1 69 and remains stable  Discontinued IV fluids previously  Received blood products  Continue to hold oral furosemide and ARB despite recovery as patient underwent prep for colonoscopy along with clear liquid diet and NPO status yesterday, examining euvolemic, with stable weights  Nephrology consulted and following  Acute blood loss anemia  Assessment & Plan  EGD 9/23 essentially normal, with note of a medium sliding hiatal hernia and numerous polyps measuring less than 5 mm in the stomach  Colonoscopy today with severe diverticulosis, fixed sigmoid loop inhibiting advancement of colonoscope  Large external hemorrhoids and a polyp which was not removed were also noted  Patient's  report same experience on patient's prior colonoscopy attempt  Patient's hemoglobin had responded to a value of 9 following initial 2 units of PRBCs - had continued to slowly drop with resumption of Eliquis, down to a value of  6 8 on the morning 9/28 requiring 2 additional units  · Eliquis remains on hold  · Colonoscopy as above  · Resumed diet  · Repeat colonoscopy in 3 months, and if incomplete recommendations for CT colonography  · If recurrent rectal bleeding recommendations are for consulting Colorectal surgery for hemorrhoidectomy  Gastrointestinal bleeding  Assessment & Plan  Evaluation and treatment as above  COVID-19 virus infection  Assessment & Plan  Initial COVID testing negative on 09/22, fevers prompting repeat check on 09/25 which was positive  Currently on the mild pathway  · CXR without acute findings  · Monitor labs, trend CRP    · Hold off on dexamethasone, remdesivir given continued lack of O2 requirement  · D-dimer elevated at 2 9 - however, patient's illnesses is mild and she is currently without oxygen requirement  Maintain on SC heparin but avoid full anticoagulation as per protocol, as well as due to GI bleeding while on anticoagulation  Chronic combined systolic and diastolic CHF (congestive heart failure) (HCC)  Assessment & Plan  Wt Readings from Last 3 Encounters:   10/02/22 53 6 kg (118 lb 2 7 oz)   05/31/22 62 8 kg (138 lb 7 2 oz)   11/16/21 62 8 kg (138 lb 7 2 oz)     Current ECHO with LVEF 45%, presence of G1 DD  RV systolic function is normal   Also with mention of moderate MR, moderate PHTN  Cardiomyopathy appears ischemic in nature  · Check daily weights - currently stable  · Maintain on low-sodium diet  · Furosemide and ARB remain hold as above  · Given weight gain and net positive I/O's received a 1 time IV dose of furosemide on 09/29  · Continue BB therapy, with plans for discharge on long-acting formulation  · Insure outpatient follow-up with Cardiology  Paroxysmal atrial fibrillation (HCC)  Assessment & Plan  Eliquis resumed on 09/25 at decreased dosing based upon patient's weight and serum creatinine - discontinued given continued drop in hemoglobin following that  Continue beta-blocker with plans on discharging on long-acting formulation  Stroke-like symptoms  Assessment & Plan  No acute abnormality on initial CT of the head  MRI contraindicated secondary to presence of AICD/ppm   Will need outpatient MRI at another [facility] following discharge  Morning lipids and A1c updated  Allergy to statin noted  Anticoagulation with apixaban currently on hold  Continue to monitor neurological symptoms  Of note, patient did not appear to have any focal symptoms earlier this week or currently, with improvement in confusion  Underlying history of dementia and 'forgetfulness' noted    Question whether altered mental status is secondary to acute metabolic encephalopathy from underlying dementia and current acute illness due to COVID-19 infection with fevers - mental status continues to appear improved and is likely at or near patient's baseline  Essential hypertension  Assessment & Plan  Changed metoprolol tartrate to metoprolol succinate (toprol XL) with the decreased LVEF, which should be continued upon discharge  Continue to hold ARB in the setting of recent DARRELL and labile renal function  Currently remains hypertensive - initiate on moderate dose calcium channel blocker and continue to monitor blood pressure  Acquired hypothyroidism  Assessment & Plan  TSH markedly low at 0 05, although checked during acute illness  Levothyroxine dosing decreased to 125 mcg, with recommendations for repeat TSH in 4-6 weeks  VTE Pharmacologic Prophylaxis: VTE Score: 10 High Risk (Score >/= 5) - Pharmacological DVT Prophylaxis Contraindicated  Sequential Compression Devices Ordered      Patient Centered Rounds: I performed bedside rounds with nursing staff today  Discussions with Specialists or Other Care Team Provider:  Review of urology note      Education and Discussions with Family / Patient: Updated  () via phone previously  Augusto Calderon     Time Spent for Care: 30 minutes  More than 50% of total time spent on counseling and coordination of care as described above      Current Length of Stay: 11 day(s)  Current Patient Status: Inpatient   Certification Statement: The patient will continue to require additional inpatient hospital stay due to need for further evaluation of potential GI bleeding with colonoscopy   Patient also needs to be 10 days out from COVID diagnosis prior to placement to rehab facility      Discharge Plan: Anticipate discharge in >72 hrs to rehab facility    Current placement limited due to COVID positivity      Code Status: Level 3 - DNAR and DNI    Subjective:   Patient seen and examined - again without complaints  Hemoglobin remains stable, as does weight and creatinine  No overnight events reported  Remains afebrile  Objective:     Vitals:   Temp (24hrs), Av 2 °F (36 8 °C), Min:96 9 °F (36 1 °C), Max:100 1 °F (37 8 °C)    Temp:  [96 9 °F (36 1 °C)-100 1 °F (37 8 °C)] 96 9 °F (36 1 °C)  HR:  [] 63  Resp:  [24] 24  BP: (161-171)/(71-99) 161/71  SpO2:  [90 %-97 %] 96 %  Body mass index is 19 07 kg/m²  Input and Output Summary (last 24 hours): Intake/Output Summary (Last 24 hours) at 10/2/2022 1245  Last data filed at 10/2/2022 0900  Gross per 24 hour   Intake 810 ml   Output 450 ml   Net 360 ml       Physical Exam:   Vital signs reviewed  Constitutional:       General: She is not in acute distress      Appearance: Normal appearance  She is normal weight  She is not ill-appearing or toxic-appearing  HENT:      Mouth/Throat:      Mouth: Mucous membranes are moist    Cardiovascular:      Rate and Rhythm: Normal rate  Rhythm irregular       Heart sounds: No murmur heard     No gallop  Pulmonary:      Effort: Pulmonary effort is normal  No respiratory distress       Breath sounds: Normal breath sounds  No wheezing, rhonchi or rales  Comments:  Minimal bibasilar crackles unchanged  Abdominal:      General: Abdomen is flat  Bowel sounds are normal  There is no distension       Palpations: Abdomen is soft       Tenderness: There is no abdominal tenderness  There is no guarding or rebound  Musculoskeletal:         General: No swelling or tenderness       Cervical back: Neck supple  Skin:     General: Skin is warm and dry  Neurological:      General: No focal deficit present       Mental Status: She is alert  She is disoriented       Comments:  Mental status improved overall, remains stable today    Psychiatric: Lupe Essex and Affect: Mood normal          Behavior: Behavior normal      Additional Data:     Labs:  Results from last 7 days   Lab Units 10/02/22  0608   WBC Thousand/uL 6 53   HEMOGLOBIN g/dL 9 7*   HEMATOCRIT % 30 3*   PLATELETS Thousands/uL 199   NEUTROS PCT % 75   LYMPHS PCT % 17   MONOS PCT % 7   EOS PCT % 1     Results from last 7 days   Lab Units 10/02/22  0608   SODIUM mmol/L 139   POTASSIUM mmol/L 4 2   CHLORIDE mmol/L 105   CO2 mmol/L 23   BUN mg/dL 49*   CREATININE mg/dL 1 69*   ANION GAP mmol/L 11   CALCIUM mg/dL 8 7   ALBUMIN g/dL 2 2*   TOTAL BILIRUBIN mg/dL 0 30   ALK PHOS U/L 111   ALT U/L 12   AST U/L 26   GLUCOSE RANDOM mg/dL 96         Results from last 7 days   Lab Units 10/01/22  2107 10/01/22  1827 09/27/22  1658 09/27/22  1023 09/27/22  0825 09/27/22  0743   POC GLUCOSE mg/dl 120 121 78 99 88 118         Results from last 7 days   Lab Units 09/26/22  0514   LACTIC ACID mmol/L 1 4   PROCALCITONIN ng/ml 0 31*       Lines/Drains:  Invasive Devices  Report    Peripheral Intravenous Line  Duration           Peripheral IV 09/28/22 Right;Upper;Ventral (anterior) Arm 3 days    Peripheral IV 09/29/22 Dorsal (posterior); Right Forearm 3 days          Drain  Duration           External Urinary Catheter 9 days              Imaging: No pertinent imaging reviewed      Recent Cultures (last 7 days):         Last 24 Hours Medication List:   Current Facility-Administered Medications   Medication Dose Route Frequency Provider Last Rate    acetaminophen  650 mg Oral Q6H PRN Shanell Grover MD      allopurinol  100 mg Oral Daily Shanell Grover MD      amLODIPine  5 mg Oral Daily Roger Gil MD      calcium carbonate-vitamin D  1 tablet Oral BID With Meals Shanell Grover MD      fluticasone  2 spray Nasal Daily Shanell Grover MD      heparin (porcine)  5,000 Units Subcutaneous [facility] Roger Gil MD      labetalol  10 mg Intravenous Q4H PRN Shanell Grover MD      levothyroxine  125 mcg Oral Early Morning Shanell Grover MD      magnesium oxide  400 mg Oral BID Shanell Grover MD      metoprolol succinate  50 mg Oral Q12H Victorina Rodriguez Bryce Frazier MD      ondansetron  4 mg Intravenous Q4H PRN Morteza Hernandez MD      pantoprazole  20 mg Oral Early Morning Morteza Hernandez MD      polyethylene glycol  17 g Oral Daily PRN Morteza Hernandez MD      sertraline  100 mg Oral Daily Morteza Hernandez MD      sodium chloride (PF)  3 mL Intravenous Q1H PRN Morteza Hernandez MD          Today, Patient Was Seen By: Lorenza Hernandez    **Please Note: This note may have been constructed using a voice recognition system  **

## 2022-10-02 NOTE — PLAN OF CARE
Problem: PAIN - ADULT  Goal: Verbalizes/displays adequate comfort level or baseline comfort level  Description: Interventions:  - Encourage patient to monitor pain and request assistance  - Assess pain using appropriate pain scale  - Administer analgesics based on type and severity of pain and evaluate response  - Implement non-pharmacological measures as appropriate and evaluate response  - Consider cultural and social influences on pain and pain management  - Notify physician/advanced practitioner if interventions unsuccessful or patient reports new pain  Outcome: Progressing     Problem: INFECTION - ADULT  Goal: Absence or prevention of progression during hospitalization  Description: INTERVENTIONS:  - Assess and monitor for signs and symptoms of infection  - Monitor lab/diagnostic results  - Monitor all insertion sites, i e  indwelling lines, tubes, and drains  - Monitor endotracheal if appropriate and nasal secretions for changes in amount and color  - [place] appropriate cooling/warming therapies per order  - Administer medications as ordered  - Instruct and encourage patient and family to use good hand hygiene technique  - Identify and instruct in appropriate isolation precautions for identified infection/condition  Outcome: Progressing     Problem: SAFETY ADULT  Goal: Patient will remain free of falls  Description: INTERVENTIONS:  - Educate patient/family on patient safety including physical limitations  - Instruct patient to call for assistance with activity   - Consult OT/PT to assist with strengthening/mobility   - Keep Call bell within reach  - Keep bed low and locked with side rails adjusted as appropriate  - Keep care items and personal belongings within reach  - Initiate and maintain comfort rounds  - Make Fall Risk Sign visible to staff  - Offer Toileting every 2 Hours, in advance of need  - Initiate/Maintain bed/chair alarm  - Obtain necessary fall risk management equipment: alarms  - Apply yellow socks and bracelet for high fall risk patients  - Consider moving patient to room near nurses station  Outcome: Progressing  Goal: Maintain or return to baseline ADL function  Description: INTERVENTIONS:  -  Assess patient's ability to carry out ADLs; assess patient's baseline for ADL function and identify physical deficits which impact ability to perform ADLs (bathing, care of mouth/teeth, toileting, grooming, dressing, etc )  - Assess/evaluate cause of self-care deficits   - Assess range of motion  - Assess patient's mobility; develop plan if impaired  - Assess patient's need for assistive devices and provide as appropriate  - Encourage maximum independence but intervene and supervise when necessary  - Involve family in performance of ADLs  - Assess for home care needs following discharge   - Consider OT consult to assist with ADL evaluation and planning for discharge  - Provide patient education as appropriate  Outcome: Progressing  Goal: Maintains/Returns to pre admission functional level  Description: INTERVENTIONS:  - Perform BMAT or MOVE assessment daily    - Set and communicate daily mobility goal to care team and patient/family/caregiver  - Collaborate with rehabilitation services on mobility goals if consulted  - Perform Range of Motion 4 times a day  - Reposition patient every 2 hours    - Dangle patient 3-4 times a day  - Stand patient 4 times a day  - Ambulate patient 4 times a day  - Out of bed to chair 3 times a day   - Out of bed for meals 3 times a day  - Out of bed for toileting  - Record patient progress and toleration of activity level   Outcome: Progressing     Problem: DISCHARGE PLANNING  Goal: Discharge to home or other facility with appropriate resources  Description: INTERVENTIONS:  - Identify barriers to discharge w/patient and caregiver  - Arrange for needed discharge resources and transportation as appropriate  - Identify discharge learning needs (meds, wound care, etc )  - Arrange for interpretive services to assist at discharge as needed  - Refer to Case Management Department for coordinating discharge planning if the patient needs post-hospital services based on physician/advanced practitioner order or complex needs related to functional status, cognitive ability, or social support system  Outcome: Progressing     Problem: Knowledge Deficit  Goal: Patient/family/caregiver demonstrates understanding of disease process, treatment plan, medications, and discharge instructions  Description: Complete learning assessment and assess knowledge base    Interventions:  - Provide teaching at level of understanding  - Provide teaching via preferred learning methods  Outcome: Progressing     Problem: CARDIOVASCULAR - ADULT  Goal: Maintains optimal cardiac output and hemodynamic stability  Description: INTERVENTIONS:  - Monitor I/O, vital signs and rhythm  - Monitor for S/S and trends of decreased cardiac output  - Administer and titrate ordered vasoactive medications to optimize hemodynamic stability  - Assess quality of pulses, skin color and temperature  - Assess for signs of decreased coronary artery perfusion  - Instruct patient to report change in severity of symptoms  Outcome: Progressing     Problem: GENITOURINARY - ADULT  Goal: Maintains or returns to baseline urinary function  Description: INTERVENTIONS:  - Assess urinary function  - Encourage oral fluids to ensure adequate hydration if ordered  - Administer IV fluids as ordered to ensure adequate hydration  - Administer ordered medications as needed  - Offer frequent toileting  - Follow urinary retention protocol if ordered  Outcome: Progressing     Problem: METABOLIC, FLUID AND ELECTROLYTES - ADULT  Goal: Electrolytes maintained within normal limits  Description: INTERVENTIONS:  - Monitor labs and assess patient for signs and symptoms of electrolyte imbalances  - Administer electrolyte replacement as ordered  - Monitor response to electrolyte replacements, including repeat lab results as appropriate  - Instruct patient on fluid and nutrition as appropriate  Outcome: Progressing     Problem: SKIN/TISSUE INTEGRITY - ADULT  Goal: Skin Integrity remains intact(Skin Breakdown Prevention)  Description: Assess:  -Perform Arcadio assessment every shift  -Clean and moisturize skin every 4 houra  -Inspect skin when repositioning, toileting, and assisting with ADLS  -Assess under medical devices such as electrodes every shift  -Assess extremities for adequate circulation and sensation     Bed Management:  -Have minimal linens on bed & keep smooth, unwrinkled  -Change linens as needed when moist or perspiring  -Avoid sitting or lying in one position for more than 2 hours while in bed  -Keep HOB at 30 degrees     Toileting:  -Offer bedside commode  -Assess for incontinence every 2 hours  -Use incontinent care products after each incontinent episode such as breif    Activity:  -Mobilize patient 4 times a day  -Encourage activity and walks on unit  -Encourage or provide ROM exercises   -Turn and reposition patient every 2 Hours  -Use appropriate equipment to lift or move patient in bed  -Instruct/ Assist with weight shifting every 2 hours when out of bed in chair  -Consider limitation of chair time 4 hour intervals    Skin Care:  -Avoid use of baby powder, tape, friction and shearing, hot water or constrictive clothing  -Relieve pressure over bony prominences using alevyn  -Do not massage red bony areas    Next Steps:  -Teach patient strategies to minimize risks such as weight shift  Outcome: Progressing     Problem: MUSCULOSKELETAL - ADULT  Goal: Maintain or return mobility to safest level of function  Description: INTERVENTIONS:  - Assess patient's ability to carry out ADLs; assess patient's baseline for ADL function and identify physical deficits which impact ability to perform ADLs (bathing, care of mouth/teeth, toileting, grooming, dressing, etc )  - Assess/evaluate cause of self-care deficits   - Assess range of motion  - Assess patient's mobility  - Assess patient's need for assistive devices and provide as appropriate  - Encourage maximum independence but intervene and supervise when necessary  - Involve family in performance of ADLs  - Assess for home care needs following discharge   - Consider OT consult to assist with ADL evaluation and planning for discharge  - Provide patient education as appropriate  Outcome: Progressing     Problem: Potential for Falls  Goal: Patient will remain free of falls  Description: INTERVENTIONS:  - Educate patient/family on patient safety including physical limitations  - Instruct patient to call for assistance with activity   - Consult OT/PT to assist with strengthening/mobility   - Keep Call bell within reach  - Keep bed low and locked with side rails adjusted as appropriate  - Keep care items and personal belongings within reach  - Initiate and maintain comfort rounds  - Make Fall Risk Sign visible to staff  - Offer Toileting every 2 Hours, in advance of need  - Initiate/Maintain bed/chair alarm  - Obtain necessary fall risk management equipment: alarms  - Apply yellow socks and bracelet for high fall risk patients  - Consider moving patient to room near nurses station  Outcome: Progressing     Problem: MOBILITY - ADULT  Goal: Maintain or return to baseline ADL function  Description: INTERVENTIONS:  -  Assess patient's ability to carry out ADLs; assess patient's baseline for ADL function and identify physical deficits which impact ability to perform ADLs (bathing, care of mouth/teeth, toileting, grooming, dressing, etc )  - Assess/evaluate cause of self-care deficits   - Assess range of motion  - Assess patient's mobility; develop plan if impaired  - Assess patient's need for assistive devices and provide as appropriate  - Encourage maximum independence but intervene and supervise when necessary  - Involve family in performance of ADLs  - Assess for home care needs following discharge   - Consider OT consult to assist with ADL evaluation and planning for discharge  - Provide patient education as appropriate  Outcome: Progressing  Goal: Maintains/Returns to pre admission functional level  Description: INTERVENTIONS:  - Perform BMAT or MOVE assessment daily    - Set and communicate daily mobility goal to care team and patient/family/caregiver  - Collaborate with rehabilitation services on mobility goals if consulted  - Perform Range of Motion 4 times a day  - Reposition patient every 2 hours  - Dangle patient 3-4 times a day  - Stand patient 4 times a day  - Ambulate patient 4 times a day  - Out of bed to chair 3 times a day   - Out of bed for meals 3 times a day  - Out of bed for toileting  - Record patient progress and toleration of activity level   Outcome: Progressing     Problem: Prexisting or High Potential for Compromised Skin Integrity  Goal: Skin integrity is maintained or improved  Description: INTERVENTIONS:  - Identify patients at risk for skin breakdown  - Assess and monitor skin integrity  - Assess and monitor nutrition and hydration status  - Monitor labs   - Assess for incontinence   - Turn and reposition patient  - Assist with mobility/ambulation  - Relieve pressure over bony prominences  - Avoid friction and shearing  - Provide appropriate hygiene as needed including keeping skin clean and dry  - Evaluate need for skin moisturizer/barrier cream  - Collaborate with interdisciplinary team   - Patient/family teaching  - Consider wound care consult   Outcome: Progressing     Problem: Nutrition/Hydration-ADULT  Goal: Nutrient/Hydration intake appropriate for improving, restoring or maintaining nutritional needs  Description: Monitor and assess patient's nutrition/hydration status for malnutrition  Collaborate with interdisciplinary team and initiate plan and interventions as ordered    Monitor patient's weight and dietary intake as ordered or per policy  Utilize nutrition screening tool and intervene as necessary  Determine patient's food preferences and provide high-protein, high-caloric foods as appropriate       INTERVENTIONS:  - Monitor oral intake, urinary output, labs, and treatment plans  - Assess nutrition and hydration status and recommend course of action  - Evaluate amount of meals eaten  - Assist patient with eating if necessary   - Allow adequate time for meals  - Recommend/ encourage appropriate diets, oral nutritional supplements, and vitamin/mineral supplements  - Order, calculate, and assess calorie counts as needed  - Recommend, monitor, and adjust tube feedings and TPN/PPN based on assessed needs  - Assess need for intravenous fluids  - Provide specific nutrition/hydration education as appropriate  - Include patient/family/caregiver in decisions related to nutrition  Outcome: Progressing     Problem: RESPIRATORY - ADULT  Goal: Achieves optimal ventilation and oxygenation  Description: INTERVENTIONS:  - Assess for changes in respiratory status  - Assess for changes in mentation and behavior  - Position to facilitate oxygenation and minimize respiratory effort  - Oxygen administered by appropriate delivery if ordered  - Initiate smoking cessation education as indicated  - Encourage broncho-pulmonary hygiene including cough, deep breathe, Incentive Spirometry  - Assess the need for suctioning and aspirate as needed  - Assess and instruct to report SOB or any respiratory difficulty  - Respiratory Therapy support as indicated  Outcome: Progressing

## 2022-10-02 NOTE — ASSESSMENT & PLAN NOTE
Eliquis resumed on 09/25 at decreased dosing based upon patient's weight and serum creatinine - discontinued given continued drop in hemoglobin following that  Continue beta-blocker with plans on discharging on long-acting formulation

## 2022-10-02 NOTE — ASSESSMENT & PLAN NOTE
Changed metoprolol tartrate to metoprolol succinate (toprol XL) with the decreased LVEF, which should be continued upon discharge  Continue to hold ARB in the setting of recent DARRELL and labile renal function  Currently remains hypertensive - initiate on moderate dose calcium channel blocker and continue to monitor blood pressure
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
## 2022-10-02 NOTE — ASSESSMENT & PLAN NOTE
No acute abnormality on initial CT of the head  MRI contraindicated secondary to presence of AICD/ppm   Will need outpatient MRI at another [facility] following discharge  Morning lipids and A1c updated  Allergy to statin noted  Anticoagulation with apixaban currently on hold  Continue to monitor neurological symptoms  Of note, patient did not appear to have any focal symptoms earlier this week or currently, with improvement in confusion  Underlying history of dementia and 'forgetfulness' noted  Question whether altered mental status is secondary to acute metabolic encephalopathy from underlying dementia and current acute illness due to COVID-19 infection with fevers - mental status continues to appear improved and is likely at or near patient's baseline

## 2022-10-02 NOTE — PLAN OF CARE
Problem: PAIN - ADULT  Goal: Verbalizes/displays adequate comfort level or baseline comfort level  Description: Interventions:  - Encourage patient to monitor pain and request assistance  - Assess pain using appropriate pain scale  - Administer analgesics based on type and severity of pain and evaluate response  - Implement non-pharmacological measures as appropriate and evaluate response  - Consider cultural and social influences on pain and pain management  - Notify physician/advanced practitioner if interventions unsuccessful or patient reports new pain  Outcome: Progressing     Problem: INFECTION - ADULT  Goal: Absence or prevention of progression during hospitalization  Description: INTERVENTIONS:  - Assess and monitor for signs and symptoms of infection  - Monitor lab/diagnostic results  - Monitor all insertion sites, i e  indwelling lines, tubes, and drains  - Monitor endotracheal if appropriate and nasal secretions for changes in amount and color  - [place] appropriate cooling/warming therapies per order  - Administer medications as ordered  - Instruct and encourage patient and family to use good hand hygiene technique  - Identify and instruct in appropriate isolation precautions for identified infection/condition  Outcome: Progressing     Problem: SAFETY ADULT  Goal: Patient will remain free of falls  Description: INTERVENTIONS:  - Educate patient/family on patient safety including physical limitations  - Instruct patient to call for assistance with activity   - Consult OT/PT to assist with strengthening/mobility   - Keep Call bell within reach  - Keep bed low and locked with side rails adjusted as appropriate  - Keep care items and personal belongings within reach  - Initiate and maintain comfort rounds  - Make Fall Risk Sign visible to staff  - Offer Toileting every 2 Hours, in advance of need  - Initiate/Maintain bed/chair alarm  - Obtain necessary fall risk management equipment: alarms  - Apply yellow socks and bracelet for high fall risk patients  - Consider moving patient to room near nurses station  Outcome: Progressing  Goal: Maintain or return to baseline ADL function  Description: INTERVENTIONS:  -  Assess patient's ability to carry out ADLs; assess patient's baseline for ADL function and identify physical deficits which impact ability to perform ADLs (bathing, care of mouth/teeth, toileting, grooming, dressing, etc )  - Assess/evaluate cause of self-care deficits   - Assess range of motion  - Assess patient's mobility; develop plan if impaired  - Assess patient's need for assistive devices and provide as appropriate  - Encourage maximum independence but intervene and supervise when necessary  - Involve family in performance of ADLs  - Assess for home care needs following discharge   - Consider OT consult to assist with ADL evaluation and planning for discharge  - Provide patient education as appropriate  Outcome: Progressing  Goal: Maintains/Returns to pre admission functional level  Description: INTERVENTIONS:  - Perform BMAT or MOVE assessment daily    - Set and communicate daily mobility goal to care team and patient/family/caregiver  - Collaborate with rehabilitation services on mobility goals if consulted  - Perform Range of Motion 4 times a day  - Reposition patient every 2 hours    - Dangle patient 3-4 times a day  - Stand patient 4 times a day  - Ambulate patient 4 times a day  - Out of bed to chair 3 times a day   - Out of bed for meals 3 times a day  - Out of bed for toileting  - Record patient progress and toleration of activity level   Outcome: Progressing     Problem: DISCHARGE PLANNING  Goal: Discharge to home or other facility with appropriate resources  Description: INTERVENTIONS:  - Identify barriers to discharge w/patient and caregiver  - Arrange for needed discharge resources and transportation as appropriate  - Identify discharge learning needs (meds, wound care, etc )  - Arrange for interpretive services to assist at discharge as needed  - Refer to Case Management Department for coordinating discharge planning if the patient needs post-hospital services based on physician/advanced practitioner order or complex needs related to functional status, cognitive ability, or social support system  Outcome: Progressing     Problem: Knowledge Deficit  Goal: Patient/family/caregiver demonstrates understanding of disease process, treatment plan, medications, and discharge instructions  Description: Complete learning assessment and assess knowledge base    Interventions:  - Provide teaching at level of understanding  - Provide teaching via preferred learning methods  Outcome: Progressing     Problem: CARDIOVASCULAR - ADULT  Goal: Maintains optimal cardiac output and hemodynamic stability  Description: INTERVENTIONS:  - Monitor I/O, vital signs and rhythm  - Monitor for S/S and trends of decreased cardiac output  - Administer and titrate ordered vasoactive medications to optimize hemodynamic stability  - Assess quality of pulses, skin color and temperature  - Assess for signs of decreased coronary artery perfusion  - Instruct patient to report change in severity of symptoms  Outcome: Progressing     Problem: GENITOURINARY - ADULT  Goal: Maintains or returns to baseline urinary function  Description: INTERVENTIONS:  - Assess urinary function  - Encourage oral fluids to ensure adequate hydration if ordered  - Administer IV fluids as ordered to ensure adequate hydration  - Administer ordered medications as needed  - Offer frequent toileting  - Follow urinary retention protocol if ordered  Outcome: Progressing     Problem: METABOLIC, FLUID AND ELECTROLYTES - ADULT  Goal: Electrolytes maintained within normal limits  Description: INTERVENTIONS:  - Monitor labs and assess patient for signs and symptoms of electrolyte imbalances  - Administer electrolyte replacement as ordered  - Monitor response to electrolyte replacements, including repeat lab results as appropriate  - Instruct patient on fluid and nutrition as appropriate  Outcome: Progressing     Problem: SKIN/TISSUE INTEGRITY - ADULT  Goal: Skin Integrity remains intact(Skin Breakdown Prevention)  Description: Assess:  -Perform Arcadio assessment every shift  -Clean and moisturize skin every 4 houra  -Inspect skin when repositioning, toileting, and assisting with ADLS  -Assess under medical devices such as electrodes every shift  -Assess extremities for adequate circulation and sensation     Bed Management:  -Have minimal linens on bed & keep smooth, unwrinkled  -Change linens as needed when moist or perspiring  -Avoid sitting or lying in one position for more than 2 hours while in bed  -Keep HOB at 30 degrees     Toileting:  -Offer bedside commode  -Assess for incontinence every 2 hours  -Use incontinent care products after each incontinent episode such as breif    Activity:  -Mobilize patient 4 times a day  -Encourage activity and walks on unit  -Encourage or provide ROM exercises   -Turn and reposition patient every 2 Hours  -Use appropriate equipment to lift or move patient in bed  -Instruct/ Assist with weight shifting every 2 hours when out of bed in chair  -Consider limitation of chair time 4 hour intervals    Skin Care:  -Avoid use of baby powder, tape, friction and shearing, hot water or constrictive clothing  -Relieve pressure over bony prominences using alevyn  -Do not massage red bony areas    Next Steps:  -Teach patient strategies to minimize risks such as weight shift  Outcome: Progressing     Problem: MUSCULOSKELETAL - ADULT  Goal: Maintain or return mobility to safest level of function  Description: INTERVENTIONS:  - Assess patient's ability to carry out ADLs; assess patient's baseline for ADL function and identify physical deficits which impact ability to perform ADLs (bathing, care of mouth/teeth, toileting, grooming, dressing, etc )  - Assess/evaluate cause of self-care deficits   - Assess range of motion  - Assess patient's mobility  - Assess patient's need for assistive devices and provide as appropriate  - Encourage maximum independence but intervene and supervise when necessary  - Involve family in performance of ADLs  - Assess for home care needs following discharge   - Consider OT consult to assist with ADL evaluation and planning for discharge  - Provide patient education as appropriate  Outcome: Progressing     Problem: Potential for Falls  Goal: Patient will remain free of falls  Description: INTERVENTIONS:  - Educate patient/family on patient safety including physical limitations  - Instruct patient to call for assistance with activity   - Consult OT/PT to assist with strengthening/mobility   - Keep Call bell within reach  - Keep bed low and locked with side rails adjusted as appropriate  - Keep care items and personal belongings within reach  - Initiate and maintain comfort rounds  - Make Fall Risk Sign visible to staff  - Offer Toileting every 2 Hours, in advance of need  - Initiate/Maintain bed/chair alarm  - Obtain necessary fall risk management equipment: alarms  - Apply yellow socks and bracelet for high fall risk patients  - Consider moving patient to room near nurses station  Outcome: Progressing     Problem: MOBILITY - ADULT  Goal: Maintain or return to baseline ADL function  Description: INTERVENTIONS:  -  Assess patient's ability to carry out ADLs; assess patient's baseline for ADL function and identify physical deficits which impact ability to perform ADLs (bathing, care of mouth/teeth, toileting, grooming, dressing, etc )  - Assess/evaluate cause of self-care deficits   - Assess range of motion  - Assess patient's mobility; develop plan if impaired  - Assess patient's need for assistive devices and provide as appropriate  - Encourage maximum independence but intervene and supervise when necessary  - Involve family in performance of ADLs  - Assess for home care needs following discharge   - Consider OT consult to assist with ADL evaluation and planning for discharge  - Provide patient education as appropriate  Outcome: Progressing  Goal: Maintains/Returns to pre admission functional level  Description: INTERVENTIONS:  - Perform BMAT or MOVE assessment daily    - Set and communicate daily mobility goal to care team and patient/family/caregiver  - Collaborate with rehabilitation services on mobility goals if consulted  - Perform Range of Motion 4 times a day  - Reposition patient every 2 hours  - Dangle patient 3-4 times a day  - Stand patient 4 times a day  - Ambulate patient 4 times a day  - Out of bed to chair 3 times a day   - Out of bed for meals 3 times a day  - Out of bed for toileting  - Record patient progress and toleration of activity level   Outcome: Progressing     Problem: Prexisting or High Potential for Compromised Skin Integrity  Goal: Skin integrity is maintained or improved  Description: INTERVENTIONS:  - Identify patients at risk for skin breakdown  - Assess and monitor skin integrity  - Assess and monitor nutrition and hydration status  - Monitor labs   - Assess for incontinence   - Turn and reposition patient  - Assist with mobility/ambulation  - Relieve pressure over bony prominences  - Avoid friction and shearing  - Provide appropriate hygiene as needed including keeping skin clean and dry  - Evaluate need for skin moisturizer/barrier cream  - Collaborate with interdisciplinary team   - Patient/family teaching  - Consider wound care consult   Outcome: Progressing     Problem: Nutrition/Hydration-ADULT  Goal: Nutrient/Hydration intake appropriate for improving, restoring or maintaining nutritional needs  Description: Monitor and assess patient's nutrition/hydration status for malnutrition  Collaborate with interdisciplinary team and initiate plan and interventions as ordered    Monitor patient's weight and dietary intake as ordered or per policy  Utilize nutrition screening tool and intervene as necessary  Determine patient's food preferences and provide high-protein, high-caloric foods as appropriate       INTERVENTIONS:  - Monitor oral intake, urinary output, labs, and treatment plans  - Assess nutrition and hydration status and recommend course of action  - Evaluate amount of meals eaten  - Assist patient with eating if necessary   - Allow adequate time for meals  - Recommend/ encourage appropriate diets, oral nutritional supplements, and vitamin/mineral supplements  - Order, calculate, and assess calorie counts as needed  - Recommend, monitor, and adjust tube feedings and TPN/PPN based on assessed needs  - Assess need for intravenous fluids  - Provide specific nutrition/hydration education as appropriate  - Include patient/family/caregiver in decisions related to nutrition  Outcome: Progressing     Problem: RESPIRATORY - ADULT  Goal: Achieves optimal ventilation and oxygenation  Description: INTERVENTIONS:  - Assess for changes in respiratory status  - Assess for changes in mentation and behavior  - Position to facilitate oxygenation and minimize respiratory effort  - Oxygen administered by appropriate delivery if ordered  - Initiate smoking cessation education as indicated  - Encourage broncho-pulmonary hygiene including cough, deep breathe, Incentive Spirometry  - Assess the need for suctioning and aspirate as needed  - Assess and instruct to report SOB or any respiratory difficulty  - Respiratory Therapy support as indicated  Outcome: Progressing

## 2022-10-03 LAB
ALBUMIN SERPL BCP-MCNC: 2.2 G/DL (ref 3.5–5)
ALP SERPL-CCNC: 119 U/L (ref 46–116)
ALT SERPL W P-5'-P-CCNC: 12 U/L (ref 12–78)
ANION GAP SERPL CALCULATED.3IONS-SCNC: 10 MMOL/L (ref 4–13)
AST SERPL W P-5'-P-CCNC: 26 U/L (ref 5–45)
BASOPHILS # BLD AUTO: 0.02 THOUSANDS/ÂΜL (ref 0–0.1)
BASOPHILS NFR BLD AUTO: 0 % (ref 0–1)
BILIRUB SERPL-MCNC: 0.38 MG/DL (ref 0.2–1)
BUN SERPL-MCNC: 48 MG/DL (ref 5–25)
CALCIUM ALBUM COR SERPL-MCNC: 10.2 MG/DL (ref 8.3–10.1)
CALCIUM SERPL-MCNC: 8.8 MG/DL (ref 8.3–10.1)
CHLORIDE SERPL-SCNC: 106 MMOL/L (ref 96–108)
CO2 SERPL-SCNC: 23 MMOL/L (ref 21–32)
CREAT SERPL-MCNC: 1.62 MG/DL (ref 0.6–1.3)
EOSINOPHIL # BLD AUTO: 0.19 THOUSAND/ÂΜL (ref 0–0.61)
EOSINOPHIL NFR BLD AUTO: 3 % (ref 0–6)
ERYTHROCYTE [DISTWIDTH] IN BLOOD BY AUTOMATED COUNT: 22.2 % (ref 11.6–15.1)
GFR SERPL CREATININE-BSD FRML MDRD: 30 ML/MIN/1.73SQ M
GLUCOSE SERPL-MCNC: 83 MG/DL (ref 65–140)
HCT VFR BLD AUTO: 32.4 % (ref 34.8–46.1)
HGB BLD-MCNC: 10.3 G/DL (ref 11.5–15.4)
IMM GRANULOCYTES # BLD AUTO: 0.02 THOUSAND/UL (ref 0–0.2)
IMM GRANULOCYTES NFR BLD AUTO: 0 % (ref 0–2)
LYMPHOCYTES # BLD AUTO: 1.08 THOUSANDS/ÂΜL (ref 0.6–4.47)
LYMPHOCYTES NFR BLD AUTO: 19 % (ref 14–44)
MAGNESIUM SERPL-MCNC: 2.3 MG/DL (ref 1.6–2.6)
MCH RBC QN AUTO: 27 PG (ref 26.8–34.3)
MCHC RBC AUTO-ENTMCNC: 31.8 G/DL (ref 31.4–37.4)
MCV RBC AUTO: 85 FL (ref 82–98)
MONOCYTES # BLD AUTO: 0.42 THOUSAND/ÂΜL (ref 0.17–1.22)
MONOCYTES NFR BLD AUTO: 7 % (ref 4–12)
NEUTROPHILS # BLD AUTO: 4.12 THOUSANDS/ÂΜL (ref 1.85–7.62)
NEUTS SEG NFR BLD AUTO: 71 % (ref 43–75)
NRBC BLD AUTO-RTO: 0 /100 WBCS
PLATELET # BLD AUTO: 225 THOUSANDS/UL (ref 149–390)
PMV BLD AUTO: 10.8 FL (ref 8.9–12.7)
POTASSIUM SERPL-SCNC: 4.3 MMOL/L (ref 3.5–5.3)
PROT SERPL-MCNC: 6.1 G/DL (ref 6.4–8.4)
RBC # BLD AUTO: 3.81 MILLION/UL (ref 3.81–5.12)
SODIUM SERPL-SCNC: 139 MMOL/L (ref 135–147)
WBC # BLD AUTO: 5.85 THOUSAND/UL (ref 4.31–10.16)

## 2022-10-03 PROCEDURE — 80053 COMPREHEN METABOLIC PANEL: CPT | Performed by: INTERNAL MEDICINE

## 2022-10-03 PROCEDURE — 85025 COMPLETE CBC W/AUTO DIFF WBC: CPT | Performed by: INTERNAL MEDICINE

## 2022-10-03 PROCEDURE — 97530 THERAPEUTIC ACTIVITIES: CPT

## 2022-10-03 PROCEDURE — 99232 SBSQ HOSP IP/OBS MODERATE 35: CPT | Performed by: INTERNAL MEDICINE

## 2022-10-03 PROCEDURE — 97535 SELF CARE MNGMENT TRAINING: CPT

## 2022-10-03 PROCEDURE — 83735 ASSAY OF MAGNESIUM: CPT | Performed by: INTERNAL MEDICINE

## 2022-10-03 RX ADMIN — ALLOPURINOL 100 MG: 100 TABLET ORAL at 09:07

## 2022-10-03 RX ADMIN — Medication 1 TABLET: at 17:12

## 2022-10-03 RX ADMIN — AMLODIPINE BESYLATE 5 MG: 5 TABLET ORAL at 09:07

## 2022-10-03 RX ADMIN — HEPARIN SODIUM 5000 UNITS: 5000 INJECTION INTRAVENOUS; SUBCUTANEOUS at 22:10

## 2022-10-03 RX ADMIN — Medication 1 TABLET: at 09:07

## 2022-10-03 RX ADMIN — MAGNESIUM OXIDE TAB 400 MG (241.3 MG ELEMENTAL MG) 400 MG: 400 (241.3 MG) TAB at 17:12

## 2022-10-03 RX ADMIN — PANTOPRAZOLE SODIUM 20 MG: 20 TABLET, DELAYED RELEASE ORAL at 05:00

## 2022-10-03 RX ADMIN — MAGNESIUM OXIDE TAB 400 MG (241.3 MG ELEMENTAL MG) 400 MG: 400 (241.3 MG) TAB at 09:07

## 2022-10-03 RX ADMIN — LEVOTHYROXINE SODIUM 125 MCG: 125 TABLET ORAL at 05:00

## 2022-10-03 RX ADMIN — HEPARIN SODIUM 5000 UNITS: 5000 INJECTION INTRAVENOUS; SUBCUTANEOUS at 14:29

## 2022-10-03 RX ADMIN — METOPROLOL SUCCINATE 50 MG: 50 TABLET, FILM COATED, EXTENDED RELEASE ORAL at 22:10

## 2022-10-03 RX ADMIN — HEPARIN SODIUM 5000 UNITS: 5000 INJECTION INTRAVENOUS; SUBCUTANEOUS at 05:00

## 2022-10-03 RX ADMIN — METOPROLOL SUCCINATE 50 MG: 50 TABLET, FILM COATED, EXTENDED RELEASE ORAL at 09:17

## 2022-10-03 RX ADMIN — SERTRALINE 100 MG: 100 TABLET, FILM COATED ORAL at 09:07

## 2022-10-03 RX ADMIN — FLUTICASONE PROPIONATE 2 SPRAY: 50 SPRAY, METERED NASAL at 09:07

## 2022-10-03 NOTE — CASE MANAGEMENT
Case Management Discharge Planning Note    Patient name Chrissy Turner  Location /267-83 MRN [id number]  : 1945 Date 10/3/2022       Current Admission Date: 2022  Current Admission Diagnosis:Sepsis due to urinary tract infection [facility])   Patient Active Problem List    Diagnosis Date Noted    Low TSH level 2022    COVID-19 virus infection 2022    Dementia ([place] [address] )     Stroke-like symptoms 2022    Sepsis due to urinary tract infection ([place] [address] ) 2022    Gastrointestinal bleeding 2022    Hydroureteronephrosis 2022    Diverticulitis 2022    Rectal bleeding 2022    Elevated troponin 2022    Iron deficiency anemia 2022    Anemia due to chronic kidney disease 2022    Paroxysmal atrial fibrillation ([place] [address] ) 2020    Ischemic cardiomyopathy 2020    S/P implantation of automatic cardioverter/defibrillator (AICD) 2020    Essential hypertension 2020    History of PTCA 2020    Leg swelling 2020    Acute kidney injury superimposed on chronic kidney disease ([place] [address] ) 2020    Perforated appendicitis 2020    Sepsis ([place] [address] ) 2020    Chronic combined systolic and diastolic CHF (congestive heart failure) ([place] [address] ) 2020    Pyuria 2020    Microscopic hematuria 2020    Vitamin D insufficiency 2020    Mitral valve insufficiency 2020    Hypercholesterolemia 2020    Aortic atherosclerosis ([place] [address] ) 2020    Renal calculus 2020    Diverticulosis 2020    Hiatal hernia 2020    Pulmonary nodule 2020    Benign hypertensive renal disease 2019    Acute blood loss anemia 2019    Gastroesophageal reflux disease without esophagitis 2019    Closed fracture of body of sternum with routine healing 2018    Congestive heart disease ([place] [address] ) 2018    Acquired hypothyroidism 2018    CAD (coronary artery disease) 04/12/2018    Forgetfulness 04/12/2018    Acute pain due to trauma 04/12/2018    Hx of fall 04/12/2018    Stress incontinence in female 04/12/2018    Anemia in chronic kidney disease 03/20/2018      LOS (days): 11  Geometric Mean LOS (GMLOS) (days): 5 00  Days to GMLOS:-6 5     OBJECTIVE:  Risk of Unplanned Readmission Score: 24 7         Current admission status: Inpatient   Preferred Pharmacy:   [address] [place] [address] [address], [address] [place] [address]  [address]  [address] [place] [address]  Phone: [phone] Fax: [phone]    Primary Care Provider: Mary Porter DO    Primary Insurance: [facility]rit [facility]an [facility]  Secondary Insurance:     DISCHARGE DETAILS:  [facility] can accept pt for tomorrow if we obtain insurance authorization  Therapy will be seeing pt again today and CM will put in request for CM support team to start authorization process  Pt's spouse aware of acceptance at [facility] and that they can take pt tomorrow if we obtain insurance authorization

## 2022-10-03 NOTE — ASSESSMENT & PLAN NOTE
EGD 9/23 essentially normal, with note of a medium sliding hiatal hernia and numerous polyps measuring less than 5 mm in the stomach  Colonoscopy  with severe diverticulosis, fixed sigmoid loop inhibiting advancement of colonoscope  Large external hemorrhoids and a polyp which was not removed were also noted  Patient's  report same experience on patient's prior colonoscopy attempt  Patient's hemoglobin had responded to a value of 9 following initial 2 units of PRBCs - had continued to slowly drop with resumption of Eliquis, down to a value of  6 8 on the morning of 9/28/2022 requiring 2 additional units of PRBC's (packed red blood cells)  · Eliquis remains on hold  · Colonoscopy as above  · Resumed diet  · Repeat colonoscopy in 3 months, and if incomplete recommendations for CT colonography  · If recurrent rectal bleeding recommendations are for consulting Colorectal Surgery for hemorrhoidectomy

## 2022-10-03 NOTE — PLAN OF CARE
Problem: OCCUPATIONAL THERAPY ADULT  Goal: Performs self-care activities at highest level of function for planned discharge setting  See evaluation for individualized goals  Description: Treatment Interventions: ADL retraining, Functional transfer training, UE strengthening/ROM, Endurance training, Patient/family training, Equipment evaluation/education, Activityengagement, Cognitive reorientation          See flowsheet documentation for full assessment, interventions and recommendations  Outcome: Progressing  Note: Limitation: Decreased ADL status, Decreased UE strength, Decreased Safe judgement during ADL, Decreased cognition, Decreased endurance, Decreased self-care trans, Decreased high-level ADLs     Assessment: Patient participated in Skilled OT session this date with interventions consisting of ADL re training with the use of correct body mechnaics, safety awareness and fall prevention techniques,  therapeutic activities to: increase activity tolerance, increase standing tolerance time with unilateral UE support to complete sink level ADLs, increase cardiovascular endurance , increase dynamic sit/ stand balance during functional activity , increase trunk control and increase OOB/ sitting tolerance   Co treatment with PTA secondary to complex medical condition of pt, mod-max A of 2 required to achieve and maintain transitional movements, requiring the need of skilled therapeutic intervention of 2 therapists to achieve delivery of services  Patient agreeable to OT treatment session, upon arrival patient was found supine in bed  Patient requiring frequent re direction, verbal cues for safety, verbal cues for correct technique, verbal cues for pacing thru activity steps, cognitive assistance to anticipate next step, one step directives and frequent rest periods   Patient continues to be functioning below baseline level, occupational performance remains limited secondary to factors listed above and increased risk for falls and injury  From OT standpoint, recommendation at time of d/c would be Post acute rehabilitation services  The patient's raw score on the AM-PAC Daily Activity inpatient short form is 13, standardized score is 32 03, less than 39 4  Patients at this level are likely to benefit from discharge to post-acute rehabilitation services  Please refer to the recommendation of the Occupational Therapist for safe discharge planning       OT Discharge Recommendation: Post acute rehabilitation services

## 2022-10-03 NOTE — PLAN OF CARE
Problem: PAIN - ADULT  Goal: Verbalizes/displays adequate comfort level or baseline comfort level  Description: Interventions:  - Encourage patient to monitor pain and request assistance  - Assess pain using appropriate pain scale  - Administer analgesics based on type and severity of pain and evaluate response  - Implement non-pharmacological measures as appropriate and evaluate response  - Consider cultural and social influences on pain and pain management  - Notify physician/advanced practitioner if interventions unsuccessful or patient reports new pain  Outcome: Progressing     Problem: INFECTION - ADULT  Goal: Absence or prevention of progression during hospitalization  Description: INTERVENTIONS:  - Assess and monitor for signs and symptoms of infection  - Monitor lab/diagnostic results  - Monitor all insertion sites, i e  indwelling lines, tubes, and drains  - Monitor endotracheal if appropriate and nasal secretions for changes in amount and color  - Schneider appropriate cooling/warming therapies per order  - Administer medications as ordered  - Instruct and encourage patient and family to use good hand hygiene technique  - Identify and instruct in appropriate isolation precautions for identified infection/condition  Outcome: Progressing     Problem: SAFETY ADULT  Goal: Patient will remain free of falls  Description: INTERVENTIONS:  - Educate patient/family on patient safety including physical limitations  - Instruct patient to call for assistance with activity   - Consult OT/PT to assist with strengthening/mobility   - Keep Call bell within reach  - Keep bed low and locked with side rails adjusted as appropriate  - Keep care items and personal belongings within reach  - Initiate and maintain comfort rounds  - Make Fall Risk Sign visible to staff  - Offer Toileting every 2 Hours, in advance of need  - Initiate/Maintain bed/chair alarm  - Obtain necessary fall risk management equipment: alarms  - Apply yellow socks and bracelet for high fall risk patients  - Consider moving patient to room near nurses station  Outcome: Progressing  Goal: Maintain or return to baseline ADL function  Description: INTERVENTIONS:  -  Assess patient's ability to carry out ADLs; assess patient's baseline for ADL function and identify physical deficits which impact ability to perform ADLs (bathing, care of mouth/teeth, toileting, grooming, dressing, etc )  - Assess/evaluate cause of self-care deficits   - Assess range of motion  - Assess patient's mobility; develop plan if impaired  - Assess patient's need for assistive devices and provide as appropriate  - Encourage maximum independence but intervene and supervise when necessary  - Involve family in performance of ADLs  - Assess for home care needs following discharge   - Consider OT consult to assist with ADL evaluation and planning for discharge  - Provide patient education as appropriate  Outcome: Progressing  Goal: Maintains/Returns to pre admission functional level  Description: INTERVENTIONS:  - Perform BMAT or MOVE assessment daily    - Set and communicate daily mobility goal to care team and patient/family/caregiver  - Collaborate with rehabilitation services on mobility goals if consulted  - Perform Range of Motion 4 times a day  - Reposition patient every 2 hours    - Dangle patient 3-4 times a day  - Stand patient 4 times a day  - Ambulate patient 4 times a day  - Out of bed to chair 3 times a day   - Out of bed for meals 3 times a day  - Out of bed for toileting  - Record patient progress and toleration of activity level   Outcome: Progressing     Problem: DISCHARGE PLANNING  Goal: Discharge to home or other facility with appropriate resources  Description: INTERVENTIONS:  - Identify barriers to discharge w/patient and caregiver  - Arrange for needed discharge resources and transportation as appropriate  - Identify discharge learning needs (meds, wound care, etc )  - Arrange for interpretive services to assist at discharge as needed  - Refer to Case Management Department for coordinating discharge planning if the patient needs post-hospital services based on physician/advanced practitioner order or complex needs related to functional status, cognitive ability, or social support system  Outcome: Progressing     Problem: Knowledge Deficit  Goal: Patient/family/caregiver demonstrates understanding of disease process, treatment plan, medications, and discharge instructions  Description: Complete learning assessment and assess knowledge base    Interventions:  - Provide teaching at level of understanding  - Provide teaching via preferred learning methods  Outcome: Progressing     Problem: CARDIOVASCULAR - ADULT  Goal: Maintains optimal cardiac output and hemodynamic stability  Description: INTERVENTIONS:  - Monitor I/O, vital signs and rhythm  - Monitor for S/S and trends of decreased cardiac output  - Administer and titrate ordered vasoactive medications to optimize hemodynamic stability  - Assess quality of pulses, skin color and temperature  - Assess for signs of decreased coronary artery perfusion  - Instruct patient to report change in severity of symptoms  Outcome: Progressing     Problem: GENITOURINARY - ADULT  Goal: Maintains or returns to baseline urinary function  Description: INTERVENTIONS:  - Assess urinary function  - Encourage oral fluids to ensure adequate hydration if ordered  - Administer IV fluids as ordered to ensure adequate hydration  - Administer ordered medications as needed  - Offer frequent toileting  - Follow urinary retention protocol if ordered  Outcome: Progressing     Problem: METABOLIC, FLUID AND ELECTROLYTES - ADULT  Goal: Electrolytes maintained within normal limits  Description: INTERVENTIONS:  - Monitor labs and assess patient for signs and symptoms of electrolyte imbalances  - Administer electrolyte replacement as ordered  - Monitor response to electrolyte replacements, including repeat lab results as appropriate  - Instruct patient on fluid and nutrition as appropriate  Outcome: Progressing     Problem: SKIN/TISSUE INTEGRITY - ADULT  Goal: Skin Integrity remains intact(Skin Breakdown Prevention)  Description: Assess:  -Perform Arcadio assessment every shift  -Clean and moisturize skin every 4 houra  -Inspect skin when repositioning, toileting, and assisting with ADLS  -Assess under medical devices such as electrodes every shift  -Assess extremities for adequate circulation and sensation     Bed Management:  -Have minimal linens on bed & keep smooth, unwrinkled  -Change linens as needed when moist or perspiring  -Avoid sitting or lying in one position for more than 2 hours while in bed  -Keep HOB at 30 degrees     Toileting:  -Offer bedside commode  -Assess for incontinence every 2 hours  -Use incontinent care products after each incontinent episode such as breif    Activity:  -Mobilize patient 4 times a day  -Encourage activity and walks on unit  -Encourage or provide ROM exercises   -Turn and reposition patient every 2 Hours  -Use appropriate equipment to lift or move patient in bed  -Instruct/ Assist with weight shifting every 2 hours when out of bed in chair  -Consider limitation of chair time 4 hour intervals    Skin Care:  -Avoid use of baby powder, tape, friction and shearing, hot water or constrictive clothing  -Relieve pressure over bony prominences using alevyn  -Do not massage red bony areas    Next Steps:  -Teach patient strategies to minimize risks such as weight shift  Outcome: Progressing     Problem: MUSCULOSKELETAL - ADULT  Goal: Maintain or return mobility to safest level of function  Description: INTERVENTIONS:  - Assess patient's ability to carry out ADLs; assess patient's baseline for ADL function and identify physical deficits which impact ability to perform ADLs (bathing, care of mouth/teeth, toileting, grooming, dressing, etc )  - Assess/evaluate cause of self-care deficits   - Assess range of motion  - Assess patient's mobility  - Assess patient's need for assistive devices and provide as appropriate  - Encourage maximum independence but intervene and supervise when necessary  - Involve family in performance of ADLs  - Assess for home care needs following discharge   - Consider OT consult to assist with ADL evaluation and planning for discharge  - Provide patient education as appropriate  Outcome: Progressing     Problem: RESPIRATORY - ADULT  Goal: Achieves optimal ventilation and oxygenation  Description: INTERVENTIONS:  - Assess for changes in respiratory status  - Assess for changes in mentation and behavior  - Position to facilitate oxygenation and minimize respiratory effort  - Oxygen administered by appropriate delivery if ordered  - Initiate smoking cessation education as indicated  - Encourage broncho-pulmonary hygiene including cough, deep breathe, Incentive Spirometry  - Assess the need for suctioning and aspirate as needed  - Assess and instruct to report SOB or any respiratory difficulty  - Respiratory Therapy support as indicated  Outcome: Progressing     Problem: Potential for Falls  Goal: Patient will remain free of falls  Description: INTERVENTIONS:  - Educate patient/family on patient safety including physical limitations  - Instruct patient to call for assistance with activity   - Consult OT/PT to assist with strengthening/mobility   - Keep Call bell within reach  - Keep bed low and locked with side rails adjusted as appropriate  - Keep care items and personal belongings within reach  - Initiate and maintain comfort rounds  - Make Fall Risk Sign visible to staff  - Offer Toileting every 2 Hours, in advance of need  - Initiate/Maintain bed/chair alarm  - Obtain necessary fall risk management equipment: alarms  - Apply yellow socks and bracelet for high fall risk patients  - Consider moving patient to room near nurses station  Outcome: Progressing     Problem: MOBILITY - ADULT  Goal: Maintain or return to baseline ADL function  Description: INTERVENTIONS:  -  Assess patient's ability to carry out ADLs; assess patient's baseline for ADL function and identify physical deficits which impact ability to perform ADLs (bathing, care of mouth/teeth, toileting, grooming, dressing, etc )  - Assess/evaluate cause of self-care deficits   - Assess range of motion  - Assess patient's mobility; develop plan if impaired  - Assess patient's need for assistive devices and provide as appropriate  - Encourage maximum independence but intervene and supervise when necessary  - Involve family in performance of ADLs  - Assess for home care needs following discharge   - Consider OT consult to assist with ADL evaluation and planning for discharge  - Provide patient education as appropriate  Outcome: Progressing  Goal: Maintains/Returns to pre admission functional level  Description: INTERVENTIONS:  - Perform BMAT or MOVE assessment daily    - Set and communicate daily mobility goal to care team and patient/family/caregiver  - Collaborate with rehabilitation services on mobility goals if consulted  - Perform Range of Motion 4 times a day  - Reposition patient every 2 hours    - Dangle patient 3-4 times a day  - Stand patient 4 times a day  - Ambulate patient 4 times a day  - Out of bed to chair 3 times a day   - Out of bed for meals 3 times a day  - Out of bed for toileting  - Record patient progress and toleration of activity level   Outcome: Progressing     Problem: Prexisting or High Potential for Compromised Skin Integrity  Goal: Skin integrity is maintained or improved  Description: INTERVENTIONS:  - Identify patients at risk for skin breakdown  - Assess and monitor skin integrity  - Assess and monitor nutrition and hydration status  - Monitor labs   - Assess for incontinence   - Turn and reposition patient  - Assist with mobility/ambulation  - Relieve pressure over bony prominences  - Avoid friction and shearing  - Provide appropriate hygiene as needed including keeping skin clean and dry  - Evaluate need for skin moisturizer/barrier cream  - Collaborate with interdisciplinary team   - Patient/family teaching  - Consider wound care consult   Outcome: Progressing     Problem: Nutrition/Hydration-ADULT  Goal: Nutrient/Hydration intake appropriate for improving, restoring or maintaining nutritional needs  Description: Monitor and assess patient's nutrition/hydration status for malnutrition  Collaborate with interdisciplinary team and initiate plan and interventions as ordered  Monitor patient's weight and dietary intake as ordered or per policy  Utilize nutrition screening tool and intervene as necessary  Determine patient's food preferences and provide high-protein, high-caloric foods as appropriate       INTERVENTIONS:  - Monitor oral intake, urinary output, labs, and treatment plans  - Assess nutrition and hydration status and recommend course of action  - Evaluate amount of meals eaten  - Assist patient with eating if necessary   - Allow adequate time for meals  - Recommend/ encourage appropriate diets, oral nutritional supplements, and vitamin/mineral supplements  - Order, calculate, and assess calorie counts as needed  - Recommend, monitor, and adjust tube feedings and TPN/PPN based on assessed needs  - Assess need for intravenous fluids  - Provide specific nutrition/hydration education as appropriate  - Include patient/family/caregiver in decisions related to nutrition  Outcome: Progressing

## 2022-10-03 NOTE — PHYSICAL THERAPY NOTE
PHYSICAL THERAPY NOTE          Patient Name: Severo Burrs Date: 10/3/2022   10/03/22 1332   PT Last Visit   PT Visit Date 10/03/22   Note Type   Note Type Treatment   Pain Assessment   Pain Assessment Tool 0-10   Pain Score No Pain   Restrictions/Precautions   Weight Bearing Precautions Per Order No   Other Precautions Contact/isolation; Airborne/isolation  (Fall Risk; Multiple lines; Cognitive)   General   Response to Previous Treatment Patient unable to report, no changes reported from family or staff   Family/Caregiver Present No   Cognition   Overall Cognitive Status Impaired   Arousal/Participation Alert   Attention Difficulty attending to directions   Orientation Level Oriented to person;Disoriented to place; Disoriented to time;Disoriented to situation   Following Commands Follows one step commands with increased time or repetition   Subjective   Subjective "I have to go to the bathroom"   Bed Mobility   Supine to Sit 3  Moderate assistance   Additional items Assist x 2;HOB elevated; Increased time required;Verbal cues;LE management   Additional Comments   (Increased time to scoo to EOB  fair unsupported sitting balance)   Transfers   Sit to Stand 2  Maximal assistance   Additional items Assist x 2; Increased time required;Verbal cues   Stand to Sit 2  Maximal assistance   Additional items Assist x 2; Increased time required;Verbal cues; Bedrails   Stand pivot 2  Maximal assistance   Additional items Assist x 2; Increased time required;Verbal cues   Toilet transfer 2  Maximal assistance   Additional items Assist x 2;Armrests; Increased time required;Verbal cues; Commode   Additional Comments Stood with RW min x 1 with RW for brief change and hygiene   Ambulation/Elevation   Gait pattern Not appropriate; Not tested   Balance   Static Sitting Fair   Dynamic Sitting Fair -   Static Standing Poor +   Dynamic Standing Poor +  (RW) Endurance Deficit   Endurance Deficit Yes   Activity Tolerance   Activity Tolerance Patient limited by fatigue   Assessment   Prognosis Good   Problem List   (Decreased strength; Decreased endurance; Impaired balance; Decreased mobility; Impaired judgement; Decreased cognition; Decreased safety awareness)   Assessment Pt seen for PT treatment session this date with interventions consisting of bed mobility and transfers to  increase activity tolerance with functional mobility to decrease fall risk  Pt agreeable to PT treatment session upon arrival, pt found in bed  in no apparent distress  Requires mod/max for bed mobility and transfers  Barriers during this session include weakness and fatigue  Pt continues to be functioning below baseline level, and remains limited 2* factors listed above and including impaired activity tolerance  Pt prognosis for achieving goals is good, pending pt progress with hospitalization/medical status improvements, and indicated by motivated to participate in therapy and ability to follow cues  PT will continue to see pt during current hospitalization in order to address the deficits listed above and provide interventions consistent w/ POC in effort to achieve goals  Current goals and POC remain appropriate, pt continues to have rehab potential  Pt is in need of continued activity in PT to improve strength balance endurance mobility transfers and ambulation with return to maximize LOF  From PT/mobility standpoint, recommendation at time of d/c would be post acute rehab  in order to promote return to PLOF and independence  The patient's AM-PAC Basic Mobility Inpatient Short Form Raw Score is 11  A Raw score of less than or equal to 16 suggests the patient may benefit from discharge to post-acute rehabilitation services  Please also refer to the recommendation of the Physical Therapist for safe discharge planning   Co-treat with OT this session due to complexity of pt and requires A x 2 to provided skilled interventions  Goals   LTG Expiration Date 10/06/22   Plan   Treatment/Interventions   (Functional transfer training; LE strengthening/ROM; Elevations; Therapeutic exercise; Endurance training; Bed mobility; Gait training)   Progress Slow progress, decreased activity tolerance   PT Frequency 3-5x/wk   Recommendation   PT Discharge Recommendation Post acute rehabilitation services   AM-PAC Basic Mobility Inpatient   Turning in Bed Without Bedrails 3   Lying on Back to Sitting on Edge of Flat Bed 2   Moving Bed to Chair 2   Standing Up From Chair 2   Walk in Room 1   Climb 3-5 Stairs 1   Basic Mobility Inpatient Raw Score 11   Basic Mobility Standardized Score 30 25   Turning Head Towards Sound 4   Follow Simple Instructions 3   Low Function Basic Mobility Raw Score 18   Low Function Basic Mobility Standardized Score 29 25   Highest Level Of Mobility   JH-HLM Goal 4: Move to chair/commode   JH-HLM Achieved 4: Move to chair/commode   Education   Education Provided Mobility training   Patient Demonstrates verbal understanding;Reinforcement needed   End of Consult   Patient Position at End of Consult Bedside chair;Bed/Chair alarm activated; All needs within reach   End of Consult Comments discussed POC with PT

## 2022-10-03 NOTE — ASSESSMENT & PLAN NOTE
Changed metoprolol tartrate to metoprolol succinate (toprol XL) with the decreased LVEF, which should be continued upon discharge  Continue to hold ARB in the setting of recent DARRELL and labile renal function  Currently remains hypertensive - initiated on moderate dose calcium channel blocker and continue to monitor blood pressure

## 2022-10-03 NOTE — PROGRESS NOTES
[facility] [facility] [address]  Progress Note - Kortney Mcelroy 1945, 65 y o  female MRN: [id number]  Unit/Bed#: 420-01 Encounter: [id number]  Primary Care Provider: Jasiel Gross DO   Date and time admitted to hospital: 9/21/2022  9:40 PM    * Sepsis due to urinary tract infection [facility])  Assessment & Plan  Sepsis was present on admission and due to UTI - patient with retained ureteral stent  Blood cultures remain negative, but urinary cultures from 09/22 with > 100,000 CFU per mL of pansensitive E coli  Concluded a 7 day course of IV ceftriaxone during the hospitalization  Gastrointestinal bleeding  Assessment & Plan  · Please see the assessment and plan for "Acute blood loss anemia"    Acute blood loss anemia  Assessment & Plan  EGD 9/23 essentially normal, with note of a medium sliding hiatal hernia and numerous polyps measuring less than 5 mm in the stomach  Colonoscopy  with severe diverticulosis, fixed sigmoid loop inhibiting advancement of colonoscope  Large external hemorrhoids and a polyp which was not removed were also noted  Patient's  report same experience on patient's prior colonoscopy attempt  Patient's hemoglobin had responded to a value of 9 following initial 2 units of PRBCs - had continued to slowly drop with resumption of Eliquis, down to a value of  6 8 on the morning of 9/28/2022 requiring 2 additional units of PRBC's (packed red blood cells)  · Eliquis remains on hold  · Colonoscopy as above  · Resumed diet  · Repeat colonoscopy in 3 months, and if incomplete recommendations for CT colonography  · If recurrent rectal bleeding recommendations are for consulting Colorectal Surgery for hemorrhoidectomy  COVID-19 virus infection  Assessment & Plan  Initial COVID testing negative on 09/22/2022, fevers prompting repeat check on 09/25/2022 which was positive  Currently on the mild pathway  · CXR without acute findings  · Monitor labs, trend CRP    · Hold off on dexamethasone, remdesivir given continued lack of O2 requirement  · D-dimer elevated at 2 9 - however, patient's illnesses is mild and she is currently without oxygen requirement  Maintain on SC heparin but avoid full anticoagulation as per protocol, as well as due to GI bleeding while on anticoagulation  Hydroureteronephrosis  Assessment & Plan  The patient was seen in consultation by Urology- no current plan for urological intervention  Continue to treat underlying UTI  As per Urology attending, retained indwelling ureteral stent may be difficult to remove, and may require retrograde and antegrade renal surgery if the proximal coil is fixed with stone formation       The patient will need definitive stone treatment and extraction of the retained left ureteral stent via cystoscopy and laser for extraction of  encrusted/retained stent as well as ureteral/renal stones with temporary stent exchange  This can be done after hospital discharge and after the suspected acute urinary tract infection has cleared and patient has completed antibiotic course  The patient can follow with her urologist at the [facility] that she previously establish care with almost 2 years ago for the initial placement of the stent or referral to [facility] Urology on an outpatient basis      If the patient develops worsening creatinine then with recommendations to obtain renal ultrasound to assess for worsening hydronephrosis, and in that instance patient will become a candidate for urological intervention  Stroke-like symptoms  Assessment & Plan  No acute abnormality on initial CT of the head  MRI contraindicated secondary to presence of AICD/ppm   Will need outpatient MRI at another campus following discharge  Morning lipids and A1c updated  Allergy to statin noted  Anticoagulation with apixaban currently on hold  Continue to monitor neurological symptoms      Of note, patient did not appear to have any focal symptoms earlier this week or currently, with improvement in confusion  Underlying history of dementia and 'forgetfulness' noted  Question whether altered mental status is secondary to acute metabolic encephalopathy from underlying dementia and current acute illness due to COVID-19 infection with fevers - mental status continues to appear improved and is likely at or near patient's baseline  Outpatient Neurology evaluation      Essential hypertension  Assessment & Plan  Changed metoprolol tartrate to metoprolol succinate (toprol XL) with the decreased LVEF, which should be continued upon discharge  Continue to hold ARB in the setting of recent DARRELL and labile renal function  Currently remains hypertensive - initiated on moderate dose calcium channel blocker and continue to monitor blood pressure  Paroxysmal atrial fibrillation (HCC)  Assessment & Plan  Eliquis resumed on 09/25/2022 at decreased dosing based upon patient's weight and serum creatinine - discontinued given continued drop in hemoglobin following that  Continue toprol XL  Chronic combined systolic and diastolic CHF (congestive heart failure) (HCC)  Assessment & Plan  Wt Readings from Last 3 Encounters:   10/02/22 53 6 kg (118 lb 2 7 oz)   05/31/22 62 8 kg (138 lb 7 2 oz)   11/16/21 62 8 kg (138 lb 7 2 oz)     Current ECHO with LVEF 45%, presence of G1 DD  RV systolic function is normal   Also with mention of moderate MR, moderate PHTN  Cardiomyopathy appears ischemic in nature  · Check daily weights - currently stable  · Maintain on low-sodium diet  · Furosemide and ARB remain on hold as above  · Given weight gain and net positive I/O's received a 1 time IV dose of furosemide on 09/29  · Continue toprol XL  · Insure outpatient follow-up with Cardiology      Acute kidney injury superimposed on chronic kidney disease [facility])  Assessment & Plan  Lab Results   Component Value Date    EGFR 30 10/03/2022    EGFR 28 10/02/2022    EGFR 28 10/01/2022    CREATININE 1 62 (H) 10/03/2022    CREATININE 1 69 (H) 10/02/2022    CREATININE 1 71 (H) 10/01/2022     DARRELL superimposed on CKD, likely in the setting of sepsis, acute blood loss anemia, with concurrent diuretic use  Baseline creatinine of 1 6 to 2 mg/dl, peak value during hospitalization of 3 mg/dl  Current creatinine has improved to 1 62 mg/dl and remains stable  Discontinued IV fluids previously  Received blood products  Continue to hold oral furosemide and ARB despite recovery as patient underwent prep for colonoscopy along with clear liquid diet and NPO status yesterday, examining euvolemic, with stable weights  Nephrology consulted and following  Acquired hypothyroidism  Assessment & Plan  TSH markedly low at 0 05, although checked during acute illness  Levothyroxine dosing decreased to 125 mcg daily, with recommendations for repeat TSH in 4-6 weeks  VTE Pharmacologic Prophylaxis: VTE Score: 10 High Risk (Score >/= 5) - Pharmacological DVT Prophylaxis Ordered: heparin  Sequential Compression Devices Ordered  Patient Centered Rounds: I performed bedside rounds with nursing staff today  Education and Discussions with Family / Patient: Attempted to update  () via phone  Left voicemail  Time Spent for Care: 30 minutes  More than 50% of total time spent on counseling and coordination of care as described above  Current Length of Stay: 11 day(s)  Current Patient Status: Inpatient   Certification Statement: The patient will continue to require additional inpatient hospital stay due to awaiting short-term rehabilitation placement  Discharge Plan: Anticipate discharge in 24-48 hrs to rehab facility  Code Status: Level 3 - DNAR and DNI    Subjective: The patient was seen and examined  The patient is doing better  No chest pain  No shortness of breath  No abdominal pain  No nausea or vomiting        Objective:     Vitals:   Temp (24hrs), Av 2 °F (36 8 °C), Min:97 8 °F (36 6 °C), Max:98 5 °F (36 9 °C)    Temp:  [97 8 °F (36 6 °C)-98 5 °F (36 9 °C)] 97 8 °F (36 6 °C)  HR:  [79-93] 85  Resp:  [16-19] 19  BP: (140-160)/(79-86) 143/81  SpO2:  [94 %-98 %] 96 %  Body mass index is 19 07 kg/m²  Input and Output Summary (last 24 hours): Intake/Output Summary (Last 24 hours) at 10/3/2022 1305  Last data filed at 10/3/2022 1221  Gross per 24 hour   Intake 120 ml   Output 250 ml   Net -130 ml       Physical Exam:   Physical Exam   General:  NAD, follows commands  HEENT:  NC/AT, mucous membranes moist  Neck:  Supple, No JVP elevation  CV:  + S1, + S2, RRR  Pulm:  Lung fields are CTA bilaterally  Abd:  Soft, Non-tender, Non-distended  Ext:  No clubbing/cyanosis/edema  Skin:  No rashes  Neuro:  Awake, alert, confused, not oriented  Psych:  Normal mood and affect      Additional Data:    Labs:  Results from last 7 days   Lab Units 10/03/22  0501   WBC Thousand/uL 5 85   HEMOGLOBIN g/dL 10 3*   HEMATOCRIT % 32 4*   PLATELETS Thousands/uL 225   NEUTROS PCT % 71   LYMPHS PCT % 19   MONOS PCT % 7   EOS PCT % 3     Results from last 7 days   Lab Units 10/03/22  0501   SODIUM mmol/L 139   POTASSIUM mmol/L 4 3   CHLORIDE mmol/L 106   CO2 mmol/L 23   BUN mg/dL 48*   CREATININE mg/dL 1 62*   ANION GAP mmol/L 10   CALCIUM mg/dL 8 8   ALBUMIN g/dL 2 2*   TOTAL BILIRUBIN mg/dL 0 38   ALK PHOS U/L 119*   ALT U/L 12   AST U/L 26   GLUCOSE RANDOM mg/dL 83         Results from last 7 days   Lab Units 10/01/22  2107 10/01/22  1827 22  1658 22  1023 22  0825 22  0743   POC GLUCOSE mg/dl 120 121 78 99 88 118               Lines/Drains:  Invasive Devices  Report    Peripheral Intravenous Line  Duration           Peripheral IV 10/03/22 Dorsal (posterior); Right Hand <1 day          Drain  Duration           External Urinary Catheter 10 days                      Imaging: No pertinent imaging reviewed      Recent Cultures (last 7 days):         Last 24 Hours Medication List:   Current Facility-Administered Medications   Medication Dose Route Frequency Provider Last Rate    acetaminophen  650 mg Oral Q6H PRN Ese Bach MD      allopurinol  100 mg Oral Daily Ese Bach MD      amLODIPine  5 mg Oral Daily Aditya Howell MD      calcium carbonate-vitamin D  1 tablet Oral BID With Meals Ese Bach MD      fluticasone  2 spray Nasal Daily Ese Bach MD      heparin (porcine)  5,000 Units Subcutaneous [facility] Aditya Howell MD      labetalol  10 mg Intravenous Q4H PRN Ese Bach MD      levothyroxine  125 mcg Oral Early Morning Ese Bach MD      magnesium oxide  400 mg Oral BID Ese Bach MD      metoprolol succinate  50 mg Oral Q12H Ese Bach MD      ondansetron  4 mg Intravenous Q4H PRN Ese Bach MD      pantoprazole  20 mg Oral Early Morning Ese Bach MD      polyethylene glycol  17 g Oral Daily PRN Ese Bach MD      sertraline  100 mg Oral Daily Ese Bach MD      sodium chloride (PF)  3 mL Intravenous Q1H PRN Ese Bach MD          Today, Patient Was Seen By: Steve Khan    **Please Note: This note may have been constructed using a voice recognition system  **

## 2022-10-03 NOTE — ASSESSMENT & PLAN NOTE
Lab Results   Component Value Date    EGFR 30 10/03/2022    EGFR 28 10/02/2022    EGFR 28 10/01/2022    CREATININE 1 62 (H) 10/03/2022    CREATININE 1 69 (H) 10/02/2022    CREATININE 1 71 (H) 10/01/2022     DARRELL superimposed on CKD, likely in the setting of sepsis, acute blood loss anemia, with concurrent diuretic use  Baseline creatinine of 1 6 to 2 mg/dl, peak value during hospitalization of 3 mg/dl  Current creatinine has improved to 1 62 mg/dl and remains stable  Discontinued IV fluids previously  Received blood products  Continue to hold oral furosemide and ARB despite recovery as patient underwent prep for colonoscopy along with clear liquid diet and NPO status yesterday, examining euvolemic, with stable weights  Nephrology consulted and following

## 2022-10-03 NOTE — CASE MANAGEMENT
Case Management Discharge Planning Note    Patient name Gina Ahmadi  Location /[id number] MRN [id number]  : 1945 Date 10/3/2022       Current Admission Date: 2022  Current Admission Diagnosis:Sepsis due to urinary tract infection [facility])   Patient Active Problem List    Diagnosis Date Noted    Low TSH level 2022    COVID-19 virus infection 2022    Dementia ([place] [address] )     Stroke-like symptoms 2022    Sepsis due to urinary tract infection ([place] [address] ) 2022    Gastrointestinal bleeding 2022    Hydroureteronephrosis 2022    Diverticulitis 2022    Rectal bleeding 2022    Elevated troponin 2022    Iron deficiency anemia 2022    Anemia due to chronic kidney disease 2022    Paroxysmal atrial fibrillation ([place] [address] ) 2020    Ischemic cardiomyopathy 2020    S/P implantation of automatic cardioverter/defibrillator (AICD) 2020    Essential hypertension 2020    History of PTCA 2020    Leg swelling 2020    Acute kidney injury superimposed on chronic kidney disease ([place] [address] ) 2020    Perforated appendicitis 2020    Sepsis ([place] [address] ) 2020    Chronic combined systolic and diastolic CHF (congestive heart failure) ([place] [address] ) 2020    Pyuria 2020    Microscopic hematuria 2020    Vitamin D insufficiency 2020    Mitral valve insufficiency 2020    Hypercholesterolemia 2020    Aortic atherosclerosis ([place] [address] ) 2020    Renal calculus 2020    Diverticulosis 2020    Hiatal hernia 2020    Pulmonary nodule 2020    Benign hypertensive renal disease 2019    Acute blood loss anemia 2019    Gastroesophageal reflux disease without esophagitis 2019    Closed fracture of body of sternum with routine healing 2018    Congestive heart disease ([place] [address] ) 2018    Acquired hypothyroidism 2018    CAD (coronary artery disease) 04/12/2018    Forgetfulness 04/12/2018    Acute pain due to trauma 04/12/2018    Hx of fall 04/12/2018    Stress incontinence in female 04/12/2018    Anemia in chronic kidney disease 03/20/2018      LOS (days): 11  Geometric Mean LOS (GMLOS) (days): 5 00  Days to GMLOS:-6 6     OBJECTIVE:  Risk of Unplanned Readmission Score: 24 7         Current admission status: Inpatient   Preferred Pharmacy:   [address] [place] [address] [address], [address] [place] [address]  [address]  [address] [place] [address] Drive  [address] [place] [address]  Phone: [phone] Fax: [phone]    Primary Care Provider: John Saldaña,     Primary Insurance: Kevin Aguilar [facility] REP  Secondary Insurance:     DISCHARGE DETAILS:                                                                                                               Facility Insurance Auth Number: Renteria Sheridan for patient to be transferred to [facility]  Clinicals faxed to [phone]

## 2022-10-03 NOTE — CASE MANAGEMENT
Case Management Progress Note    Patient name Rinku Avendano  Location /[id number] MRN [id number]  : 1945 Date 10/3/2022       LOS (days): 11  Geometric Mean LOS (GMLOS) (days): 4 80  Days to GMLOS:-6 5        OBJECTIVE:        Current admission status: Inpatient  Preferred Pharmacy:   [facility] [address], [address] [address]  [address]  [address]  [address] [place] [place] [address]  Phone: [phone] Fax: [phone]    Primary Care Provider: Bal Kilgore DO    [facility]  Secondary Insurance:     PROGRESS NOTE:Continuing to follow pts medical status and work on [address] [place] [address]; [facility]; [facility] ; [facility]; [address] [place] [address] all able to accept pt   Most SNF's cannot take pt until either 10/5 or 10/6 due to being positve for COVID

## 2022-10-03 NOTE — ASSESSMENT & PLAN NOTE
Wt Readings from Last 3 Encounters:   10/02/22 53 6 kg (118 lb 2 7 oz)   05/31/22 62 8 kg (138 lb 7 2 oz)   11/16/21 62 8 kg (138 lb 7 2 oz)     Current ECHO with LVEF 45%, presence of G1 DD  RV systolic function is normal   Also with mention of moderate MR, moderate PHTN  Cardiomyopathy appears ischemic in nature  · Check daily weights - currently stable  · Maintain on low-sodium diet  · Furosemide and ARB remain hold as above  · Given weight gain and net positive I/O's received a 1 time IV dose of furosemide on 09/29  · Continue toprol XL  · Insure outpatient follow-up with Cardiology

## 2022-10-03 NOTE — ASSESSMENT & PLAN NOTE
Initial COVID testing negative on 09/22/2022, fevers prompting repeat check on 09/25/2022 which was positive  Currently on the mild pathway  · CXR without acute findings  · Monitor labs, trend CRP  · Hold off on dexamethasone, remdesivir given continued lack of O2 requirement  · D-dimer elevated at 2 9 - however, patient's illnesses is mild and she is currently without oxygen requirement  Maintain on SC heparin but avoid full anticoagulation as per protocol, as well as due to GI bleeding while on anticoagulation

## 2022-10-03 NOTE — ASSESSMENT & PLAN NOTE
TSH markedly low at 0 05, although checked during acute illness  Levothyroxine dosing decreased to 125 mcg daily, with recommendations for repeat TSH in 4-6 weeks

## 2022-10-03 NOTE — OCCUPATIONAL THERAPY NOTE
Occupational Therapy Progress Note     Patient Name: Chanda Barillas Date: 10/3/2022  Problem List  Principal Problem:    Sepsis due to urinary tract infection [facility])  Active Problems:    Acquired hypothyroidism    Acute kidney injury superimposed on chronic kidney disease ([facility] [address] )    Chronic combined systolic and diastolic CHF (congestive heart failure) (HCC)    Paroxysmal atrial fibrillation ([facility] )    Essential hypertension    Acute blood loss anemia    Stroke-like symptoms    Gastrointestinal bleeding    Hydroureteronephrosis    COVID-19 virus infection              10/03/22 1333   OT Last Visit   OT Visit Date 10/03/22   Note Type   Note Type Treatment   Restrictions/Precautions   Weight Bearing Precautions Per Order No   Other Precautions Contact/isolation; Airborne/isolation; Chair Alarm; Bed Alarm;Multiple lines; Fall Risk   Pain Assessment   Pain Score No Pain   ADL   Where Assessed Commode   Grooming Assistance 5  Supervision/Setup   Grooming Deficit Brushing hair   LB Dressing Assistance 2  Maximal Assistance   LB Dressing Deficit Thread RLE into underwear;Pull up over hips; Thread LLE into underwear   Toileting Assistance  2  Maximal Assistance   Toileting Deficit Clothing management up;Perineal hygiene; Increased time to complete; Bedside commode;Clothing management down   Toileting Comments pt incontinent with urine and bowel; requires max (A) for all magali hygiene and LB dressing tasks   Bed Mobility   Supine to Sit 3  Moderate assistance   Additional items Assist x 2;Bedrails; Increased time required;LE management   Sit to Supine   (seated in chair at end of session)   Additional Comments pt on RA during session; Spo2 WFL with no complaints of SOB   Transfers   Sit to Stand 2  Maximal assistance   Additional items Assist x 2; Increased time required;Verbal cues   Stand to Sit 2  Maximal assistance   Additional items Assist x 2; Increased time required;Verbal cues   Stand pivot 2  Maximal assistance Additional items Assist x 2; Increased time required;Verbal cues   Toilet transfer 2  Maximal assistance   Additional items Assist x 2; Increased time required;Verbal cues; Commode  (utilizes no device due to inability to manuever device)   Additional Comments pt requires mod-max (A) x2 for all functional transfers at this time; pt with poor attention to task limiting functional ability to follow through with safety and stability as well as use of RW   Functional Mobility   Additional Comments pt is not appropriate for functional mobility at this time due to transfers status and significant posterior lean and L lean   Cognition   Overall Cognitive Status Impaired   Arousal/Participation Alert   Attention Difficulty attending to directions   Orientation Level Oriented to person;Disoriented to place; Disoriented to time;Disoriented to situation   Memory Decreased long term memory;Decreased short term memory   Following Commands Follows one step commands with increased time or repetition   Comments pt requires redirectin often during functional tasks; pt with poor attention at times for functional tasks   Activity Tolerance   Activity Tolerance Patient limited by fatigue; Other (Comment)  (cognition)   Assessment   Assessment Patient participated in Skilled OT session this date with interventions consisting of ADL re training with the use of correct body mechnaics, safety awareness and fall prevention techniques,  therapeutic activities to: increase activity tolerance, increase standing tolerance time with unilateral UE support to complete sink level ADLs, increase cardiovascular endurance , increase dynamic sit/ stand balance during functional activity , increase trunk control and increase OOB/ sitting tolerance    Co treatment with PTA secondary to complex medical condition of pt, mod-max A of 2 required to achieve and maintain transitional movements, requiring the need of skilled therapeutic intervention of 2 therapists to achieve delivery of services  Patient agreeable to OT treatment session, upon arrival patient was found supine in bed  Patient requiring frequent re direction, verbal cues for safety, verbal cues for correct technique, verbal cues for pacing thru activity steps, cognitive assistance to anticipate next step, one step directives and frequent rest periods  Patient continues to be functioning below baseline level, occupational performance remains limited secondary to factors listed above and increased risk for falls and injury  From OT standpoint, recommendation at time of d/c would be Post acute rehabilitation services  The patient's raw score on the AM-PAC Daily Activity inpatient short form is 13, standardized score is 32 03, less than 39 4  Patients at this level are likely to benefit from discharge to post-acute rehabilitation services  Please refer to the recommendation of the Occupational Therapist for safe discharge planning     Plan   Goal Expiration Date 10/06/22   OT Treatment Day 4   OT Frequency 3-5x/wk   Recommendation   OT Discharge Recommendation Post acute rehabilitation services   AM-PAC Daily Activity Inpatient   Lower Body Dressing 1   Bathing 2   Toileting 2   Upper Body Dressing 2   Grooming 3   Eating 3   Daily Activity Raw Score 13   Daily Activity Standardized Score (Calc for Raw Score >=11) 32 03   AM-PAC Applied Cognition Inpatient   Following a Speech/Presentation 3   Understanding Ordinary Conversation 2   Taking Medications 1   Remembering Where Things Are Placed or Put Away 1   Remembering List of 4-5 Errands 1   Taking Care of Complicated Tasks 1   Applied Cognition Raw Score 9   Applied Cognition Standardized Score 22 48

## 2022-10-03 NOTE — ASSESSMENT & PLAN NOTE
No acute abnormality on initial CT of the head  MRI contraindicated secondary to presence of AICD/ppm   Will need outpatient MRI at another campus following discharge  Morning lipids and A1c updated  Allergy to statin noted  Anticoagulation with apixaban currently on hold  Continue to monitor neurological symptoms  Of note, patient did not appear to have any focal symptoms earlier this week or currently, with improvement in confusion  Underlying history of dementia and 'forgetfulness' noted  Question whether altered mental status is secondary to acute metabolic encephalopathy from underlying dementia and current acute illness due to COVID-19 infection with fevers - mental status continues to appear improved and is likely at or near patient's baseline      Outpatient Neurology evaluation

## 2022-10-03 NOTE — PLAN OF CARE
Problem: Nutrition/Hydration-ADULT  Goal: Nutrient/Hydration intake appropriate for improving, restoring or maintaining nutritional needs  Description: Monitor and assess patient's nutrition/hydration status for malnutrition  Collaborate with interdisciplinary team and initiate plan and interventions as ordered  Monitor patient's weight and dietary intake as ordered or per policy  Utilize nutrition screening tool and intervene as necessary  Determine patient's food preferences and provide high-protein, high-caloric foods as appropriate  INTERVENTIONS:  - Monitor oral intake, urinary output, labs, and treatment plans  - Assess nutrition and hydration status and recommend course of action  - Evaluate amount of meals eaten  - Assist patient with eating if necessary   - Allow adequate time for meals  - Recommend/ encourage appropriate diets, oral nutritional supplements, and vitamin/mineral supplements  - Order, calculate, and assess calorie counts as needed  - Recommend, monitor, and adjust tube feedings and TPN/PPN based on assessed needs  - Assess need for intravenous fluids  - Provide specific nutrition/hydration education as appropriate  - Include patient/family/caregiver in decisions related to nutrition  Outcome: Not Progressing   Intakes remain poor  Diet and supplements adjusted

## 2022-10-03 NOTE — PLAN OF CARE
Problem: PHYSICAL THERAPY ADULT  Goal: Performs mobility at highest level of function for planned discharge setting  See evaluation for individualized goals  Description: Treatment/Interventions: Functional transfer training, LE strengthening/ROM, Elevations, Therapeutic exercise, Endurance training, Bed mobility, Gait training          See flowsheet documentation for full assessment, interventions and recommendations  Outcome: Progressing  Note: Prognosis: Good  Problem List:  (Decreased strength; Decreased endurance; Impaired balance; Decreased mobility; Impaired judgement; Decreased cognition; Decreased safety awareness)  Assessment: Pt seen for PT treatment session this date with interventions consisting of bed mobility and transfers to  increase activity tolerance with functional mobility to decrease fall risk  Pt agreeable to PT treatment session upon arrival, pt found in bed  in no apparent distress  Requires mod/max for bed mobility and transfers  Barriers during this session include weakness and fatigue  Pt continues to be functioning below baseline level, and remains limited 2* factors listed above and including impaired activity tolerance  Pt prognosis for achieving goals is good, pending pt progress with hospitalization/medical status improvements, and indicated by motivated to participate in therapy and ability to follow cues  PT will continue to see pt during current hospitalization in order to address the deficits listed above and provide interventions consistent w/ POC in effort to achieve goals  Current goals and POC remain appropriate, pt continues to have rehab potential  Pt is in need of continued activity in PT to improve strength balance endurance mobility transfers and ambulation with return to maximize LOF  From PT/mobility standpoint, recommendation at time of d/c would be post acute rehab  in order to promote return to PLOF and independence    The patient's AM-PAC Basic Mobility Inpatient Short Form Raw Score is 11  A Raw score of less than or equal to 16 suggests the patient may benefit from discharge to post-acute rehabilitation services  Please also refer to the recommendation of the Physical Therapist for safe discharge planning  Co-treat with OT this session due to complexity of pt and requires A x 2 to provided skilled interventions  PT Discharge Recommendation: Post acute rehabilitation services    See flowsheet documentation for full assessment

## 2022-10-03 NOTE — PROGRESS NOTES
Progress Note - Nephrology   Darryn Fernandes 65 y o  female MRN: [id number]  Unit/Bed#: 420-01 Encounter: [id number]    A/P:  1  Acute kidney injury on top of chronic kidney disease-resolved   Continue optimize care, avoid potential nephrotoxins  2  Chronic kidney disease stage 4 with baseline creatinine between 1 7-2 mg/dL   Follows with Dr Daija Nguyen  3  Moderate left hydronephrosis -stable  4   Chronic congestive heart failure   Patient compensated at this time    Follow up reason for today's visit:  Acute kidney injury/chronic kidney disease    Sepsis due to urinary tract infection [facility])    Patient Active Problem List   Diagnosis    Closed fracture of body of sternum with routine healing    [facility] )    Acquired hypothyroidism    CAD (coronary artery disease)    Forgetfulness    Acute pain due to trauma    Hx of fall    Stress incontinence in female    Acute kidney injury superimposed on chronic kidney disease ([facility] )    Perforated appendicitis    Sepsis ([facility] )    Chronic combined systolic and diastolic CHF (congestive heart failure) ([facility] )    Pyuria    Microscopic hematuria    Vitamin D insufficiency    Mitral valve insufficiency    Hypercholesterolemia    Aortic atherosclerosis (HCC)    Renal calculus    Diverticulosis    Hiatal hernia    Pulmonary nodule    Leg swelling    Paroxysmal atrial fibrillation (HCC)    Ischemic cardiomyopathy    S/P implantation of automatic cardioverter/defibrillator (AICD)    Essential hypertension    History of PTCA    Anemia due to chronic kidney disease    Iron deficiency anemia    Diverticulitis    Rectal bleeding    Elevated troponin    Acute blood loss anemia    Anemia in chronic kidney disease    Benign hypertensive renal disease    Gastroesophageal reflux disease without esophagitis    Stroke-like symptoms    Sepsis due to urinary tract infection (HCC)    Gastrointestinal bleeding    Hydroureteronephrosis    [facility]    Low TSH level    COVID-19 virus infection         Subjective:   Patient feeling better, although continues not to well in general   She is trying to eat and drink as tolerated  Objective:     Vitals: Blood pressure 143/81, pulse 85, temperature 97 8 °F (36 6 °C), resp  rate 19, height 5' 6" (1 676 m), weight 53 6 kg (118 lb 2 7 oz), SpO2 96 %  ,Body mass index is 19 07 kg/m²  Weight (last 2 days)     Date/Time Weight    10/02/22 0558 53 6 (118 17)    10/01/22 0535 53 9 (118 83)            Intake/Output Summary (Last 24 hours) at 10/3/2022 1212  Last data filed at 10/3/2022 0836  Gross per 24 hour   Intake 120 ml   Output 250 ml   Net -130 ml     I/O last 3 completed shifts: In: 46 [P O :390]  Out: 500 [Urine:500]         Physical Exam: /81   Pulse 85   Temp 97 8 °F (36 6 °C)   Resp 19   Ht 5' 6" (1 676 m)   Wt 53 6 kg (118 lb 2 7 oz)   SpO2 96%   BMI 19 07 kg/m²     General Appearance:    Alert, cooperative, no distress, appears stated age   Head:    Normocephalic, without obvious abnormality, atraumatic   Eyes:    Conjunctiva/corneas clear   Ears:    Normal external ears   Nose:   Nares normal, septum midline, mucosa normal, no drainage    or sinus tenderness   Throat:   Lips, mucosa, and tongue normal; teeth and gums normal   Neck:   Supple   Back:     Symmetric, no curvature, ROM normal, no CVA tenderness   Lungs:     Clear to auscultation bilaterally, respirations unlabored   Chest wall:    No tenderness or deformity   Heart:    Regular rate and rhythm, S1 and S2 normal, no murmur, rub   or gallop   Abdomen:     Soft, non-tender, bowel sounds active   Extremities:   Extremities normal, atraumatic, no cyanosis or edema   Skin:   Skin color, texture, turgor normal, no rashes or lesions   Lymph nodes:   Cervical normal   Neurologic:   CNII-XII intact            Lab, Imaging and other studies: I have personally reviewed pertinent labs    CBC:   Lab Results   Component Value Date    WBC 5 85 10/03/2022    HGB 10 3 (L) 10/03/2022    HCT 32 4 (L) 10/03/2022    MCV 85 10/03/2022     10/03/2022    MCH 27 0 10/03/2022    MCHC 31 8 10/03/2022    RDW 22 2 (H) 10/03/2022    MPV 10 8 10/03/2022    NRBC 0 10/03/2022     CMP:   Lab Results   Component Value Date    K 4 3 10/03/2022     10/03/2022    CO2 23 10/03/2022    BUN 48 (H) 10/03/2022    CREATININE 1 62 (H) 10/03/2022    CALCIUM 8 8 10/03/2022    AST 26 10/03/2022    ALT 12 10/03/2022    ALKPHOS 119 (H) 10/03/2022    EGFR 30 10/03/2022         Results from last 7 days   Lab Units 10/03/22  0501 10/02/22  0608 10/01/22  0459   POTASSIUM mmol/L 4 3 4 2 3 7   CHLORIDE mmol/L 106 105 108   CO2 mmol/L 23 23 22   BUN mg/dL 48* 49* 47*   CREATININE mg/dL 1 62* 1 69* 1 71*   CALCIUM mg/dL 8 8 8 7 8 5   ALK PHOS U/L 119* 111 110   ALT U/L 12 12 12   AST U/L 26 26 29         Phosphorus: No results found for: PHOS  Magnesium:   Lab Results   Component Value Date    MG 2 3 10/03/2022     Urinalysis: No results found for: Dulcie Bellow, SPECGRAV, PHUR, LEUKOCYTESUR, NITRITE, PROTEINUA, GLUCOSEU, KETONESU, BILIRUBINUR, BLOODU  Ionized Calcium: No results found for: CAION  Coagulation: No results found for: PT, INR, APTT  Troponin: No results found for: TROPONINI  ABG: No results found for: PHART, JJN5PRC, PO2ART, DJI9WNM, R0GSTCLO, BEART, SOURCE  Radiology review:     IMAGING  Procedure: Colonoscopy    Result Date: 9/30/2022  Narrative: [facility] Operating Room [place] [address]: 9/30/22 PHYSICIAN(S): Attending: Diana Gardner MD Fellow: No Staff Documented INDICATION: Acute blood loss anemia POST-OP DIAGNOSIS: See the impression below  HISTORY: Prior colonoscopy: 3 years ago  BOWEL PREPARATION:  PREPROCEDURE: Informed consent was obtained for the procedure, including sedation   Risks including but not limited to bleeding, infection, perforation, adverse drug reaction and aspiration were explained in detail  Also explained about less than 100% sensitivity with the exam and other alternatives  The patient was placed in the left lateral decubitus position  DETAILS OF PROCEDURE: Patient was taken to the procedure room where a time out was performed to confirm correct patient and correct procedure  The patient underwent monitored anesthesia care, which was administered by an anesthesia professional  The patient's blood pressure, heart rate, level of consciousness, oxygen and respirations were monitored throughout the procedure  A digital rectal exam was performed  The scope was introduced through the anus and advanced to the descending colon  Retroflexion was performed in the rectum  The quality of bowel preparation was evaluated using the [facility] Bowel Preparation Scale with scores of: right colon = not assessed, transverse colon = not assessed, left colon = 2  The total BBPS score was 2  Bowel prep was adequate  The patient experienced no blood loss  The procedure was difficult due to adhesions, fixation, loops in the digestive tract and tortuous colon  In response to procedure difficulty, counter pressure was applied, the observation site was flushed, the patient was repositioned, reduction was employed and water immersion technique was employed  The patient tolerated the procedure well  There were no apparent complications   ANESTHESIA INFORMATION: ASA: IV Anesthesia Type: IV Sedation with Anesthesia MEDICATIONS: No administrations occurring from 1627 to 1701 on 09/30/22 FINDINGS: Polyp measuring 5-9 mm in the sigmoid colon Severe diverticula in the descending colon and sigmoid colon EVENTS: Procedure Events Event Event Time ENDO SCOPE OUT TIME 9/30/2022  5:00 PM SPECIMENS: * No specimens in log * EQUIPMENT: Colonoscope -     Impression: Sigmoid polyp not removed because of recent bleeding Severe diverticulosis with fixed sigmoid loop (I was unable to advance the pediatric colonoscope or even the upper endoscope this area due to the severe looping and inability to reduce) Large external hemorrhoids were probably the cause of her rectal bleeding No active bleeding at this time RECOMMENDATION: Repeat colonoscopy in 3 months due to a personal history of colon polyps (Would re-attempt colonoscopy at that time and remove the sigmoid polyp    If colonoscopy incomplete, would perform CT colonography at that time ) If recurrent rectal bleeding, would consult Colorectal surgery for hemorrhoidectomy  Ivelisse Tony MD       Current Facility-Administered Medications   Medication Dose Route Frequency    acetaminophen (TYLENOL) tablet 650 mg  650 mg Oral Q6H PRN    allopurinol (ZYLOPRIM) tablet 100 mg  100 mg Oral Daily    amLODIPine (NORVASC) tablet 5 mg  5 mg Oral Daily    calcium carbonate-vitamin D (OSCAL-D) 500 mg-200 units per tablet 1 tablet  1 tablet Oral BID With Meals    fluticasone (FLONASE) 50 mcg/act nasal spray 2 spray  2 spray Nasal Daily    heparin (porcine) subcutaneous injection 5,000 Units  5,000 Units Subcutaneous Q8H [address]    labetalol (NORMODYNE) injection 10 mg  10 mg Intravenous Q4H PRN    levothyroxine tablet 125 mcg  125 mcg Oral Early Morning    magnesium oxide (MAG-OX) tablet 400 mg  400 mg Oral BID    metoprolol succinate (TOPROL-XL) 24 hr tablet 50 mg  50 mg Oral Q12H    ondansetron (ZOFRAN) injection 4 mg  4 mg Intravenous Q4H PRN    pantoprazole (PROTONIX) EC tablet 20 mg  20 mg Oral Early Morning    polyethylene glycol (MIRALAX) packet 17 g  17 g Oral Daily PRN    sertraline (ZOLOFT) tablet 100 mg  100 mg Oral Daily    sodium chloride (PF) 0 9 % injection 3 mL  3 mL Intravenous Q1H PRN     Medications Discontinued During This Encounter   Medication Reason    calcium gluconate 3 g in sodium chloride 0 9 % 100 mL IVPB     sodium bicarbonate tablet 650 mg     metoprolol tartrate (LOPRESSOR) tablet 200 mg     aspirin chewable tablet 81 mg     sodium bicarbonate 150 mEq in dextrose 5 % 1,000 mL infusion     lactated ringers infusion     metoprolol tartrate (LOPRESSOR) tablet 50 mg     metoprolol succinate (TOPROL-XL) 24 hr tablet 50 mg     levothyroxine tablet 150 mcg     heparin (porcine) subcutaneous injection 5,000 Units     magnesium sulfate IVPB (premix) SOLN 1 g     dextrose 5 % in lactated Ringer's infusion     lactated ringers infusion     insulin regular (HumuLIN R,NovoLIN R) 1 Units/mL in sodium chloride 0 9 % 100 mL infusion     apixaban (ELIQUIS) tablet 2 5 mg     acetaminophen (TYLENOL) tablet 488 mg     cefTRIAXone (ROCEPHIN) IVPB (premix in dextrose) 1,000 mg 50 mL     magnesium sulfate 2 g/50 mL IVPB (premix) 2 g        Tano Mayberry      This progress note was produced in part using a dictation device which may document imprecise wording from author's original intent

## 2022-10-03 NOTE — ASSESSMENT & PLAN NOTE
The patient was seen in consultation by Urology- no current plan for urological intervention  Continue to treat underlying UTI  As per Urology attending, retained indwelling ureteral stent may be difficult to remove, and may require retrograde and antegrade renal surgery if the proximal coil is fixed with stone formation       The patient will need definitive stone treatment and extraction of the retained left ureteral stent via cystoscopy and laser for extraction of  encrusted/retained stent as well as ureteral/renal stones with temporary stent exchange  This can be done after hospital discharge and after the suspected acute urinary tract infection has cleared and patient has completed antibiotic course  The patient can follow with her urologist at the [facility] that she previously establish care with almost 2 years ago for the initial placement of the stent or referral to [facility] Urology on an outpatient basis      If the patient develops worsening creatinine then with recommendations to obtain renal ultrasound to assess for worsening hydronephrosis, and in that instance patient will become a candidate for urological intervention

## 2022-10-03 NOTE — ASSESSMENT & PLAN NOTE
Eliquis resumed on 09/25/2022 at decreased dosing based upon patient's weight and serum creatinine - discontinued given continued drop in hemoglobin following that  Continue toprol XL

## 2022-10-03 NOTE — ASSESSMENT & PLAN NOTE
Sepsis was present on admission and due to UTI - patient with retained ureteral stent  Blood cultures remain negative, but urinary cultures from 09/22 with > 100,000 CFU per mL of pansensitive E coli  Concluded a 7 day course of IV ceftriaxone during the hospitalization

## 2022-10-04 VITALS
BODY MASS INDEX: 18.88 KG/M2 | OXYGEN SATURATION: 95 % | RESPIRATION RATE: 20 BRPM | HEART RATE: 64 BPM | DIASTOLIC BLOOD PRESSURE: 79 MMHG | HEIGHT: 66 IN | WEIGHT: 117.5 LBS | TEMPERATURE: 97.1 F | SYSTOLIC BLOOD PRESSURE: 161 MMHG

## 2022-10-04 PROBLEM — I27.20 PULMONARY HYPERTENSION (HCC): Status: ACTIVE | Noted: 2022-10-04

## 2022-10-04 PROBLEM — E83.39 HYPOPHOSPHATEMIA: Status: ACTIVE | Noted: 2022-10-04

## 2022-10-04 LAB
ALBUMIN SERPL BCP-MCNC: 2.3 G/DL (ref 3.5–5)
ALP SERPL-CCNC: 126 U/L (ref 46–116)
ALT SERPL W P-5'-P-CCNC: 16 U/L (ref 12–78)
ANION GAP SERPL CALCULATED.3IONS-SCNC: 8 MMOL/L (ref 4–13)
AST SERPL W P-5'-P-CCNC: 30 U/L (ref 5–45)
BASOPHILS # BLD AUTO: 0.03 THOUSANDS/ÂΜL (ref 0–0.1)
BASOPHILS NFR BLD AUTO: 1 % (ref 0–1)
BILIRUB SERPL-MCNC: 0.36 MG/DL (ref 0.2–1)
BUN SERPL-MCNC: 44 MG/DL (ref 5–25)
CA-I BLD-SCNC: 1.19 MMOL/L (ref 1.12–1.32)
CALCIUM ALBUM COR SERPL-MCNC: 10.1 MG/DL (ref 8.3–10.1)
CALCIUM SERPL-MCNC: 8.7 MG/DL (ref 8.3–10.1)
CHLORIDE SERPL-SCNC: 109 MMOL/L (ref 96–108)
CK SERPL-CCNC: 24 U/L (ref 26–192)
CO2 SERPL-SCNC: 26 MMOL/L (ref 21–32)
CREAT SERPL-MCNC: 1.65 MG/DL (ref 0.6–1.3)
EOSINOPHIL # BLD AUTO: 0.2 THOUSAND/ÂΜL (ref 0–0.61)
EOSINOPHIL NFR BLD AUTO: 3 % (ref 0–6)
ERYTHROCYTE [DISTWIDTH] IN BLOOD BY AUTOMATED COUNT: 22 % (ref 11.6–15.1)
GFR SERPL CREATININE-BSD FRML MDRD: 29 ML/MIN/1.73SQ M
GLUCOSE SERPL-MCNC: 84 MG/DL (ref 65–140)
HCT VFR BLD AUTO: 34.8 % (ref 34.8–46.1)
HGB BLD-MCNC: 10.7 G/DL (ref 11.5–15.4)
IMM GRANULOCYTES # BLD AUTO: 0.04 THOUSAND/UL (ref 0–0.2)
IMM GRANULOCYTES NFR BLD AUTO: 1 % (ref 0–2)
LYMPHOCYTES # BLD AUTO: 1.19 THOUSANDS/ÂΜL (ref 0.6–4.47)
LYMPHOCYTES NFR BLD AUTO: 19 % (ref 14–44)
MAGNESIUM SERPL-MCNC: 2.2 MG/DL (ref 1.6–2.6)
MCH RBC QN AUTO: 26.1 PG (ref 26.8–34.3)
MCHC RBC AUTO-ENTMCNC: 30.7 G/DL (ref 31.4–37.4)
MCV RBC AUTO: 85 FL (ref 82–98)
MONOCYTES # BLD AUTO: 0.51 THOUSAND/ÂΜL (ref 0.17–1.22)
MONOCYTES NFR BLD AUTO: 8 % (ref 4–12)
NEUTROPHILS # BLD AUTO: 4.4 THOUSANDS/ÂΜL (ref 1.85–7.62)
NEUTS SEG NFR BLD AUTO: 68 % (ref 43–75)
NRBC BLD AUTO-RTO: 0 /100 WBCS
PHOSPHATE SERPL-MCNC: 2.2 MG/DL (ref 2.3–4.1)
PLATELET # BLD AUTO: 257 THOUSANDS/UL (ref 149–390)
PMV BLD AUTO: 10.8 FL (ref 8.9–12.7)
POTASSIUM SERPL-SCNC: 4.3 MMOL/L (ref 3.5–5.3)
PROCALCITONIN SERPL-MCNC: 0.26 NG/ML
PROT SERPL-MCNC: 6.5 G/DL (ref 6.4–8.4)
PTH RELATED PROT SERPL-SCNC: <2 PMOL/L
RBC # BLD AUTO: 4.1 MILLION/UL (ref 3.81–5.12)
SODIUM SERPL-SCNC: 143 MMOL/L (ref 135–147)
WBC # BLD AUTO: 6.37 THOUSAND/UL (ref 4.31–10.16)

## 2022-10-04 PROCEDURE — 85025 COMPLETE CBC W/AUTO DIFF WBC: CPT | Performed by: INTERNAL MEDICINE

## 2022-10-04 PROCEDURE — 82330 ASSAY OF CALCIUM: CPT | Performed by: INTERNAL MEDICINE

## 2022-10-04 PROCEDURE — 84100 ASSAY OF PHOSPHORUS: CPT | Performed by: INTERNAL MEDICINE

## 2022-10-04 PROCEDURE — 84145 PROCALCITONIN (PCT): CPT | Performed by: INTERNAL MEDICINE

## 2022-10-04 PROCEDURE — 97530 THERAPEUTIC ACTIVITIES: CPT

## 2022-10-04 PROCEDURE — 83735 ASSAY OF MAGNESIUM: CPT | Performed by: INTERNAL MEDICINE

## 2022-10-04 PROCEDURE — 99239 HOSP IP/OBS DSCHRG MGMT >30: CPT | Performed by: INTERNAL MEDICINE

## 2022-10-04 PROCEDURE — 80053 COMPREHEN METABOLIC PANEL: CPT | Performed by: INTERNAL MEDICINE

## 2022-10-04 PROCEDURE — 82550 ASSAY OF CK (CPK): CPT | Performed by: INTERNAL MEDICINE

## 2022-10-04 RX ORDER — ZINC OXIDE 216 MG/ML
1 LOTION TOPICAL 2 TIMES DAILY WITH MEALS
Refills: 0
Start: 2022-10-04

## 2022-10-04 RX ORDER — AMLODIPINE BESYLATE 10 MG/1
10 TABLET ORAL DAILY
Refills: 0
Start: 2022-10-05

## 2022-10-04 RX ORDER — ACETAMINOPHEN 325 MG/1
650 TABLET ORAL EVERY 6 HOURS PRN
Refills: 0
Start: 2022-10-04

## 2022-10-04 RX ORDER — DOCUSATE SODIUM 100 MG/1
100 CAPSULE, LIQUID FILLED ORAL 2 TIMES DAILY
Refills: 0
Start: 2022-10-04

## 2022-10-04 RX ORDER — FLUTICASONE PROPIONATE 50 MCG
1 SPRAY, SUSPENSION (ML) NASAL DAILY
Refills: 0
Start: 2022-10-04

## 2022-10-04 RX ORDER — MELATONIN
1000 DAILY
Refills: 0
Start: 2022-10-05

## 2022-10-04 RX ORDER — LANOLIN ALCOHOL/MO/W.PET/CERES
3 CREAM (GRAM) TOPICAL
Refills: 0
Start: 2022-10-04

## 2022-10-04 RX ORDER — AMLODIPINE BESYLATE 5 MG/1
5 TABLET ORAL ONCE
Status: COMPLETED | OUTPATIENT
Start: 2022-10-04 | End: 2022-10-04

## 2022-10-04 RX ORDER — HEPARIN SODIUM 5000 [USP'U]/ML
5000 INJECTION, SOLUTION INTRAVENOUS; SUBCUTANEOUS EVERY 8 HOURS SCHEDULED
Qty: 1 ML | Refills: 0
Start: 2022-10-04

## 2022-10-04 RX ORDER — LEVOTHYROXINE SODIUM 0.12 MG/1
125 TABLET ORAL
Refills: 0
Start: 2022-10-05

## 2022-10-04 RX ORDER — METOPROLOL SUCCINATE 50 MG/1
50 TABLET, EXTENDED RELEASE ORAL EVERY 12 HOURS
Refills: 0
Start: 2022-10-04

## 2022-10-04 RX ORDER — AMLODIPINE BESYLATE 10 MG/1
10 TABLET ORAL DAILY
Status: DISCONTINUED | OUTPATIENT
Start: 2022-10-05 | End: 2022-10-04 | Stop reason: HOSPADM

## 2022-10-04 RX ORDER — POLYETHYLENE GLYCOL 3350 17 G/17G
17 POWDER, FOR SOLUTION ORAL DAILY PRN
Refills: 0
Start: 2022-10-04

## 2022-10-04 RX ORDER — MELATONIN
1000 DAILY
Status: DISCONTINUED | OUTPATIENT
Start: 2022-10-05 | End: 2022-10-04 | Stop reason: HOSPADM

## 2022-10-04 RX ADMIN — HEPARIN SODIUM 5000 UNITS: 5000 INJECTION INTRAVENOUS; SUBCUTANEOUS at 14:16

## 2022-10-04 RX ADMIN — ALLOPURINOL 100 MG: 100 TABLET ORAL at 09:19

## 2022-10-04 RX ADMIN — AMLODIPINE BESYLATE 5 MG: 5 TABLET ORAL at 09:19

## 2022-10-04 RX ADMIN — PANTOPRAZOLE SODIUM 20 MG: 20 TABLET, DELAYED RELEASE ORAL at 05:15

## 2022-10-04 RX ADMIN — LEVOTHYROXINE SODIUM 125 MCG: 125 TABLET ORAL at 05:15

## 2022-10-04 RX ADMIN — MAGNESIUM OXIDE TAB 400 MG (241.3 MG ELEMENTAL MG) 400 MG: 400 (241.3 MG) TAB at 09:19

## 2022-10-04 RX ADMIN — Medication 1 TABLET: at 09:21

## 2022-10-04 RX ADMIN — FLUTICASONE PROPIONATE 2 SPRAY: 50 SPRAY, METERED NASAL at 09:22

## 2022-10-04 RX ADMIN — SERTRALINE 100 MG: 100 TABLET, FILM COATED ORAL at 09:19

## 2022-10-04 RX ADMIN — METOPROLOL SUCCINATE 50 MG: 50 TABLET, FILM COATED, EXTENDED RELEASE ORAL at 09:19

## 2022-10-04 RX ADMIN — DIBASIC SODIUM PHOSPHATE, MONOBASIC POTASSIUM PHOSPHATE AND MONOBASIC SODIUM PHOSPHATE 1 TABLET: 852; 155; 130 TABLET ORAL at 09:19

## 2022-10-04 RX ADMIN — AMLODIPINE BESYLATE 5 MG: 5 TABLET ORAL at 12:23

## 2022-10-04 RX ADMIN — HEPARIN SODIUM 5000 UNITS: 5000 INJECTION INTRAVENOUS; SUBCUTANEOUS at 05:15

## 2022-10-04 NOTE — CASE MANAGEMENT
Case Management Discharge Planning Note    Patient name Edouard Blackburn  Location /[id number] MRN [id number]  : 1945 Date 10/4/2022       Current Admission Date: 2022  Current Admission Diagnosis:Sepsis due to urinary tract infection [facility])   Patient Active Problem List    Diagnosis Date Noted    Hypophosphatemia 10/04/2022    Low TSH level 2022    COVID-19 virus infection 2022    Dementia ([place] [address] )     Stroke-like symptoms 2022    Sepsis due to urinary tract infection ([place] [address] ) 2022    Gastrointestinal bleeding 2022    Hydroureteronephrosis 2022    Diverticulitis 2022    Rectal bleeding 2022    Elevated troponin 2022    Iron deficiency anemia 2022    Anemia due to chronic kidney disease 2022    Paroxysmal atrial fibrillation ([place] [address] ) 2020    Ischemic cardiomyopathy 2020    S/P implantation of automatic cardioverter/defibrillator (AICD) 2020    Essential hypertension 2020    History of PTCA 2020    Leg swelling 2020    Acute kidney injury superimposed on chronic kidney disease ([facility] ) 2020    Perforated appendicitis 2020    Sepsis ([facility] ) 2020    Chronic combined systolic and diastolic CHF (congestive heart failure) ([place] [address] ) 2020    Pyuria 2020    Microscopic hematuria 2020    Vitamin D insufficiency 2020    Mitral valve insufficiency 2020    Hypercholesterolemia 2020    Aortic atherosclerosis ([place] [address] ) 2020    Renal calculus 2020    Diverticulosis 2020    Hiatal hernia 2020    Pulmonary nodule 2020    Benign hypertensive renal disease 2019    Acute blood loss anemia 2019    Gastroesophageal reflux disease without esophagitis 2019    Closed fracture of body of sternum with routine healing 2018    Congestive heart disease ([facility] ) 2018    Acquired hypothyroidism 2018    CAD (coronary artery disease) 04/12/2018    Forgetfulness 04/12/2018    Acute pain due to trauma 04/12/2018    Hx of fall 04/12/2018    Stress incontinence in female 04/12/2018    Anemia in chronic kidney disease 03/20/2018      LOS (days): 12  Geometric Mean LOS (GMLOS) (days): 5 00  Days to GMLOS:-7 5     OBJECTIVE:  Risk of Unplanned Readmission Score: 29 66         Current admission status: Inpatient   Preferred Pharmacy:   [address] [place] [address] [address], [address] [place] [address]  [address]  [address] [place] [address] Drive  [address] Naima [place] [address]  Phone: [phone] Fax: [phone]    Primary Care Provider: Gabby Vines DO    Primary Insurance: Joint venture between [facility] [employer] [facility]  Secondary Insurance:     DISCHARGE DETAILS:    Discharge Destination Plan[de-identified] SNF, Short Term Rehab ([facility])  Transport at Discharge : BLS Ambulance        [facility]  Shai  ETA of Transport (Date): 10/04/22  ETA of Transport (Time): [id number]

## 2022-10-04 NOTE — ASSESSMENT & PLAN NOTE
· Outpatient sleep study to check for obstructive sleep apnea  · Outpatient evaluation by the [facility]

## 2022-10-04 NOTE — CASE MANAGEMENT
Case Management Discharge Planning Note    Patient name Toula Slight  Location /[id number] MRN [id number]  : 1945 Date 10/4/2022       Current Admission Date: 2022  Current Admission Diagnosis:Sepsis due to urinary tract infection [facility])   Patient Active Problem List    Diagnosis Date Noted    Hypophosphatemia 10/04/2022    Low TSH level 2022    COVID-19 virus infection 2022    Dementia ([place] [address] )     Stroke-like symptoms 2022    Sepsis due to urinary tract infection ([place] [address] ) 2022    Gastrointestinal bleeding 2022    Hydroureteronephrosis 2022    Diverticulitis 2022    Rectal bleeding 2022    Elevated troponin 2022    Iron deficiency anemia 2022    Anemia due to chronic kidney disease 2022    Paroxysmal atrial fibrillation ([place] [address] ) 2020    Ischemic cardiomyopathy 2020    S/P implantation of automatic cardioverter/defibrillator (AICD) 2020    Essential hypertension 2020    History of PTCA 2020    Leg swelling 2020    Acute kidney injury superimposed on chronic kidney disease ([facility] ) 2020    Perforated appendicitis 2020    Sepsis ([place] [address] ) 2020    Chronic combined systolic and diastolic CHF (congestive heart failure) ([place] [address] ) 2020    Pyuria 2020    Microscopic hematuria 2020    Vitamin D insufficiency 2020    Mitral valve insufficiency 2020    Hypercholesterolemia 2020    Aortic atherosclerosis ([place] [address] ) 2020    Renal calculus 2020    Diverticulosis 2020    Hiatal hernia 2020    Pulmonary nodule 2020    Benign hypertensive renal disease 2019    Acute blood loss anemia 2019    Gastroesophageal reflux disease without esophagitis 2019    Closed fracture of body of sternum with routine healing 2018    Congestive heart disease ([facility] ) 2018    Acquired hypothyroidism 2018    CAD (coronary artery disease) 04/12/2018    Forgetfulness 04/12/2018    Acute pain due to trauma 04/12/2018    Hx of fall 04/12/2018    Stress incontinence in female 04/12/2018    Anemia in chronic kidney disease 03/20/2018      LOS (days): 12  Geometric Mean LOS (GMLOS) (days): 5 00  Days to GMLOS:-7 4     OBJECTIVE:  Risk of Unplanned Readmission Score: 29 66         Current admission status: Inpatient   Preferred Pharmacy:   [address] [place] [address] [address], [address] [place] [address]  [address]  [address] [place] [address] Drive  [address] [place] [address]  Phone: [phone] Fax: [phone]    Primary Care Provider: Susan Hampton DO    Primary Insurance: [facility]  Secondary Insurance:     [address] [place] [address] Number: approved SNF auth # [id number] for [facility]: 10/4  NRD: 10/6  Care Coord: not assigned yet  F: [phone]

## 2022-10-04 NOTE — ASSESSMENT & PLAN NOTE
Sepsis was present on admission and due to UTI - patient with retained ureteral stent  Blood cultures remain negative, but urinary cultures from 09/22 with > 100,000 CFU per mL of pansensitive E coli  Concluded a 7 day course of IV ceftriaxone during the hospitalization  · The patient was evaluated by PT and OT during the hospitalization  · She required short-term rehabilitation placement upon discharge

## 2022-10-04 NOTE — CASE MANAGEMENT
Case Management Discharge Planning Note    Patient name Cora Ramirez  Location /396-10 MRN [id number]  : 1945 Date 10/4/2022       Current Admission Date: 2022  Current Admission Diagnosis:Sepsis due to urinary tract infection [facility])   Patient Active Problem List    Diagnosis Date Noted    Hypophosphatemia 10/04/2022    Low TSH level 2022    COVID-19 virus infection 2022    Dementia ([place] [address] )     Stroke-like symptoms 2022    Sepsis due to urinary tract infection ([place] [address] ) 2022    Gastrointestinal bleeding 2022    Hydroureteronephrosis 2022    Diverticulitis 2022    Rectal bleeding 2022    Elevated troponin 2022    Iron deficiency anemia 2022    Anemia due to chronic kidney disease 2022    Paroxysmal atrial fibrillation ([place] [address] ) 2020    Ischemic cardiomyopathy 2020    S/P implantation of automatic cardioverter/defibrillator (AICD) 2020    Essential hypertension 2020    History of PTCA 2020    Leg swelling 2020    Acute kidney injury superimposed on chronic kidney disease ([facility] ) 2020    Perforated appendicitis 2020    Sepsis ([facility] ) 2020    Chronic combined systolic and diastolic CHF (congestive heart failure) ([place] [address] ) 2020    Pyuria 2020    Microscopic hematuria 2020    Vitamin D insufficiency 2020    Mitral valve insufficiency 2020    Hypercholesterolemia 2020    Aortic atherosclerosis ([place] [address] ) 2020    Renal calculus 2020    Diverticulosis 2020    Hiatal hernia 2020    Pulmonary nodule 2020    Benign hypertensive renal disease 2019    Acute blood loss anemia 2019    Gastroesophageal reflux disease without esophagitis 2019    Closed fracture of body of sternum with routine healing 2018    Congestive heart disease ([facility] ) 2018    Acquired hypothyroidism 2018    CAD (coronary artery disease) 04/12/2018    Forgetfulness 04/12/2018    Acute pain due to trauma 04/12/2018    Hx of fall 04/12/2018    Stress incontinence in female 04/12/2018    Anemia in chronic kidney disease 03/20/2018      LOS (days): 12  Geometric Mean LOS (GMLOS) (days): 5 00  Days to GMLOS:-7 3     OBJECTIVE:  Risk of Unplanned Readmission Score: 27 3         Current admission status: Inpatient   Preferred Pharmacy:   [address] [place] [address] [address], [address] [place] [address]  [address]  [address]  [address] [place] [address]  Phone: [phone] Fax: [phone]    Primary Care Provider: Pat Vargas DO    Primary Insurance: Charisse Blue [facility] REP  Secondary Insurance:     DISCHARGE DETAILS:                                                                                                               Facility Insurance Auth Number: Updated clinicals requested  Clinicals faxed to [phone]  Any questions, call [phone] opt [phone]

## 2022-10-04 NOTE — DISCHARGE SUMMARY
[address]  Discharge- Manuel Lockett 1945, 65 y o  female MRN: [id number]  Unit/Bed#: 420-01 Encounter: [id number]  Primary Care Provider: Geovani Shook DO   Date and time admitted to hospital: 9/21/2022  9:40 PM    * Sepsis due to urinary tract infection [facility])  Assessment & Plan  Sepsis was present on admission and due to UTI - patient with retained ureteral stent  Blood cultures remain negative, but urinary cultures from 09/22 with > 100,000 CFU per mL of pansensitive E coli  Concluded a 7 day course of IV ceftriaxone during the hospitalization  · The patient was evaluated by PT and OT during the hospitalization  · She required short-term rehabilitation placement upon discharge  Gastrointestinal bleeding  Assessment & Plan  · Please see the assessment and plan for "Acute blood loss anemia"    Acute blood loss anemia  Assessment & Plan  EGD 9/23 essentially normal, with note of a medium sliding hiatal hernia and numerous polyps measuring less than 5 mm in the stomach  Colonoscopy  with severe diverticulosis, fixed sigmoid loop inhibiting advancement of colonoscope  Large external hemorrhoids and a polyp which was not removed were also noted  Patient's  report same experience on patient's prior colonoscopy attempt  Patient's hemoglobin had responded to a value of 9 following initial 2 units of PRBCs - had continued to slowly drop with resumption of Eliquis, down to a value of  6 8 on the morning of 9/28/2022 requiring 2 additional units of PRBC's (packed red blood cells)  · Eliquis remains on hold  · Colonoscopy as above  · Resumed diet  · Repeat colonoscopy in 3 months, and if incomplete recommendations for CT colonography  · If recurrent rectal bleeding recommendations are for consulting Colorectal Surgery for hemorrhoidectomy      COVID-19 virus infection  Assessment & Plan  Initial COVID testing negative on 09/22/2022, fevers prompting repeat check on 09/25/2022, which was positive  Currently on the mild pathway  · CXR without acute findings  · Monitor labs, trend CRP  · Hold off on dexamethasone, remdesivir given continued lack of O2 requirement  · D-dimer elevated at 2 9 - however, patient's illnesses is mild and she is currently without oxygen requirement  Maintain on SC heparin for DVT prophylaxis but avoid full anticoagulation as per protocol, as well as due to GI bleeding while on anticoagulation  Pulmonary hypertension ([address] )  Assessment & Plan  · Outpatient sleep study to check for obstructive sleep apnea  · Outpatient evaluation by [facility]    Hypophosphatemia  Assessment & Plan  · Received PO phosphorus supplementation  · Follow the phosphorus level after discharge    Hydroureteronephrosis  Assessment & Plan  The patient was seen in consultation by Urology- no current plan for urological intervention  Continue to treat underlying UTI  As per Urology attending, retained indwelling ureteral stent may be difficult to remove, and may require retrograde and antegrade renal surgery if the proximal coil is fixed with stone formation       The patient will need definitive stone treatment and extraction of the retained left ureteral stent via cystoscopy and laser for extraction of  encrusted/retained stent as well as ureteral/renal stones with temporary stent exchange  This can be done after hospital discharge and after the suspected acute urinary tract infection has cleared and patient has completed antibiotic course    The patient can follow with her Urologist at the [facility] that she previously establish care with almost 2 years ago for the initial placement of the stent or referral to [facility] Urology on an outpatient basis      If the patient develops worsening creatinine then with recommendations to obtain renal ultrasound to assess for worsening hydronephrosis, and in that instance patient will become a candidate for urological intervention  Stroke-like symptoms  Assessment & Plan  No acute abnormality on initial CT of the head  MRI contraindicated secondary to presence of AICD/ppm   Will need outpatient MRI at another campus following discharge  Morning lipids and A1c updated  Allergy to statin noted  Anticoagulation with apixaban currently on hold  Continue to monitor neurological symptoms  Of note, patient did not appear to have any focal symptoms earlier this week or currently, with improvement in confusion  Underlying history of dementia and 'forgetfulness' noted  Question whether altered mental status is secondary to acute metabolic encephalopathy from underlying dementia and current acute illness due to COVID-19 infection with fevers - mental status continues to appear improved and is likely at or near patient's baseline  Outpatient Neurology evaluation      Essential hypertension  Assessment & Plan  Changed metoprolol tartrate to metoprolol succinate (toprol XL) with the decreased LVEF, which should be continued upon discharge  Continue to hold ARB in the setting of recent DARRELL and labile renal function  Currently remains hypertensive - initiated on moderate dose calcium channel blocker and continue to monitor blood pressure   Outpatient follow-up with PCP in regards to this matter    Paroxysmal atrial fibrillation [facility])  Assessment & Plan  Eliquis resumed on 09/25/2022 at decreased dosing based upon patient's weight and serum creatinine - discontinued given continued drop in hemoglobin following that  Continue toprol XL  Outpatient follow-up with Cardiology    Chronic combined systolic and diastolic CHF (congestive heart failure) ([facility])  Assessment & Plan  Wt Readings from Last 3 Encounters:   10/04/22 53 3 kg (117 lb 8 1 oz)   05/31/22 62 8 kg (138 lb 7 2 oz)   11/16/21 62 8 kg (138 lb 7 2 oz)     Current ECHO with LVEF 45%, presence of G1 DD    RV systolic function is normal   Also with mention of moderate MR, moderate PHTN  Cardiomyopathy appears ischemic in nature  · Check daily weights - currently stable  · Maintain on low-sodium diet  · Furosemide and ARB remain on hold as above  · Given weight gain and net positive I/O's received a 1 time IV dose of furosemide on 09/29  · Continue toprol XL  · Insure outpatient follow-up with Cardiology  Acute kidney injury superimposed on chronic kidney disease [facility])  Assessment & Plan  Lab Results   Component Value Date    EGFR 29 10/04/2022    EGFR 30 10/03/2022    EGFR 28 10/02/2022    CREATININE 1 65 (H) 10/04/2022    CREATININE 1 62 (H) 10/03/2022    CREATININE 1 69 (H) 10/02/2022     DARRELL superimposed on CKD, likely in the setting of sepsis, acute blood loss anemia, with concurrent diuretic use  Baseline creatinine of 1 6 to 2 mg/dl, peak value during hospitalization of 3 mg/dl  Current creatinine has improved to 1 62 mg/dl and remains stable  Discontinued IV fluids previously  Received blood products  Continue to hold oral furosemide and ARB despite recovery as patient underwent prep for colonoscopy along with clear liquid diet and NPO status yesterday, examining euvolemic, with stable weights  Nephrology consulted and following  Outpatient follow-up with Nephrology      Acquired hypothyroidism  Assessment & Plan  TSH markedly low at 0 05, although checked during acute illness  Levothyroxine dosing decreased to 125 mcg daily, with recommendations for repeat TSH in 4-6 weeks       Outpatient follow-up with PCP in regards to this matter    Discharging Physician / Practitioner: Michelle Watkins  PCP: Gracy Soto DO  Admission Date:   Admission Orders (From admission, onward)     Ordered        09/22/22 0125  Inpatient Admission  Once                      Discharge Date: 10/04/22    Consultations During Hospital Stay:  · Nephrology  · Urology  · Gastroenterology  · Dietitian/nutritionist    Procedures Performed:   EGD (09/23/2022): IMPRESSION:  · The stomach and duodenum appeared normal   · Ten or more polyps measuring smaller than 5 mm in the cardia and body of the stomach  · Medium sliding hiatal hernia (type I hiatal hernia)  · The esophagus appeared normal   RECOMMENDATION:  Consider colonoscopy if continues to drop her hemoglobin    Colonoscopy (09/28/2022): IMPRESSION:  Sigmoid polyp not removed because of recent bleeding     Severe diverticulosis with fixed sigmoid loop (I was unable to advance the pediatric colonoscope or even the upper endoscope this area due to the severe looping and inability to reduce)     Large external hemorrhoids were probably the cause of her rectal bleeding     No active bleeding at this time     RECOMMENDATION:  Repeat colonoscopy in 3 months due to a personal history of colon polyps (Would re-attempt colonoscopy at that time and remove the sigmoid polyp  If colonoscopy incomplete, would perform CT colonography at that time )     If recurrent rectal bleeding, would consult Colorectal surgery for hemorrhoidectomy      Significant Findings / Test Results:   EGD    Addendum Date: 9/23/2022    [facility] Operating Room [address] [phone] DATE OF SERVICE: 9/23/22 PHYSICIAN(S): Attending: Cornelius Quiñonez MD Fellow: No Staff Documented INDICATION: Acute blood loss anemia, Gastrointestinal hemorrhage, unspecified gastrointestinal hemorrhage type POST-OP DIAGNOSIS: See the impression below  PREPROCEDURE: Informed consent was obtained for the procedure, including sedation  Risks of perforation, hemorrhage, adverse drug reaction and aspiration were discussed  The patient was placed in the left lateral decubitus position  Patient was explained about the risks and benefits of the procedure  Risks including but not limited to bleeding, infection, and perforation were explained in detail   Also explained about less than 100% sensitivity with the exam and other alternatives  DETAILS OF PROCEDURE: Patient was taken to the procedure room where a time out was performed to confirm correct patient and correct procedure  The patient underwent monitored anesthesia care, which was administered by an anesthesia professional  The patient's blood pressure, heart rate, level of consciousness, respirations and oxygen were monitored throughout the procedure  The scope was advanced to the second part of the duodenum  Retroflexion was performed in the fundus  The patient experienced no blood loss  The procedure was not difficult  The patient tolerated the procedure well  There were no apparent complications  ANESTHESIA INFORMATION: ASA: III Anesthesia Type: IV Sedation with Anesthesia MEDICATIONS: No administrations occurring from 0952 to 1026 on 09/23/22 FINDINGS: The stomach and duodenum appeared normal  Ten or more polyps measuring smaller than 5 mm in the cardia and body of the stomach Medium sliding hiatal hernia (type I hiatal hernia) The esophagus appeared normal  SPECIMENS: * No specimens in log * IMPRESSION: The stomach and duodenum appeared normal  Ten or more polyps measuring smaller than 5 mm in the cardia and body of the stomach Medium sliding hiatal hernia (type I hiatal hernia) The esophagus appeared normal  RECOMMENDATION: Consider colonoscopy if continues to drop her hemoglobin   Aureliano Canchola MD     Result Date: 9/23/2022  Impression: The stomach and duodenum appeared normal  Medium sliding hiatal hernia (type I hiatal hernia) The esophagus appeared normal  RECOMMENDATION: Consider colonoscopy if continues to drop her hemoglobin   Aureliano Canchola MD     CT chest abdomen pelvis wo contrast    Result Date: 9/22/2022  Impression: Mild-to-moderate left hydroureteronephrosis with inflammation along the proximal ureter is similar to the May 31, 2022 CT  Nephroureteral stent is in place   Workstation performed: [id number]     XR chest portable    Result Date: 2022  Impression: No acute cardiopulmonary disease  Workstation performed: [id number]     CT head without contrast    Result Date: 2022  Impression: No acute intracranial abnormality  Workstation performed: [id number]     US kidney and bladder    Result Date: 2022  Impression: Limited visualization of the kidneys  Moderate left hydronephrosis  Stent poorly visualized  Workstation performed: [id number]     ECG 12 lead  Order: [id number]   Status: Final result     Visible to patient: No (inaccessible in [facility])     Next appt: None     0 Result Notes    Component Ref Range & Units 22 2306 22 1056 10/28/21 2114 10/28/21 21120 0246 20 2314 18 2039   Ventricular Rate BPM 85  87  84  83  152  100  95    Atrial Rate BPM 85  87  84  83  174  100  95    WV Interval ms 156  130  142  142    130  136    QRSD Interval ms 124  118  134  134  148  134  120    QT Interval ms 408  404  436  446  280  400  394    QTC Interval ms 485  486  515  524  445  516  495    P Axis degrees 76  40  91  80    58  67    QRS Mercer degrees -84  269  -66  -66  -43  -74  -84    T Wave Axis degrees 97  92  77  85  167  94  81              Narrative & Impression    Atrial-sensed ventricular-paced rhythm  Abnormal ECG  When compared with ECG of 31-MAY-2022 10:56,  Vent  rate has decreased BY   2 BPM  Confirmed by Rashaad Artist ([id number]) on 2022 1:34:44 PM      Specimen Collected: 22 23:06 Last Resulted: 22 13:34           [facility] and [address] [address]  [phone]  Horacio Andrea complete  Order# [id number]  Reading physician: Chloe Goodpasture, MD Ordering physician: Zenia Jett PA-C Study date: 22     Name: Gagan Honeycutt                       : 1945  MRN: [id number]                       Age: 65 y o    Patient Status: Inpatient          Gender: female    Vitals    Height Weight BSA (Calculated - m2) BP Pulse   5' 6" (1 676 m) 63 5 kg (140 lb) 1 72 sq meters 140/73 80       PACS Images     Show images for Echo complete w/ contrast if indicated    Study Details    This transthoracic echocardiogram was performed in the echo lab  This was a routine, inpatient study  Study quality was adequate  This was a technically difficult study due to low acoustic windows  A complete 2D, color flow Doppler and spectral Doppler transthoracic echocardiogram was performed  The apical, parasternal, subcostal and suprasternal views were obtained  Indications  Priority: Routine  TIA       History    Sepsis, ICD,CHF,       Interpretation Summary         Left Ventricle: Left ventricular cavity size is normal  Wall thickness is moderately increased  There is moderate concentric hypertrophy  The left ventricular ejection fraction is 45%  Systolic function is mildly reduced  There is mild global hypokinesis  Diastolic function is mildly abnormal, consistent with grade I (abnormal) relaxation    Right Ventricle: Right ventricular cavity size is normal  Systolic function is normal     Left Atrium: The atrium is moderately dilated    Mitral Valve: There is severe annular calcification  There is moderate regurgitation    Tricuspid Valve: The estimated right ventricular systolic pressure is 53 08 mmHg    IVC/SVC: The right atrial pressure is estimated at 10 0 mmHg  The inferior vena cava is normal in size  Respirophasic changes were normal     Pulmonary Artery: The estimated pulmonary artery systolic pressure is 38 6 mmHg  The pulmonary artery systolic pressure is moderately increased      Results from last 7 days   Lab Units 10/04/22  0538 10/03/22  0501 10/02/22  0608   WBC Thousand/uL 6 37 5 85 6 53   HEMOGLOBIN g/dL 10 7* 10 3* 9 7*   HEMATOCRIT % 34 8 32 4* 30 3*   PLATELETS Thousands/uL 257 225 199     Results from last 7 days   Lab Units 10/04/22  0538 10/03/22  0501 10/02/22  0608   SODIUM mmol/L 143 139 139   POTASSIUM mmol/L 4 3 4 3 4 2   CHLORIDE mmol/L 109* 106 105   CO2 mmol/L 26 23 23   BUN mg/dL 44* 48* 49*   CREATININE mg/dL 1 65* 1 62* 1 69*   CALCIUM mg/dL 8 7 8 8 8 7     Results from last 7 days   Lab Units 10/04/22  0538 10/03/22  0501 10/02/22  0608   MAGNESIUM mg/dL 2 2 2 3 2 3     Results from last 7 days   Lab Units 10/04/22  0538 10/03/22  0501 10/02/22  0608   ALK PHOS U/L 126* 119* 111   ALT U/L 16 12 12   AST U/L 30 26 26       Incidental Findings:   · As above     Test Results Pending at Discharge (will require follow-up):  · None     Outpatient Tests Requested:  · CBC with diff , CMP, Magnesium level, Phosphorus level, Ionized Calcium level, and Procalcitonin level in 1 day and weekly while at the SNF  · Outpatient sleep study    Complications:  None    Reason for Admission:  Sepsis due to acute cystitis    Hospital Course:   Gina Ahmadi is a 65 y o  female patient who originally presented to the hospital on 9/21/2022 due to dizziness, nausea, and possible stroke-like symptoms  Please see above list of diagnoses and related plan for additional information  Condition at Discharge: stable    Discharge Day Visit / Exam:   Subjective: The patient was seen and examined  The patient is doing better  No chest pain  No shortness of breath  No abdominal pain  No nausea or vomiting      Vitals: Blood Pressure: 161/79 (10/04/22 1149)  Pulse: 64 (10/04/22 1149)  Temperature: (!) 97 1 °F (36 2 °C) (10/04/22 0813)  Temp Source: Oral (10/03/22 0700)  Respirations: 20 (10/04/22 0813)  Height: 5' 6" (167 6 cm) (09/22/22 1358)  Weight - Scale: 53 3 kg (117 lb 8 1 oz) (10/04/22 0559)  SpO2: 95 % (10/04/22 1149)  Exam:   Physical Exam   General:  NAD, follows commands  HEENT:  NC/AT, mucous membranes moist  Neck:  Supple, No JVP elevation  CV:  + S1, + S2, RRR  Pulm:  Lung fields are CTA bilaterally  Abd:  Soft, Non-tender, Non-distended  Ext:  No clubbing/cyanosis/edema  Skin:  No rashes  Neuro:  Awake, alert, confused, not oriented  Psych:  Normal mood and affect      Discussion with Family: Attempted to update  () via phone  Left voicemail  Discharge instructions/Information to patient and family:  See after visit summary for information provided to patient and family  Provisions for Follow-Up Care:  See after visit summary for information related to follow-up care and any pertinent home health orders  Disposition:   [facility] for short-term rehabilitation    Planned Readmission:  No     Discharge Statement:  I spent 45 minutes discharging the patient  This time was spent on the day of discharge  I had direct contact with the patient on the day of discharge  Greater than 50% of the total time was spent examining patient, answering all patient questions, arranging and discussing plan of care with patient as well as directly providing post-discharge instructions  Additional time then spent on discharge activities  Discharge Medications:  See after visit summary for reconciled discharge medications provided to patient and/or family        **Please Note: This note may have been constructed using a voice recognition system**

## 2022-10-04 NOTE — ASSESSMENT & PLAN NOTE
Changed metoprolol tartrate to metoprolol succinate (toprol XL) with the decreased LVEF, which should be continued upon discharge  Continue to hold ARB in the setting of recent DARRELL and labile renal function  Currently remains hypertensive - initiated on moderate dose calcium channel blocker and continue to monitor blood pressure       Outpatient follow-up with PCP in regards to this matter

## 2022-10-04 NOTE — NURSING NOTE
Pt left stable via stretcher  Report was given to receiving facility [facility]

## 2022-10-04 NOTE — CASE MANAGEMENT
Case Management Discharge Planning Note    Patient name Radha Isidro  Location /618-35 MRN [id number]  : 1945 Date 10/4/2022       Current Admission Date: 2022  Current Admission Diagnosis:Sepsis due to urinary tract infection [facility])   Patient Active Problem List    Diagnosis Date Noted    Hypophosphatemia 10/04/2022    Low TSH level 2022    COVID-19 virus infection 2022    Dementia ([place] [address] )     Stroke-like symptoms 2022    Sepsis due to urinary tract infection ([place] [address] ) 2022    Gastrointestinal bleeding 2022    Hydroureteronephrosis 2022    Diverticulitis 2022    Rectal bleeding 2022    Elevated troponin 2022    Iron deficiency anemia 2022    Anemia due to chronic kidney disease 2022    Paroxysmal atrial fibrillation ([place] [address] ) 2020    Ischemic cardiomyopathy 2020    S/P implantation of automatic cardioverter/defibrillator (AICD) 2020    Essential hypertension 2020    History of PTCA 2020    Leg swelling 2020    Acute kidney injury superimposed on chronic kidney disease ([facility] ) 2020    Perforated appendicitis 2020    Sepsis ([facility] ) 2020    Chronic combined systolic and diastolic CHF (congestive heart failure) ([place] [address] ) 2020    Pyuria 2020    Microscopic hematuria 2020    Vitamin D insufficiency 2020    Mitral valve insufficiency 2020    Hypercholesterolemia 2020    Aortic atherosclerosis ([place] [address] ) 2020    Renal calculus 2020    Diverticulosis 2020    Hiatal hernia 2020    Pulmonary nodule 2020    Benign hypertensive renal disease 2019    Acute blood loss anemia 2019    Gastroesophageal reflux disease without esophagitis 2019    Closed fracture of body of sternum with routine healing 2018    Congestive heart disease ([facility] ) 2018    Acquired hypothyroidism 2018    CAD (coronary artery disease) 04/12/2018    Forgetfulness 04/12/2018    Acute pain due to trauma 04/12/2018    Hx of fall 04/12/2018    Stress incontinence in female 04/12/2018    Anemia in chronic kidney disease 03/20/2018      LOS (days): 12  Geometric Mean LOS (GMLOS) (days): 5 00  Days to GMLOS:-7 5     OBJECTIVE:  Risk of Unplanned Readmission Score: 29 66         Current admission status: Inpatient   Preferred Pharmacy:   [address] [place] [address]  [address]  [address] [place] [address]  Phone: [phone] Fax: [phone]    Primary Care Provider: Kaya Sky DO    Primary Insurance: [facility] REP  Secondary Insurance:     DISCHARGE DETAILS: time got moved up to 4pm    Discharge Destination Plan[de-identified] Short Term Rehab, SNF ([facility])  Transport at Discharge : BLS Ambulance        Transported by Enedelia Jaime and Unit #):  Shai  ETA of Transport (Date): 10/04/22  ETA of Transport (Time): 1600

## 2022-10-04 NOTE — ASSESSMENT & PLAN NOTE
TSH markedly low at 0 05, although checked during acute illness  Levothyroxine dosing decreased to 125 mcg daily, with recommendations for repeat TSH in 4-6 weeks       Outpatient follow-up with PCP in regards to this matter

## 2022-10-04 NOTE — ASSESSMENT & PLAN NOTE
The patient was seen in consultation by Urology- no current plan for urological intervention  Continue to treat underlying UTI  As per Urology attending, retained indwelling ureteral stent may be difficult to remove, and may require retrograde and antegrade renal surgery if the proximal coil is fixed with stone formation       The patient will need definitive stone treatment and extraction of the retained left ureteral stent via cystoscopy and laser for extraction of  encrusted/retained stent as well as ureteral/renal stones with temporary stent exchange  This can be done after hospital discharge and after the suspected acute urinary tract infection has cleared and patient has completed antibiotic course  The patient can follow with her Urologist at the [facility] that she previously establish care with almost 2 years ago for the initial placement of the stent or referral to [facility] on an outpatient basis      If the patient develops worsening creatinine then with recommendations to obtain renal ultrasound to assess for worsening hydronephrosis, and in that instance patient will become a candidate for urological intervention

## 2022-10-04 NOTE — ASSESSMENT & PLAN NOTE
Lab Results   Component Value Date    EGFR 29 10/04/2022    EGFR 30 10/03/2022    EGFR 28 10/02/2022    CREATININE 1 65 (H) 10/04/2022    CREATININE 1 62 (H) 10/03/2022    CREATININE 1 69 (H) 10/02/2022     DARRELL superimposed on CKD, likely in the setting of sepsis, acute blood loss anemia, with concurrent diuretic use  Baseline creatinine of 1 6 to 2 mg/dl, peak value during hospitalization of 3 mg/dl  Current creatinine has improved to 1 62 mg/dl and remains stable  Discontinued IV fluids previously  Received blood products  Continue to hold oral furosemide and ARB despite recovery as patient underwent prep for colonoscopy along with clear liquid diet and NPO status yesterday, examining euvolemic, with stable weights  Nephrology consulted and following      Outpatient follow-up with Nephrology

## 2022-10-04 NOTE — PHYSICAL THERAPY NOTE
PHYSICAL THERAPY NOTE          Patient Name: Harmony Patterson Date: 10/4/2022   10/04/22 1421   PT Last Visit   PT Visit Date 10/04/22   Note Type   Note Type Treatment   Pain Assessment   Pain Assessment Tool 0-10   Pain Score No Pain   Restrictions/Precautions   Weight Bearing Precautions Per Order No   Other Precautions   (Fall Risk; Multiple lines; Cognitive; Contact/isolation; Airborne/isolation)   General   Response to Previous Treatment Patient unable to report, no changes reported from family or staff   Family/Caregiver Present No   Cognition   Overall Cognitive Status Impaired   Arousal/Participation Alert   Following Commands Follows one step commands with increased time or repetition   Subjective   Subjective Agreeable to therapy   Bed Mobility   Supine to Sit 4  Minimal assistance   Additional items Assist x 1; Increased time required;Verbal cues   Additional Comments Increased time to scoot to EOB  Fair unsupported sitting balance  Transfers   Sit to Stand 3  Moderate assistance   Additional items Assist x 2; Increased time required;Verbal cues   Stand to Sit 3  Moderate assistance   Additional items Assist x 2; Increased time required;Verbal cues   Stand pivot 3  Moderate assistance   Additional items Assist x 2; Increased time required;Verbal cues   Additional Comments SPT bed to chair   Ambulation/Elevation   Gait pattern Not tested; Not appropriate   Balance   Static Sitting Fair   Dynamic Sitting Fair   Static Standing Poor +   Dynamic Standing Poor +   Endurance Deficit   Endurance Deficit Yes   Activity Tolerance   Activity Tolerance Patient limited by fatigue   Assessment   Prognosis Fair   Problem List   (Decreased strength; Decreased endurance; Impaired balance; Decreased mobility;  Impaired judgement; Decreased cognition; Decreased safety awareness)   Assessment Pt  seen for PT treatment session this date with interventions consisting of bed mobility and transfers w/ emphasis on improving pt's mobility  Pt  Requiring frequent  cues for sequence and safety  In comparison to previous session, Pt  With no change  in activity tolerance  Pt is in need of continued activity in PT to improve strength balance endurance mobility transfers and ambulation with return to maximize LOF  From PT/mobility standpoint, recommendation at time of d/c would be post acute rehab  in order to promote return to PLOF and independence  The patient's AM-PAC Basic Mobility Inpatient Short Form Raw Score is 11  A Raw score of less than or equal to 16 suggests the patient may benefit from discharge to post-acute rehabilitation services  Please also refer to physical therapy recommendation for safe DC planning  Co-treat with ALETHA this session due to complexity of pt and requires A x 2 to provided skilled interventions  Goals   LTG Expiration Date 10/06/22   Plan   Treatment/Interventions   (Functional transfer training; LE strengthening/ROM; Elevations; Therapeutic exercise;  Endurance training; Bed mobility; Gait training)   Progress Slow progress, decreased activity tolerance   PT Frequency 3-5x/wk   Recommendation   PT Discharge Recommendation Post acute rehabilitation services   AM-PAC Basic Mobility Inpatient   Turning in Bed Without Bedrails 3   Lying on Back to Sitting on Edge of Flat Bed 2   Moving Bed to Chair 2   Standing Up From Chair 2   Walk in Room 1   Climb 3-5 Stairs 1   Basic Mobility Inpatient Raw Score 11   Basic Mobility Standardized Score 30 25   Turning Head Towards Sound 4   Follow Simple Instructions 3   Low Function Basic Mobility Raw Score 18   Low Function Basic Mobility Standardized Score 29 25   Highest Level Of Mobility   JH-HLM Goal 4: Move to chair/commode   JH-HLM Achieved 4: Move to chair/commode   Education   Education Provided Mobility training   Patient Reinforcement needed   End of Consult   Patient Position at End of Consult Bed/Chair alarm activated; Bedside chair; All needs within reach   End of Consult Comments discussed POC with PT

## 2022-10-04 NOTE — ASSESSMENT & PLAN NOTE
Initial COVID testing negative on 09/22/2022, fevers prompting repeat check on 09/25/2022, which was positive  Currently on the mild pathway  · CXR without acute findings  · Monitor labs, trend CRP  · Hold off on dexamethasone, remdesivir given continued lack of O2 requirement  · D-dimer elevated at 2 9 - however, patient's illnesses is mild and she is currently without oxygen requirement  Maintain on SC heparin for DVT prophylaxis but avoid full anticoagulation as per protocol, as well as due to GI bleeding while on anticoagulation

## 2022-10-04 NOTE — CASE MANAGEMENT
Case Management Progress Note    Patient name Gina Ac  Location /748-06 MRN [id number]  : 1945 Date 10/4/2022       LOS (days): 12  Geometric Mean LOS (GMLOS) (days): 5 00  Days to GMLOS:-7 6        OBJECTIVE:        Current admission status: Inpatient  Preferred Pharmacy:   [address] [place], [place] [address]carlos alberto Walls, [address] [place] [address]  [address]  [address]  [address] Maida FREEMAN [address]  Phone: [phone] Fax: [phone]    Primary Care Provider: Electa Koyanagi, DO    Primary Insurance: [address] [facility]  Secondary Insurance:     PROGRESS NOTE:Messages left for pt's spouse:Mika [phone] to notify him of  time for his wife to go to [facility]

## 2022-10-04 NOTE — OCCUPATIONAL THERAPY NOTE
Occupational Therapy Progress Note     Patient Name: David Okeefe Date: 10/4/2022  Problem List  Principal Problem:    Sepsis due to urinary tract infection [facility])  Active Problems:    Acquired hypothyroidism    Acute kidney injury superimposed on chronic kidney disease ([facility] )    Chronic combined systolic and diastolic CHF (congestive heart failure) ([facility])    Paroxysmal atrial fibrillation ([facility] )    Essential hypertension    Acute blood loss anemia    Stroke-like symptoms    Gastrointestinal bleeding    Hydroureteronephrosis    COVID-19 virus infection    Hypophosphatemia          10/04/22 1420   OT Last Visit   OT Visit Date 10/04/22   Note Type   Note Type Treatment   Restrictions/Precautions   Weight Bearing Precautions Per Order No   Other Precautions Fall Risk;Multiple lines;Cognitive;Contact/isolation; Airborne/isolation   Pain Assessment   Pain Assessment Tool 0-10   Pain Score No Pain   Bed Mobility   Supine to Sit 4  Minimal assistance   Additional items Assist x 1; Increased time required;Verbal cues; Bedrails   Transfers   Sit to Stand 3  Moderate assistance   Additional items Assist x 2; Increased time required;Verbal cues   Stand to Sit 3  Moderate assistance   Additional items Assist x 2; Increased time required;Verbal cues   Stand pivot 3  Moderate assistance   Additional items Assist x 2; Increased time required;Verbal cues   Additional Comments Pt required HHA to complete stand pivot to kimberly chair   Functional Mobility   Additional Comments Pt not appropriate to complete at this time   Cognition   Overall Cognitive Status Impaired   Arousal/Participation Alert   Attention Difficulty attending to directions   Orientation Level Oriented to person;Disoriented to place; Disoriented to time;Disoriented to situation   Memory Decreased long term memory;Decreased short term memory   Following Commands Follows one step commands with increased time or repetition   Activity Tolerance   Activity Tolerance Patient limited by fatigue   Assessment   Assessment Patient participated in Skilled OT session this date with interventions consisting of  therapeutic activities to: increase activity tolerance   Co treatment with PTA secondary to complex medical condition of pt, mod A of 2 required to achieve and maintain transitional movements, requiring the need of skilled therapeutic intervention of 2 therapists to achieve delivery of services  Patient agreeable to OT treatment session, upon arrival patient was found supine in bed  Supine to sit txfrs with Min Ax1, stand pivot txfrs from bed to kimberly chair with Mod Ax2, sit to stand txfrs from bed with Mod Ax2  Patient requiring verbal cues for safety and verbal cues for correct technique  Patient continues to be functioning below baseline level, occupational performance remains limited secondary to factors listed above and increased risk for falls and injury  From OT standpoint, recommendation at time of d/c would be Post acute rehabilitation services  The patient's raw score on the AM-PAC Daily Activity inpatient short form is 13, standardized score is 32 03, less than 39 4  Patients at this level are likely to benefit from discharge to post-acute rehabilitation services  Please refer to the recommendation of the Occupational Therapist for safe discharge planning     Plan   Goal Expiration Date 10/06/22   OT Treatment Day 5   OT Frequency 3-5x/wk   Recommendation   OT Discharge Recommendation Post acute rehabilitation services   AM-[facility] Daily Activity Inpatient   Lower Body Dressing 1   Bathing 2   Toileting 2   Upper Body Dressing 2   Grooming 3   Eating 3   Daily Activity Raw Score 13   Daily Activity Standardized Score (Calc for Raw Score >=11) 32 03   AM-PAC Applied Cognition Inpatient   Following a Speech/Presentation 3   Understanding Ordinary Conversation 2   Taking Medications 1   Remembering Where Things Are Placed or Put Away 1   Remembering List of 4-5 Errands 1 Taking Care of Complicated Tasks 1   Applied Cognition Raw Score 9   Applied Cognition Standardized Score 22 48     Pt left seated in kimberly chair with chair alarm on and all needs in reach

## 2022-10-04 NOTE — CASE MANAGEMENT
Case Management Discharge Planning Note    [facility]979-82 MRN [id number]  : 1945 Date 10/4/2022       Current Admission Date: 2022  Current Admission Diagnosis:Sepsis due to urinary tract infection [facility])   Patient Active Problem List    Diagnosis Date Noted    Hypophosphatemia 10/04/2022    Low TSH level 2022    COVID-19 virus infection 2022    Dementia ([place] [address] )     Stroke-like symptoms 2022    Sepsis due to urinary tract infection ([place] [address] ) 2022    Gastrointestinal bleeding 2022    Hydroureteronephrosis 2022    Diverticulitis 2022    Rectal bleeding 2022    Elevated troponin 2022    Iron deficiency anemia 2022    Anemia due to chronic kidney disease 2022    Paroxysmal atrial fibrillation ([place] [address] ) 2020    Ischemic cardiomyopathy 2020    S/P implantation of automatic cardioverter/defibrillator (AICD) 2020    Essential hypertension 2020    History of PTCA 2020    Leg swelling 2020    Acute kidney injury superimposed on chronic kidney disease ([facility] ) 2020    Perforated appendicitis 2020    Sepsis ([facility] ) 2020    Chronic combined systolic and diastolic CHF (congestive heart failure) ([place] [address] ) 2020    Pyuria 2020    Microscopic hematuria 2020    Vitamin D insufficiency 2020    Mitral valve insufficiency 2020    Hypercholesterolemia 2020    Aortic atherosclerosis ([place] [address] ) 2020    Renal calculus 2020    Diverticulosis 2020    Hiatal hernia 2020    Pulmonary nodule 2020    Benign hypertensive renal disease 2019    Acute blood loss anemia 2019    Gastroesophageal reflux disease without esophagitis 2019    Closed fracture of body of sternum with routine healing 2018    Congestive heart disease ([facility] ) 2018    Acquired hypothyroidism 2018    CAD (coronary artery disease) 04/12/2018    Forgetfulness 04/12/2018    Acute pain due to trauma 04/12/2018    Hx of fall 04/12/2018    Stress incontinence in female 04/12/2018    Anemia in chronic kidney disease 03/20/2018      LOS (days): 12  Geometric Mean LOS (GMLOS) (days): 5 00  Days to GMLOS:-7 5     OBJECTIVE:  Risk of Unplanned Readmission Score: 29 66         Current admission status: Inpatient   Preferred Pharmacy:   [address] [place] [address] [address], [address] [place] [address]  [address]  [address] [place] [address] Drive  DR Jj [place]EM[place] [address]  Phone: [phone] Fax: [phone]    Primary Care Provider: Yesenia Jones DO    Primary Insurance: [address] [place] [address]  Secondary Insurance:     DISCHARGE DETAILS:Received authorization for pt to go to [facility] on this date  approved SNF auth # [id number] for [facility]: 10/4  NRD: 10/6  Care Coord: not assigned yet  F: [phone]  CM working on setting up transport

## 2022-10-04 NOTE — ASSESSMENT & PLAN NOTE
Wt Readings from Last 3 Encounters:   10/04/22 53 3 kg (117 lb 8 1 oz)   05/31/22 62 8 kg (138 lb 7 2 oz)   11/16/21 62 8 kg (138 lb 7 2 oz)     Current ECHO with LVEF 45%, presence of G1 DD  RV systolic function is normal   Also with mention of moderate MR, moderate PHTN  Cardiomyopathy appears ischemic in nature  · Check daily weights - currently stable  · Maintain on low-sodium diet  · Furosemide and ARB remain on hold as above  · Given weight gain and net positive I/O's received a 1 time IV dose of furosemide on 09/29  · Continue toprol XL  · Insure outpatient follow-up with Cardiology

## 2022-10-04 NOTE — PLAN OF CARE
Problem: PAIN - ADULT  Goal: Verbalizes/displays adequate comfort level or baseline comfort level  Description: Interventions:  - Encourage patient to monitor pain and request assistance  - Assess pain using appropriate pain scale  - Administer analgesics based on type and severity of pain and evaluate response  - Implement non-pharmacological measures as appropriate and evaluate response  - Consider cultural and social influences on pain and pain management  - Notify physician/advanced practitioner if interventions unsuccessful or patient reports new pain  Outcome: Progressing     Problem: INFECTION - ADULT  Goal: Absence or prevention of progression during hospitalization  Description: INTERVENTIONS:  - Assess and monitor for signs and symptoms of infection  - Monitor lab/diagnostic results  - Monitor all insertion sites, i e  indwelling lines, tubes, and drains  - Monitor endotracheal if appropriate and nasal secretions for changes in amount and color  - [place] appropriate cooling/warming therapies per order  - Administer medications as ordered  - Instruct and encourage patient and family to use good hand hygiene technique  - Identify and instruct in appropriate isolation precautions for identified infection/condition  Outcome: Progressing     Problem: SAFETY ADULT  Goal: Patient will remain free of falls  Description: INTERVENTIONS:  - Educate patient/family on patient safety including physical limitations  - Instruct patient to call for assistance with activity   - Consult OT/PT to assist with strengthening/mobility   - Keep Call bell within reach  - Keep bed low and locked with side rails adjusted as appropriate  - Keep care items and personal belongings within reach  - Initiate and maintain comfort rounds  - Make Fall Risk Sign visible to staff  - Offer Toileting every 2 Hours, in advance of need  - Initiate/Maintain bed/chair alarm  - Obtain necessary fall risk management equipment: alarms  - Apply yellow socks and bracelet for high fall risk patients  - Consider moving patient to room near nurses station  Outcome: Progressing  Goal: Maintain or return to baseline ADL function  Description: INTERVENTIONS:  -  Assess patient's ability to carry out ADLs; assess patient's baseline for ADL function and identify physical deficits which impact ability to perform ADLs (bathing, care of mouth/teeth, toileting, grooming, dressing, etc )  - Assess/evaluate cause of self-care deficits   - Assess range of motion  - Assess patient's mobility; develop plan if impaired  - Assess patient's need for assistive devices and provide as appropriate  - Encourage maximum independence but intervene and supervise when necessary  - Involve family in performance of ADLs  - Assess for home care needs following discharge   - Consider OT consult to assist with ADL evaluation and planning for discharge  - Provide patient education as appropriate  Outcome: Progressing  Goal: Maintains/Returns to pre admission functional level  Description: INTERVENTIONS:  - Perform BMAT or MOVE assessment daily    - Set and communicate daily mobility goal to care team and patient/family/caregiver  - Collaborate with rehabilitation services on mobility goals if consulted  - Perform Range of Motion 4 times a day  - Reposition patient every 2 hours    - Dangle patient 3-4 times a day  - Stand patient 4 times a day  - Ambulate patient 4 times a day  - Out of bed to chair 3 times a day   - Out of bed for meals 3 times a day  - Out of bed for toileting  - Record patient progress and toleration of activity level   Outcome: Progressing     Problem: DISCHARGE PLANNING  Goal: Discharge to home or other facility with appropriate resources  Description: INTERVENTIONS:  - Identify barriers to discharge w/patient and caregiver  - Arrange for needed discharge resources and transportation as appropriate  - Identify discharge learning needs (meds, wound care, etc )  - Arrange for interpretive services to assist at discharge as needed  - Refer to Case Management Department for coordinating discharge planning if the patient needs post-hospital services based on physician/advanced practitioner order or complex needs related to functional status, cognitive ability, or social support system  Outcome: Progressing     Problem: Knowledge Deficit  Goal: Patient/family/caregiver demonstrates understanding of disease process, treatment plan, medications, and discharge instructions  Description: Complete learning assessment and assess knowledge base    Interventions:  - Provide teaching at level of understanding  - Provide teaching via preferred learning methods  Outcome: Progressing     Problem: CARDIOVASCULAR - ADULT  Goal: Maintains optimal cardiac output and hemodynamic stability  Description: INTERVENTIONS:  - Monitor I/O, vital signs and rhythm  - Monitor for S/S and trends of decreased cardiac output  - Administer and titrate ordered vasoactive medications to optimize hemodynamic stability  - Assess quality of pulses, skin color and temperature  - Assess for signs of decreased coronary artery perfusion  - Instruct patient to report change in severity of symptoms  Outcome: Progressing     Problem: GENITOURINARY - ADULT  Goal: Maintains or returns to baseline urinary function  Description: INTERVENTIONS:  - Assess urinary function  - Encourage oral fluids to ensure adequate hydration if ordered  - Administer IV fluids as ordered to ensure adequate hydration  - Administer ordered medications as needed  - Offer frequent toileting  - Follow urinary retention protocol if ordered  Outcome: Progressing     Problem: METABOLIC, FLUID AND ELECTROLYTES - ADULT  Goal: Electrolytes maintained within normal limits  Description: INTERVENTIONS:  - Monitor labs and assess patient for signs and symptoms of electrolyte imbalances  - Administer electrolyte replacement as ordered  - Monitor response to electrolyte replacements, including repeat lab results as appropriate  - Instruct patient on fluid and nutrition as appropriate  Outcome: Progressing     Problem: SKIN/TISSUE INTEGRITY - ADULT  Goal: Skin Integrity remains intact(Skin Breakdown Prevention)  Description: Assess:  -Perform Arcadio assessment every shift  -Clean and moisturize skin every 4 hours  -Inspect skin when repositioning, toileting, and assisting with ADLS  -Assess under medical devices such as electrodes every shift  -Assess extremities for adequate circulation and sensation     Bed Management:  -Have minimal linens on bed & keep smooth, unwrinkled  -Change linens as needed when moist or perspiring  -Avoid sitting or lying in one position for more than 2 hours while in bed  -Keep HOB at 30 degrees     Toileting:  -Offer bedside commode  -Assess for incontinence every 2 hours  -Use incontinent care products after each incontinent episode such as breif    Activity:  -Mobilize patient 4 times a day  -Encourage activity and walks on unit  -Encourage or provide ROM exercises   -Turn and reposition patient every 2 Hours  -Use appropriate equipment to lift or move patient in bed  -Instruct/ Assist with weight shifting every 2 hours when out of bed in chair  -Consider limitation of chair time 4 hour intervals    Skin Care:  -Avoid use of baby powder, tape, friction and shearing, hot water or constrictive clothing  -Relieve pressure over bony prominences using alevyn  -Do not massage red bony areas    Next Steps:  -Teach patient strategies to minimize risks such as weight shift  Outcome: Progressing     Problem: MUSCULOSKELETAL - ADULT  Goal: Maintain or return mobility to safest level of function  Description: INTERVENTIONS:  - Assess patient's ability to carry out ADLs; assess patient's baseline for ADL function and identify physical deficits which impact ability to perform ADLs (bathing, care of mouth/teeth, toileting, grooming, dressing, etc )  - Assess/evaluate cause of self-care deficits   - Assess range of motion  - Assess patient's mobility  - Assess patient's need for assistive devices and provide as appropriate  - Encourage maximum independence but intervene and supervise when necessary  - Involve family in performance of ADLs  - Assess for home care needs following discharge   - Consider OT consult to assist with ADL evaluation and planning for discharge  - Provide patient education as appropriate  Outcome: Progressing     Problem: RESPIRATORY - ADULT  Goal: Achieves optimal ventilation and oxygenation  Description: INTERVENTIONS:  - Assess for changes in respiratory status  - Assess for changes in mentation and behavior  - Position to facilitate oxygenation and minimize respiratory effort  - Oxygen administered by appropriate delivery if ordered  - Initiate smoking cessation education as indicated  - Encourage broncho-pulmonary hygiene including cough, deep breathe, Incentive Spirometry  - Assess the need for suctioning and aspirate as needed  - Assess and instruct to report SOB or any respiratory difficulty  - Respiratory Therapy support as indicated  Outcome: Progressing     Problem: Potential for Falls  Goal: Patient will remain free of falls  Description: INTERVENTIONS:  - Educate patient/family on patient safety including physical limitations  - Instruct patient to call for assistance with activity   - Consult OT/PT to assist with strengthening/mobility   - Keep Call bell within reach  - Keep bed low and locked with side rails adjusted as appropriate  - Keep care items and personal belongings within reach  - Initiate and maintain comfort rounds  - Make Fall Risk Sign visible to staff  - Offer Toileting every 2 Hours, in advance of need  - Initiate/Maintain bed/chair alarm  - Obtain necessary fall risk management equipment: alarms  - Apply yellow socks and bracelet for high fall risk patients  - Consider moving patient to room near nurses station  Outcome: Progressing     Problem: MOBILITY - ADULT  Goal: Maintain or return to baseline ADL function  Description: INTERVENTIONS:  -  Assess patient's ability to carry out ADLs; assess patient's baseline for ADL function and identify physical deficits which impact ability to perform ADLs (bathing, care of mouth/teeth, toileting, grooming, dressing, etc )  - Assess/evaluate cause of self-care deficits   - Assess range of motion  - Assess patient's mobility; develop plan if impaired  - Assess patient's need for assistive devices and provide as appropriate  - Encourage maximum independence but intervene and supervise when necessary  - Involve family in performance of ADLs  - Assess for home care needs following discharge   - Consider OT consult to assist with ADL evaluation and planning for discharge  - Provide patient education as appropriate  Outcome: Progressing  Goal: Maintains/Returns to pre admission functional level  Description: INTERVENTIONS:  - Perform BMAT or MOVE assessment daily    - Set and communicate daily mobility goal to care team and patient/family/caregiver  - Collaborate with rehabilitation services on mobility goals if consulted  - Perform Range of Motion 4 times a day  - Reposition patient every 2 hours    - Dangle patient 3-4 times a day  - Stand patient 4 times a day  - Ambulate patient 4 times a day  - Out of bed to chair 3 times a day   - Out of bed for meals 3 times a day  - Out of bed for toileting  - Record patient progress and toleration of activity level   Outcome: Progressing     Problem: Prexisting or High Potential for Compromised Skin Integrity  Goal: Skin integrity is maintained or improved  Description: INTERVENTIONS:  - Identify patients at risk for skin breakdown  - Assess and monitor skin integrity  - Assess and monitor nutrition and hydration status  - Monitor labs   - Assess for incontinence   - Turn and reposition patient  - Assist with mobility/ambulation  - Relieve pressure over bony prominences  - Avoid friction and shearing  - Provide appropriate hygiene as needed including keeping skin clean and dry  - Evaluate need for skin moisturizer/barrier cream  - Collaborate with interdisciplinary team   - Patient/family teaching  - Consider wound care consult   Outcome: Progressing     Problem: Nutrition/Hydration-ADULT  Goal: Nutrient/Hydration intake appropriate for improving, restoring or maintaining nutritional needs  Description: Monitor and assess patient's nutrition/hydration status for malnutrition  Collaborate with interdisciplinary team and initiate plan and interventions as ordered  Monitor patient's weight and dietary intake as ordered or per policy  Utilize nutrition screening tool and intervene as necessary  Determine patient's food preferences and provide high-protein, high-caloric foods as appropriate       INTERVENTIONS:  - Monitor oral intake, urinary output, labs, and treatment plans  - Assess nutrition and hydration status and recommend course of action  - Evaluate amount of meals eaten  - Assist patient with eating if necessary   - Allow adequate time for meals  - Recommend/ encourage appropriate diets, oral nutritional supplements, and vitamin/mineral supplements  - Assess need for intravenous fluids  - Provide specific nutrition/hydration education as appropriate  - Include patient/family/caregiver in decisions related to nutrition  Outcome: Progressing

## 2022-10-04 NOTE — ASSESSMENT & PLAN NOTE
Eliquis resumed on 09/25/2022 at decreased dosing based upon patient's weight and serum creatinine - discontinued given continued drop in hemoglobin following that  Continue toprol XL      Outpatient follow-up with Cardiology

## 2022-10-05 NOTE — TELEPHONE ENCOUNTER
Patient discharged from the hospital yesterday  Called and spoke with patients  Remy Bales, who states patient was transferred to [facility]  He does wish to transfer patients care from [address] [facility] to [facility]  He asked that I contact [place] regarding appointment for patient  Called and spoke with patients nurse at [facility]  Patient scheduled for appointment on 10/21/22 @ 11:30 am with Bebe Garcia in [place]  Office address provided and rehab will arrange transport for patient if she is still in facility at that time  Returned call to patients  and made him aware of appointment date, time, and location

## 2022-10-26 ENCOUNTER — OFFICE VISIT (OUTPATIENT)
Dept: NEPHROLOGY | Facility: CLINIC | Age: 77
End: 2022-10-26
Payer: COMMERCIAL

## 2022-10-26 VITALS
SYSTOLIC BLOOD PRESSURE: 142 MMHG | BODY MASS INDEX: 18.97 KG/M2 | OXYGEN SATURATION: 96 % | HEART RATE: 72 BPM | DIASTOLIC BLOOD PRESSURE: 78 MMHG | HEIGHT: 66 IN

## 2022-10-26 DIAGNOSIS — N18.9 ANEMIA ASSOCIATED WITH CHRONIC RENAL FAILURE: ICD-10-CM

## 2022-10-26 DIAGNOSIS — E79.0 HYPERURICEMIA: ICD-10-CM

## 2022-10-26 DIAGNOSIS — I50.42 CHRONIC COMBINED SYSTOLIC AND DIASTOLIC CHF (CONGESTIVE HEART FAILURE) (HCC): ICD-10-CM

## 2022-10-26 DIAGNOSIS — I12.9 BENIGN HYPERTENSIVE RENAL DISEASE: ICD-10-CM

## 2022-10-26 DIAGNOSIS — D63.1 ANEMIA ASSOCIATED WITH CHRONIC RENAL FAILURE: ICD-10-CM

## 2022-10-26 DIAGNOSIS — N18.4 STAGE 4 CHRONIC KIDNEY DISEASE (HCC): Primary | ICD-10-CM

## 2022-10-26 PROBLEM — N25.81 SECONDARY RENAL HYPERPARATHYROIDISM (HCC): Status: ACTIVE | Noted: 2022-10-26

## 2022-10-26 PROCEDURE — 99214 OFFICE O/P EST MOD 30 MIN: CPT | Performed by: INTERNAL MEDICINE

## 2022-10-26 NOTE — PROGRESS NOTES
Assessment & Plan:    1  Stage 4 chronic kidney disease (HCC)  -     Comprehensive metabolic panel; Future; Expected date: 12/07/2022  -     Magnesium; Future; Expected date: 12/07/2022  -     Microalbumin / creatinine urine ratio; Future; Expected date: 12/07/2022  -     CBC and differential; Future; Expected date: 12/07/2022  -     PTH, intact; Future; Expected date: 12/07/2022  -     Urinalysis with microscopic; Future; Expected date: 12/07/2022  -     Uric acid; Future; Expected date: 12/07/2022  -     Phosphorus; Future; Expected date: 12/07/2022    2  Anemia associated with chronic renal failure  -     CBC and differential; Future; Expected date: 12/07/2022    3  Benign hypertensive renal disease  -     Comprehensive metabolic panel; Future; Expected date: 12/07/2022  -     Microalbumin / creatinine urine ratio; Future; Expected date: 12/07/2022    4  Chronic combined systolic and diastolic CHF (congestive heart failure) ([facility] [address] )    5  Hyperuricemia       1  CKD4, baseline 1 7-2 0  UOP nonoliguric by hx  Previously followed with Dr Britton Garner, last seen in April  10/24  Na 142 WNL  K 4 6 WNL   Cl 110 TCO2 23 WNL  Ca 9 6 WNL     Recommend  1  Reviewed CKD stages, Cr and eGFR trends  Patient disoriented and not a reliable historian at present  Creatinine stable at baseline  Stable metabolics  Appears euvolemic at present, no LE edema, clear lungs, MMM  Suspect UOP nonoliguric  2  Quantify proteinuria  3  Check PTH for staging  4  FU labs in 2 months  Follow up in 2 months as trends stable at baseline  2  Anemia with CKD, follows with [facility]  Received IV iron in past   hgb 9 8, continue to follow with hematology for IV iron/PAUL requirements  3  Benign HTN renal disease  Goal <130/80  Maintained on norvasc 10mg/day and toprol 50mg/day   No changes made, BP under reasonable control per rehab records  /78 HR 72 in office  4  Chronic combined CHF  Follow weight    Low sodium diet 2000mg/day  Weight 118 lb in hospital and reported weight 53 3 kg which is stable  5  Hyperuricemia, urate 5 4, no changes   Allopurinol 100mg/day         The benefits, risks and alternatives to the treatment plan were discussed at this visit  Patient was advised of common adverse effects of any medical therapies prescribed  All questions were answered and discussed with the patient and any accompanying family members or caretakers  Subjective:      Patient ID: Elio Mora is a 65 y o  female presenting for CKD follow up  She was hospitalized at [facility], last seen on 10/3 by nephrology service  She had DARRELL on CKD with Cr 1 7-2 0  Previously followed by Dr Lai Oliveros for CKD last seen in office 4/22  She had moderate large hydronephrosis on US 9/22  Evaluated by urology and no intervention at that time  Etiology of DARRELL 2/2 decreased effective arterial volume, sepsis due to UTI and anemia  She had stroke like symptoms as well, CT of the head was unrevealing  HPI    In interim since her hospital discharge, reports fatigue  She had vomiting several days ago on one occasion  Reports intermittent chest discomfort/pain at night when lays down  Reports short duration and dull difficult to quantify  She has been at a rehab facility  Patient not an ideal historian  Most recent Cr 1 69 BUN 36, eGFR 31ml/min on 10/24/22   hgb 9 8 on 10/10/22  She was at rehab and labs reviewed from 10/24  Her BP  120/60-70s in rehab  /78  HR 72  Spoke with aide in room and stated no weight changes  The following portions of the patient's history were reviewed and updated as appropriate: allergies, current medications, past family history, past medical history, past social history, past surgical history, and problem list     Review of Systems   Constitutional: Positive for fatigue  Respiratory: Negative  Cardiovascular: Positive for chest pain  Gastrointestinal: Positive for vomiting  Genitourinary: Negative  All other systems reviewed and are negative  Objective:      /78   Pulse 72   Ht 5' 6" (1 676 m)   SpO2 96%   BMI 18 97 kg/m²          Physical Exam  Vitals and nursing note reviewed  Constitutional:       Appearance: She is ill-appearing  Comments: Cachetic  Frail    HENT:      Head: Atraumatic  Nose: Nose normal       Mouth/Throat:      Mouth: Mucous membranes are moist       Pharynx: Oropharynx is clear  Eyes:      General: No scleral icterus  Conjunctiva/sclera: Conjunctivae normal    Cardiovascular:      Rate and Rhythm: Normal rate and regular rhythm  Heart sounds: Normal heart sounds  No friction rub  Pulmonary:      Breath sounds: No wheezing, rhonchi or rales  Abdominal:      General: Bowel sounds are normal       Palpations: Abdomen is soft  Tenderness: There is no abdominal tenderness  There is no guarding  Musculoskeletal:      Right lower leg: No edema  Left lower leg: No edema  Skin:     General: Skin is warm and dry  Neurological:      Mental Status: She is alert  She is disoriented  Motor: Weakness present        Comments: Anabel Schultz person and place   In wheelchair              Lab Results   Component Value Date     04/11/2018    SODIUM 143 10/04/2022    K 4 3 10/04/2022     (H) 10/04/2022    CO2 26 10/04/2022    ANIONGAP 15 2 04/11/2018    AGAP 8 10/04/2022    BUN 44 (H) 10/04/2022    CREATININE 1 65 (H) 10/04/2022    GLUC 84 10/04/2022    GLUF 96 06/27/2022    CALCIUM 8 7 10/04/2022    AST 30 10/04/2022    ALT 16 10/04/2022    ALKPHOS 126 (H) 10/04/2022    PROT 7 1 04/11/2018    TP 6 5 10/04/2022    BILITOT 0 4 04/11/2018    TBILI 0 36 10/04/2022    EGFR 29 10/04/2022      Lab Results   Component Value Date    CREATININE 1 65 (H) 10/04/2022    CREATININE 1 62 (H) 10/03/2022    CREATININE 1 69 (H) 10/02/2022    CREATININE 1 71 (H) 10/01/2022    CREATININE 1 77 (H) 09/30/2022    CREATININE 1 80 (H) 09/29/2022    CREATININE 1 79 (H) 09/28/2022    CREATININE 1 72 (H) 09/28/2022    CREATININE 1 86 (H) 09/27/2022    CREATININE  09/27/2022      Comment:      Standardized to IDMS reference method  This is a corrected result   Previous result was 1 71 mg/dL on 9/27/2022 at 0721 EDT    CREATININE 1 94 (H) 09/26/2022    CREATININE 2 02 (H) 09/25/2022    CREATININE 2 04 (H) 09/24/2022    CREATININE 2 40 (H) 09/23/2022    CREATININE 2 45 (H) 09/23/2022      Lab Results   Component Value Date    COLORU Yellow 09/25/2022    CLARITYU Clear 09/25/2022    SPECGRAV <=1 005 09/25/2022    PHUR 6 5 09/25/2022    LEUKOCYTESUR Moderate (A) 09/25/2022    NITRITE Negative 09/25/2022    PROTEIN UA 30 (1+) (A) 09/25/2022    GLUCOSEU Negative 09/25/2022    KETONESU Negative 09/25/2022    UROBILINOGEN 0 2 09/25/2022    BILIRUBINUR Negative 09/25/2022    BLOODU Large (A) 09/25/2022    RBCUA 2-4 09/25/2022    WBCUA 4-10 (A) 09/25/2022    EPIS Occasional 09/25/2022    BACTERIA Moderate (A) 09/25/2022      No results found for: LABPROT  No results found for: Kacy Swan  Lab Results   Component Value Date    WBC 6 37 10/04/2022    HGB 10 7 (L) 10/04/2022    HCT 34 8 10/04/2022    MCV 85 10/04/2022     10/04/2022      Lab Results   Component Value Date    HGB 10 7 (L) 10/04/2022    HGB 10 3 (L) 10/03/2022    HGB 9 7 (L) 10/02/2022    HGB 10 2 (L) 10/01/2022    HGB 10 6 (L) 09/30/2022      Lab Results   Component Value Date    IRON 90 09/22/2022    TIBC 340 09/22/2022    FERRITIN 100 09/22/2022      No results found for: PTHCALCIUM, [id number], PHOSPHORUS   Lab Results   Component Value Date    CHOLESTEROL 199 09/22/2022    HDL 26 (L) 09/22/2022    LDLCALC 102 (H) 09/22/2022    TRIG 354 (H) 09/22/2022      Lab Results   Component Value Date    URICACID 4 6 09/22/2022      Lab Results   Component Value Date    HGBA1C 5 3 09/22/2022      No results found for: TSHANTIBODY, P5SZTFS, FREET4   No results found for: ERNST, DSDNAAB, [facility]   Lab Results   Component Value Date    PROT 7 1 04/11/2018        Portions of the record may have been created with voice recognition software  Occasional wrong word or "sound a like" substitutions may have occurred due to the inherent limitations of voice recognition software  Read the chart carefully and recognize, using context, where substitutions have occurred  If you have any questions, please contact the dictating provider

## 2022-10-26 NOTE — PATIENT INSTRUCTIONS
Your kidney function is stable at 31%  Continue to follow with hematology for anemia management  No changes to medications  Follow up blood work in 2 months  Follow up in 2 months

## 2022-11-16 ENCOUNTER — TELEPHONE (OUTPATIENT)
Dept: GASTROENTEROLOGY | Facility: CLINIC | Age: 77
End: 2022-11-16

## 2022-11-21 PROBLEM — A41.9 SEPSIS (HCC): Status: RESOLVED | Noted: 2020-07-29 | Resolved: 2022-11-21

## 2022-11-25 PROBLEM — A41.9 SEPSIS DUE TO URINARY TRACT INFECTION (HCC): Status: RESOLVED | Noted: 2022-09-22 | Resolved: 2022-11-25

## 2022-11-25 PROBLEM — N39.0 SEPSIS DUE TO URINARY TRACT INFECTION (HCC): Status: RESOLVED | Noted: 2022-09-22 | Resolved: 2022-11-25

## 2022-12-07 LAB
CREAT ?TM UR-SCNC: 47.7 UMOL/L
EXT PROTEIN URINE: 89.7
PROT/CREAT UR: 1.88 MG/G{CREAT}

## 2023-01-13 ENCOUNTER — OFFICE VISIT (OUTPATIENT)
Dept: NEPHROLOGY | Facility: CLINIC | Age: 78
End: 2023-01-13

## 2023-01-13 VITALS
OXYGEN SATURATION: 99 % | BODY MASS INDEX: 18.8 KG/M2 | DIASTOLIC BLOOD PRESSURE: 76 MMHG | HEART RATE: 64 BPM | WEIGHT: 117 LBS | HEIGHT: 66 IN | SYSTOLIC BLOOD PRESSURE: 138 MMHG

## 2023-01-13 DIAGNOSIS — N18.9 ACUTE KIDNEY INJURY SUPERIMPOSED ON CHRONIC KIDNEY DISEASE (HCC): Primary | ICD-10-CM

## 2023-01-13 DIAGNOSIS — N17.9 ACUTE KIDNEY INJURY SUPERIMPOSED ON CHRONIC KIDNEY DISEASE (HCC): Primary | ICD-10-CM

## 2023-01-13 DIAGNOSIS — N18.32 ANEMIA IN STAGE 3B CHRONIC KIDNEY DISEASE (HCC): ICD-10-CM

## 2023-01-13 DIAGNOSIS — Z71.89 GOALS OF CARE, COUNSELING/DISCUSSION: ICD-10-CM

## 2023-01-13 DIAGNOSIS — I50.42 CHRONIC COMBINED SYSTOLIC AND DIASTOLIC CHF (CONGESTIVE HEART FAILURE) (HCC): ICD-10-CM

## 2023-01-13 DIAGNOSIS — D50.9 IRON DEFICIENCY ANEMIA, UNSPECIFIED IRON DEFICIENCY ANEMIA TYPE: ICD-10-CM

## 2023-01-13 DIAGNOSIS — D63.1 ANEMIA IN STAGE 3B CHRONIC KIDNEY DISEASE (HCC): ICD-10-CM

## 2023-01-13 DIAGNOSIS — N25.81 SECONDARY RENAL HYPERPARATHYROIDISM (HCC): ICD-10-CM

## 2023-01-13 DIAGNOSIS — I10 ESSENTIAL HYPERTENSION: ICD-10-CM

## 2023-01-13 DIAGNOSIS — R80.9 PROTEINURIA, UNSPECIFIED TYPE: ICD-10-CM

## 2023-01-13 NOTE — PROGRESS NOTES
Assessment & Plan:    1  Acute kidney injury superimposed on chronic kidney disease [facility])  Assessment & Plan:  Lab Results   Component Value Date    EGFR 29 10/04/2022    EGFR 30 10/03/2022    EGFR 28 10/02/2022    CREATININE 1 65 (H) 10/04/2022    CREATININE 1 62 (H) 10/03/2022    CREATININE 1 69 (H) 10/02/2022   Appears that renal function is at its baseline  Personally reviewed graph of renal function trends with patient  Patient is not on an ACE-I, ARB or SGLT-2 inhibitor  Patient was advised to avoid nephrotoxins  Continue management of healthy weight, blood glucose and blood pressures  Please renally dose medication for a creatinine clearance of 29 L/min  Patient has not yet undergone [facility] education  Refer today  She reports that she makes medical decisions  No medical POA listed on [employer] paperwork  Continue to monitor renal function, anemia and bone-mineral parameters  Orders:  -     Ambulatory Referral to Hematology / Oncology; Future  -     Ambulatory Referral to CKD Education Program; Future  -     Ambulatory referral to advanced care planning; Future  -     CBC and differential; Future; Expected date: 04/06/2023  -     Comprehensive metabolic panel; Future; Expected date: 04/06/2023  -     Magnesium; Future; Expected date: 04/06/2023  -     Microalbumin / creatinine urine ratio; Future; Expected date: 04/06/2023  -     Phosphorus; Future; Expected date: 04/06/2023  -     Protein / creatinine ratio, urine; Future; Expected date: 04/06/2023  -     PTH, intact; Future; Expected date: 04/06/2023  -     Urinalysis with microscopic; Future; Expected date: 04/06/2023    2  Secondary renal hyperparathyroidism ([facility] [address] )  Assessment & Plan:  PTH okay  Continue to monitor  Orders:  -     Phosphorus; Future; Expected date: 04/06/2023  -     PTH, intact; Future; Expected date: 04/06/2023    3   Chronic combined systolic and diastolic CHF (congestive heart failure) ([facility])  Assessment & Plan:  Wt Readings from Last 3 Encounters:   01/13/23 53 1 kg (117 lb)   10/04/22 53 3 kg (117 lb 8 1 oz)   05/31/22 62 8 kg (138 lb 7 2 oz)   Examines euvolemic  No need for diuretics at this time  She is on a beta-blocker  Continue to monitor volume status  2 g dietary sodium restriction  4  Essential hypertension  Assessment & Plan:  Blood pressure is well controlled  Continue current antihypertensive regimen  Recommend monitoring at least 3 days weekly  Bring log for blood pressures to follow-up  5  Iron deficiency anemia, unspecified iron deficiency anemia type  -     Ambulatory Referral to Hematology / Oncology; Future  -     CBC and differential; Future; Expected date: 04/06/2023    6  Anemia in stage 3b chronic kidney disease [facility])  Assessment & Plan:  Needs to see Hematology    Orders:  -     Ambulatory Referral to Hematology / Oncology; Future  -     CBC and differential; Future; Expected date: 04/06/2023    7  Proteinuria, unspecified type  -     Microalbumin / creatinine urine ratio; Future; Expected date: 04/06/2023  -     Protein / creatinine ratio, urine; Future; Expected date: 04/06/2023    8  Goals of care, counseling/discussion  -     Ambulatory referral to advanced care planning; Future       The benefits, risks and alternatives to the treatment plan were discussed at this visit  Patient was advised of common adverse effects of any medical therapies prescribed  All questions were answered and discussed with the patient and any accompanying family members or caretakers  Subjective:      Patient ID: Burt Golden is a 65 y o  female seen in the [place] office  HPI     Today, patient presents for routine follow-up without acute complaints relating to blood pressure, edema or nephrological concerns  Patient is hospitalized at [address] [place] [address] from 9/22/2022 through 10/4/2022 for sepsis in the setting of urinary tract infection with hydroureteronephrosis and COVID infection      She was seen by Dr Armand Dakins on 10/26/2022 for evaluation of stage IV chronic kidney disease  Blood pressure is 138/76 with a heart rate of 64  Patient does not have a log of blood pressures to review  Denies headaches, lightheadedness, dizziness  Patient reports adherence with antihypertensive regimen and denies adverse effects: Metoprolol succinate 50 mg every 12 hours, amlodipine 10 mg daily  Patient denies lower extremity swelling  Reviewed and discussed the results of labs performed 12/7/2022 with a creatinine of 1 77 mg/dL with estimated GFR 29 mL/min  Alb 2 6 g/dL  At her consultation was noted that baseline creatinine was 1 67 mg/dL on 10/24/2022  PC 1 88  Hgb 7 7 g/dL on 01/09/23   5 pg/mL and Phos 4 1 mg/dL  History is obtained from patient  But is limited as she is a poor historian  On stretcher with transport and no care aide familiar with her care  The following portions of the patient's history were reviewed and updated as appropriate: allergies, current medications, past family history, past medical history, past social history, past surgical history, and problem list     Review of Systems   Reason unable to perform ROS: limited by poor historian  Constitutional: Positive for fatigue ("always")  Respiratory: Negative for apnea, cough and shortness of breath  Cardiovascular: Positive for leg swelling (Left knee)  Negative for chest pain and palpitations  Gastrointestinal: Negative for constipation, diarrhea and vomiting  Genitourinary: Negative for decreased urine volume, difficulty urinating, dysuria, flank pain, frequency, hematuria and urgency  No Greer   Musculoskeletal: Positive for arthralgias and gait problem (stretcher transport)  Negative for back pain, joint swelling and myalgias  Skin: Negative for color change and rash  Neurological: Negative for dizziness, seizures, syncope, weakness, light-headedness, numbness and headaches  Hematological: Negative for adenopathy  Does not bruise/bleed easily  Psychiatric/Behavioral: Negative for agitation, behavioral problems, confusion, decreased concentration, dysphoric mood and hallucinations  The patient is not nervous/anxious and is not hyperactive  All other systems reviewed and are negative  Objective:      /76   Pulse 64   Ht 5' 6" (1 676 m)   Wt 53 1 kg (117 lb)   SpO2 99%   BMI 18 88 kg/m²          Physical Exam  Vitals and nursing note reviewed  Exam conducted with a chaperone present  Constitutional:       General: She is not in acute distress  Appearance: Normal appearance  She is normal weight  She is not ill-appearing or toxic-appearing  HENT:      Head: Normocephalic and atraumatic  Nose: Nose normal  No congestion or rhinorrhea  Mouth/Throat:      Mouth: Mucous membranes are moist       Pharynx: Oropharynx is clear  Eyes:      General: No scleral icterus  Right eye: No discharge  Left eye: No discharge  Extraocular Movements: Extraocular movements intact  Conjunctiva/sclera: Conjunctivae normal       Pupils: Pupils are equal, round, and reactive to light  Cardiovascular:      Rate and Rhythm: Normal rate and regular rhythm  Pulses: Normal pulses  Heart sounds: Murmur heard  Pulmonary:      Effort: Pulmonary effort is normal  No respiratory distress  Breath sounds: Normal breath sounds  No wheezing  Abdominal:      General: Abdomen is flat  Bowel sounds are normal  There is no distension  Palpations: Abdomen is soft  There is no mass  Tenderness: There is no abdominal tenderness  Musculoskeletal:         General: No swelling or deformity  Normal range of motion  Cervical back: Normal range of motion and neck supple  No rigidity or tenderness  Skin:     General: Skin is warm and dry  Coloration: Skin is not jaundiced or pale  Findings: No bruising     Neurological: General: No focal deficit present  Mental Status: She is alert and oriented to person, place, and time  Mental status is at baseline  Psychiatric:         Mood and Affect: Mood normal          Behavior: Behavior normal          Thought Content: Thought content normal          Judgment: Judgment normal              Lab Results   Component Value Date     04/11/2018    SODIUM 143 10/04/2022    K 4 3 10/04/2022     (H) 10/04/2022    CO2 26 10/04/2022    ANIONGAP 15 2 04/11/2018    AGAP 8 10/04/2022    BUN 44 (H) 10/04/2022    CREATININE 1 65 (H) 10/04/2022    GLUC 84 10/04/2022    GLUF 96 06/27/2022    CALCIUM 8 7 10/04/2022    AST 30 10/04/2022    ALT 16 10/04/2022    ALKPHOS 126 (H) 10/04/2022    PROT 7 1 04/11/2018    TP 6 5 10/04/2022    BILITOT 0 4 04/11/2018    TBILI 0 36 10/04/2022    EGFR 29 10/04/2022      Lab Results   Component Value Date    CREATININE 1 65 (H) 10/04/2022    CREATININE 1 62 (H) 10/03/2022    CREATININE 1 69 (H) 10/02/2022    CREATININE 1 71 (H) 10/01/2022    CREATININE 1 77 (H) 09/30/2022    CREATININE 1 80 (H) 09/29/2022    CREATININE 1 79 (H) 09/28/2022    CREATININE 1 72 (H) 09/28/2022    CREATININE 1 86 (H) 09/27/2022    CREATININE  09/27/2022      Comment:      Standardized to IDMS reference method  This is a corrected result   Previous result was 1 71 mg/dL on 9/27/2022 at 0721 EDT    CREATININE 1 94 (H) 09/26/2022    CREATININE 2 02 (H) 09/25/2022    CREATININE 2 04 (H) 09/24/2022    CREATININE 2 40 (H) 09/23/2022    CREATININE 2 45 (H) 09/23/2022      Lab Results   Component Value Date    COLORU Yellow 09/25/2022    CLARITYU Clear 09/25/2022    SPECGRAV <=1 005 09/25/2022    PHUR 6 5 09/25/2022    LEUKOCYTESUR Moderate (A) 09/25/2022    NITRITE Negative 09/25/2022    PROTEIN UA 89 7 12/07/2022    GLUCOSEU Negative 09/25/2022    KETONESU Negative 09/25/2022    UROBILINOGEN 0 2 09/25/2022    BILIRUBINUR Negative 09/25/2022    BLOODU Large (A) 09/25/2022    RBCUA 2-4 09/25/2022    WBCUA 4-10 (A) 09/25/2022    EPIS Occasional 09/25/2022    BACTERIA Moderate (A) 09/25/2022      No results found for: LABPROT  No results found for: Charley Abraham  Lab Results   Component Value Date    WBC 6 37 10/04/2022    HGB 10 7 (L) 10/04/2022    HCT 34 8 10/04/2022    MCV 85 10/04/2022     10/04/2022      Lab Results   Component Value Date    HGB 10 7 (L) 10/04/2022    HGB 10 3 (L) 10/03/2022    HGB 9 7 (L) 10/02/2022    HGB 10 2 (L) 10/01/2022    HGB 10 6 (L) 09/30/2022      Lab Results   Component Value Date    IRON 90 09/22/2022    TIBC 340 09/22/2022    FERRITIN 100 09/22/2022      No results found for: PTHCALCIUM, SRWK35JIPXEL, PHOSPHORUS   Lab Results   Component Value Date    CHOLESTEROL 199 09/22/2022    HDL 26 (L) 09/22/2022    LDLCALC 102 (H) 09/22/2022    TRIG 354 (H) 09/22/2022      Lab Results   Component Value Date    URICACID 4 6 09/22/2022      Lab Results   Component Value Date    HGBA1C 5 3 09/22/2022      No results found for: TSHANTIBODY, D3LBOWA, FREET4   No results found for: ERNST, DSDNAAB, RFIGM   Lab Results   Component Value Date    PROT 7 1 04/11/2018        Portions of the record may have been created with voice recognition software  Occasional wrong word or "sound a like" substitutions may have occurred due to the inherent limitations of voice recognition software  Read the chart carefully and recognize, using context, where substitutions have occurred  If you have any questions, please contact the dictating provider

## 2023-01-13 NOTE — ASSESSMENT & PLAN NOTE
Wt Readings from Last 3 Encounters:   01/13/23 53 1 kg (117 lb)   10/04/22 53 3 kg (117 lb 8 1 oz)   05/31/22 62 8 kg (138 lb 7 2 oz)   Examines euvolemic  No need for diuretics at this time  She is on a beta-blocker  Continue to monitor volume status  2 g dietary sodium restriction

## 2023-01-13 NOTE — ASSESSMENT & PLAN NOTE
Blood pressure is well controlled  Continue current antihypertensive regimen  Recommend monitoring at least 3 days weekly  Bring log for blood pressures to follow-up

## 2023-01-19 ENCOUNTER — TELEPHONE (OUTPATIENT)
Dept: NEPHROLOGY | Facility: CLINIC | Age: 78
End: 2023-01-19

## 2023-01-19 ENCOUNTER — APPOINTMENT (EMERGENCY)
Dept: CT IMAGING | Facility: HOSPITAL | Age: 78
End: 2023-01-19

## 2023-01-19 ENCOUNTER — HOSPITAL ENCOUNTER (INPATIENT)
Facility: HOSPITAL | Age: 78
LOS: 7 days | Discharge: NURSING, REHAB OR LONG-TERM CARE | End: 2023-01-26
Attending: EMERGENCY MEDICINE | Admitting: FAMILY MEDICINE

## 2023-01-19 ENCOUNTER — APPOINTMENT (INPATIENT)
Dept: NON INVASIVE DIAGNOSTICS | Facility: HOSPITAL | Age: 78
End: 2023-01-19

## 2023-01-19 ENCOUNTER — APPOINTMENT (EMERGENCY)
Dept: RADIOLOGY | Facility: HOSPITAL | Age: 78
End: 2023-01-19

## 2023-01-19 DIAGNOSIS — D64.9 ANEMIA: ICD-10-CM

## 2023-01-19 DIAGNOSIS — R41.82 AMS (ALTERED MENTAL STATUS): Primary | ICD-10-CM

## 2023-01-19 DIAGNOSIS — N39.0 UTI (URINARY TRACT INFECTION): ICD-10-CM

## 2023-01-19 DIAGNOSIS — Z98.61 HISTORY OF PTCA: ICD-10-CM

## 2023-01-19 DIAGNOSIS — N25.81 SECONDARY RENAL HYPERPARATHYROIDISM (HCC): ICD-10-CM

## 2023-01-19 DIAGNOSIS — D62 ACUTE ON CHRONIC BLOOD LOSS ANEMIA: ICD-10-CM

## 2023-01-19 DIAGNOSIS — R33.9 URINARY RETENTION: ICD-10-CM

## 2023-01-19 DIAGNOSIS — D63.1 ANEMIA DUE TO CHRONIC KIDNEY DISEASE, UNSPECIFIED CKD STAGE: ICD-10-CM

## 2023-01-19 DIAGNOSIS — K92.2 GI BLEED: ICD-10-CM

## 2023-01-19 DIAGNOSIS — N18.9 ANEMIA DUE TO CHRONIC KIDNEY DISEASE, UNSPECIFIED CKD STAGE: ICD-10-CM

## 2023-01-19 DIAGNOSIS — N17.9 AKI (ACUTE KIDNEY INJURY) (HCC): ICD-10-CM

## 2023-01-19 DIAGNOSIS — K62.5 RECTAL BLEEDING: ICD-10-CM

## 2023-01-19 DIAGNOSIS — I65.29 STENOSIS OF CAROTID ARTERY, UNSPECIFIED LATERALITY: ICD-10-CM

## 2023-01-19 DIAGNOSIS — E87.20 METABOLIC ACIDOSIS: ICD-10-CM

## 2023-01-19 DIAGNOSIS — R29.90 STROKE-LIKE SYMPTOMS: ICD-10-CM

## 2023-01-19 DIAGNOSIS — E03.9 ACQUIRED HYPOTHYROIDISM: ICD-10-CM

## 2023-01-19 DIAGNOSIS — K92.2 GASTROINTESTINAL HEMORRHAGE, UNSPECIFIED GASTROINTESTINAL HEMORRHAGE TYPE: ICD-10-CM

## 2023-01-19 DIAGNOSIS — I72.9 ANEURYSM (HCC): ICD-10-CM

## 2023-01-19 DIAGNOSIS — N30.00 ACUTE CYSTITIS WITHOUT HEMATURIA: ICD-10-CM

## 2023-01-19 PROBLEM — R79.89 ELEVATED D-DIMER: Status: ACTIVE | Noted: 2023-01-19

## 2023-01-19 LAB
2HR DELTA HS TROPONIN: -4 NG/L
4HR DELTA HS TROPONIN: -1 NG/L
ALBUMIN SERPL BCP-MCNC: 3.2 G/DL (ref 3.5–5)
ALP SERPL-CCNC: 107 U/L (ref 34–104)
ALT SERPL W P-5'-P-CCNC: 8 U/L (ref 7–52)
ANION GAP SERPL CALCULATED.3IONS-SCNC: 10 MMOL/L (ref 4–13)
ANION GAP SERPL CALCULATED.3IONS-SCNC: 9 MMOL/L (ref 4–13)
AST SERPL W P-5'-P-CCNC: 17 U/L (ref 13–39)
BACTERIA UR QL AUTO: ABNORMAL /HPF
BASE EX.OXY STD BLDV CALC-SCNC: 89.4 % (ref 60–80)
BASE EXCESS BLDV CALC-SCNC: -10.8 MMOL/L
BASOPHILS # BLD AUTO: 0.08 THOUSANDS/ÂΜL (ref 0–0.1)
BASOPHILS # BLD AUTO: 0.1 THOUSANDS/ÂΜL (ref 0–0.1)
BASOPHILS NFR BLD AUTO: 1 % (ref 0–1)
BASOPHILS NFR BLD AUTO: 1 % (ref 0–1)
BETA-HYDROXYBUTYRATE: 0.1 MMOL/L
BILIRUB SERPL-MCNC: 0.41 MG/DL (ref 0.2–1)
BILIRUB UR QL STRIP: NEGATIVE
BNP SERPL-MCNC: 3380 PG/ML (ref 0–100)
BUN SERPL-MCNC: 72 MG/DL (ref 5–25)
BUN SERPL-MCNC: 72 MG/DL (ref 5–25)
CALCIUM ALBUM COR SERPL-MCNC: 9.5 MG/DL (ref 8.3–10.1)
CALCIUM SERPL-MCNC: 8.9 MG/DL (ref 8.4–10.2)
CALCIUM SERPL-MCNC: 8.9 MG/DL (ref 8.4–10.2)
CARDIAC TROPONIN I PNL SERPL HS: 22 NG/L
CARDIAC TROPONIN I PNL SERPL HS: 25 NG/L
CARDIAC TROPONIN I PNL SERPL HS: 26 NG/L
CHLORIDE SERPL-SCNC: 109 MMOL/L (ref 96–108)
CHLORIDE SERPL-SCNC: 110 MMOL/L (ref 96–108)
CHOLEST SERPL-MCNC: 130 MG/DL
CLARITY UR: ABNORMAL
CO2 SERPL-SCNC: 16 MMOL/L (ref 21–32)
CO2 SERPL-SCNC: 17 MMOL/L (ref 21–32)
COLOR UR: YELLOW
CREAT SERPL-MCNC: 2 MG/DL (ref 0.6–1.3)
CREAT SERPL-MCNC: 2.02 MG/DL (ref 0.6–1.3)
CREAT UR-MCNC: 51 MG/DL
D DIMER PPP FEU-MCNC: 6.69 UG/ML FEU
EOSINOPHIL # BLD AUTO: 0.13 THOUSAND/ÂΜL (ref 0–0.61)
EOSINOPHIL # BLD AUTO: 0.13 THOUSAND/ÂΜL (ref 0–0.61)
EOSINOPHIL NFR BLD AUTO: 1 % (ref 0–6)
EOSINOPHIL NFR BLD AUTO: 2 % (ref 0–6)
ERYTHROCYTE [DISTWIDTH] IN BLOOD BY AUTOMATED COUNT: 15.9 % (ref 11.6–15.1)
ERYTHROCYTE [DISTWIDTH] IN BLOOD BY AUTOMATED COUNT: 15.9 % (ref 11.6–15.1)
EST. AVERAGE GLUCOSE BLD GHB EST-MCNC: 100 MG/DL
FERRITIN SERPL-MCNC: 58 NG/ML (ref 8–388)
FLUAV RNA RESP QL NAA+PROBE: NEGATIVE
FLUBV RNA RESP QL NAA+PROBE: NEGATIVE
GFR SERPL CREATININE-BSD FRML MDRD: 23 ML/MIN/1.73SQ M
GFR SERPL CREATININE-BSD FRML MDRD: 23 ML/MIN/1.73SQ M
GLUCOSE SERPL-MCNC: 82 MG/DL (ref 65–140)
GLUCOSE SERPL-MCNC: 84 MG/DL (ref 65–140)
GLUCOSE SERPL-MCNC: 84 MG/DL (ref 65–140)
GLUCOSE UR STRIP-MCNC: NEGATIVE MG/DL
HBA1C MFR BLD: 5.1 %
HCO3 BLDV-SCNC: 14.2 MMOL/L (ref 24–30)
HCT VFR BLD AUTO: 25.2 % (ref 34.8–46.1)
HCT VFR BLD AUTO: 25.7 % (ref 34.8–46.1)
HDLC SERPL-MCNC: 20 MG/DL
HGB BLD-MCNC: 7.7 G/DL (ref 11.5–15.4)
HGB BLD-MCNC: 7.8 G/DL (ref 11.5–15.4)
HGB UR QL STRIP.AUTO: ABNORMAL
IMM GRANULOCYTES # BLD AUTO: 0.03 THOUSAND/UL (ref 0–0.2)
IMM GRANULOCYTES # BLD AUTO: 0.04 THOUSAND/UL (ref 0–0.2)
IMM GRANULOCYTES NFR BLD AUTO: 0 % (ref 0–2)
IMM GRANULOCYTES NFR BLD AUTO: 1 % (ref 0–2)
INR PPP: 2.34 (ref 0.84–1.19)
KETONES UR STRIP-MCNC: NEGATIVE MG/DL
LACTATE SERPL-SCNC: 0.7 MMOL/L (ref 0.5–2)
LDLC SERPL CALC-MCNC: 78 MG/DL (ref 0–100)
LEUKOCYTE ESTERASE UR QL STRIP: ABNORMAL
LYMPHOCYTES # BLD AUTO: 1.23 THOUSANDS/ÂΜL (ref 0.6–4.47)
LYMPHOCYTES # BLD AUTO: 1.52 THOUSANDS/ÂΜL (ref 0.6–4.47)
LYMPHOCYTES NFR BLD AUTO: 16 % (ref 14–44)
LYMPHOCYTES NFR BLD AUTO: 17 % (ref 14–44)
MAGNESIUM SERPL-MCNC: 2 MG/DL (ref 1.9–2.7)
MCH RBC QN AUTO: 27.4 PG (ref 26.8–34.3)
MCH RBC QN AUTO: 27.4 PG (ref 26.8–34.3)
MCHC RBC AUTO-ENTMCNC: 30.4 G/DL (ref 31.4–37.4)
MCHC RBC AUTO-ENTMCNC: 30.6 G/DL (ref 31.4–37.4)
MCV RBC AUTO: 90 FL (ref 82–98)
MCV RBC AUTO: 90 FL (ref 82–98)
MONOCYTES # BLD AUTO: 0.48 THOUSAND/ÂΜL (ref 0.17–1.22)
MONOCYTES # BLD AUTO: 0.62 THOUSAND/ÂΜL (ref 0.17–1.22)
MONOCYTES NFR BLD AUTO: 6 % (ref 4–12)
MONOCYTES NFR BLD AUTO: 7 % (ref 4–12)
NEUTROPHILS # BLD AUTO: 5.72 THOUSANDS/ÂΜL (ref 1.85–7.62)
NEUTROPHILS # BLD AUTO: 6.67 THOUSANDS/ÂΜL (ref 1.85–7.62)
NEUTS SEG NFR BLD AUTO: 74 % (ref 43–75)
NEUTS SEG NFR BLD AUTO: 74 % (ref 43–75)
NITRITE UR QL STRIP: POSITIVE
NON-SQ EPI CELLS URNS QL MICRO: ABNORMAL /HPF
NRBC BLD AUTO-RTO: 0 /100 WBCS
NRBC BLD AUTO-RTO: 0 /100 WBCS
O2 CT BLDV-SCNC: 10.4 ML/DL
PCO2 BLDV: 28.2 MM HG (ref 42–50)
PH BLDV: 7.32 [PH] (ref 7.3–7.4)
PH UR STRIP.AUTO: 6 [PH]
PLATELET # BLD AUTO: 300 THOUSANDS/UL (ref 149–390)
PLATELET # BLD AUTO: 303 THOUSANDS/UL (ref 149–390)
PMV BLD AUTO: 10.6 FL (ref 8.9–12.7)
PMV BLD AUTO: 10.9 FL (ref 8.9–12.7)
PO2 BLDV: 65.3 MM HG (ref 35–45)
POTASSIUM SERPL-SCNC: 4.4 MMOL/L (ref 3.5–5.3)
POTASSIUM SERPL-SCNC: 4.8 MMOL/L (ref 3.5–5.3)
PROCALCITONIN SERPL-MCNC: 0.21 NG/ML
PROT SERPL-MCNC: 6.3 G/DL (ref 6.4–8.4)
PROT UR STRIP-MCNC: ABNORMAL MG/DL
PROTHROMBIN TIME: 25.7 SECONDS (ref 11.6–14.5)
RBC # BLD AUTO: 2.81 MILLION/UL (ref 3.81–5.12)
RBC # BLD AUTO: 2.85 MILLION/UL (ref 3.81–5.12)
RBC #/AREA URNS AUTO: ABNORMAL /HPF
RSV RNA RESP QL NAA+PROBE: NEGATIVE
SARS-COV-2 RNA RESP QL NAA+PROBE: NEGATIVE
SODIUM 24H UR-SCNC: 58 MOL/L
SODIUM SERPL-SCNC: 135 MMOL/L (ref 135–147)
SODIUM SERPL-SCNC: 136 MMOL/L (ref 135–147)
SP GR UR STRIP.AUTO: 1.02 (ref 1–1.03)
T4 FREE SERPL-MCNC: 0.66 NG/DL (ref 0.76–1.46)
TRIGL SERPL-MCNC: 160 MG/DL
TSH SERPL DL<=0.05 MIU/L-ACNC: 5.79 UIU/ML (ref 0.45–4.5)
UROBILINOGEN UR QL STRIP.AUTO: 0.2 E.U./DL
VIT B12 SERPL-MCNC: 363 PG/ML (ref 100–900)
WBC # BLD AUTO: 7.68 THOUSAND/UL (ref 4.31–10.16)
WBC # BLD AUTO: 9.07 THOUSAND/UL (ref 4.31–10.16)
WBC #/AREA URNS AUTO: ABNORMAL /HPF
WBC CLUMPS # UR AUTO: PRESENT /UL

## 2023-01-19 RX ORDER — DOCUSATE SODIUM 100 MG/1
100 CAPSULE, LIQUID FILLED ORAL 2 TIMES DAILY
Status: DISCONTINUED | OUTPATIENT
Start: 2023-01-19 | End: 2023-01-26 | Stop reason: HOSPADM

## 2023-01-19 RX ORDER — ONDANSETRON 4 MG/1
4 TABLET, ORALLY DISINTEGRATING ORAL EVERY 6 HOURS PRN
COMMUNITY
End: 2023-01-26

## 2023-01-19 RX ORDER — SODIUM CHLORIDE, SODIUM GLUCONATE, SODIUM ACETATE, POTASSIUM CHLORIDE, MAGNESIUM CHLORIDE, SODIUM PHOSPHATE, DIBASIC, AND POTASSIUM PHOSPHATE .53; .5; .37; .037; .03; .012; .00082 G/100ML; G/100ML; G/100ML; G/100ML; G/100ML; G/100ML; G/100ML
50 INJECTION, SOLUTION INTRAVENOUS CONTINUOUS
Status: DISCONTINUED | OUTPATIENT
Start: 2023-01-19 | End: 2023-01-24

## 2023-01-19 RX ORDER — SODIUM BICARBONATE 650 MG/1
650 TABLET ORAL
Status: COMPLETED | OUTPATIENT
Start: 2023-01-19 | End: 2023-01-20

## 2023-01-19 RX ORDER — ALLOPURINOL 100 MG/1
100 TABLET ORAL DAILY
Status: DISCONTINUED | OUTPATIENT
Start: 2023-01-19 | End: 2023-01-26 | Stop reason: HOSPADM

## 2023-01-19 RX ORDER — CEFTRIAXONE 1 G/50ML
1000 INJECTION, SOLUTION INTRAVENOUS ONCE
Status: COMPLETED | OUTPATIENT
Start: 2023-01-19 | End: 2023-01-19

## 2023-01-19 RX ORDER — APIXABAN 5 MG/1
5 TABLET, FILM COATED ORAL 2 TIMES DAILY
Status: ON HOLD | COMMUNITY
Start: 2023-01-02

## 2023-01-19 RX ORDER — METOPROLOL SUCCINATE 100 MG/1
100 TABLET, EXTENDED RELEASE ORAL DAILY
Status: DISCONTINUED | OUTPATIENT
Start: 2023-01-19 | End: 2023-01-19

## 2023-01-19 RX ORDER — ATORVASTATIN CALCIUM 40 MG/1
40 TABLET, FILM COATED ORAL EVERY EVENING
Status: DISCONTINUED | OUTPATIENT
Start: 2023-01-19 | End: 2023-01-19

## 2023-01-19 RX ORDER — ONDANSETRON 4 MG/1
4 TABLET, FILM COATED ORAL EVERY 6 HOURS PRN
COMMUNITY
End: 2023-01-26

## 2023-01-19 RX ORDER — METOPROLOL SUCCINATE 100 MG/1
100 TABLET, EXTENDED RELEASE ORAL DAILY
Status: DISCONTINUED | OUTPATIENT
Start: 2023-01-20 | End: 2023-01-26 | Stop reason: HOSPADM

## 2023-01-19 RX ORDER — AMLODIPINE BESYLATE 10 MG/1
10 TABLET ORAL DAILY
Status: DISCONTINUED | OUTPATIENT
Start: 2023-01-19 | End: 2023-01-19

## 2023-01-19 RX ORDER — LEVOTHYROXINE SODIUM 0.12 MG/1
125 TABLET ORAL
Status: DISCONTINUED | OUTPATIENT
Start: 2023-01-19 | End: 2023-01-20

## 2023-01-19 RX ORDER — POLYETHYLENE GLYCOL 3350 17 G/17G
17 POWDER, FOR SOLUTION ORAL DAILY PRN
Status: DISCONTINUED | OUTPATIENT
Start: 2023-01-19 | End: 2023-01-26 | Stop reason: HOSPADM

## 2023-01-19 RX ORDER — ASPIRIN 81 MG/1
81 TABLET, CHEWABLE ORAL DAILY
Status: DISCONTINUED | OUTPATIENT
Start: 2023-01-19 | End: 2023-01-21

## 2023-01-19 RX ORDER — METOPROLOL SUCCINATE 100 MG/1
100 TABLET, EXTENDED RELEASE ORAL DAILY
Status: ON HOLD | COMMUNITY

## 2023-01-19 RX ORDER — SERTRALINE HYDROCHLORIDE 100 MG/1
100 TABLET, FILM COATED ORAL DAILY
Status: DISCONTINUED | OUTPATIENT
Start: 2023-01-19 | End: 2023-01-26 | Stop reason: HOSPADM

## 2023-01-19 RX ORDER — MELATONIN
1000 DAILY
Status: DISCONTINUED | OUTPATIENT
Start: 2023-01-19 | End: 2023-01-26 | Stop reason: HOSPADM

## 2023-01-19 RX ORDER — CEFTRIAXONE 1 G/50ML
1000 INJECTION, SOLUTION INTRAVENOUS EVERY 24 HOURS
Status: DISCONTINUED | OUTPATIENT
Start: 2023-01-19 | End: 2023-01-24

## 2023-01-19 RX ORDER — ACETAMINOPHEN 325 MG/1
650 TABLET ORAL EVERY 6 HOURS PRN
Status: DISCONTINUED | OUTPATIENT
Start: 2023-01-19 | End: 2023-01-26 | Stop reason: HOSPADM

## 2023-01-19 RX ADMIN — IOHEXOL 100 ML: 350 INJECTION, SOLUTION INTRAVENOUS at 03:16

## 2023-01-19 RX ADMIN — DOCUSATE SODIUM 100 MG: 100 CAPSULE ORAL at 17:45

## 2023-01-19 RX ADMIN — SERTRALINE 100 MG: 100 TABLET, FILM COATED ORAL at 09:12

## 2023-01-19 RX ADMIN — APIXABAN 2.5 MG: 2.5 TABLET, FILM COATED ORAL at 09:12

## 2023-01-19 RX ADMIN — ALLOPURINOL 100 MG: 100 TABLET ORAL at 09:12

## 2023-01-19 RX ADMIN — Medication 1000 UNITS: at 09:12

## 2023-01-19 RX ADMIN — ASPIRIN 81 MG CHEWABLE TABLET 81 MG: 81 TABLET CHEWABLE at 09:12

## 2023-01-19 RX ADMIN — SODIUM BICARBONATE 650 MG TABLET 650 MG: at 12:07

## 2023-01-19 RX ADMIN — METOPROLOL SUCCINATE 100 MG: 100 TABLET, EXTENDED RELEASE ORAL at 09:12

## 2023-01-19 RX ADMIN — CEFTRIAXONE 1000 MG: 1 INJECTION, SOLUTION INTRAVENOUS at 04:14

## 2023-01-19 RX ADMIN — APIXABAN 2.5 MG: 2.5 TABLET, FILM COATED ORAL at 17:45

## 2023-01-19 RX ADMIN — AMLODIPINE BESYLATE 10 MG: 10 TABLET ORAL at 09:12

## 2023-01-19 RX ADMIN — SODIUM CHLORIDE, SODIUM GLUCONATE, SODIUM ACETATE, POTASSIUM CHLORIDE, MAGNESIUM CHLORIDE, SODIUM PHOSPHATE, DIBASIC, AND POTASSIUM PHOSPHATE 50 ML/HR: .53; .5; .37; .037; .03; .012; .00082 INJECTION, SOLUTION INTRAVENOUS at 09:12

## 2023-01-19 RX ADMIN — SODIUM BICARBONATE 650 MG TABLET 650 MG: at 17:45

## 2023-01-19 RX ADMIN — DOCUSATE SODIUM 100 MG: 100 CAPSULE ORAL at 09:12

## 2023-01-19 RX ADMIN — LEVOTHYROXINE SODIUM 125 MCG: 125 TABLET ORAL at 09:12

## 2023-01-19 RX ADMIN — CEFTRIAXONE 1000 MG: 1 INJECTION, SOLUTION INTRAVENOUS at 22:15

## 2023-01-19 NOTE — ASSESSMENT & PLAN NOTE
Lab Results   Component Value Date    EGFR 23 01/19/2023    EGFR 29 10/04/2022    EGFR 30 10/03/2022    CREATININE 2 02 (H) 01/19/2023    CREATININE 1 65 (H) 10/04/2022    CREATININE 1 62 (H) 10/03/2022   · Creatinine elevated with metabolic acidosis  · Gentle hydration  · UTI-empiric ceftriaxone  · Avoid nephrotoxic medication  · Only dose medication  · Appreciate nephrology consultation

## 2023-01-19 NOTE — ASSESSMENT & PLAN NOTE
Wt Readings from Last 3 Encounters:   01/19/23 55 2 kg (121 lb 11 1 oz)   01/13/23 53 1 kg (117 lb)   10/04/22 53 3 kg (117 lb 8 1 oz)     EF 45% September 2022  Appears euvolemic on exam  Not on outpatient diuretics  Continue metoprolol succinate  Monitor volume status

## 2023-01-19 NOTE — CASE MANAGEMENT
Case Management Progress Note    Patient name Sandeep Aguilar  Location /-41 MRN [id number]  : 1945 Date 2023       LOS (days): 0  Geometric Mean LOS (GMLOS) (days): 4 30  Days to GMLOS:3 8        OBJECTIVE:        Current admission status: Inpatient  Preferred Pharmacy:   [address] [place] [address] [address], [address] [place] [address]  [id number]  [address]  [address] [place] [id number]  Phone: [phone] Fax: [phone]    Primary Care Provider: Latrice Burrell DO    Primary Insurance: [facility]  Secondary Insurance:     PROGRESS NOTE:  Chart reviewed aware of medical management  Case was discussed in multidisciplinary discharge meeting with [address] [place] [address]  Clinical information supporting hospitalization:   Pending Neurology recommendations  PT,OT,Speech recommendation is to return to the facility  Barriers to discharge:  - medical management  Discharge plan:  [place]  CM will continue to follow plan of care

## 2023-01-19 NOTE — QUICK NOTE
Stroke alert called: 244 AM  Neurology response 244 aM  LKW 11 PM per nursing facility  NIHSS at least 5 per exam given to me however patient at baseline with some confusion, not oriented to time, new symptoms of mild left sided weakness and neglect when following commands with that side per exam by ED      CTH stat not acute  CTA H/N no LVO on my review- official radiology read pending    TNK-tPA no patient out of window  Would admit under stroke protocol at this time  MRI brain routine  Continue eliquis for now  Pendng MRI and clinical course- daytime neurology team can decide if patient needs switch from eliquis or AP addition for secondary stroke prevention  Infectious metabolic work up per primary team    Discussed with Perico Fernandes above at time of alert

## 2023-01-19 NOTE — PLAN OF CARE
Problem: Potential for Falls  Goal: Patient will remain free of falls  Description: INTERVENTIONS:  - Educate patient/family on patient safety including physical limitations  - Instruct patient to call for assistance with activity   - Consult OT/PT to assist with strengthening/mobility   - Keep Call bell within reach  - Keep bed low and locked with side rails adjusted as appropriate  - Keep care items and personal belongings within reach  - Initiate and maintain comfort rounds  - Make Fall Risk Sign visible to staff  - Offer Toileting every   Hours, in advance of need  - Initiate/Maintain   alarm  - Obtain necessary fall risk management equipment:     - Apply yellow socks and bracelet for high fall risk patients  - Consider moving patient to room near nurses station  Outcome: Progressing     Problem: PAIN - ADULT  Goal: Verbalizes/displays adequate comfort level or baseline comfort level  Description: Interventions:  - Encourage patient to monitor pain and request assistance  - Assess pain using appropriate pain scale  - Administer analgesics based on type and severity of pain and evaluate response  - Implement non-pharmacological measures as appropriate and evaluate response  - Consider cultural and social influences on pain and pain management  - Notify physician/advanced practitioner if interventions unsuccessful or patient reports new pain  Outcome: Progressing     Problem: INFECTION - ADULT  Goal: Absence or prevention of progression during hospitalization  Description: INTERVENTIONS:  - Assess and monitor for signs and symptoms of infection  - Monitor lab/diagnostic results  - Monitor all insertion sites, i e  indwelling lines, tubes, and drains  - Monitor endotracheal if appropriate and nasal secretions for changes in amount and color  - [place] appropriate cooling/warming therapies per order  - Administer medications as ordered  - Instruct and encourage patient and family to use good hand hygiene technique  - Identify and instruct in appropriate isolation precautions for identified infection/condition  Outcome: Progressing     Problem: SAFETY ADULT  Goal: Patient will remain free of falls  Description: INTERVENTIONS:  - Educate patient/family on patient safety including physical limitations  - Instruct patient to call for assistance with activity   - Consult OT/PT to assist with strengthening/mobility   - Keep Call bell within reach  - Keep bed low and locked with side rails adjusted as appropriate  - Keep care items and personal belongings within reach  - Initiate and maintain comfort rounds  - Make Fall Risk Sign visible to staff  - Offer Toileting every   Hours, in advance of need  - Initiate/Maintain   alarm  - Obtain necessary fall risk management equipment:     - Apply yellow socks and bracelet for high fall risk patients  - Consider moving patient to room near nurses station  Outcome: Progressing  Goal: Maintain or return to baseline ADL function  Description: INTERVENTIONS:  -  Assess patient's ability to carry out ADLs; assess patient's baseline for ADL function and identify physical deficits which impact ability to perform ADLs (bathing, care of mouth/teeth, toileting, grooming, dressing, etc )  - Assess/evaluate cause of self-care deficits   - Assess range of motion  - Assess patient's mobility; develop plan if impaired  - Assess patient's need for assistive devices and provide as appropriate  - Encourage maximum independence but intervene and supervise when necessary  - Involve family in performance of ADLs  - Assess for home care needs following discharge   - Consider OT consult to assist with ADL evaluation and planning for discharge  - Provide patient education as appropriate  Outcome: Progressing  Goal: Maintains/Returns to pre admission functional level  Description: INTERVENTIONS:  - Perform BMAT or MOVE assessment daily    - Set and communicate daily mobility goal to care team and patient/family/caregiver  - Collaborate with rehabilitation services on mobility goals if consulted  - Perform Range of Motion   times a day  - Reposition patient every   hours  - Dangle patient   times a day  - Stand patient   times a day  - Ambulate patient   times a day  - Out of bed to chair   times a day   - Out of bed for meals   times a day  - Out of bed for toileting  - Record patient progress and toleration of activity level   Outcome: Progressing     Problem: DISCHARGE PLANNING  Goal: Discharge to home or other facility with appropriate resources  Description: INTERVENTIONS:  - Identify barriers to discharge w/patient and caregiver  - Arrange for needed discharge resources and transportation as appropriate  - Identify discharge learning needs (meds, wound care, etc )  - Arrange for interpretive services to assist at discharge as needed  - Refer to Case Management Department for coordinating discharge planning if the patient needs post-hospital services based on physician/advanced practitioner order or complex needs related to functional status, cognitive ability, or social support system  Outcome: Progressing     Problem: Knowledge Deficit  Goal: Patient/family/caregiver demonstrates understanding of disease process, treatment plan, medications, and discharge instructions  Description: Complete learning assessment and assess knowledge base    Interventions:  - Provide teaching at level of understanding  - Provide teaching via preferred learning methods  Outcome: Progressing     Problem: NEUROSENSORY - ADULT  Goal: Achieves stable or improved neurological status  Description: INTERVENTIONS  - Monitor and report changes in neurological status  - Monitor vital signs such as temperature, blood pressure, glucose, and any other labs ordered   - Initiate measures to prevent increased intracranial pressure  - Monitor for seizure activity and implement precautions if appropriate      Outcome: Progressing  Goal: Remains free of injury related to seizures activity  Description: INTERVENTIONS  - Maintain airway, patient safety  and administer oxygen as ordered  - Monitor patient for seizure activity, document and report duration and description of seizure to physician/advanced practitioner  - If seizure occurs,  ensure patient safety during seizure  - Reorient patient post seizure  - Seizure pads on all 4 side rails  - Instruct patient/family to notify RN of any seizure activity including if an aura is experienced  - Instruct patient/family to call for assistance with activity based on nursing assessment  - Administer anti-seizure medications if ordered    Outcome: Progressing  Goal: Achieves maximal functionality and self care  Description: INTERVENTIONS  - Monitor swallowing and airway patency with patient fatigue and changes in neurological status  - Encourage and assist patient to increase activity and self care     - Encourage visually impaired, hearing impaired and aphasic patients to use assistive/communication devices  Outcome: Progressing     Problem: CARDIOVASCULAR - ADULT  Goal: Maintains optimal cardiac output and hemodynamic stability  Description: INTERVENTIONS:  - Monitor I/O, vital signs and rhythm  - Monitor for S/S and trends of decreased cardiac output  - Administer and titrate ordered vasoactive medications to optimize hemodynamic stability  - Assess quality of pulses, skin color and temperature  - Assess for signs of decreased coronary artery perfusion  - Instruct patient to report change in severity of symptoms  Outcome: Progressing  Goal: Absence of cardiac dysrhythmias or at baseline rhythm  Description: INTERVENTIONS:  - Continuous cardiac monitoring, vital signs, obtain 12 lead EKG if ordered  - Administer antiarrhythmic and heart rate control medications as ordered  - Monitor electrolytes and administer replacement therapy as ordered  Outcome: Progressing     Problem: RESPIRATORY - ADULT  Goal: Achieves optimal ventilation and oxygenation  Description: INTERVENTIONS:  - Assess for changes in respiratory status  - Assess for changes in mentation and behavior  - Position to facilitate oxygenation and minimize respiratory effort  - Oxygen administered by appropriate delivery if ordered  - Initiate smoking cessation education as indicated  - Encourage broncho-pulmonary hygiene including cough, deep breathe, Incentive Spirometry  - Assess the need for suctioning and aspirate as needed  - Assess and instruct to report SOB or any respiratory difficulty  - Respiratory Therapy support as indicated  Outcome: Progressing     Problem: GASTROINTESTINAL - ADULT  Goal: Minimal or absence of nausea and/or vomiting  Description: INTERVENTIONS:  - Administer IV fluids if ordered to ensure adequate hydration  - Maintain NPO status until nausea and vomiting are resolved  - Nasogastric tube if ordered  - Administer ordered antiemetic medications as needed  - Provide nonpharmacologic comfort measures as appropriate  - Advance diet as tolerated, if ordered  - Consider nutrition services referral to assist patient with adequate nutrition and appropriate food choices  Outcome: Progressing  Goal: Maintains or returns to baseline bowel function  Description: INTERVENTIONS:  - Assess bowel function  - Encourage oral fluids to ensure adequate hydration  - Administer IV fluids if ordered to ensure adequate hydration  - Administer ordered medications as needed  - Encourage mobilization and activity  - Consider nutritional services referral to assist patient with adequate nutrition and appropriate food choices  Outcome: Progressing  Goal: Maintains adequate nutritional intake  Description: INTERVENTIONS:  - Monitor percentage of each meal consumed  - Identify factors contributing to decreased intake, treat as appropriate  - Assist with meals as needed  - Monitor I&O, weight, and lab values if indicated  - Obtain nutrition services referral as needed  Outcome: Progressing  Goal: Establish and maintain optimal ostomy function  Description: INTERVENTIONS:  - Assess bowel function  - Encourage oral fluids to ensure adequate hydration  - Administer IV fluids if ordered to ensure adequate hydration   - Administer ordered medications as needed  - Encourage mobilization and activity  - Nutrition services referral to assist patient with appropriate food choices  - Assess stoma site  - Consider wound care consult   Outcome: Progressing  Goal: Oral mucous membranes remain intact  Description: INTERVENTIONS  - Assess oral mucosa and hygiene practices  - Implement preventative oral hygiene regimen  - Implement oral medicated treatments as ordered  - Initiate Nutrition services referral as needed  Outcome: Progressing     Problem: GENITOURINARY - ADULT  Goal: Maintains or returns to baseline urinary function  Description: INTERVENTIONS:  - Assess urinary function  - Encourage oral fluids to ensure adequate hydration if ordered  - Administer IV fluids as ordered to ensure adequate hydration  - Administer ordered medications as needed  - Offer frequent toileting  - Follow urinary retention protocol if ordered  Outcome: Progressing  Goal: Absence of urinary retention  Description: INTERVENTIONS:  - Assess patient’s ability to void and empty bladder  - Monitor I/O  - Bladder scan as needed  - Discuss with physician/AP medications to alleviate retention as needed  - Discuss catheterization for long term situations as appropriate  Outcome: Progressing  Goal: Urinary catheter remains patent  Description: INTERVENTIONS:  - Assess patency of urinary catheter  - If patient has a chronic brambila, consider changing catheter if non-functioning  - Follow guidelines for intermittent irrigation of non-functioning urinary catheter  Outcome: Progressing     Problem: METABOLIC, FLUID AND ELECTROLYTES - ADULT  Goal: Electrolytes maintained within normal limits  Description: INTERVENTIONS:  - Monitor labs and assess patient for signs and symptoms of electrolyte imbalances  - Administer electrolyte replacement as ordered  - Monitor response to electrolyte replacements, including repeat lab results as appropriate  - Instruct patient on fluid and nutrition as appropriate  Outcome: Progressing  Goal: Fluid balance maintained  Description: INTERVENTIONS:  - Monitor labs   - Monitor I/O and WT  - Instruct patient on fluid and nutrition as appropriate  - Assess for signs & symptoms of volume excess or deficit  Outcome: Progressing  Goal: Glucose maintained within target range  Description: INTERVENTIONS:  - Monitor Blood Glucose as ordered  - Assess for signs and symptoms of hyperglycemia and hypoglycemia  - Administer ordered medications to maintain glucose within target range  - Assess nutritional intake and initiate nutrition service referral as needed  Outcome: Progressing     Problem: SKIN/TISSUE INTEGRITY - ADULT  Goal: Skin Integrity remains intact(Skin Breakdown Prevention)  Description: Assess:  -Perform [place] assessment every    -Clean and moisturize skin every    -Inspect skin when repositioning, toileting, and assisting with ADLS  -Assess under medical devices such as   every    -Assess extremities for adequate circulation and sensation     Bed Management:  -Have minimal linens on bed & keep smooth, unwrinkled  -Change linens as needed when moist or perspiring  -Avoid sitting or lying in one position for more than   hours while in bed  -Keep HOB at  degrees     Toileting:  -Offer bedside commode  -Assess for incontinence every   -Use incontinent care products after each incontinent episode such as   Activity:  -Mobilize patient   times a day  -Encourage activity and walks on unit  -Encourage or provide ROM exercises   -Turn and reposition patient every   Hours  -Use appropriate equipment to lift or move patient in bed  -Instruct/ Assist with weight shifting every    when out of bed in chair  -Consider limitation of chair time   hour intervals    Skin Care:  -Avoid use of baby powder, tape, friction and shearing, hot water or constrictive clothing  -Relieve pressure over bony prominences using    -Do not massage red bony areas    Next Steps:  -Teach patient strategies to minimize risks such as     -Consider consults to  interdisciplinary teams such as   Outcome: Progressing  Goal: Incision(s), wounds(s) or drain site(s) healing without S/S of infection  Description: INTERVENTIONS  - Assess and document dressing, incision, wound bed, drain sites and surrounding tissue  - Provide patient and family education  - Perform skin care/dressing changes every   Outcome: Progressing  Goal: Pressure injury heals and does not worsen  Description: Interventions:  - Implement low air loss mattress or specialty surface (Criteria met)  - Apply silicone foam dressing  - Instruct/assist with weight shifting every   minutes when in chair   - Limit chair time to   hour intervals  - Use special pressure reducing interventions such as   when in chair   - Apply fecal or urinary incontinence containment device   - Perform passive or active ROM every   - Turn and reposition patient & offload bony prominences every    hours   - Utilize friction reducing device or surface for transfers   - Consider consults to  interdisciplinary teams such as    - Use incontinent care products after each incontinent episode such as    - Consider nutrition services referral as needed  Outcome: Progressing     Problem: HEMATOLOGIC - ADULT  Goal: Maintains hematologic stability  Description: INTERVENTIONS  - Assess for signs and symptoms of bleeding or hemorrhage  - Monitor labs  - Administer supportive blood products/factors as ordered and appropriate  Outcome: Progressing     Problem: MUSCULOSKELETAL - ADULT  Goal: Maintain or return mobility to safest level of function  Description: INTERVENTIONS:  - Assess patient's ability to carry out ADLs; assess patient's baseline for ADL function and identify physical deficits which impact ability to perform ADLs (bathing, care of mouth/teeth, toileting, grooming, dressing, etc )  - Assess/evaluate cause of self-care deficits   - Assess range of motion  - Assess patient's mobility  - Assess patient's need for assistive devices and provide as appropriate  - Encourage maximum independence but intervene and supervise when necessary  - Involve family in performance of ADLs  - Assess for home care needs following discharge   - Consider OT consult to assist with ADL evaluation and planning for discharge  - Provide patient education as appropriate  Outcome: Progressing  Goal: Maintain proper alignment of affected body part  Description: INTERVENTIONS:  - Support, maintain and protect limb and body alignment  - Provide patient/ family with appropriate education  Outcome: Progressing     Problem: MOBILITY - ADULT  Goal: Maintain or return to baseline ADL function  Description: INTERVENTIONS:  -  Assess patient's ability to carry out ADLs; assess patient's baseline for ADL function and identify physical deficits which impact ability to perform ADLs (bathing, care of mouth/teeth, toileting, grooming, dressing, etc )  - Assess/evaluate cause of self-care deficits   - Assess range of motion  - Assess patient's mobility; develop plan if impaired  - Assess patient's need for assistive devices and provide as appropriate  - Encourage maximum independence but intervene and supervise when necessary  - Involve family in performance of ADLs  - Assess for home care needs following discharge   - Consider OT consult to assist with ADL evaluation and planning for discharge  - Provide patient education as appropriate  Outcome: Progressing  Goal: Maintains/Returns to pre admission functional level  Description: INTERVENTIONS:  - Perform BMAT or MOVE assessment daily    - Set and communicate daily mobility goal to care team and patient/family/caregiver  - Collaborate with rehabilitation services on mobility goals if consulted  - Perform Range of Motion   times a day  - Reposition patient every   hours  - Dangle patient   times a day  - Stand patient   times a day  - Ambulate patient   Sita Estrada   times a day  - Out of bed to chair   times a day   - Out of bed for meals    times a day  - Out of bed for toileting  - Record patient progress and toleration of activity level   Outcome: Progressing     Problem: Prexisting or High Potential for Compromised Skin Integrity  Goal: Skin integrity is maintained or improved  Description: INTERVENTIONS:  - Identify patients at risk for skin breakdown  - Assess and monitor skin integrity  - Assess and monitor nutrition and hydration status  - Monitor labs   - Assess for incontinence   - Turn and reposition patient  - Assist with mobility/ambulation  - Relieve pressure over bony prominences  - Avoid friction and shearing  - Provide appropriate hygiene as needed including keeping skin clean and dry  - Evaluate need for skin moisturizer/barrier cream  - Collaborate with interdisciplinary team   - Patient/family teaching  - Consider wound care consult   Outcome: Progressing

## 2023-01-19 NOTE — PLAN OF CARE
Problem: Potential for Falls  Goal: Patient will remain free of falls  Description: INTERVENTIONS:  - Educate patient/family on patient safety including physical limitations  - Instruct patient to call for assistance with activity   - Consult OT/PT to assist with strengthening/mobility   - Keep Call bell within reach  - Keep bed low and locked with side rails adjusted as appropriate  - Keep care items and personal belongings within reach  - Initiate and maintain comfort rounds  - Make Fall Risk Sign visible to staff  Problem: PAIN - ADULT  Goal: Verbalizes/displays adequate comfort level or baseline comfort level  Description: Interventions:  - Encourage patient to monitor pain and request assistance  - Assess pain using appropriate pain scale  - Administer analgesics based on type and severity of pain and evaluate response  - Implement non-pharmacological measures as appropriate and evaluate response  - Consider cultural and social influences on pain and pain management  - Notify physician/advanced practitioner if interventions unsuccessful or patient reports new pain  Outcome: Progressing     Problem: INFECTION - ADULT  Goal: Absence or prevention of progression during hospitalization  Description: INTERVENTIONS:  - Assess and monitor for signs and symptoms of infection  - Monitor lab/diagnostic results  - Monitor all insertion sites, i e  indwelling lines, tubes, and drains  - Monitor endotracheal if appropriate and nasal secretions for changes in amount and color  - [place] appropriate cooling/warming therapies per order  - Administer medications as ordered  - Instruct and encourage patient and family to use good hand hygiene technique  - Identify and instruct in appropriate isolation precautions for identified infection/condition  Outcome: Progressing     Problem: NEUROSENSORY - ADULT  Goal: Achieves stable or improved neurological status  Description: INTERVENTIONS  - Monitor and report changes in neurological status  - Monitor vital signs such as temperature, blood pressure, glucose, and any other labs ordered   - Initiate measures to prevent increased intracranial pressure  - Monitor for seizure activity and implement precautions if appropriate      Outcome: Progressing  Goal: Remains free of injury related to seizures activity  Description: INTERVENTIONS  - Maintain airway, patient safety  and administer oxygen as ordered  - Monitor patient for seizure activity, document and report duration and description of seizure to physician/advanced practitioner  - If seizure occurs,  ensure patient safety during seizure  - Reorient patient post seizure  - Seizure pads on all 4 side rails  - Instruct patient/family to notify RN of any seizure activity including if an aura is experienced  - Instruct patient/family to call for assistance with activity based on nursing assessment  - Administer anti-seizure medications if ordered    Outcome: Progressing  Goal: Achieves maximal functionality and self care  Description: INTERVENTIONS  - Monitor swallowing and airway patency with patient fatigue and changes in neurological status  - Encourage and assist patient to increase activity and self care     - Encourage visually impaired, hearing impaired and aphasic patients to use assistive/communication devices  Outcome: Progressing     - Apply yellow socks and bracelet for high fall risk patients  - Consider moving patient to room near nurses station  Outcome: Progressing

## 2023-01-19 NOTE — ASSESSMENT & PLAN NOTE
· D-dimer 6 69 during ED evaluation  · Chronically anticoagulated with Eliquis, resides in nursing facility with medication compliance  · No tachycardia or hypoxia to suggest PE  · Continue to monitor
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
## 2023-01-19 NOTE — SPEECH THERAPY NOTE
Speech Language/Pathology  Speech-Language Pathology Bedside Swallow Evaluation      Patient Name: Anand PEREZMIC'E Date: 1/19/2023    Summary   Consult received under stroke pathway  Pt admitted w/ altered mental status, CT clear but MRI pending  PMHx includes dementia, CHF, and AFIB  Pt resides at a SNF, baseline diet [facility] soft/ thins  Currently on regular textures w/ thin liquids  Pt w/ lower dentures in place, edentulous upper  Pt states she has upper dentures but doesn't have them here  Denies difficulty swallowing  See speech/lang rhianna for more information on cognition  Pt observed w/ dental soft Sinhala toast and thin liquids  Pt required assistance for set up and supervision for self feeding d/t confusion  Pt declined syrup and butter to assist in making food softer  Pt presents w/ moderate oral phase dysphagia characterized by difficulty biting into food and prolonged mastication and poor bolus breakdown w/ solids  Adequate oral clearance noted  Suspected pharyngeal phase dysphagia characterized by intermittent weak cough w/ solids, however suspect this more related to oral phase and inability to fully breakdown solids d/t lack of dentition  Recommend to downgrade to puree w/ thin liquids  Meds crushed in puree but can be trialed whole if cognition/command following allows  Assistance/supervision for meals d/t cognition  SLP will continue to follow for diet tolerance/advancement if dentures arrive      Risk/s for Aspiration: Low-Moderate     Recommended Diet: puree/level 1 diet and thin liquids   Recommended Form of Meds: crushed with puree   Aspiration precautions and swallowing strategies: upright posture, only feed when fully alert, slow rate of feeding and alternating bites and sips  Other Recommendations: Continue frequent oral care, Supervision for meals d/t confusion        Current Medical Status  Christina Lopez Kirts [place] is a 65 y o  female with a PMH of dementia, atrial fibrillation chronically anticoagulated with Eliquis, CAD, CHF with reduced ejection fraction 45%, chronic anemia, CKD followed by outpatient nephrology who is a resident at [facility] and presented with increased confusion from baseline unable to follow commands worrisome for strokelike symptoms per nursing home  In the emergency department patient was a stroke alert underwent CTA brain with no acute abnormality, evaluated by neurology with recommendations for admission to stroke pathway and obtain MRI of brain  Also found to have UTI initiated on ceftriaxone  Current Precautions:  Fall  Aspiration      Allergies:  No known food allergies    Past medical history:  Please see H&P for details    Special Studies:  CT Head:  No acute intracranial abnormality      Social/Education/Vocational Hx:  Pt lives in SNF/ECF    Swallow Information   Current Risks for Dysphagia & Aspiration: CVA and AMS  Current Symptoms/Concerns: coughing with po  Current Diet: regular diet and thin liquids   Baseline Diet: soft/level 3 diet and thin liquids      Baseline Assessment   Behavior/Cognition: decreased attention  Speech/Language Status: able to follow commands inconsistently and limited verbal output  Patient Positioning: upright in bed  Pain Status/Interventions/Response to Interventions:   No report of or nonverbal indications of pain  Swallow Mechanism Exam  Facial: left facial droop  Labial: decreased ROM left side and unable to test 2/2 limited command following  Lingual: unable to test 2/2 limited command following  Velum: symmetrical  Mandible: adequate ROM  Dentition: lower dentures  Vocal quality:clear/adequate   Volitional Cough: strong/productive   Respiratory Status: on RA      Consistencies Assessed and Performance   Consistencies Administered: thin liquids and soft solids    Oral Stage: moderate  Mastication was prolonged with the materials administered today suspect d /t lack of upper dentition    Bolus formation and transfer were prolonged  No overt s/s reduced oral control  Pharyngeal Stage: suspected  Swallow Mechanics:  Swallowing initiation appeared delayed  Laryngeal rise was palpated and judged to be within functional limits  Intermittent coughing w/ solids, suspect related to impaired oral phase        Esophageal Concerns: none reported      Summary and Recommendations (see above)    Results Reviewed with: patient, RN and MD     Treatment Recommended: Dysphagia therapy     Frequency of treatment: 2-3x/week    Patient Stated Goal: Pt unable to state goal d/t cognition    Dysphagia LTG  -Patient will demonstrate safe and effective oral intake (without overt s/s significant oral/pharyngeal dysphagia including s/s penetration or aspiration) for the highest appropriate diet level  Short Term Goals:  -Pt will tolerate Dysphagia 1/pureed diet and thin liquid with no significant s/s oral or pharyngeal dysphagia across 1-3 diagnostic session/s    -Pt will tolerate Dysphagia 2/mechanical soft diet -Patient will tolerate trials of upgraded food and/or liquid texture with no significant s/s of oral or pharyngeal dysphagia including aspirat    Re: Mastication  -Patient will demonstrate adequate mastication to safely consume the  least restrictive diet with no verbal, visual or tactile cues needed  Re: Compensatory Strategies  - Patient’s caregiver will demonstrate adherence to recommended diet, as well as application of aspiration precautions and compensatory strategies  Speech Therapy Prognosis   Prognosis: fair    Prognosis Considerations: age and cognitive status        Speech/Language Evaluation      Patient Name: Taiwo Gomez        Summary   Pt presents w/ moderately impaired receptive and expressive language, suspect related to moderate-severely impaired cognition rather than receptive/expressive aphasia  Per SNF, pt's cognition impaired at baseline w/ limited communication     Pt oriented to month and self but not to year or place  Delayed responses to all questions w/ repetition required  Poor divided attention and unable to converse w/ SLP and self feed during breakfast  Intermittent confusion w/ self feeding, pt attempting to eat butter packet  Receptively, pt followed one step commands but unable to progress to two steps  Answered personal and simple yes/no questions w/ 80% accuracy w/ repetition of stimuli  Responsive naming completed w/ 60% accuracy  Expressively, pt named objects w/ 80% accuracy w/ aphasic error x1 (bird for banana), otherwise did not initiate conversation w/ SLP  Motor speech appears intact  No definitive visual spatial deficits (pt able to point to the window on the left) however difficult to determine d/t impaired command following  Recommendation:  Suspect pt's baseline cognitive status impacting communication, suspect pt grossly at baseline for speech/language  Treatment Recommended/Frequency: SLP will s/o for speech/language, please re-consult as needed      General Cognitive Status:  Alert with poor attention    Auditory Comprehension:  One step commands: 100%  Two step commands:0%  Personal y/n ?'s: 80%  Simple y/n ?'s: 80%  Comprehension of Conversation: Limited    Reading Comprehension:  Impaired- read "make your head" instead of shake your head   Pt denied wearing glasses    Oral Expression:  Auto Sequences:   Count Forwards: intact  Days[de-identified] intact   Months: impaired- no response  Automatic Social Utterances[de-identified]  impaired  Confrontation Namin%  Responsive Namin%  Conversation: impaired  Able to make basic needs known:  impaired    Written Expression:  Did not test    Motor Speech:  Dysarthria: Not noted     Apraxia: Not noted    Orientation:  Person: intact   Place: impaired, "[facility]"  City: impaired  Time of Day: impaired  Month: intact  [place]: impaired  Year:  Impaired, did not improve w/ binary choice  Reason for hospitalization:impaired    Cognitive -linguistic skills:  Impaired at baseline    Long term memory: impaired    Also noted:  Distractable  Inattention    Results discussed with:  PT/OT    Palak Balderas 86 CCC-SLP  1/19/2023
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
## 2023-01-19 NOTE — PROGRESS NOTES
Call placed to [facility] to obtain medical records on patient  Spoke with Neftaly Wong who will be faxing information to unit
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
## 2023-01-19 NOTE — PLAN OF CARE
Problem: PHYSICAL THERAPY ADULT  Goal: Performs mobility at highest level of function for planned discharge setting  See evaluation for individualized goals  Description: Treatment/Interventions: ADL retraining, Functional transfer training, LE strengthening/ROM, Therapeutic exercise, Endurance training, Patient/family training, Equipment eval/education, Bed mobility, Gait training, Cognitive reorientation, Compensatory technique education, Spoke to nursing, Spoke to case management, OT  Equipment Recommended:  (TBD by facility)       See flowsheet documentation for full assessment, interventions and recommendations  Note: Prognosis: Fair  Problem List: Decreased strength, Decreased endurance, Impaired balance, Decreased mobility, Decreased range of motion, Decreased coordination, Decreased cognition, Impaired judgement, Decreased safety awareness, Pain  Assessment: Pt is a 65 y o  female seen for PT evaluation s/p admission to [address] [place] [address] on 1/19/2023 with Stroke-like symptoms  Order placed for PT services    Upon evaluation: Pt is presenting with impaired functional mobility due to pain, decreased strength, decreased ROM, decreased endurance, impaired balance, impaired coordination, gait deviations, impaired cognition, decreased safety awareness, impaired judgment, and fall risk requiring  moderate assistance for bed mobility, moderate of 2 to moderate of 1 and maximal of 1 (2 person) assistance for transfers, and moderate of 1 and maximal of 1 (2 person) assistance for ambulation with RW   Pt's clinical presentation is currently unpredictable given the functional mobility deficits above, especially weakness, decreased ROM, decreased endurance, gait deviations, pain, decreased activity tolerance, decreased functional mobility tolerance, decreased safety awareness, impaired judgement, and decreased cognition, coupled with fall risks as indicated by AM-PAC 6-Clicks: 18/58 as well as impaired balance, polypharmacy, impaired coordination, impaired judgement, decreased safety awareness and decreased cognition and combined with medical complications of abnormal renal lab values, abnormal H&H, need for input for mobility technique/safety and anemia, eleavted D dimer, DARRELL, abnormal BNP  Pt's PMHx and comorbidities that may affect physical performance and progress include: CHF, CKD, A fib, CAD and arthritis, anemia, breast CA, depression, HTN, migraines  Personal factors affecting pt at time of IE include: inability to perform IADLs, inability to perform ADLs, inability to navigate level surfaces without external assistance, inability to ambulate household distances and limited insight into impairments  Pt will benefit from continued skilled PT services to address deficits as defined above and to maximize level of functional mobility to facilitate return toward PLOF and improved QOL  From PT/mobility standpoint, recommendation at time of d/c would be Short term rehab pending progress in order to reduce fall risk and maximize pt's functional independence and consistency with mobility in order to facilitate return to PLOF  Recommend trial with walker next 1-2 sessions and ther ex next 1-2 sessions  Barriers to Discharge Comments: requires increased assistance to complete mobility, however patient may reside in SNF where she can have assistance prn  PT Discharge Recommendation: Return to facility with rehabilitation services    See flowsheet documentation for full assessment

## 2023-01-19 NOTE — CASE MANAGEMENT
Case Management Assessment & Discharge Planning Note    Patient name Elle Stamp  Location /-90 MRN [id number]  : 1945 Date 2023       Current Admission Date: 2023  Current Admission Diagnosis:Stroke-like symptoms   Patient Active Problem List    Diagnosis Date Noted   • Elevated d-dimer 2023   • Proteinuria 2023   • Goals of care, counseling/discussion 2023   • Secondary renal hyperparathyroidism ([place] [address] ) 10/26/2022   • Hyperuricemia 10/26/2022   • Hypophosphatemia 10/04/2022   • Pulmonary hypertension ([place] [address] ) 10/04/2022   • Low TSH level 2022   • COVID-19 virus infection 2022   • Dementia ([place] [address] )    • Stroke-like symptoms 2022   • Gastrointestinal bleeding    • Metabolic acidosis    • Hydroureteronephrosis 2022   • Diverticulitis 2022   • Rectal bleeding 2022   • Elevated troponin 2022   • Iron deficiency anemia 2022   • Anemia due to chronic kidney disease 2022   • Paroxysmal atrial fibrillation ([place] [address] ) 2020   • Ischemic cardiomyopathy 2020   • S/P implantation of automatic cardioverter/defibrillator (AICD) 2020   • Essential hypertension 2020   • History of PTCA 2020   • Leg swelling 2020   • Acute kidney injury superimposed on chronic kidney disease ([place] [address] ) 2020   • Perforated appendicitis 2020   • Chronic combined systolic and diastolic CHF (congestive heart failure) ([place] [address] ) 2020   • Pyuria 2020   • Microscopic hematuria 2020   • Vitamin D insufficiency 2020   • Mitral valve insufficiency 2020   • Hypercholesterolemia 2020   • Aortic atherosclerosis ([place] [address] ) 2020   • Renal calculus 2020   • Diverticulosis 2020   • Hiatal hernia 2020   • Pulmonary nodule 2020   • Benign hypertensive renal disease 2019   • Acute blood loss anemia 2019   • Gastroesophageal reflux disease without esophagitis 02/08/2019   • Closed fracture of body of sternum with routine healing 04/12/2018   • Congestive heart disease ([address] ) 04/12/2018   • Acquired hypothyroidism 04/12/2018   • CAD (coronary artery disease) 04/12/2018   • Forgetfulness 04/12/2018   • Acute pain due to trauma 04/12/2018   • Hx of fall 04/12/2018   • Stress incontinence in female 04/12/2018   • Anemia in chronic kidney disease 03/20/2018      LOS (days): 0  Geometric Mean LOS (GMLOS) (days): 4 30  Days to GMLOS:3 8     OBJECTIVE:    Risk of Unplanned Readmission Score: 25 03         Current admission status: Inpatient  Referral Reason: Stroke  CM met with patient at the bedside, pt alert but confused  CM called pt's daughter to obtain baseline information  CM discussed the role of CM in helping the patient develop a discharge plan and assist the patient in carry out their plan  Preferred Pharmacy:   [address] [place] [address] Paulette Afshan CARABALLO  [address]  [place] [address]  Phone: [phone] Fax: [phone]    Primary Care Provider: Francois Dacosta DO    [facility]  Secondary Insurance:     ASSESSMENT:  Misti Pedersen Proxies    There are no active Health Care Proxies on file  Advance Directives  Does patient have a [address] [place] [address] Avenue?: No  Does patient currently have a [facility] decision maker?: Yes, please see Health Care Proxy section  Does patient have Advance Directives?: No  Primary Contact: Janie Dye         Readmission Root Cause  30 Day Readmission: No    Patient Information  Mental Status: Confused  During Assessment patient was accompanied by: Not accompanied during assessment  Assessment information provided by[de-identified] Daughter  Support Systems: Home care staff  [place] of Residence: [facility]  do you live in?: [facility] entry access options   Select all that apply : No steps to enter home  Type of Current Residence: Facility  Upon entering residence, is there a bedroom on the main floor (no further steps)?: Yes  Upon entering residence, is there a bathroom on the main floor (no further steps)?: Yes  In the last 12 months, was there a time when you were not able to pay the mortgage or rent on time?: No  In the last 12 months, how many places have you lived?: 2  In the last 12 months, was there a time when you did not have a steady place to sleep or slept in a shelter (including now)?: No  Homeless/housing insecurity resource given?: No  Living Arrangements: Other (Comment) ([place])  Is patient a ?: No    Activities of Daily Living Prior to Admission  Functional Status: Total dependent  Completes ADLs independently?: No (pt can feed herself)  Level of ADL dependence: Total Dependent  Ambulates independently?: No  Level of ambulatory dependence: Total Dependent  Does patient use assisted devices?: Yes  Assisted Devices (DME) used:  Wheelchair  Does patient currently own DME?: Yes  What DME does the patient currently own?: Wheelchair  Does patient have a history of Outpatient Therapy (PT/OT)?: No  Does the patient have a history of Short-Term Rehab?: Yes  Does patient have a history of HHC?: No  Does patient currently have [facility]?: No         Patient Information Continued  Income Source: SSI/SSD  Does patient have prescription coverage?: Yes  Within the past 12 months, you worried that your food would run out before you got the money to buy more : Never true  Within the past 12 months, the food you bought just didn't last and you didn't have money to get more : Never true  Food insecurity resource given?: No  Does patient receive dialysis treatments?: No  Does patient have a history of substance abuse?: No  Does patient have a history of Mental Health Diagnosis?: No         Means of Transportation  In the past 12 months, has lack of transportation kept you from medical appointments or from getting medications?: No  In the past 12 months, has lack of transportation kept you from meetings, work, or from getting things needed for daily living?: No  Was application for public transport provided?: No        DISCHARGE DETAILS:    Discharge planning discussed with[de-identified] Pt's daughter  Freedom of Choice: Yes  Comments - Freedom of Choice: Pt's daughter prefers that pt returns to [address] [place] [address] contacted family/caregiver?: Yes  Were Treatment Team discharge recommendations reviewed with patient/caregiver?: Yes  Did patient/caregiver verbalize understanding of patient care needs?: Yes  Were patient/caregiver advised of the risks associated with not following Treatment Team discharge recommendations?: Yes    Contacts  Patient Contacts: Toi Eagle  Relationship to Patient[de-identified] Family  Contact Method: Phone  Phone Number: [phone]  Reason/Outcome: Discharge Planning              Other Referral/Resources/Interventions Provided:  Interventions: Facility Return            Discharge Destination Plan[de-identified] Facility Return      Discussed d/c plan with pt's daughter  She stated that she would like to have the pt return to [facility] as she has no way to provide the level of care that the pt needs  Referral placed back to [facility] through [address]

## 2023-01-19 NOTE — PLAN OF CARE
Problem: OCCUPATIONAL THERAPY ADULT  Goal: Performs self-care activities at highest level of function for planned discharge setting  See evaluation for individualized goals  Description: Treatment Interventions: ADL retraining, Functional transfer training, UE strengthening/ROM, Endurance training, Cognitive reorientation, Patient/family training, Equipment evaluation/education, Neuromuscular reeducation, Compensatory technique education, Continued evaluation, Energy conservation, Activityengagement          See flowsheet documentation for full assessment, interventions and recommendations  Note: Limitation: Decreased ADL status, Decreased UE strength, Decreased Safe judgement during ADL, Decreased cognition, Decreased endurance, Decreased self-care trans, Decreased high-level ADLs  Prognosis: Good  Assessment: Pt is a 65 y o  female, admitted to [address] [place] [address] 1/19/2023 d/t experiencing AMS, facial droop and L sided weakness  Dx: stroke-like symptoms  Pt with PMHx impacting their performance during ADL tasks, including: a-fib, anemia, arthritis, CAD, B breast CA, depression, GERD, HTN, migraine, pneumonia  Prior to admission to the hospital Pt was performing ADLs with physical assistance  IADLs with physical assistance  Functional transfers/ambulation with physical assistance  Cognitive status was PTA was impaired  Per staff at SNF, PTA Pt was minimally verbal and required Max-Total for all ADLs, however on eval, Pt with decent communication and command following and participating well with therapy  OT order placed to assess Pt's ADLs, cognitive status, and performance during functional tasks in order to maximize safety and independence while making most appropriate d/c recommendations   Pt's clinical presentation is currently unstable/unpredictable given new onset deficits that effect Pt's occupational performance and ability to safely return to OF including decrease activity tolerance, decrease standing balance, decrease sitting balance, decrease performance during ADL tasks, decrease cognition, decrease safety awareness , decrease UB MS, decrease generalized strength, decrease activity engagement, decrease performance during functional transfers, high fall risk and limited insight to deficits combined with medical complications of abnormal renal lab values, change in mental status, abnormal CO2 values, abnormal D-dimer, decreased skin integrity, multiple readmissions, incontinence, fear/retreat and need for input for mobility technique/safety  Personal factors affecting Pt at time of initial evaluation include: limited home support, advanced age, past experience, inability to perform current job functions, inability to perform IADLs, inability to perform ADLs, new need for AD, inability to ambulate household distances, inability to navigate community distances, limited insight into impairments, decreased initiation and engagement, difficulty communicating and questionable non-compliance  Pt will benefit from continued skilled OT services to address deficits as defined above and to maximize level independence/participation during ADLs and functional tasks to facilitate return toward PLOF and improved quality of life  From an occupational therapy standpoint, recommendation at time of d/c would be post acute rehab       OT Discharge Recommendation: Post acute rehabilitation services

## 2023-01-19 NOTE — ASSESSMENT & PLAN NOTE
• Admit to Stroke Pathway per neurology recommendation  • NIH 5- No TPA out of window  • CT/CTA brain no acute abnormality  • Aspirin 81 mg daily  • Continue Eliquis twice daily  • Unable to tolerate statin secondary to severe myalgias   • telemetry  • -200 permissive hypertension  • Recent echocardiogram September 2022 with EF 45%  • Mri brain w/o contrast  • Flp, tsh, hba1c, b12, tsh, vit D  • Speech/PT/OT eval  • History of dementia with confusion at baseline from [facility] nursing facility, was witnessed by staff with worsening confusion  • Possible metabolic encephalopathy in setting of urinary tract infection

## 2023-01-19 NOTE — ASSESSMENT & PLAN NOTE
· Chronic anemia hemoglobin appears baseline when compared to labs from care everywhere  · Check iron panel  · Monitor hemoglobin

## 2023-01-19 NOTE — CONSULTS
Consult received for stroke pathway  Pt with hx of dementia  Noted to reside at [facility]  Noted to be on mechanical soft/thin liquids diet at baseline per SLP note  Pt stating "they're always pushing sandwiches on you" referring to [facility]  ? accuracy of recall, noting hx of dementia  Pt stating "I lost so much of my memory, if I continue I won't have any left " Pt asking to call her mom or neighbor during time of visit  Says she has a good appetite "as you can tell" pointing to her stomach  Says she "doesn't believe in" taking ensure supplements, would rather eat regular foods  Chart review of weight hx: 5/31/22 138 5lb, 12/21/22 132lb, 9/22/22 117 7lb, 1/19/23 121lb  Noted hx of CHF, also per previous RD note, pt was previously intentionally losing weight as was improving diet quality  BMI low for her age  Dysphagia diet per SLP recommendations  Can continue with 2gm Na restriction at this time, will monitor po and consider liberalizing as appropriate  Fluid restriction per MD/Nephrology  Will trial magic cup supplement daily

## 2023-01-19 NOTE — ASSESSMENT & PLAN NOTE
· Continue Eliquis 5 mg twice daily  · Continue metoprolol succinate  · PPM in place  · Paced rhythm on EKG, rate controlled

## 2023-01-20 ENCOUNTER — APPOINTMENT (INPATIENT)
Dept: CT IMAGING | Facility: HOSPITAL | Age: 78
End: 2023-01-20

## 2023-01-20 PROBLEM — I65.29 CAROTID ARTERY STENOSIS: Status: ACTIVE | Noted: 2023-01-20

## 2023-01-20 PROBLEM — I67.1 RIGHT INTERNAL CAROTID ARTERY ANEURYSM: Status: ACTIVE | Noted: 2023-01-20

## 2023-01-20 LAB
25(OH)D3 SERPL-MCNC: 40.2 NG/ML (ref 30–100)
ANION GAP SERPL CALCULATED.3IONS-SCNC: 9 MMOL/L (ref 4–13)
AORTIC ROOT: 3.7 CM
AORTIC VALVE MEAN VELOCITY: 7.7 M/S
APICAL FOUR CHAMBER EJECTION FRACTION: 16 %
ASCENDING AORTA: 2.3 CM
AV AREA BY CONTINUOUS VTI: 1.7 CM2
AV AREA PEAK VELOCITY: 1.7 CM2
AV LVOT MEAN GRADIENT: 0 MMHG
AV LVOT PEAK GRADIENT: 1 MMHG
AV MEAN GRADIENT: 3 MMHG
AV PEAK GRADIENT: 6 MMHG
AV REGURGITATION PRESSURE HALF TIME: 624 MS
AV VALVE AREA: 1.72 CM2
AV VELOCITY RATIO: 0.45
BUN SERPL-MCNC: 66 MG/DL (ref 5–25)
CALCIUM SERPL-MCNC: 8.6 MG/DL (ref 8.4–10.2)
CHLORIDE SERPL-SCNC: 110 MMOL/L (ref 96–108)
CO2 SERPL-SCNC: 17 MMOL/L (ref 21–32)
CREAT SERPL-MCNC: 1.85 MG/DL (ref 0.6–1.3)
DOP CALC AO PEAK VEL: 1.19 M/S
DOP CALC AO VTI: 24.99 CM
DOP CALC LVOT AREA: 3.8 CM2
DOP CALC LVOT DIAMETER: 2.2 CM
DOP CALC LVOT PEAK VEL VTI: 11.33 CM
DOP CALC LVOT PEAK VEL: 0.54 M/S
DOP CALC LVOT STROKE INDEX: 25.3 ML/M2
DOP CALC LVOT STROKE VOLUME: 43.05
DOP CALC MV VTI: 199.5 CM
E WAVE DECELERATION TIME: 187 MS
FRACTIONAL SHORTENING: 16 (ref 28–44)
GFR SERPL CREATININE-BSD FRML MDRD: 25 ML/MIN/1.73SQ M
GLUCOSE SERPL-MCNC: 81 MG/DL (ref 65–140)
INTERVENTRICULAR SEPTUM IN DIASTOLE (PARASTERNAL SHORT AXIS VIEW): 1.3 CM
INTERVENTRICULAR SEPTUM: 1.3 CM (ref 0.6–1.1)
IRON SATN MFR SERPL: 5 % (ref 15–50)
IRON SERPL-MCNC: 15 UG/DL (ref 50–170)
LAAS-AP2: 26.7 CM2
LAAS-AP4: 38.7 CM2
LEFT ATRIUM SIZE: 4.8 CM
LEFT INTERNAL DIMENSION IN SYSTOLE: 4.3 CM (ref 2.1–4)
LEFT VENTRICLE DIASTOLIC VOLUME (MOD BIPLANE): 161 ML
LEFT VENTRICLE SYSTOLIC VOLUME (MOD BIPLANE): 110 ML
LEFT VENTRICULAR INTERNAL DIMENSION IN DIASTOLE: 5.1 CM (ref 3.5–6)
LEFT VENTRICULAR POSTERIOR WALL IN END DIASTOLE: 1.4 CM
LEFT VENTRICULAR STROKE VOLUME: 44 ML
LV EF: 32 %
LVSV (TEICH): 44 ML
MV E'TISSUE VEL-LAT: 12 CM/S
MV E'TISSUE VEL-SEP: 5 CM/S
MV MEAN GRADIENT: 61 MMHG
MV PEAK A VEL: 1.12 M/S
MV PEAK E VEL: 102 CM/S
MV PEAK GRADIENT: 115 MMHG
MV STENOSIS PRESSURE HALF TIME: 54 MS
MV VALVE AREA BY CONTINUITY EQUATION: 0.22 CM2
MV VALVE AREA P 1/2 METHOD: 4.07
POTASSIUM SERPL-SCNC: 4.5 MMOL/L (ref 3.5–5.3)
PV PEAK GRADIENT: 2 MMHG
RA PRESSURE ESTIMATED: 15 MMHG
RIGHT ATRIUM AREA SYSTOLE A4C: 22.9 CM2
RIGHT VENTRICLE ID DIMENSION: 3.8 CM
RV PSP: 58 MMHG
SL CV AV DECELERATION TIME RETROGRADE: 2151 MS
SL CV AV PEAK GRADIENT RETROGRADE: 59 MMHG
SL CV LEFT ATRIUM LENGTH A2C: 6 CM
SL CV LV EF: 25
SL CV PED ECHO LEFT VENTRICLE DIASTOLIC VOLUME (MOD BIPLANE) 2D: 126 ML
SL CV PED ECHO LEFT VENTRICLE SYSTOLIC VOLUME (MOD BIPLANE) 2D: 83 ML
SODIUM SERPL-SCNC: 136 MMOL/L (ref 135–147)
TIBC SERPL-MCNC: 321 UG/DL (ref 250–450)
TR MAX PG: 43 MMHG
TR PEAK VELOCITY: 3.3 M/S
TRICUSPID ANNULAR PLANE SYSTOLIC EXCURSION: 1.5 CM
TRICUSPID VALVE PEAK REGURGITATION VELOCITY: 3.3 M/S

## 2023-01-20 RX ORDER — AMLODIPINE BESYLATE 10 MG/1
10 TABLET ORAL DAILY
Status: DISCONTINUED | OUTPATIENT
Start: 2023-01-21 | End: 2023-01-21

## 2023-01-20 RX ORDER — SODIUM BICARBONATE 650 MG/1
650 TABLET ORAL
Status: DISCONTINUED | OUTPATIENT
Start: 2023-01-20 | End: 2023-01-24

## 2023-01-20 RX ORDER — LEVOTHYROXINE SODIUM 0.15 MG/1
150 TABLET ORAL
Status: DISCONTINUED | OUTPATIENT
Start: 2023-01-21 | End: 2023-01-26 | Stop reason: HOSPADM

## 2023-01-20 RX ADMIN — CEFTRIAXONE 1000 MG: 1 INJECTION, SOLUTION INTRAVENOUS at 21:31

## 2023-01-20 RX ADMIN — SODIUM BICARBONATE 650 MG TABLET 650 MG: at 17:22

## 2023-01-20 RX ADMIN — LEVOTHYROXINE SODIUM 125 MCG: 125 TABLET ORAL at 06:08

## 2023-01-20 RX ADMIN — METOPROLOL SUCCINATE 100 MG: 100 TABLET, EXTENDED RELEASE ORAL at 09:12

## 2023-01-20 RX ADMIN — Medication 1000 UNITS: at 09:12

## 2023-01-20 RX ADMIN — SODIUM BICARBONATE 650 MG TABLET 650 MG: at 21:31

## 2023-01-20 RX ADMIN — SERTRALINE 100 MG: 100 TABLET, FILM COATED ORAL at 09:12

## 2023-01-20 RX ADMIN — APIXABAN 2.5 MG: 2.5 TABLET, FILM COATED ORAL at 17:22

## 2023-01-20 RX ADMIN — SODIUM BICARBONATE 650 MG TABLET 650 MG: at 09:12

## 2023-01-20 RX ADMIN — SODIUM CHLORIDE, SODIUM GLUCONATE, SODIUM ACETATE, POTASSIUM CHLORIDE, MAGNESIUM CHLORIDE, SODIUM PHOSPHATE, DIBASIC, AND POTASSIUM PHOSPHATE 50 ML/HR: .53; .5; .37; .037; .03; .012; .00082 INJECTION, SOLUTION INTRAVENOUS at 03:09

## 2023-01-20 RX ADMIN — ASPIRIN 81 MG CHEWABLE TABLET 81 MG: 81 TABLET CHEWABLE at 09:12

## 2023-01-20 RX ADMIN — ALLOPURINOL 100 MG: 100 TABLET ORAL at 09:12

## 2023-01-20 RX ADMIN — DOCUSATE SODIUM 100 MG: 100 CAPSULE ORAL at 17:22

## 2023-01-20 RX ADMIN — APIXABAN 2.5 MG: 2.5 TABLET, FILM COATED ORAL at 09:12

## 2023-01-20 RX ADMIN — DOCUSATE SODIUM 100 MG: 100 CAPSULE ORAL at 09:12

## 2023-01-20 NOTE — ASSESSMENT & PLAN NOTE
• Admit to Stroke Pathway per neurology recommendation  • NIH 5- No TPA out of window  • CT/CTA brain no acute abnormality  • Aspirin 81 mg daily  • Continue Eliquis twice daily  • Unable to tolerate statin secondary to severe myalgias   • telemetry  • -200 permissive hypertension-can resume amlodipine in a m  • Recent echocardiogram September 2022 with EF 45%  • Mri brain w/o contrast-patient has MRI noncompatible pacemaker in place    For that reason, we will repeat CT scan of head after 24 hours (on 1/20/2023 at 4 PM)  • Speech/PT/OT eval  • History of dementia with confusion at baseline from [facility] skilled nursing facility, was witnessed by staff with worsening confusion  • Possible metabolic encephalopathy in setting of urinary tract infection

## 2023-01-20 NOTE — QUICK NOTE
Chart reviewed, patient not seen  Creatinine trend improving  TCO2 trend stable  Continue sodium bicarbonate but increase dosing to TID

## 2023-01-20 NOTE — CONSULTS
Vascular surgery Note:    78-year-old female with dementia, HTN, HLD, A  fib on Eliquis, CAD, chronic heart failure, s/p PPM, right carotid stent, chronic kidney disease  Patient presented from nursing home with left-sided weakness, stroke alert/stroke pathway  Vascular is consulted for evaluation of left carotid stenosis    Diagnostics:  CTA head/ neck 1/19/23 showed moderate aortic atherosclerosis, bilateral vertebral artery stenosis, right common carotid stent patent, left ICA approximately 70% stenosis, 2 mm right middle ICA aneurysm   MRI ordered/not obtained 2/2 non compatible PPM device, will have repeat CTH in 24-36 hours  CV duplex ordered     Recommendations:  -Patient not seen   Reviewed EMR, imaging discussed with Abdulaziz  -Will evaluate with CV duplex for baseline  -Neurology following  -MRI not compatible with patient's pacemaker  -Asymptomatic left carotid stenosis and transient left-sided weakness   -Recommend medical management with antiplatelet and statin therapy  -Will discuss with Dr Jaqui Dao
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
## 2023-01-20 NOTE — ASSESSMENT & PLAN NOTE
· Chronic anemia hemoglobin appears baseline when compared to labs from care everywhere  · Monitor hemoglobin  Lab Results   Component Value Date    IRON 15 (L) 01/19/2023    TIBC 321 01/19/2023    FERRITIN 58 01/19/2023   ·   Lab Results   Component Value Date    DAXYDPOL18 363 01/19/2023   ·   ·

## 2023-01-20 NOTE — TELEMEDICINE
e-Consult (IPC)     Inpatient consult to Neurosurgery  Consult performed by: Rufino Hale PA-C  Consult ordered by: Mac Yin MD           Contacted by Geraldine Huynh @ [facility]  Ether Click 65 y o  female MRN: [id number]  Unit/Bed#: -Trisha Encounter: [id number]    Reason for Consult    Per provider report, patient with PMH significant for dementia, afib on Eliquis, CAD, CHF, CKD presents with increased confusion with concern for stroke  Imaging revealed incidental 2mm right ICA aneurysm  Neurosurgery was contacted for further evaluation  Available past medical history,social history, surgical history, medication list, drug allergies and review of systems were reviewed  /71 (BP Location: Right arm)   Pulse 71   Temp 97 9 °F (36 6 °C)   Resp 18   Ht 5' 5" (1 651 m)   Wt 56 3 kg (124 lb 1 9 oz)   SpO2 97%   BMI 20 65 kg/m²      Clinical exam per provider report, AO to self only  Can follow simple commands  No CN deficits  Imaging personally reviewed  CTA head and neck w/wo, 1/19/23: 2 mm right middle ICA aneurysm    Assessment and Recommendations    1  Given patient's age and co-morbidities, no further imaging or intervention is recommended at this time  2  Based on ISUIA she has a < 1%/yr risk of aneurysm rupture  Modifiable risk factors including hypertension, and smoking  Signs and symptoms of aneurysm rupture including severe, sudden onset headache, neck pain, nausea and vomiting, and seizure  These symptoms should prompt her to visit an emergency department immediately  All questions answered  Provider is in agreement with the course of action  11-20 minutes, >50% of the total time devoted to medical consultative verbal/EMR discussion between providers  Written report will be generated in the EMR

## 2023-01-20 NOTE — ASSESSMENT & PLAN NOTE
Lab Results   Component Value Date    EGFR 25 01/20/2023    EGFR 23 01/19/2023    EGFR 23 01/19/2023    CREATININE 1 85 (H) 01/20/2023    CREATININE 2 00 (H) 01/19/2023    CREATININE 2 02 (H) 01/19/2023   · Creatinine elevated with metabolic acidosis  · Gentle hydration  · UTI-empiric ceftriaxone  · Avoid nephrotoxic medication  · Only dose medication  · Appreciate nephrology consultation-continue gentle hydration, avoid hypotension

## 2023-01-20 NOTE — CASE MANAGEMENT
Case Management Discharge Planning Note    Patient name Saintclair Maple  Location /-34 MRN [id number]  : 1945 Date 2023       Current Admission Date: 2023  Current Admission Diagnosis:Stroke-like symptoms   Patient Active Problem List    Diagnosis Date Noted   • Right internal carotid artery aneurysm 2023   • Carotid artery stenosis 2023   • Elevated d-dimer 2023   • Proteinuria 2023   • Goals of care, counseling/discussion 2023   • Secondary renal hyperparathyroidism ([place] [address] ) 10/26/2022   • Hyperuricemia 10/26/2022   • Hypophosphatemia 10/04/2022   • Pulmonary hypertension ([place] [address] ) 10/04/2022   • Low TSH level 2022   • COVID-19 virus infection 2022   • Dementia ([place] [address] )    • Stroke-like symptoms 2022   • Gastrointestinal bleeding    • Metabolic acidosis    • Hydroureteronephrosis 2022   • Diverticulitis 2022   • Rectal bleeding 2022   • Elevated troponin 2022   • Iron deficiency anemia 2022   • Anemia due to chronic kidney disease 2022   • Paroxysmal atrial fibrillation ([place] [address] ) 2020   • Ischemic cardiomyopathy 2020   • S/P implantation of automatic cardioverter/defibrillator (AICD) 2020   • Essential hypertension 2020   • History of PTCA 2020   • Leg swelling 2020   • Acute kidney injury superimposed on chronic kidney disease ([place] [address] ) 2020   • Perforated appendicitis 2020   • Chronic combined systolic and diastolic CHF (congestive heart failure) ([place] [address] ) 2020   • Pyuria 2020   • Microscopic hematuria 2020   • Vitamin D insufficiency 2020   • Mitral valve insufficiency 2020   • Hypercholesterolemia 2020   • Aortic atherosclerosis ([place] [address] ) 2020   • Renal calculus 2020   • Diverticulosis 2020   • Hiatal hernia 2020   • Pulmonary nodule 2020   • Benign hypertensive renal disease 2019   • Acute blood loss anemia 02/08/2019   • Gastroesophageal reflux disease without esophagitis 02/08/2019   • Closed fracture of body of sternum with routine healing 04/12/2018   • Congestive heart disease ([address] ) 04/12/2018   • Acquired hypothyroidism 04/12/2018   • CAD (coronary artery disease) 04/12/2018   • Forgetfulness 04/12/2018   • Acute pain due to trauma 04/12/2018   • Hx of fall 04/12/2018   • Stress incontinence in female 04/12/2018   • Anemia in chronic kidney disease 03/20/2018      LOS (days): 1  Geometric Mean LOS (GMLOS) (days): 4 30  Days to GMLOS:2 9     OBJECTIVE:  Risk of Unplanned Readmission Score: 25 36         Current admission status: Inpatient   Preferred Pharmacy:   [address] [place] [address], [address] [place] [address]  [address]  [address]  [place] [id number]  Phone: [phone] Fax: [phone]    Primary Care Provider: Francois Dacosta DO    Primary Insurance: Joselin Hough [facility] REP  Secondary Insurance:       Chart reviewed aware of medical management  Case was discussed in multidisciplinary discharge meeting with OT  Nursing,Provider and CM  Clinical information supporting hospitalization:   Stroke like symptoms  - plan is repeat CT this afternoon  Neurology follow up and recommendations are pending  DARRELL is improving    Barriers to discharge:  - medical management  Discharge plan:  Return to [facility]- as patient is a MA bed hold - S  CM will continue to follow plan of care

## 2023-01-20 NOTE — ASSESSMENT & PLAN NOTE
· Continue Eliquis 5 mg twice daily  · Continue metoprolol succinate  · PPM in place-noncompatible for MRI  · Paced rhythm on EKG, rate controlled

## 2023-01-20 NOTE — PLAN OF CARE
Problem: Potential for Falls  Goal: Patient will remain free of falls  Description: INTERVENTIONS:  - Educate patient/family on patient safety including physical limitations  - Instruct patient to call for assistance with activity   - Consult OT/PT to assist with strengthening/mobility   - Keep Call bell within reach  - Keep bed low and locked with side rails adjusted as appropriate  - Keep care items and personal belongings within reach  - Initiate and maintain comfort rounds  - Make Fall Risk Sign visible to staff  Problem: PAIN - ADULT  Goal: Verbalizes/displays adequate comfort level or baseline comfort level  Description: Interventions:  - Encourage patient to monitor pain and request assistance  - Assess pain using appropriate pain scale  - Administer analgesics based on type and severity of pain and evaluate response  - Implement non-pharmacological measures as appropriate and evaluate response  - Consider cultural and social influences on pain and pain management  - Notify physician/advanced practitioner if interventions unsuccessful or patient reports new pain  Outcome: Progressing     Problem: INFECTION - ADULT  Goal: Absence or prevention of progression during hospitalization  Description: INTERVENTIONS:  - Assess and monitor for signs and symptoms of infection  - Monitor lab/diagnostic results  - Monitor all insertion sites, i e  indwelling lines, tubes, and drains  - Monitor endotracheal if appropriate and nasal secretions for changes in amount and color  - [place] appropriate cooling/warming therapies per order  - Administer medications as ordered  - Instruct and encourage patient and family to use good hand hygiene technique  - Identify and instruct in appropriate isolation precautions for identified infection/condition  Outcome: Progressing     - Apply yellow socks and bracelet for high fall risk patients  - Consider moving patient to room near nurses station  Outcome: Progressing

## 2023-01-20 NOTE — ASSESSMENT & PLAN NOTE
· TSH elevated, free T4 low  · Was taking levothyroxine 125 mcg daily-dose increased to 150 mcg, patient will need to recheck TSH in 4 to 6 weeks

## 2023-01-20 NOTE — PROGRESS NOTES
-- Patient:  -- MRN: [id number]  -- Aidin Request ID: [id number]  -- Level of care reserved: Juanpablo Harry  -- Partner Reserved[facility] [phone]  -- Clinical needs requested:  -- Geography searched: 10 miles around [id number]  -- Start of Service:  -- Request sent: 4:07pm EST on 1/19/2023 by Khari Worthington  -- Partner reserved: 1:46pm EST on 1/20/2023 by Aron Pratt  -- Choice list shared: 1:45pm EST on 1/20/2023 by Aron Pratt

## 2023-01-20 NOTE — ASSESSMENT & PLAN NOTE
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

## 2023-01-20 NOTE — ASSESSMENT & PLAN NOTE
Patient was taking amlodipine and metoprolol, amlodipine remain on hold due to lying permissive hypertension  Continue metoprolol  Can resume amlodipine in a m

## 2023-01-20 NOTE — PLAN OF CARE
Problem: Potential for Falls  Goal: Patient will remain free of falls  Description: INTERVENTIONS:  - Educate patient/family on patient safety including physical limitations  - Instruct patient to call for assistance with activity   - Consult OT/PT to assist with strengthening/mobility   - Keep Call bell within reach  - Keep bed low and locked with side rails adjusted as appropriate  - Keep care items and personal belongings within reach  - Initiate and maintain comfort rounds  - Make Fall Risk Sign visible to staff  - Offer Toileting every 2 Hours, in advance of need  - Initiate/Maintain bed/chair alarm  - Apply yellow socks and bracelet for high fall risk patients  - Consider moving patient to room near nurses station  Outcome: Progressing     Problem: PAIN - ADULT  Goal: Verbalizes/displays adequate comfort level or baseline comfort level  Description: Interventions:  - Encourage patient to monitor pain and request assistance  - Assess pain using appropriate pain scale  - Administer analgesics based on type and severity of pain and evaluate response  - Implement non-pharmacological measures as appropriate and evaluate response  - Consider cultural and social influences on pain and pain management  - Notify physician/advanced practitioner if interventions unsuccessful or patient reports new pain  Outcome: Progressing     Problem: INFECTION - ADULT  Goal: Absence or prevention of progression during hospitalization  Description: INTERVENTIONS:  - Assess and monitor for signs and symptoms of infection  - Monitor lab/diagnostic results  - Monitor all insertion sites, i e  indwelling lines, tubes, and drains  - Monitor endotracheal if appropriate and nasal secretions for changes in amount and color  - [place] appropriate cooling/warming therapies per order  - Administer medications as ordered  - Instruct and encourage patient and family to use good hand hygiene technique  - Identify and instruct in appropriate isolation precautions for identified infection/condition  Outcome: Progressing     Problem: SAFETY ADULT  Goal: Patient will remain free of falls  Description: INTERVENTIONS:  - Educate patient/family on patient safety including physical limitations  - Instruct patient to call for assistance with activity   - Consult OT/PT to assist with strengthening/mobility   - Keep Call bell within reach  - Keep bed low and locked with side rails adjusted as appropriate  - Keep care items and personal belongings within reach  - Initiate and maintain comfort rounds  - Make Fall Risk Sign visible to staff  - Apply yellow socks and bracelet for high fall risk patients  - Consider moving patient to room near nurses station  Outcome: Progressing  Goal: Maintain or return to baseline ADL function  Description: INTERVENTIONS:  -  Assess patient's ability to carry out ADLs; assess patient's baseline for ADL function and identify physical deficits which impact ability to perform ADLs (bathing, care of mouth/teeth, toileting, grooming, dressing, etc )  - Assess/evaluate cause of self-care deficits   - Assess range of motion  - Assess patient's mobility; develop plan if impaired  - Assess patient's need for assistive devices and provide as appropriate  - Encourage maximum independence but intervene and supervise when necessary  - Involve family in performance of ADLs  - Assess for home care needs following discharge   - Consider OT consult to assist with ADL evaluation and planning for discharge  - Provide patient education as appropriate  Outcome: Progressing  Goal: Maintains/Returns to pre admission functional level  Description: INTERVENTIONS:  - Perform BMAT or MOVE assessment daily    - Set and communicate daily mobility goal to care team and patient/family/caregiver     - Collaborate with rehabilitation services on mobility goals if consulted  - Out of bed for meals 3 times a day  - Out of bed for toileting  - Record patient progress and toleration of activity level   Outcome: Progressing     Problem: DISCHARGE PLANNING  Goal: Discharge to home or other facility with appropriate resources  Description: INTERVENTIONS:  - Identify barriers to discharge w/patient and caregiver  - Arrange for needed discharge resources and transportation as appropriate  - Identify discharge learning needs (meds, wound care, etc )  - Arrange for interpretive services to assist at discharge as needed  - Refer to Case Management Department for coordinating discharge planning if the patient needs post-hospital services based on physician/advanced practitioner order or complex needs related to functional status, cognitive ability, or social support system  Outcome: Progressing     Problem: Knowledge Deficit  Goal: Patient/family/caregiver demonstrates understanding of disease process, treatment plan, medications, and discharge instructions  Description: Complete learning assessment and assess knowledge base    Interventions:  - Provide teaching at level of understanding  - Provide teaching via preferred learning methods  Outcome: Progressing     Problem: NEUROSENSORY - ADULT  Goal: Achieves stable or improved neurological status  Description: INTERVENTIONS  - Monitor and report changes in neurological status  - Monitor vital signs such as temperature, blood pressure, glucose, and any other labs ordered   - Initiate measures to prevent increased intracranial pressure  - Monitor for seizure activity and implement precautions if appropriate      Outcome: Progressing  Goal: Remains free of injury related to seizures activity  Description: INTERVENTIONS  - Maintain airway, patient safety  and administer oxygen as ordered  - Monitor patient for seizure activity, document and report duration and description of seizure to physician/advanced practitioner  - If seizure occurs,  ensure patient safety during seizure  - Reorient patient post seizure  - Seizure pads on all 4 side rails  - Instruct patient/family to notify RN of any seizure activity including if an aura is experienced  - Instruct patient/family to call for assistance with activity based on nursing assessment  - Administer anti-seizure medications if ordered    Outcome: Progressing  Goal: Achieves maximal functionality and self care  Description: INTERVENTIONS  - Monitor swallowing and airway patency with patient fatigue and changes in neurological status  - Encourage and assist patient to increase activity and self care     - Encourage visually impaired, hearing impaired and aphasic patients to use assistive/communication devices  Outcome: Progressing     Problem: CARDIOVASCULAR - ADULT  Goal: Maintains optimal cardiac output and hemodynamic stability  Description: INTERVENTIONS:  - Monitor I/O, vital signs and rhythm  - Monitor for S/S and trends of decreased cardiac output  - Administer and titrate ordered vasoactive medications to optimize hemodynamic stability  - Assess quality of pulses, skin color and temperature  - Assess for signs of decreased coronary artery perfusion  - Instruct patient to report change in severity of symptoms  Outcome: Progressing  Goal: Absence of cardiac dysrhythmias or at baseline rhythm  Description: INTERVENTIONS:  - Continuous cardiac monitoring, vital signs, obtain 12 lead EKG if ordered  - Administer antiarrhythmic and heart rate control medications as ordered  - Monitor electrolytes and administer replacement therapy as ordered  Outcome: Progressing     Problem: RESPIRATORY - ADULT  Goal: Achieves optimal ventilation and oxygenation  Description: INTERVENTIONS:  - Assess for changes in respiratory status  - Assess for changes in mentation and behavior  - Position to facilitate oxygenation and minimize respiratory effort  - Oxygen administered by appropriate delivery if ordered  - Initiate smoking cessation education as indicated  - Encourage broncho-pulmonary hygiene including cough, deep breathe, Incentive Spirometry  - Assess the need for suctioning and aspirate as needed  - Assess and instruct to report SOB or any respiratory difficulty  - Respiratory Therapy support as indicated  Outcome: Progressing     Problem: GASTROINTESTINAL - ADULT  Goal: Minimal or absence of nausea and/or vomiting  Description: INTERVENTIONS:  - Administer IV fluids if ordered to ensure adequate hydration  - Maintain NPO status until nausea and vomiting are resolved  - Nasogastric tube if ordered  - Administer ordered antiemetic medications as needed  - Provide nonpharmacologic comfort measures as appropriate  - Advance diet as tolerated, if ordered  - Consider nutrition services referral to assist patient with adequate nutrition and appropriate food choices  Outcome: Progressing     Problem: GENITOURINARY - ADULT  Goal: Maintains or returns to baseline urinary function  Description: INTERVENTIONS:  - Assess urinary function  - Encourage oral fluids to ensure adequate hydration if ordered  - Administer IV fluids as ordered to ensure adequate hydration  - Administer ordered medications as needed  - Offer frequent toileting  - Follow urinary retention protocol if ordered  Outcome: Progressing     Problem: METABOLIC, FLUID AND ELECTROLYTES - ADULT  Goal: Electrolytes maintained within normal limits  Description: INTERVENTIONS:  - Monitor labs and assess patient for signs and symptoms of electrolyte imbalances  - Administer electrolyte replacement as ordered  - Monitor response to electrolyte replacements, including repeat lab results as appropriate  - Instruct patient on fluid and nutrition as appropriate  Outcome: Progressing  Goal: Fluid balance maintained  Description: INTERVENTIONS:  - Monitor labs   - Monitor I/O and WT  - Instruct patient on fluid and nutrition as appropriate  - Assess for signs & symptoms of volume excess or deficit  Outcome: Progressing  Goal: Glucose maintained within target range  Description: INTERVENTIONS:  - Monitor Blood Glucose as ordered  - Assess for signs and symptoms of hyperglycemia and hypoglycemia  - Administer ordered medications to maintain glucose within target range  - Assess nutritional intake and initiate nutrition service referral as needed  Outcome: Progressing     Problem: HEMATOLOGIC - ADULT  Goal: Maintains hematologic stability  Description: INTERVENTIONS  - Assess for signs and symptoms of bleeding or hemorrhage  - Monitor labs  - Administer supportive blood products/factors as ordered and appropriate  Outcome: Progressing     Problem: Nutrition/Hydration-ADULT  Goal: Nutrient/Hydration intake appropriate for improving, restoring or maintaining nutritional needs  Description: Monitor and assess patient's nutrition/hydration status for malnutrition  Collaborate with interdisciplinary team and initiate plan and interventions as ordered  Monitor patient's weight and dietary intake as ordered or per policy  Utilize nutrition screening tool and intervene as necessary  Determine patient's food preferences and provide high-protein, high-caloric foods as appropriate       INTERVENTIONS:  - Monitor oral intake, urinary output, labs, and treatment plans  - Assess nutrition and hydration status and recommend course of action  - Evaluate amount of meals eaten  - Assist patient with eating if necessary   - Allow adequate time for meals  - Recommend/ encourage appropriate diets, oral nutritional supplements, and vitamin/mineral supplements  - Order, calculate, and assess calorie counts as needed  - Recommend, monitor, and adjust tube feedings and TPN/PPN based on assessed needs  - Assess need for intravenous fluids  - Provide specific nutrition/hydration education as appropriate  - Include patient/family/caregiver in decisions related to nutrition  Outcome: Progressing

## 2023-01-20 NOTE — ASSESSMENT & PLAN NOTE
Wt Readings from Last 3 Encounters:   01/20/23 56 3 kg (124 lb 1 9 oz)   01/13/23 53 1 kg (117 lb)   10/04/22 53 3 kg (117 lb 8 1 oz)     EF 45% September 2022  Appears euvolemic on exam  Not on outpatient diuretics  Continue metoprolol succinate  Monitor volume status

## 2023-01-20 NOTE — PROGRESS NOTES
[address]  Progress Note - Jaclyn Rebolledo 1945, 65 y o  female MRN: [id number]  Unit/Bed#: -Trisha Encounter: [id number]  Primary Care Provider: Salvador Pink DO   Date and time admitted to hospital: 1/19/2023  2:42 AM    * Stroke-like symptoms  Assessment & Plan  • Admit to Stroke Pathway per neurology recommendation  • NIH 5- No TPA out of window  • CT/CTA brain no acute abnormality  • Aspirin 81 mg daily  • Continue Eliquis twice daily  • Unable to tolerate statin secondary to severe myalgias   • telemetry  • -200 permissive hypertension-can resume amlodipine in a m  • Recent echocardiogram September 2022 with EF 45%  • Mri brain w/o contrast-patient has MRI noncompatible pacemaker in place  For that reason, we will repeat CT scan of head after 24 hours (on 1/20/2023 at 4 PM)  • Speech/PT/OT eval  • History of dementia with confusion at baseline from [facility] nursing facility, was witnessed by staff with worsening confusion  • Possible metabolic encephalopathy in setting of urinary tract infection      Carotid artery stenosis  Assessment & Plan  Aortic arch and great vessels:  Moderate aortic atherosclerosis  Olivia Grover is a 50% stenosis at the brachycephalic artery noted on series 2/23   Right vertebral artery: 60% stenosis at the origin   Left vertebral artery:  80% stenosis at the origin     Right carotid:  Stent noted within the right common carotid and internal carotid arteries   Left carotid:  Large cluster of calcified atherosclerotic plaque at the carotid bulb with about a 70% stenosis     Follow vascular surgery recommendation    Right internal carotid artery aneurysm  Assessment & Plan  2 mm right middle ICA aneurysm   Follow neurosurgery recommendation    Elevated d-dimer  Assessment & Plan  · D-dimer 6 69 during ED evaluation  · Chronically anticoagulated with Eliquis, resides in nursing facility with medication compliance  · No tachycardia or hypoxia to suggest PE  · Continue to monitor    Metabolic acidosis  Assessment & Plan  Slowly improving, continue gentle hydration appreciate nephrology consult    Benign hypertensive renal disease  Assessment & Plan  Patient was taking amlodipine and metoprolol, amlodipine remain on hold due to lying permissive hypertension  Continue metoprolol  Can resume amlodipine in a m      Anemia due to chronic kidney disease  Assessment & Plan  · Chronic anemia hemoglobin appears baseline when compared to labs from care everywhere  · Monitor hemoglobin  Lab Results   Component Value Date    IRON 15 (L) 01/19/2023    TIBC 321 01/19/2023    FERRITIN 58 01/19/2023   ·   Lab Results   Component Value Date    [id number] 363 01/19/2023   ·   ·     Paroxysmal atrial fibrillation (HCC)  Assessment & Plan  · Continue Eliquis 5 mg twice daily  · Continue metoprolol succinate  · PPM in place-noncompatible for MRI  · Paced rhythm on EKG, rate controlled    Chronic combined systolic and diastolic CHF (congestive heart failure) (HCC)  Assessment & Plan  Wt Readings from Last 3 Encounters:   01/20/23 56 3 kg (124 lb 1 9 oz)   01/13/23 53 1 kg (117 lb)   10/04/22 53 3 kg (117 lb 8 1 oz)     EF 45% September 2022  Appears euvolemic on exam  Not on outpatient diuretics  Continue metoprolol succinate  Monitor volume status      Acute kidney injury superimposed on chronic kidney disease [facility])  Assessment & Plan  Lab Results   Component Value Date    EGFR 25 01/20/2023    EGFR 23 01/19/2023    EGFR 23 01/19/2023    CREATININE 1 85 (H) 01/20/2023    CREATININE 2 00 (H) 01/19/2023    CREATININE 2 02 (H) 01/19/2023   · Creatinine elevated with metabolic acidosis  · Gentle hydration  · UTI-empiric ceftriaxone  · Avoid nephrotoxic medication  · Only dose medication  · Appreciate nephrology consultation-continue gentle hydration, avoid hypotension    Acquired hypothyroidism  Assessment & Plan  · TSH elevated, free T4 low  · Was taking levothyroxine 125 mcg daily-dose increased to 150 mcg, patient will need to recheck TSH in 4 to 6 weeks          VTE Pharmacologic Prophylaxis: VTE Score: 8 High Risk (Score >/= 5) - Pharmacological DVT Prophylaxis Ordered: apixaban (Eliquis)  Sequential Compression Devices Ordered  Patient Centered Rounds: I performed bedside rounds with nursing staff today  Discussions with Specialists or Other Care Team Provider: Vascular surgery, neurosurgery, neurology    Education and Discussions with Family / Patient: none  Current Length of Stay: 1 day(s)  Current Patient Status: Inpatient   Certification Statement: The patient will continue to require additional inpatient hospital stay due to To monitor above conditions  Discharge Plan: Anticipate discharge in 24-48 hrs to rehab facility  Code Status: Level 3 - DNAR and DNI    Subjective:   Seen and evaluated during rounds  Patient sitting upright position, patient is much more alert and awake compared to yesterday  Denies any significant complaint  Objective:     Vitals:   Temp (24hrs), Av 6 °F (36 4 °C), Min:97 3 °F (36 3 °C), Max:97 9 °F (36 6 °C)    Temp:  [97 3 °F (36 3 °C)-97 9 °F (36 6 °C)] 97 9 °F (36 6 °C)  HR:  [62-71] 71  Resp:  [14-20] 18  BP: (120-148)/(64-78) 140/71  SpO2:  [94 %-98 %] 97 %  Body mass index is 20 65 kg/m²  Input and Output Summary (last 24 hours): Intake/Output Summary (Last 24 hours) at 2023 1344  Last data filed at 2023 1311  Gross per 24 hour   Intake 1639 16 ml   Output 244 ml   Net 1395 16 ml       Physical Exam:   Physical Exam  Vitals and nursing note reviewed  Constitutional:       Appearance: Normal appearance  She is not ill-appearing or diaphoretic  HENT:      Mouth/Throat:      Mouth: Mucous membranes are moist       Pharynx: Oropharynx is clear  No oropharyngeal exudate  Eyes:      General: No scleral icterus  Left eye: No discharge  Extraocular Movements: Extraocular movements intact  Conjunctiva/sclera: Conjunctivae normal       Pupils: Pupils are equal, round, and reactive to light  Cardiovascular:      Rate and Rhythm: Normal rate  Heart sounds: Normal heart sounds  No friction rub  No gallop  Pulmonary:      Effort: Pulmonary effort is normal  No respiratory distress  Breath sounds: No stridor  No wheezing or rhonchi  Abdominal:      General: Abdomen is flat  Bowel sounds are normal  There is no distension  Palpations: There is no mass  Tenderness: There is no abdominal tenderness  Hernia: No hernia is present  Musculoskeletal:         General: No swelling, tenderness or deformity  Cervical back: Normal range of motion  No rigidity  Right lower leg: No edema  Left lower leg: No edema  Lymphadenopathy:      Cervical: No cervical adenopathy  Skin:     General: Skin is warm  Capillary Refill: Capillary refill takes less than 2 seconds  Coloration: Skin is not jaundiced or pale  Findings: No bruising or erythema  Neurological:      General: No focal deficit present  Mental Status: She is alert and oriented to person, place, and time  Cranial Nerves: No cranial nerve deficit  Sensory: No sensory deficit  Motor: No weakness        Coordination: Coordination normal          Additional Data:     Labs:  Results from last 7 days   Lab Units 01/19/23  0757   WBC Thousand/uL 7 68   HEMOGLOBIN g/dL 7 7*   HEMATOCRIT % 25 2*   PLATELETS Thousands/uL 303   NEUTROS PCT % 74   LYMPHS PCT % 16   MONOS PCT % 6   EOS PCT % 2     Results from last 7 days   Lab Units 01/20/23  0700 01/19/23  0757 01/19/23  0256   SODIUM mmol/L 136   < > 136   POTASSIUM mmol/L 4 5   < > 4 8   CHLORIDE mmol/L 110*   < > 110*   CO2 mmol/L 17*   < > 16*   BUN mg/dL 66*   < > 72*   CREATININE mg/dL 1 85*   < > 2 02*   ANION GAP mmol/L 9   < > 10   CALCIUM mg/dL 8 6   < > 8 9   ALBUMIN g/dL  --   --  3 2*   TOTAL BILIRUBIN mg/dL  --   --  0 41 ALK PHOS U/L  --   --  107*   ALT U/L  --   --  8   AST U/L  --   --  17   GLUCOSE RANDOM mg/dL 81   < > 82    < > = values in this interval not displayed  Results from last 7 days   Lab Units 01/19/23  0256   INR  2 34*     Results from last 7 days   Lab Units 01/19/23  0241   POC GLUCOSE mg/dl 84     Results from last 7 days   Lab Units 01/19/23  0757   HEMOGLOBIN A1C % 5 1     Results from last 7 days   Lab Units 01/19/23  0311 01/19/23  0256   LACTIC ACID mmol/L 0 7  --    PROCALCITONIN ng/ml  --  0 21       Lines/Drains:  Invasive Devices     Peripheral Intravenous Line  Duration           Peripheral IV 01/19/23 Right;Ventral (anterior) Forearm 1 day                  Telemetry:  Telemetry Orders (From admission, onward)             48 Hour Telemetry Monitoring  Continuous x 48 hours        References:    Telemetry Guidelines   Question:  Reason for 48 Hour Telemetry  Answer:  Acute CVA (<24 hrs old, hemispheric strokes, selected brainstem strokes, cardiac arrhythmias)                 Telemetry Reviewed: Normal Sinus Rhythm  Indication for Continued Telemetry Use: No indication for continued use  Will discontinue  Imaging: No pertinent imaging reviewed  Recent Cultures (last 7 days):   Results from last 7 days   Lab Units 01/19/23  0320 01/19/23  0311   BLOOD CULTURE   --  No Growth at 24 hrs  No Growth at 24 hrs     URINE CULTURE  >100,000 cfu/ml Gram Negative Robin Enteric Like*  --        Last 24 Hours Medication List:   Current Facility-Administered Medications   Medication Dose Route Frequency Provider Last Rate   • acetaminophen  650 mg Oral Q6H PRN Juju S Funmilayo, CRNP     • allopurinol  100 mg Oral Daily Juju S Funmilayo, CRNP     • [START ON 1/21/2023] amLODIPine  10 mg Oral Daily Crystal Avalos MD     • apixaban  2 5 mg Oral BID Juju S Funmilayo, CRNP     • aspirin  81 mg Oral Daily Juju S Funmilayo, CRNP     • cefTRIAXone  1,000 mg Intravenous Q24H Juju S Funmilayo, CRNP 1,000 mg (01/19/23 4281)   • cholecalciferol  1,000 Units Oral Daily Juju S Funmilayo, CRNP     • docusate sodium  100 mg Oral BID Juju S Funmilayo, CRNP     • [START ON 1/21/2023] levothyroxine  150 mcg Oral Early Morning Crystal Avalos MD     • metoprolol succinate  100 mg Oral Daily Salvador Avalos MD     • multi-electrolyte  50 mL/hr Intravenous Continuous Juju S Funmilayo, CRNP 50 mL/hr (01/20/23 0154)   • polyethylene glycol  17 g Oral Daily PRN Juju S Funmilayo, CRNP     • sertraline  100 mg Oral Daily Juju S Funmilayo, CRNP     • sodium bicarbonate  650 mg Oral BID after meals Marta Pruitt PA-C          Today, Patient Was Seen By: Britany Hanson MD    **Please Note: This note may have been constructed using a voice recognition system  **

## 2023-01-21 ENCOUNTER — APPOINTMENT (INPATIENT)
Dept: NON INVASIVE DIAGNOSTICS | Facility: HOSPITAL | Age: 78
End: 2023-01-21

## 2023-01-21 PROBLEM — N30.90 CYSTITIS: Status: ACTIVE | Noted: 2023-01-21

## 2023-01-21 LAB
ALBUMIN SERPL BCP-MCNC: 3 G/DL (ref 3.5–5)
ALP SERPL-CCNC: 96 U/L (ref 34–104)
ALT SERPL W P-5'-P-CCNC: 7 U/L (ref 7–52)
ANION GAP SERPL CALCULATED.3IONS-SCNC: 9 MMOL/L (ref 4–13)
AST SERPL W P-5'-P-CCNC: 14 U/L (ref 13–39)
BASOPHILS # BLD AUTO: 0.1 THOUSANDS/ÂΜL (ref 0–0.1)
BASOPHILS NFR BLD AUTO: 2 % (ref 0–1)
BILIRUB SERPL-MCNC: 0.3 MG/DL (ref 0.2–1)
BUN SERPL-MCNC: 58 MG/DL (ref 5–25)
CALCIUM ALBUM COR SERPL-MCNC: 9.2 MG/DL (ref 8.3–10.1)
CALCIUM SERPL-MCNC: 8.4 MG/DL (ref 8.4–10.2)
CHLORIDE SERPL-SCNC: 109 MMOL/L (ref 96–108)
CO2 SERPL-SCNC: 18 MMOL/L (ref 21–32)
CREAT SERPL-MCNC: 1.79 MG/DL (ref 0.6–1.3)
EOSINOPHIL # BLD AUTO: 0.22 THOUSAND/ÂΜL (ref 0–0.61)
EOSINOPHIL NFR BLD AUTO: 3 % (ref 0–6)
ERYTHROCYTE [DISTWIDTH] IN BLOOD BY AUTOMATED COUNT: 16 % (ref 11.6–15.1)
GFR SERPL CREATININE-BSD FRML MDRD: 26 ML/MIN/1.73SQ M
GLUCOSE SERPL-MCNC: 74 MG/DL (ref 65–140)
HCT VFR BLD AUTO: 24.9 % (ref 34.8–46.1)
HGB BLD-MCNC: 7.5 G/DL (ref 11.5–15.4)
IMM GRANULOCYTES # BLD AUTO: 0.02 THOUSAND/UL (ref 0–0.2)
IMM GRANULOCYTES NFR BLD AUTO: 0 % (ref 0–2)
LYMPHOCYTES # BLD AUTO: 1.1 THOUSANDS/ÂΜL (ref 0.6–4.47)
LYMPHOCYTES NFR BLD AUTO: 17 % (ref 14–44)
MCH RBC QN AUTO: 27.3 PG (ref 26.8–34.3)
MCHC RBC AUTO-ENTMCNC: 30.1 G/DL (ref 31.4–37.4)
MCV RBC AUTO: 91 FL (ref 82–98)
MONOCYTES # BLD AUTO: 0.48 THOUSAND/ÂΜL (ref 0.17–1.22)
MONOCYTES NFR BLD AUTO: 7 % (ref 4–12)
MRSA NOSE QL CULT: NORMAL
NEUTROPHILS # BLD AUTO: 4.57 THOUSANDS/ÂΜL (ref 1.85–7.62)
NEUTS SEG NFR BLD AUTO: 71 % (ref 43–75)
NRBC BLD AUTO-RTO: 0 /100 WBCS
PLATELET # BLD AUTO: 293 THOUSANDS/UL (ref 149–390)
PMV BLD AUTO: 10.3 FL (ref 8.9–12.7)
POTASSIUM SERPL-SCNC: 4.2 MMOL/L (ref 3.5–5.3)
PROT SERPL-MCNC: 5.9 G/DL (ref 6.4–8.4)
RBC # BLD AUTO: 2.75 MILLION/UL (ref 3.81–5.12)
SODIUM SERPL-SCNC: 136 MMOL/L (ref 135–147)
WBC # BLD AUTO: 6.49 THOUSAND/UL (ref 4.31–10.16)

## 2023-01-21 RX ORDER — AMLODIPINE BESYLATE 10 MG/1
10 TABLET ORAL DAILY
Status: DISCONTINUED | OUTPATIENT
Start: 2023-01-22 | End: 2023-01-26 | Stop reason: HOSPADM

## 2023-01-21 RX ADMIN — DOCUSATE SODIUM 100 MG: 100 CAPSULE ORAL at 17:18

## 2023-01-21 RX ADMIN — SODIUM BICARBONATE 650 MG TABLET 650 MG: at 09:05

## 2023-01-21 RX ADMIN — APIXABAN 5 MG: 5 TABLET, FILM COATED ORAL at 17:18

## 2023-01-21 RX ADMIN — METOPROLOL SUCCINATE 100 MG: 100 TABLET, EXTENDED RELEASE ORAL at 09:05

## 2023-01-21 RX ADMIN — SODIUM BICARBONATE 650 MG TABLET 650 MG: at 17:18

## 2023-01-21 RX ADMIN — APIXABAN 2.5 MG: 2.5 TABLET, FILM COATED ORAL at 09:13

## 2023-01-21 RX ADMIN — LEVOTHYROXINE SODIUM 150 MCG: 150 TABLET ORAL at 05:15

## 2023-01-21 RX ADMIN — ALLOPURINOL 100 MG: 100 TABLET ORAL at 09:05

## 2023-01-21 RX ADMIN — SERTRALINE 100 MG: 100 TABLET, FILM COATED ORAL at 09:05

## 2023-01-21 RX ADMIN — CEFTRIAXONE 1000 MG: 1 INJECTION, SOLUTION INTRAVENOUS at 20:35

## 2023-01-21 RX ADMIN — AMLODIPINE BESYLATE 10 MG: 10 TABLET ORAL at 09:05

## 2023-01-21 RX ADMIN — SODIUM BICARBONATE 650 MG TABLET 650 MG: at 11:37

## 2023-01-21 RX ADMIN — DOCUSATE SODIUM 100 MG: 100 CAPSULE ORAL at 09:05

## 2023-01-21 RX ADMIN — SODIUM CHLORIDE, SODIUM GLUCONATE, SODIUM ACETATE, POTASSIUM CHLORIDE, MAGNESIUM CHLORIDE, SODIUM PHOSPHATE, DIBASIC, AND POTASSIUM PHOSPHATE 50 ML/HR: .53; .5; .37; .037; .03; .012; .00082 INJECTION, SOLUTION INTRAVENOUS at 20:34

## 2023-01-21 RX ADMIN — Medication 1000 UNITS: at 09:05

## 2023-01-21 RX ADMIN — SODIUM CHLORIDE, SODIUM GLUCONATE, SODIUM ACETATE, POTASSIUM CHLORIDE, MAGNESIUM CHLORIDE, SODIUM PHOSPHATE, DIBASIC, AND POTASSIUM PHOSPHATE 50 ML/HR: .53; .5; .37; .037; .03; .012; .00082 INJECTION, SOLUTION INTRAVENOUS at 02:21

## 2023-01-21 RX ADMIN — ASPIRIN 81 MG CHEWABLE TABLET 81 MG: 81 TABLET CHEWABLE at 09:06
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
## 2023-01-21 NOTE — UTILIZATION REVIEW
NOTIFICATION OF INPATIENT ADMISSION   AUTHORIZATION REQUEST   SERVICING FACILITY:   [address] [place] [address]  Sheila Stanford [address] [place] [address], [facility] [place] [place]  Tax ID: [id number]  NPI: [id number] ATTENDING PROVIDER:  Attending Name and NPI#: Julius Dhillon Md [[id number]]  Address: [facility]  Sheila Stanford [address] [place] [address], [facility] [place]  Phone: [phone]   ADMISSION INFORMATION:  Place of Service: [place] [address]  Place of Service Code: 21  Inpatient Admission Date/Time: 1/19/23  4:16 AM  Discharge Date/Time: No discharge date for patient encounter  Admitting Diagnosis Code/Description:  UTI (urinary tract infection) [N39 0]  Anemia [D64 9]  GI bleed [K92 2]  AMS (altered mental status) [R41 82]     UTILIZATION REVIEW CONTACT:  Tanner Grant Utilization   Network Utilization Review Department  Phone: [phone]  Fax [phone]  Email: [email or web address]  Contact for approvals/pending authorizations, clinical reviews, and discharge  PHYSICIAN ADVISORY SERVICES:  Medical Necessity Denial & [facility] Review  Phone: [phone]  Fax: [phone]  Email: [email or web address]

## 2023-01-21 NOTE — ASSESSMENT & PLAN NOTE
Lab Results   Component Value Date    EGFR 26 01/21/2023    EGFR 25 01/20/2023    EGFR 23 01/19/2023    CREATININE 1 79 (H) 01/21/2023    CREATININE 1 85 (H) 01/20/2023    CREATININE 2 00 (H) 01/19/2023   · Creatinine elevated with metabolic acidosis  · UTI-empiric ceftriaxone  · Gentle IV hydration  · Avoid nephrotoxic medication  · Appreciate nephrology consultation-continue gentle hydration, avoid hypotension

## 2023-01-21 NOTE — ASSESSMENT & PLAN NOTE
• Suspect TIA vs Recrudescence  • NIH 5- No TPA out of window  • CT/CTA brain no acute abnormality  • Aspirin 81 mg daily-will DC aspirin since patient is on Eliquis to reduce bleeding risk as per neurology recommendation  • Continue Eliquis twice daily  • Unable to tolerate statin secondary to severe myalgias   • Telemetry-no significant event noted   • Resume home medication  • Recent echocardiogram September 2022 with EF 45%  • Mri brain w/o contrast-patient has MRI noncompatible pacemaker in place    For that reason,   • Repeat CT scan normal   • speech/PT/OT eval appreciated  • History of dementia with confusion at baseline from [facility] skilled nursing facility, was witnessed by staff with worsening confusion

## 2023-01-21 NOTE — PLAN OF CARE
Problem: CARDIOVASCULAR - ADULT  Goal: Maintains optimal cardiac output and hemodynamic stability  Description: INTERVENTIONS:  - Monitor I/O, vital signs and rhythm  - Monitor for S/S and trends of decreased cardiac output  - Administer and titrate ordered vasoactive medications to optimize hemodynamic stability  - Assess quality of pulses, skin color and temperature  - Assess for signs of decreased coronary artery perfusion  - Instruct patient to report change in severity of symptoms  1/21/2023 1058 by Rula Caban RN  Outcome: Progressing  1/21/2023 1053 by Rula Caban RN  Outcome: Progressing  Goal: Absence of cardiac dysrhythmias or at baseline rhythm  Description: INTERVENTIONS:  - Continuous cardiac monitoring, vital signs, obtain 12 lead EKG if ordered  - Administer antiarrhythmic and heart rate control medications as ordered  - Monitor electrolytes and administer replacement therapy as ordered  1/21/2023 1058 by Rula Caban RN  Outcome: Progressing  1/21/2023 1053 by Rula Caban RN  Outcome: Progressing     Problem: RESPIRATORY - ADULT  Goal: Achieves optimal ventilation and oxygenation  Description: INTERVENTIONS:  - Assess for changes in respiratory status  - Assess for changes in mentation and behavior  - Position to facilitate oxygenation and minimize respiratory effort  - Oxygen administered by appropriate delivery if ordered  - Initiate smoking cessation education as indicated  - Encourage broncho-pulmonary hygiene including cough, deep breathe, Incentive Spirometry  - Assess the need for suctioning and aspirate as needed  - Assess and instruct to report SOB or any respiratory difficulty  - Respiratory Therapy support as indicated  1/21/2023 1058 by Rula Caban RN  Outcome: Progressing  1/21/2023 1053 by Rula Caban RN  Outcome: Progressing     Problem: GASTROINTESTINAL - ADULT  Goal: Minimal or absence of nausea and/or vomiting  Description: INTERVENTIONS:  - Administer IV fluids if ordered to ensure adequate hydration  - Maintain NPO status until nausea and vomiting are resolved  - Nasogastric tube if ordered  - Administer ordered antiemetic medications as needed  - Provide nonpharmacologic comfort measures as appropriate  - Advance diet as tolerated, if ordered  - Consider nutrition services referral to assist patient with adequate nutrition and appropriate food choices  1/21/2023 1058 by Marlene Coles RN  Outcome: Progressing  1/21/2023 1053 by Marlene Coles RN  Outcome: Progressing  Goal: Maintains or returns to baseline bowel function  Description: INTERVENTIONS:  - Assess bowel function  - Encourage oral fluids to ensure adequate hydration  - Administer IV fluids if ordered to ensure adequate hydration  - Administer ordered medications as needed  - Encourage mobilization and activity  - Consider nutritional services referral to assist patient with adequate nutrition and appropriate food choices  1/21/2023 1058 by Marlene Coles RN  Outcome: Progressing  1/21/2023 1053 by Marlene Coles RN  Outcome: Progressing  Goal: Maintains adequate nutritional intake  Description: INTERVENTIONS:  - Monitor percentage of each meal consumed  - Identify factors contributing to decreased intake, treat as appropriate  - Assist with meals as needed  - Monitor I&O, weight, and lab values if indicated  - Obtain nutrition services referral as needed  1/21/2023 1058 by Marleen Coles RN  Outcome: Progressing  1/21/2023 1053 by Marlene Coles RN  Outcome: Progressing  Goal: Establish and maintain optimal ostomy function  Description: INTERVENTIONS:  - Assess bowel function  - Encourage oral fluids to ensure adequate hydration  - Administer IV fluids if ordered to ensure adequate hydration   - Administer ordered medications as needed  - Encourage mobilization and activity  - Nutrition services referral to assist patient with appropriate food choices  - Assess stoma site  - Consider wound care consult   1/21/2023 1058 by Marlene Coles RN  Outcome: Progressing  1/21/2023 1053 by Rula Caban RN  Outcome: Progressing  Goal: Oral mucous membranes remain intact  Description: INTERVENTIONS  - Assess oral mucosa and hygiene practices  - Implement preventative oral hygiene regimen  - Implement oral medicated treatments as ordered  - Initiate Nutrition services referral as needed  1/21/2023 1058 by Rula Caban RN  Outcome: Progressing  1/21/2023 1053 by Rula Caban RN  Outcome: Progressing     Problem: GENITOURINARY - ADULT  Goal: Maintains or returns to baseline urinary function  Description: INTERVENTIONS:  - Assess urinary function  - Encourage oral fluids to ensure adequate hydration if ordered  - Administer IV fluids as ordered to ensure adequate hydration  - Administer ordered medications as needed  - Offer frequent toileting  - Follow urinary retention protocol if ordered  1/21/2023 1058 by Rula Caban RN  Outcome: Progressing  1/21/2023 1053 by Rula Caban RN  Outcome: Progressing  Goal: Absence of urinary retention  Description: INTERVENTIONS:  - Assess patient’s ability to void and empty bladder  - Monitor I/O  - Bladder scan as needed  - Discuss with physician/AP medications to alleviate retention as needed  - Discuss catheterization for long term situations as appropriate  1/21/2023 1058 by Rula Caban RN  Outcome: Progressing  1/21/2023 1053 by Rula Caban RN  Outcome: Progressing  Goal: Urinary catheter remains patent  Description: INTERVENTIONS:  - Assess patency of urinary catheter  - If patient has a chronic brambila, consider changing catheter if non-functioning  - Follow guidelines for intermittent irrigation of non-functioning urinary catheter  1/21/2023 1058 by Rula Caban RN  Outcome: Progressing  1/21/2023 1053 by Rula Caban RN  Outcome: Progressing     Problem: METABOLIC, FLUID AND ELECTROLYTES - ADULT  Goal: Electrolytes maintained within normal limits  Description: INTERVENTIONS:  - Monitor labs and assess patient for signs and symptoms of electrolyte imbalances  - Administer electrolyte replacement as ordered  - Monitor response to electrolyte replacements, including repeat lab results as appropriate  - Instruct patient on fluid and nutrition as appropriate  1/21/2023 1058 by Latisha Duarte RN  Outcome: Progressing  1/21/2023 1053 by Latisha Duarte RN  Outcome: Progressing  Goal: Fluid balance maintained  Description: INTERVENTIONS:  - Monitor labs   - Monitor I/O and WT  - Instruct patient on fluid and nutrition as appropriate  - Assess for signs & symptoms of volume excess or deficit  1/21/2023 1058 by Latisha Duarte RN  Outcome: Progressing  1/21/2023 1053 by Latisha Duarte RN  Outcome: Progressing  Goal: Glucose maintained within target range  Description: INTERVENTIONS:  - Monitor Blood Glucose as ordered  - Assess for signs and symptoms of hyperglycemia and hypoglycemia  - Administer ordered medications to maintain glucose within target range  - Assess nutritional intake and initiate nutrition service referral as needed  1/21/2023 1058 by Latisha Duarte RN  Outcome: Progressing  1/21/2023 1053 by Latisha Duarte RN  Outcome: Progressing     Problem: HEMATOLOGIC - ADULT  Goal: Maintains hematologic stability  Description: INTERVENTIONS  - Assess for signs and symptoms of bleeding or hemorrhage  - Monitor labs  - Administer supportive blood products/factors as ordered and appropriate  1/21/2023 1058 by Latisha Duarte RN  Outcome: Progressing  1/21/2023 1053 by Latisha Duarte RN  Outcome: Progressing

## 2023-01-21 NOTE — ASSESSMENT & PLAN NOTE
2 mm right middle ICA aneurysm   Neurosurgery recommendation appreciated, recommends outpatient follow-up

## 2023-01-21 NOTE — PLAN OF CARE
Problem: PAIN - ADULT  Goal: Verbalizes/displays adequate comfort level or baseline comfort level  Description: Interventions:  - Encourage patient to monitor pain and request assistance  - Assess pain using appropriate pain scale  - Administer analgesics based on type and severity of pain and evaluate response  - Implement non-pharmacological measures as appropriate and evaluate response  - Consider cultural and social influences on pain and pain management  - Notify physician/advanced practitioner if interventions unsuccessful or patient reports new pain  Outcome: Progressing     Problem: INFECTION - ADULT  Goal: Absence or prevention of progression during hospitalization  Description: INTERVENTIONS:  - Assess and monitor for signs and symptoms of infection  - Monitor lab/diagnostic results  - Monitor all insertion sites, i e  indwelling lines, tubes, and drains  - Monitor endotracheal if appropriate and nasal secretions for changes in amount and color  - [place] appropriate cooling/warming therapies per order  - Administer medications as ordered  - Instruct and encourage patient and family to use good hand hygiene technique  - Identify and instruct in appropriate isolation precautions for identified infection/condition  Outcome: Progressing     Problem: DISCHARGE PLANNING  Goal: Discharge to home or other facility with appropriate resources  Description: INTERVENTIONS:  - Identify barriers to discharge w/patient and caregiver  - Arrange for needed discharge resources and transportation as appropriate  - Identify discharge learning needs (meds, wound care, etc )  - Arrange for interpretive services to assist at discharge as needed  - Refer to Case Management Department for coordinating discharge planning if the patient needs post-hospital services based on physician/advanced practitioner order or complex needs related to functional status, cognitive ability, or social support system  Outcome: Progressing Problem: Knowledge Deficit  Goal: Patient/family/caregiver demonstrates understanding of disease process, treatment plan, medications, and discharge instructions  Description: Complete learning assessment and assess knowledge base  Interventions:  - Provide teaching at level of understanding  - Provide teaching via preferred learning methods  Outcome: Progressing     Problem: NEUROSENSORY - ADULT  Goal: Achieves stable or improved neurological status  Description: INTERVENTIONS  - Monitor and report changes in neurological status  - Monitor vital signs such as temperature, blood pressure, glucose, and any other labs ordered   - Initiate measures to prevent increased intracranial pressure  - Monitor for seizure activity and implement precautions if appropriate      Outcome: Progressing  Goal: Remains free of injury related to seizures activity  Description: INTERVENTIONS  - Maintain airway, patient safety  and administer oxygen as ordered  - Monitor patient for seizure activity, document and report duration and description of seizure to physician/advanced practitioner  - If seizure occurs,  ensure patient safety during seizure  - Reorient patient post seizure  - Seizure pads on all 4 side rails  - Instruct patient/family to notify RN of any seizure activity including if an aura is experienced  - Instruct patient/family to call for assistance with activity based on nursing assessment  - Administer anti-seizure medications if ordered    Outcome: Progressing  Goal: Achieves maximal functionality and self care  Description: INTERVENTIONS  - Monitor swallowing and airway patency with patient fatigue and changes in neurological status  - Encourage and assist patient to increase activity and self care     - Encourage visually impaired, hearing impaired and aphasic patients to use assistive/communication devices  Outcome: Progressing     Problem: CARDIOVASCULAR - ADULT  Goal: Maintains optimal cardiac output and hemodynamic stability  Description: INTERVENTIONS:  - Monitor I/O, vital signs and rhythm  - Monitor for S/S and trends of decreased cardiac output  - Administer and titrate ordered vasoactive medications to optimize hemodynamic stability  - Assess quality of pulses, skin color and temperature  - Assess for signs of decreased coronary artery perfusion  - Instruct patient to report change in severity of symptoms  Outcome: Progressing  Goal: Absence of cardiac dysrhythmias or at baseline rhythm  Description: INTERVENTIONS:  - Continuous cardiac monitoring, vital signs, obtain 12 lead EKG if ordered  - Administer antiarrhythmic and heart rate control medications as ordered  - Monitor electrolytes and administer replacement therapy as ordered  Outcome: Progressing     Problem: RESPIRATORY - ADULT  Goal: Achieves optimal ventilation and oxygenation  Description: INTERVENTIONS:  - Assess for changes in respiratory status  - Assess for changes in mentation and behavior  - Position to facilitate oxygenation and minimize respiratory effort  - Oxygen administered by appropriate delivery if ordered  - Initiate smoking cessation education as indicated  - Encourage broncho-pulmonary hygiene including cough, deep breathe, Incentive Spirometry  - Assess the need for suctioning and aspirate as needed  - Assess and instruct to report SOB or any respiratory difficulty  - Respiratory Therapy support as indicated  Outcome: Progressing

## 2023-01-21 NOTE — ASSESSMENT & PLAN NOTE
· Chronic anemia hemoglobin appears baseline when compared to labs from care everywhere  · Monitor hemoglobin  Lab Results   Component Value Date    IRON 15 (L) 01/19/2023    TIBC 321 01/19/2023    FERRITIN 58 01/19/2023     Lab Results   Component Value Date    [id number] 363 01/19/2023     ·

## 2023-01-21 NOTE — PROGRESS NOTES
[address]  Progress Note - Jayy Suero 1945, 65 y o  female MRN: [id number]  Unit/Bed#: -[facility] Encounter: [id number]  Primary Care Provider: Meliton Bernal DO   Date and time admitted to hospital: 1/19/2023  2:42 AM    * Stroke-like symptoms  Assessment & Plan  • Suspect TIA vs Recrudescence  • NIH 5- No TPA out of window  • CT/CTA brain no acute abnormality  • Aspirin 81 mg daily-will DC aspirin since patient is on Eliquis to reduce bleeding risk as per neurology recommendation  • Continue Eliquis twice daily  • Unable to tolerate statin secondary to severe myalgias   • Telemetry-no significant event noted   • Resume home medication  • Recent echocardiogram September 2022 with EF 45%  • Mri brain w/o contrast-patient has MRI noncompatible pacemaker in place  For that reason,   • Repeat CT scan normal   • speech/PT/OT eval appreciated  • History of dementia with confusion at baseline from [facility] nursing facility, was witnessed by staff with worsening confusion        Cystitis  Assessment & Plan  She is on ceftriaxone  Urine culture growing E  coli, follow sensitivity results    Aneurysm [facility])  Assessment & Plan  Discussed with neurosurgery, recommends outpatient follow-up and risk factor control    Carotid artery stenosis  Assessment & Plan  Aortic arch and great vessels:  Moderate aortic atherosclerosis  [place] is a 50% stenosis at the brachycephalic artery noted on series 2/23   Right vertebral artery: 60% stenosis at the origin   Left vertebral artery:  80% stenosis at the origin     Right carotid:  Stent noted within the right common carotid and internal carotid arteries   Left carotid:  Large cluster of calcified atherosclerotic plaque at the carotid bulb with about a 70% stenosis     Vascular surgery recommended outpatient follow-up and carotid duplex    Right internal carotid artery aneurysm  Assessment & Plan  2 mm right middle ICA aneurysm   Neurosurgery recommendation appreciated, recommends outpatient follow-up    Elevated d-dimer  Assessment & Plan  · D-dimer 6 69 during ED evaluation  · Chronically anticoagulated with Eliquis, resides in nursing facility with medication compliance  · No tachycardia or hypoxia to suggest PE  · Continue to monitor    Metabolic acidosis  Assessment & Plan  Slowly improving, continue gentle hydration appreciate nephrology consult    Benign hypertensive renal disease  Assessment & Plan  Patient was taking amlodipine and metoprolol, amlodipine remain on hold due to lying permissive hypertension  Continue metoprolol    Can resume amlodipine     Anemia due to chronic kidney disease  Assessment & Plan  · Chronic anemia hemoglobin appears baseline when compared to labs from care everywhere  · Monitor hemoglobin  Lab Results   Component Value Date    IRON 15 (L) 01/19/2023    TIBC 321 01/19/2023    FERRITIN 58 01/19/2023     Lab Results   Component Value Date    [id number] 363 01/19/2023     ·     Paroxysmal atrial fibrillation (HCC)  Assessment & Plan  · Continue Eliquis 5 mg twice daily  · Continue metoprolol succinate  · PPM in place-noncompatible for MRI  · Paced rhythm on EKG, rate controlled    Chronic combined systolic and diastolic CHF (congestive heart failure) (HCC)  Assessment & Plan  Wt Readings from Last 3 Encounters:   01/21/23 57 kg (125 lb 10 6 oz)   01/13/23 53 1 kg (117 lb)   10/04/22 53 3 kg (117 lb 8 1 oz)     EF 45% September 2022  Appears euvolemic on exam  Not on outpatient diuretics  Continue metoprolol succinate  Monitor volume status      DARRELL (acute kidney injury) [facility])  Assessment & Plan  Lab Results   Component Value Date    EGFR 26 01/21/2023    EGFR 25 01/20/2023    EGFR 23 01/19/2023    CREATININE 1 79 (H) 01/21/2023    CREATININE 1 85 (H) 01/20/2023    CREATININE 2 00 (H) 01/19/2023   · Creatinine elevated with metabolic acidosis  · UTI-empiric ceftriaxone  · Gentle IV hydration  · Avoid nephrotoxic medication  · Appreciate nephrology consultation-continue gentle hydration, avoid hypotension    Acquired hypothyroidism  Assessment & Plan  · TSH elevated, free T4 low  · Was taking levothyroxine 125 mcg daily-dose increased to 150 mcg, patient will need to recheck TSH in 4 to 6 weeks          VTE Pharmacologic Prophylaxis: VTE Score: 8 High Risk (Score >/= 5) - Pharmacological DVT Prophylaxis Ordered: apixaban (Eliquis)  Sequential Compression Devices Ordered  Patient Centered Rounds: I performed bedside rounds with nursing staff today  Discussions with Specialists or Other Care Team Provider: Neurology, nephrology    Education and Discussions with Family / Patient: Updated  (daughter) via phone  Current Length of Stay: 2 day(s)  Current Patient Status: Inpatient   Certification Statement: The patient will continue to require additional inpatient hospital stay due to To monitor above conditions  Discharge Plan: Anticipate discharge in 24-48 hrs to rehab facility  Code Status: Level 3 - DNAR and DNI    Subjective:   Seen and evaluated on rounds  Resting comfortably  Patient seems on her baseline  Denies any significant complaint  Objective:     Vitals:   Temp (24hrs), Av 4 °F (36 3 °C), Min:97 2 °F (36 2 °C), Max:97 7 °F (36 5 °C)    Temp:  [97 2 °F (36 2 °C)-97 7 °F (36 5 °C)] 97 7 °F (36 5 °C)  HR:  [66-78] 72  Resp:  [16-18] 18  BP: (129-159)/(55-71) 151/70  SpO2:  [91 %-98 %] 96 %  Body mass index is 20 91 kg/m²  Input and Output Summary (last 24 hours): Intake/Output Summary (Last 24 hours) at 2023 1502  Last data filed at 2023 0900  Gross per 24 hour   Intake 1210 ml   Output 225 ml   Net 985 ml       Physical Exam:   Physical Exam  Vitals and nursing note reviewed  Constitutional:       Appearance: Normal appearance  She is not ill-appearing or diaphoretic  HENT:      Right Ear: Tympanic membrane normal  There is no impacted cerumen        Left Ear: There is no impacted cerumen  Nose: Nose normal  No congestion  Mouth/Throat:      Mouth: Mucous membranes are moist       Pharynx: Oropharynx is clear  No oropharyngeal exudate  Eyes:      General: No scleral icterus  Left eye: No discharge  Extraocular Movements: Extraocular movements intact  Conjunctiva/sclera: Conjunctivae normal       Pupils: Pupils are equal, round, and reactive to light  Cardiovascular:      Rate and Rhythm: Normal rate  Heart sounds: Normal heart sounds  No murmur heard  No friction rub  No gallop  Comments: Pacemaker in place  Pulmonary:      Effort: Pulmonary effort is normal  No respiratory distress  Breath sounds: No wheezing  Abdominal:      General: Abdomen is flat  Bowel sounds are normal  There is no distension  Palpations: There is no mass  Tenderness: There is no abdominal tenderness  Hernia: No hernia is present  Musculoskeletal:         General: Normal range of motion  Cervical back: Normal range of motion  Right lower leg: No edema  Left lower leg: No edema  Skin:     General: Skin is warm  Capillary Refill: Capillary refill takes less than 2 seconds  Coloration: Skin is not jaundiced or pale  Findings: No bruising or erythema  Neurological:      General: No focal deficit present  Mental Status: She is alert and oriented to person, place, and time  Cranial Nerves: No cranial nerve deficit  Sensory: No sensory deficit  Motor: No weakness        Coordination: Coordination normal    Psychiatric:         Mood and Affect: Mood normal          Behavior: Behavior normal          Additional Data:     Labs:  Results from last 7 days   Lab Units 01/21/23  0507   WBC Thousand/uL 6 49   HEMOGLOBIN g/dL 7 5*   HEMATOCRIT % 24 9*   PLATELETS Thousands/uL 293   NEUTROS PCT % 71   LYMPHS PCT % 17   MONOS PCT % 7   EOS PCT % 3     Results from last 7 days   Lab Units 01/21/23  0507   SODIUM mmol/L 136   POTASSIUM mmol/L 4 2   CHLORIDE mmol/L 109*   CO2 mmol/L 18*   BUN mg/dL 58*   CREATININE mg/dL 1 79*   ANION GAP mmol/L 9   CALCIUM mg/dL 8 4   ALBUMIN g/dL 3 0*   TOTAL BILIRUBIN mg/dL 0 30   ALK PHOS U/L 96   ALT U/L 7   AST U/L 14   GLUCOSE RANDOM mg/dL 74     Results from last 7 days   Lab Units 01/19/23  0256   INR  2 34*     Results from last 7 days   Lab Units 01/19/23  0241   POC GLUCOSE mg/dl 84     Results from last 7 days   Lab Units 01/19/23  0757   HEMOGLOBIN A1C % 5 1     Results from last 7 days   Lab Units 01/19/23  0311 01/19/23  0256   LACTIC ACID mmol/L 0 7  --    PROCALCITONIN ng/ml  --  0 21       Lines/Drains:  Invasive Devices     Peripheral Intravenous Line  Duration           Peripheral IV 01/19/23 Right;Ventral (anterior) Forearm 2 days          Drain  Duration           External Urinary Catheter <1 day                      Imaging: No pertinent imaging reviewed  Recent Cultures (last 7 days):   Results from last 7 days   Lab Units 01/19/23  0320 01/19/23  0311   BLOOD CULTURE   --  No Growth at 48 hrs  No Growth at 48 hrs     URINE CULTURE  >100,000 cfu/ml Escherichia coli*  <10,000 cfu/ml Enterococcus species*  --        Last 24 Hours Medication List:   Current Facility-Administered Medications   Medication Dose Route Frequency Provider Last Rate   • acetaminophen  650 mg Oral Q6H PRN Juju S Funmilayo, CRNP     • allopurinol  100 mg Oral Daily Juju S Funmilayo, CRNP     • amLODIPine  10 mg Oral Daily Crystal Avalos MD     • apixaban  5 mg Oral BID Prachi Avalos MD     • cefTRIAXone  1,000 mg Intravenous Q24H Juju S Funmilayo, CRNP 1,000 mg (01/20/23 2131)   • cholecalciferol  1,000 Units Oral Daily Juju S Funmilayo, CRNP     • docusate sodium  100 mg Oral BID Juju S Funmilayo, CRNP     • levothyroxine  150 mcg Oral Early Morning Jordyn Lim MD     • metoprolol succinate  100 mg Oral Daily Jordyn Lim MD     • multi-electrolyte  50 mL/hr Intravenous Continuous Juju S Funmilayo, CRNP 50 mL/hr (01/21/23 0221)   • polyethylene glycol  17 g Oral Daily PRN Juju S Funmilayo, CRNP     • sertraline  100 mg Oral Daily Juju S Funmilayo, CRNP     • sodium bicarbonate  650 mg Oral TID after meals DO Hernan          Today, Patient Was Seen By: Zayra Gama MD    **Please Note: This note may have been constructed using a voice recognition system  **

## 2023-01-21 NOTE — ASSESSMENT & PLAN NOTE
Patient was taking amlodipine and metoprolol, amlodipine remain on hold due to lying permissive hypertension  Continue metoprolol    Can resume amlodipine

## 2023-01-21 NOTE — ASSESSMENT & PLAN NOTE
Wt Readings from Last 3 Encounters:   01/21/23 57 kg (125 lb 10 6 oz)   01/13/23 53 1 kg (117 lb)   10/04/22 53 3 kg (117 lb 8 1 oz)     EF 45% September 2022  Appears euvolemic on exam  Not on outpatient diuretics  Continue metoprolol succinate  Monitor volume status

## 2023-01-21 NOTE — PLAN OF CARE
Problem: Potential for Falls  Goal: Patient will remain free of falls  Description: INTERVENTIONS:  - Educate patient/family on patient safety including physical limitations  - Instruct patient to call for assistance with activity   - Consult OT/PT to assist with strengthening/mobility   - Keep Call bell within reach  - Keep bed low and locked with side rails adjusted as appropriate  - Keep care items and personal belongings within reach  - Initiate and maintain comfort rounds  - Make Fall Risk Sign visible to staff  - Offer Toileting every  Hours, in advance of need  - Initiate/Maintain alarm  - Obtain necessary fall risk management equipment  - Apply yellow socks and bracelet for high fall risk patients  - Consider moving patient to room near nurses station  Outcome: Progressing     Problem: PAIN - ADULT  Goal: Verbalizes/displays adequate comfort level or baseline comfort level  Description: Interventions:  - Encourage patient to monitor pain and request assistance  - Assess pain using appropriate pain scale  - Administer analgesics based on type and severity of pain and evaluate response  - Implement non-pharmacological measures as appropriate and evaluate response  - Consider cultural and social influences on pain and pain management  - Notify physician/advanced practitioner if interventions unsuccessful or patient reports new pain  Outcome: Progressing     Problem: Knowledge Deficit  Goal: Patient/family/caregiver demonstrates understanding of disease process, treatment plan, medications, and discharge instructions  Description: Complete learning assessment and assess knowledge base    Interventions:  - Provide teaching at level of understanding  - Provide teaching via preferred learning methods  Outcome: Not Progressing

## 2023-01-21 NOTE — ASSESSMENT & PLAN NOTE
Aortic arch and great vessels:  Moderate aortic atherosclerosis  Penn Giancarlo is a 50% stenosis at the brachycephalic artery noted on series 2/23   Right vertebral artery: 60% stenosis at the origin   Left vertebral artery:  80% stenosis at the origin     Right carotid:  Stent noted within the right common carotid and internal carotid arteries   Left carotid:  Large cluster of calcified atherosclerotic plaque at the carotid bulb with about a 70% stenosis     Vascular surgery recommended outpatient follow-up and carotid duplex

## 2023-01-22 PROBLEM — D62 ACUTE ON CHRONIC BLOOD LOSS ANEMIA: Status: ACTIVE | Noted: 2023-01-22

## 2023-01-22 LAB
ABO GROUP BLD: NORMAL
ANION GAP SERPL CALCULATED.3IONS-SCNC: 9 MMOL/L (ref 4–13)
BLD GP AB SCN SERPL QL: POSITIVE
BLOOD GROUP ANTIBODIES SERPL: NORMAL
BUN SERPL-MCNC: 59 MG/DL (ref 5–25)
CALCIUM SERPL-MCNC: 8.3 MG/DL (ref 8.4–10.2)
CHLORIDE SERPL-SCNC: 109 MMOL/L (ref 96–108)
CO2 SERPL-SCNC: 19 MMOL/L (ref 21–32)
CREAT SERPL-MCNC: 1.8 MG/DL (ref 0.6–1.3)
E AG RBC QL: NEGATIVE
ERYTHROCYTE [DISTWIDTH] IN BLOOD BY AUTOMATED COUNT: 15.9 % (ref 11.6–15.1)
GFR SERPL CREATININE-BSD FRML MDRD: 26 ML/MIN/1.73SQ M
GLUCOSE SERPL-MCNC: 81 MG/DL (ref 65–140)
HCT VFR BLD AUTO: 22.6 % (ref 34.8–46.1)
HCT VFR BLD AUTO: 25.2 % (ref 34.8–46.1)
HGB BLD-MCNC: 7.1 G/DL (ref 11.5–15.4)
HGB BLD-MCNC: 7.5 G/DL (ref 11.5–15.4)
MAGNESIUM SERPL-MCNC: 2.1 MG/DL (ref 1.9–2.7)
MCH RBC QN AUTO: 27.5 PG (ref 26.8–34.3)
MCHC RBC AUTO-ENTMCNC: 31.4 G/DL (ref 31.4–37.4)
MCV RBC AUTO: 88 FL (ref 82–98)
PLATELET # BLD AUTO: 302 THOUSANDS/UL (ref 149–390)
PMV BLD AUTO: 10.1 FL (ref 8.9–12.7)
POTASSIUM SERPL-SCNC: 4.7 MMOL/L (ref 3.5–5.3)
RBC # BLD AUTO: 2.58 MILLION/UL (ref 3.81–5.12)
RH BLD: POSITIVE
SODIUM SERPL-SCNC: 137 MMOL/L (ref 135–147)
SPECIMEN EXPIRATION DATE: NORMAL
WBC # BLD AUTO: 6.98 THOUSAND/UL (ref 4.31–10.16)

## 2023-01-22 RX ORDER — POLYETHYLENE GLYCOL 3350 17 G/17G
119 POWDER, FOR SOLUTION ORAL 2 TIMES DAILY
Status: DISCONTINUED | OUTPATIENT
Start: 2023-01-22 | End: 2023-01-23

## 2023-01-22 RX ORDER — PANTOPRAZOLE SODIUM 40 MG/10ML
40 INJECTION, POWDER, LYOPHILIZED, FOR SOLUTION INTRAVENOUS EVERY 12 HOURS SCHEDULED
Status: DISCONTINUED | OUTPATIENT
Start: 2023-01-22 | End: 2023-01-24

## 2023-01-22 RX ADMIN — METOPROLOL SUCCINATE 100 MG: 100 TABLET, EXTENDED RELEASE ORAL at 09:13

## 2023-01-22 RX ADMIN — SODIUM CHLORIDE, SODIUM GLUCONATE, SODIUM ACETATE, POTASSIUM CHLORIDE, MAGNESIUM CHLORIDE, SODIUM PHOSPHATE, DIBASIC, AND POTASSIUM PHOSPHATE 50 ML/HR: .53; .5; .37; .037; .03; .012; .00082 INJECTION, SOLUTION INTRAVENOUS at 17:25

## 2023-01-22 RX ADMIN — SODIUM BICARBONATE 650 MG TABLET 650 MG: at 12:51

## 2023-01-22 RX ADMIN — BISACODYL 10 MG: 5 TABLET, COATED ORAL at 17:24

## 2023-01-22 RX ADMIN — SODIUM BICARBONATE 650 MG TABLET 650 MG: at 17:24

## 2023-01-22 RX ADMIN — SERTRALINE 100 MG: 100 TABLET, FILM COATED ORAL at 09:13

## 2023-01-22 RX ADMIN — SODIUM BICARBONATE 650 MG TABLET 650 MG: at 09:14

## 2023-01-22 RX ADMIN — ALLOPURINOL 100 MG: 100 TABLET ORAL at 09:14

## 2023-01-22 RX ADMIN — DOCUSATE SODIUM 100 MG: 100 CAPSULE ORAL at 17:24

## 2023-01-22 RX ADMIN — CEFTRIAXONE 1000 MG: 1 INJECTION, SOLUTION INTRAVENOUS at 20:50

## 2023-01-22 RX ADMIN — AMLODIPINE BESYLATE 10 MG: 10 TABLET ORAL at 09:14

## 2023-01-22 RX ADMIN — Medication 1000 UNITS: at 09:14

## 2023-01-22 RX ADMIN — PANTOPRAZOLE SODIUM 40 MG: 40 INJECTION, POWDER, FOR SOLUTION INTRAVENOUS at 20:50

## 2023-01-22 RX ADMIN — POLYETHYLENE GLYCOL 3350 119 G: 17 POWDER, FOR SOLUTION ORAL at 17:23

## 2023-01-22 RX ADMIN — PANTOPRAZOLE SODIUM 40 MG: 40 INJECTION, POWDER, FOR SOLUTION INTRAVENOUS at 09:13

## 2023-01-22 NOTE — PROGRESS NOTES
Pt incontinent of a large bloody bowel movement  No complaints of abdominal pain/discomfort/nausea/vomitting  Juju Funmilayo made aware  New orders received

## 2023-01-22 NOTE — ASSESSMENT & PLAN NOTE
· Chronic anemia hemoglobin appears baseline when compared to labs from care everywhere  · Monitor hemoglobin  Lab Results   Component Value Date    IRON 15 (L) 01/19/2023    TIBC 321 01/19/2023    FERRITIN 58 01/19/2023     Lab Results   Component Value Date    FVGUHKVL10 363 01/19/2023     ·

## 2023-01-22 NOTE — ASSESSMENT & PLAN NOTE
Witnessed per rectal bleed on 1/21/2023 night  Hemoglobin dropped  Eliquis remains on hold  She had a EGD colonoscopy in September 2023 which shows large hemorrhoids-that time it was suggested to repeat scope in 3 months  Will prep the patient, follow GI consult if agrees, will proceed with EGD colonoscopy

## 2023-01-22 NOTE — ASSESSMENT & PLAN NOTE
Lab Results   Component Value Date    EGFR 26 01/22/2023    EGFR 26 01/21/2023    EGFR 25 01/20/2023    CREATININE 1 80 (H) 01/22/2023    CREATININE 1 79 (H) 01/21/2023    CREATININE 1 85 (H) 01/20/2023   · Creatinine elevated with metabolic acidosis  · UTI-empiric ceftriaxone  · Gentle IV hydration-will discuss with nephrology regarding hydration    · Avoid nephrotoxic medication  · Appreciate nephrology consultation-continue gentle hydration, avoid hypotension

## 2023-01-22 NOTE — PROGRESS NOTES
[address]  Progress Note - Jayy Suero 1945, 65 y o  female MRN: [id number]  Unit/Bed#: -Trisha Encounter: [id number]  Primary Care Provider: Meliton Bernal DO   Date and time admitted to hospital: 1/19/2023  2:42 AM    Stroke-like symptoms  Assessment & Plan  • Suspect TIA vs Recrudescence  • NIH 5- No TPA out of window  • CT/CTA brain no acute abnormality  • Aspirin 81 mg daily-DC aspirin since patient is on Eliquis to reduce bleeding risk as per neurology recommendation  • Continue Eliquis twice daily-remain on hold due to GI bleed  • Unable to tolerate statin secondary to severe myalgias   • Telemetry-no significant event noted   • Resume home medication  • Recent echocardiogram September 2022 with EF 45%  • Mri brain w/o contrast-patient has MRI noncompatible pacemaker in place    For that reason,   • Repeat CT scan normal   • speech/PT/OT eval appreciated  • History of dementia with confusion at baseline from [facility], was witnessed by staff with worsening confusion        * Acute on chronic blood loss anemia  Assessment & Plan  Witnessed per rectal bleed on 1/21/2023 night  Hemoglobin dropped  Eliquis remains on hold  She had a EGD colonoscopy in September 2023 which shows large hemorrhoids-that time it was suggested to repeat scope in 3 months  Will prep the patient, follow GI consult if agrees, will proceed with EGD colonoscopy    Cystitis  Assessment & Plan  She is on ceftriaxone  Urine culture growing E  coli, follow sensitivity results    Aneurysm [facility])  Assessment & Plan  Discussed with neurosurgery, recommends outpatient follow-up and risk factor control    Carotid artery stenosis  Assessment & Plan  Aortic arch and great vessels:  Moderate aortic atherosclerosis  Daniel Bachelor is a 50% stenosis at the brachycephalic artery noted on series 2/23   Right vertebral artery: 60% stenosis at the origin   Left vertebral artery:  80% stenosis at the origin  Right carotid:  Stent noted within the right common carotid and internal carotid arteries   Left carotid:  Large cluster of calcified atherosclerotic plaque at the carotid bulb with about a 70% stenosis     Vascular surgery recommended outpatient follow-up and carotid duplex    Right internal carotid artery aneurysm  Assessment & Plan  2 mm right middle ICA aneurysm   Neurosurgery recommendation appreciated, recommends outpatient follow-up    Elevated d-dimer  Assessment & Plan  · D-dimer 6 69 during ED evaluation  · Chronically anticoagulated with Eliquis, resides in nursing facility with medication compliance  · No tachycardia or hypoxia to suggest PE  · Continue to monitor    Metabolic acidosis  Assessment & Plan  Slowly improving, continue gentle hydration appreciate nephrology consult    Benign hypertensive renal disease  Assessment & Plan  Patient was taking amlodipine and metoprolol, amlodipine remain on hold due to lying permissive hypertension  Continue metoprolol    Can resume amlodipine     Anemia due to chronic kidney disease  Assessment & Plan  · Chronic anemia hemoglobin appears baseline when compared to labs from care everywhere  · Monitor hemoglobin  Lab Results   Component Value Date    IRON 15 (L) 01/19/2023    TIBC 321 01/19/2023    FERRITIN 58 01/19/2023     Lab Results   Component Value Date    [id number] 363 01/19/2023     ·     Paroxysmal atrial fibrillation (HCC)  Assessment & Plan  · Continue Eliquis 5 mg twice daily-remain on hold due to gastrointestinal bleeding  · Continue metoprolol succinate  · PPM in place-noncompatible for MRI  · Paced rhythm on EKG, rate controlled    Chronic combined systolic and diastolic CHF (congestive heart failure) (HCC)  Assessment & Plan  Wt Readings from Last 3 Encounters:   01/22/23 58 5 kg (128 lb 15 5 oz)   01/13/23 53 1 kg (117 lb)   10/04/22 53 3 kg (117 lb 8 1 oz)     EF 45% September 2022  Appears euvolemic on exam  Not on outpatient diuretics  Continue metoprolol succinate  Monitor volume status      DARRELL (acute kidney injury) [facility])  Assessment & Plan  Lab Results   Component Value Date    EGFR 26 2023    EGFR 26 2023    EGFR 25 2023    CREATININE 1 80 (H) 2023    CREATININE 1 79 (H) 2023    CREATININE 1 85 (H) 2023   · Creatinine elevated with metabolic acidosis  · UTI-empiric ceftriaxone  · Gentle IV hydration-will discuss with nephrology regarding hydration  · Avoid nephrotoxic medication  · Appreciate nephrology consultation-continue gentle hydration, avoid hypotension    Acquired hypothyroidism  Assessment & Plan  · TSH elevated, free T4 low  · Was taking levothyroxine 125 mcg daily-dose increased to 150 mcg, patient will need to recheck TSH in 4 to 6 weeks        VTE Pharmacologic Prophylaxis: VTE Score: 8 Was on Eliquis, remained on hold    Patient Centered Rounds: I performed bedside rounds with nursing staff today  Discussions with Specialists or Other Care Team Provider: None    Education and Discussions with Family / Patient: Updated  (daughter) via phone  Current Length of Stay: 3 day(s)  Current Patient Status: Inpatient   Certification Statement: The patient will continue to require additional inpatient hospital stay due to To monitor above condition  Discharge Plan: Anticipate discharge in 48-72 hrs to rehab facility  Code Status: Level 3 - DNAR and DNI    Subjective:   Seen and evaluated and examined  Resting comfortably  Denies any significant complaint  As per note, patient had bright rectal bleed overnight  Objective:     Vitals:   Temp (24hrs), Av 5 °F (36 4 °C), Min:97 °F (36 1 °C), Max:97 9 °F (36 6 °C)    Temp:  [97 °F (36 1 °C)-97 9 °F (36 6 °C)] 97 3 °F (36 3 °C)  HR:  [42-76] 69  Resp:  [16-18] 16  BP: (138-145)/(66-72) 138/66  SpO2:  [96 %-100 %] 98 %  Body mass index is 21 46 kg/m²  Input and Output Summary (last 24 hours):      Intake/Output Summary (Last 24 hours) at 1/22/2023 1321  Last data filed at 1/22/2023 1145  Gross per 24 hour   Intake 120 ml   Output 100 ml   Net 20 ml       Physical Exam:   Physical Exam  Vitals and nursing note reviewed  Constitutional:       Appearance: Normal appearance  She is not ill-appearing or diaphoretic  HENT:      Mouth/Throat:      Mouth: Mucous membranes are moist    Eyes:      General: No scleral icterus  Left eye: No discharge  Extraocular Movements: Extraocular movements intact  Pupils: Pupils are equal, round, and reactive to light  Neck:      Vascular: No carotid bruit  Cardiovascular:      Rate and Rhythm: Normal rate  Heart sounds: No murmur heard  No friction rub  No gallop  Pulmonary:      Effort: Pulmonary effort is normal  No respiratory distress  Breath sounds: No stridor  No wheezing or rhonchi  Abdominal:      General: Abdomen is flat  Bowel sounds are normal  There is no distension  Palpations: There is no mass  Tenderness: There is no abdominal tenderness  Hernia: No hernia is present  Musculoskeletal:         General: No swelling, tenderness, deformity or signs of injury  Cervical back: Normal range of motion  Skin:     General: Skin is warm  Neurological:      General: No focal deficit present  Mental Status: She is alert and oriented to person, place, and time  Cranial Nerves: No cranial nerve deficit  Sensory: No sensory deficit  Motor: No weakness        Coordination: Coordination normal          Additional Data:     Labs:  Results from last 7 days   Lab Units 01/22/23  0456 01/21/23  0507   WBC Thousand/uL 6 98 6 49   HEMOGLOBIN g/dL 7 1* 7 5*   HEMATOCRIT % 22 6* 24 9*   PLATELETS Thousands/uL 302 293   NEUTROS PCT %  --  71   LYMPHS PCT %  --  17   MONOS PCT %  --  7   EOS PCT %  --  3     Results from last 7 days   Lab Units 01/22/23  0456 01/21/23  0507   SODIUM mmol/L 137 136   POTASSIUM mmol/L 4 7 4  2   CHLORIDE mmol/L 109* 109*   CO2 mmol/L 19* 18*   BUN mg/dL 59* 58*   CREATININE mg/dL 1 80* 1 79*   ANION GAP mmol/L 9 9   CALCIUM mg/dL 8 3* 8 4   ALBUMIN g/dL  --  3 0*   TOTAL BILIRUBIN mg/dL  --  0 30   ALK PHOS U/L  --  96   ALT U/L  --  7   AST U/L  --  14   GLUCOSE RANDOM mg/dL 81 74     Results from last 7 days   Lab Units 01/19/23  0256   INR  2 34*     Results from last 7 days   Lab Units 01/19/23  0241   POC GLUCOSE mg/dl 84     Results from last 7 days   Lab Units 01/19/23  0757   HEMOGLOBIN A1C % 5 1     Results from last 7 days   Lab Units 01/19/23  0311 01/19/23  0256   LACTIC ACID mmol/L 0 7  --    PROCALCITONIN ng/ml  --  0 21       Lines/Drains:  Invasive Devices     Peripheral Intravenous Line  Duration           Peripheral IV 01/19/23 Right;Ventral (anterior) Forearm 3 days          Drain  Duration           External Urinary Catheter 1 day                  Telemetry:  Telemetry Orders (From admission, onward)             24 Hour Telemetry Monitoring  Continuous x 24 Hours (Telem)        References:    Telemetry Guidelines   Question:  Reason for 24 Hour Telemetry  Answer:  Acute CVA (>24 hrs old)                 Telemetry Reviewed: Normal Sinus Rhythm  Indication for Continued Telemetry Use: Arrthymias requiring medical therapy             Imaging: No pertinent imaging reviewed  Recent Cultures (last 7 days):   Results from last 7 days   Lab Units 01/19/23  0320 01/19/23  0311   BLOOD CULTURE   --  No Growth at 72 hrs  No Growth at 72 hrs     URINE CULTURE  >100,000 cfu/ml Escherichia coli*  >100,000 cfu/ml Lactobacillus species*  <10,000 cfu/ml Enterococcus species*  <10,000 cfu/ml Staphylococcus species*  --        Last 24 Hours Medication List:   Current Facility-Administered Medications   Medication Dose Route Frequency Provider Last Rate   • acetaminophen  650 mg Oral Q6H PRN KARLA Dasilva     • allopurinol  100 mg Oral Daily Juju S KARLA Mello     • amLODIPine  10 mg Oral Daily KARLA Ruiz     • bisacodyl  10 mg Oral Once Lashell Arias MD     • cefTRIAXone  1,000 mg Intravenous Q24H Juju S Funmilayo, CRNP 1,000 mg (01/21/23 2035)   • cholecalciferol  1,000 Units Oral Daily Juju S Funmilayo, CRNP     • docusate sodium  100 mg Oral BID Juju S Funmilayo, CRNP     • levothyroxine  150 mcg Oral Early Morning Crystal Avalos MD     • metoprolol succinate  100 mg Oral Daily Roney Avalos MD     • multi-electrolyte  50 mL/hr Intravenous Continuous Juju S Funmilayo, CRNP 50 mL/hr (01/21/23 2034)   • pantoprazole  40 mg Intravenous Q12H [address] Juju S Funmilayo, CRNP     • polyethylene glycol  119 g Oral BID Roney Avalos MD     • polyethylene glycol  17 g Oral Daily PRN Juju S Funmilayo, CRNP     • sertraline  100 mg Oral Daily Juju S Funmilayo, CRNP     • sodium bicarbonate  650 mg Oral TID after meals DO Hernan          Today, Patient Was Seen By: Lashell Arias MD    **Please Note: This note may have been constructed using a voice recognition system  **

## 2023-01-22 NOTE — PROGRESS NOTES
Continuing to encourage the first 1/2 of bowel prep/miralax, but patient is refusing  States "I reached my max"  MD aware

## 2023-01-22 NOTE — QUICK NOTE
· Pt with BRBPR mixed with stool x1  Hemodynamically stable  · Hx of lower GIB in 2019 presumed secondary to diverticulosis, ascending colon polyp (removed) and large hemorrhoids found on colonoscopy   Required PRBCs at that time      · PLAN  · Eliquis on hold  · Trend Hgb  · Transfuse as needed  · Stool diary  · NPO for possible GI intervention - will need transfer if emergent   · Consider GI bleeding scan if develops large volume rectal bleeding but defer at this time secondary to renal status  · Protonix

## 2023-01-22 NOTE — ASSESSMENT & PLAN NOTE
Aortic arch and great vessels:  Moderate aortic atherosclerosis  Tru Thakkar is a 50% stenosis at the brachycephalic artery noted on series 2/23   Right vertebral artery: 60% stenosis at the origin   Left vertebral artery:  80% stenosis at the origin     Right carotid:  Stent noted within the right common carotid and internal carotid arteries   Left carotid:  Large cluster of calcified atherosclerotic plaque at the carotid bulb with about a 70% stenosis     Vascular surgery recommended outpatient follow-up and carotid duplex
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
## 2023-01-22 NOTE — ASSESSMENT & PLAN NOTE
Wt Readings from Last 3 Encounters:   01/22/23 58 5 kg (128 lb 15 5 oz)   01/13/23 53 1 kg (117 lb)   10/04/22 53 3 kg (117 lb 8 1 oz)     EF 45% September 2022  Appears euvolemic on exam  Not on outpatient diuretics  Continue metoprolol succinate  Monitor volume status

## 2023-01-22 NOTE — ASSESSMENT & PLAN NOTE
· Continue Eliquis 5 mg twice daily-remain on hold due to gastrointestinal bleeding  · Continue metoprolol succinate  · PPM in place-noncompatible for MRI  · Paced rhythm on EKG, rate controlled

## 2023-01-22 NOTE — PLAN OF CARE
Problem: PAIN - ADULT  Goal: Verbalizes/displays adequate comfort level or baseline comfort level  Description: Interventions:  - Encourage patient to monitor pain and request assistance  - Assess pain using appropriate pain scale  - Administer analgesics based on type and severity of pain and evaluate response  - Implement non-pharmacological measures as appropriate and evaluate response  - Consider cultural and social influences on pain and pain management  - Notify physician/advanced practitioner if interventions unsuccessful or patient reports new pain  Outcome: Progressing     Problem: INFECTION - ADULT  Goal: Absence or prevention of progression during hospitalization  Description: INTERVENTIONS:  - Assess and monitor for signs and symptoms of infection  - Monitor lab/diagnostic results  - Monitor all insertion sites, i e  indwelling lines, tubes, and drains  - Monitor endotracheal if appropriate and nasal secretions for changes in amount and color  - [place] appropriate cooling/warming therapies per order  - Administer medications as ordered  - Instruct and encourage patient and family to use good hand hygiene technique  - Identify and instruct in appropriate isolation precautions for identified infection/condition  Outcome: Progressing     Problem: DISCHARGE PLANNING  Goal: Discharge to home or other facility with appropriate resources  Description: INTERVENTIONS:  - Identify barriers to discharge w/patient and caregiver  - Arrange for needed discharge resources and transportation as appropriate  - Identify discharge learning needs (meds, wound care, etc )  - Arrange for interpretive services to assist at discharge as needed  - Refer to Case Management Department for coordinating discharge planning if the patient needs post-hospital services based on physician/advanced practitioner order or complex needs related to functional status, cognitive ability, or social support system  Outcome: Progressing Problem: Knowledge Deficit  Goal: Patient/family/caregiver demonstrates understanding of disease process, treatment plan, medications, and discharge instructions  Description: Complete learning assessment and assess knowledge base  Interventions:  - Provide teaching at level of understanding  - Provide teaching via preferred learning methods  Outcome: Progressing     Problem: NEUROSENSORY - ADULT  Goal: Achieves stable or improved neurological status  Description: INTERVENTIONS  - Monitor and report changes in neurological status  - Monitor vital signs such as temperature, blood pressure, glucose, and any other labs ordered   - Initiate measures to prevent increased intracranial pressure  - Monitor for seizure activity and implement precautions if appropriate      Outcome: Progressing  Goal: Remains free of injury related to seizures activity  Description: INTERVENTIONS  - Maintain airway, patient safety  and administer oxygen as ordered  - Monitor patient for seizure activity, document and report duration and description of seizure to physician/advanced practitioner  - If seizure occurs,  ensure patient safety during seizure  - Reorient patient post seizure  - Seizure pads on all 4 side rails  - Instruct patient/family to notify RN of any seizure activity including if an aura is experienced  - Instruct patient/family to call for assistance with activity based on nursing assessment  - Administer anti-seizure medications if ordered    Outcome: Progressing  Goal: Achieves maximal functionality and self care  Description: INTERVENTIONS  - Monitor swallowing and airway patency with patient fatigue and changes in neurological status  - Encourage and assist patient to increase activity and self care     - Encourage visually impaired, hearing impaired and aphasic patients to use assistive/communication devices  Outcome: Progressing     Problem: CARDIOVASCULAR - ADULT  Goal: Maintains optimal cardiac output and hemodynamic stability  Description: INTERVENTIONS:  - Monitor I/O, vital signs and rhythm  - Monitor for S/S and trends of decreased cardiac output  - Administer and titrate ordered vasoactive medications to optimize hemodynamic stability  - Assess quality of pulses, skin color and temperature  - Assess for signs of decreased coronary artery perfusion  - Instruct patient to report change in severity of symptoms  Outcome: Progressing  Goal: Absence of cardiac dysrhythmias or at baseline rhythm  Description: INTERVENTIONS:  - Continuous cardiac monitoring, vital signs, obtain 12 lead EKG if ordered  - Administer antiarrhythmic and heart rate control medications as ordered  - Monitor electrolytes and administer replacement therapy as ordered  Outcome: Progressing     Problem: RESPIRATORY - ADULT  Goal: Achieves optimal ventilation and oxygenation  Description: INTERVENTIONS:  - Assess for changes in respiratory status  - Assess for changes in mentation and behavior  - Position to facilitate oxygenation and minimize respiratory effort  - Oxygen administered by appropriate delivery if ordered  - Initiate smoking cessation education as indicated  - Encourage broncho-pulmonary hygiene including cough, deep breathe, Incentive Spirometry  - Assess the need for suctioning and aspirate as needed  - Assess and instruct to report SOB or any respiratory difficulty  - Respiratory Therapy support as indicated  Outcome: Progressing     Problem: GASTROINTESTINAL - ADULT  Goal: Minimal or absence of nausea and/or vomiting  Description: INTERVENTIONS:  - Administer IV fluids if ordered to ensure adequate hydration  - Maintain NPO status until nausea and vomiting are resolved  - Nasogastric tube if ordered  - Administer ordered antiemetic medications as needed  - Provide nonpharmacologic comfort measures as appropriate  - Advance diet as tolerated, if ordered  - Consider nutrition services referral to assist patient with adequate nutrition and appropriate food choices  Outcome: Progressing  Goal: Maintains or returns to baseline bowel function  Description: INTERVENTIONS:  - Assess bowel function  - Encourage oral fluids to ensure adequate hydration  - Administer IV fluids if ordered to ensure adequate hydration  - Administer ordered medications as needed  - Encourage mobilization and activity  - Consider nutritional services referral to assist patient with adequate nutrition and appropriate food choices  Outcome: Progressing  Goal: Maintains adequate nutritional intake  Description: INTERVENTIONS:  - Monitor percentage of each meal consumed  - Identify factors contributing to decreased intake, treat as appropriate  - Assist with meals as needed  - Monitor I&O, weight, and lab values if indicated  - Obtain nutrition services referral as needed  Outcome: Progressing  Goal: Establish and maintain optimal ostomy function  Description: INTERVENTIONS:  - Assess bowel function  - Encourage oral fluids to ensure adequate hydration  - Administer IV fluids if ordered to ensure adequate hydration   - Administer ordered medications as needed  - Encourage mobilization and activity  - Nutrition services referral to assist patient with appropriate food choices  - Assess stoma site  - Consider wound care consult   Outcome: Progressing  Goal: Oral mucous membranes remain intact  Description: INTERVENTIONS  - Assess oral mucosa and hygiene practices  - Implement preventative oral hygiene regimen  - Implement oral medicated treatments as ordered  - Initiate Nutrition services referral as needed  Outcome: Progressing     Problem: GENITOURINARY - ADULT  Goal: Maintains or returns to baseline urinary function  Description: INTERVENTIONS:  - Assess urinary function  - Encourage oral fluids to ensure adequate hydration if ordered  - Administer IV fluids as ordered to ensure adequate hydration  - Administer ordered medications as needed  - Offer frequent toileting  - Follow urinary retention protocol if ordered  Outcome: Progressing  Goal: Absence of urinary retention  Description: INTERVENTIONS:  - Assess patient’s ability to void and empty bladder  - Monitor I/O  - Bladder scan as needed  - Discuss with physician/AP medications to alleviate retention as needed  - Discuss catheterization for long term situations as appropriate  Outcome: Progressing  Goal: Urinary catheter remains patent  Description: INTERVENTIONS:  - Assess patency of urinary catheter  - If patient has a chronic brambila, consider changing catheter if non-functioning  - Follow guidelines for intermittent irrigation of non-functioning urinary catheter  Outcome: Progressing

## 2023-01-22 NOTE — ASSESSMENT & PLAN NOTE
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 skilled nursing facility, was witnessed by staff with worsening confusion

## 2023-01-23 LAB
ALBUMIN SERPL BCP-MCNC: 3.1 G/DL (ref 3.5–5)
ALP SERPL-CCNC: 103 U/L (ref 34–104)
ALT SERPL W P-5'-P-CCNC: 7 U/L (ref 7–52)
ANION GAP SERPL CALCULATED.3IONS-SCNC: 11 MMOL/L (ref 4–13)
AST SERPL W P-5'-P-CCNC: 16 U/L (ref 13–39)
BACTERIA UR CULT: ABNORMAL
BASOPHILS # BLD AUTO: 0.08 THOUSANDS/ÂΜL (ref 0–0.1)
BASOPHILS NFR BLD AUTO: 1 % (ref 0–1)
BILIRUB SERPL-MCNC: 0.36 MG/DL (ref 0.2–1)
BUN SERPL-MCNC: 55 MG/DL (ref 5–25)
CALCIUM ALBUM COR SERPL-MCNC: 9.2 MG/DL (ref 8.3–10.1)
CALCIUM SERPL-MCNC: 8.5 MG/DL (ref 8.4–10.2)
CHLORIDE SERPL-SCNC: 107 MMOL/L (ref 96–108)
CO2 SERPL-SCNC: 21 MMOL/L (ref 21–32)
CREAT SERPL-MCNC: 1.85 MG/DL (ref 0.6–1.3)
EOSINOPHIL # BLD AUTO: 0.17 THOUSAND/ÂΜL (ref 0–0.61)
EOSINOPHIL NFR BLD AUTO: 3 % (ref 0–6)
ERYTHROCYTE [DISTWIDTH] IN BLOOD BY AUTOMATED COUNT: 16.1 % (ref 11.6–15.1)
GFR SERPL CREATININE-BSD FRML MDRD: 25 ML/MIN/1.73SQ M
GLUCOSE SERPL-MCNC: 78 MG/DL (ref 65–140)
HCT VFR BLD AUTO: 24 % (ref 34.8–46.1)
HGB BLD-MCNC: 7.3 G/DL (ref 11.5–15.4)
IMM GRANULOCYTES # BLD AUTO: 0.04 THOUSAND/UL (ref 0–0.2)
IMM GRANULOCYTES NFR BLD AUTO: 1 % (ref 0–2)
LYMPHOCYTES # BLD AUTO: 1.12 THOUSANDS/ÂΜL (ref 0.6–4.47)
LYMPHOCYTES NFR BLD AUTO: 18 % (ref 14–44)
MAGNESIUM SERPL-MCNC: 2.2 MG/DL (ref 1.9–2.7)
MCH RBC QN AUTO: 27 PG (ref 26.8–34.3)
MCHC RBC AUTO-ENTMCNC: 30.4 G/DL (ref 31.4–37.4)
MCV RBC AUTO: 89 FL (ref 82–98)
MONOCYTES # BLD AUTO: 0.49 THOUSAND/ÂΜL (ref 0.17–1.22)
MONOCYTES NFR BLD AUTO: 8 % (ref 4–12)
NEUTROPHILS # BLD AUTO: 4.36 THOUSANDS/ÂΜL (ref 1.85–7.62)
NEUTS SEG NFR BLD AUTO: 69 % (ref 43–75)
NRBC BLD AUTO-RTO: 0 /100 WBCS
PLATELET # BLD AUTO: 324 THOUSANDS/UL (ref 149–390)
PMV BLD AUTO: 10.3 FL (ref 8.9–12.7)
POTASSIUM SERPL-SCNC: 4.2 MMOL/L (ref 3.5–5.3)
PROT SERPL-MCNC: 6 G/DL (ref 6.4–8.4)
RBC # BLD AUTO: 2.7 MILLION/UL (ref 3.81–5.12)
SODIUM SERPL-SCNC: 139 MMOL/L (ref 135–147)
WBC # BLD AUTO: 6.26 THOUSAND/UL (ref 4.31–10.16)

## 2023-01-23 RX ORDER — ASPIRIN 81 MG/1
81 TABLET ORAL DAILY
Status: DISCONTINUED | OUTPATIENT
Start: 2023-01-24 | End: 2023-01-26 | Stop reason: HOSPADM

## 2023-01-23 RX ADMIN — SODIUM CHLORIDE, SODIUM GLUCONATE, SODIUM ACETATE, POTASSIUM CHLORIDE, MAGNESIUM CHLORIDE, SODIUM PHOSPHATE, DIBASIC, AND POTASSIUM PHOSPHATE 50 ML/HR: .53; .5; .37; .037; .03; .012; .00082 INJECTION, SOLUTION INTRAVENOUS at 12:30

## 2023-01-23 RX ADMIN — SODIUM BICARBONATE 650 MG TABLET 650 MG: at 08:56

## 2023-01-23 RX ADMIN — PANTOPRAZOLE SODIUM 40 MG: 40 INJECTION, POWDER, FOR SOLUTION INTRAVENOUS at 08:56

## 2023-01-23 RX ADMIN — DOCUSATE SODIUM 100 MG: 100 CAPSULE ORAL at 17:12

## 2023-01-23 RX ADMIN — CEFTRIAXONE 1000 MG: 1 INJECTION, SOLUTION INTRAVENOUS at 21:27

## 2023-01-23 RX ADMIN — DOCUSATE SODIUM 100 MG: 100 CAPSULE ORAL at 08:56

## 2023-01-23 RX ADMIN — PANTOPRAZOLE SODIUM 40 MG: 40 INJECTION, POWDER, FOR SOLUTION INTRAVENOUS at 21:27

## 2023-01-23 RX ADMIN — AMLODIPINE BESYLATE 10 MG: 10 TABLET ORAL at 08:56

## 2023-01-23 RX ADMIN — SERTRALINE 100 MG: 100 TABLET, FILM COATED ORAL at 08:56

## 2023-01-23 RX ADMIN — SODIUM BICARBONATE 650 MG TABLET 650 MG: at 17:12

## 2023-01-23 RX ADMIN — ALLOPURINOL 100 MG: 100 TABLET ORAL at 08:56

## 2023-01-23 RX ADMIN — Medication 1000 UNITS: at 08:56

## 2023-01-23 RX ADMIN — SODIUM BICARBONATE 650 MG TABLET 650 MG: at 12:30

## 2023-01-23 RX ADMIN — METOPROLOL SUCCINATE 100 MG: 100 TABLET, EXTENDED RELEASE ORAL at 08:56

## 2023-01-23 RX ADMIN — APIXABAN 5 MG: 5 TABLET, FILM COATED ORAL at 17:12

## 2023-01-23 NOTE — PLAN OF CARE
Problem: Potential for Falls  Goal: Patient will remain free of falls  Description: INTERVENTIONS:  - Educate patient/family on patient safety including physical limitations  - Instruct patient to call for assistance with activity   - Consult OT/PT to assist with strengthening/mobility   - Keep Call bell within reach  - Keep bed low and locked with side rails adjusted as appropriate  - Keep care items and personal belongings within reach  - Initiate and maintain comfort rounds  - Make Fall Risk Sign visible to staff  - Offer Toileting every 2 Hours, in advance of need  - Initiate/Maintain BEDalarm  - Obtain necessary fall risk management equipment: ALARM  Problem: PAIN - ADULT  Goal: Verbalizes/displays adequate comfort level or baseline comfort level  Description: Interventions:  - Encourage patient to monitor pain and request assistance  - Assess pain using appropriate pain scale  - Administer analgesics based on type and severity of pain and evaluate response  - Implement non-pharmacological measures as appropriate and evaluate response  - Consider cultural and social influences on pain and pain management  - Notify physician/advanced practitioner if interventions unsuccessful or patient reports new pain  Outcome: Progressing     Problem: INFECTION - ADULT  Goal: Absence or prevention of progression during hospitalization  Description: INTERVENTIONS:  - Assess and monitor for signs and symptoms of infection  - Monitor lab/diagnostic results  - Monitor all insertion sites, i e  indwelling lines, tubes, and drains  - Monitor endotracheal if appropriate and nasal secretions for changes in amount and color  - [place] appropriate cooling/warming therapies per order  - Administer medications as ordered  - Instruct and encourage patient and family to use good hand hygiene technique  - Identify and instruct in appropriate isolation precautions for identified infection/condition  Outcome: Progressing     Problem: SAFETY ADULT  Goal: Patient will remain free of falls  Description: INTERVENTIONS:  - Educate patient/family on patient safety including physical limitations  - Instruct patient to call for assistance with activity   - Consult OT/PT to assist with strengthening/mobility   - Keep Call bell within reach  - Keep bed low and locked with side rails adjusted as appropriate  - Keep care items and personal belongings within reach  - Initiate and maintain comfort rounds  - Make Fall Risk Sign visible to staff  - Offer Toileting every 2 Hours, in advance of need  - Initiate/Maintain BED alarm  - Obtain necessary fall risk management equipment: BED ALARM  - Apply yellow socks and bracelet for high fall risk patients  - Consider moving patient to room near nurses station  Outcome: Progressing  Goal: Maintain or return to baseline ADL function  Description: INTERVENTIONS:  -  Assess patient's ability to carry out ADLs; assess patient's baseline for ADL function and identify physical deficits which impact ability to perform ADLs (bathing, care of mouth/teeth, toileting, grooming, dressing, etc )  - Assess/evaluate cause of self-care deficits   - Assess range of motion  - Assess patient's mobility; develop plan if impaired  - Assess patient's need for assistive devices and provide as appropriate  - Encourage maximum independence but intervene and supervise when necessary  - Involve family in performance of ADLs  - Assess for home care needs following discharge   - Consider OT consult to assist with ADL evaluation and planning for discharge  - Provide patient education as appropriate  Outcome: Progressing  Goal: Maintains/Returns to pre admission functional level  Description: INTERVENTIONS:  - Perform BMAT or MOVE assessment daily    - Set and communicate daily mobility goal to care team and patient/family/caregiver     - Collaborate with rehabilitation services on mobility goals if consulted  - Perform Range of Motion 3 times a day   - Reposition patient every 2 hours  - Dangle patient 3 times a day  - Stand patient 3 times a day  - Ambulate patient 3 times a day  - Out of bed to chair 3 times a day   - Out of bed for meals 3 times a day  - Out of bed for toileting  - Record patient progress and toleration of activity level   Outcome: Progressing     Problem: DISCHARGE PLANNING  Goal: Discharge to home or other facility with appropriate resources  Description: INTERVENTIONS:  - Identify barriers to discharge w/patient and caregiver  - Arrange for needed discharge resources and transportation as appropriate  - Identify discharge learning needs (meds, wound care, etc )  - Arrange for interpretive services to assist at discharge as needed  - Refer to Case Management Department for coordinating discharge planning if the patient needs post-hospital services based on physician/advanced practitioner order or complex needs related to functional status, cognitive ability, or social support system  Outcome: Progressing     Problem: Knowledge Deficit  Goal: Patient/family/caregiver demonstrates understanding of disease process, treatment plan, medications, and discharge instructions  Description: Complete learning assessment and assess knowledge base    Interventions:  - Provide teaching at level of understanding  - Provide teaching via preferred learning methods  Outcome: Progressing     Problem: CARDIOVASCULAR - ADULT  Goal: Maintains optimal cardiac output and hemodynamic stability  Description: INTERVENTIONS:  - Monitor I/O, vital signs and rhythm  - Monitor for S/S and trends of decreased cardiac output  - Administer and titrate ordered vasoactive medications to optimize hemodynamic stability  - Assess quality of pulses, skin color and temperature  - Assess for signs of decreased coronary artery perfusion  - Instruct patient to report change in severity of symptoms  Outcome: Progressing  Goal: Absence of cardiac dysrhythmias or at baseline rhythm  Description: INTERVENTIONS:  - Continuous cardiac monitoring, vital signs, obtain 12 lead EKG if ordered  - Administer antiarrhythmic and heart rate control medications as ordered  - Monitor electrolytes and administer replacement therapy as ordered  Outcome: Progressing     Problem: NEUROSENSORY - ADULT  Goal: Achieves stable or improved neurological status  Description: INTERVENTIONS  - Monitor and report changes in neurological status  - Monitor vital signs such as temperature, blood pressure, glucose, and any other labs ordered   - Initiate measures to prevent increased intracranial pressure  - Monitor for seizure activity and implement precautions if appropriate      Outcome: Progressing  Goal: Remains free of injury related to seizures activity  Description: INTERVENTIONS  - Maintain airway, patient safety  and administer oxygen as ordered  - Monitor patient for seizure activity, document and report duration and description of seizure to physician/advanced practitioner  - If seizure occurs,  ensure patient safety during seizure  - Reorient patient post seizure  - Seizure pads on all 4 side rails  - Instruct patient/family to notify RN of any seizure activity including if an aura is experienced  - Instruct patient/family to call for assistance with activity based on nursing assessment  - Administer anti-seizure medications if ordered    Outcome: Progressing  Goal: Achieves maximal functionality and self care  Description: INTERVENTIONS  - Monitor swallowing and airway patency with patient fatigue and changes in neurological status  - Encourage and assist patient to increase activity and self care     - Encourage visually impaired, hearing impaired and aphasic patients to use assistive/communication devices  Outcome: Progressing     Problem: RESPIRATORY - ADULT  Goal: Achieves optimal ventilation and oxygenation  Description: INTERVENTIONS:  - Assess for changes in respiratory status  - Assess for changes in mentation and behavior  - Position to facilitate oxygenation and minimize respiratory effort  - Oxygen administered by appropriate delivery if ordered  - Initiate smoking cessation education as indicated  - Encourage broncho-pulmonary hygiene including cough, deep breathe, Incentive Spirometry  - Assess the need for suctioning and aspirate as needed  - Assess and instruct to report SOB or any respiratory difficulty  - Respiratory Therapy support as indicated  Outcome: Progressing     Problem: GASTROINTESTINAL - ADULT  Goal: Minimal or absence of nausea and/or vomiting  Description: INTERVENTIONS:  - Administer IV fluids if ordered to ensure adequate hydration  - Maintain NPO status until nausea and vomiting are resolved  - Nasogastric tube if ordered  - Administer ordered antiemetic medications as needed  - Provide nonpharmacologic comfort measures as appropriate  - Advance diet as tolerated, if ordered  - Consider nutrition services referral to assist patient with adequate nutrition and appropriate food choices  Outcome: Progressing  Goal: Maintains or returns to baseline bowel function  Description: INTERVENTIONS:  - Assess bowel function  - Encourage oral fluids to ensure adequate hydration  - Administer IV fluids if ordered to ensure adequate hydration  - Administer ordered medications as needed  - Encourage mobilization and activity  - Consider nutritional services referral to assist patient with adequate nutrition and appropriate food choices  Outcome: Progressing  Goal: Maintains adequate nutritional intake  Description: INTERVENTIONS:  - Monitor percentage of each meal consumed  - Identify factors contributing to decreased intake, treat as appropriate  - Assist with meals as needed  - Monitor I&O, weight, and lab values if indicated  - Obtain nutrition services referral as needed  Outcome: Progressing  Goal: Establish and maintain optimal ostomy function  Description: INTERVENTIONS:  - Assess bowel function  - Encourage oral fluids to ensure adequate hydration  - Administer IV fluids if ordered to ensure adequate hydration   - Administer ordered medications as needed  - Encourage mobilization and activity  - Nutrition services referral to assist patient with appropriate food choices  - Assess stoma site  - Consider wound care consult   Outcome: Progressing  Goal: Oral mucous membranes remain intact  Description: INTERVENTIONS  - Assess oral mucosa and hygiene practices  - Implement preventative oral hygiene regimen  - Implement oral medicated treatments as ordered  - Initiate Nutrition services referral as needed  Outcome: Progressing     - Apply yellow socks and bracelet for high fall risk patients  - Consider moving patient to room near nurses station  Outcome: Progressing

## 2023-01-23 NOTE — PROGRESS NOTES
Progress Note - Nephrology   Yunior Lemon 65 y o  female MRN: [id number]  Unit/Bed#: -01 Encounter: [id number]    Assessment and Plan    1  Chronic changes, stage IIIb with baseline creatinine 1 7 mg/dL  Renal function remains stable  Trend renal function, optimize hemodynamics, hold ACE/ARB, avoid nephrotoxins, renally dose medications, monitor volume status, monitor for urinary retention  Nephrology will sign off at this time  Please do not hesitate to contact with any further questions or concerns  2  Metabolic acidosis  Improved  Continue sodium bicarbonate 650 mg 3 times daily  Consider dose reduction, if indicated  3  Hypertension in setting of chronic kidney disease  Blood pressure is acceptable  Continue current antihypertensive regimen with hold parameters  4  Acute on chronic anemia  GI to evaluate acute blood loss anemia  Will need outpatient hematology follow-up for iron deficiency anemia and anemia of chronic disease      Follow up reason for today's visit:     Acute on chronic blood loss anemia    Patient Active Problem List   Diagnosis   • Closed fracture of body of sternum with routine healing   • Congestive heart disease ([facility] )   • Acquired hypothyroidism   • CAD (coronary artery disease)   • Forgetfulness   • Acute pain due to trauma   • Hx of fall   • Stress incontinence in female   • DARRELL (acute kidney injury) ([facility] )   • Perforated appendicitis   • Chronic combined systolic and diastolic CHF (congestive heart failure) ([facility])   • Pyuria   • Microscopic hematuria   • Vitamin D insufficiency   • Mitral valve insufficiency   • Hypercholesterolemia   • Aortic atherosclerosis ([facility])   • Renal calculus   • Diverticulosis   • Hiatal hernia   • Pulmonary nodule   • Leg swelling   • Paroxysmal atrial fibrillation ([facility])   • Ischemic cardiomyopathy   • S/P implantation of automatic cardioverter/defibrillator (AICD)   • Essential hypertension   • History of PTCA   • Anemia due to chronic kidney disease   • Iron deficiency anemia   • Diverticulitis   • Rectal bleeding   • Elevated troponin   • Acute blood loss anemia   • Anemia in chronic kidney disease   • Benign hypertensive renal disease   • Gastroesophageal reflux disease without esophagitis   • Stroke-like symptoms   • Gastrointestinal bleeding   • Metabolic acidosis   • Hydroureteronephrosis   • Dementia (HCC)   • Low TSH level   • COVID-19 virus infection   • Hypophosphatemia   • Pulmonary hypertension (HCC)   • Secondary renal hyperparathyroidism (HCC)   • Hyperuricemia   • Proteinuria   • Goals of care, counseling/discussion   • Elevated d-dimer   • Right internal carotid artery aneurysm   • Carotid artery stenosis   • Aneurysm (HCC)   • Cystitis   • Acute on chronic blood loss anemia         Subjective:   "So so " Interval history and review of systems may be limited due to dementia  Objective:     Vitals: Blood pressure 147/68, pulse 66, temperature (!) 97 3 °F (36 3 °C), resp  rate 20, height 5' 5" (1 651 m), weight 61 kg (134 lb 7 7 oz), SpO2 98 %  ,Body mass index is 22 38 kg/m²  Weight (last 2 days)     Date/Time Weight    01/23/23 0534 61 (134 48)    01/23/23 03:15:21 61 (134 48)    01/22/23 0503 58 5 (128 97)    01/21/23 0530 57 (125 66)            Intake/Output Summary (Last 24 hours) at 1/23/2023 1507  Last data filed at 1/23/2023 1113  Gross per 24 hour   Intake 240 ml   Output 1200 ml   Net -960 ml     I/O last 3 completed shifts: In: 300 [P O :300]  Out: 850 [Urine:850]         Physical Exam: /68   Pulse 66   Temp (!) 97 3 °F (36 3 °C)   Resp 20   Ht 5' 5" (1 651 m)   Wt 61 kg (134 lb 7 7 oz)   SpO2 98%   BMI 22 38 kg/m²     General Appearance:    No acute distress  Cooperative  Appears stated age  Head:    Normocephalic  Atraumatic  Normal jaw occlusion  Eyes:    Lids, conjunctiva normal  No scleral icterus  Ears:    Normal external ears  Nose:   Nares normal  No drainage     Mouth:   Lips, tongue normal  Mucosa normal  Phonation normal    Neck:   Supple  Symmetrical    Back:     Symmetric  No CVA tenderness  Lungs:     Normal respiratory effort  Clear to auscultation bilaterally  Chest wall:    No tenderness or deformity  Heart:    Regular rate and rhythm  Normal S1 and S2  No murmur  No JVD  No edema  Abdomen:     Soft  Non-tender  Bowel sounds active  Genitourinary:   No Greer catheter present  Extremities:   Extremities normal  Atraumatic  No cyanosis  Skin:   Warm and dry  No pallor, jaundice, rash, ecchymoses  Neurologic:  Not oriented  Lab, Imaging and other studies: I have personally reviewed pertinent labs  CBC:   Lab Results   Component Value Date    WBC 6 26 01/23/2023    HGB 7 3 (L) 01/23/2023    HCT 24 0 (L) 01/23/2023    MCV 89 01/23/2023     01/23/2023    MCH 27 0 01/23/2023    MCHC 30 4 (L) 01/23/2023    RDW 16 1 (H) 01/23/2023    MPV 10 3 01/23/2023    NRBC 0 01/23/2023     CMP:   Lab Results   Component Value Date    K 4 2 01/23/2023     01/23/2023    CO2 21 01/23/2023    BUN 55 (H) 01/23/2023    CREATININE 1 85 (H) 01/23/2023    CALCIUM 8 5 01/23/2023    AST 16 01/23/2023    ALT 7 01/23/2023    ALKPHOS 103 01/23/2023    EGFR 25 01/23/2023         Results from last 7 days   Lab Units 01/23/23  0525 01/22/23  0456 01/21/23  0507 01/19/23  0757 01/19/23  0256   POTASSIUM mmol/L 4 2 4 7 4 2   < > 4 8   CHLORIDE mmol/L 107 109* 109*   < > 110*   CO2 mmol/L 21 19* 18*   < > 16*   BUN mg/dL 55* 59* 58*   < > 72*   CREATININE mg/dL 1 85* 1 80* 1 79*   < > 2 02*   CALCIUM mg/dL 8 5 8 3* 8 4   < > 8 9   ALK PHOS U/L 103  --  96  --  107*   ALT U/L 7  --  7  --  8   AST U/L 16  --  14  --  17    < > = values in this interval not displayed           Phosphorus: No results found for: PHOS  Magnesium:   Lab Results   Component Value Date    MG 2 2 01/23/2023     Urinalysis: No results found for: SHANTEL Calderon, [address] [facility], LEUKOCYTESUR, NITRITE, [address] [facility], [place]da [place], BILIRUBINUR, BLOODU  Ionized Calcium: No results found for: CAION  Coagulation: No results found for: PT, INR, APTT  Troponin: No results found for: TROPONINI  ABG: No results found for: PHART, ODH1SHC, PO2ART, TLG1WFM, B8XMAFEH, BEART, SOURCE  Radiology review:     IMAGING  Procedure: CT head wo contrast    Result Date: 1/20/2023  Narrative: CT BRAIN - WITHOUT CONTRAST INDICATION:   Stroke, follow up TIA  COMPARISON:  January 19, 2023 TECHNIQUE:  CT examination of the brain was performed  In addition to axial images, sagittal and coronal 2D reformatted images were created and submitted for interpretation  Radiation dose length product (DLP) for this visit:  861 mGy-cm   This examination, like all CT scans performed in the [facility], was performed utilizing techniques to minimize radiation dose exposure, including the use of iterative reconstruction and automated exposure control  IMAGE QUALITY:  Diagnostic  FINDINGS:  PARENCHYMA:  Stable appearance of encephalomalacia within the distal right PCA territory Periventricular and white matter hypodensities consistent with moderate microangiopathic changes Gray-white differentiation is otherwise preserved No evidence of hemorrhage or mass Normal intracranial vasculature  VENTRICLES AND EXTRA-AXIAL SPACES:  Normal for the patient's age  VISUALIZED ORBITS AND PARANASAL SINUSES:  Unremarkable  CALVARIUM AND EXTRACRANIAL SOFT TISSUES:   Normal      Impression: No acute intracranial abnormality  Workstation performed: [id number]     Procedure: VAS carotid complete study    Result Date: 1/22/2023  Narrative:  [facility] REPORT CLINICAL: Indications: Patient presents with to evaluate for carotid artery stenosis s/p suspected CVA with left sided complications  Patient is poor historian 2* dementia   Operative History: Angioplasty of Left CCA/ICA with Stent placement Cardiac angioplasty with stent placement Cardiac Defibrillator implantation Left Mastectomy Right Partial Mastectomy Risk Factors The patient has history of HTN, Hyperlipidemia, CHF, A-Fib, Carotid Stenosis, CKD and CAD  Clinical Right Pressure:  154/84 mm Hg, Left Pressure: Mastectomy  FINDINGS:  Right              Impression  PSV  EDV (cm/s)  Direction of Flow  Ratio  Dist  ICA                       53          10                      0 91  Mid  ICA                        62          10                      1 07  Prox  ICA - Stent  1 - 49%      49          10                      0 85  Dist CCA - Stent                69          11                            Mid CCA - Stent                 58           0                      1 07  Prox CCA                        54          10                            Ext Carotid                    134           0                      2 32  Prox Vert                       68          11  Antegrade                 Subclavian                     124           6                             Left               Impression  PSV  EDV (cm/s)  Direction of Flow  Ratio  Dist  ICA                       46           9                      0 85  Mid  ICA                       106          16                      1 98  Prox  ICA          70%+        293          34                      5 45  Dist CCA                        67          11                            Mid CCA                         54           9                      0 72  Prox CCA                        75           0                            Ext Carotid                    145           9                      2 69  Prox Vert                       36           7  Antegrade                 Subclavian                     127          10                               CONCLUSION: Impression RIGHT: There is <50% restenosis noted in the internal carotid artery and stent  Plaque is calcified and irregular  Vertebral artery flow is antegrade  There is no significant subclavian artery disease  LEFT: There is >70% stenosis noted in the internal carotid artery  Plaque is calcified and irregular  Vertebral artery flow is antegrade  There is no significant subclavian artery disease    SIGNATURE: Electronically Signed by: Jarvis Hernández DO on 2023-01-22 01:28:19 PM      Current Facility-Administered Medications   Medication Dose Route Frequency   • acetaminophen (TYLENOL) tablet 650 mg  650 mg Oral Q6H PRN   • allopurinol (ZYLOPRIM) tablet 100 mg  100 mg Oral Daily   • amLODIPine (NORVASC) tablet 10 mg  10 mg Oral Daily   • apixaban (ELIQUIS) tablet 5 mg  5 mg Oral BID   • cefTRIAXone (ROCEPHIN) IVPB (premix in dextrose) 1,000 mg 50 mL  1,000 mg Intravenous Q24H   • cholecalciferol (VITAMIN D3) tablet 1,000 Units  1,000 Units Oral Daily   • docusate sodium (COLACE) capsule 100 mg  100 mg Oral BID   • levothyroxine tablet 150 mcg  150 mcg Oral Early Morning   • metoprolol succinate (TOPROL-XL) 24 hr tablet 100 mg  100 mg Oral Daily   • multi-electrolyte (PLASMALYTE-A/ISOLYTE-S PH 7 4) IV solution  50 mL/hr Intravenous Continuous   • pantoprazole (PROTONIX) injection 40 mg  40 mg Intravenous Q12H [address]   • polyethylene glycol (GLYCOLAX) bowel prep 119 g  119 g Oral BID   • polyethylene glycol (MIRALAX) packet 17 g  17 g Oral Daily PRN   • sertraline (ZOLOFT) tablet 100 mg  100 mg Oral Daily   • sodium bicarbonate tablet 650 mg  650 mg Oral TID after meals     Medications Discontinued During This Encounter   Medication Reason   • atorvastatin (LIPITOR) tablet 40 mg    • metoprolol succinate (TOPROL-XL) 50 mg 24 hr tablet Dose adjustment   • amLODIPine (NORVASC) tablet 10 mg    • metoprolol succinate (TOPROL-XL) 24 hr tablet 100 mg    • levothyroxine tablet 125 mcg    • aspirin chewable tablet 81 mg    • apixaban (ELIQUIS) tablet 2 5 mg    • amLODIPine (NORVASC) tablet 10 mg    • apixaban (ELIQUIS) tablet 5 mg        Marissa Schirmer, PA-C    Portions of the record may have been created with voice recognition software  Occasional wrong word or "sound a like" substitutions may have occurred due to the inherent limitations of voice recognition software  Read the chart carefully and recognize, using context, where substitutions have occurred

## 2023-01-23 NOTE — ASSESSMENT & PLAN NOTE
Lab Results   Component Value Date    EGFR 25 01/23/2023    EGFR 26 01/22/2023    EGFR 26 01/21/2023    CREATININE 1 85 (H) 01/23/2023    CREATININE 1 80 (H) 01/22/2023    CREATININE 1 79 (H) 01/21/2023   · Creatinine elevated with metabolic acidosis  · UTI-empiric ceftriaxone (urine culture reviewed, sensitive to cefazolin)  · Gentle IV hydration-will discuss with nephrology regarding hydration    · Avoid nephrotoxic medication  · Appreciate nephrology consultation-continue gentle hydration, avoid hypotension Kierra Hernandez

## 2023-01-23 NOTE — ASSESSMENT & PLAN NOTE
· Continue Eliquis 5 mg twice daily-remain on hold due to gastrointestinal bleeding, which resumed since patient refusing bowel prep-family updated  · Continue metoprolol succinate  · PPM in place-noncompatible for MRI  · Paced rhythm on EKG, rate controlled

## 2023-01-23 NOTE — ASSESSMENT & PLAN NOTE
· Chronic anemia hemoglobin appears baseline when compared to labs from care everywhere  · Monitor hemoglobin  Lab Results   Component Value Date    IRON 15 (L) 01/19/2023    TIBC 321 01/19/2023    FERRITIN 58 01/19/2023     Lab Results   Component Value Date    QOTITPNF39 363 01/19/2023     ·
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
## 2023-01-23 NOTE — CONSULTS
e-Consult (IPC)   John Speaks 65 y o  female MRN: [id number]  Unit/Bed#: -01 Encounter: [id number]      Reason for Consult / Principal Problem: ams, possible stroke  Inpatient consult to Neurology  Consult performed by: Denise Sanchez MD  Consult ordered by: Leartis Collet, MD        01/23/23      ASSESSMENT:  Toxic/metabolic encephalopathy in setting of riri on ckd/e  Coli uti superimposed on underlying dementia likely vascular component  Pt was a stroke alert on 1/19/23  Pt presented with worsening confusion and found to have some left sided weakness in the ED  CTA head/neck showed 70% left sided extracranial stenosis asymptomatic given the noted weakness was on the same side  Also showed 2 mm right ICA aneurysm  Unable to perform MRI brain scan given non compatible ppm device  Repeat head ct next day unremarkable  Ct head is not showing any obvious chronic strokes  Recrudescence is still a possibility in setting of the riri and uti  Also cannot exclude the possibility of a seizure as well such as a complex partial event as dementia is a risk factor for seizure activity however lower suspicion for this as mentation seems to have gradually worsened  Lower suspicion for eliquis failure  Given gi bleed eliquis and baby aspirin on hold  Hx of hemorroids  RECOMMENDATIONS:  Restart aspirin (for left side ica extracranial stenosis and rt sided ica stent)  and eliquis (for paf)  when cleared from GI standpoint  outpt vascular neurology follow up in 4-6 weeks  outpt neurosurgery follow up for aneurysm  outpt vascular surgery follow up for asymptomatic left extracranial carotid artery  Hb 7 3 this morning, 7 5  yesterday afternoon  Low threshold for transfusion  31 + minutes, >50% of the total time devoted to medical consultative verbal/EMR discussion between providers  Written report will be generated in the EMR      Denise Sanchez MD

## 2023-01-23 NOTE — ASSESSMENT & PLAN NOTE
• Suspect TIA vs Recrudescence  • NIH 5- No TPA out of window  • CT/CTA brain no acute abnormality  • Aspirin 81 mg daily-DC aspirin since patient is on Eliquis to reduce bleeding risk as per neurology recommendation (initial recommendation)-neurology evaluated patient, updated recommendation to resume Eliquis and aspirin  • Since patient is refusing bowel prep for GI procedure (for hematochezia), discussed with patient's daughter-after discussion has done, patient started on Eliquis and aspirin, monitor patient hemodynamically status and H&H for next 24 to 48 hours, if remains stable can be discharged  • Unable to tolerate statin secondary to severe myalgias   • Telemetry-no significant event noted   • Resume home medication  • Recent echocardiogram September 2022 with EF 45%  • Mri brain w/o contrast-patient has MRI noncompatible pacemaker in place    For that reason,   • Repeat CT scan normal   • speech/PT/OT eval appreciated  • History of dementia with confusion at baseline from [facility] nursing facility, was witnessed by staff with worsening confusion

## 2023-01-23 NOTE — ASSESSMENT & PLAN NOTE
Aortic arch and great vessels:  Moderate aortic atherosclerosis  Heather Gomezte is a 50% stenosis at the brachycephalic artery noted on series 2/23   Right vertebral artery: 60% stenosis at the origin   Left vertebral artery:  80% stenosis at the origin     Right carotid:  Stent noted within the right common carotid and internal carotid arteries   Left carotid:  Large cluster of calcified atherosclerotic plaque at the carotid bulb with about a 70% stenosis     Vascular surgery recommended outpatient follow-up and pending carotid duplex

## 2023-01-23 NOTE — ASSESSMENT & PLAN NOTE
Wt Readings from Last 3 Encounters:   01/23/23 61 kg (134 lb 7 7 oz)   01/13/23 53 1 kg (117 lb)   10/04/22 53 3 kg (117 lb 8 1 oz)     EF 45% September 2022  Appears euvolemic on exam  Not on outpatient diuretics  Continue metoprolol succinate  Monitor volume status

## 2023-01-23 NOTE — ASSESSMENT & PLAN NOTE
Witnessed per rectal bleed on 1/21/2023 night  Hemoglobin dropped  Initially Eliquis was on hold  She had a EGD colonoscopy in September 2023 which shows large hemorrhoids-that time it was suggested to repeat scope in 3 months  As patient is refusing bowel prep, discussed with patient's daughter over the phone regarding treatment options and alternatives  At this time, we will resume Eliquis and aspirin and monitor patient's clinically for next 24 to 48 hours, if remains stable can be discharged

## 2023-01-23 NOTE — PROGRESS NOTES
[address]  Progress Note - Brian Neves 1945, 65 y o  female MRN: [id number]  Unit/Bed#: -Trisha Encounter: [id number]  Primary Care Provider: Yuli Harrison DO   Date and time admitted to hospital: 1/19/2023  2:42 AM    Stroke-like symptoms  Assessment & Plan  • Suspect TIA vs Recrudescence  • NIH 5- No TPA out of window  • CT/CTA brain no acute abnormality  • Aspirin 81 mg daily-DC aspirin since patient is on Eliquis to reduce bleeding risk as per neurology recommendation (initial recommendation)-neurology evaluated patient, updated recommendation to resume Eliquis and aspirin  • Since patient is refusing bowel prep for GI procedure (for hematochezia), discussed with patient's daughter-after discussion has done, patient started on Eliquis and aspirin, monitor patient hemodynamically status and H&H for next 24 to 48 hours, if remains stable can be discharged  • Unable to tolerate statin secondary to severe myalgias   • Telemetry-no significant event noted   • Resume home medication  • Recent echocardiogram September 2022 with EF 45%  • Mri brain w/o contrast-patient has MRI noncompatible pacemaker in place  For that reason,   • Repeat CT scan normal   • speech/PT/OT eval appreciated  • History of dementia with confusion at baseline from [facility] nursing facility, was witnessed by staff with worsening confusion        * Acute on chronic blood loss anemia  Assessment & Plan  Witnessed per rectal bleed on 1/21/2023 night  Hemoglobin dropped  Initially Eliquis was on hold  She had a EGD colonoscopy in September 2023 which shows large hemorrhoids-that time it was suggested to repeat scope in 3 months  As patient is refusing bowel prep, discussed with patient's daughter over the phone regarding treatment options and alternatives    At this time, we will resume Eliquis and aspirin and monitor patient's clinically for next 24 to 48 hours, if remains stable can be discharged  Cystitis  Assessment & Plan  She is on ceftriaxone  Urine culture reviewed,    Aneurysm [facility])  Assessment & Plan  Discussed with neurosurgery, recommends outpatient follow-up and risk factor control    Carotid artery stenosis  Assessment & Plan  Aortic arch and great vessels:  Moderate aortic atherosclerosis  Carlos A Mould is a 50% stenosis at the brachycephalic artery noted on series 2/23   Right vertebral artery: 60% stenosis at the origin   Left vertebral artery:  80% stenosis at the origin  Right carotid:  Stent noted within the right common carotid and internal carotid arteries   Left carotid:  Large cluster of calcified atherosclerotic plaque at the carotid bulb with about a 70% stenosis     Vascular surgery recommended outpatient follow-up and pending carotid duplex    Right internal carotid artery aneurysm  Assessment & Plan  2 mm right middle ICA aneurysm   Neurosurgery recommendation appreciated, recommends outpatient follow-up    Elevated d-dimer  Assessment & Plan  · D-dimer 6 69 during ED evaluation  · Chronically anticoagulated with Eliquis, resides in nursing facility with medication compliance  · No tachycardia or hypoxia to suggest PE  · Continue to monitor    Metabolic acidosis  Assessment & Plan  Resolved  Consider to adjust sodium bicarb tablet    Benign hypertensive renal disease  Assessment & Plan  Patient was taking amlodipine and metoprolol, amlodipine remain on hold due to lying permissive hypertension  Continue metoprolol    Can resume amlodipine     Anemia due to chronic kidney disease  Assessment & Plan  · Chronic anemia hemoglobin appears baseline when compared to labs from care everywhere  · Monitor hemoglobin  Lab Results   Component Value Date    IRON 15 (L) 01/19/2023    TIBC 321 01/19/2023    FERRITIN 58 01/19/2023     Lab Results   Component Value Date    [id number] 363 01/19/2023     ·     Paroxysmal atrial fibrillation (HCC)  Assessment & Plan  · Continue Eliquis 5 mg twice daily-remain on hold due to gastrointestinal bleeding, which resumed since patient refusing bowel prep-family updated  · Continue metoprolol succinate  · PPM in place-noncompatible for MRI  · Paced rhythm on EKG, rate controlled    Chronic combined systolic and diastolic CHF (congestive heart failure) ([facility])  Assessment & Plan  Wt Readings from Last 3 Encounters:   01/23/23 61 kg (134 lb 7 7 oz)   01/13/23 53 1 kg (117 lb)   10/04/22 53 3 kg (117 lb 8 1 oz)     EF 45% September 2022  Appears euvolemic on exam  Not on outpatient diuretics  Continue metoprolol succinate  Monitor volume status      DARRELL (acute kidney injury) [facility])  Assessment & Plan  Lab Results   Component Value Date    EGFR 25 01/23/2023    EGFR 26 01/22/2023    EGFR 26 01/21/2023    CREATININE 1 85 (H) 01/23/2023    CREATININE 1 80 (H) 01/22/2023    CREATININE 1 79 (H) 01/21/2023   · Creatinine elevated with metabolic acidosis  · UTI-empiric ceftriaxone (urine culture reviewed, sensitive to cefazolin)  · Gentle IV hydration-will discuss with nephrology regarding hydration  · Avoid nephrotoxic medication  · Appreciate nephrology consultation-continue gentle hydration, avoid hypotension    Acquired hypothyroidism  Assessment & Plan  · TSH elevated, free T4 low  · Was taking levothyroxine 125 mcg daily-dose increased to 150 mcg, patient will need to recheck TSH in 4 to 6 weeks        VTE Pharmacologic Prophylaxis: VTE Score: 8 High Risk (Score >/= 5) - Pharmacological DVT Prophylaxis Ordered: apixaban (Eliquis)  Sequential Compression Devices Ordered  Patient Centered Rounds: I performed bedside rounds with nursing staff today  Discussions with Specialists or Other Care Team Provider: Neurology, gastroenterology, nephrology    Education and Discussions with Family / Patient: Updated  (daughter) via phone      Current Length of Stay: 4 day(s)  Current Patient Status: Inpatient   Certification Statement: The patient will continue to require additional inpatient hospital stay due to To monitor above condition  Discharge Plan: Anticipate discharge in 24-48 hrs to rehab facility  Code Status: Level 3 - DNAR and DNI    Subjective:   Seen and evaluated during the room  Resting comfortably  Patient remain on her baseline where she can able to recall her name date of birth her daughter's name and place  But reports her  is still alive (as per daughter, patient's  )-keep refusing bowel prep  Objective:     Vitals:   Temp (24hrs), Av 7 °F (36 5 °C), Min:97 3 °F (36 3 °C), Max:98 1 °F (36 7 °C)    Temp:  [97 3 °F (36 3 °C)-98 1 °F (36 7 °C)] 97 9 °F (36 6 °C)  HR:  [57-66] 65  Resp:  [16-20] 20  BP: (136-147)/(62-68) 145/65  SpO2:  [95 %-99 %] 97 %  Body mass index is 22 38 kg/m²  Input and Output Summary (last 24 hours): Intake/Output Summary (Last 24 hours) at 2023 1613  Last data filed at 2023 1113  Gross per 24 hour   Intake 240 ml   Output 1200 ml   Net -960 ml       Physical Exam:   Physical Exam  Vitals and nursing note reviewed  Constitutional:       Appearance: She is not ill-appearing or diaphoretic  Comments: Patient remained confused   HENT:      Nose: Nose normal  No congestion  Mouth/Throat:      Mouth: Mucous membranes are moist       Pharynx: Oropharynx is clear  No oropharyngeal exudate  Eyes:      General: No scleral icterus  Left eye: No discharge  Extraocular Movements: Extraocular movements intact  Conjunctiva/sclera: Conjunctivae normal       Pupils: Pupils are equal, round, and reactive to light  Cardiovascular:      Rate and Rhythm: Normal rate  Heart sounds: Normal heart sounds  No friction rub  No gallop  Pulmonary:      Effort: Pulmonary effort is normal  No respiratory distress  Breath sounds: No stridor  No wheezing or rhonchi  Abdominal:      General: Abdomen is flat  Bowel sounds are normal  There is no distension  Palpations: There is no mass  Tenderness: There is no abdominal tenderness  Hernia: No hernia is present  Musculoskeletal:         General: No swelling, tenderness, deformity or signs of injury  Cervical back: Normal range of motion  No rigidity  Lymphadenopathy:      Cervical: No cervical adenopathy  Neurological:      Mental Status: She is alert  Cranial Nerves: No cranial nerve deficit  Sensory: No sensory deficit  Motor: No weakness  Coordination: Coordination normal       Comments: Patient is alert but confused  Cannot recall her name and date of birth, her daughter's name, and the place where she is right now    Still calling left her  is alive where her  is dead          Additional Data:     Labs:  Results from last 7 days   Lab Units 01/23/23  0525   WBC Thousand/uL 6 26   HEMOGLOBIN g/dL 7 3*   HEMATOCRIT % 24 0*   PLATELETS Thousands/uL 324   NEUTROS PCT % 69   LYMPHS PCT % 18   MONOS PCT % 8   EOS PCT % 3     Results from last 7 days   Lab Units 01/23/23  0525   SODIUM mmol/L 139   POTASSIUM mmol/L 4 2   CHLORIDE mmol/L 107   CO2 mmol/L 21   BUN mg/dL 55*   CREATININE mg/dL 1 85*   ANION GAP mmol/L 11   CALCIUM mg/dL 8 5   ALBUMIN g/dL 3 1*   TOTAL BILIRUBIN mg/dL 0 36   ALK PHOS U/L 103   ALT U/L 7   AST U/L 16   GLUCOSE RANDOM mg/dL 78     Results from last 7 days   Lab Units 01/19/23  0256   INR  2 34*     Results from last 7 days   Lab Units 01/19/23  0241   POC GLUCOSE mg/dl 84     Results from last 7 days   Lab Units 01/19/23  0757   HEMOGLOBIN A1C % 5 1     Results from last 7 days   Lab Units 01/19/23  0311 01/19/23  0256   LACTIC ACID mmol/L 0 7  --    PROCALCITONIN ng/ml  --  0 21       Lines/Drains:  Invasive Devices     Peripheral Intravenous Line  Duration           Peripheral IV 01/19/23 Right;Ventral (anterior) Forearm 4 days    Peripheral IV 01/23/23 Left Antecubital <1 day                  Telemetry:  Telemetry Orders (From admission, onward)             24 Hour Telemetry Monitoring  Continuous x 24 Hours (Telem)           References:    Telemetry Guidelines   Question:  Reason for 24 Hour Telemetry  Answer:  Acute CVA (>24 hrs old)                 Telemetry Reviewed: paced rythm  Indication for Continued Telemetry Use: No indication for continued use  Will discontinue  Imaging: No pertinent imaging reviewed  Recent Cultures (last 7 days):   Results from last 7 days   Lab Units 23  0320 23  0311   BLOOD CULTURE   --  No Growth After 4 Days  No Growth After 4 Days     URINE CULTURE  >100,000 cfu/ml Escherichia coli*  >100,000 cfu/ml Lactobacillus species*  <10,000 cfu/ml Enterococcus species*  <10,000 cfu/ml Staphylococcus species*  --        Last 24 Hours Medication List:   Current Facility-Administered Medications   Medication Dose Route Frequency Provider Last Rate   • acetaminophen  650 mg Oral Q6H PRN Juju S Funmilayo, CRNP     • allopurinol  100 mg Oral Daily Juju S Funmilayo, CRNP     • amLODIPine  10 mg Oral Daily Lauris Goldmann, CRNP     • apixaban  5 mg Oral BID Sarah Weber MD     • [START ON 2023] aspirin  81 mg Oral Daily Crystal Avalos MD     • cefTRIAXone  1,000 mg Intravenous Q24H Juju S Funmilayo, CRNP 1,000 mg (23)   • cholecalciferol  1,000 Units Oral Daily Juju S Funmilayo, CRNP     • docusate sodium  100 mg Oral BID Juju S Funmilayo, CRNP     • levothyroxine  150 mcg Oral Early Morning Crystal Avalos MD     • metoprolol succinate  100 mg Oral Daily Crystal Avalos MD     • multi-electrolyte  50 mL/hr Intravenous Continuous Juju S Funmilayo, CRNP 50 mL/hr (23 1230)   • pantoprazole  40 mg Intravenous Q12H [address] Juju S Funmilayo, CRNP     • polyethylene glycol  119 g Oral BID Stacy Avalos MD     • polyethylene glycol  17 g Oral Daily PRN Juju S Funmilayo, CRNP     • sertraline  100 mg Oral Daily Juju S Funmilayo, CRNP     • sodium bicarbonate  650 mg Oral TID after meals [facility] DO Anup          Today, Patient Was Seen By: Gerardo Mcdonald MD    **Please Note: This note may have been constructed using a voice recognition system  **

## 2023-01-24 PROBLEM — N18.9 ANEMIA DUE TO CHRONIC KIDNEY DISEASE: Status: RESOLVED | Noted: 2022-03-04 | Resolved: 2023-01-24

## 2023-01-24 PROBLEM — N30.00 ACUTE CYSTITIS WITHOUT HEMATURIA: Status: ACTIVE | Noted: 2023-01-21

## 2023-01-24 PROBLEM — E87.20 METABOLIC ACIDOSIS: Status: RESOLVED | Noted: 2022-09-22 | Resolved: 2023-01-24

## 2023-01-24 PROBLEM — G93.41 METABOLIC ENCEPHALOPATHY: Status: ACTIVE | Noted: 2022-09-22

## 2023-01-24 PROBLEM — D63.1 ANEMIA DUE TO CHRONIC KIDNEY DISEASE: Status: RESOLVED | Noted: 2022-03-04 | Resolved: 2023-01-24

## 2023-01-24 LAB
ALBUMIN SERPL BCP-MCNC: 3 G/DL (ref 3.5–5)
ALP SERPL-CCNC: 102 U/L (ref 34–104)
ALT SERPL W P-5'-P-CCNC: 4 U/L (ref 7–52)
ANION GAP SERPL CALCULATED.3IONS-SCNC: 12 MMOL/L (ref 4–13)
AST SERPL W P-5'-P-CCNC: 14 U/L (ref 13–39)
BACTERIA BLD CULT: NORMAL
BACTERIA BLD CULT: NORMAL
BASOPHILS # BLD AUTO: 0.1 THOUSANDS/ÂΜL (ref 0–0.1)
BASOPHILS NFR BLD AUTO: 1 % (ref 0–1)
BILIRUB SERPL-MCNC: 0.32 MG/DL (ref 0.2–1)
BUN SERPL-MCNC: 50 MG/DL (ref 5–25)
CALCIUM ALBUM COR SERPL-MCNC: 9.3 MG/DL (ref 8.3–10.1)
CALCIUM SERPL-MCNC: 8.5 MG/DL (ref 8.4–10.2)
CHLORIDE SERPL-SCNC: 108 MMOL/L (ref 96–108)
CO2 SERPL-SCNC: 18 MMOL/L (ref 21–32)
CREAT SERPL-MCNC: 1.86 MG/DL (ref 0.6–1.3)
EOSINOPHIL # BLD AUTO: 0.19 THOUSAND/ÂΜL (ref 0–0.61)
EOSINOPHIL NFR BLD AUTO: 3 % (ref 0–6)
ERYTHROCYTE [DISTWIDTH] IN BLOOD BY AUTOMATED COUNT: 16.2 % (ref 11.6–15.1)
GFR SERPL CREATININE-BSD FRML MDRD: 25 ML/MIN/1.73SQ M
GLUCOSE SERPL-MCNC: 78 MG/DL (ref 65–140)
HCT VFR BLD AUTO: 23.3 % (ref 34.8–46.1)
HGB BLD-MCNC: 7.1 G/DL (ref 11.5–15.4)
IMM GRANULOCYTES # BLD AUTO: 0.03 THOUSAND/UL (ref 0–0.2)
IMM GRANULOCYTES NFR BLD AUTO: 0 % (ref 0–2)
LYMPHOCYTES # BLD AUTO: 1.28 THOUSANDS/ÂΜL (ref 0.6–4.47)
LYMPHOCYTES NFR BLD AUTO: 17 % (ref 14–44)
MCH RBC QN AUTO: 27.3 PG (ref 26.8–34.3)
MCHC RBC AUTO-ENTMCNC: 30.5 G/DL (ref 31.4–37.4)
MCV RBC AUTO: 90 FL (ref 82–98)
MONOCYTES # BLD AUTO: 0.58 THOUSAND/ÂΜL (ref 0.17–1.22)
MONOCYTES NFR BLD AUTO: 8 % (ref 4–12)
NEUTROPHILS # BLD AUTO: 5.44 THOUSANDS/ÂΜL (ref 1.85–7.62)
NEUTS SEG NFR BLD AUTO: 71 % (ref 43–75)
NRBC BLD AUTO-RTO: 0 /100 WBCS
PLATELET # BLD AUTO: 303 THOUSANDS/UL (ref 149–390)
PMV BLD AUTO: 10.5 FL (ref 8.9–12.7)
POTASSIUM SERPL-SCNC: 4.1 MMOL/L (ref 3.5–5.3)
PROT SERPL-MCNC: 5.7 G/DL (ref 6.4–8.4)
RBC # BLD AUTO: 2.6 MILLION/UL (ref 3.81–5.12)
SODIUM SERPL-SCNC: 138 MMOL/L (ref 135–147)
WBC # BLD AUTO: 7.62 THOUSAND/UL (ref 4.31–10.16)

## 2023-01-24 PROCEDURE — 30233N1 TRANSFUSION OF NONAUTOLOGOUS RED BLOOD CELLS INTO PERIPHERAL VEIN, PERCUTANEOUS APPROACH: ICD-10-PCS | Performed by: FAMILY MEDICINE

## 2023-01-24 RX ORDER — HYDROCORTISONE ACETATE 25 MG/1
25 SUPPOSITORY RECTAL 2 TIMES DAILY
Status: DISCONTINUED | OUTPATIENT
Start: 2023-01-24 | End: 2023-01-26

## 2023-01-24 RX ORDER — PANTOPRAZOLE SODIUM 40 MG/1
40 TABLET, DELAYED RELEASE ORAL
Status: DISCONTINUED | OUTPATIENT
Start: 2023-01-25 | End: 2023-01-26 | Stop reason: HOSPADM

## 2023-01-24 RX ORDER — CEFTRIAXONE 1 G/50ML
1000 INJECTION, SOLUTION INTRAVENOUS ONCE
Status: COMPLETED | OUTPATIENT
Start: 2023-01-24 | End: 2023-01-24

## 2023-01-24 RX ORDER — SODIUM BICARBONATE 650 MG/1
1300 TABLET ORAL
Status: DISCONTINUED | OUTPATIENT
Start: 2023-01-24 | End: 2023-01-25

## 2023-01-24 RX ADMIN — APIXABAN 5 MG: 5 TABLET, FILM COATED ORAL at 09:02

## 2023-01-24 RX ADMIN — Medication 1000 UNITS: at 09:02

## 2023-01-24 RX ADMIN — SODIUM CHLORIDE, SODIUM GLUCONATE, SODIUM ACETATE, POTASSIUM CHLORIDE, MAGNESIUM CHLORIDE, SODIUM PHOSPHATE, DIBASIC, AND POTASSIUM PHOSPHATE 50 ML/HR: .53; .5; .37; .037; .03; .012; .00082 INJECTION, SOLUTION INTRAVENOUS at 10:19

## 2023-01-24 RX ADMIN — SODIUM BICARBONATE 650 MG TABLET 1300 MG: at 17:13

## 2023-01-24 RX ADMIN — LEVOTHYROXINE SODIUM 150 MCG: 150 TABLET ORAL at 06:09

## 2023-01-24 RX ADMIN — HYDROCORTISONE ACETATE 25 MG: 25 SUPPOSITORY RECTAL at 12:06

## 2023-01-24 RX ADMIN — ASPIRIN 81 MG: 81 TABLET, COATED ORAL at 09:02

## 2023-01-24 RX ADMIN — ALLOPURINOL 100 MG: 100 TABLET ORAL at 09:02

## 2023-01-24 RX ADMIN — CEFTRIAXONE 1000 MG: 1 INJECTION, SOLUTION INTRAVENOUS at 20:37

## 2023-01-24 RX ADMIN — AMLODIPINE BESYLATE 10 MG: 10 TABLET ORAL at 09:03

## 2023-01-24 RX ADMIN — SODIUM BICARBONATE 650 MG TABLET 650 MG: at 12:06

## 2023-01-24 RX ADMIN — PANTOPRAZOLE SODIUM 40 MG: 40 INJECTION, POWDER, FOR SOLUTION INTRAVENOUS at 09:02

## 2023-01-24 RX ADMIN — METOPROLOL SUCCINATE 100 MG: 100 TABLET, EXTENDED RELEASE ORAL at 09:03

## 2023-01-24 RX ADMIN — SERTRALINE 100 MG: 100 TABLET, FILM COATED ORAL at 09:03

## 2023-01-24 RX ADMIN — SODIUM BICARBONATE 650 MG TABLET 650 MG: at 09:02

## 2023-01-24 RX ADMIN — APIXABAN 5 MG: 5 TABLET, FILM COATED ORAL at 17:13

## 2023-01-24 NOTE — ASSESSMENT & PLAN NOTE
· Continue Eliquis 5 mg twice daily-Continue metoprolol succinate  · PPM in place-noncompatible for MRI  · Paced rhythm on EKG, rate controlled

## 2023-01-24 NOTE — PROGRESS NOTES
[facility]  Progress Note - Brian Neves 65 y o  female MRN: [id number]    Unit/Bed#: -01 Encounter: [id number]    Assessment/Plan:  Rectal bleeding  Acute blood loss anemia  Dementia  Hemorrhoids  Iron deficiency anemia  -Differential diagnosis includes bleeding from known diverticulosis or hemorrhoids given hematochezia, low suspicion for upper GI bleed  -Patient declining treatment although patient likely does not have capacity due to her underlying dementia however cannot force patient to undergo a procedure  -Unfortunately with reinitiation of anticoagulation her hemoglobin has down trended  -Fortunately rectal exam with external hemorrhoids and brown stool in vault without signs of bleeding    Recommendations:  -Discuss goals of care with patient's family and to consider risks versus benefits of anticoagulation  -Start Anusol suppository twice daily x2 weeks  -Transfuse hemoglobin to keep>8 given her CVA and atherosclerotic disease  -Iron supplementation  -Bowel regimen  -B12 supplementation given level <400  -Continue to monitor hemoglobin  -Decrease PPI to once daily  -Alternatively can consider surgical evaluation for empiric hemorrhoidectomy    Subjective:   Patient seen and examined  No complaints  AAO x1  Reviewed nursing notes and discussed with nursing aides no further blood in the stool  Objective:     Vitals: Blood pressure 145/64, pulse 66, temperature (!) 97 3 °F (36 3 °C), resp  rate 16, height 5' 5" (1 651 m), weight 59 8 kg (131 lb 13 4 oz), SpO2 100 %  ,Body mass index is 21 94 kg/m²  Intake/Output Summary (Last 24 hours) at 1/24/2023 0935  Last data filed at 1/23/2023 1740  Gross per 24 hour   Intake 670 ml   Output 600 ml   Net 70 ml       Review of Systems: as per HPI  Review of Systems    Physical Exam:     Physical Exam  Constitutional:       General: She is not in acute distress  Appearance: She is ill-appearing  HENT:      Head: Normocephalic  Eyes:      General: No scleral icterus  Cardiovascular:      Rate and Rhythm: Normal rate  Pulmonary:      Effort: Pulmonary effort is normal    Abdominal:      General: There is no distension  Palpations: Abdomen is soft  Tenderness: There is no abdominal tenderness  There is no guarding  Genitourinary:     Comments: External hemorrhoids without bleeding, dark brown stool in vault  Skin:     Coloration: Skin is not jaundiced  Neurological:      Mental Status: Mental status is at baseline             Invasive Devices     Peripheral Intravenous Line  Duration           Peripheral IV 01/23/23 Left Antecubital 1 day                        CBC:   Lab Results   Component Value Date    WBC 7 62 01/24/2023    HGB 7 1 (L) 01/24/2023    HCT 23 3 (L) 01/24/2023    MCV 90 01/24/2023     01/24/2023    MCH 27 3 01/24/2023    MCHC 30 5 (L) 01/24/2023    RDW 16 2 (H) 01/24/2023    MPV 10 5 01/24/2023    NRBC 0 01/24/2023   ,   CMP:   Lab Results   Component Value Date    K 4 1 01/24/2023     01/24/2023    CO2 18 (L) 01/24/2023    BUN 50 (H) 01/24/2023    CREATININE 1 86 (H) 01/24/2023    CALCIUM 8 5 01/24/2023    AST 14 01/24/2023    ALT 4 (L) 01/24/2023    ALKPHOS 102 01/24/2023    EGFR 25 01/24/2023

## 2023-01-24 NOTE — ASSESSMENT & PLAN NOTE
Wt Readings from Last 3 Encounters:   01/24/23 59 8 kg (131 lb 13 4 oz)   01/13/23 53 1 kg (117 lb)   10/04/22 53 3 kg (117 lb 8 1 oz)     EF 45% September 2022  Appears euvolemic on exam  Not on outpatient diuretics  Continue metoprolol succinate  Monitor volume status

## 2023-01-24 NOTE — UTILIZATION REVIEW
Continued Stay Review    Date: 1/24/2023                       Current Patient Class: inpatient  Current Level of Care: med/surg  HPI:77 y o  female initially admitted on 1/19 with acute blood anemia   Assessment/Plan: pt with no complaints  AA&O x1  No reports of further bloody bms overnight  GI consulted -- differential dx includes bleeding from known diverticulosis or hemorrhoids given hematochezia, low suspicion for upper GI bleed  Pt declining treatment although she likely does not have capacity d/t her underlying dementia however cannot force pt to undergo a procedure  Hgb downtrended since reinitiation of anticoagulant  Plan to discuss Ida 63 with pt's family and to consider risks vs benefit of anticoagulation  Start Anusol suppl BID for 2 wks  Iron supplementation  Bowel regimen  B12 supplement  Decrease PPI to once daily  Transfuse hgb to keep>8, continue to monitor  Can consider surgical eval for empiric hemorrhoidectomy  Per nephrology acidosis has worsened on sodium bicarb 650 mg po tid, increase to 1300 but change to twice daily  Repeat bicarb tomorrow  Continue norvasc and metoprolol and monitor BP  Continue asa and anticoagulation and monitor Hgb  Plan to d/c to SNF when medically stable      Vital Signs:   Date/Time Temp Pulse Resp BP MAP (mmHg) SpO2 O2 Device Patient Position - Orthostatic VS   01/24/23 0900 -- -- -- -- -- -- None (Room air) --   01/24/23 07:13:45 97 3 °F (36 3 °C) Abnormal  66 16 145/64 91 100 % -- --   01/23/23 2127 -- -- -- -- -- -- None (Room air) --   01/23/23 20:15:21 97 9 °F (36 6 °C) 70 20 140/55 83 95 % -- --   01/23/23 16:06:51 97 9 °F (36 6 °C) 65 20 145/65 92 97 % -- --   01/23/23 11:08:28 97 3 °F (36 3 °C) Abnormal  66 -- 147/68 94 98 % -- --   01/23/23 0957 -- -- -- -- -- -- None (Room air) --   01/23/23 06:55:20 97 3 °F (36 3 °C) Abnormal  62 20 137/63 88 99 % -- --   01/23/23 03:15:21 97 3 °F (36 3 °C) Abnormal  57 18 140/66 91 99 % -- Lying   01/22/23 6062 -- -- -- -- -- -- None (Room air) --   01/22/23 22:34:07 97 9 °F (36 6 °C) 64 18 136/63 87 98 % -- --   01/22/23 22:31:28 97 9 °F (36 6 °C) 65 18 136/63 87 99 % -- --   01/22/23 1945 98 1 °F (36 7 °C) 65 16 136/62 85 95 % None (Room air) Lying   01/22/23 1545 98 1 °F (36 7 °C) 68 18 138/63 88 95 % None (Room air) Lying   01/22/23 1145 97 3 °F (36 3 °C) Abnormal  69 16 138/66 -- 98 % None (Room air) Lying   01/22/23 0745 97 9 °F (36 6 °C) 66 18 139/67 -- 100 % None (Room air)      Pertinent Labs/Diagnostic Results:   Results from last 7 days   Lab Units 01/24/23  0606 01/23/23  0525 01/22/23  1412 01/22/23  0456 01/21/23  0507   WBC Thousand/uL 7 62 6 26  --  6 98 6 49   HEMOGLOBIN g/dL 7 1* 7 3* 7 5* 7 1* 7 5*   HEMATOCRIT % 23 3* 24 0* 25 2* 22 6* 24 9*   PLATELETS Thousands/uL 303 324  --  302 293   NEUTROS ABS Thousands/µL 5 44 4 36  --   --  4 57     Results from last 7 days   Lab Units 01/24/23  0606 01/23/23  0525 01/22/23  0456 01/21/23  0507 01/20/23  0700 01/19/23  0757 01/19/23  0256   SODIUM mmol/L 138 139 137 136 136   < > 136   POTASSIUM mmol/L 4 1 4 2 4 7 4 2 4 5   < > 4 8   CHLORIDE mmol/L 108 107 109* 109* 110*   < > 110*   CO2 mmol/L 18* 21 19* 18* 17*   < > 16*   ANION GAP mmol/L 12 11 9 9 9   < > 10   BUN mg/dL 50* 55* 59* 58* 66*   < > 72*   CREATININE mg/dL 1 86* 1 85* 1 80* 1 79* 1 85*   < > 2 02*   EGFR ml/min/1 73sq m 25 25 26 26 25   < > 23   CALCIUM mg/dL 8 5 8 5 8 3* 8 4 8 6   < > 8 9   MAGNESIUM mg/dL  --  2 2 2 1  --   --   --  2 0    < > = values in this interval not displayed       Results from last 7 days   Lab Units 01/24/23  0606 01/23/23  0525 01/21/23  0507 01/19/23  0256   AST U/L 14 16 14 17   ALT U/L 4* 7 7 8   ALK PHOS U/L 102 103 96 107*   TOTAL PROTEIN g/dL 5 7* 6 0* 5 9* 6 3*   ALBUMIN g/dL 3 0* 3 1* 3 0* 3 2*   TOTAL BILIRUBIN mg/dL 0 32 0 36 0 30 0 41     Results from last 7 days   Lab Units 01/24/23  0606 01/23/23  0525 01/22/23  0456 01/21/23  0507 01/20/23  0700 01/19/23  0757 01/19/23  0256   GLUCOSE RANDOM mg/dL 78 78 81 74 81 84 82     Results from last 7 days   Lab Units 01/19/23  0320 01/19/23  0311   BLOOD CULTURE   --  No Growth After 5 Days  No Growth After 5 Days  URINE CULTURE  >100,000 cfu/ml Escherichia coli*  >100,000 cfu/ml Lactobacillus species*  <10,000 cfu/ml Enterococcus species*  <10,000 cfu/ml Staphylococcus species*  --        Medications:   Scheduled Medications:  allopurinol, 100 mg, Oral, Daily  amLODIPine, 10 mg, Oral, Daily  apixaban, 5 mg, Oral, BID  aspirin, 81 mg, Oral, Daily  cefTRIAXone, 1,000 mg, Intravenous, Once  cholecalciferol, 1,000 Units, Oral, Daily  cyanocobalamin, 1,000 mcg, Oral, Daily  docusate sodium, 100 mg, Oral, BID  hydrocortisone, 25 mg, Rectal, BID  levothyroxine, 150 mcg, Oral, Early Morning  metoprolol succinate, 100 mg, Oral, Daily  [START ON 1/25/2023] pantoprazole, 40 mg, Oral, Early Morning  sertraline, 100 mg, Oral, Daily  sodium bicarbonate, 1,300 mg, Oral, BID after meals    multi-electrolyte (PLASMALYTE-A/ISOLYTE-S PH 7 4) IV solution  Rate: 50 mL/hr Dose: 50 mL/hr  Freq: Continuous Route: IV  Indications of Use: IV Hydration  Last Dose: 50 mL/hr (01/24/23 1019)  Start: 01/19/23 0800 End: 01/24/23 1313    PRN Meds:  acetaminophen, 650 mg, Oral, Q6H PRN  polyethylene glycol, 17 g, Oral, Daily PRN      Discharge Plan: D    Network Utilization Review Department  ATTENTION: Please call with any questions or concerns to [phone] and carefully listen to the prompts so that you are directed to the right person  All voicemails are confidential   Alcario Broad all requests for admission clinical reviews, approved or denied determinations and any other requests to dedicated fax number below belonging to the campus where the patient is receiving treatment   List of dedicated fax numbers for the Facilities:  [address] [place] [address] DENIALS (Administrative/Medical Necessity) [phone]   [address] (Maternity/NICU/Pediatrics) Alexandria Chinchilla [address] Kavita Mayen  [phone]   [address] [place] [address] [facility] [address] [facility] [address] [phone]

## 2023-01-24 NOTE — ASSESSMENT & PLAN NOTE
Lab Results   Component Value Date    EGFR 25 01/24/2023    EGFR 25 01/23/2023    EGFR 26 01/22/2023    CREATININE 1 86 (H) 01/24/2023    CREATININE 1 85 (H) 01/23/2023    CREATININE 1 80 (H) 01/22/2023   · On CKD stage III b- creatinine baseline 1 7  Creatinine is stable at 1 86 discontinue further fluids as she is eating without any issues  · Acidosis has worsened on sodium bicarb 650 mg po tid  increase her to 1300 but change to twice daily  Repeat bicarb tomorrow

## 2023-01-24 NOTE — PLAN OF CARE
Problem: NEUROSENSORY - ADULT  Goal: Achieves stable or improved neurological status  Description: INTERVENTIONS  - Monitor and report changes in neurological status  - Monitor vital signs such as temperature, blood pressure, glucose, and any other labs ordered   - Initiate measures to prevent increased intracranial pressure  - Monitor for seizure activity and implement precautions if appropriate      Outcome: Progressing  Goal: Remains free of injury related to seizures activity  Description: INTERVENTIONS  - Maintain airway, patient safety  and administer oxygen as ordered  - Monitor patient for seizure activity, document and report duration and description of seizure to physician/advanced practitioner  - If seizure occurs,  ensure patient safety during seizure  - Reorient patient post seizure  - Seizure pads on all 4 side rails  - Instruct patient/family to notify RN of any seizure activity including if an aura is experienced  - Instruct patient/family to call for assistance with activity based on nursing assessment  - Administer anti-seizure medications if ordered    Outcome: Progressing  Goal: Achieves maximal functionality and self care  Description: INTERVENTIONS  - Monitor swallowing and airway patency with patient fatigue and changes in neurological status  - Encourage and assist patient to increase activity and self care     - Encourage visually impaired, hearing impaired and aphasic patients to use assistive/communication devices  Outcome: Progressing     Problem: GASTROINTESTINAL - ADULT  Goal: Minimal or absence of nausea and/or vomiting  Description: INTERVENTIONS:  - Administer IV fluids if ordered to ensure adequate hydration  - Maintain NPO status until nausea and vomiting are resolved  - Nasogastric tube if ordered  - Administer ordered antiemetic medications as needed  - Provide nonpharmacologic comfort measures as appropriate  - Advance diet as tolerated, if ordered  - Consider nutrition services referral to assist patient with adequate nutrition and appropriate food choices  Outcome: Progressing  Goal: Maintains or returns to baseline bowel function  Description: INTERVENTIONS:  - Assess bowel function  - Encourage oral fluids to ensure adequate hydration  - Administer IV fluids if ordered to ensure adequate hydration  - Administer ordered medications as needed  - Encourage mobilization and activity  - Consider nutritional services referral to assist patient with adequate nutrition and appropriate food choices  Outcome: Progressing  Goal: Maintains adequate nutritional intake  Description: INTERVENTIONS:  - Monitor percentage of each meal consumed  - Identify factors contributing to decreased intake, treat as appropriate  - Assist with meals as needed  - Monitor I&O, weight, and lab values if indicated  - Obtain nutrition services referral as needed  Outcome: Progressing  Goal: Establish and maintain optimal ostomy function  Description: INTERVENTIONS:  - Assess bowel function  - Encourage oral fluids to ensure adequate hydration  - Administer IV fluids if ordered to ensure adequate hydration   - Administer ordered medications as needed  - Encourage mobilization and activity  - Nutrition services referral to assist patient with appropriate food choices  - Assess stoma site  - Consider wound care consult   Outcome: Progressing  Goal: Oral mucous membranes remain intact  Description: INTERVENTIONS  - Assess oral mucosa and hygiene practices  - Implement preventative oral hygiene regimen  - Implement oral medicated treatments as ordered  - Initiate Nutrition services referral as needed  Outcome: Progressing     Problem: METABOLIC, FLUID AND ELECTROLYTES - ADULT  Goal: Electrolytes maintained within normal limits  Description: INTERVENTIONS:  - Monitor labs and assess patient for signs and symptoms of electrolyte imbalances  - Administer electrolyte replacement as ordered  - Monitor response to electrolyte replacements, including repeat lab results as appropriate  - Instruct patient on fluid and nutrition as appropriate  Outcome: Progressing  Goal: Fluid balance maintained  Description: INTERVENTIONS:  - Monitor labs   - Monitor I/O and WT  - Instruct patient on fluid and nutrition as appropriate  - Assess for signs & symptoms of volume excess or deficit  Outcome: Progressing  Goal: Glucose maintained within target range  Description: INTERVENTIONS:  - Monitor Blood Glucose as ordered  - Assess for signs and symptoms of hyperglycemia and hypoglycemia  - Administer ordered medications to maintain glucose within target range  - Assess nutritional intake and initiate nutrition service referral as needed  Outcome: Progressing

## 2023-01-24 NOTE — ASSESSMENT & PLAN NOTE
Witnessed per rectal bleed on 1/21/2023 night  Hemoglobin dropped  Initially Eliquis was on hold  She had a EGD colonoscopy in September 2022 which shows large hemorrhoids-that time it was suggested to repeat scope in 3 months  As patient is refusing bowel prep, discussed with patient's daughter over the phone regarding treatment options and alternatives  At this time, we will resume Eliquis and aspirin and monitor patient's clinically for next 24 to 48 hours, if remains stable can be discharged  No report of having another bloody bowel movements GI did a rectal exam there is brown stool in the vault and hemorrhoids  There is no overt GI bleeding we will continue her aspirin and anticoagulation  Patient is still refusing to repeat any EGD or colonoscopy although she does not probably have any capacity to make accurate decisions due to dementia - but cant force   Also has associated vitamin B12 deficiency  Will start vitamin B12 1000 mcg p o  daily has iron deficiency anemia we will start iron as well after blood transfusion to keep hemoglobin on the higher side with a CKD greater than 8 will call daughter to do a consent, as hemoglobin dropped  Anemia has been also multifactorial anemia secondary to chronic disease vitamin B12 deficiency and iron deficiency    Also for hemorrhoids started Anusol suppositories

## 2023-01-24 NOTE — PROGRESS NOTES
Progress Note - Nephrology   John Clements 65 y o  female MRN: [id number]  Unit/Bed#: -01 Encounter: [id number]    A/P:  1  Chronic kidney stage IIIb with a baseline creatinine around 1 7 mg/deciliter  2  Metabolic acidosis   Continue with sodium bicarbonate supplementation at this time, will serum bicarbonate is 20 mmol/liter  May need to slightly increase dosing depending on she progresses clinically  3   Hypertension in setting of chronic kidney disease stage III   Blood pressure appropriate, continue to monitor for now  4   Strokelike symptoms  5  Acute on chronic blood loss   Appreciate gastroenterology recommendations  6  Acute cystitis   Continue treatment according to hospitalist recommendations      Follow up reason for today's visit: Chronic disease stage III/metabolic acidosis    Acute on chronic blood loss anemia    Patient Active Problem List   Diagnosis   • Closed fracture of body of sternum with routine healing   • [facility]est[facility] heart disease ([facility] )   • Acquired hypothyroidism   • CAD (coronary artery disease)   • Forgetfulness   • Acute pain due to trauma   • Hx of fall   • Stress incontinence in female   • DARRELL (acute kidney injury) ([facility] )   • Perforated appendicitis   • Chronic combined systolic and diastolic CHF (congestive heart failure) ([facility])   • Pyuria   • Microscopic hematuria   • Vitamin D insufficiency   • Mitral valve insufficiency   • Hypercholesterolemia   • Aortic atherosclerosis ([facility])   • Renal calculus   • Diverticulosis   • Hiatal hernia   • Pulmonary nodule   • Leg swelling   • Paroxysmal atrial fibrillation ([facility])   • Ischemic cardiomyopathy   • S/P implantation of automatic cardioverter/defibrillator (AICD)   • Essential hypertension   • History of PTCA   • Anemia due to chronic kidney disease   • Iron deficiency anemia   • Diverticulitis   • Rectal bleeding   • Elevated troponin   • Acute blood loss anemia   • Anemia in chronic kidney disease   • Benign hypertensive renal disease   • Gastroesophageal reflux disease without esophagitis   • Stroke-like symptoms   • Gastrointestinal bleeding   • Metabolic acidosis   • Hydroureteronephrosis   • Dementia (HCC)   • Low TSH level   • COVID-19 virus infection   • Hypophosphatemia   • Pulmonary hypertension (HCC)   • Secondary renal hyperparathyroidism (HCC)   • Hyperuricemia   • Proteinuria   • Goals of care, counseling/discussion   • Elevated d-dimer   • Right internal carotid artery aneurysm   • Carotid artery stenosis   • Aneurysm (HCC)   • Cystitis   • Acute on chronic blood loss anemia         Subjective:   No acute events, I saw the patient while she was eating breakfast, no choking noted  Objective:     Vitals: Blood pressure 145/64, pulse 66, temperature (!) 97 3 °F (36 3 °C), resp  rate 16, height 5' 5" (1 651 m), weight 59 8 kg (131 lb 13 4 oz), SpO2 100 %  ,Body mass index is 21 94 kg/m²  Weight (last 2 days)     Date/Time Weight    01/24/23 0544 59 8 (131 84)    01/23/23 0534 61 (134 48)    01/23/23 03:15:21 61 (134 48)    01/22/23 0503 58 5 (128 97)            Intake/Output Summary (Last 24 hours) at 1/24/2023 1139  Last data filed at 1/23/2023 1740  Gross per 24 hour   Intake 670 ml   Output --   Net 670 ml     I/O last 3 completed shifts: In: 670 [P O :120;  I V :550]  Out: 1200 [Urine:1200]         Physical Exam: /64   Pulse 66   Temp (!) 97 3 °F (36 3 °C)   Resp 16   Ht 5' 5" (1 651 m)   Wt 59 8 kg (131 lb 13 4 oz)   SpO2 100%   BMI 21 94 kg/m²     General Appearance:    Alert, cooperative, no distress, appears stated age   Head:    Normocephalic, without obvious abnormality, atraumatic   Eyes:    Conjunctiva/corneas clear   Ears:    Normal external ears   Nose:   Nares normal, septum midline, mucosa normal, no drainage    or sinus tenderness   Throat:   Lips, mucosa, and tongue normal; teeth and gums normal   Neck:   Supple   Back:     Symmetric, no curvature, ROM normal, no CVA tenderness   Lungs: Clear to auscultation bilaterally, respirations unlabored   Chest wall:    No tenderness or deformity   Heart:    Regular rate and rhythm, S1 and S2 normal, no murmur, rub   or gallop   Abdomen:     Soft, non-tender, bowel sounds active   Extremities:   Extremities normal, atraumatic, no cyanosis or edema   Skin:   Skin color, texture, turgor normal, no rashes or lesions   Lymph nodes:   Cervical normal   Neurologic:   CNII-XII intact            Lab, Imaging and other studies: I have personally reviewed pertinent labs  CBC:   Lab Results   Component Value Date    WBC 7 62 01/24/2023    HGB 7 1 (L) 01/24/2023    HCT 23 3 (L) 01/24/2023    MCV 90 01/24/2023     01/24/2023    MCH 27 3 01/24/2023    MCHC 30 5 (L) 01/24/2023    RDW 16 2 (H) 01/24/2023    MPV 10 5 01/24/2023    NRBC 0 01/24/2023     CMP:   Lab Results   Component Value Date    K 4 1 01/24/2023     01/24/2023    CO2 18 (L) 01/24/2023    BUN 50 (H) 01/24/2023    CREATININE 1 86 (H) 01/24/2023    CALCIUM 8 5 01/24/2023    AST 14 01/24/2023    ALT 4 (L) 01/24/2023    ALKPHOS 102 01/24/2023    EGFR 25 01/24/2023           Results from last 7 days   Lab Units 01/24/23  0606 01/23/23  0525 01/22/23  0456 01/21/23  0507   POTASSIUM mmol/L 4 1 4 2 4 7 4 2   CHLORIDE mmol/L 108 107 109* 109*   CO2 mmol/L 18* 21 19* 18*   BUN mg/dL 50* 55* 59* 58*   CREATININE mg/dL 1 86* 1 85* 1 80* 1 79*   CALCIUM mg/dL 8 5 8 5 8 3* 8 4   ALK PHOS U/L 102 103  --  96   ALT U/L 4* 7  --  7   AST U/L 14 16  --  14         Phosphorus: No results found for: PHOS  Magnesium: No results found for: MG  Urinalysis: No results found for: COLORU, CLARITYU, SPECGRAV, PHUR, LEUKOCYTESUR, NITRITE, PROTEINUA, GLUCOSEU, KETONESU, BILIRUBINUR, BLOODU  Ionized Calcium: No results found for: CAION  Coagulation: No results found for: PT, INR, APTT  Troponin: No results found for: TROPONINI  ABG: No results found for: PHART, FAT2PYL, PO2ART, MCI4SSD, A3BUUTLM, BEART, SOURCE  Radiology review:     IMAGING  No results found  Current Facility-Administered Medications   Medication Dose Route Frequency   • acetaminophen (TYLENOL) tablet 650 mg  650 mg Oral Q6H PRN   • allopurinol (ZYLOPRIM) tablet 100 mg  100 mg Oral Daily   • amLODIPine (NORVASC) tablet 10 mg  10 mg Oral Daily   • apixaban (ELIQUIS) tablet 5 mg  5 mg Oral BID   • aspirin (ECOTRIN LOW STRENGTH) EC tablet 81 mg  81 mg Oral Daily   • cefTRIAXone (ROCEPHIN) IVPB (premix in dextrose) 1,000 mg 50 mL  1,000 mg Intravenous Q24H   • cholecalciferol (VITAMIN D3) tablet 1,000 Units  1,000 Units Oral Daily   • docusate sodium (COLACE) capsule 100 mg  100 mg Oral BID   • hydrocortisone (ANUSOL-HC) rectal suppository 25 mg  25 mg Rectal BID   • levothyroxine tablet 150 mcg  150 mcg Oral Early Morning   • metoprolol succinate (TOPROL-XL) 24 hr tablet 100 mg  100 mg Oral Daily   • multi-electrolyte (PLASMALYTE-A/ISOLYTE-S PH 7 4) IV solution  50 mL/hr Intravenous Continuous   • pantoprazole (PROTONIX) injection 40 mg  40 mg Intravenous Q12H BRIDGETTE   • polyethylene glycol (MIRALAX) packet 17 g  17 g Oral Daily PRN   • sertraline (ZOLOFT) tablet 100 mg  100 mg Oral Daily   • sodium bicarbonate tablet 650 mg  650 mg Oral TID after meals     Medications Discontinued During This Encounter   Medication Reason   • atorvastatin (LIPITOR) tablet 40 mg    • metoprolol succinate (TOPROL-XL) 50 mg 24 hr tablet Dose adjustment   • amLODIPine (NORVASC) tablet 10 mg    • metoprolol succinate (TOPROL-XL) 24 hr tablet 100 mg    • levothyroxine tablet 125 mcg    • aspirin chewable tablet 81 mg    • apixaban (ELIQUIS) tablet 2 5 mg    • amLODIPine (NORVASC) tablet 10 mg    • apixaban (ELIQUIS) tablet 5 mg    • polyethylene glycol (GLYCOLAX) bowel prep 119 g        Amanda Salas, DO      This progress note was produced in part using a dictation device which may document imprecise wording from author's original intent

## 2023-01-24 NOTE — ASSESSMENT & PLAN NOTE
Toxic/metabolic encephalopathy in setting of riri on ckd/e  Coli uti superimposed on underlying dementia likely vascular component  Unable to perform MRI brain scan given non compatible ppm device  Repeat head ct next day unremarkable  Ct head is not showing any obvious chronic strokes  Recrudescence is still a possibility in setting of the riri and uti  Continue aspirin for the stenosis and Eliquis for the atrial fibrillation    Patient is currently alert and oriented to self and place

## 2023-01-24 NOTE — ASSESSMENT & PLAN NOTE
· Aortic arch and great vessels:  Moderate aortic atherosclerosis  Ellie Thomas is a 50% stenosis at the brachycephalic artery noted on series 2/23   Right vertebral artery: 60% stenosis at the origin   Left vertebral artery:  80% stenosis at the origin  Right carotid:  Stent noted within the right common carotid and internal carotid arteries   Left carotid:  Large cluster of calcified atherosclerotic plaque at the carotid bulb with about a 70% stenosis     Vascular surgery recommended outpatient follow-up and carotid duplex done 1/21 RIGHT:  There is <50% restenosis noted in the internal carotid artery and stent  Plaque  is calcified and irregular  Vertebral artery flow is antegrade  There is no significant subclavian artery  disease  LEFT:  There is >70% stenosis noted in the internal carotid artery  Plaque is  calcified and irregular  Vertebral artery flow is antegrade  There is no significant subclavian artery  Disease    Per neurology-Restart aspirin (for left side ica extracranial stenosis and rt sided ica stent)

## 2023-01-24 NOTE — ASSESSMENT & PLAN NOTE
Discussed with neurosurgery, recommends outpatient follow-up and risk factor control2 mm right middle ICA aneurysm

## 2023-01-24 NOTE — PLAN OF CARE
Problem: Potential for Falls  Goal: Patient will remain free of falls  Description: INTERVENTIONS:  - Educate patient/family on patient safety including physical limitations  - Instruct patient to call for assistance with activity   - Consult OT/PT to assist with strengthening/mobility   - Keep Call bell within reach  - Keep bed low and locked with side rails adjusted as appropriate  - Keep care items and personal belongings within reach  - Initiate and maintain comfort rounds  - Make Fall Risk Sign visible to staff  - Offer Toileting every 2 Hours, in advance of need  - Initiate/Maintain bed alarm  - Obtain necessary fall risk management equipment:   - Apply yellow socks and bracelet for high fall risk patients  - Consider moving patient to room near nurses station  Outcome: Progressing     Problem: PAIN - ADULT  Goal: Verbalizes/displays adequate comfort level or baseline comfort level  Description: Interventions:  - Encourage patient to monitor pain and request assistance  - Assess pain using appropriate pain scale  - Administer analgesics based on type and severity of pain and evaluate response  - Implement non-pharmacological measures as appropriate and evaluate response  - Consider cultural and social influences on pain and pain management  - Notify physician/advanced practitioner if interventions unsuccessful or patient reports new pain  Outcome: Progressing     Problem: INFECTION - ADULT  Goal: Absence or prevention of progression during hospitalization  Description: INTERVENTIONS:  - Assess and monitor for signs and symptoms of infection  - Monitor lab/diagnostic results  - Monitor all insertion sites, i e  indwelling lines, tubes, and drains  - Monitor endotracheal if appropriate and nasal secretions for changes in amount and color  - Fackler appropriate cooling/warming therapies per order  - Administer medications as ordered  - Instruct and encourage patient and family to use good hand hygiene technique  - Identify and instruct in appropriate isolation precautions for identified infection/condition  Outcome: Progressing     Problem: SAFETY ADULT  Goal: Patient will remain free of falls  Description: INTERVENTIONS:  - Educate patient/family on patient safety including physical limitations  - Instruct patient to call for assistance with activity   - Consult OT/PT to assist with strengthening/mobility   - Keep Call bell within reach  - Keep bed low and locked with side rails adjusted as appropriate  - Keep care items and personal belongings within reach  - Initiate and maintain comfort rounds  - Make Fall Risk Sign visible to staff  - Offer Toileting every 2 Hours, in advance of need  - Initiate/Maintain bed alarm  - Obtain necessary fall risk management equipment:   - Apply yellow socks and bracelet for high fall risk patients  - Consider moving patient to room near nurses station  Outcome: Progressing  Goal: Maintain or return to baseline ADL function  Description: INTERVENTIONS:  -  Assess patient's ability to carry out ADLs; assess patient's baseline for ADL function and identify physical deficits which impact ability to perform ADLs (bathing, care of mouth/teeth, toileting, grooming, dressing, etc )  - Assess/evaluate cause of self-care deficits   - Assess range of motion  - Assess patient's mobility; develop plan if impaired  - Assess patient's need for assistive devices and provide as appropriate  - Encourage maximum independence but intervene and supervise when necessary  - Involve family in performance of ADLs  - Assess for home care needs following discharge   - Consider OT consult to assist with ADL evaluation and planning for discharge  - Provide patient education as appropriate  Outcome: Progressing  Goal: Maintains/Returns to pre admission functional level  Description: INTERVENTIONS:  - Perform BMAT or MOVE assessment daily    - Set and communicate daily mobility goal to care team and patient/family/caregiver  - Collaborate with rehabilitation services on mobility goals if consulted  - Perform Range of Motion 2 times a day  - Reposition patient every 2 hours  - Dangle patient 2 times a day  - Stand patient 2 times a day  - Ambulate patient 2 times a day  - Out of bed to chair 2 times a day   - Out of bed for meals 2 times a day  - Out of bed for toileting  - Record patient progress and toleration of activity level   Outcome: Progressing     Problem: DISCHARGE PLANNING  Goal: Discharge to home or other facility with appropriate resources  Description: INTERVENTIONS:  - Identify barriers to discharge w/patient and caregiver  - Arrange for needed discharge resources and transportation as appropriate  - Identify discharge learning needs (meds, wound care, etc )  - Arrange for interpretive services to assist at discharge as needed  - Refer to Case Management Department for coordinating discharge planning if the patient needs post-hospital services based on physician/advanced practitioner order or complex needs related to functional status, cognitive ability, or social support system  Outcome: Progressing     Problem: Knowledge Deficit  Goal: Patient/family/caregiver demonstrates understanding of disease process, treatment plan, medications, and discharge instructions  Description: Complete learning assessment and assess knowledge base    Interventions:  - Provide teaching at level of understanding  - Provide teaching via preferred learning methods  Outcome: Progressing     Problem: NEUROSENSORY - ADULT  Goal: Achieves stable or improved neurological status  Description: INTERVENTIONS  - Monitor and report changes in neurological status  - Monitor vital signs such as temperature, blood pressure, glucose, and any other labs ordered   - Initiate measures to prevent increased intracranial pressure  - Monitor for seizure activity and implement precautions if appropriate      Outcome: Progressing  Goal: Remains free of injury related to seizures activity  Description: INTERVENTIONS  - Maintain airway, patient safety  and administer oxygen as ordered  - Monitor patient for seizure activity, document and report duration and description of seizure to physician/advanced practitioner  - If seizure occurs,  ensure patient safety during seizure  - Reorient patient post seizure  - Seizure pads on all 4 side rails  - Instruct patient/family to notify RN of any seizure activity including if an aura is experienced  - Instruct patient/family to call for assistance with activity based on nursing assessment  - Administer anti-seizure medications if ordered    Outcome: Progressing  Goal: Achieves maximal functionality and self care  Description: INTERVENTIONS  - Monitor swallowing and airway patency with patient fatigue and changes in neurological status  - Encourage and assist patient to increase activity and self care     - Encourage visually impaired, hearing impaired and aphasic patients to use assistive/communication devices  Outcome: Progressing     Problem: CARDIOVASCULAR - ADULT  Goal: Maintains optimal cardiac output and hemodynamic stability  Description: INTERVENTIONS:  - Monitor I/O, vital signs and rhythm  - Monitor for S/S and trends of decreased cardiac output  - Administer and titrate ordered vasoactive medications to optimize hemodynamic stability  - Assess quality of pulses, skin color and temperature  - Assess for signs of decreased coronary artery perfusion  - Instruct patient to report change in severity of symptoms  Outcome: Progressing  Goal: Absence of cardiac dysrhythmias or at baseline rhythm  Description: INTERVENTIONS:  - Continuous cardiac monitoring, vital signs, obtain 12 lead EKG if ordered  - Administer antiarrhythmic and heart rate control medications as ordered  - Monitor electrolytes and administer replacement therapy as ordered  Outcome: Progressing     Problem: RESPIRATORY - ADULT  Goal: Achieves optimal ventilation and oxygenation  Description: INTERVENTIONS:  - Assess for changes in respiratory status  - Assess for changes in mentation and behavior  - Position to facilitate oxygenation and minimize respiratory effort  - Oxygen administered by appropriate delivery if ordered  - Initiate smoking cessation education as indicated  - Encourage broncho-pulmonary hygiene including cough, deep breathe, Incentive Spirometry  - Assess the need for suctioning and aspirate as needed  - Assess and instruct to report SOB or any respiratory difficulty  - Respiratory Therapy support as indicated  Outcome: Progressing     Problem: GASTROINTESTINAL - ADULT  Goal: Minimal or absence of nausea and/or vomiting  Description: INTERVENTIONS:  - Administer IV fluids if ordered to ensure adequate hydration  - Maintain NPO status until nausea and vomiting are resolved  - Nasogastric tube if ordered  - Administer ordered antiemetic medications as needed  - Provide nonpharmacologic comfort measures as appropriate  - Advance diet as tolerated, if ordered  - Consider nutrition services referral to assist patient with adequate nutrition and appropriate food choices  Outcome: Progressing  Goal: Maintains or returns to baseline bowel function  Description: INTERVENTIONS:  - Assess bowel function  - Encourage oral fluids to ensure adequate hydration  - Administer IV fluids if ordered to ensure adequate hydration  - Administer ordered medications as needed  - Encourage mobilization and activity  - Consider nutritional services referral to assist patient with adequate nutrition and appropriate food choices  Outcome: Progressing  Goal: Maintains adequate nutritional intake  Description: INTERVENTIONS:  - Monitor percentage of each meal consumed  - Identify factors contributing to decreased intake, treat as appropriate  - Assist with meals as needed  - Monitor I&O, weight, and lab values if indicated  - Obtain nutrition services referral as needed  Outcome: Progressing  Goal: Establish and maintain optimal ostomy function  Description: INTERVENTIONS:  - Assess bowel function  - Encourage oral fluids to ensure adequate hydration  - Administer IV fluids if ordered to ensure adequate hydration   - Administer ordered medications as needed  - Encourage mobilization and activity  - Nutrition services referral to assist patient with appropriate food choices  - Assess stoma site  - Consider wound care consult   Outcome: Progressing  Goal: Oral mucous membranes remain intact  Description: INTERVENTIONS  - Assess oral mucosa and hygiene practices  - Implement preventative oral hygiene regimen  - Implement oral medicated treatments as ordered  - Initiate Nutrition services referral as needed  Outcome: Progressing     Problem: GENITOURINARY - ADULT  Goal: Maintains or returns to baseline urinary function  Description: INTERVENTIONS:  - Assess urinary function  - Encourage oral fluids to ensure adequate hydration if ordered  - Administer IV fluids as ordered to ensure adequate hydration  - Administer ordered medications as needed  - Offer frequent toileting  - Follow urinary retention protocol if ordered  Outcome: Progressing  Goal: Absence of urinary retention  Description: INTERVENTIONS:  - Assess patient’s ability to void and empty bladder  - Monitor I/O  - Bladder scan as needed  - Discuss with physician/AP medications to alleviate retention as needed  - Discuss catheterization for long term situations as appropriate  Outcome: Progressing  Goal: Urinary catheter remains patent  Description: INTERVENTIONS:  - Assess patency of urinary catheter  - If patient has a chronic brambila, consider changing catheter if non-functioning  - Follow guidelines for intermittent irrigation of non-functioning urinary catheter  Outcome: Progressing     Problem: METABOLIC, FLUID AND ELECTROLYTES - ADULT  Goal: Electrolytes maintained within normal limits  Description: INTERVENTIONS:  - Monitor labs and assess patient for signs and symptoms of electrolyte imbalances  - Administer electrolyte replacement as ordered  - Monitor response to electrolyte replacements, including repeat lab results as appropriate  - Instruct patient on fluid and nutrition as appropriate  Outcome: Progressing  Goal: Fluid balance maintained  Description: INTERVENTIONS:  - Monitor labs   - Monitor I/O and WT  - Instruct patient on fluid and nutrition as appropriate  - Assess for signs & symptoms of volume excess or deficit  Outcome: Progressing  Goal: Glucose maintained within target range  Description: INTERVENTIONS:  - Monitor Blood Glucose as ordered  - Assess for signs and symptoms of hyperglycemia and hypoglycemia  - Administer ordered medications to maintain glucose within target range  - Assess nutritional intake and initiate nutrition service referral as needed  Outcome: Progressing     Problem: SKIN/TISSUE INTEGRITY - ADULT  Goal: Skin Integrity remains intact(Skin Breakdown Prevention)  Description: Assess:  -Perform Arcadio assessment every   -Clean and moisturize skin every   -Inspect skin when repositioning, toileting, and assisting with ADLS  -Assess under medical devices such as  every   -Assess extremities for adequate circulation and sensation     Bed Management:  -Have minimal linens on bed & keep smooth, unwrinkled  -Change linens as needed when moist or perspiring  -Avoid sitting or lying in one position for more than  hours while in bed  -Keep HOB at degrees     Toileting:  -Offer bedside commode  -Assess for incontinence every   -Use incontinent care products after each incontinent episode such as ***    Activity:  -Mobilize patient *** times a day  -Encourage activity and walks on unit  -Encourage or provide ROM exercises   -Turn and reposition patient every *** Hours  -Use appropriate equipment to lift or move patient in bed  -Instruct/ Assist with weight shifting every *** when out of bed in chair  -Consider limitation of chair time *** hour intervals    Skin Care:  -Avoid use of baby powder, tape, friction and shearing, hot water or constrictive clothing  -Relieve pressure over bony prominences using ***  -Do not massage red bony areas    Next Steps:  -Teach patient strategies to minimize risks such as ***   -Consider consults to  interdisciplinary teams such as ***  Outcome: Progressing  Goal: Incision(s), wounds(s) or drain site(s) healing without S/S of infection  Description: INTERVENTIONS  - Assess and document dressing, incision, wound bed, drain sites and surrounding tissue  - Provide patient and family education  - Perform skin care/dressing changes every ***  Outcome: Progressing  Goal: Pressure injury heals and does not worsen  Description: Interventions:  - Implement low air loss mattress or specialty surface (Criteria met)  - Apply silicone foam dressing  - Instruct/assist with weight shifting every *** minutes when in chair   - Limit chair time to *** hour intervals  - Use special pressure reducing interventions such as *** when in chair   - Apply fecal or urinary incontinence containment device   - Perform passive or active ROM every ***  - Turn and reposition patient & offload bony prominences every *** hours   - Utilize friction reducing device or surface for transfers   - Consider consults to  interdisciplinary teams such as ***  - Use incontinent care products after each incontinent episode such as ***  - Consider nutrition services referral as needed  Outcome: Progressing     Problem: HEMATOLOGIC - ADULT  Goal: Maintains hematologic stability  Description: INTERVENTIONS  - Assess for signs and symptoms of bleeding or hemorrhage  - Monitor labs  - Administer supportive blood products/factors as ordered and appropriate  Outcome: Progressing     Problem: MUSCULOSKELETAL - ADULT  Goal: Maintain or return mobility to safest level of function  Description: INTERVENTIONS:  - Assess patient's ability to carry out ADLs; assess patient's baseline for ADL function and identify physical deficits which impact ability to perform ADLs (bathing, care of mouth/teeth, toileting, grooming, dressing, etc )  - Assess/evaluate cause of self-care deficits   - Assess range of motion  - Assess patient's mobility  - Assess patient's need for assistive devices and provide as appropriate  - Encourage maximum independence but intervene and supervise when necessary  - Involve family in performance of ADLs  - Assess for home care needs following discharge   - Consider OT consult to assist with ADL evaluation and planning for discharge  - Provide patient education as appropriate  Outcome: Progressing  Goal: Maintain proper alignment of affected body part  Description: INTERVENTIONS:  - Support, maintain and protect limb and body alignment  - Provide patient/ family with appropriate education  Outcome: Progressing     Problem: MOBILITY - ADULT  Goal: Maintain or return to baseline ADL function  Description: INTERVENTIONS:  -  Assess patient's ability to carry out ADLs; assess patient's baseline for ADL function and identify physical deficits which impact ability to perform ADLs (bathing, care of mouth/teeth, toileting, grooming, dressing, etc )  - Assess/evaluate cause of self-care deficits   - Assess range of motion  - Assess patient's mobility; develop plan if impaired  - Assess patient's need for assistive devices and provide as appropriate  - Encourage maximum independence but intervene and supervise when necessary  - Involve family in performance of ADLs  - Assess for home care needs following discharge   - Consider OT consult to assist with ADL evaluation and planning for discharge  - Provide patient education as appropriate  Outcome: Progressing  Goal: Maintains/Returns to pre admission functional level  Description: INTERVENTIONS:  - Perform BMAT or MOVE assessment daily    - Set and communicate daily mobility goal to care team and patient/family/caregiver  - Collaborate with rehabilitation services on mobility goals if consulted  - Perform Range of Motion *** times a day  - Reposition patient every *** hours  - Dangle patient *** times a day  - Stand patient *** times a day  - Ambulate patient *** times a day  - Out of bed to chair *** times a day   - Out of bed for meals *** times a day  - Out of bed for toileting  - Record patient progress and toleration of activity level   Outcome: Progressing     Problem: Prexisting or High Potential for Compromised Skin Integrity  Goal: Skin integrity is maintained or improved  Description: INTERVENTIONS:  - Identify patients at risk for skin breakdown  - Assess and monitor skin integrity  - Assess and monitor nutrition and hydration status  - Monitor labs   - Assess for incontinence   - Turn and reposition patient  - Assist with mobility/ambulation  - Relieve pressure over bony prominences  - Avoid friction and shearing  - Provide appropriate hygiene as needed including keeping skin clean and dry  - Evaluate need for skin moisturizer/barrier cream  - Collaborate with interdisciplinary team   - Patient/family teaching  - Consider wound care consult   Outcome: Progressing     Problem: Nutrition/Hydration-ADULT  Goal: Nutrient/Hydration intake appropriate for improving, restoring or maintaining nutritional needs  Description: Monitor and assess patient's nutrition/hydration status for malnutrition  Collaborate with interdisciplinary team and initiate plan and interventions as ordered  Monitor patient's weight and dietary intake as ordered or per policy  Utilize nutrition screening tool and intervene as necessary  Determine patient's food preferences and provide high-protein, high-caloric foods as appropriate       INTERVENTIONS:  - Monitor oral intake, urinary output, labs, and treatment plans  - Assess nutrition and hydration status and recommend course of action  - Evaluate amount of meals eaten  - Assist patient with eating if necessary   - Allow adequate time for meals  - Recommend/ encourage appropriate diets, oral nutritional supplements, and vitamin/mineral supplements  - Order, calculate, and assess calorie counts as needed  - Recommend, monitor, and adjust tube feedings and TPN/PPN based on assessed needs  - Assess need for intravenous fluids  - Provide specific nutrition/hydration education as appropriate  - Include patient/family/caregiver in decisions related to nutrition  Outcome: Progressing

## 2023-01-24 NOTE — PROGRESS NOTES
[address]  Progress Note - Whitney Owusu 1945, 65 y o  female MRN: [id number]  Unit/Bed#: -Trisha Encounter: [id number]  Primary Care Provider: Sal Prater DO   Date and time admitted to hospital: 1/19/2023  2:42 AM    * Acute on chronic blood loss anemia  Assessment & Plan  Witnessed per rectal bleed on 1/21/2023 night  Hemoglobin dropped  Initially Eliquis was on hold  She had a EGD colonoscopy in September 2022 which shows large hemorrhoids-that time it was suggested to repeat scope in 3 months  As patient is refusing bowel prep, discussed with patient's daughter over the phone regarding treatment options and alternatives  At this time, we will resume Eliquis and aspirin and monitor patient's clinically for next 24 to 48 hours, if remains stable can be discharged  No report of having another bloody bowel movements GI did a rectal exam there is brown stool in the vault and hemorrhoids  There is no overt GI bleeding we will continue her aspirin and anticoagulation  Patient is still refusing to repeat any EGD or colonoscopy although she does not probably have any capacity to make accurate decisions due to dementia - but cant force   Also has associated vitamin B12 deficiency  Will start vitamin B12 1000 mcg p o  daily has iron deficiency anemia we will start iron as well after blood transfusion to keep hemoglobin on the higher side with a CKD greater than 8 will call daughter to do a consent, as hemoglobin dropped  Anemia has been also multifactorial anemia secondary to chronic disease vitamin B12 deficiency and iron deficiency    Also for hemorrhoids started Anusol suppositories      Acute cystitis without hematuria  Assessment & Plan  She is on ceftriaxone  Urine culture reviewed,  Last day of abx 7/7    Aneurysm [facility])  Assessment & Plan  Discussed with neurosurgery, recommends outpatient follow-up and risk factor control2 mm right middle ICA aneurysm    Carotid artery stenosis  Assessment & Plan  · Aortic arch and great vessels:  Moderate aortic atherosclerosis  [place] Epstein is a 50% stenosis at the brachycephalic artery noted on series 2/23   Right vertebral artery: 60% stenosis at the origin   Left vertebral artery:  80% stenosis at the origin  Right carotid:  Stent noted within the right common carotid and internal carotid arteries   Left carotid:  Large cluster of calcified atherosclerotic plaque at the carotid bulb with about a 70% stenosis     Vascular surgery recommended outpatient follow-up and carotid duplex done 1/21 RIGHT:  There is <50% restenosis noted in the internal carotid artery and stent  Plaque  is calcified and irregular  Vertebral artery flow is antegrade  There is no significant subclavian artery  disease  LEFT:  There is >70% stenosis noted in the internal carotid artery  Plaque is  calcified and irregular  Vertebral artery flow is antegrade  There is no significant subclavian artery  Disease  Per neurology-Restart aspirin (for left side ica extracranial stenosis and rt sided ica stent)      Right internal carotid artery aneurysm  Assessment & Plan  2 mm right middle ICA aneurysm   Neurosurgery recommendation appreciated, recommends outpatient follow-up    Elevated d-dimer  Assessment & Plan  · D-dimer 6 69 during ED evaluation  · Chronically anticoagulated with Eliquis, resides in nursing facility with medication compliance  · No tachycardia or hypoxia to suggest PE  · Continue to monitor    Metabolic encephalopathy  Assessment & Plan  Toxic/metabolic encephalopathy in setting of riri on ckd/e  Coli uti superimposed on underlying dementia likely vascular component  Unable to perform MRI brain scan given non compatible ppm device  Repeat head ct next day unremarkable  Ct head is not showing any obvious chronic strokes   Recrudescence is still a possibility in setting of the riri and uti  Continue aspirin for the stenosis and Eliquis for the atrial fibrillation  Patient is currently alert and oriented to self and place          Benign hypertensive renal disease  Assessment & Plan  Blood pressure is controlled continue Norvasc and metoprolol     Paroxysmal atrial fibrillation (HCC)  Assessment & Plan  · Continue Eliquis 5 mg twice daily-Continue metoprolol succinate  · PPM in place-noncompatible for MRI  · Paced rhythm on EKG, rate controlled    Chronic combined systolic and diastolic CHF (congestive heart failure) (HCC)  Assessment & Plan  Wt Readings from Last 3 Encounters:   01/24/23 59 8 kg (131 lb 13 4 oz)   01/13/23 53 1 kg (117 lb)   10/04/22 53 3 kg (117 lb 8 1 oz)     EF 45% September 2022  Appears euvolemic on exam  Not on outpatient diuretics  Continue metoprolol succinate  Monitor volume status      DARRELL (acute kidney injury) [facility])  Assessment & Plan  Lab Results   Component Value Date    EGFR 25 01/24/2023    EGFR 25 01/23/2023    EGFR 26 01/22/2023    CREATININE 1 86 (H) 01/24/2023    CREATININE 1 85 (H) 01/23/2023    CREATININE 1 80 (H) 01/22/2023   · On CKD stage III b- creatinine baseline 1 7  Creatinine is stable at 1 86 discontinue further fluids as she is eating without any issues  · Acidosis has worsened on sodium bicarb 650 mg po tid  increase her to 1300 but change to twice daily  Repeat bicarb tomorrow  Acquired hypothyroidism  Assessment & Plan  · TSH elevated, free T4 low  · Was taking levothyroxine 125 mcg daily-dose increased to 150 mcg, patient will need to recheck TSH in 4 to 6 weeks          VTE Pharmacologic Prophylaxis: VTE Score: 8 High Risk (Score >/= 5) - Pharmacological DVT Prophylaxis Ordered: apixaban (Eliquis)  Sequential Compression Devices Ordered  Patient Centered Rounds: I performed bedside rounds with nursing staff today    Discussions with Specialists or Other Care Team Provider: Discussed with gastroenterology    Education and Discussions with Family / Patient: Patient will update family  Time Spent for Care: 30 minutes  More than 50% of total time spent on counseling and coordination of care as described above  Current Length of Stay: 5 day(s)  Current Patient Status: Inpatient   Certification Statement: The patient will continue to require additional inpatient hospital stay due to Acute blood loss anemia  Discharge Plan: Anticipate discharge in 24-48 hrs to rehab facility  Code Status: Level 3 - DNAR and DNI    Subjective:   Patient seen and examined she denies any complaints    Objective:     Vitals:   Temp (24hrs), Av 7 °F (36 5 °C), Min:97 3 °F (36 3 °C), Max:97 9 °F (36 6 °C)    Temp:  [97 3 °F (36 3 °C)-97 9 °F (36 6 °C)] 97 3 °F (36 3 °C)  HR:  [65-70] 66  Resp:  [16-20] 16  BP: (140-145)/(55-65) 145/64  SpO2:  [95 %-100 %] 100 %  Body mass index is 21 94 kg/m²  Input and Output Summary (last 24 hours): Intake/Output Summary (Last 24 hours) at 2023 1328  Last data filed at 2023 1740  Gross per 24 hour   Intake 670 ml   Output --   Net 670 ml       Physical Exam:   Physical Exam  Vitals and nursing note reviewed  Constitutional:       General: She is not in acute distress  Appearance: She is well-developed  HENT:      Head: Normocephalic and atraumatic  Eyes:      Conjunctiva/sclera: Conjunctivae normal    Cardiovascular:      Rate and Rhythm: Normal rate and regular rhythm  Heart sounds: No murmur heard  Pulmonary:      Effort: Pulmonary effort is normal  No respiratory distress  Breath sounds: Normal breath sounds  No wheezing or rales  Abdominal:      General: There is no distension  Palpations: Abdomen is soft  Tenderness: There is no abdominal tenderness  Musculoskeletal:         General: No swelling  Cervical back: Neck supple  Skin:     General: Skin is warm and dry  Capillary Refill: Capillary refill takes less than 2 seconds  Neurological:      Mental Status: She is alert        Comments: Awake alert and oriented to self and place not to time   Psychiatric:         Mood and Affect: Mood normal           Additional Data:     Labs:  Results from last 7 days   Lab Units 01/24/23  0606   WBC Thousand/uL 7 62   HEMOGLOBIN g/dL 7 1*   HEMATOCRIT % 23 3*   PLATELETS Thousands/uL 303   NEUTROS PCT % 71   LYMPHS PCT % 17   MONOS PCT % 8   EOS PCT % 3     Results from last 7 days   Lab Units 01/24/23  0606   SODIUM mmol/L 138   POTASSIUM mmol/L 4 1   CHLORIDE mmol/L 108   CO2 mmol/L 18*   BUN mg/dL 50*   CREATININE mg/dL 1 86*   ANION GAP mmol/L 12   CALCIUM mg/dL 8 5   ALBUMIN g/dL 3 0*   TOTAL BILIRUBIN mg/dL 0 32   ALK PHOS U/L 102   ALT U/L 4*   AST U/L 14   GLUCOSE RANDOM mg/dL 78     Results from last 7 days   Lab Units 01/19/23  0256   INR  2 34*     Results from last 7 days   Lab Units 01/19/23  0241   POC GLUCOSE mg/dl 84     Results from last 7 days   Lab Units 01/19/23  0757   HEMOGLOBIN A1C % 5 1     Results from last 7 days   Lab Units 01/19/23  0311 01/19/23  0256   LACTIC ACID mmol/L 0 7  --    PROCALCITONIN ng/ml  --  0 21       Lines/Drains:  Invasive Devices     Peripheral Intravenous Line  Duration           Peripheral IV 01/23/23 Left Antecubital 1 day                      Imaging: Reviewed radiology reports from this admission including: CT head    Recent Cultures (last 7 days):   Results from last 7 days   Lab Units 01/19/23  0320 01/19/23  0311   BLOOD CULTURE   --  No Growth After 5 Days  No Growth After 5 Days     URINE CULTURE  >100,000 cfu/ml Escherichia coli*  >100,000 cfu/ml Lactobacillus species*  <10,000 cfu/ml Enterococcus species*  <10,000 cfu/ml Staphylococcus species*  --        Last 24 Hours Medication List:   Current Facility-Administered Medications   Medication Dose Route Frequency Provider Last Rate   • acetaminophen  650 mg Oral Q6H PRN KARLA Dasilva     • allopurinol  100 mg Oral Daily KARLA Dasilva     • amLODIPine  10 mg Oral Daily KARLA Ureña     • apixaban  5 mg Oral BID Crystal Veena Sesay MD     • aspirin  81 mg Oral Daily Crystal Avalos MD     • cefTRIAXone  1,000 mg Intravenous Once Debbie Vera MD     • cholecalciferol  1,000 Units Oral Daily Juju S Funmilayo, CRNP     • cyanocobalamin  1,000 mcg Oral Daily Debbie Vera MD     • docusate sodium  100 mg Oral BID Juju S Funmilayo, CRNP     • hydrocortisone  25 mg Rectal BID Sarah Sanders MD     • levothyroxine  150 mcg Oral Early Morning Edi Cabrera MD     • metoprolol succinate  100 mg Oral Daily Edi Cabrera MD     • [START ON 1/25/2023] pantoprazole  40 mg Oral Early Morning Debbie Vera MD     • polyethylene glycol  17 g Oral Daily PRN Juju S Funmilayo, CRNP     • sertraline  100 mg Oral Daily Juju S Funmilayo, CRNP     • sodium bicarbonate  1,300 mg Oral BID after meals Debbie Vera MD          Today, Patient Was Seen By: Debbie Vera MD    **Please Note: This note may have been constructed using a voice recognition system  **

## 2023-01-24 NOTE — CASE MANAGEMENT
CM will continue to follow, current dc plan remains   Case Management Discharge Planning Note    Patient name Tramaine Schwab  Location [address] [id number] MRN [id number]  : 1945 Date 2023       Current Admission Date: 2023  Current Admission Diagnosis:Acute on chronic blood loss anemia   Patient Active Problem List    Diagnosis Date Noted   • Acute on chronic blood loss anemia 2023   • Acute cystitis without hematuria 2023   • Right internal carotid artery aneurysm 2023   • Carotid artery stenosis 2023   • Aneurysm ([place] [address] )    • Elevated d-dimer 2023   • Proteinuria 2023   • Goals of care, counseling/discussion 2023   • Secondary renal hyperparathyroidism ([place] [address] ) 10/26/2022   • Hyperuricemia 10/26/2022   • Hypophosphatemia 10/04/2022   • Pulmonary hypertension ([place] [address] ) 10/04/2022   • Low TSH level 2022   • COVID-19 virus infection 2022   • Dementia ([place] [address] )    • Metabolic encephalopathy    • Gastrointestinal bleeding 2022   • Hydroureteronephrosis 2022   • Diverticulitis 2022   • Rectal bleeding 2022   • Elevated troponin 2022   • Iron deficiency anemia 2022   • Paroxysmal atrial fibrillation ([place] [address] ) 2020   • Ischemic cardiomyopathy 2020   • S/P implantation of automatic cardioverter/defibrillator (AICD) 2020   • Essential hypertension 2020   • History of PTCA 2020   • Leg swelling 2020   • DARRELL (acute kidney injury) ([place] [address] ) 2020   • Perforated appendicitis 2020   • Chronic combined systolic and diastolic CHF (congestive heart failure) ([place] [address] ) 2020   • Pyuria 2020   • Microscopic hematuria 2020   • Vitamin D insufficiency 2020   • Mitral valve insufficiency 2020   • Hypercholesterolemia 2020   • Aortic atherosclerosis ([place] [address] ) 2020   • Renal calculus 2020   • Diverticulosis 2020   • Hiatal hernia 2020   • Pulmonary nodule 07/29/2020   • Benign hypertensive renal disease 09/12/2019   • Acute blood loss anemia 02/08/2019   • Gastroesophageal reflux disease without esophagitis 02/08/2019   • Closed fracture of body of sternum with routine healing 04/12/2018   • Congestive heart disease ([facility] [address] ) 04/12/2018   • Acquired hypothyroidism 04/12/2018   • CAD (coronary artery disease) 04/12/2018   • Forgetfulness 04/12/2018   • Acute pain due to trauma 04/12/2018   • Hx of fall 04/12/2018   • Stress incontinence in female 04/12/2018   • Anemia in chronic kidney disease 03/20/2018      LOS (days): 5  Geometric Mean LOS (GMLOS) (days): 4 30  Days to GMLOS:-1 1     OBJECTIVE:  Risk of Unplanned Readmission Score: 25 61         Current admission status: Inpatient   Preferred Pharmacy:   [address] [place] [address] [facility] [address]  [address]  [address] [place] [address]  Phone: [phone] Fax: [phone]    Primary Care Provider: Miguel Jimenez DO    Primary Insurance: [employer]even Velazco [facility] REP  Secondary Insurance:     DISCHARGE DETAILS:         Chart reviewed aware of medical management  Pt continues with chronic blood loss anemia, DARRELL  CM will continue to follow, current dc plan remains return to [facility] where patient is MA bed hold

## 2023-01-25 LAB
ABO GROUP BLD BPU: NORMAL
ANION GAP SERPL CALCULATED.3IONS-SCNC: 9 MMOL/L (ref 4–13)
BASOPHILS # BLD AUTO: 0.08 THOUSANDS/ÂΜL (ref 0–0.1)
BASOPHILS NFR BLD AUTO: 1 % (ref 0–1)
BPU ID: NORMAL
BUN SERPL-MCNC: 52 MG/DL (ref 5–25)
CALCIUM SERPL-MCNC: 8.3 MG/DL (ref 8.4–10.2)
CHLORIDE SERPL-SCNC: 110 MMOL/L (ref 96–108)
CO2 SERPL-SCNC: 21 MMOL/L (ref 21–32)
CREAT SERPL-MCNC: 1.93 MG/DL (ref 0.6–1.3)
CROSSMATCH: NORMAL
EOSINOPHIL # BLD AUTO: 0.18 THOUSAND/ÂΜL (ref 0–0.61)
EOSINOPHIL NFR BLD AUTO: 3 % (ref 0–6)
ERYTHROCYTE [DISTWIDTH] IN BLOOD BY AUTOMATED COUNT: 16.3 % (ref 11.6–15.1)
GFR SERPL CREATININE-BSD FRML MDRD: 24 ML/MIN/1.73SQ M
GLUCOSE SERPL-MCNC: 83 MG/DL (ref 65–140)
HCT VFR BLD AUTO: 25.6 % (ref 34.8–46.1)
HGB BLD-MCNC: 7.9 G/DL (ref 11.5–15.4)
IMM GRANULOCYTES # BLD AUTO: 0.03 THOUSAND/UL (ref 0–0.2)
IMM GRANULOCYTES NFR BLD AUTO: 0 % (ref 0–2)
LYMPHOCYTES # BLD AUTO: 1.03 THOUSANDS/ÂΜL (ref 0.6–4.47)
LYMPHOCYTES NFR BLD AUTO: 15 % (ref 14–44)
MCH RBC QN AUTO: 26.7 PG (ref 26.8–34.3)
MCHC RBC AUTO-ENTMCNC: 30.9 G/DL (ref 31.4–37.4)
MCV RBC AUTO: 87 FL (ref 82–98)
MONOCYTES # BLD AUTO: 0.53 THOUSAND/ÂΜL (ref 0.17–1.22)
MONOCYTES NFR BLD AUTO: 8 % (ref 4–12)
NEUTROPHILS # BLD AUTO: 4.97 THOUSANDS/ÂΜL (ref 1.85–7.62)
NEUTS SEG NFR BLD AUTO: 73 % (ref 43–75)
NRBC BLD AUTO-RTO: 0 /100 WBCS
PHOSPHATE SERPL-MCNC: 3.9 MG/DL (ref 2.3–4.1)
PLATELET # BLD AUTO: 279 THOUSANDS/UL (ref 149–390)
PMV BLD AUTO: 10.1 FL (ref 8.9–12.7)
POTASSIUM SERPL-SCNC: 4.1 MMOL/L (ref 3.5–5.3)
RBC # BLD AUTO: 2.96 MILLION/UL (ref 3.81–5.12)
SODIUM SERPL-SCNC: 140 MMOL/L (ref 135–147)
UNIT DISPENSE STATUS: NORMAL
UNIT PRODUCT CODE: NORMAL
UNIT PRODUCT VOLUME: 300 ML
UNIT RH: NORMAL
WBC # BLD AUTO: 6.82 THOUSAND/UL (ref 4.31–10.16)

## 2023-01-25 RX ORDER — SODIUM BICARBONATE 650 MG/1
650 TABLET ORAL
Status: DISCONTINUED | OUTPATIENT
Start: 2023-01-25 | End: 2023-01-26 | Stop reason: HOSPADM

## 2023-01-25 RX ADMIN — SODIUM BICARBONATE 650 MG TABLET 1300 MG: at 07:44

## 2023-01-25 RX ADMIN — METOPROLOL SUCCINATE 100 MG: 100 TABLET, EXTENDED RELEASE ORAL at 08:08

## 2023-01-25 RX ADMIN — HYDROCORTISONE ACETATE 25 MG: 25 SUPPOSITORY RECTAL at 10:18

## 2023-01-25 RX ADMIN — PANTOPRAZOLE SODIUM 40 MG: 40 TABLET, DELAYED RELEASE ORAL at 05:00

## 2023-01-25 RX ADMIN — APIXABAN 5 MG: 5 TABLET, FILM COATED ORAL at 17:00

## 2023-01-25 RX ADMIN — IRON SUCROSE 300 MG: 20 INJECTION, SOLUTION INTRAVENOUS at 11:55

## 2023-01-25 RX ADMIN — ASPIRIN 81 MG: 81 TABLET, COATED ORAL at 08:08

## 2023-01-25 RX ADMIN — SERTRALINE 100 MG: 100 TABLET, FILM COATED ORAL at 08:08

## 2023-01-25 RX ADMIN — ALLOPURINOL 100 MG: 100 TABLET ORAL at 08:08

## 2023-01-25 RX ADMIN — LEVOTHYROXINE SODIUM 150 MCG: 150 TABLET ORAL at 05:00

## 2023-01-25 RX ADMIN — CYANOCOBALAMIN TAB 500 MCG 1000 MCG: 500 TAB at 08:09

## 2023-01-25 RX ADMIN — AMLODIPINE BESYLATE 10 MG: 10 TABLET ORAL at 08:09

## 2023-01-25 RX ADMIN — SODIUM BICARBONATE 650 MG TABLET 650 MG: at 16:59

## 2023-01-25 RX ADMIN — SODIUM BICARBONATE 650 MG TABLET 650 MG: at 11:58

## 2023-01-25 RX ADMIN — HYDROCORTISONE ACETATE 25 MG: 25 SUPPOSITORY RECTAL at 17:00

## 2023-01-25 RX ADMIN — APIXABAN 5 MG: 5 TABLET, FILM COATED ORAL at 08:07

## 2023-01-25 RX ADMIN — Medication 1000 UNITS: at 08:09

## 2023-01-25 NOTE — ASSESSMENT & PLAN NOTE
Witnessed per rectal bleed on 1/21/2023 night  Hemoglobin dropped  Initially Eliquis was on hold  She had a EGD colonoscopy in September 2022 which shows large hemorrhoids-that time it was suggested to repeat scope in 3 months  As patient is refusing bowel prep, discussed with patient's daughter over the phone regarding treatment options and alternatives  At this time, we will resume Eliquis and aspirin and monitor patient's clinically for next 24 to 48 hours, if remains stable can be discharged  No report of having another bloody bowel movements GI did a rectal exam there is brown stool in the vault and hemorrhoids  There is no overt GI bleeding we will continue her aspirin and anticoagulation  Patient is still refusing to repeat any EGD or colonoscopy although she does not probably have any capacity to make accurate decisions due to dementia - but cant force   Also has associated vitamin B12 deficiency  Will start vitamin B12 1000 mcg p o  daily   Anemia has been also multifactorial anemia secondary to chronic disease vitamin B12 deficiency and iron deficiency  Also for hemorrhoids started Anusol suppositories  Status post 1 unit of PRBC transfusion on 1/25/2023 improved to 7 9 no evidence of gross bleeding  We will give a dose of Venofer 300 mg IV x1    Does have associated iron deficiency

## 2023-01-25 NOTE — PLAN OF CARE
Problem: Potential for Falls  Goal: Patient will remain free of falls  Description: INTERVENTIONS:  - Educate patient/family on patient safety including physical limitations  - Instruct patient to call for assistance with activity   - Consult OT/PT to assist with strengthening/mobility   - Keep Call bell within reach  - Keep bed low and locked with side rails adjusted as appropriate  - Keep care items and personal belongings within reach  - Initiate and maintain comfort rounds  - Make Fall Risk Sign visible to staff  - Offer Toileting every  Hours, in advance of need  - Initiate/Maintain alarm  - Obtain necessary fall risk management equipment:   - Apply yellow socks and bracelet for high fall risk patients  - Consider moving patient to room near nurses station  Outcome: Progressing     Problem: PAIN - ADULT  Goal: Verbalizes/displays adequate comfort level or baseline comfort level  Description: Interventions:  - Encourage patient to monitor pain and request assistance  - Assess pain using appropriate pain scale  - Administer analgesics based on type and severity of pain and evaluate response  - Implement non-pharmacological measures as appropriate and evaluate response  - Consider cultural and social influences on pain and pain management  - Notify physician/advanced practitioner if interventions unsuccessful or patient reports new pain  Outcome: Progressing     Problem: INFECTION - ADULT  Goal: Absence or prevention of progression during hospitalization  Description: INTERVENTIONS:  - Assess and monitor for signs and symptoms of infection  - Monitor lab/diagnostic results  - Monitor all insertion sites, i e  indwelling lines, tubes, and drains  - Monitor endotracheal if appropriate and nasal secretions for changes in amount and color  - [place] appropriate cooling/warming therapies per order  - Administer medications as ordered  - Instruct and encourage patient and family to use good hand hygiene technique  - Identify and instruct in appropriate isolation precautions for identified infection/condition  Outcome: Progressing     Problem: SAFETY ADULT  Goal: Patient will remain free of falls  Description: INTERVENTIONS:  - Educate patient/family on patient safety including physical limitations  - Instruct patient to call for assistance with activity   - Consult OT/PT to assist with strengthening/mobility   - Keep Call bell within reach  - Keep bed low and locked with side rails adjusted as appropriate  - Keep care items and personal belongings within reach  - Initiate and maintain comfort rounds  - Make Fall Risk Sign visible to staff  - Offer Toileting every  Hours, in advance of need  - Initiate/Maintainalarm  - Obtain necessary fall risk management equipment:   - Apply yellow socks and bracelet for high fall risk patients  - Consider moving patient to room near nurses station  Outcome: Progressing  Goal: Maintain or return to baseline ADL function  Description: INTERVENTIONS:  -  Assess patient's ability to carry out ADLs; assess patient's baseline for ADL function and identify physical deficits which impact ability to perform ADLs (bathing, care of mouth/teeth, toileting, grooming, dressing, etc )  - Assess/evaluate cause of self-care deficits   - Assess range of motion  - Assess patient's mobility; develop plan if impaired  - Assess patient's need for assistive devices and provide as appropriate  - Encourage maximum independence but intervene and supervise when necessary  - Involve family in performance of ADLs  - Assess for home care needs following discharge   - Consider OT consult to assist with ADL evaluation and planning for discharge  - Provide patient education as appropriate  Outcome: Progressing  Goal: Maintains/Returns to pre admission functional level  Description: INTERVENTIONS:  - Perform BMAT or MOVE assessment daily    - Set and communicate daily mobility goal to care team and patient/family/caregiver  - Collaborate with rehabilitation services on mobility goals if consulted  - Perform Range of Motion  times a day  - Reposition patient every  hours  - Dangle patient times a day  - Stand patient times a day  - Ambulate patient  times a day  - Out of bed to chair  times a day   - Out of bed for meals  times a day  - Out of bed for toileting  - Record patient progress and toleration of activity level   Outcome: Progressing     Problem: DISCHARGE PLANNING  Goal: Discharge to home or other facility with appropriate resources  Description: INTERVENTIONS:  - Identify barriers to discharge w/patient and caregiver  - Arrange for needed discharge resources and transportation as appropriate  - Identify discharge learning needs (meds, wound care, etc )  - Arrange for interpretive services to assist at discharge as needed  - Refer to Case Management Department for coordinating discharge planning if the patient needs post-hospital services based on physician/advanced practitioner order or complex needs related to functional status, cognitive ability, or social support system  Outcome: Progressing     Problem: Knowledge Deficit  Goal: Patient/family/caregiver demonstrates understanding of disease process, treatment plan, medications, and discharge instructions  Description: Complete learning assessment and assess knowledge base    Interventions:  - Provide teaching at level of understanding  - Provide teaching via preferred learning methods  Outcome: Progressing     Problem: NEUROSENSORY - ADULT  Goal: Achieves stable or improved neurological status  Description: INTERVENTIONS  - Monitor and report changes in neurological status  - Monitor vital signs such as temperature, blood pressure, glucose, and any other labs ordered   - Initiate measures to prevent increased intracranial pressure  - Monitor for seizure activity and implement precautions if appropriate      Outcome: Progressing  Goal: Remains free of injury related to seizures activity  Description: INTERVENTIONS  - Maintain airway, patient safety  and administer oxygen as ordered  - Monitor patient for seizure activity, document and report duration and description of seizure to physician/advanced practitioner  - If seizure occurs,  ensure patient safety during seizure  - Reorient patient post seizure  - Seizure pads on all 4 side rails  - Instruct patient/family to notify RN of any seizure activity including if an aura is experienced  - Instruct patient/family to call for assistance with activity based on nursing assessment  - Administer anti-seizure medications if ordered    Outcome: Progressing  Goal: Achieves maximal functionality and self care  Description: INTERVENTIONS  - Monitor swallowing and airway patency with patient fatigue and changes in neurological status  - Encourage and assist patient to increase activity and self care     - Encourage visually impaired, hearing impaired and aphasic patients to use assistive/communication devices  Outcome: Progressing     Problem: CARDIOVASCULAR - ADULT  Goal: Maintains optimal cardiac output and hemodynamic stability  Description: INTERVENTIONS:  - Monitor I/O, vital signs and rhythm  - Monitor for S/S and trends of decreased cardiac output  - Administer and titrate ordered vasoactive medications to optimize hemodynamic stability  - Assess quality of pulses, skin color and temperature  - Assess for signs of decreased coronary artery perfusion  - Instruct patient to report change in severity of symptoms  Outcome: Progressing  Goal: Absence of cardiac dysrhythmias or at baseline rhythm  Description: INTERVENTIONS:  - Continuous cardiac monitoring, vital signs, obtain 12 lead EKG if ordered  - Administer antiarrhythmic and heart rate control medications as ordered  - Monitor electrolytes and administer replacement therapy as ordered  Outcome: Progressing     Problem: RESPIRATORY - ADULT  Goal: Achieves optimal ventilation and oxygenation  Description: INTERVENTIONS:  - Assess for changes in respiratory status  - Assess for changes in mentation and behavior  - Position to facilitate oxygenation and minimize respiratory effort  - Oxygen administered by appropriate delivery if ordered  - Initiate smoking cessation education as indicated  - Encourage broncho-pulmonary hygiene including cough, deep breathe, Incentive Spirometry  - Assess the need for suctioning and aspirate as needed  - Assess and instruct to report SOB or any respiratory difficulty  - Respiratory Therapy support as indicated  Outcome: Progressing     Problem: GASTROINTESTINAL - ADULT  Goal: Minimal or absence of nausea and/or vomiting  Description: INTERVENTIONS:  - Administer IV fluids if ordered to ensure adequate hydration  - Maintain NPO status until nausea and vomiting are resolved  - Nasogastric tube if ordered  - Administer ordered antiemetic medications as needed  - Provide nonpharmacologic comfort measures as appropriate  - Advance diet as tolerated, if ordered  - Consider nutrition services referral to assist patient with adequate nutrition and appropriate food choices  Outcome: Progressing  Goal: Maintains or returns to baseline bowel function  Description: INTERVENTIONS:  - Assess bowel function  - Encourage oral fluids to ensure adequate hydration  - Administer IV fluids if ordered to ensure adequate hydration  - Administer ordered medications as needed  - Encourage mobilization and activity  - Consider nutritional services referral to assist patient with adequate nutrition and appropriate food choices  Outcome: Progressing  Goal: Maintains adequate nutritional intake  Description: INTERVENTIONS:  - Monitor percentage of each meal consumed  - Identify factors contributing to decreased intake, treat as appropriate  - Assist with meals as needed  - Monitor I&O, weight, and lab values if indicated  - Obtain nutrition services referral as needed  Outcome: Progressing  Goal: Establish and maintain optimal ostomy function  Description: INTERVENTIONS:  - Assess bowel function  - Encourage oral fluids to ensure adequate hydration  - Administer IV fluids if ordered to ensure adequate hydration   - Administer ordered medications as needed  - Encourage mobilization and activity  - Nutrition services referral to assist patient with appropriate food choices  - Assess stoma site  - Consider wound care consult   Outcome: Progressing  Goal: Oral mucous membranes remain intact  Description: INTERVENTIONS  - Assess oral mucosa and hygiene practices  - Implement preventative oral hygiene regimen  - Implement oral medicated treatments as ordered  - Initiate Nutrition services referral as needed  Outcome: Progressing     Problem: GENITOURINARY - ADULT  Goal: Maintains or returns to baseline urinary function  Description: INTERVENTIONS:  - Assess urinary function  - Encourage oral fluids to ensure adequate hydration if ordered  - Administer IV fluids as ordered to ensure adequate hydration  - Administer ordered medications as needed  - Offer frequent toileting  - Follow urinary retention protocol if ordered  Outcome: Progressing  Goal: Absence of urinary retention  Description: INTERVENTIONS:  - Assess patient’s ability to void and empty bladder  - Monitor I/O  - Bladder scan as needed  - Discuss with physician/AP medications to alleviate retention as needed  - Discuss catheterization for long term situations as appropriate  Outcome: Progressing  Goal: Urinary catheter remains patent  Description: INTERVENTIONS:  - Assess patency of urinary catheter  - If patient has a chronic brambila, consider changing catheter if non-functioning  - Follow guidelines for intermittent irrigation of non-functioning urinary catheter  Outcome: Progressing     Problem: METABOLIC, FLUID AND ELECTROLYTES - ADULT  Goal: Electrolytes maintained within normal limits  Description: INTERVENTIONS:  - Monitor labs and assess patient for signs and symptoms of electrolyte imbalances  - Administer electrolyte replacement as ordered  - Monitor response to electrolyte replacements, including repeat lab results as appropriate  - Instruct patient on fluid and nutrition as appropriate  Outcome: Progressing  Goal: Fluid balance maintained  Description: INTERVENTIONS:  - Monitor labs   - Monitor I/O and WT  - Instruct patient on fluid and nutrition as appropriate  - Assess for signs & symptoms of volume excess or deficit  Outcome: Progressing  Goal: Glucose maintained within target range  Description: INTERVENTIONS:  - Monitor Blood Glucose as ordered  - Assess for signs and symptoms of hyperglycemia and hypoglycemia  - Administer ordered medications to maintain glucose within target range  - Assess nutritional intake and initiate nutrition service referral as needed  Outcome: Progressing     Problem: SKIN/TISSUE INTEGRITY - ADULT  Goal: Skin Integrity remains intact(Skin Breakdown Prevention)  Description: Assess:  -Perform [place] assessment every   -Clean and moisturize skin every   -Inspect skin when repositioning, toileting, and assisting with ADLS  -Assess under medical devices such as every   -Assess extremities for adequate circulation and sensation     Bed Management:  -Have minimal linens on bed & keep smooth, unwrinkled  -Change linens as needed when moist or perspiring  -Avoid sitting or lying in one position for more than  hours while in bed  -Keep HOB degrees     Toileting:  -Offer bedside commode  -Assess for incontinence every   -Use incontinent care products after each incontinent episode such as     Activity:  -Mobilize patient  times a day  -Encourage activity and walks on unit  -Encourage or provide ROM exercises   -Turn and reposition patient every  Hours  -Use appropriate equipment to lift or move patient in bed  -Instruct/ Assist with weight shifting every  when out of bed in chair  -Consider limitation of chair time  hour intervals    Skin Care:  -Avoid use of baby powder, tape, friction and shearing, hot water or constrictive clothing  -Relieve pressure over bony prominences using   -Do not massage red bony areas    Next Steps:  -Teach patient strategies to minimize risks such as   -Consider consults to  interdisciplinary teams such as   Outcome: Progressing  Goal: Incision(s), wounds(s) or drain site(s) healing without S/S of infection  Description: INTERVENTIONS  - Assess and document dressing, incision, wound bed, drain sites and surrounding tissue  - Provide patient and family education  - Perform skin care/dressing changes every   Outcome: Progressing  Goal: Pressure injury heals and does not worsen  Description: Interventions:  - Implement low air loss mattress or specialty surface (Criteria met)  - Apply silicone foam dressing  - Instruct/assist with weight shifting every  minutes when in chair   - Limit chair time to  hour intervals  - Use special pressure reducing interventions such as when in chair   - Apply fecal or urinary incontinence containment device   - Perform passive or active ROM every   - Turn and reposition patient & offload bony prominences every  hours   - Utilize friction reducing device or surface for transfers   - Consider consults to  interdisciplinary teams such as   - Use incontinent care products after each incontinent episode such as  - Consider nutrition services referral as needed  Outcome: Progressing     Problem: HEMATOLOGIC - ADULT  Goal: Maintains hematologic stability  Description: INTERVENTIONS  - Assess for signs and symptoms of bleeding or hemorrhage  - Monitor labs  - Administer supportive blood products/factors as ordered and appropriate  Outcome: Progressing     Problem: MUSCULOSKELETAL - ADULT  Goal: Maintain or return mobility to safest level of function  Description: INTERVENTIONS:  - Assess patient's ability to carry out ADLs; assess patient's baseline for ADL function and identify physical deficits which impact ability to perform ADLs (bathing, care of mouth/teeth, toileting, grooming, dressing, etc )  - Assess/evaluate cause of self-care deficits   - Assess range of motion  - Assess patient's mobility  - Assess patient's need for assistive devices and provide as appropriate  - Encourage maximum independence but intervene and supervise when necessary  - Involve family in performance of ADLs  - Assess for home care needs following discharge   - Consider OT consult to assist with ADL evaluation and planning for discharge  - Provide patient education as appropriate  Outcome: Progressing  Goal: Maintain proper alignment of affected body part  Description: INTERVENTIONS:  - Support, maintain and protect limb and body alignment  - Provide patient/ family with appropriate education  Outcome: Progressing     Problem: MOBILITY - ADULT  Goal: Maintain or return to baseline ADL function  Description: INTERVENTIONS:  -  Assess patient's ability to carry out ADLs; assess patient's baseline for ADL function and identify physical deficits which impact ability to perform ADLs (bathing, care of mouth/teeth, toileting, grooming, dressing, etc )  - Assess/evaluate cause of self-care deficits   - Assess range of motion  - Assess patient's mobility; develop plan if impaired  - Assess patient's need for assistive devices and provide as appropriate  - Encourage maximum independence but intervene and supervise when necessary  - Involve family in performance of ADLs  - Assess for home care needs following discharge   - Consider OT consult to assist with ADL evaluation and planning for discharge  - Provide patient education as appropriate  Outcome: Progressing  Goal: Maintains/Returns to pre admission functional level  Description: INTERVENTIONS:  - Perform BMAT or MOVE assessment daily    - Set and communicate daily mobility goal to care team and patient/family/caregiver     - Collaborate with rehabilitation services on mobility goals if consulted  - Perform Range of Motion  times a day  - Reposition patient every  hours  - Dangle patient  times a day  - Stand patient  times a day  - Ambulate patient  times a day  - Out of bed to chair  times a day   - Out of bed for meals  times a day  - Out of bed for toileting  - Record patient progress and toleration of activity level   Outcome: Progressing     Problem: Prexisting or High Potential for Compromised Skin Integrity  Goal: Skin integrity is maintained or improved  Description: INTERVENTIONS:  - Identify patients at risk for skin breakdown  - Assess and monitor skin integrity  - Assess and monitor nutrition and hydration status  - Monitor labs   - Assess for incontinence   - Turn and reposition patient  - Assist with mobility/ambulation  - Relieve pressure over bony prominences  - Avoid friction and shearing  - Provide appropriate hygiene as needed including keeping skin clean and dry  - Evaluate need for skin moisturizer/barrier cream  - Collaborate with interdisciplinary team   - Patient/family teaching  - Consider wound care consult   Outcome: Progressing     Problem: Nutrition/Hydration-ADULT  Goal: Nutrient/Hydration intake appropriate for improving, restoring or maintaining nutritional needs  Description: Monitor and assess patient's nutrition/hydration status for malnutrition  Collaborate with interdisciplinary team and initiate plan and interventions as ordered  Monitor patient's weight and dietary intake as ordered or per policy  Utilize nutrition screening tool and intervene as necessary  Determine patient's food preferences and provide high-protein, high-caloric foods as appropriate       INTERVENTIONS:  - Monitor oral intake, urinary output, labs, and treatment plans  - Assess nutrition and hydration status and recommend course of action  - Evaluate amount of meals eaten  - Assist patient with eating if necessary   - Allow adequate time for meals  - Recommend/ encourage appropriate diets, oral nutritional supplements, and vitamin/mineral supplements  - Order, calculate, and assess calorie counts as needed  - Recommend, monitor, and adjust tube feedings and TPN/PPN based on assessed needs  - Assess need for intravenous fluids  - Provide specific nutrition/hydration education as appropriate  - Include patient/family/caregiver in decisions related to nutrition  Outcome: Progressing

## 2023-01-25 NOTE — PROGRESS NOTES
Progress Note - Nephrology   Taiwo Gomez 65 y o  female MRN: [id number]  Unit/Bed#: -01 Encounter: [id number]    A/P:  1  Chronic kidney stage IIIb with a baseline creatinine around 1 7 mg/deciliter  2  Metabolic acidosis              Serum bicarb is appropriate 21 mmol/liter, continue with current bicarb supplementation  3   Hypertension in setting of chronic/metabolic acidosis stage III              Blood pressures stable, continue current medications  4   Strokelike symptoms  5  Acute on chronic blood loss              Patient's status post packed red blood cells, continue to monitor hemoglobin, continue care according to gastroenterology and hospitalist recommendations    6  Acute cystitis   Patient given a dose of ceftriaxone yesterday    Follow up reason for today's visit: Chronic kidney disease/hypertension/acidosis    Acute on chronic blood loss anemia    Patient Active Problem List   Diagnosis   • Closed fracture of body of sternum with routine healing   • Congestive heart disease ([facility] )   • Acquired hypothyroidism   • CAD (coronary artery disease)   • Forgetfulness   • Acute pain due to trauma   • Hx of fall   • Stress incontinence in female   • DARRELL (acute kidney injury) ([facility] )   • Perforated appendicitis   • Chronic combined systolic and diastolic CHF (congestive heart failure) ([facility])   • Pyuria   • Microscopic hematuria   • Vitamin D insufficiency   • Mitral valve insufficiency   • Hypercholesterolemia   • Aortic atherosclerosis ([facility])   • Renal calculus   • Diverticulosis   • Hiatal hernia   • Pulmonary nodule   • Leg swelling   • Paroxysmal atrial fibrillation ([facility])   • Ischemic cardiomyopathy   • S/P implantation of automatic cardioverter/defibrillator (AICD)   • Essential hypertension   • History of PTCA   • Iron deficiency anemia   • Diverticulitis   • Rectal bleeding   • Elevated troponin   • Acute blood loss anemia   • Anemia in chronic kidney disease   • Benign hypertensive renal disease • Gastroesophageal reflux disease without esophagitis   • Metabolic encephalopathy   • Gastrointestinal bleeding   • Hydroureteronephrosis   • Dementia (HCC)   • Low TSH level   • COVID-19 virus infection   • Hypophosphatemia   • Pulmonary hypertension (HCC)   • Secondary renal hyperparathyroidism (HCC)   • Hyperuricemia   • Proteinuria   • Goals of care, counseling/discussion   • Elevated d-dimer   • Right internal carotid artery aneurysm   • Carotid artery stenosis   • Aneurysm (HCC)   • Acute cystitis without hematuria   • Acute on chronic blood loss anemia         Subjective:   No acute events, patient breathing is comfortable at this time  Objective:     Vitals: Blood pressure 140/64, pulse 68, temperature 97 5 °F (36 4 °C), resp  rate 20, height 5' 5" (1 651 m), weight 61 kg (134 lb 7 7 oz), SpO2 99 %  ,Body mass index is 22 38 kg/m²  Weight (last 2 days)     Date/Time Weight    01/25/23 0600 61 (134 48)    01/24/23 0544 59 8 (131 84)    01/23/23 0534 61 (134 48)    01/23/23 03:15:21 61 (134 48)            Intake/Output Summary (Last 24 hours) at 1/25/2023 0918  Last data filed at 1/25/2023 0450  Gross per 24 hour   Intake 517 ml   Output 186 ml   Net 331 ml     I/O last 3 completed shifts:   In: 517 [P O :180; Blood:337]  Out: 186 [Urine:186]         Physical Exam: /64   Pulse 68   Temp 97 5 °F (36 4 °C)   Resp 20   Ht 5' 5" (1 651 m)   Wt 61 kg (134 lb 7 7 oz)   SpO2 99%   BMI 22 38 kg/m²     General Appearance:    Alert, cooperative, no distress, appears stated age   Head:    Normocephalic, without obvious abnormality, atraumatic   Eyes:    Conjunctiva/corneas clear   Ears:    Normal external ears   Nose:   Nares normal, septum midline, mucosa normal, no drainage    or sinus tenderness   Throat:   Lips, mucosa, and tongue normal; teeth and gums normal   Neck:   Supple   Back:     Symmetric, no curvature, ROM normal, no CVA tenderness   Lungs:     Clear to auscultation bilaterally, respirations unlabored   Chest wall:    No tenderness or deformity   Heart:    Regular rate and rhythm, S1 and S2 normal, no murmur, rub   or gallop   Abdomen:     Soft, non-tender, bowel sounds active   Extremities:   Extremities normal, atraumatic, no cyanosis, +2 presacral edema   Skin:   Skin color, texture, turgor normal, no rashes or lesions   Lymph nodes:   Cervical normal   Neurologic:   CNII-XII intact            Lab, Imaging and other studies: I have personally reviewed pertinent labs  CBC:   Lab Results   Component Value Date    WBC 6 82 01/25/2023    HGB 7 9 (L) 01/25/2023    HCT 25 6 (L) 01/25/2023    MCV 87 01/25/2023     01/25/2023    MCH 26 7 (L) 01/25/2023    MCHC 30 9 (L) 01/25/2023    RDW 16 3 (H) 01/25/2023    MPV 10 1 01/25/2023    NRBC 0 01/25/2023     CMP:   Lab Results   Component Value Date    K 4 1 01/25/2023     (H) 01/25/2023    CO2 21 01/25/2023    BUN 52 (H) 01/25/2023    CREATININE 1 93 (H) 01/25/2023    CALCIUM 8 3 (L) 01/25/2023    EGFR 24 01/25/2023         Results from last 7 days   Lab Units 01/25/23  0435 01/24/23  0606 01/23/23  0525 01/22/23  0456 01/21/23  0507   POTASSIUM mmol/L 4 1 4 1 4 2   < > 4 2   CHLORIDE mmol/L 110* 108 107   < > 109*   CO2 mmol/L 21 18* 21   < > 18*   BUN mg/dL 52* 50* 55*   < > 58*   CREATININE mg/dL 1 93* 1 86* 1 85*   < > 1 79*   CALCIUM mg/dL 8 3* 8 5 8 5   < > 8 4   ALK PHOS U/L  --  102 103  --  96   ALT U/L  --  4* 7  --  7   AST U/L  --  14 16  --  14    < > = values in this interval not displayed           Phosphorus:   Lab Results   Component Value Date    PHOS 3 9 01/25/2023     Magnesium: No results found for: MG  Urinalysis: No results found for: COLORU, CLARITYU, SPECGRAV, PHUR, LEUKOCYTESUR, NITRITE, PROTEINUA, GLUCOSEU, KETONESU, BILIRUBINUR, BLOODU  Ionized Calcium: No results found for: CAION  Coagulation: No results found for: PT, INR, APTT  Troponin: No results found for: TROPONINI  ABG: No results found for: PHART, SAE9BJH, PO2ART, FOD5TNJ, J1KDORGW, BEART, SOURCE  Radiology review:     IMAGING  No results found      Current Facility-Administered Medications   Medication Dose Route Frequency   • acetaminophen (TYLENOL) tablet 650 mg  650 mg Oral Q6H PRN   • allopurinol (ZYLOPRIM) tablet 100 mg  100 mg Oral Daily   • amLODIPine (NORVASC) tablet 10 mg  10 mg Oral Daily   • apixaban (ELIQUIS) tablet 5 mg  5 mg Oral BID   • aspirin (ECOTRIN LOW STRENGTH) EC tablet 81 mg  81 mg Oral Daily   • cholecalciferol (VITAMIN D3) tablet 1,000 Units  1,000 Units Oral Daily   • cyanocobalamin (VITAMIN B-12) tablet 1,000 mcg  1,000 mcg Oral Daily   • docusate sodium (COLACE) capsule 100 mg  100 mg Oral BID   • hydrocortisone (ANUSOL-HC) rectal suppository 25 mg  25 mg Rectal BID   • levothyroxine tablet 150 mcg  150 mcg Oral Early Morning   • metoprolol succinate (TOPROL-XL) 24 hr tablet 100 mg  100 mg Oral Daily   • pantoprazole (PROTONIX) EC tablet 40 mg  40 mg Oral Early Morning   • polyethylene glycol (MIRALAX) packet 17 g  17 g Oral Daily PRN   • sertraline (ZOLOFT) tablet 100 mg  100 mg Oral Daily   • sodium bicarbonate tablet 650 mg  650 mg Oral TID after meals     Medications Discontinued During This Encounter   Medication Reason   • atorvastatin (LIPITOR) tablet 40 mg    • metoprolol succinate (TOPROL-XL) 50 mg 24 hr tablet Dose adjustment   • amLODIPine (NORVASC) tablet 10 mg    • metoprolol succinate (TOPROL-XL) 24 hr tablet 100 mg    • levothyroxine tablet 125 mcg    • aspirin chewable tablet 81 mg    • apixaban (ELIQUIS) tablet 2 5 mg    • amLODIPine (NORVASC) tablet 10 mg    • apixaban (ELIQUIS) tablet 5 mg    • polyethylene glycol (GLYCOLAX) bowel prep 119 g    • sodium bicarbonate tablet 650 mg    • pantoprazole (PROTONIX) injection 40 mg    • multi-electrolyte (PLASMALYTE-A/ISOLYTE-S PH 7 4) IV solution    • cefTRIAXone (ROCEPHIN) IVPB (premix in dextrose) 1,000 mg 50 mL    • sodium bicarbonate tablet 1,300 mg        Mellissa Melody, DO      This progress note was produced in part using a dictation device which may document imprecise wording from author's original intent

## 2023-01-25 NOTE — PLAN OF CARE
Problem: PHYSICAL THERAPY ADULT  Goal: Performs mobility at highest level of function for planned discharge setting  See evaluation for individualized goals  Description: Treatment/Interventions: ADL retraining, Functional transfer training, LE strengthening/ROM, Therapeutic exercise, Endurance training, Patient/family training, Equipment eval/education, Bed mobility, Gait training, Cognitive reorientation, Compensatory technique education, Spoke to nursing, Spoke to case management, OT  Equipment Recommended:  (TBD by facility)       See flowsheet documentation for full assessment, interventions and recommendations  Outcome: Not Progressing  Note: Prognosis: Fair  Problem List: Decreased strength, Decreased endurance, Impaired balance, Decreased mobility, Decreased coordination, Decreased cognition, Impaired judgement, Decreased safety awareness, Pain  Assessment: Pt seen for PT treatment session this date with interventions consisting of Therapeutic exercise consisting of: AROM 20 reps B LE in sitting position and therapeutic activity consisting of training: sit<>stand transfers, static standing tolerance for 3x1 minutes w/ max UE support and vc and tactile cues for static standing posture faciliation  Pt agreeable to PT treatment session upon arrival, pt found seated OOB in recliner, in no apparent distress and responsive  In comparison to previous session, pt with no improvements as evidenced by pt continues to require Ax2 for functional mobility  Post session: pt returned back to recliner, chair alarm engaged, all needs in reach and RN notified of session findings/recommendations  Continue to recommend return to facility with rehabilitation services at time of d/c in order to maximize pt's functional independence and safety w/ mobility  Pt continues to be functioning below baseline level   PT will continue to see pt during current hospitalization in order to address the deficits listed above and provide interventions consistent w/ POC in effort to achieve STGs  Barriers to Discharge Comments: requires increased assistance to complete mobility, however patient may reside in SNF where she can have assistance prn  PT Discharge Recommendation: Return to facility with rehabilitation services    See flowsheet documentation for full assessment

## 2023-01-25 NOTE — PLAN OF CARE
Problem: Potential for Falls  Goal: Patient will remain free of falls  Description: INTERVENTIONS:  - Educate patient/family on patient safety including physical limitations  - Instruct patient to call for assistance with activity   - Consult OT/PT to assist with strengthening/mobility   - Keep Call bell within reach  - Keep bed low and locked with side rails adjusted as appropriate  - Keep care items and personal belongings within reach  - Initiate and maintain comfort rounds  - Make Fall Risk Sign visible to staff  - Offer Toileting every 2 Hours, in advance of need  - Initiate/Maintain bed alarm  - Obtain necessary fall risk management equipment: bed alarm  - Apply yellow socks and bracelet for high fall risk patients  - Consider moving patient to room near nurses station  Outcome: Progressing     Problem: PAIN - ADULT  Goal: Verbalizes/displays adequate comfort level or baseline comfort level  Description: Interventions:  - Encourage patient to monitor pain and request assistance  - Assess pain using appropriate pain scale  - Administer analgesics based on type and severity of pain and evaluate response  - Implement non-pharmacological measures as appropriate and evaluate response  - Consider cultural and social influences on pain and pain management  - Notify physician/advanced practitioner if interventions unsuccessful or patient reports new pain  Outcome: Progressing     Problem: INFECTION - ADULT  Goal: Absence or prevention of progression during hospitalization  Description: INTERVENTIONS:  - Assess and monitor for signs and symptoms of infection  - Monitor lab/diagnostic results  - Monitor all insertion sites, i e  indwelling lines, tubes, and drains  - Monitor endotracheal if appropriate and nasal secretions for changes in amount and color  - [place] appropriate cooling/warming therapies per order  - Administer medications as ordered  - Instruct and encourage patient and family to use good hand hygiene technique  - Identify and instruct in appropriate isolation precautions for identified infection/condition  Outcome: Progressing     Problem: SAFETY ADULT  Goal: Patient will remain free of falls  Description: INTERVENTIONS:  - Educate patient/family on patient safety including physical limitations  - Instruct patient to call for assistance with activity   - Consult OT/PT to assist with strengthening/mobility   - Keep Call bell within reach  - Keep bed low and locked with side rails adjusted as appropriate  - Keep care items and personal belongings within reach  - Initiate and maintain comfort rounds  - Make Fall Risk Sign visible to staff  - Offer Toileting every 2 Hours, in advance of need  - Initiate/Maintain alarm    - Apply yellow socks and bracelet for high fall risk patients  - Consider moving patient to room near nurses station  Outcome: Progressing  Goal: Maintain or return to baseline ADL function  Description: INTERVENTIONS:  -  Assess patient's ability to carry out ADLs; assess patient's baseline for ADL function and identify physical deficits which impact ability to perform ADLs (bathing, care of mouth/teeth, toileting, grooming, dressing, etc )  - Assess/evaluate cause of self-care deficits   - Assess range of motion  - Assess patient's mobility; develop plan if impaired  - Assess patient's need for assistive devices and provide as appropriate  - Encourage maximum independence but intervene and supervise when necessary  - Involve family in performance of ADLs  - Assess for home care needs following discharge   - Consider OT consult to assist with ADL evaluation and planning for discharge  - Provide patient education as appropriate  Outcome: Progressing  Goal: Maintains/Returns to pre admission functional level  Description: INTERVENTIONS:  - Perform BMAT or MOVE assessment daily    - Set and communicate daily mobility goal to care team and patient/family/caregiver     - Collaborate with rehabilitation services on mobility goals if consulted    - Reposition patient every 2 hours  - Out of bed to chair 3 times a day   - Out of bed for meals 3 times a day  - Out of bed for toileting  - Record patient progress and toleration of activity level   Outcome: Progressing     Problem: DISCHARGE PLANNING  Goal: Discharge to home or other facility with appropriate resources  Description: INTERVENTIONS:  - Identify barriers to discharge w/patient and caregiver  - Arrange for needed discharge resources and transportation as appropriate  - Identify discharge learning needs (meds, wound care, etc )  - Arrange for interpretive services to assist at discharge as needed  - Refer to Case Management Department for coordinating discharge planning if the patient needs post-hospital services based on physician/advanced practitioner order or complex needs related to functional status, cognitive ability, or social support system  Outcome: Progressing     Problem: Knowledge Deficit  Goal: Patient/family/caregiver demonstrates understanding of disease process, treatment plan, medications, and discharge instructions  Description: Complete learning assessment and assess knowledge base    Interventions:  - Provide teaching at level of understanding  - Provide teaching via preferred learning methods  Outcome: Progressing     Problem: NEUROSENSORY - ADULT  Goal: Achieves stable or improved neurological status  Description: INTERVENTIONS  - Monitor and report changes in neurological status  - Monitor vital signs such as temperature, blood pressure, glucose, and any other labs ordered   - Initiate measures to prevent increased intracranial pressure  - Monitor for seizure activity and implement precautions if appropriate      Outcome: Progressing  Goal: Remains free of injury related to seizures activity  Description: INTERVENTIONS  - Maintain airway, patient safety  and administer oxygen as ordered  - Monitor patient for seizure activity, document and report duration and description of seizure to physician/advanced practitioner  - If seizure occurs,  ensure patient safety during seizure  - Reorient patient post seizure  - Seizure pads on all 4 side rails  - Instruct patient/family to notify RN of any seizure activity including if an aura is experienced  - Instruct patient/family to call for assistance with activity based on nursing assessment  - Administer anti-seizure medications if ordered    Outcome: Progressing  Goal: Achieves maximal functionality and self care  Description: INTERVENTIONS  - Monitor swallowing and airway patency with patient fatigue and changes in neurological status  - Encourage and assist patient to increase activity and self care     - Encourage visually impaired, hearing impaired and aphasic patients to use assistive/communication devices  Outcome: Progressing     Problem: CARDIOVASCULAR - ADULT  Goal: Maintains optimal cardiac output and hemodynamic stability  Description: INTERVENTIONS:  - Monitor I/O, vital signs and rhythm  - Monitor for S/S and trends of decreased cardiac output  - Administer and titrate ordered vasoactive medications to optimize hemodynamic stability  - Assess quality of pulses, skin color and temperature  - Assess for signs of decreased coronary artery perfusion  - Instruct patient to report change in severity of symptoms  Outcome: Progressing  Goal: Absence of cardiac dysrhythmias or at baseline rhythm  Description: INTERVENTIONS:  - Continuous cardiac monitoring, vital signs, obtain 12 lead EKG if ordered  - Administer antiarrhythmic and heart rate control medications as ordered  - Monitor electrolytes and administer replacement therapy as ordered  Outcome: Progressing     Problem: RESPIRATORY - ADULT  Goal: Achieves optimal ventilation and oxygenation  Description: INTERVENTIONS:  - Assess for changes in respiratory status  - Assess for changes in mentation and behavior  - Position to facilitate oxygenation and minimize respiratory effort  - Oxygen administered by appropriate delivery if ordered  - Initiate smoking cessation education as indicated  - Encourage broncho-pulmonary hygiene including cough, deep breathe, Incentive Spirometry  - Assess the need for suctioning and aspirate as needed  - Assess and instruct to report SOB or any respiratory difficulty  - Respiratory Therapy support as indicated  Outcome: Progressing     Problem: GASTROINTESTINAL - ADULT  Goal: Minimal or absence of nausea and/or vomiting  Description: INTERVENTIONS:  - Administer IV fluids if ordered to ensure adequate hydration  - Maintain NPO status until nausea and vomiting are resolved  - Nasogastric tube if ordered  - Administer ordered antiemetic medications as needed  - Provide nonpharmacologic comfort measures as appropriate  - Advance diet as tolerated, if ordered  - Consider nutrition services referral to assist patient with adequate nutrition and appropriate food choices  Outcome: Progressing  Goal: Maintains or returns to baseline bowel function  Description: INTERVENTIONS:  - Assess bowel function  - Encourage oral fluids to ensure adequate hydration  - Administer IV fluids if ordered to ensure adequate hydration  - Administer ordered medications as needed  - Encourage mobilization and activity  - Consider nutritional services referral to assist patient with adequate nutrition and appropriate food choices  Outcome: Progressing  Goal: Maintains adequate nutritional intake  Description: INTERVENTIONS:  - Monitor percentage of each meal consumed  - Identify factors contributing to decreased intake, treat as appropriate  - Assist with meals as needed  - Monitor I&O, weight, and lab values if indicated  - Obtain nutrition services referral as needed  Outcome: Progressing  Goal: Establish and maintain optimal ostomy function  Description: INTERVENTIONS:  - Assess bowel function  - Encourage oral fluids to ensure adequate hydration  - Administer IV fluids if ordered to ensure adequate hydration   - Administer ordered medications as needed  - Encourage mobilization and activity  - Nutrition services referral to assist patient with appropriate food choices  - Assess stoma site  - Consider wound care consult   Outcome: Progressing  Goal: Oral mucous membranes remain intact  Description: INTERVENTIONS  - Assess oral mucosa and hygiene practices  - Implement preventative oral hygiene regimen  - Implement oral medicated treatments as ordered  - Initiate Nutrition services referral as needed  Outcome: Progressing     Problem: GENITOURINARY - ADULT  Goal: Maintains or returns to baseline urinary function  Description: INTERVENTIONS:  - Assess urinary function  - Encourage oral fluids to ensure adequate hydration if ordered  - Administer IV fluids as ordered to ensure adequate hydration  - Administer ordered medications as needed  - Offer frequent toileting  - Follow urinary retention protocol if ordered  Outcome: Progressing  Goal: Absence of urinary retention  Description: INTERVENTIONS:  - Assess patient’s ability to void and empty bladder  - Monitor I/O  - Bladder scan as needed  - Discuss with physician/AP medications to alleviate retention as needed  - Discuss catheterization for long term situations as appropriate  Outcome: Progressing  Goal: Urinary catheter remains patent  Description: INTERVENTIONS:  - Assess patency of urinary catheter  - If patient has a chronic brambila, consider changing catheter if non-functioning  - Follow guidelines for intermittent irrigation of non-functioning urinary catheter  Outcome: Progressing     Problem: METABOLIC, FLUID AND ELECTROLYTES - ADULT  Goal: Electrolytes maintained within normal limits  Description: INTERVENTIONS:  - Monitor labs and assess patient for signs and symptoms of electrolyte imbalances  - Administer electrolyte replacement as ordered  - Monitor response to electrolyte replacements, including repeat lab results as appropriate  - Instruct patient on fluid and nutrition as appropriate  Outcome: Progressing  Goal: Fluid balance maintained  Description: INTERVENTIONS:  - Monitor labs   - Monitor I/O and WT  - Instruct patient on fluid and nutrition as appropriate  - Assess for signs & symptoms of volume excess or deficit  Outcome: Progressing  Goal: Glucose maintained within target range  Description: INTERVENTIONS:  - Monitor Blood Glucose as ordered  - Assess for signs and symptoms of hyperglycemia and hypoglycemia  - Administer ordered medications to maintain glucose within target range  - Assess nutritional intake and initiate nutrition service referral as needed  Outcome: Progressing     Problem: SKIN/TISSUE INTEGRITY - ADULT  Goal: Skin Integrity remains intact(Skin Breakdown Prevention)  Description: Assess:  -Perform [place] assessment every shifrt  -Clean and moisturize skin every shift  -Inspect skin when repositioning, toileting, and assisting with ADLS  -Assess extremities for adequate circulation and sensation     Bed Management:  -Have minimal linens on bed & keep smooth, unwrinkled  -Change linens as needed when moist or perspiring  -Avoid sitting or lying in one position for more than 2 hours while in bed  -Keep HOB at 30degrees     Toileting:  -Offer bedside commode  -Assess for incontinence every shift    Activity:  -Mobilize patient 3 times a day  -Encourage activity and walks on unit  -Encourage or provide ROM exercises   -Turn and reposition patient every 2 Hours  -Use appropriate equipment to lift or move patient in bed  -Instruct/ Assist with weight shifting every 1 when out of bed in chair  -Consider limitation of chair time 2 hour intervals    Skin Care:  -Avoid use of baby powder, tape, friction and shearing, hot water or constrictive clothing  -Relieve pressure over bony prominences using pad  -Do not massage red bony areas    Next Steps:  -Teach patient strategies to minimize risks such as reposition   -Consider consults to  interdisciplinary teams such as PT  Outcome: Progressing  Goal: Incision(s), wounds(s) or drain site(s) healing without S/S of infection  Description: INTERVENTIONS  - Assess and document dressing, incision, wound bed, drain sites and surrounding tissue  - Provide patient and family education  - Perform skin care/dressing changes every shift  Outcome: Progressing  Goal: Pressure injury heals and does not worsen  Description: Interventions:  - Implement low air loss mattress or specialty surface (Criteria met)  - Apply silicone foam dressing  - Instruct/assist with weight shifting every 50 minutes when in chair   - Limit chair time to 2 hour intervals  - Utilize friction reducing device or surface for transfers   - Consider consults to  interdisciplinary teams such as PT  - Use incontinent care products after each incontinent episode such as pad  - Consider nutrition services referral as needed  Outcome: Progressing     Problem: HEMATOLOGIC - ADULT  Goal: Maintains hematologic stability  Description: INTERVENTIONS  - Assess for signs and symptoms of bleeding or hemorrhage  - Monitor labs  - Administer supportive blood products/factors as ordered and appropriate  Outcome: Progressing     Problem: MUSCULOSKELETAL - ADULT  Goal: Maintain or return mobility to safest level of function  Description: INTERVENTIONS:  - Assess patient's ability to carry out ADLs; assess patient's baseline for ADL function and identify physical deficits which impact ability to perform ADLs (bathing, care of mouth/teeth, toileting, grooming, dressing, etc )  - Assess/evaluate cause of self-care deficits   - Assess range of motion  - Assess patient's mobility  - Assess patient's need for assistive devices and provide as appropriate  - Encourage maximum independence but intervene and supervise when necessary  - Involve family in performance of ADLs  - Assess for home care needs following discharge   - Consider OT consult to assist with ADL evaluation and planning for discharge  - Provide patient education as appropriate  Outcome: Progressing  Goal: Maintain proper alignment of affected body part  Description: INTERVENTIONS:  - Support, maintain and protect limb and body alignment  - Provide patient/ family with appropriate education  Outcome: Progressing     Problem: MOBILITY - ADULT  Goal: Maintain or return to baseline ADL function  Description: INTERVENTIONS:  -  Assess patient's ability to carry out ADLs; assess patient's baseline for ADL function and identify physical deficits which impact ability to perform ADLs (bathing, care of mouth/teeth, toileting, grooming, dressing, etc )  - Assess/evaluate cause of self-care deficits   - Assess range of motion  - Assess patient's mobility; develop plan if impaired  - Assess patient's need for assistive devices and provide as appropriate  - Encourage maximum independence but intervene and supervise when necessary  - Involve family in performance of ADLs  - Assess for home care needs following discharge   - Consider OT consult to assist with ADL evaluation and planning for discharge  - Provide patient education as appropriate  Outcome: Progressing  Goal: Maintains/Returns to pre admission functional level  Description: INTERVENTIONS:  - Perform BMAT or MOVE assessment daily    - Set and communicate daily mobility goal to care team and patient/family/caregiver     - Collaborate with rehabilitation services on mobility goals if consulted  - Out of bed to chair 3 times a day   - Out of bed for meals 3 times a day  - Out of bed for toileting  - Record patient progress and toleration of activity level   Outcome: Progressing     Problem: Prexisting or High Potential for Compromised Skin Integrity  Goal: Skin integrity is maintained or improved  Description: INTERVENTIONS:  - Identify patients at risk for skin breakdown  - Assess and monitor skin integrity  - Assess and monitor nutrition and hydration status  - Monitor labs   - Assess for incontinence   - Turn and reposition patient  - Assist with mobility/ambulation  - Relieve pressure over bony prominences  - Avoid friction and shearing  - Provide appropriate hygiene as needed including keeping skin clean and dry  - Evaluate need for skin moisturizer/barrier cream  - Collaborate with interdisciplinary team   - Patient/family teaching  - Consider wound care consult   Outcome: Progressing     Problem: Nutrition/Hydration-ADULT  Goal: Nutrient/Hydration intake appropriate for improving, restoring or maintaining nutritional needs  Description: Monitor and assess patient's nutrition/hydration status for malnutrition  Collaborate with interdisciplinary team and initiate plan and interventions as ordered  Monitor patient's weight and dietary intake as ordered or per policy  Utilize nutrition screening tool and intervene as necessary  Determine patient's food preferences and provide high-protein, high-caloric foods as appropriate       INTERVENTIONS:  - Monitor oral intake, urinary output, labs, and treatment plans  - Assess nutrition and hydration status and recommend course of action  - Evaluate amount of meals eaten  - Assist patient with eating if necessary   - Allow adequate time for meals  - Recommend/ encourage appropriate diets, oral nutritional supplements, and vitamin/mineral supplements  - Order, calculate, and assess calorie counts as needed  - Recommend, monitor, and adjust tube feedings and TPN/PPN based on assessed needs  - Assess need for intravenous fluids  - Provide specific nutrition/hydration education as appropriate  - Include patient/family/caregiver in decisions related to nutrition  Outcome: Progressing

## 2023-01-25 NOTE — PROGRESS NOTES
[facility]  Progress Note - Elijahnysurendra Nurse 65 y o  female MRN: [id number]    Unit/Bed#: -01 Encounter: [id number]    Assessment/Plan:  Rectal bleeding  Acute blood loss anemia  Dementia  Hemorrhoids  Iron deficiency anemia  -Differential diagnosis includes bleeding from known diverticulosis or hemorrhoids given hematochezia, low suspicion for upper GI bleed  -Patient declining treatment although patient likely does not have capacity due to her underlying dementia however cannot force patient to undergo a procedure  -Unfortunately with reinitiation of anticoagulation her hemoglobin has down trended  -Fortunately previous rectal exam with external hemorrhoids and brown stool in vault without signs of bleeding  -2 smears yesterday with soft brown stool     Recommendations:  -Discuss goals of care with patient's family and to consider risks versus benefits of anticoagulation  -Start Anusol suppository twice daily x2 weeks  -Transfuse hemoglobin to keep>8 given her CVA and atherosclerotic disease  -Iron supplementation  -Bowel regimen  -cont B12 supplementation given level <400  -Continue to monitor hemoglobin  -cont PPI once daily  -Alternatively can consider surgical evaluation for empiric hemorrhoidectomy    Patient is adamantly refusing any scopes at this time, and declines me calling her family to discuss as "she has made up her mind"    At this time GI will sign off, please contact us with any questions or concerns  Cole Lizabeth A  [facility]  Gastroenterology    Patient plan of care formulated with Dr Cristel Parra  Subjective:   Patient seen after eating breakfast this am     Denies any n/v/abd pain  Long discussion regarding scopes with pt  She is adamantly refusing due to her grandmother and mother having issues with scopes  Offered to discuss her concerns further, but she says "she won't talk about it"  Offered to talk to her family as well, but patient declines       Noted to have a soft brown smear last night and yesterday am     Objective:     Vitals: Blood pressure 140/64, pulse 68, temperature 97 5 °F (36 4 °C), resp  rate 20, height 5' 5" (1 651 m), weight 61 kg (134 lb 7 7 oz), SpO2 99 %  ,Body mass index is 22 38 kg/m²  Intake/Output Summary (Last 24 hours) at 1/25/2023 0943  Last data filed at 1/25/2023 0450  Gross per 24 hour   Intake 517 ml   Output 186 ml   Net 331 ml       Review of Systems: as per HPI  Review of Systems   Constitutional: Negative for appetite change  Gastrointestinal: Negative for abdominal distention, abdominal pain, anal bleeding, blood in stool, nausea and vomiting  All other systems reviewed and are negative  Physical Exam:     Physical Exam  Vitals and nursing note reviewed  Constitutional:       General: She is not in acute distress  Appearance: She is well-developed  HENT:      Head: Normocephalic and atraumatic  Mouth/Throat:      Mouth: Mucous membranes are moist       Pharynx: Oropharynx is clear  No oropharyngeal exudate  Eyes:      General: No scleral icterus  Conjunctiva/sclera: Conjunctivae normal    Cardiovascular:      Rate and Rhythm: Normal rate  Pulmonary:      Effort: Pulmonary effort is normal  No respiratory distress  Abdominal:      Palpations: Abdomen is soft  Tenderness: There is no abdominal tenderness  Skin:     General: Skin is warm and dry  Capillary Refill: Capillary refill takes less than 2 seconds  Coloration: Skin is not jaundiced  Neurological:      General: No focal deficit present  Mental Status: She is alert  Mental status is at baseline        Comments: Alert, oriented to self and place   Psychiatric:         Mood and Affect: Mood normal            Invasive Devices     Peripheral Intravenous Line  Duration           Peripheral IV 01/23/23 Left Antecubital 2 days                        CBC:   Lab Results   Component Value Date    WBC 6 82 01/25/2023    HGB 7 9 (L) 01/25/2023    HCT 25 6 (L) 01/25/2023    MCV 87 01/25/2023     01/25/2023    MCH 26 7 (L) 01/25/2023    MCHC 30 9 (L) 01/25/2023    RDW 16 3 (H) 01/25/2023    MPV 10 1 01/25/2023    NRBC 0 01/25/2023   ,   CMP:   Lab Results   Component Value Date    K 4 1 01/25/2023     (H) 01/25/2023    CO2 21 01/25/2023    BUN 52 (H) 01/25/2023    CREATININE 1 93 (H) 01/25/2023    CALCIUM 8 3 (L) 01/25/2023    EGFR 24 01/25/2023   ,   Lipase: No results found for: LIPASE,  PT/INR: No results found for: PT, INR,   Troponin: No results found for: TROPONINI,   Magnesium: No components found for: MAG,   Phosphorous:   Lab Results   Component Value Date    PHOS 3 9 01/25/2023       Counseling / Coordination of Care  Total time spent today  15 minutes  Greater than 50% of total time was spent with the patient and / or family counseling and / or coordination of care

## 2023-01-25 NOTE — ASSESSMENT & PLAN NOTE
Lab Results   Component Value Date    EGFR 24 01/25/2023    EGFR 25 01/24/2023    EGFR 25 01/23/2023    CREATININE 1 93 (H) 01/25/2023    CREATININE 1 86 (H) 01/24/2023    CREATININE 1 85 (H) 01/23/2023   · On CKD stage III b- creatinine baseline 1 7  Creatinine slightly up to 1 93 but she is eating and drinking she is urinating we will do a bladder scan    Does not look dry or overly fluid overloaded discussed with nephrology just to continue monitoring acidosis has resolved back down to sodium bicarb 650 mg p o  3 times daily

## 2023-01-25 NOTE — ASSESSMENT & PLAN NOTE
· Aortic arch and great vessels:  Moderate aortic atherosclerosis  Leona Morgan is a 50% stenosis at the brachycephalic artery noted on series 2/23   Right vertebral artery: 60% stenosis at the origin   Left vertebral artery:  80% stenosis at the origin  Right carotid:  Stent noted within the right common carotid and internal carotid arteries   Left carotid:  Large cluster of calcified atherosclerotic plaque at the carotid bulb with about a 70% stenosis     Vascular surgery recommended outpatient follow-up and carotid duplex done 1/21 RIGHT:  There is <50% restenosis noted in the internal carotid artery and stent  Plaque  is calcified and irregular  Vertebral artery flow is antegrade  There is no significant subclavian artery  disease  LEFT:  There is >70% stenosis noted in the internal carotid artery  Plaque is  calcified and irregular  Vertebral artery flow is antegrade  There is no significant subclavian artery  Disease    Per neurology-Restart aspirin (for left side ica extracranial stenosis and rt sided ica stent)

## 2023-01-25 NOTE — PHYSICAL THERAPY NOTE
PHYSICAL THERAPY TREATMENT NOTE  NAME:  John Clements  DATE: 01/25/23    Length Of Stay: 6  Performed at least 2 patient identifiers during session: Name and ID bracelet    TREATMENT:      01/25/23 1023   PT Last Visit   PT Visit Date 01/25/23   Note Type   Note Type Treatment   Pain Assessment   Pain Assessment Tool 0-10   Pain Score No Pain   Restrictions/Precautions   Weight Bearing Precautions Per Order No   Other Precautions Cognitive; Chair Alarm; Bed Alarm; Fall Risk;Telemetry;Multiple lines   General   Chart Reviewed Yes   Cognition   Overall Cognitive Status Impaired   Arousal/Participation Alert; Responsive; Cooperative   Attention Difficulty attending to directions   Orientation Level Oriented to person   Memory Unable to assess   Following Commands Follows one step commands without difficulty   Comments pt pleasant and cooperative   Bed Mobility   Additional Comments pt recieved OOB in recliner and remained OOB upon conclusion   Transfers   Sit to Stand 3  Moderate assistance   Additional items Assist x 2; Increased time required;Verbal cues   Stand to Sit 3  Moderate assistance   Additional items Assist x 1; Increased time required; Impulsive   Additional Comments patient requires verbal and manual cues for proper hand placement  Required Ax2 to achieve standing posture with manual cues for wt shift anterior  Patient able to stand with modAx1 for approx 1 minx3 trials for standing tolerance and hygiene and brief change   Balance   Static Sitting Fair   Dynamic Sitting Fair -   Static Standing Poor +   Dynamic Standing Poor -   Ambulatory Poor -   Endurance Deficit   Endurance Deficit Yes   Activity Tolerance   Activity Tolerance Patient limited by fatigue   Nurse Made Aware Patricia QUEVEDO assisted with standing and hygiene   Exercises   Quad Sets Sitting;20 reps;AROM; Bilateral   Heelslides Sitting;20 reps;AROM; Bilateral   Glute Sets Sitting;20 reps;AROM; Bilateral   Hip Abduction Sitting;20 reps;AROM; Bilateral Hip Adduction Sitting;20 reps;AROM; Bilateral   Knee AROM Long Arc Quad Sitting;20 reps;AROM; Bilateral   Ankle Pumps Sitting;20 reps;AROM; Bilateral   Marching Sitting;20 reps;AROM; Bilateral   Assessment   Prognosis Fair   Problem List Decreased strength;Decreased endurance; Impaired balance;Decreased mobility; Decreased coordination;Decreased cognition; Impaired judgement;Decreased safety awareness;Pain   Assessment Pt seen for PT treatment session this date with interventions consisting of Therapeutic exercise consisting of: AROM 20 reps B LE in sitting position and therapeutic activity consisting of training: sit<>stand transfers, static standing tolerance for 3x1 minutes w/ max UE support and vc and tactile cues for static standing posture faciliation  Pt agreeable to PT treatment session upon arrival, pt found seated OOB in recliner, in no apparent distress and responsive  In comparison to previous session, pt with no improvements as evidenced by pt continues to require Ax2 for functional mobility  Post session: pt returned back to recliner, chair alarm engaged, all needs in reach and RN notified of session findings/recommendations  Continue to recommend return to facility with rehabilitation services at time of d/c in order to maximize pt's functional independence and safety w/ mobility  Pt continues to be functioning below baseline level  PT will continue to see pt during current hospitalization in order to address the deficits listed above and provide interventions consistent w/ POC in effort to achieve STGs  Goals   STG Expiration Date 02/02/23   Plan   Treatment/Interventions Functional transfer training;LE strengthening/ROM; Therapeutic exercise; Endurance training;Cognitive reorientation;Equipment eval/education; Bed mobility;Gait training;Spoke to nursing   Progress No functional improvements   PT Frequency 3-5x/wk   Recommendation   PT Discharge Recommendation Return to facility with rehabilitation services   AM-PAC Basic Mobility Inpatient   Turning in Flat Bed Without Bedrails 2   Lying on Back to Sitting on Edge of Flat Bed Without Bedrails 2   Moving Bed to Chair 1   Standing Up From Chair Using Arms 2   Walk in Room 1   Climb 3-5 Stairs With Railing 1   Basic Mobility Inpatient Raw Score 9   Turning Head Towards Sound 4   Follow Simple Instructions 4   Low Function Basic Mobility Raw Score 17   Low Function Basic Mobility Standardized Score 27 46   Highest Level Of Mobility   -HLM Goal 3: Sit at edge of bed   JH-HLM Achieved 5: Stand (1 or more minutes)   Education   Education Provided Mobility training;Assistive device;Home exercise program   Patient Reinforcement needed   End of Consult   Patient Position at End of Consult Bedside chair;Bed/Chair alarm activated; All needs within reach         The patient's AM-PAC Basic Mobility Inpatient Short Form Raw Score is 9  A Raw score of less than or equal to 16 suggests the patient may benefit from discharge to post-acute rehabilitation services  Please also refer to the recommendation of the Physical Therapist for safe discharge planning        Margarette Kinney PTA,PTA

## 2023-01-25 NOTE — ASSESSMENT & PLAN NOTE
Wt Readings from Last 3 Encounters:   01/25/23 61 kg (134 lb 7 7 oz)   01/13/23 53 1 kg (117 lb)   10/04/22 53 3 kg (117 lb 8 1 oz)     EF 45% September 2022  Appears euvolemic on exam  Not on outpatient diuretics  Continue metoprolol succinate  Monitor volume status

## 2023-01-25 NOTE — PROGRESS NOTES
[facility]  Progress Note - Claudia Cortes 1945, 65 y o  female MRN: [id number]  Unit/Bed#: -Trisha Encounter: [id number]  Primary Care Provider: Lucille Carcamo DO   Date and time admitted to hospital: 1/19/2023  2:42 AM    * Acute on chronic blood loss anemia  Assessment & Plan  Witnessed per rectal bleed on 1/21/2023 night  Hemoglobin dropped  Initially Eliquis was on hold  She had a EGD colonoscopy in September 2022 which shows large hemorrhoids-that time it was suggested to repeat scope in 3 months  As patient is refusing bowel prep, discussed with patient's daughter over the phone regarding treatment options and alternatives  At this time, we will resume Eliquis and aspirin and monitor patient's clinically for next 24 to 48 hours, if remains stable can be discharged  No report of having another bloody bowel movements GI did a rectal exam there is brown stool in the vault and hemorrhoids  There is no overt GI bleeding we will continue her aspirin and anticoagulation  Patient is still refusing to repeat any EGD or colonoscopy although she does not probably have any capacity to make accurate decisions due to dementia - but cant force   Also has associated vitamin B12 deficiency  Will start vitamin B12 1000 mcg p o  daily   Anemia has been also multifactorial anemia secondary to chronic disease vitamin B12 deficiency and iron deficiency  Also for hemorrhoids started Anusol suppositories  Status post 1 unit of PRBC transfusion on 1/25/2023 improved to 7 9 no evidence of gross bleeding  We will give a dose of Venofer 300 mg IV x1    Does have associated iron deficiency      Acute cystitis without hematuria  Assessment & Plan  She is on ceftriaxone  Urine culture reviewed,  Completed 7 days of antibiotics    Aneurysm [facility])  Assessment & Plan  Discussed with neurosurgery, recommends outpatient follow-up and risk factor control2 mm right middle ICA aneurysm    Carotid artery stenosis  Assessment & Plan  · Aortic arch and great vessels:  Moderate aortic atherosclerosis  Adriana Jayashree is a 50% stenosis at the brachycephalic artery noted on series 2/23   Right vertebral artery: 60% stenosis at the origin   Left vertebral artery:  80% stenosis at the origin  Right carotid:  Stent noted within the right common carotid and internal carotid arteries   Left carotid:  Large cluster of calcified atherosclerotic plaque at the carotid bulb with about a 70% stenosis     Vascular surgery recommended outpatient follow-up and carotid duplex done 1/21 RIGHT:  There is <50% restenosis noted in the internal carotid artery and stent  Plaque  is calcified and irregular  Vertebral artery flow is antegrade  There is no significant subclavian artery  disease  LEFT:  There is >70% stenosis noted in the internal carotid artery  Plaque is  calcified and irregular  Vertebral artery flow is antegrade  There is no significant subclavian artery  Disease  Per neurology-Restart aspirin (for left side ica extracranial stenosis and rt sided ica stent)      Right internal carotid artery aneurysm  Assessment & Plan  2 mm right middle ICA aneurysm   Neurosurgery recommendation appreciated, recommends outpatient follow-up    Elevated d-dimer  Assessment & Plan  · D-dimer 6 69 during ED evaluation  · Chronically anticoagulated with Eliquis, resides in nursing facility with medication compliance  · No tachycardia or hypoxia to suggest PE  · Continue to monitor    Metabolic encephalopathy  Assessment & Plan  Toxic/metabolic encephalopathy in setting of riri on ckd/e  Coli uti superimposed on underlying dementia likely vascular component  Unable to perform MRI brain scan given non compatible ppm device  Repeat head ct next day unremarkable  Ct head is not showing any obvious chronic strokes   Recrudescence is still a possibility in setting of the riri and uti  Continue aspirin for the stenosis and Eliquis for the atrial fibrillation  Patient is currently alert and oriented to self and place          Benign hypertensive renal disease  Assessment & Plan  Blood pressure is controlled continue Norvasc and metoprolol     Paroxysmal atrial fibrillation (HCC)  Assessment & Plan  · Continue Eliquis 5 mg twice daily-Continue metoprolol succinate  · PPM in place-noncompatible for MRI  · Paced rhythm on EKG, rate controlled    Chronic combined systolic and diastolic CHF (congestive heart failure) (HCC)  Assessment & Plan  Wt Readings from Last 3 Encounters:   01/25/23 61 kg (134 lb 7 7 oz)   01/13/23 53 1 kg (117 lb)   10/04/22 53 3 kg (117 lb 8 1 oz)     EF 45% September 2022  Appears euvolemic on exam  Not on outpatient diuretics  Continue metoprolol succinate  Monitor volume status      DARRELL (acute kidney injury) [facility])  Assessment & Plan  Lab Results   Component Value Date    EGFR 24 01/25/2023    EGFR 25 01/24/2023    EGFR 25 01/23/2023    CREATININE 1 93 (H) 01/25/2023    CREATININE 1 86 (H) 01/24/2023    CREATININE 1 85 (H) 01/23/2023   · On CKD stage III b- creatinine baseline 1 7  Creatinine slightly up to 1 93 but she is eating and drinking she is urinating we will do a bladder scan  Does not look dry or overly fluid overloaded discussed with nephrology just to continue monitoring acidosis has resolved back down to sodium bicarb 650 mg p o  3 times daily    Acquired hypothyroidism  Assessment & Plan  · TSH elevated, free T4 low  · Was taking levothyroxine 125 mcg daily-dose increased to 150 mcg, patient will need to recheck TSH in 4 to 6 weeks          VTE Pharmacologic Prophylaxis: VTE Score: 8 High Risk (Score >/= 5) - Pharmacological DVT Prophylaxis Ordered: apixaban (Eliquis)  Sequential Compression Devices Ordered  Patient Centered Rounds: I performed bedside rounds with nursing staff today    Discussions with Specialists or Other Care Team Provider: Discussed with nephrology    Education and Discussions with Family / Patient: Attempted to update  (daughter) via phone  Left voicemail  Time Spent for Care: 30 minutes  More than 50% of total time spent on counseling and coordination of care as described above  Current Length of Stay: 6 day(s)  Current Patient Status: Inpatient   Certification Statement: The patient will continue to require additional inpatient hospital stay due to anemia  Discharge Plan: Anticipate discharge tomorrow to rehab facility  Code Status: Level 3 - DNAR and DNI    Subjective:   Seen and examined no complaints     Objective:     Vitals:   Temp (24hrs), Av 4 °F (36 3 °C), Min:97 3 °F (36 3 °C), Max:97 5 °F (36 4 °C)    Temp:  [97 3 °F (36 3 °C)-97 5 °F (36 4 °C)] 97 5 °F (36 4 °C)  HR:  [68-73] 68  Resp:  [16-20] 20  BP: (140-144)/(62-64) 140/64  SpO2:  [95 %-99 %] 99 %  Body mass index is 22 38 kg/m²  Input and Output Summary (last 24 hours): Intake/Output Summary (Last 24 hours) at 2023 1123  Last data filed at 2023 0900  Gross per 24 hour   Intake 637 ml   Output 586 ml   Net 51 ml       Physical Exam:   Physical Exam  Vitals and nursing note reviewed  Constitutional:       General: She is not in acute distress  Appearance: She is well-developed  HENT:      Head: Normocephalic and atraumatic  Eyes:      Conjunctiva/sclera: Conjunctivae normal    Cardiovascular:      Rate and Rhythm: Normal rate and regular rhythm  Heart sounds: No murmur heard  Pulmonary:      Effort: Pulmonary effort is normal  No respiratory distress  Breath sounds: Normal breath sounds  No wheezing or rales  Abdominal:      General: There is no distension  Palpations: Abdomen is soft  Tenderness: There is no abdominal tenderness  Musculoskeletal:         General: No swelling  Cervical back: Neck supple  Skin:     General: Skin is warm and dry  Capillary Refill: Capillary refill takes less than 2 seconds     Neurological:      Mental Status: She is alert  Comments: aaashley name and place    Psychiatric:         Mood and Affect: Mood normal           Additional Data:     Labs:  Results from last 7 days   Lab Units 01/25/23  0435   WBC Thousand/uL 6 82   HEMOGLOBIN g/dL 7 9*   HEMATOCRIT % 25 6*   PLATELETS Thousands/uL 279   NEUTROS PCT % 73   LYMPHS PCT % 15   MONOS PCT % 8   EOS PCT % 3     Results from last 7 days   Lab Units 01/25/23  0435 01/24/23  0606   SODIUM mmol/L 140 138   POTASSIUM mmol/L 4 1 4 1   CHLORIDE mmol/L 110* 108   CO2 mmol/L 21 18*   BUN mg/dL 52* 50*   CREATININE mg/dL 1 93* 1 86*   ANION GAP mmol/L 9 12   CALCIUM mg/dL 8 3* 8 5   ALBUMIN g/dL  --  3 0*   TOTAL BILIRUBIN mg/dL  --  0 32   ALK PHOS U/L  --  102   ALT U/L  --  4*   AST U/L  --  14   GLUCOSE RANDOM mg/dL 83 78     Results from last 7 days   Lab Units 01/19/23  0256   INR  2 34*     Results from last 7 days   Lab Units 01/19/23  0241   POC GLUCOSE mg/dl 84     Results from last 7 days   Lab Units 01/19/23  0757   HEMOGLOBIN A1C % 5 1     Results from last 7 days   Lab Units 01/19/23  0311 01/19/23  0256   LACTIC ACID mmol/L 0 7  --    PROCALCITONIN ng/ml  --  0 21       Lines/Drains:  Invasive Devices     Peripheral Intravenous Line  Duration           Peripheral IV 01/23/23 Left Antecubital 2 days                      Imaging: Reviewed radiology reports from this admission including: CT head    Recent Cultures (last 7 days):   Results from last 7 days   Lab Units 01/19/23  0320 01/19/23  0311   BLOOD CULTURE   --  No Growth After 5 Days  No Growth After 5 Days     URINE CULTURE  >100,000 cfu/ml Escherichia coli*  >100,000 cfu/ml Lactobacillus species*  <10,000 cfu/ml Enterococcus species*  <10,000 cfu/ml Staphylococcus species*  --        Last 24 Hours Medication List:   Current Facility-Administered Medications   Medication Dose Route Frequency Provider Last Rate   • acetaminophen  650 mg Oral Q6H PRN Juju Mello, CRROQUE     • allopurinol  100 mg Oral Daily Juju S Funmilayo, CRNP     • amLODIPine  10 mg Oral Daily Tyron Tinoco, CRNP     • apixaban  5 mg Oral BID Kwan Ring MD     • aspirin  81 mg Oral Daily Crystal Avalos MD     • cholecalciferol  1,000 Units Oral Daily Juju S Funmilayo, CRNP     • cyanocobalamin  1,000 mcg Oral Daily Ata Perez MD     • docusate sodium  100 mg Oral BID Juju S Funmilayo, CRNP     • hydrocortisone  25 mg Rectal BID Cole Rodríguez MD     • iron sucrose  300 mg Intravenous Once Ata Perez MD     • levothyroxine  150 mcg Oral Early Morning Kwan Ring MD     • metoprolol succinate  100 mg Oral Daily Kwan Ring MD     • pantoprazole  40 mg Oral Early Morning Ata Perez MD     • polyethylene glycol  17 g Oral Daily PRN Juju S Funmilayo, CRNP     • sertraline  100 mg Oral Daily Juju S Funmilayo, CRNP     • sodium bicarbonate  650 mg Oral TID after meals Ata Perez MD          Today, Patient Was Seen By: Ata Perez MD    **Please Note: This note may have been constructed using a voice recognition system  **

## 2023-01-26 VITALS
WEIGHT: 136.47 LBS | DIASTOLIC BLOOD PRESSURE: 67 MMHG | TEMPERATURE: 97.3 F | SYSTOLIC BLOOD PRESSURE: 154 MMHG | RESPIRATION RATE: 18 BRPM | OXYGEN SATURATION: 97 % | HEART RATE: 69 BPM | HEIGHT: 65 IN | BODY MASS INDEX: 22.74 KG/M2

## 2023-01-26 LAB
ANION GAP SERPL CALCULATED.3IONS-SCNC: 10 MMOL/L (ref 4–13)
BASOPHILS # BLD AUTO: 0.04 THOUSANDS/ÂΜL (ref 0–0.1)
BASOPHILS NFR BLD AUTO: 1 % (ref 0–1)
BUN SERPL-MCNC: 50 MG/DL (ref 5–25)
CALCIUM SERPL-MCNC: 8.3 MG/DL (ref 8.4–10.2)
CHLORIDE SERPL-SCNC: 111 MMOL/L (ref 96–108)
CO2 SERPL-SCNC: 20 MMOL/L (ref 21–32)
CREAT SERPL-MCNC: 1.81 MG/DL (ref 0.6–1.3)
EOSINOPHIL # BLD AUTO: 0.15 THOUSAND/ÂΜL (ref 0–0.61)
EOSINOPHIL NFR BLD AUTO: 2 % (ref 0–6)
ERYTHROCYTE [DISTWIDTH] IN BLOOD BY AUTOMATED COUNT: 16.5 % (ref 11.6–15.1)
GFR SERPL CREATININE-BSD FRML MDRD: 26 ML/MIN/1.73SQ M
GLUCOSE SERPL-MCNC: 72 MG/DL (ref 65–140)
HCT VFR BLD AUTO: 25.6 % (ref 34.8–46.1)
HGB BLD-MCNC: 8 G/DL (ref 11.5–15.4)
IMM GRANULOCYTES # BLD AUTO: 0.02 THOUSAND/UL (ref 0–0.2)
IMM GRANULOCYTES NFR BLD AUTO: 0 % (ref 0–2)
LYMPHOCYTES # BLD AUTO: 1.03 THOUSANDS/ÂΜL (ref 0.6–4.47)
LYMPHOCYTES NFR BLD AUTO: 15 % (ref 14–44)
MCH RBC QN AUTO: 27.7 PG (ref 26.8–34.3)
MCHC RBC AUTO-ENTMCNC: 31.3 G/DL (ref 31.4–37.4)
MCV RBC AUTO: 89 FL (ref 82–98)
MONOCYTES # BLD AUTO: 0.5 THOUSAND/ÂΜL (ref 0.17–1.22)
MONOCYTES NFR BLD AUTO: 7 % (ref 4–12)
NEUTROPHILS # BLD AUTO: 5.28 THOUSANDS/ÂΜL (ref 1.85–7.62)
NEUTS SEG NFR BLD AUTO: 75 % (ref 43–75)
NRBC BLD AUTO-RTO: 0 /100 WBCS
PLATELET # BLD AUTO: 281 THOUSANDS/UL (ref 149–390)
PMV BLD AUTO: 10.5 FL (ref 8.9–12.7)
POTASSIUM SERPL-SCNC: 4 MMOL/L (ref 3.5–5.3)
RBC # BLD AUTO: 2.89 MILLION/UL (ref 3.81–5.12)
SARS-COV-2 RNA RESP QL NAA+PROBE: NEGATIVE
SODIUM SERPL-SCNC: 141 MMOL/L (ref 135–147)
WBC # BLD AUTO: 7.02 THOUSAND/UL (ref 4.31–10.16)

## 2023-01-26 PROCEDURE — 0T9B70Z DRAINAGE OF BLADDER WITH DRAINAGE DEVICE, VIA NATURAL OR ARTIFICIAL OPENING: ICD-10-PCS | Performed by: FAMILY MEDICINE

## 2023-01-26 RX ORDER — HYDROCORTISONE ACETATE 25 MG/1
25 SUPPOSITORY RECTAL 2 TIMES DAILY
Status: DISCONTINUED | OUTPATIENT
Start: 2023-01-26 | End: 2023-01-26 | Stop reason: HOSPADM

## 2023-01-26 RX ORDER — ASPIRIN 81 MG/1
81 TABLET ORAL DAILY
Refills: 0 | Status: ON HOLD
Start: 2023-01-27

## 2023-01-26 RX ORDER — SODIUM BICARBONATE 650 MG/1
650 TABLET ORAL
Refills: 0 | Status: ON HOLD
Start: 2023-01-26

## 2023-01-26 RX ORDER — LEVOTHYROXINE SODIUM 0.15 MG/1
150 TABLET ORAL
Refills: 0
Start: 2023-01-27 | End: 2023-02-03

## 2023-01-26 RX ORDER — PANTOPRAZOLE SODIUM 40 MG/1
40 TABLET, DELAYED RELEASE ORAL
Refills: 0 | Status: ON HOLD
Start: 2023-01-27

## 2023-01-26 RX ORDER — TAMSULOSIN HYDROCHLORIDE 0.4 MG/1
0.4 CAPSULE ORAL
Refills: 0 | Status: ON HOLD
Start: 2023-01-26

## 2023-01-26 RX ORDER — HYDROCORTISONE ACETATE 25 MG/1
25 SUPPOSITORY RECTAL 2 TIMES DAILY
Qty: 12 SUPPOSITORY | Refills: 0
Start: 2023-01-26 | End: 2023-02-03

## 2023-01-26 RX ADMIN — Medication 1000 UNITS: at 10:18

## 2023-01-26 RX ADMIN — ASPIRIN 81 MG: 81 TABLET, COATED ORAL at 10:18

## 2023-01-26 RX ADMIN — SODIUM BICARBONATE 650 MG TABLET 650 MG: at 10:01

## 2023-01-26 RX ADMIN — SODIUM BICARBONATE 650 MG TABLET 650 MG: at 11:56

## 2023-01-26 RX ADMIN — SERTRALINE 100 MG: 100 TABLET, FILM COATED ORAL at 10:18

## 2023-01-26 RX ADMIN — LEVOTHYROXINE SODIUM 150 MCG: 150 TABLET ORAL at 05:03

## 2023-01-26 RX ADMIN — CYANOCOBALAMIN TAB 500 MCG 1000 MCG: 500 TAB at 10:01

## 2023-01-26 RX ADMIN — AMLODIPINE BESYLATE 10 MG: 10 TABLET ORAL at 10:01

## 2023-01-26 RX ADMIN — APIXABAN 5 MG: 5 TABLET, FILM COATED ORAL at 10:18

## 2023-01-26 RX ADMIN — METOPROLOL SUCCINATE 100 MG: 100 TABLET, EXTENDED RELEASE ORAL at 10:18

## 2023-01-26 RX ADMIN — PANTOPRAZOLE SODIUM 40 MG: 40 TABLET, DELAYED RELEASE ORAL at 05:03

## 2023-01-26 RX ADMIN — ALLOPURINOL 100 MG: 100 TABLET ORAL at 10:01

## 2023-01-26 NOTE — CASE MANAGEMENT
Case Management Discharge Planning Note    Patient name Gwen Richardson  Location [address] [id number]/-[id number] MRN [id number]  : 1945 Date 2023       Current Admission Date: 2023  Current Admission Diagnosis:Acute on chronic blood loss anemia   Patient Active Problem List    Diagnosis Date Noted   • Acute on chronic blood loss anemia 2023   • Acute cystitis without hematuria 2023   • Right internal carotid artery aneurysm 2023   • Carotid artery stenosis 2023   • Aneurysm ([place] [address] )    • Elevated d-dimer 2023   • Proteinuria 2023   • Goals of care, counseling/discussion 2023   • Secondary renal hyperparathyroidism ([place] [address] ) 10/26/2022   • Hyperuricemia 10/26/2022   • Hypophosphatemia 10/04/2022   • Pulmonary hypertension ([place] [address] ) 10/04/2022   • Low TSH level 2022   • COVID-19 virus infection 2022   • Dementia ([place] [address] )    • Metabolic encephalopathy    • Gastrointestinal bleeding 2022   • Hydroureteronephrosis 2022   • Diverticulitis 2022   • Rectal bleeding 2022   • Elevated troponin 2022   • Iron deficiency anemia 2022   • Paroxysmal atrial fibrillation ([place] [address] ) 2020   • Ischemic cardiomyopathy 2020   • S/P implantation of automatic cardioverter/defibrillator (AICD) 2020   • Essential hypertension 2020   • History of PTCA 2020   • Leg swelling 2020   • DARRELL (acute kidney injury) ([place] [address] ) 2020   • Perforated appendicitis 2020   • Chronic combined systolic and diastolic CHF (congestive heart failure) ([place] [address] ) 2020   • Pyuria 2020   • Microscopic hematuria 2020   • Vitamin D insufficiency 2020   • Mitral valve insufficiency 2020   • Hypercholesterolemia 2020   • Aortic atherosclerosis ([place] [address] ) 2020   • Renal calculus 2020   • Diverticulosis 2020   • Hiatal hernia 2020   • Pulmonary nodule 2020   • Benign hypertensive renal disease 09/12/2019   • Acute blood loss anemia 02/08/2019   • Gastroesophageal reflux disease without esophagitis 02/08/2019   • Closed fracture of body of sternum with routine healing 04/12/2018   • Congestive heart disease ([address] ) 04/12/2018   • Acquired hypothyroidism 04/12/2018   • CAD (coronary artery disease) 04/12/2018   • Forgetfulness 04/12/2018   • Acute pain due to trauma 04/12/2018   • Hx of fall 04/12/2018   • Stress incontinence in female 04/12/2018   • Anemia in chronic kidney disease 03/20/2018      LOS (days): 7  Geometric Mean LOS (GMLOS) (days): 4 30  Days to GMLOS:-3     OBJECTIVE:  Risk of Unplanned Readmission Score: 31 59         Current admission status: Inpatient   Preferred Pharmacy:   [address] [place], [place] [address]mary [address]  [address]  [place] [address]  Phone: [phone] Fax: [phone]    Primary Care Provider: Lucille Carcamo DO    Primary Insurance: [facility]  Secondary Insurance:     DISCHARGE DETAILS:    Discharge planning discussed with[de-identified] patient  Freedom of Choice: Yes        Were Treatment Team discharge recommendations reviewed with patient/caregiver?: Yes  Did patient/caregiver verbalize understanding of patient care needs?: Yes  Were patient/caregiver advised of the risks associated with not following Treatment Team discharge recommendations?: Yes         [address] [place] [address]         Is the patient interested in [address] at discharge?: No    DME Referral Provided  Referral made for DME?: No    Other Referral/Resources/Interventions Provided:  Interventions: Facility Return    Would you like to participate in our [address] service program?  : No - Declined    Treatment Team Recommendation: Facility Return  Discharge Destination Plan[de-identified] Facility Return  Transport at Discharge : BLS Ambulance            CM met with patient to let her know discharge later today to [facility]   Patient was confused indicating she is going home today with her family  FARHAD left voice message for daughter, Nathaniel Culver, indicating discharge today to [facility]  FARHAD submitted Roundtrip referral for BLS transport for patient to [facility] where she is MA bed hold

## 2023-01-26 NOTE — PLAN OF CARE
Problem: Potential for Falls  Goal: Patient will remain free of falls  Description: INTERVENTIONS:  - Educate patient/family on patient safety including physical limitations  - Instruct patient to call for assistance with activity   - Consult OT/PT to assist with strengthening/mobility   - Keep Call bell within reach  - Keep bed low and locked with side rails adjusted as appropriate  - Keep care items and personal belongings within reach  - Initiate and maintain comfort rounds  - Make Fall Risk Sign visible to staff  - Offer Toileting every 2 Hours, in advance of need  - Initiate/Maintain bed alarm  - Obtain necessary fall risk management equipment: yellow socks  - Apply yellow socks and bracelet for high fall risk patients  - Consider moving patient to room near nurses station  Outcome: Progressing     Problem: PAIN - ADULT  Goal: Verbalizes/displays adequate comfort level or baseline comfort level  Description: Interventions:  - Encourage patient to monitor pain and request assistance  - Assess pain using appropriate pain scale  - Administer analgesics based on type and severity of pain and evaluate response  - Implement non-pharmacological measures as appropriate and evaluate response  - Consider cultural and social influences on pain and pain management  - Notify physician/advanced practitioner if interventions unsuccessful or patient reports new pain  Outcome: Progressing     Problem: INFECTION - ADULT  Goal: Absence or prevention of progression during hospitalization  Description: INTERVENTIONS:  - Assess and monitor for signs and symptoms of infection  - Monitor lab/diagnostic results  - Monitor all insertion sites, i e  indwelling lines, tubes, and drains  - Monitor endotracheal if appropriate and nasal secretions for changes in amount and color  - [place] appropriate cooling/warming therapies per order  - Administer medications as ordered  - Instruct and encourage patient and family to use good hand hygiene technique  - Identify and instruct in appropriate isolation precautions for identified infection/condition  Outcome: Progressing     Problem: SAFETY ADULT  Goal: Patient will remain free of falls  Description: INTERVENTIONS:  - Educate patient/family on patient safety including physical limitations  - Instruct patient to call for assistance with activity   - Consult OT/PT to assist with strengthening/mobility   - Keep Call bell within reach  - Keep bed low and locked with side rails adjusted as appropriate  - Keep care items and personal belongings within reach  - Initiate and maintain comfort rounds  - Make Fall Risk Sign visible to staff  - Offer Toileting every 2 Hours, in advance of need  - Initiate/Maintain bed alarm  - Obtain necessary fall risk management equipment: yellow socks   - Apply yellow socks and bracelet for high fall risk patients  - Consider moving patient to room near nurses station  Outcome: Progressing  Goal: Maintain or return to baseline ADL function  Description: INTERVENTIONS:  -  Assess patient's ability to carry out ADLs; assess patient's baseline for ADL function and identify physical deficits which impact ability to perform ADLs (bathing, care of mouth/teeth, toileting, grooming, dressing, etc )  - Assess/evaluate cause of self-care deficits   - Assess range of motion  - Assess patient's mobility; develop plan if impaired  - Assess patient's need for assistive devices and provide as appropriate  - Encourage maximum independence but intervene and supervise when necessary  - Involve family in performance of ADLs  - Assess for home care needs following discharge   - Consider OT consult to assist with ADL evaluation and planning for discharge  - Provide patient education as appropriate  Outcome: Progressing  Goal: Maintains/Returns to pre admission functional level  Description: INTERVENTIONS:  - Perform BMAT or MOVE assessment daily    - Set and communicate daily mobility goal to care team and patient/family/caregiver  - Collaborate with rehabilitation services on mobility goals if consulted  - Perform Range of Motion 3 times a day  - Reposition patient every 3 hours  - Dangle patient 3 times a day  - Stand patient 3 times a day  - Ambulate patient 3 times a day  - Out of bed to chair 3 times a day   - Out of bed for meals 3 times a day  - Out of bed for toileting  - Record patient progress and toleration of activity level   Outcome: Progressing     Problem: DISCHARGE PLANNING  Goal: Discharge to home or other facility with appropriate resources  Description: INTERVENTIONS:  - Identify barriers to discharge w/patient and caregiver  - Arrange for needed discharge resources and transportation as appropriate  - Identify discharge learning needs (meds, wound care, etc )  - Arrange for interpretive services to assist at discharge as needed  - Refer to Case Management Department for coordinating discharge planning if the patient needs post-hospital services based on physician/advanced practitioner order or complex needs related to functional status, cognitive ability, or social support system  Outcome: Progressing     Problem: Knowledge Deficit  Goal: Patient/family/caregiver demonstrates understanding of disease process, treatment plan, medications, and discharge instructions  Description: Complete learning assessment and assess knowledge base    Interventions:  - Provide teaching at level of understanding  - Provide teaching via preferred learning methods  Outcome: Progressing     Problem: NEUROSENSORY - ADULT  Goal: Achieves stable or improved neurological status  Description: INTERVENTIONS  - Monitor and report changes in neurological status  - Monitor vital signs such as temperature, blood pressure, glucose, and any other labs ordered   - Initiate measures to prevent increased intracranial pressure  - Monitor for seizure activity and implement precautions if appropriate      Outcome: Progressing  Goal: Remains free of injury related to seizures activity  Description: INTERVENTIONS  - Maintain airway, patient safety  and administer oxygen as ordered  - Monitor patient for seizure activity, document and report duration and description of seizure to physician/advanced practitioner  - If seizure occurs,  ensure patient safety during seizure  - Reorient patient post seizure  - Seizure pads on all 4 side rails  - Instruct patient/family to notify RN of any seizure activity including if an aura is experienced  - Instruct patient/family to call for assistance with activity based on nursing assessment  - Administer anti-seizure medications if ordered    Outcome: Progressing  Goal: Achieves maximal functionality and self care  Description: INTERVENTIONS  - Monitor swallowing and airway patency with patient fatigue and changes in neurological status  - Encourage and assist patient to increase activity and self care     - Encourage visually impaired, hearing impaired and aphasic patients to use assistive/communication devices  Outcome: Progressing     Problem: CARDIOVASCULAR - ADULT  Goal: Maintains optimal cardiac output and hemodynamic stability  Description: INTERVENTIONS:  - Monitor I/O, vital signs and rhythm  - Monitor for S/S and trends of decreased cardiac output  - Administer and titrate ordered vasoactive medications to optimize hemodynamic stability  - Assess quality of pulses, skin color and temperature  - Assess for signs of decreased coronary artery perfusion  - Instruct patient to report change in severity of symptoms  Outcome: Progressing  Goal: Absence of cardiac dysrhythmias or at baseline rhythm  Description: INTERVENTIONS:  - Continuous cardiac monitoring, vital signs, obtain 12 lead EKG if ordered  - Administer antiarrhythmic and heart rate control medications as ordered  - Monitor electrolytes and administer replacement therapy as ordered  Outcome: Progressing     Problem: RESPIRATORY - ADULT  Goal: Achieves optimal ventilation and oxygenation  Description: INTERVENTIONS:  - Assess for changes in respiratory status  - Assess for changes in mentation and behavior  - Position to facilitate oxygenation and minimize respiratory effort  - Oxygen administered by appropriate delivery if ordered  - Initiate smoking cessation education as indicated  - Encourage broncho-pulmonary hygiene including cough, deep breathe, Incentive Spirometry  - Assess the need for suctioning and aspirate as needed  - Assess and instruct to report SOB or any respiratory difficulty  - Respiratory Therapy support as indicated  Outcome: Progressing     Problem: GASTROINTESTINAL - ADULT  Goal: Minimal or absence of nausea and/or vomiting  Description: INTERVENTIONS:  - Administer IV fluids if ordered to ensure adequate hydration  - Maintain NPO status until nausea and vomiting are resolved  - Nasogastric tube if ordered  - Administer ordered antiemetic medications as needed  - Provide nonpharmacologic comfort measures as appropriate  - Advance diet as tolerated, if ordered  - Consider nutrition services referral to assist patient with adequate nutrition and appropriate food choices  Outcome: Progressing  Goal: Maintains or returns to baseline bowel function  Description: INTERVENTIONS:  - Assess bowel function  - Encourage oral fluids to ensure adequate hydration  - Administer IV fluids if ordered to ensure adequate hydration  - Administer ordered medications as needed  - Encourage mobilization and activity  - Consider nutritional services referral to assist patient with adequate nutrition and appropriate food choices  Outcome: Progressing  Goal: Maintains adequate nutritional intake  Description: INTERVENTIONS:  - Monitor percentage of each meal consumed  - Identify factors contributing to decreased intake, treat as appropriate  - Assist with meals as needed  - Monitor I&O, weight, and lab values if indicated  - Obtain nutrition services referral as needed  Outcome: Progressing  Goal: Establish and maintain optimal ostomy function  Description: INTERVENTIONS:  - Assess bowel function  - Encourage oral fluids to ensure adequate hydration  - Administer IV fluids if ordered to ensure adequate hydration   - Administer ordered medications as needed  - Encourage mobilization and activity  - Nutrition services referral to assist patient with appropriate food choices  - Assess stoma site  - Consider wound care consult   Outcome: Progressing  Goal: Oral mucous membranes remain intact  Description: INTERVENTIONS  - Assess oral mucosa and hygiene practices  - Implement preventative oral hygiene regimen  - Implement oral medicated treatments as ordered  - Initiate Nutrition services referral as needed  Outcome: Progressing     Problem: GENITOURINARY - ADULT  Goal: Maintains or returns to baseline urinary function  Description: INTERVENTIONS:  - Assess urinary function  - Encourage oral fluids to ensure adequate hydration if ordered  - Administer IV fluids as ordered to ensure adequate hydration  - Administer ordered medications as needed  - Offer frequent toileting  - Follow urinary retention protocol if ordered  Outcome: Progressing  Goal: Absence of urinary retention  Description: INTERVENTIONS:  - Assess patient’s ability to void and empty bladder  - Monitor I/O  - Bladder scan as needed  - Discuss with physician/AP medications to alleviate retention as needed  - Discuss catheterization for long term situations as appropriate  Outcome: Progressing  Goal: Urinary catheter remains patent  Description: INTERVENTIONS:  - Assess patency of urinary catheter  - If patient has a chronic brambila, consider changing catheter if non-functioning  - Follow guidelines for intermittent irrigation of non-functioning urinary catheter  Outcome: Progressing     Problem: METABOLIC, FLUID AND ELECTROLYTES - ADULT  Goal: Electrolytes maintained within normal limits  Description: INTERVENTIONS:  - Monitor labs and assess patient for signs and symptoms of electrolyte imbalances  - Administer electrolyte replacement as ordered  - Monitor response to electrolyte replacements, including repeat lab results as appropriate  - Instruct patient on fluid and nutrition as appropriate  Outcome: Progressing  Goal: Fluid balance maintained  Description: INTERVENTIONS:  - Monitor labs   - Monitor I/O and WT  - Instruct patient on fluid and nutrition as appropriate  - Assess for signs & symptoms of volume excess or deficit  Outcome: Progressing  Goal: Glucose maintained within target range  Description: INTERVENTIONS:  - Monitor Blood Glucose as ordered  - Assess for signs and symptoms of hyperglycemia and hypoglycemia  - Administer ordered medications to maintain glucose within target range  - Assess nutritional intake and initiate nutrition service referral as needed  Outcome: Progressing     Problem: SKIN/TISSUE INTEGRITY - ADULT  Goal: Incision(s), wounds(s) or drain site(s) healing without S/S of infection  Description: INTERVENTIONS  - Assess and document dressing, incision, wound bed, drain sites and surrounding tissue  - Provide patient and family education  - Perform skin care/dressing changes every shift  Outcome: Progressing  Goal: Pressure injury heals and does not worsen  Description: Interventions:  - Implement low air loss mattress or specialty surface (Criteria met)  - Apply silicone foam dressing  - Apply fecal or urinary incontinence containment device   - Turn and reposition patient & offload bony prominences every 2 hours   - Utilize friction reducing device or surface for transfers   - Consider nutrition services referral as needed  Outcome: Progressing     Problem: HEMATOLOGIC - ADULT  Goal: Maintains hematologic stability  Description: INTERVENTIONS  - Assess for signs and symptoms of bleeding or hemorrhage  - Monitor labs  - Administer supportive blood products/factors as ordered and appropriate  Outcome: Progressing     Problem: MUSCULOSKELETAL - ADULT  Goal: Maintain or return mobility to safest level of function  Description: INTERVENTIONS:  - Assess patient's ability to carry out ADLs; assess patient's baseline for ADL function and identify physical deficits which impact ability to perform ADLs (bathing, care of mouth/teeth, toileting, grooming, dressing, etc )  - Assess/evaluate cause of self-care deficits   - Assess range of motion  - Assess patient's mobility  - Assess patient's need for assistive devices and provide as appropriate  - Encourage maximum independence but intervene and supervise when necessary  - Involve family in performance of ADLs  - Assess for home care needs following discharge   - Consider OT consult to assist with ADL evaluation and planning for discharge  - Provide patient education as appropriate  Outcome: Progressing  Goal: Maintain proper alignment of affected body part  Description: INTERVENTIONS:  - Support, maintain and protect limb and body alignment  - Provide patient/ family with appropriate education  Outcome: Progressing     Problem: MOBILITY - ADULT  Goal: Maintain or return to baseline ADL function  Description: INTERVENTIONS:  -  Assess patient's ability to carry out ADLs; assess patient's baseline for ADL function and identify physical deficits which impact ability to perform ADLs (bathing, care of mouth/teeth, toileting, grooming, dressing, etc )  - Assess/evaluate cause of self-care deficits   - Assess range of motion  - Assess patient's mobility; develop plan if impaired  - Assess patient's need for assistive devices and provide as appropriate  - Encourage maximum independence but intervene and supervise when necessary  - Involve family in performance of ADLs  - Assess for home care needs following discharge   - Consider OT consult to assist with ADL evaluation and planning for discharge  - Provide patient education as appropriate  Outcome: Progressing  Goal: Maintains/Returns to pre admission functional level  Description: INTERVENTIONS:  - Perform BMAT or MOVE assessment daily    - Set and communicate daily mobility goal to care team and patient/family/caregiver  - Collaborate with rehabilitation services on mobility goals if consulted  - Perform Range of Motion 3 times a day  - Reposition patient every 3 hours  - Dangle patient 3 times a day  - Stand patient 3 times a day  - Ambulate patient 3 times a day  - Out of bed to chair 3 times a day   - Out of bed for meals 3 times a day  - Out of bed for toileting  - Record patient progress and toleration of activity level   Outcome: Progressing     Problem: Prexisting or High Potential for Compromised Skin Integrity  Goal: Skin integrity is maintained or improved  Description: INTERVENTIONS:  - Identify patients at risk for skin breakdown  - Assess and monitor skin integrity  - Assess and monitor nutrition and hydration status  - Monitor labs   - Assess for incontinence   - Turn and reposition patient  - Assist with mobility/ambulation  - Relieve pressure over bony prominences  - Avoid friction and shearing  - Provide appropriate hygiene as needed including keeping skin clean and dry  - Evaluate need for skin moisturizer/barrier cream  - Collaborate with interdisciplinary team   - Patient/family teaching  - Consider wound care consult   Outcome: Progressing     Problem: Nutrition/Hydration-ADULT  Goal: Nutrient/Hydration intake appropriate for improving, restoring or maintaining nutritional needs  Description: Monitor and assess patient's nutrition/hydration status for malnutrition  Collaborate with interdisciplinary team and initiate plan and interventions as ordered  Monitor patient's weight and dietary intake as ordered or per policy  Utilize nutrition screening tool and intervene as necessary   Determine patient's food preferences and provide high-protein, high-caloric foods as appropriate       INTERVENTIONS:  - Monitor oral intake, urinary output, labs, and treatment plans  - Assess nutrition and hydration status and recommend course of action  - Evaluate amount of meals eaten  - Assist patient with eating if necessary   - Allow adequate time for meals  - Recommend/ encourage appropriate diets, oral nutritional supplements, and vitamin/mineral supplements  - Order, calculate, and assess calorie counts as needed  - Recommend, monitor, and adjust tube feedings and TPN/PPN based on assessed needs  - Assess need for intravenous fluids  - Provide specific nutrition/hydration education as appropriate  - Include patient/family/caregiver in decisions related to nutrition  Outcome: Progressing

## 2023-01-26 NOTE — ASSESSMENT & PLAN NOTE
· Aortic arch and great vessels:  Moderate aortic atherosclerosis  Darlean Cord is a 50% stenosis at the brachycephalic artery noted on series 2/23   Right vertebral artery: 60% stenosis at the origin   Left vertebral artery:  80% stenosis at the origin  Right carotid:  Stent noted within the right common carotid and internal carotid arteries   Left carotid:  Large cluster of calcified atherosclerotic plaque at the carotid bulb with about a 70% stenosis     Vascular surgery recommended outpatient follow-up and carotid duplex done 1/21 RIGHT:  There is <50% restenosis noted in the internal carotid artery and stent  Plaque  is calcified and irregular  Vertebral artery flow is antegrade  There is no significant subclavian artery  disease  LEFT:  There is >70% stenosis noted in the internal carotid artery  Plaque is  calcified and irregular  Vertebral artery flow is antegrade  There is no significant subclavian artery  Disease    Per neurology-Restart aspirin (for left side ica extracranial stenosis and rt sided ica stent)

## 2023-01-26 NOTE — ASSESSMENT & PLAN NOTE
Lab Results   Component Value Date    EGFR 26 01/26/2023    EGFR 24 01/25/2023    EGFR 25 01/24/2023    CREATININE 1 81 (H) 01/26/2023    CREATININE 1 93 (H) 01/25/2023    CREATININE 1 86 (H) 01/24/2023   · On CKD stage III b- creatinine baseline 1 7  · Ct stable patient urinary retention post void is 245 climbed up to from 231 we will place a Greer catheter in  Discharged with a Greer catheter start Flomax  Voiding trial at the nursing facility or the urology office in 7 days referral has been provided patient possibly has her own urologist daughter is not aware probably nursing home is if does give instructions to call to set up an appointment

## 2023-01-26 NOTE — DISCHARGE SUMMARY
[address]  Discharge- Huber Walker 1945, 65 y o  female MRN: [id number]  Unit/Bed#: -Trisha Encounter: [id number]  Primary Care Provider: Bev Smith DO   Date and time admitted to hospital: 1/19/2023  2:42 AM    * Acute on chronic blood loss anemia  Assessment & Plan  Witnessed per rectal bleed on 1/21/2023 night  Hemoglobin dropped  Initially Eliquis was on hold  She had a EGD colonoscopy in September 2022 which shows large hemorrhoids-that time it was suggested to repeat scope in 3 months  As patient is refusing bowel prep, discussed with patient's daughter over the phone regarding treatment options and alternatives  At this time, we will resume Eliquis and aspirin and monitor patient's clinically for next 24 to 48 hours, if remains stable can be discharged  No report of having another bloody bowel movements GI did a rectal exam there is brown stool in the vault and hemorrhoids  There is no overt GI bleeding we will continue her aspirin and anticoagulation  Patient is still refusing to repeat any EGD or colonoscopy although she does not probably have any capacity to make accurate decisions due to dementia - but cant force   Also has associated vitamin B12 deficiency  Will start vitamin B12 1000 mcg p o  daily   Anemia has been also multifactorial anemia secondary to chronic disease vitamin B12 deficiency and iron deficiency  Also for hemorrhoids started Anusol suppositories  Status post 1 unit of PRBC transfusion on 1/25/2023 improved to 7 9 no evidence of gross bleeding  We will give a dose of Venofer 300 mg IV x1    Does have associated iron deficiency  Hb stable-no gross bleed will discharge she will need to be evaluated by colorectal for hemorrhoidectomy if this recurs      Acute cystitis without hematuria  Assessment & Plan  She is on ceftriaxone  Urine culture reviewed,  Completed 7 days of antibiotics    Aneurysm [facility])  Assessment & Plan  Discussed with neurosurgery, recommends outpatient follow-up and risk factor control2 mm right middle ICA aneurysm    Carotid artery stenosis  Assessment & Plan  · Aortic arch and great vessels:  Moderate aortic atherosclerosis  Adriana Jyaashree is a 50% stenosis at the brachycephalic artery noted on series 2/23   Right vertebral artery: 60% stenosis at the origin   Left vertebral artery:  80% stenosis at the origin  Right carotid:  Stent noted within the right common carotid and internal carotid arteries   Left carotid:  Large cluster of calcified atherosclerotic plaque at the carotid bulb with about a 70% stenosis     Vascular surgery recommended outpatient follow-up and carotid duplex done 1/21 RIGHT:  There is <50% restenosis noted in the internal carotid artery and stent  Plaque  is calcified and irregular  Vertebral artery flow is antegrade  There is no significant subclavian artery  disease  LEFT:  There is >70% stenosis noted in the internal carotid artery  Plaque is  calcified and irregular  Vertebral artery flow is antegrade  There is no significant subclavian artery  Disease  Per neurology-Restart aspirin (for left side ica extracranial stenosis and rt sided ica stent)      Right internal carotid artery aneurysm  Assessment & Plan  2 mm right middle ICA aneurysm   Neurosurgery recommendation appreciated, recommends outpatient follow-up    Elevated d-dimer  Assessment & Plan  · D-dimer 6 69 during ED evaluation  · Chronically anticoagulated with Eliquis, resides in nursing facility with medication compliance  · No tachycardia or hypoxia to suggest PE  · Continue to monitor    Metabolic encephalopathy  Assessment & Plan  Toxic/metabolic encephalopathy in setting of riri on ckd/e  Coli uti superimposed on underlying dementia likely vascular component  Unable to perform MRI brain scan given non compatible ppm device  Repeat head ct next day unremarkable  Ct head is not showing any obvious chronic strokes   Recrudescence is still a possibility in setting of the darrell and uti  Continue aspirin for the stenosis and Eliquis for the atrial fibrillation  Patient is currently alert and oriented to self and place          Benign hypertensive renal disease  Assessment & Plan  Blood pressure is controlled continue Norvasc and metoprolol     Paroxysmal atrial fibrillation (HCC)  Assessment & Plan  · Continue Eliquis 5 mg twice daily-Continue metoprolol succinate  · PPM in place-noncompatible for MRI  · Paced rhythm on EKG, rate controlled    Chronic combined systolic and diastolic CHF (congestive heart failure) (HCC)  Assessment & Plan  Wt Readings from Last 3 Encounters:   01/26/23 61 9 kg (136 lb 7 4 oz)   01/13/23 53 1 kg (117 lb)   10/04/22 53 3 kg (117 lb 8 1 oz)     EF 45% September 2022  Appears euvolemic on exam  Not on outpatient diuretics  Continue metoprolol succinate  Monitor volume status      DARRELL (acute kidney injury) [facility])  Assessment & Plan  Lab Results   Component Value Date    EGFR 26 01/26/2023    EGFR 24 01/25/2023    EGFR 25 01/24/2023    CREATININE 1 81 (H) 01/26/2023    CREATININE 1 93 (H) 01/25/2023    CREATININE 1 86 (H) 01/24/2023   · On CKD stage III b- creatinine baseline 1 7  · Ct stable patient urinary retention post void is 245 climbed up to from 231 we will place a Greer catheter in  Discharged with a Greer catheter start Flomax  Voiding trial at the nursing facility or the urology office in 7 days referral has been provided patient possibly has her own urologist daughter is not aware probably nursing home is if does give instructions to call to set up an appointment      Acquired hypothyroidism  Assessment & Plan  · TSH elevated, free T4 low  · Was taking levothyroxine 125 mcg daily-dose increased to 150 mcg, patient will need to recheck TSH in 4 to 6 weeks        Medical Problems     Resolved Problems  Date Reviewed: 1/26/2023          Resolved    Anemia due to chronic kidney disease 1/24/2023     Resolved by Aracely Cee MD    Metabolic acidosis 2/81/1303     Resolved by  Aracely Cee MD        Discharging Physician / Practitioner: Aracely Cee MD  PCP: [address] [place] [address],   Admission Date:   Admission Orders (From admission, onward)     Ordered        01/19/23 0416  INPATIENT ADMISSION  Once                      Discharge Date: 01/26/23    Consultations During Hospital Stay:  · Nephrology  · Gastroenterology  · Vascular  · Neurosurgery  · neurology    Procedures Performed:   · none    Significant Findings / Test Results:   · 1/19-Ct head - normal   · cxr nml  · cta head and neck- 1  No large vessel occlusion  2  Severe right vertebral artery origin, and left cervical ICA stenoses  3  Moderate right vertebral artery origin, and brachiocephalic artery stenoses  4  Mild right terminal ICA stenosis  5   2 mm right middle ICA aneurysm  · Carotid duplex - Impression  RIGHT:  There is <50% restenosis noted in the internal carotid artery and stent  Plaque  is calcified and irregular  Vertebral artery flow is antegrade  There is no significant subclavian artery  disease  LEFT:  There is >70% stenosis noted in the internal carotid artery  Plaque is  calcified and irregular  Vertebral artery flow is antegrade  There is no significant subclavian artery  Disease  2decho- Left Ventricle: Left ventricular cavity size is normal  Wall thickness is increased  There is mild to moderate concentric hypertrophy  The left ventricular ejection fraction is 25%  Systolic function is severely reduced  There is severe global hypokinesis  Unable to assess diastolic function due to significant annular calcification  •  Right Ventricle: Right ventricular cavity size is upper normal  Systolic function is mildly reduced  Abnormal tricuspid annular plane systolic excursion (TAPSE) < 1 7 cm  A ICD wire is present  •  Left Atrium: The atrium is dilated  •  Atrial Septum: No patent foramen ovale detected  Negative bubble study  The septum bows into the left atrium  •  Aortic Valve: There is mild regurgitation  The aortic valve has no significant stenosis  •  Mitral Valve: There is moderate thickening  There is moderate calcification  The posterior leaflet is fixed  There is severe annular calcification  There is moderate regurgitation  There is no evidence of stenosis as assessed but visually the valve appears to have mild stenosis  •  Tricuspid Valve: There is moderate regurgitation  There is no evidence of stenosis  The right ventricular systolic pressure is moderately to severely elevated  The estimated right ventricular systolic pressure is 81 72 mmHg  •  Pulmonic Valve: There is mild regurgitation  •  Aorta: The aortic root is upper normal in size  The aortic root is 3 70 cm  •  Prior TTE study available for comparison  Prior study date: 9/22/2022  Changes noted when compared to prior study  Changes include: The EF on prior study was estimated at 45%  Direct comparison of images shows EF is decreased on today's study (estimated at 25%)  No tricuspid regurgitations was reported on prior study however this was an error as there is a documented TR velocity  Other findings are similar     · Ct head repetead neg   Incidental Findings:   · See above to adressed with follow up with pcp    Test Results Pending at Discharge (will require follow up):   · none     Outpatient Tests Requested:  · Bmp 2/1  · tsh in 1 month     Complications:  none    Reason for Admission: Strokelike symptoms    Hospital Course:   Luan García is a 65 y o  female patient who originally presented to the hospital on 1/19/2023 due to strokelike symptoms and a CT of the brain was negative CTA head is severe stenosis as stated above  Repeat CT in 48 hours was negative as well as she was unable to get an MRI secondary to compatible device    Evaluated by neurology suspected secondary to acute cystitis suspect metabolic encephalopathy which should resolve that she is back to baseline alert and oriented times to self and place that she does have history of underlying dementia completed 7 days of antibiotics for the UTI in terms of the multifactorial anemia she does have hemorrhoids  Iron deficiency and vitamin B12 deficiency she had brown stool on the exam with GI she declined to have a colonoscopy and endoscopy done she did receive 1 unit of PRBC and Venofer hemoglobin remained stable  To continue Eliquis and aspirin Anusol started and if that would happen again that she need to follow-up with colorectal to discuss hemorrhoidectomy  Follow-up with vascular as an outpatient  They have evaluated her here continue antiplatelets and statin  She was started on sodium bicarb secondary to metabolic acidosis which has improved creatinine stayed stable on day of discharge she was found to have retention post void up to 39 which has increased Greer has been placed she should follow-up with a voiding trial with urology possibly has her own the referral has been made or a voiding trial can be done at the nursing home in 7 days started Flomax  Medically to be discharged back to [facility]        Please see above list of diagnoses and related plan for additional information  Condition at Discharge: stable    Discharge Day Visit / Exam:   Subjective:  Seen and examined no complaints  Vitals: Blood Pressure: 154/67 (01/26/23 0715)  Pulse: 69 (01/26/23 0715)  Temperature: (!) 97 3 °F (36 3 °C) (01/26/23 0715)  Temp Source: Temporal (01/25/23 2311)  Respirations: 18 (01/26/23 0715)  Height: 5' 5" (165 1 cm) (01/19/23 1522)  Weight - Scale: 61 9 kg (136 lb 7 4 oz) (01/26/23 0536)  SpO2: 97 % (01/26/23 0715)  Exam:   Physical Exam  Vitals and nursing note reviewed  Constitutional:       General: She is not in acute distress  Appearance: She is well-developed  HENT:      Head: Normocephalic and atraumatic     Eyes:      Conjunctiva/sclera: Conjunctivae normal    Cardiovascular: Rate and Rhythm: Normal rate and regular rhythm  Heart sounds: No murmur heard  Pulmonary:      Effort: Pulmonary effort is normal  No respiratory distress  Breath sounds: Normal breath sounds  No wheezing or rales  Abdominal:      General: There is no distension  Palpations: Abdomen is soft  Tenderness: There is no abdominal tenderness  Musculoskeletal:         General: No swelling  Cervical back: Neck supple  Skin:     General: Skin is warm and dry  Capillary Refill: Capillary refill takes less than 2 seconds  Neurological:      Mental Status: She is alert  Comments: aaox2 self and place   Psychiatric:         Mood and Affect: Mood normal           Discussion with Family: Updated  (daughter) via phone  Discharge instructions/Information to patient and family:   See after visit summary for information provided to patient and family  Provisions for Follow-Up Care:  See after visit summary for information related to follow-up care and any pertinent home health orders  Disposition:   [facility] Readmission: no     Discharge Statement:  I spent >35 minutes discharging the patient  This time was spent on the day of discharge  I had direct contact with the patient on the day of discharge  Greater than 50% of the total time was spent examining patient, answering all patient questions, arranging and discussing plan of care with patient as well as directly providing post-discharge instructions  Additional time then spent on discharge activities  Discharge Medications:  See after visit summary for reconciled discharge medications provided to patient and/or family        **Please Note: This note may have been constructed using a voice recognition system**

## 2023-01-26 NOTE — CASE MANAGEMENT
Case Management Discharge Planning Note    Patient name Jamil Duarte  Location [address] [id number]/-[id number] MRN [id number]  : 1945 Date 2023       Current Admission Date: 2023  Current Admission Diagnosis:Acute on chronic blood loss anemia   Patient Active Problem List    Diagnosis Date Noted   • Acute on chronic blood loss anemia 2023   • Acute cystitis without hematuria 2023   • Right internal carotid artery aneurysm 2023   • Carotid artery stenosis 2023   • Aneurysm ([place] [address] )    • Elevated d-dimer 2023   • Proteinuria 2023   • Goals of care, counseling/discussion 2023   • Secondary renal hyperparathyroidism ([place] [address] ) 10/26/2022   • Hyperuricemia 10/26/2022   • Hypophosphatemia 10/04/2022   • Pulmonary hypertension ([place] [address] ) 10/04/2022   • Low TSH level 2022   • COVID-19 virus infection 2022   • Dementia ([place] [address] )    • Metabolic encephalopathy    • Gastrointestinal bleeding 2022   • Hydroureteronephrosis 2022   • Diverticulitis 2022   • Rectal bleeding 2022   • Elevated troponin 2022   • Iron deficiency anemia 2022   • Paroxysmal atrial fibrillation ([place] [address] ) 2020   • Ischemic cardiomyopathy 2020   • S/P implantation of automatic cardioverter/defibrillator (AICD) 2020   • Essential hypertension 2020   • History of PTCA 2020   • Leg swelling 2020   • DARRELL (acute kidney injury) ([place] [address] ) 2020   • Perforated appendicitis 2020   • Chronic combined systolic and diastolic CHF (congestive heart failure) ([place] [address] ) 2020   • Pyuria 2020   • Microscopic hematuria 2020   • Vitamin D insufficiency 2020   • Mitral valve insufficiency 2020   • Hypercholesterolemia 2020   • Aortic atherosclerosis ([place] [address] ) 2020   • Renal calculus 2020   • Diverticulosis 2020   • Hiatal hernia 2020   • Pulmonary nodule 2020   • Benign hypertensive renal disease 09/12/2019   • Acute blood loss anemia 02/08/2019   • Gastroesophageal reflux disease without esophagitis 02/08/2019   • Closed fracture of body of sternum with routine healing 04/12/2018   • Congestive heart disease ([address] ) 04/12/2018   • Acquired hypothyroidism 04/12/2018   • CAD (coronary artery disease) 04/12/2018   • Forgetfulness 04/12/2018   • Acute pain due to trauma 04/12/2018   • Hx of fall 04/12/2018   • Stress incontinence in female 04/12/2018   • Anemia in chronic kidney disease 03/20/2018      LOS (days): 7  Geometric Mean LOS (GMLOS) (days): 4 30  Days to GMLOS:-3     OBJECTIVE:  Risk of Unplanned Readmission Score: 33 59         Current admission status: Inpatient   Preferred Pharmacy:   [address] [place] [address] [facility] [address]  [address]  [place] [address]  Phone: [phone] Fax: [phone]    Primary Care Provider: Júnior Vyas DO    Primary Insurance: [employer] [address] [facility]  Secondary Insurance:     DISCHARGE DETAILS:           CM received RTCF daughter regarding discharge of patient today to [facility]  CM was unaware of discharge today  CM spoke to daughter, reviewed DC IMM with daughter and informed that patient can stay an additional 4 hours for reconsidering appealing the discharge as the medicare rights were review on the day of discharge  Daughter verbalized understanding and feels ready to go STRand does not intend to stay 4 hours to reconsider  IMM placed in bin for filing                              IMM Given (Date):: 01/26/23  IMM Given to[de-identified] Family  Family notified[de-identified] reviewed verbally with daughter

## 2023-01-26 NOTE — PLAN OF CARE
Problem: Potential for Falls  Goal: Patient will remain free of falls  Description: INTERVENTIONS:  - Educate patient/family on patient safety including physical limitations  - Instruct patient to call for assistance with activity   - Consult OT/PT to assist with strengthening/mobility   - Keep Call bell within reach  - Keep bed low and locked with side rails adjusted as appropriate  - Keep care items and personal belongings within reach  - Initiate and maintain comfort rounds  - Make Fall Risk Sign visible to staff  - Apply yellow socks and bracelet for high fall risk patients  - Consider moving patient to room near nurses station  Outcome: Progressing     Problem: PAIN - ADULT  Goal: Verbalizes/displays adequate comfort level or baseline comfort level  Description: Interventions:  - Encourage patient to monitor pain and request assistance  - Assess pain using appropriate pain scale  - Administer analgesics based on type and severity of pain and evaluate response  - Implement non-pharmacological measures as appropriate and evaluate response  - Consider cultural and social influences on pain and pain management  - Notify physician/advanced practitioner if interventions unsuccessful or patient reports new pain  Outcome: Progressing     Problem: INFECTION - ADULT  Goal: Absence or prevention of progression during hospitalization  Description: INTERVENTIONS:  - Assess and monitor for signs and symptoms of infection  - Monitor lab/diagnostic results  - Monitor all insertion sites, i e  indwelling lines, tubes, and drains  - Monitor endotracheal if appropriate and nasal secretions for changes in amount and color  - Doyle appropriate cooling/warming therapies per order  - Administer medications as ordered  - Instruct and encourage patient and family to use good hand hygiene technique  - Identify and instruct in appropriate isolation precautions for identified infection/condition  Outcome: Progressing     Problem: SAFETY ADULT  Goal: Patient will remain free of falls  Description: INTERVENTIONS:  - Educate patient/family on patient safety including physical limitations  - Instruct patient to call for assistance with activity   - Consult OT/PT to assist with strengthening/mobility   - Keep Call bell within reach  - Keep bed low and locked with side rails adjusted as appropriate  - Keep care items and personal belongings within reach  - Initiate and maintain comfort rounds  - Make Fall Risk Sign visible to staff  - Apply yellow socks and bracelet for high fall risk patients  - Consider moving patient to room near nurses station  Outcome: Progressing  Goal: Maintain or return to baseline ADL function  Description: INTERVENTIONS:  -  Assess patient's ability to carry out ADLs; assess patient's baseline for ADL function and identify physical deficits which impact ability to perform ADLs (bathing, care of mouth/teeth, toileting, grooming, dressing, etc )  - Assess/evaluate cause of self-care deficits   - Assess range of motion  - Assess patient's mobility; develop plan if impaired  - Assess patient's need for assistive devices and provide as appropriate  - Encourage maximum independence but intervene and supervise when necessary  - Involve family in performance of ADLs  - Assess for home care needs following discharge   - Consider OT consult to assist with ADL evaluation and planning for discharge  - Provide patient education as appropriate  Outcome: Progressing  Goal: Maintains/Returns to pre admission functional level  Description: INTERVENTIONS:  - Perform BMAT or MOVE assessment daily    - Set and communicate daily mobility goal to care team and patient/family/caregiver     - Out of bed for toileting  - Record patient progress and toleration of activity level   Outcome: Progressing     Problem: DISCHARGE PLANNING  Goal: Discharge to home or other facility with appropriate resources  Description: INTERVENTIONS:  - Identify barriers to discharge w/patient and caregiver  - Arrange for needed discharge resources and transportation as appropriate  - Identify discharge learning needs (meds, wound care, etc )  - Arrange for interpretive services to assist at discharge as needed  - Refer to Case Management Department for coordinating discharge planning if the patient needs post-hospital services based on physician/advanced practitioner order or complex needs related to functional status, cognitive ability, or social support system  Outcome: Progressing     Problem: Knowledge Deficit  Goal: Patient/family/caregiver demonstrates understanding of disease process, treatment plan, medications, and discharge instructions  Description: Complete learning assessment and assess knowledge base    Interventions:  - Provide teaching at level of understanding  - Provide teaching via preferred learning methods  Outcome: Progressing     Problem: NEUROSENSORY - ADULT  Goal: Achieves stable or improved neurological status  Description: INTERVENTIONS  - Monitor and report changes in neurological status  - Monitor vital signs such as temperature, blood pressure, glucose, and any other labs ordered   - Initiate measures to prevent increased intracranial pressure  - Monitor for seizure activity and implement precautions if appropriate      Outcome: Progressing  Goal: Remains free of injury related to seizures activity  Description: INTERVENTIONS  - Maintain airway, patient safety  and administer oxygen as ordered  - Monitor patient for seizure activity, document and report duration and description of seizure to physician/advanced practitioner  - If seizure occurs,  ensure patient safety during seizure  - Reorient patient post seizure  - Seizure pads on all 4 side rails  - Instruct patient/family to notify RN of any seizure activity including if an aura is experienced  - Instruct patient/family to call for assistance with activity based on nursing assessment  - Administer anti-seizure medications if ordered    Outcome: Progressing  Goal: Achieves maximal functionality and self care  Description: INTERVENTIONS  - Monitor swallowing and airway patency with patient fatigue and changes in neurological status  - Encourage and assist patient to increase activity and self care     - Encourage visually impaired, hearing impaired and aphasic patients to use assistive/communication devices  Outcome: Progressing     Problem: CARDIOVASCULAR - ADULT  Goal: Maintains optimal cardiac output and hemodynamic stability  Description: INTERVENTIONS:  - Monitor I/O, vital signs and rhythm  - Monitor for S/S and trends of decreased cardiac output  - Administer and titrate ordered vasoactive medications to optimize hemodynamic stability  - Assess quality of pulses, skin color and temperature  - Assess for signs of decreased coronary artery perfusion  - Instruct patient to report change in severity of symptoms  Outcome: Progressing  Goal: Absence of cardiac dysrhythmias or at baseline rhythm  Description: INTERVENTIONS:  - Continuous cardiac monitoring, vital signs, obtain 12 lead EKG if ordered  - Administer antiarrhythmic and heart rate control medications as ordered  - Monitor electrolytes and administer replacement therapy as ordered  Outcome: Progressing     Problem: RESPIRATORY - ADULT  Goal: Achieves optimal ventilation and oxygenation  Description: INTERVENTIONS:  - Assess for changes in respiratory status  - Assess for changes in mentation and behavior  - Position to facilitate oxygenation and minimize respiratory effort  - Oxygen administered by appropriate delivery if ordered  - Initiate smoking cessation education as indicated  - Encourage broncho-pulmonary hygiene including cough, deep breathe, Incentive Spirometry  - Assess the need for suctioning and aspirate as needed  - Assess and instruct to report SOB or any respiratory difficulty  - Respiratory Therapy support as indicated  Outcome: Progressing     Problem: GASTROINTESTINAL - ADULT  Goal: Minimal or absence of nausea and/or vomiting  Description: INTERVENTIONS:  - Administer IV fluids if ordered to ensure adequate hydration  - Maintain NPO status until nausea and vomiting are resolved  - Nasogastric tube if ordered  - Administer ordered antiemetic medications as needed  - Provide nonpharmacologic comfort measures as appropriate  - Advance diet as tolerated, if ordered  - Consider nutrition services referral to assist patient with adequate nutrition and appropriate food choices  Outcome: Progressing  Goal: Maintains or returns to baseline bowel function  Description: INTERVENTIONS:  - Assess bowel function  - Encourage oral fluids to ensure adequate hydration  - Administer IV fluids if ordered to ensure adequate hydration  - Administer ordered medications as needed  - Encourage mobilization and activity  - Consider nutritional services referral to assist patient with adequate nutrition and appropriate food choices  Outcome: Progressing  Goal: Maintains adequate nutritional intake  Description: INTERVENTIONS:  - Monitor percentage of each meal consumed  - Identify factors contributing to decreased intake, treat as appropriate  - Assist with meals as needed  - Monitor I&O, weight, and lab values if indicated  - Obtain nutrition services referral as needed  Outcome: Progressing  Goal: Establish and maintain optimal ostomy function  Description: INTERVENTIONS:  - Assess bowel function  - Encourage oral fluids to ensure adequate hydration  - Administer IV fluids if ordered to ensure adequate hydration   - Administer ordered medications as needed  - Encourage mobilization and activity  - Nutrition services referral to assist patient with appropriate food choices  - Assess stoma site  - Consider wound care consult   Outcome: Progressing  Goal: Oral mucous membranes remain intact  Description: INTERVENTIONS  - Assess oral mucosa and hygiene practices  - Implement preventative oral hygiene regimen  - Implement oral medicated treatments as ordered  - Initiate Nutrition services referral as needed  Outcome: Progressing     Problem: GENITOURINARY - ADULT  Goal: Maintains or returns to baseline urinary function  Description: INTERVENTIONS:  - Assess urinary function  - Encourage oral fluids to ensure adequate hydration if ordered  - Administer IV fluids as ordered to ensure adequate hydration  - Administer ordered medications as needed  - Offer frequent toileting  - Follow urinary retention protocol if ordered  Outcome: Progressing  Goal: Absence of urinary retention  Description: INTERVENTIONS:  - Assess patient’s ability to void and empty bladder  - Monitor I/O  - Bladder scan as needed  - Discuss with physician/AP medications to alleviate retention as needed  - Discuss catheterization for long term situations as appropriate  Outcome: Progressing  Goal: Urinary catheter remains patent  Description: INTERVENTIONS:  - Assess patency of urinary catheter  - If patient has a chronic brambila, consider changing catheter if non-functioning  - Follow guidelines for intermittent irrigation of non-functioning urinary catheter  Outcome: Progressing     Problem: METABOLIC, FLUID AND ELECTROLYTES - ADULT  Goal: Electrolytes maintained within normal limits  Description: INTERVENTIONS:  - Monitor labs and assess patient for signs and symptoms of electrolyte imbalances  - Administer electrolyte replacement as ordered  - Monitor response to electrolyte replacements, including repeat lab results as appropriate  - Instruct patient on fluid and nutrition as appropriate  Outcome: Progressing  Goal: Fluid balance maintained  Description: INTERVENTIONS:  - Monitor labs   - Monitor I/O and WT  - Instruct patient on fluid and nutrition as appropriate  - Assess for signs & symptoms of volume excess or deficit  Outcome: Progressing  Goal: Glucose maintained within target range  Description: INTERVENTIONS:  - Monitor Blood Glucose as ordered  - Assess for signs and symptoms of hyperglycemia and hypoglycemia  - Administer ordered medications to maintain glucose within target range  - Assess nutritional intake and initiate nutrition service referral as needed  Outcome: Progressing     Problem: SKIN/TISSUE INTEGRITY - ADULT  Goal: Skin Integrity remains intact(Skin Breakdown Prevention)  Description: Assess:  -Perform Arcadio assessment every    -Clean and moisturize skin every    -Inspect skin when repositioning, toileting, and assisting with ADLS  -Assess under medical devices such as   every    -Assess extremities for adequate circulation and sensation     Bed Management:  -Have minimal linens on bed & keep smooth, unwrinkled  -Change linens as needed when moist or perspiring  -Avoid sitting or lying in one position for more than   hours while in bed  -Keep HOB at  degrees     Toileting:  -Offer bedside commode  -Assess for incontinence every    -Use incontinent care products after each incontinent episode such as      Activity:  -Mobilize patient   times a day  -Encourage activity and walks on unit  -Encourage or provide ROM exercises   -Turn and reposition patient every   Hours  -Use appropriate equipment to lift or move patient in bed  -Instruct/ Assist with weight shifting every   when out of bed in chair  -Consider limitation of chair time   hour intervals    Skin Care:  -Avoid use of baby powder, tape, friction and shearing, hot water or constrictive clothing  -Relieve pressure over bony prominences using    -Do not massage red bony areas    Next Steps:  -Teach patient strategies to minimize risks such as     -Consider consults to  interdisciplinary teams such as    Outcome: Progressing  Goal: Incision(s), wounds(s) or drain site(s) healing without S/S of infection  Description: INTERVENTIONS  - Assess and document dressing, incision, wound bed, drain sites and surrounding tissue  - Provide patient and family education  - Perform skin care/dressing changes every    Outcome: Progressing  Goal: Pressure injury heals and does not worsen  Description: Interventions:  - Implement low air loss mattress or specialty surface (Criteria met)  - Apply silicone foam dressing  - Instruct/assist with weight shifting every   minutes when in chair   - Limit chair time to   hour intervals  - Use special pressure reducing interventions such as   when in chair   - Apply fecal or urinary incontinence containment device   - Perform passive or active ROM every    - Turn and reposition patient & offload bony prominences every   hours   - Utilize friction reducing device or surface for transfers   - Consider consults to  interdisciplinary teams such as    - Use incontinent care products after each incontinent episode such as    - Consider nutrition services referral as needed  Outcome: Progressing     Problem: HEMATOLOGIC - ADULT  Goal: Maintains hematologic stability  Description: INTERVENTIONS  - Assess for signs and symptoms of bleeding or hemorrhage  - Monitor labs  - Administer supportive blood products/factors as ordered and appropriate  Outcome: Progressing     Problem: MUSCULOSKELETAL - ADULT  Goal: Maintain or return mobility to safest level of function  Description: INTERVENTIONS:  - Assess patient's ability to carry out ADLs; assess patient's baseline for ADL function and identify physical deficits which impact ability to perform ADLs (bathing, care of mouth/teeth, toileting, grooming, dressing, etc )  - Assess/evaluate cause of self-care deficits   - Assess range of motion  - Assess patient's mobility  - Assess patient's need for assistive devices and provide as appropriate  - Encourage maximum independence but intervene and supervise when necessary  - Involve family in performance of ADLs  - Assess for home care needs following discharge   - Consider OT consult to assist with ADL evaluation and planning for discharge  - Provide patient education as appropriate  Outcome: Progressing  Goal: Maintain proper alignment of affected body part  Description: INTERVENTIONS:  - Support, maintain and protect limb and body alignment  - Provide patient/ family with appropriate education  Outcome: Progressing     Problem: MOBILITY - ADULT  Goal: Maintain or return to baseline ADL function  Description: INTERVENTIONS:  -  Assess patient's ability to carry out ADLs; assess patient's baseline for ADL function and identify physical deficits which impact ability to perform ADLs (bathing, care of mouth/teeth, toileting, grooming, dressing, etc )  - Assess/evaluate cause of self-care deficits   - Assess range of motion  - Assess patient's mobility; develop plan if impaired  - Assess patient's need for assistive devices and provide as appropriate  - Encourage maximum independence but intervene and supervise when necessary  - Involve family in performance of ADLs  - Assess for home care needs following discharge   - Consider OT consult to assist with ADL evaluation and planning for discharge  - Provide patient education as appropriate  Outcome: Progressing  Goal: Maintains/Returns to pre admission functional level  Description: INTERVENTIONS:  - Perform BMAT or MOVE assessment daily    - Set and communicate daily mobility goal to care team and patient/family/caregiver     - Collaborate with rehabilitation services on mobility goals if consulted  - Out of bed for toileting  - Record patient progress and toleration of activity level   Outcome: Progressing     Problem: Prexisting or High Potential for Compromised Skin Integrity  Goal: Skin integrity is maintained or improved  Description: INTERVENTIONS:  - Identify patients at risk for skin breakdown  - Assess and monitor skin integrity  - Assess and monitor nutrition and hydration status  - Monitor labs   - Assess for incontinence   - Turn and reposition patient  - Assist with mobility/ambulation  - Relieve pressure over bony prominences  - Avoid friction and shearing  - Provide appropriate hygiene as needed including keeping skin clean and dry  - Evaluate need for skin moisturizer/barrier cream  - Collaborate with interdisciplinary team   - Patient/family teaching  - Consider wound care consult   Outcome: Progressing     Problem: Nutrition/Hydration-ADULT  Goal: Nutrient/Hydration intake appropriate for improving, restoring or maintaining nutritional needs  Description: Monitor and assess patient's nutrition/hydration status for malnutrition  Collaborate with interdisciplinary team and initiate plan and interventions as ordered  Monitor patient's weight and dietary intake as ordered or per policy  Utilize nutrition screening tool and intervene as necessary  Determine patient's food preferences and provide high-protein, high-caloric foods as appropriate       INTERVENTIONS:  - Monitor oral intake, urinary output, labs, and treatment plans  - Assess nutrition and hydration status and recommend course of action  - Evaluate amount of meals eaten  - Assist patient with eating if necessary   - Allow adequate time for meals  - Recommend/ encourage appropriate diets, oral nutritional supplements, and vitamin/mineral supplements  - Order, calculate, and assess calorie counts as needed  - Recommend, monitor, and adjust tube feedings and TPN/PPN based on assessed needs  - Assess need for intravenous fluids  - Provide specific nutrition/hydration education as appropriate  - Include patient/family/caregiver in decisions related to nutrition  Outcome: Progressing

## 2023-01-26 NOTE — ASSESSMENT & PLAN NOTE
Wt Readings from Last 3 Encounters:   01/26/23 61 9 kg (136 lb 7 4 oz)   01/13/23 53 1 kg (117 lb)   10/04/22 53 3 kg (117 lb 8 1 oz)     EF 45% September 2022  Appears euvolemic on exam  Not on outpatient diuretics  Continue metoprolol succinate  Monitor volume status

## 2023-01-26 NOTE — SPEECH THERAPY NOTE
Speech Language/Pathology    Speech/Language Pathology Progress Note    Patient Name: Anand uFentes  RTEPB'M Date: 1/26/2023       Subjective:  "Very good soup"    Objective: To assess diet tolerance  Pt currently on puree and thin liquids  Assessment:  Pt on room air, positioned upright in bed  Lower dentures in cup, upper dentures not present  RN Renee aware  Pt presented w/ thin soup, puree and thins via straw  Pt self fed w/ verbal cues for appropriate use of utensils  Upgrade trials not appropriate at this time d/t lack of dentition  +bolus retrieval w/ adequate oral management of purees  No overt s/s aspiration w/ purees or thins  Plan/Recommendations:  Recommend to continue puree w/ thin liquids   Meds crushed in puree      Rosibel Trammell 89  1/26/2023

## 2023-01-26 NOTE — ASSESSMENT & PLAN NOTE
Witnessed per rectal bleed on 1/21/2023 night  Hemoglobin dropped  Initially Eliquis was on hold  She had a EGD colonoscopy in September 2022 which shows large hemorrhoids-that time it was suggested to repeat scope in 3 months  As patient is refusing bowel prep, discussed with patient's daughter over the phone regarding treatment options and alternatives  At this time, we will resume Eliquis and aspirin and monitor patient's clinically for next 24 to 48 hours, if remains stable can be discharged  No report of having another bloody bowel movements GI did a rectal exam there is brown stool in the vault and hemorrhoids  There is no overt GI bleeding we will continue her aspirin and anticoagulation  Patient is still refusing to repeat any EGD or colonoscopy although she does not probably have any capacity to make accurate decisions due to dementia - but cant force   Also has associated vitamin B12 deficiency  Will start vitamin B12 1000 mcg p o  daily   Anemia has been also multifactorial anemia secondary to chronic disease vitamin B12 deficiency and iron deficiency  Also for hemorrhoids started Anusol suppositories  Status post 1 unit of PRBC transfusion on 1/25/2023 improved to 7 9 no evidence of gross bleeding  We will give a dose of Venofer 300 mg IV x1    Does have associated iron deficiency  Hb stable-no gross bleed will discharge she will need to be evaluated by colorectal for hemorrhoidectomy if this recurs

## 2023-01-26 NOTE — NURSING NOTE
AVS reviewed with Frederic  All questions answered  Greer in place upon discharge  IV intact  Patient refused removal of IV  Frederic said they would remove

## 2023-01-26 NOTE — PROGRESS NOTES
Progress Note - Nephrology   Noe Mclean 65 y o  female MRN: [id number]  Unit/Bed#: -[facility] Encounter: [id number]    A/P:  1  CKD3 baseline around 1 7mg/dL  Trends stable at 1 8  she is azotemic which can be due to bleeding  Stable from renal perspective for DC  FU chem in 1 week at dc  2  Metabolic acidosis 2/2 ckd   TCO2 20-- goal TCO2 20-22  Continue current regimen  3  HTN in setting of CKD  BP trends stable, continue current medications    4  UR, brambila being placed per protocol    5  Acute cystitis   Management per primary    6  Acute on chronic blood loss  Continue to trend hgb  Continue care per GI/hospital team    Sp venofer on 1/25    hgb 8 0, stable  Follow up reason for today's visit:     CKD    Subjective:   Patient seen and examined today  She is confused on my evaluation  Denies cp/sob  ROS limited due to current mentation  She believes she ate breakfast today  Discussed care with RN, tentative plan for DC today  Brambila being placed now for UR  Objective:     Vitals: Blood pressure 154/67, pulse 69, temperature (!) 97 3 °F (36 3 °C), resp  rate 18, height 5' 5" (1 651 m), weight 61 9 kg (136 lb 7 4 oz), SpO2 97 %  ,Body mass index is 22 71 kg/m²  Weight (last 2 days)     Date/Time Weight    01/26/23 0536 61 9 (136 47)    01/25/23 0600 61 (134 48)    01/24/23 0544 59 8 (131 84)            Intake/Output Summary (Last 24 hours) at 1/26/2023 1131  Last data filed at 1/26/2023 1040  Gross per 24 hour   Intake 490 ml   Output 716 ml   Net -226 ml     I/O last 3 completed shifts:   In: 370 [P O :120; IV Piggyback:250]  Out: 1152 [Urine:1152]         Physical Exam: /67   Pulse 69   Temp (!) 97 3 °F (36 3 °C)   Resp 18   Ht 5' 5" (1 651 m)   Wt 61 9 kg (136 lb 7 4 oz)   SpO2 97%   BMI 22 71 kg/m²     General Appearance:    Alert, cooperative, no distress, appears stated age   Head:    Normocephalic, without obvious abnormality, atraumatic   Eyes:    Conjunctiva/corneas clear   Ears:    Normal external ears   Nose:   Nares normal, septum midline, mucosa normal, no drainage    or sinus tenderness   Throat:   Lips, mucosa, and tongue normal; teeth and gums normal   Neck:    Back:      Lungs:     Clear to auscultation bilaterally, respirations unlabored   Chest wall:    No tenderness or deformity   Heart:    Regular rate and rhythm, S1 and S2 normal, no murmur, rub   or gallop   Abdomen:     Soft, non-tender, bowel sounds active   Extremities:  no edema    Skin:   Skin color, texture, turgor normal, no rashes or lesions   Lymph nodes:      Neurologic:   Confused, AAOX2            Lab, Imaging and other studies: I have personally reviewed pertinent labs  CBC:   Lab Results   Component Value Date    WBC 7 02 01/26/2023    HGB 8 0 (L) 01/26/2023    HCT 25 6 (L) 01/26/2023    MCV 89 01/26/2023     01/26/2023    MCH 27 7 01/26/2023    MCHC 31 3 (L) 01/26/2023    RDW 16 5 (H) 01/26/2023    MPV 10 5 01/26/2023    NRBC 0 01/26/2023     CMP:   Lab Results   Component Value Date    K 4 0 01/26/2023     (H) 01/26/2023    CO2 20 (L) 01/26/2023    BUN 50 (H) 01/26/2023    CREATININE 1 81 (H) 01/26/2023    CALCIUM 8 3 (L) 01/26/2023    EGFR 26 01/26/2023         Results from last 7 days   Lab Units 01/26/23  0458 01/25/23  0435 01/24/23  0606 01/23/23  0525 01/22/23  0456 01/21/23  0507   POTASSIUM mmol/L 4 0 4 1 4 1 4 2   < > 4 2   CHLORIDE mmol/L 111* 110* 108 107   < > 109*   CO2 mmol/L 20* 21 18* 21   < > 18*   BUN mg/dL 50* 52* 50* 55*   < > 58*   CREATININE mg/dL 1 81* 1 93* 1 86* 1 85*   < > 1 79*   CALCIUM mg/dL 8 3* 8 3* 8 5 8 5   < > 8 4   ALK PHOS U/L  --   --  102 103  --  96   ALT U/L  --   --  4* 7  --  7   AST U/L  --   --  14 16  --  14    < > = values in this interval not displayed           Phosphorus: No results found for: PHOS  Magnesium: No results found for: MG  Urinalysis: No results found for: Eleonore Do, SPECGRAV, PHUR, LEUKOCYTESUR, NITRITE, PROTEINUA, Tyler Bai, BILIRUBINUR, BLOODU  Ionized Calcium: No results found for: LAMAR  Coagulation: No results found for: PT, INR, APTT  Troponin: No results found for: TROPONINI  ABG: No results found for: PHART, OBM1CIP, PO2ART, WEJ1DSV, P7AAITJN, BEART, SOURCE  Radiology review:     IMAGING  No results found      Current Facility-Administered Medications   Medication Dose Route Frequency   • acetaminophen (TYLENOL) tablet 650 mg  650 mg Oral Q6H PRN   • allopurinol (ZYLOPRIM) tablet 100 mg  100 mg Oral Daily   • amLODIPine (NORVASC) tablet 10 mg  10 mg Oral Daily   • apixaban (ELIQUIS) tablet 5 mg  5 mg Oral BID   • aspirin (ECOTRIN LOW STRENGTH) EC tablet 81 mg  81 mg Oral Daily   • cholecalciferol (VITAMIN D3) tablet 1,000 Units  1,000 Units Oral Daily   • cyanocobalamin (VITAMIN B-12) tablet 1,000 mcg  1,000 mcg Oral Daily   • docusate sodium (COLACE) capsule 100 mg  100 mg Oral BID   • hydrocortisone (ANUSOL-HC) rectal suppository 25 mg  25 mg Rectal BID   • levothyroxine tablet 150 mcg  150 mcg Oral Early Morning   • metoprolol succinate (TOPROL-XL) 24 hr tablet 100 mg  100 mg Oral Daily   • pantoprazole (PROTONIX) EC tablet 40 mg  40 mg Oral Early Morning   • polyethylene glycol (MIRALAX) packet 17 g  17 g Oral Daily PRN   • sertraline (ZOLOFT) tablet 100 mg  100 mg Oral Daily   • sodium bicarbonate tablet 650 mg  650 mg Oral TID after meals     Medications Discontinued During This Encounter   Medication Reason   • atorvastatin (LIPITOR) tablet 40 mg    • metoprolol succinate (TOPROL-XL) 50 mg 24 hr tablet Dose adjustment   • amLODIPine (NORVASC) tablet 10 mg    • metoprolol succinate (TOPROL-XL) 24 hr tablet 100 mg    • levothyroxine tablet 125 mcg    • aspirin chewable tablet 81 mg    • apixaban (ELIQUIS) tablet 2 5 mg    • amLODIPine (NORVASC) tablet 10 mg    • apixaban (ELIQUIS) tablet 5 mg    • polyethylene glycol (GLYCOLAX) bowel prep 119 g    • sodium bicarbonate tablet 650 mg    • pantoprazole (PROTONIX) injection 40 mg    • multi-electrolyte (PLASMALYTE-A/ISOLYTE-S PH 7 4) IV solution    • cefTRIAXone (ROCEPHIN) IVPB (premix in dextrose) 1,000 mg 50 mL    • sodium bicarbonate tablet 1,300 mg    • hydrocortisone (ANUSOL-HC) rectal suppository 25 mg        Denita Griffin DO      This progress note was produced in part using a dictation device which may document imprecise wording from author's original intent

## 2023-01-27 ENCOUNTER — HOSPITAL ENCOUNTER (EMERGENCY)
Facility: HOSPITAL | Age: 78
Discharge: HOME | End: 2023-01-27
Attending: EMERGENCY MEDICINE

## 2023-01-27 ENCOUNTER — APPOINTMENT (EMERGENCY)
Dept: CT IMAGING | Facility: HOSPITAL | Age: 78
End: 2023-01-27

## 2023-01-27 VITALS
OXYGEN SATURATION: 98 % | TEMPERATURE: 98.3 F | HEIGHT: 65 IN | DIASTOLIC BLOOD PRESSURE: 77 MMHG | SYSTOLIC BLOOD PRESSURE: 166 MMHG | HEART RATE: 61 BPM | RESPIRATION RATE: 17 BRPM | BODY MASS INDEX: 24.21 KG/M2 | WEIGHT: 145.28 LBS

## 2023-01-27 DIAGNOSIS — K64.9 HEMORRHOIDS, UNSPECIFIED HEMORRHOID TYPE: ICD-10-CM

## 2023-01-27 DIAGNOSIS — R10.9 ABDOMINAL PAIN: ICD-10-CM

## 2023-01-27 DIAGNOSIS — K62.5 RECTAL BLEEDING: Primary | ICD-10-CM

## 2023-01-27 LAB
ALBUMIN SERPL BCP-MCNC: 3.3 G/DL (ref 3.5–5)
ALP SERPL-CCNC: 122 U/L (ref 34–104)
ALT SERPL W P-5'-P-CCNC: 5 U/L (ref 7–52)
ANION GAP SERPL CALCULATED.3IONS-SCNC: 10 MMOL/L (ref 4–13)
APTT PPP: 33 SECONDS (ref 23–37)
AST SERPL W P-5'-P-CCNC: 13 U/L (ref 13–39)
BACTERIA UR QL AUTO: ABNORMAL /HPF
BASOPHILS # BLD AUTO: 0.08 THOUSANDS/ÂΜL (ref 0–0.1)
BASOPHILS NFR BLD AUTO: 1 % (ref 0–1)
BILIRUB SERPL-MCNC: 0.37 MG/DL (ref 0.2–1)
BILIRUB UR QL STRIP: NEGATIVE
BUN SERPL-MCNC: 52 MG/DL (ref 5–25)
CALCIUM ALBUM COR SERPL-MCNC: 9.4 MG/DL (ref 8.3–10.1)
CALCIUM SERPL-MCNC: 8.8 MG/DL (ref 8.4–10.2)
CHLORIDE SERPL-SCNC: 107 MMOL/L (ref 96–108)
CLARITY UR: ABNORMAL
CO2 SERPL-SCNC: 23 MMOL/L (ref 21–32)
COLOR UR: YELLOW
CREAT SERPL-MCNC: 1.98 MG/DL (ref 0.6–1.3)
EOSINOPHIL # BLD AUTO: 0.19 THOUSAND/ÂΜL (ref 0–0.61)
EOSINOPHIL NFR BLD AUTO: 2 % (ref 0–6)
ERYTHROCYTE [DISTWIDTH] IN BLOOD BY AUTOMATED COUNT: 16.7 % (ref 11.6–15.1)
FLUAV RNA RESP QL NAA+PROBE: NEGATIVE
FLUBV RNA RESP QL NAA+PROBE: NEGATIVE
GFR SERPL CREATININE-BSD FRML MDRD: 23 ML/MIN/1.73SQ M
GLUCOSE SERPL-MCNC: 124 MG/DL (ref 65–140)
GLUCOSE UR STRIP-MCNC: NEGATIVE MG/DL
HCT VFR BLD AUTO: 29.7 % (ref 34.8–46.1)
HGB BLD-MCNC: 8.7 G/DL (ref 11.5–15.4)
HGB UR QL STRIP.AUTO: ABNORMAL
IMM GRANULOCYTES # BLD AUTO: 0.05 THOUSAND/UL (ref 0–0.2)
IMM GRANULOCYTES NFR BLD AUTO: 1 % (ref 0–2)
INR PPP: 1.63 (ref 0.84–1.19)
KETONES UR STRIP-MCNC: NEGATIVE MG/DL
LEUKOCYTE ESTERASE UR QL STRIP: ABNORMAL
LIPASE SERPL-CCNC: 44 U/L (ref 11–82)
LYMPHOCYTES # BLD AUTO: 1.29 THOUSANDS/ÂΜL (ref 0.6–4.47)
LYMPHOCYTES NFR BLD AUTO: 13 % (ref 14–44)
MCH RBC QN AUTO: 26.4 PG (ref 26.8–34.3)
MCHC RBC AUTO-ENTMCNC: 29.3 G/DL (ref 31.4–37.4)
MCV RBC AUTO: 90 FL (ref 82–98)
MONOCYTES # BLD AUTO: 0.58 THOUSAND/ÂΜL (ref 0.17–1.22)
MONOCYTES NFR BLD AUTO: 6 % (ref 4–12)
NEUTROPHILS # BLD AUTO: 7.94 THOUSANDS/ÂΜL (ref 1.85–7.62)
NEUTS SEG NFR BLD AUTO: 77 % (ref 43–75)
NITRITE UR QL STRIP: NEGATIVE
NON-SQ EPI CELLS URNS QL MICRO: ABNORMAL /HPF
NRBC BLD AUTO-RTO: 0 /100 WBCS
PH UR STRIP.AUTO: 5.5 [PH]
PLATELET # BLD AUTO: 310 THOUSANDS/UL (ref 149–390)
PMV BLD AUTO: 10.1 FL (ref 8.9–12.7)
POTASSIUM SERPL-SCNC: 4.3 MMOL/L (ref 3.5–5.3)
PROT SERPL-MCNC: 6.4 G/DL (ref 6.4–8.4)
PROT UR STRIP-MCNC: ABNORMAL MG/DL
PROTHROMBIN TIME: 19.4 SECONDS (ref 11.6–14.5)
RBC # BLD AUTO: 3.29 MILLION/UL (ref 3.81–5.12)
RBC #/AREA URNS AUTO: ABNORMAL /HPF
RSV RNA RESP QL NAA+PROBE: NEGATIVE
SARS-COV-2 RNA RESP QL NAA+PROBE: NEGATIVE
SODIUM SERPL-SCNC: 140 MMOL/L (ref 135–147)
SP GR UR STRIP.AUTO: 1.02 (ref 1–1.03)
UROBILINOGEN UR QL STRIP.AUTO: 0.2 E.U./DL
WBC # BLD AUTO: 10.13 THOUSAND/UL (ref 4.31–10.16)
WBC #/AREA URNS AUTO: ABNORMAL /HPF

## 2023-01-27 NOTE — UTILIZATION REVIEW
NOTIFICATION OF ADMISSION DISCHARGE   This is a Notification of Discharge from [facility]  Please be advised that this patient has been discharge from our facility  Below you will find the admission and discharge date and time including the patient’s disposition  UTILIZATION REVIEW CONTACT:  [address] [place]  Utilization   Network Utilization Review Department  Phone: [phone] x carefully listen to the prompts  All voicemails are confidential   Email: [email or web address]     ADMISSION INFORMATION  PRESENTATION DATE: 1/19/2023  2:42 AM  OBERVATION ADMISSION DATE:   INPATIENT ADMISSION DATE: 1/19/23  4:16 AM   DISCHARGE DATE: 1/26/2023  3:23 PM   DISPOSITION:Non [facility] SNF/TCU/SNU    IMPORTANT INFORMATION:  Send all requests for admission clinical reviews, approved or denied determinations and any other requests to dedicated fax number below belonging to the campus where the patient is receiving treatment   List of dedicated fax numbers:  [address] DENIALS (Administrative/Medical Necessity) [phone]   [address] (Maternity/NICU/Pediatrics) [phone]   [facility] [phone]   [facility] 87 [phone]   [address] [phone]   [address] [facility] [phone]   [address] [place] [address] [phone]   [address] [facility] 119 [phone]   [facility]  [phone]   [address] [facility] [phone]   [facility] [address] [facility] [phone]
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
## 2023-01-30 ENCOUNTER — TELEPHONE (OUTPATIENT)
Dept: UROLOGY | Facility: AMBULATORY SURGERY CENTER | Age: 78
End: 2023-01-30

## 2023-01-30 NOTE — TELEPHONE ENCOUNTER
Please Triage  New Patient    What is the reason for the patient’s appointment? [facility] calling to make an ER f/u possible [facility] due to altered mental state  She also has a brambila    What office location does the patient prefer? [place]     Imaging/Lab Results:    Do we accept the patient's insurance or is the patient Self-Pay? Insurance Provider: [employer]   Plan Type/Number:  Member ID#: Has the patient had any previous Urologist(s)? Yes [facility]    Have patient records been requested? If not are records showing in Epic: epic    Has the patient had any outside testing done? Does the patient have a personal history of cancer?  no

## 2023-01-31 ENCOUNTER — APPOINTMENT (EMERGENCY)
Dept: RADIOLOGY | Facility: HOSPITAL | Age: 78
End: 2023-01-31

## 2023-01-31 ENCOUNTER — HOSPITAL ENCOUNTER (INPATIENT)
Facility: HOSPITAL | Age: 78
LOS: 3 days | Discharge: NURSING, REHAB OR LONG-TERM CARE | End: 2023-02-03
Attending: EMERGENCY MEDICINE | Admitting: FAMILY MEDICINE

## 2023-01-31 DIAGNOSIS — I50.9 CHF (CONGESTIVE HEART FAILURE) (HCC): Primary | ICD-10-CM

## 2023-01-31 DIAGNOSIS — J90 PLEURAL EFFUSION: ICD-10-CM

## 2023-01-31 DIAGNOSIS — E03.9 ACQUIRED HYPOTHYROIDISM: ICD-10-CM

## 2023-01-31 DIAGNOSIS — N39.0 UTI (URINARY TRACT INFECTION): ICD-10-CM

## 2023-01-31 DIAGNOSIS — U07.1 COVID: ICD-10-CM

## 2023-01-31 DIAGNOSIS — R79.89 LOW TSH LEVEL: ICD-10-CM

## 2023-01-31 PROBLEM — N18.32 STAGE 3B CHRONIC KIDNEY DISEASE (HCC): Status: ACTIVE | Noted: 2023-01-31

## 2023-01-31 PROBLEM — T83.511A URINARY TRACT INFECTION ASSOCIATED WITH INDWELLING URETHRAL CATHETER (HCC): Status: ACTIVE | Noted: 2023-01-31

## 2023-01-31 PROBLEM — I50.43 ACUTE ON CHRONIC COMBINED SYSTOLIC AND DIASTOLIC CONGESTIVE HEART FAILURE (HCC): Status: ACTIVE | Noted: 2020-07-29

## 2023-01-31 LAB
ABO GROUP BLD: NORMAL
ALBUMIN SERPL BCP-MCNC: 3.2 G/DL (ref 3.5–5)
ALP SERPL-CCNC: 95 U/L (ref 34–104)
ALT SERPL W P-5'-P-CCNC: 5 U/L (ref 7–52)
ANION GAP SERPL CALCULATED.3IONS-SCNC: 7 MMOL/L (ref 4–13)
APTT PPP: 34 SECONDS (ref 23–37)
AST SERPL W P-5'-P-CCNC: 15 U/L (ref 13–39)
BACTERIA UR QL AUTO: ABNORMAL /HPF
BASOPHILS # BLD AUTO: 0.05 THOUSANDS/ÂΜL (ref 0–0.1)
BASOPHILS NFR BLD AUTO: 1 % (ref 0–1)
BILIRUB SERPL-MCNC: 0.43 MG/DL (ref 0.2–1)
BILIRUB UR QL STRIP: NEGATIVE
BLD GP AB SCN SERPL QL: POSITIVE
BLOOD GROUP ANTIBODIES SERPL: NORMAL
BNP SERPL-MCNC: 2137 PG/ML (ref 0–100)
BUN SERPL-MCNC: 56 MG/DL (ref 5–25)
CALCIUM ALBUM COR SERPL-MCNC: 9.5 MG/DL (ref 8.3–10.1)
CALCIUM SERPL-MCNC: 8.9 MG/DL (ref 8.4–10.2)
CARDIAC TROPONIN I PNL SERPL HS: 22 NG/L
CHLORIDE SERPL-SCNC: 106 MMOL/L (ref 96–108)
CLARITY UR: ABNORMAL
CO2 SERPL-SCNC: 25 MMOL/L (ref 21–32)
COLOR UR: YELLOW
CREAT SERPL-MCNC: 2.18 MG/DL (ref 0.6–1.3)
EOSINOPHIL # BLD AUTO: 0.16 THOUSAND/ÂΜL (ref 0–0.61)
EOSINOPHIL NFR BLD AUTO: 2 % (ref 0–6)
ERYTHROCYTE [DISTWIDTH] IN BLOOD BY AUTOMATED COUNT: 17.9 % (ref 11.6–15.1)
FLUAV RNA RESP QL NAA+PROBE: NEGATIVE
FLUBV RNA RESP QL NAA+PROBE: NEGATIVE
GFR SERPL CREATININE-BSD FRML MDRD: 21 ML/MIN/1.73SQ M
GLUCOSE SERPL-MCNC: 84 MG/DL (ref 65–140)
GLUCOSE UR STRIP-MCNC: NEGATIVE MG/DL
HCT VFR BLD AUTO: 29.1 % (ref 34.8–46.1)
HGB BLD-MCNC: 8.5 G/DL (ref 11.5–15.4)
HGB UR QL STRIP.AUTO: ABNORMAL
IMM GRANULOCYTES # BLD AUTO: 0.03 THOUSAND/UL (ref 0–0.2)
IMM GRANULOCYTES NFR BLD AUTO: 0 % (ref 0–2)
INR PPP: 1.62 (ref 0.84–1.19)
KETONES UR STRIP-MCNC: NEGATIVE MG/DL
LACTATE SERPL-SCNC: 1 MMOL/L (ref 0.5–2)
LEUKOCYTE ESTERASE UR QL STRIP: ABNORMAL
LYMPHOCYTES # BLD AUTO: 1.18 THOUSANDS/ÂΜL (ref 0.6–4.47)
LYMPHOCYTES NFR BLD AUTO: 15 % (ref 14–44)
MAGNESIUM SERPL-MCNC: 2.1 MG/DL (ref 1.9–2.7)
MCH RBC QN AUTO: 27.2 PG (ref 26.8–34.3)
MCHC RBC AUTO-ENTMCNC: 29.2 G/DL (ref 31.4–37.4)
MCV RBC AUTO: 93 FL (ref 82–98)
MONOCYTES # BLD AUTO: 0.39 THOUSAND/ÂΜL (ref 0.17–1.22)
MONOCYTES NFR BLD AUTO: 5 % (ref 4–12)
NEUTROPHILS # BLD AUTO: 6.34 THOUSANDS/ÂΜL (ref 1.85–7.62)
NEUTS SEG NFR BLD AUTO: 77 % (ref 43–75)
NITRITE UR QL STRIP: NEGATIVE
NON-SQ EPI CELLS URNS QL MICRO: ABNORMAL /HPF
NRBC BLD AUTO-RTO: 0 /100 WBCS
OTHER STN SPEC: ABNORMAL
PH UR STRIP.AUTO: 5.5 [PH]
PLATELET # BLD AUTO: 269 THOUSANDS/UL (ref 149–390)
PMV BLD AUTO: 10.3 FL (ref 8.9–12.7)
POTASSIUM SERPL-SCNC: 4.9 MMOL/L (ref 3.5–5.3)
PROCALCITONIN SERPL-MCNC: 0.16 NG/ML
PROT SERPL-MCNC: 6.2 G/DL (ref 6.4–8.4)
PROT UR STRIP-MCNC: ABNORMAL MG/DL
PROTHROMBIN TIME: 19.4 SECONDS (ref 11.6–14.5)
RBC # BLD AUTO: 3.12 MILLION/UL (ref 3.81–5.12)
RBC #/AREA URNS AUTO: ABNORMAL /HPF
RH BLD: POSITIVE
RSV RNA RESP QL NAA+PROBE: NEGATIVE
SARS-COV-2 RNA RESP QL NAA+PROBE: POSITIVE
SODIUM SERPL-SCNC: 138 MMOL/L (ref 135–147)
SP GR UR STRIP.AUTO: 1.01 (ref 1–1.03)
SPECIMEN EXPIRATION DATE: NORMAL
T3FREE SERPL-MCNC: 0.59 PG/ML (ref 2.3–4.2)
T4 FREE SERPL-MCNC: 0.69 NG/DL (ref 0.76–1.46)
TSH SERPL DL<=0.05 MIU/L-ACNC: 15.25 UIU/ML (ref 0.45–4.5)
UROBILINOGEN UR QL STRIP.AUTO: 0.2 E.U./DL
WBC # BLD AUTO: 8.15 THOUSAND/UL (ref 4.31–10.16)
WBC #/AREA URNS AUTO: ABNORMAL /HPF

## 2023-01-31 PROCEDURE — 0T2BX0Z CHANGE DRAINAGE DEVICE IN BLADDER, EXTERNAL APPROACH: ICD-10-PCS | Performed by: FAMILY MEDICINE

## 2023-01-31 RX ORDER — DOCUSATE SODIUM 100 MG/1
100 CAPSULE, LIQUID FILLED ORAL 2 TIMES DAILY
Status: DISCONTINUED | OUTPATIENT
Start: 2023-01-31 | End: 2023-02-03 | Stop reason: HOSPADM

## 2023-01-31 RX ORDER — ALLOPURINOL 100 MG/1
100 TABLET ORAL DAILY
Status: DISCONTINUED | OUTPATIENT
Start: 2023-01-31 | End: 2023-02-03 | Stop reason: HOSPADM

## 2023-01-31 RX ORDER — PANTOPRAZOLE SODIUM 40 MG/1
40 TABLET, DELAYED RELEASE ORAL
Status: DISCONTINUED | OUTPATIENT
Start: 2023-02-01 | End: 2023-02-03 | Stop reason: HOSPADM

## 2023-01-31 RX ORDER — ACETAMINOPHEN 325 MG/1
650 TABLET ORAL EVERY 6 HOURS PRN
Status: DISCONTINUED | OUTPATIENT
Start: 2023-01-31 | End: 2023-02-03 | Stop reason: HOSPADM

## 2023-01-31 RX ORDER — MELATONIN
1000 DAILY
Status: DISCONTINUED | OUTPATIENT
Start: 2023-01-31 | End: 2023-02-03 | Stop reason: HOSPADM

## 2023-01-31 RX ORDER — SERTRALINE HYDROCHLORIDE 100 MG/1
100 TABLET, FILM COATED ORAL DAILY
Status: DISCONTINUED | OUTPATIENT
Start: 2023-01-31 | End: 2023-02-03 | Stop reason: HOSPADM

## 2023-01-31 RX ORDER — ONDANSETRON 2 MG/ML
4 INJECTION INTRAMUSCULAR; INTRAVENOUS EVERY 6 HOURS PRN
Status: DISCONTINUED | OUTPATIENT
Start: 2023-01-31 | End: 2023-02-03 | Stop reason: HOSPADM

## 2023-01-31 RX ORDER — SODIUM BICARBONATE 650 MG/1
650 TABLET ORAL
Status: DISCONTINUED | OUTPATIENT
Start: 2023-01-31 | End: 2023-02-03 | Stop reason: HOSPADM

## 2023-01-31 RX ORDER — HYDROCORTISONE ACETATE 25 MG/1
25 SUPPOSITORY RECTAL 2 TIMES DAILY
Status: DISCONTINUED | OUTPATIENT
Start: 2023-01-31 | End: 2023-02-03 | Stop reason: HOSPADM

## 2023-01-31 RX ORDER — CEFEPIME HYDROCHLORIDE 1 G/50ML
1000 INJECTION, SOLUTION INTRAVENOUS EVERY 24 HOURS
Status: DISCONTINUED | OUTPATIENT
Start: 2023-01-31 | End: 2023-02-02

## 2023-01-31 RX ORDER — LEVOTHYROXINE SODIUM 175 UG/1
175 TABLET ORAL
Status: DISCONTINUED | OUTPATIENT
Start: 2023-02-01 | End: 2023-02-03 | Stop reason: HOSPADM

## 2023-01-31 RX ORDER — FUROSEMIDE 10 MG/ML
40 INJECTION INTRAMUSCULAR; INTRAVENOUS
Status: DISCONTINUED | OUTPATIENT
Start: 2023-01-31 | End: 2023-02-02

## 2023-01-31 RX ORDER — ASPIRIN 81 MG/1
81 TABLET ORAL DAILY
Status: DISCONTINUED | OUTPATIENT
Start: 2023-01-31 | End: 2023-02-03 | Stop reason: HOSPADM

## 2023-01-31 RX ORDER — METOPROLOL SUCCINATE 100 MG/1
100 TABLET, EXTENDED RELEASE ORAL DAILY
Status: DISCONTINUED | OUTPATIENT
Start: 2023-01-31 | End: 2023-02-03 | Stop reason: HOSPADM

## 2023-01-31 RX ORDER — FUROSEMIDE 10 MG/ML
40 INJECTION INTRAMUSCULAR; INTRAVENOUS ONCE
Status: COMPLETED | OUTPATIENT
Start: 2023-01-31 | End: 2023-01-31

## 2023-01-31 RX ORDER — AMLODIPINE BESYLATE 10 MG/1
10 TABLET ORAL DAILY
Status: DISCONTINUED | OUTPATIENT
Start: 2023-01-31 | End: 2023-02-03 | Stop reason: HOSPADM

## 2023-01-31 RX ORDER — TAMSULOSIN HYDROCHLORIDE 0.4 MG/1
0.4 CAPSULE ORAL
Status: DISCONTINUED | OUTPATIENT
Start: 2023-01-31 | End: 2023-02-03 | Stop reason: HOSPADM

## 2023-01-31 RX ADMIN — FUROSEMIDE 40 MG: 10 INJECTION, SOLUTION INTRAMUSCULAR; INTRAVENOUS at 15:56

## 2023-01-31 RX ADMIN — APIXABAN 5 MG: 5 TABLET, FILM COATED ORAL at 17:18

## 2023-01-31 RX ADMIN — DOCUSATE SODIUM 100 MG: 100 CAPSULE ORAL at 17:18

## 2023-01-31 RX ADMIN — TAMSULOSIN HYDROCHLORIDE 0.4 MG: 0.4 CAPSULE ORAL at 15:56

## 2023-01-31 RX ADMIN — SODIUM BICARBONATE 650 MG TABLET 650 MG: at 17:18

## 2023-01-31 RX ADMIN — CEFEPIME HYDROCHLORIDE 1000 MG: 1 INJECTION, SOLUTION INTRAVENOUS at 22:55

## 2023-01-31 RX ADMIN — CEFEPIME HYDROCHLORIDE 500 MG: 1 INJECTION, POWDER, FOR SOLUTION INTRAMUSCULAR; INTRAVENOUS at 11:38

## 2023-01-31 RX ADMIN — FUROSEMIDE 40 MG: 10 INJECTION, SOLUTION INTRAMUSCULAR; INTRAVENOUS at 11:28

## 2023-01-31 NOTE — ED NOTES
Tiger text was sent by [facility] to 3M RN  Pt transported to unit   No s/s of distress, Vs stable, pt is alert, mask in place     Missy Martinez, [address] [place] [address]  01/31/23 7621

## 2023-01-31 NOTE — PLAN OF CARE
Problem: Potential for Falls  Goal: Patient will remain free of falls  Description: INTERVENTIONS:  - Educate patient/family on patient safety including physical limitations  - Instruct patient to call for assistance with activity   - Consult OT/PT to assist with strengthening/mobility   - Keep Call bell within reach  - Keep bed low and locked with side rails adjusted as appropriate  - Keep care items and personal belongings within reach  - Initiate and maintain comfort rounds  - Make Fall Risk Sign visible to staff  - Offer Toileting every   Hours, in advance of need  - Initiate/Maintain   alarm  - Obtain necessary fall risk management equipment:      - Apply yellow socks and bracelet for high fall risk patients  - Consider moving patient to room near nurses station  Outcome: Progressing     Problem: CARDIOVASCULAR - ADULT  Goal: Maintains optimal cardiac output and hemodynamic stability  Description: INTERVENTIONS:  - Monitor I/O, vital signs and rhythm  - Monitor for S/S and trends of decreased cardiac output  - Administer and titrate ordered vasoactive medications to optimize hemodynamic stability  - Assess quality of pulses, skin color and temperature  - Assess for signs of decreased coronary artery perfusion  - Instruct patient to report change in severity of symptoms  Outcome: Progressing  Goal: Absence of cardiac dysrhythmias or at baseline rhythm  Description: INTERVENTIONS:  - Continuous cardiac monitoring, vital signs, obtain 12 lead EKG if ordered  - Administer antiarrhythmic and heart rate control medications as ordered  - Monitor electrolytes and administer replacement therapy as ordered  Outcome: Progressing     Problem: RESPIRATORY - ADULT  Goal: Achieves optimal ventilation and oxygenation  Description: INTERVENTIONS:  - Assess for changes in respiratory status  - Assess for changes in mentation and behavior  - Position to facilitate oxygenation and minimize respiratory effort  - Oxygen administered by appropriate delivery if ordered  - Initiate smoking cessation education as indicated  - Encourage broncho-pulmonary hygiene including cough, deep breathe, Incentive Spirometry  - Assess the need for suctioning and aspirate as needed  - Assess and instruct to report SOB or any respiratory difficulty  - Respiratory Therapy support as indicated  Outcome: Progressing     Problem: SKIN/TISSUE INTEGRITY - ADULT  Goal: Skin Integrity remains intact(Skin Breakdown Prevention)  Description: Assess:  -Perform Arcadio assessment every    -Clean and moisturize skin every    -Inspect skin when repositioning, toileting, and assisting with ADLS  -Assess under medical devices such as   every    -Assess extremities for adequate circulation and sensation     Bed Management:  -Have minimal linens on bed & keep smooth, unwrinkled  -Change linens as needed when moist or perspiring  -Avoid sitting or lying in one position for more than   hours while in bed  -Keep HOB at  degrees     Toileting:  -Offer bedside commode  -Assess for incontinence every   -Use incontinent care products after each incontinent episode such as   Activity:  -Mobilize patient   times a day  -Encourage activity and walks on unit  -Encourage or provide ROM exercises   -Turn and reposition patient every   Hours  -Use appropriate equipment to lift or move patient in bed  -Instruct/ Assist with weight shifting every   when out of bed in chair  -Consider limitation of chair time   hour intervals    Skin Care:  -Avoid use of baby powder, tape, friction and shearing, hot water or constrictive clothing  -Relieve pressure over bony prominences using    -Do not massage red bony areas    Next Steps:  -Teach patient strategies to minimize risks such as     -Consider consults to  interdisciplinary teams such as   Ramila Mendiola   Outcome: Progressing  Goal: Incision(s), wounds(s) or drain site(s) healing without S/S of infection  Description: INTERVENTIONS  - Assess and document dressing, incision, wound bed, drain sites and surrounding tissue  - Provide patient and family education  - Perform skin care/dressing changes every   Jd Coffer Outcome: Progressing  Goal: Pressure injury heals and does not worsen  Description: Interventions:  - Implement low air loss mattress or specialty surface (Criteria met)  - Apply silicone foam dressing  - Instruct/assist with weight shifting every   minutes when in chair   - Limit chair time to   hour intervals  - Use special pressure reducing interventions such as   when in chair   - Apply fecal or urinary incontinence containment device   - Perform passive or active ROM every   - Turn and reposition patient & offload bony prominences every   hours   - Utilize friction reducing device or surface for transfers   - Consider consults to  interdisciplinary teams such as    - Use incontinent care products after each incontinent episode such as      - Consider nutrition services referral as needed  Outcome: Progressing     Problem: MUSCULOSKELETAL - ADULT  Goal: Maintain or return mobility to safest level of function  Description: INTERVENTIONS:  - Assess patient's ability to carry out ADLs; assess patient's baseline for ADL function and identify physical deficits which impact ability to perform ADLs (bathing, care of mouth/teeth, toileting, grooming, dressing, etc )  - Assess/evaluate cause of self-care deficits   - Assess range of motion  - Assess patient's mobility  - Assess patient's need for assistive devices and provide as appropriate  - Encourage maximum independence but intervene and supervise when necessary  - Involve family in performance of ADLs  - Assess for home care needs following discharge   - Consider OT consult to assist with ADL evaluation and planning for discharge  - Provide patient education as appropriate  Outcome: Progressing  Goal: Maintain proper alignment of affected body part  Description: INTERVENTIONS:  - Support, maintain and protect limb and body alignment  - Provide patient/ family with appropriate education  Outcome: Progressing     Problem: PAIN - ADULT  Goal: Verbalizes/displays adequate comfort level or baseline comfort level  Description: Interventions:  - Encourage patient to monitor pain and request assistance  - Assess pain using appropriate pain scale  - Administer analgesics based on type and severity of pain and evaluate response  - Implement non-pharmacological measures as appropriate and evaluate response  - Consider cultural and social influences on pain and pain management  - Notify physician/advanced practitioner if interventions unsuccessful or patient reports new pain  Outcome: Progressing     Problem: INFECTION - ADULT  Goal: Absence or prevention of progression during hospitalization  Description: INTERVENTIONS:  - Assess and monitor for signs and symptoms of infection  - Monitor lab/diagnostic results  - Monitor all insertion sites, i e  indwelling lines, tubes, and drains  - Monitor endotracheal if appropriate and nasal secretions for changes in amount and color  - [place] appropriate cooling/warming therapies per order  - Administer medications as ordered  - Instruct and encourage patient and family to use good hand hygiene technique  - Identify and instruct in appropriate isolation precautions for identified infection/condition  Outcome: Progressing  Goal: Absence of fever/infection during neutropenic period  Description: INTERVENTIONS:  - Monitor WBC    Outcome: Progressing     Problem: SAFETY ADULT  Goal: Patient will remain free of falls  Description: INTERVENTIONS:  - Educate patient/family on patient safety including physical limitations  - Instruct patient to call for assistance with activity   - Consult OT/PT to assist with strengthening/mobility   - Keep Call bell within reach  - Keep bed low and locked with side rails adjusted as appropriate  - Keep care items and personal belongings within reach  - Initiate and maintain comfort rounds  - Make Fall Risk Sign visible to staff  - Offer Toileting every   Hours, in advance of need  - Initiate/Maintain   alarm  - Obtain necessary fall risk management equipment:      - Apply yellow socks and bracelet for high fall risk patients  - Consider moving patient to room near nurses station  Outcome: Progressing  Goal: Maintain or return to baseline ADL function  Description: INTERVENTIONS:  -  Assess patient's ability to carry out ADLs; assess patient's baseline for ADL function and identify physical deficits which impact ability to perform ADLs (bathing, care of mouth/teeth, toileting, grooming, dressing, etc )  - Assess/evaluate cause of self-care deficits   - Assess range of motion  - Assess patient's mobility; develop plan if impaired  - Assess patient's need for assistive devices and provide as appropriate  - Encourage maximum independence but intervene and supervise when necessary  - Involve family in performance of ADLs  - Assess for home care needs following discharge   - Consider OT consult to assist with ADL evaluation and planning for discharge  - Provide patient education as appropriate  Outcome: Progressing  Goal: Maintains/Returns to pre admission functional level  Description: INTERVENTIONS:  - Perform BMAT or MOVE assessment daily    - Set and communicate daily mobility goal to care team and patient/family/caregiver  - Collaborate with rehabilitation services on mobility goals if consulted  - Perform Range of Motion   times a day  - Reposition patient every   hours  - Dangle patient   times a day  - Stand patient   times a day  - Ambulate patient   times a day  - Out of bed to chair   times a day   - Out of bed for meals     times a day  - Out of bed for toileting  - Record patient progress and toleration of activity level   Outcome: Progressing     Problem: DISCHARGE PLANNING  Goal: Discharge to home or other facility with appropriate resources  Description: INTERVENTIONS:  - Identify barriers to discharge w/patient and caregiver  - Arrange for needed discharge resources and transportation as appropriate  - Identify discharge learning needs (meds, wound care, etc )  - Arrange for interpretive services to assist at discharge as needed  - Refer to Case Management Department for coordinating discharge planning if the patient needs post-hospital services based on physician/advanced practitioner order or complex needs related to functional status, cognitive ability, or social support system  Outcome: Progressing     Problem: Knowledge Deficit  Goal: Patient/family/caregiver demonstrates understanding of disease process, treatment plan, medications, and discharge instructions  Description: Complete learning assessment and assess knowledge base    Interventions:  - Provide teaching at level of understanding  - Provide teaching via preferred learning methods  Outcome: Progressing

## 2023-01-31 NOTE — ASSESSMENT & PLAN NOTE
As per chart review, patient had COVID on September 2022  Patient got recently discharged from this hospital with normal resolved  Came back today with COVID-positive  Patient is saturating well on room air  Patient already on Eliquis, continue  Continue treatment as per mild pathway-if patient started requiring oxygen, adjust the treatment as per protocol

## 2023-01-31 NOTE — ASSESSMENT & PLAN NOTE
Catheter exchange in ER  Patient recently was admitted into this hospital and completed antibiotic treatment at that time  Status post renally dose adjusted cefepime-we will continue  Follow repeat urine culture
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
## 2023-01-31 NOTE — ASSESSMENT & PLAN NOTE
Lab Results   Component Value Date    EGFR 21 01/31/2023    EGFR 23 01/27/2023    EGFR 26 01/26/2023    CREATININE 2 18 (H) 01/31/2023    CREATININE 1 98 (H) 01/27/2023    CREATININE 1 81 (H) 01/26/2023   Patient baseline creatinine runs around 1   7  Patient recently discharged from this hospital with creatinine 1 98 on 1/27/2023  Follows up with nephrology on outpatient basis  Patient placed on IV diuretics due to CHF, continue renal function

## 2023-01-31 NOTE — ASSESSMENT & PLAN NOTE
Wt Readings from Last 3 Encounters:   01/31/23 59 7 kg (131 lb 11 2 oz)   01/27/23 65 9 kg (145 lb 4 5 oz)   01/26/23 61 9 kg (136 lb 7 4 oz)     Came from nursing facility with generalized weakness  Chest x-ray shows vascular congestion with pleural effusion  proBNP elevated (patient also has CKD)  Status post IV diuretics with Lasix 40 mg-we will continue Lasix 40 mg twice daily since patient is clinically volume overloaded  Follow intake and output  Standing weight  Maintain electrolytes  Follow renal function

## 2023-02-01 PROBLEM — D64.9 ACUTE ON CHRONIC ANEMIA: Status: ACTIVE | Noted: 2018-03-20

## 2023-02-01 LAB
ALBUMIN SERPL BCP-MCNC: 2.8 G/DL (ref 3.5–5)
ALP SERPL-CCNC: 82 U/L (ref 34–104)
ALT SERPL W P-5'-P-CCNC: 4 U/L (ref 7–52)
ANION GAP SERPL CALCULATED.3IONS-SCNC: 7 MMOL/L (ref 4–13)
AST SERPL W P-5'-P-CCNC: 13 U/L (ref 13–39)
BASOPHILS # BLD AUTO: 0.06 THOUSANDS/ÂΜL (ref 0–0.1)
BASOPHILS NFR BLD AUTO: 1 % (ref 0–1)
BILIRUB SERPL-MCNC: 0.41 MG/DL (ref 0.2–1)
BUN SERPL-MCNC: 58 MG/DL (ref 5–25)
CALCIUM ALBUM COR SERPL-MCNC: 9.5 MG/DL (ref 8.3–10.1)
CALCIUM SERPL-MCNC: 8.5 MG/DL (ref 8.4–10.2)
CHLORIDE SERPL-SCNC: 107 MMOL/L (ref 96–108)
CO2 SERPL-SCNC: 25 MMOL/L (ref 21–32)
CREAT SERPL-MCNC: 2.16 MG/DL (ref 0.6–1.3)
EOSINOPHIL # BLD AUTO: 0.19 THOUSAND/ÂΜL (ref 0–0.61)
EOSINOPHIL NFR BLD AUTO: 3 % (ref 0–6)
ERYTHROCYTE [DISTWIDTH] IN BLOOD BY AUTOMATED COUNT: 17.6 % (ref 11.6–15.1)
GFR SERPL CREATININE-BSD FRML MDRD: 21 ML/MIN/1.73SQ M
GLUCOSE SERPL-MCNC: 78 MG/DL (ref 65–140)
HCT VFR BLD AUTO: 26.3 % (ref 34.8–46.1)
HGB BLD-MCNC: 7.8 G/DL (ref 11.5–15.4)
IMM GRANULOCYTES # BLD AUTO: 0.03 THOUSAND/UL (ref 0–0.2)
IMM GRANULOCYTES NFR BLD AUTO: 1 % (ref 0–2)
LYMPHOCYTES # BLD AUTO: 0.81 THOUSANDS/ÂΜL (ref 0.6–4.47)
LYMPHOCYTES NFR BLD AUTO: 13 % (ref 14–44)
MAGNESIUM SERPL-MCNC: 1.9 MG/DL (ref 1.9–2.7)
MCH RBC QN AUTO: 27.3 PG (ref 26.8–34.3)
MCHC RBC AUTO-ENTMCNC: 29.7 G/DL (ref 31.4–37.4)
MCV RBC AUTO: 92 FL (ref 82–98)
MONOCYTES # BLD AUTO: 0.4 THOUSAND/ÂΜL (ref 0.17–1.22)
MONOCYTES NFR BLD AUTO: 7 % (ref 4–12)
NEUTROPHILS # BLD AUTO: 4.66 THOUSANDS/ÂΜL (ref 1.85–7.62)
NEUTS SEG NFR BLD AUTO: 75 % (ref 43–75)
NRBC BLD AUTO-RTO: 0 /100 WBCS
PLATELET # BLD AUTO: 252 THOUSANDS/UL (ref 149–390)
PMV BLD AUTO: 10.9 FL (ref 8.9–12.7)
POTASSIUM SERPL-SCNC: 4.2 MMOL/L (ref 3.5–5.3)
PROT SERPL-MCNC: 5.4 G/DL (ref 6.4–8.4)
RBC # BLD AUTO: 2.86 MILLION/UL (ref 3.81–5.12)
SODIUM SERPL-SCNC: 139 MMOL/L (ref 135–147)
WBC # BLD AUTO: 6.15 THOUSAND/UL (ref 4.31–10.16)

## 2023-02-01 RX ADMIN — FUROSEMIDE 40 MG: 10 INJECTION, SOLUTION INTRAMUSCULAR; INTRAVENOUS at 09:32

## 2023-02-01 RX ADMIN — DOCUSATE SODIUM 100 MG: 100 CAPSULE ORAL at 17:26

## 2023-02-01 RX ADMIN — LEVOTHYROXINE SODIUM 175 MCG: 175 TABLET ORAL at 05:27

## 2023-02-01 RX ADMIN — Medication 1000 UNITS: at 09:36

## 2023-02-01 RX ADMIN — ASPIRIN 81 MG: 81 TABLET, COATED ORAL at 09:36

## 2023-02-01 RX ADMIN — SODIUM BICARBONATE 650 MG TABLET 650 MG: at 09:36

## 2023-02-01 RX ADMIN — CEFEPIME HYDROCHLORIDE 1000 MG: 1 INJECTION, SOLUTION INTRAVENOUS at 22:45

## 2023-02-01 RX ADMIN — FUROSEMIDE 40 MG: 10 INJECTION, SOLUTION INTRAMUSCULAR; INTRAVENOUS at 17:26

## 2023-02-01 RX ADMIN — HYDROCORTISONE ACETATE 25 MG: 25 SUPPOSITORY RECTAL at 09:37

## 2023-02-01 RX ADMIN — APIXABAN 5 MG: 5 TABLET, FILM COATED ORAL at 09:36

## 2023-02-01 RX ADMIN — ALLOPURINOL 100 MG: 100 TABLET ORAL at 09:36

## 2023-02-01 RX ADMIN — CYANOCOBALAMIN TAB 500 MCG 1000 MCG: 500 TAB at 09:36

## 2023-02-01 RX ADMIN — DOCUSATE SODIUM 100 MG: 100 CAPSULE ORAL at 09:36

## 2023-02-01 RX ADMIN — TAMSULOSIN HYDROCHLORIDE 0.4 MG: 0.4 CAPSULE ORAL at 17:26

## 2023-02-01 RX ADMIN — METOPROLOL SUCCINATE 100 MG: 100 TABLET, EXTENDED RELEASE ORAL at 09:36

## 2023-02-01 RX ADMIN — SODIUM BICARBONATE 650 MG TABLET 650 MG: at 12:12

## 2023-02-01 RX ADMIN — SERTRALINE 100 MG: 100 TABLET, FILM COATED ORAL at 09:36

## 2023-02-01 RX ADMIN — SODIUM BICARBONATE 650 MG TABLET 650 MG: at 17:26

## 2023-02-01 RX ADMIN — AMLODIPINE BESYLATE 10 MG: 10 TABLET ORAL at 09:36

## 2023-02-01 RX ADMIN — APIXABAN 5 MG: 5 TABLET, FILM COATED ORAL at 17:27

## 2023-02-01 RX ADMIN — PANTOPRAZOLE SODIUM 40 MG: 40 TABLET, DELAYED RELEASE ORAL at 05:27

## 2023-02-01 RX ADMIN — HYDROCORTISONE ACETATE 25 MG: 25 SUPPOSITORY RECTAL at 17:26

## 2023-02-01 NOTE — ASSESSMENT & PLAN NOTE
· Recently patient's levothyroxine dose adjusted, consider to recheck TSH after 4 weeks  · Continue levothyroxine 125mcg

## 2023-02-01 NOTE — ASSESSMENT & PLAN NOTE
· Baseline hgb 8 3, frequently going lower  · Hgb this morning is 7 8  · Likely from dilution in the setting of acute CHF vs chronic kidney disease  · No obvious signs of bleeding  · Stool smear negative for blood  · Patient had anemia work up outpatient   Outpatient nephrology recommended her to go see a hematologist   · CBC in AM to monitor
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
## 2023-02-01 NOTE — ASSESSMENT & PLAN NOTE
· Catheter exchange in ER  · Patient recently was admitted into this hospital and completed antibiotic treatment at that time  · Status post renally dose adjusted cefepime-we will continue  · Follow repeat urine culture

## 2023-02-01 NOTE — ASSESSMENT & PLAN NOTE
Lab Results   Component Value Date    EGFR 21 02/01/2023    EGFR 21 01/31/2023    EGFR 23 01/27/2023    CREATININE 2 16 (H) 02/01/2023    CREATININE 2 18 (H) 01/31/2023    CREATININE 1 98 (H) 01/27/2023   · Patient baseline creatinine runs around 1 7  · Patient recently discharged from this hospital with creatinine 1 98 on 1/27/2023  · Follows up with nephrology on outpatient basis  · Patient placed on IV diuretics due to CHF, continue renal function

## 2023-02-01 NOTE — ASSESSMENT & PLAN NOTE
Wt Readings from Last 3 Encounters:   02/01/23 61 8 kg (136 lb 3 9 oz)   01/27/23 65 9 kg (145 lb 4 5 oz)   01/26/23 61 9 kg (136 lb 7 4 oz)     · Came from nursing facility with generalized weakness  · Chest x-ray shows vascular congestion with pleural effusion  · proBNP elevated (patient also has CKD)  · Status post IV diuretics with Lasix 40 mg-we will continue Lasix 40 mg twice daily since patient is volume overloaded  · Follow intake and output  · Standing weight  · Maintain electrolytes  · Follow renal function  · If no improvement in AM BMP, can consult nephrology

## 2023-02-01 NOTE — PROGRESS NOTES
[address]  Progress Note - Claudia Cortes 1945, 65 y o  female MRN: [id number]  Unit/Bed#: [facility] Encounter: [id number]  Primary Care Provider: Lucille Carcamo DO   Date and time admitted to hospital: 1/31/2023  9:37 AM    * 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 heart failure (HCC)  Assessment & Plan  Wt Readings from Last 3 Encounters:   02/01/23 61 8 kg (136 lb 3 9 oz)   01/27/23 65 9 kg (145 lb 4 5 oz)   01/26/23 61 9 kg (136 lb 7 4 oz)     · Came from nursing facility with generalized weakness  · Chest x-ray shows vascular congestion with pleural effusion  · proBNP elevated (patient also has CKD)  · Status post IV diuretics with Lasix 40 mg-we will continue Lasix 40 mg twice daily since patient is volume overloaded  · Follow intake and output  · Standing weight  · Maintain electrolytes  · Follow renal function  · If no improvement in AM BMP, can consult nephrology    COVID-19 virus infection  Assessment & Plan  · As per chart review, patient had COVID on September 2022  · Patient got recently discharged from this hospital with normal resolved  · Came back with COVID-positive  · Patient is saturating well on room air  · Patient already on Eliquis, continue  · Continue treatment as per mild pathway-if patient started requiring oxygen, adjust the treatment as per protocol    Stage 3b chronic kidney disease [facility])  Assessment & Plan  Lab Results   Component Value Date    EGFR 21 02/01/2023    EGFR 21 01/31/2023    EGFR 23 01/27/2023    CREATININE 2 16 (H) 02/01/2023    CREATININE 2 18 (H) 01/31/2023    CREATININE 1 98 (H) 01/27/2023   · Patient baseline creatinine runs around 1 7  · Patient recently discharged from this hospital with creatinine 1 98 on 1/27/2023  · Follows up with nephrology on outpatient basis  · Patient placed on IV diuretics due to CHF, continue renal function    Urinary tract infection associated with indwelling urethral catheter [facility])  Assessment & Plan  · Catheter exchange in ER  · Patient recently was admitted into this hospital and completed antibiotic treatment at that time  · Status post renally dose adjusted cefepime-we will continue  · Follow repeat urine culture    Acute on chronic anemia  Assessment & Plan  · Baseline hgb 8 3, frequently going lower  · Hgb this morning is 7 8  · Likely from dilution in the setting of acute CHF vs chronic kidney disease  · No obvious signs of bleeding  · Stool smear negative for blood  · Patient had anemia work up outpatient  Outpatient nephrology recommended her to go see a hematologist   · CBC in AM to monitor    S/P implantation of automatic cardioverter/defibrillator (AICD)  Assessment & Plan  · Noted    Paroxysmal atrial fibrillation ([address] )  Assessment & Plan  · Status post AICD in place  · Continue Eliquis  · Continue metoprolol    Acquired hypothyroidism  Assessment & Plan  · Recently patient's levothyroxine dose adjusted, consider to recheck TSH after 4 weeks  · Continue levothyroxine 125mcg      VTE Pharmacologic Prophylaxis: VTE Score: 7 High Risk (Score >/= 5) - Pharmacological DVT Prophylaxis Ordered: apixaban (Eliquis)  Sequential Compression Devices Ordered  Patient Centered Rounds: I performed bedside rounds with nursing staff today  Discussions with Specialists or Other Care Team Provider: None    Education and Discussions with Family / Patient: Updated  (daughter) via phone  Time Spent for Care: 30 minutes  More than 50% of total time spent on counseling and coordination of care as described above  Current Length of Stay: 1 day(s)  Current Patient Status: Inpatient   Certification Statement: The patient will continue to require additional inpatient hospital stay due to decreased hemoglobin and acute chf exacerbation  Discharge Plan: Anticipate discharge in 48 hrs to prior assisted or independent living facility      Code Status: Level 3 - DNAR and DNI    Subjective:   Patient states that she is feeling well today  She is oriented to self and place  She denies shortness of breath, cough, chest pain, abdominal pain, nausea or vomiting  Objective:      Vitals:   Temp (24hrs), Av 4 °F (36 3 °C), Min:97 3 °F (36 3 °C), Max:97 5 °F (36 4 °C)    Temp:  [97 3 °F (36 3 °C)-97 5 °F (36 4 °C)] 97 5 °F (36 4 °C)  HR:  [68-73] 73  Resp:  [16-18] 18  BP: (136-151)/(61-67) 151/67  SpO2:  [95 %-100 %] 98 %  Body mass index is 22 67 kg/m²  Input and Output Summary (last 24 hours): Intake/Output Summary (Last 24 hours) at 2023 1603  Last data filed at 2023 1401  Gross per 24 hour   Intake 500 ml   Output 2300 ml   Net -1800 ml       Physical Exam:   Physical Exam  Vitals reviewed  Constitutional:       General: She is not in acute distress  Appearance: Normal appearance  She is normal weight  She is not ill-appearing  HENT:      Head: Normocephalic and atraumatic  Nose: Nose normal       Mouth/Throat:      Mouth: Mucous membranes are moist       Pharynx: Oropharynx is clear  Eyes:      Extraocular Movements: Extraocular movements intact  Conjunctiva/sclera: Conjunctivae normal    Cardiovascular:      Rate and Rhythm: Normal rate and regular rhythm  Pulses: Normal pulses  Heart sounds: Murmur heard  Comments: A fib murmur heard  Pulmonary:      Effort: Pulmonary effort is normal  No respiratory distress  Breath sounds: Normal breath sounds  No wheezing  Abdominal:      General: Abdomen is flat  Bowel sounds are normal  There is no distension  Palpations: Abdomen is soft  Tenderness: There is no abdominal tenderness  There is no guarding  Musculoskeletal:         General: Normal range of motion  Cervical back: Normal range of motion  Right lower leg: No edema  Left lower leg: No edema  Skin:     General: Skin is warm  Neurological:      General: No focal deficit present        Mental Status: She is alert and oriented to person, place, and time  Mental status is at baseline  Psychiatric:         Mood and Affect: Mood normal          Behavior: Behavior normal          Thought Content: Thought content normal          Judgment: Judgment normal           Additional Data:     Labs:  Results from last 7 days   Lab Units 02/01/23  0526   WBC Thousand/uL 6 15   HEMOGLOBIN g/dL 7 8*   HEMATOCRIT % 26 3*   PLATELETS Thousands/uL 252   NEUTROS PCT % 75   LYMPHS PCT % 13*   MONOS PCT % 7   EOS PCT % 3     Results from last 7 days   Lab Units 02/01/23  0526   SODIUM mmol/L 139   POTASSIUM mmol/L 4 2   CHLORIDE mmol/L 107   CO2 mmol/L 25   BUN mg/dL 58*   CREATININE mg/dL 2 16*   ANION GAP mmol/L 7   CALCIUM mg/dL 8 5   ALBUMIN g/dL 2 8*   TOTAL BILIRUBIN mg/dL 0 41   ALK PHOS U/L 82   ALT U/L 4*   AST U/L 13   GLUCOSE RANDOM mg/dL 78     Results from last 7 days   Lab Units 01/31/23  1022   INR  1 62*             Results from last 7 days   Lab Units 01/31/23  1020 01/31/23  1013   LACTIC ACID mmol/L  --  1 0   PROCALCITONIN ng/ml 0 16  --        Lines/Drains:  Invasive Devices     Peripheral Intravenous Line  Duration           Peripheral IV 01/31/23 Left;Ventral (anterior) Wrist 1 day          Drain  Duration           Urethral Catheter Coude 18 Fr  1 day              Urinary Catheter:  Goal for removal: N/A - Chronic Greer               Imaging: Reviewed radiology reports from this admission including: chest xray    Recent Cultures (last 7 days):   Results from last 7 days   Lab Units 01/31/23  1023 01/31/23  1022 01/31/23  1013   BLOOD CULTURE   --  Received in Microbiology Lab  Culture in Progress  Received in Microbiology Lab  Culture in Progress  URINE CULTURE  Culture results to follow    --   --        Last 24 Hours Medication List:   Current Facility-Administered Medications   Medication Dose Route Frequency Provider Last Rate   • acetaminophen  650 mg Oral Q6H PRN Edi Cabrera MD     • allopurinol  100 mg Oral Daily Genevieve Woods MD     • amLODIPine  10 mg Oral Daily Crystal Avalos MD     • apixaban  5 mg Oral BID Genevieve Woods MD     • aspirin  81 mg Oral Daily Genevieve Woods MD     • cefepime  1,000 mg Intravenous Q24H Genevieve Woods MD 1,000 mg (01/31/23 4596)   • cholecalciferol  1,000 Units Oral Daily Genevieve Woods MD     • cyanocobalamin  1,000 mcg Oral Daily Genevieve Woods MD     • docusate sodium  100 mg Oral BID Genevieve Woods MD     • furosemide  40 mg Intravenous BID (diuretic) Genevieve Woods MD     • hydrocortisone  25 mg Rectal BID Geneveive Woods MD     • levothyroxine  175 mcg Oral Early Morning Genevieve Woods MD     • metoprolol succinate  100 mg Oral Daily Genevieve Woods MD     • ondansetron  4 mg Intravenous Q6H PRN Genevieve Woods MD     • pantoprazole  40 mg Oral Early Morning Genevieve Woods MD     • sertraline  100 mg Oral Daily Genevieve Woods MD     • sodium bicarbonate  650 mg Oral TID after meals Genevieve Woods MD     • tamsulosin  0 4 mg Oral Daily With Wander Knox MD          Today, Patient Was Seen By: Domi Camarillo PA-C    **Please Note: This note may have been constructed using a voice recognition system  **

## 2023-02-01 NOTE — PLAN OF CARE
Problem: Potential for Falls  Goal: Patient will remain free of falls  Description: INTERVENTIONS:  - Educate patient/family on patient safety including physical limitations  - Instruct patient to call for assistance with activity   - Consult OT/PT to assist with strengthening/mobility   - Keep Call bell within reach  - Keep bed low and locked with side rails adjusted as appropriate  - Keep care items and personal belongings within reach  - Initiate and maintain comfort rounds  - Make Fall Risk Sign visible to staff  - Offer Toileting every   Hours, in advance of need  - Initiate/Maintain   alarm  - Obtain necessary fall risk management equipment:      - Apply yellow socks and bracelet for high fall risk patients  - Consider moving patient to room near nurses station  Outcome: Progressing     Problem: CARDIOVASCULAR - ADULT  Goal: Maintains optimal cardiac output and hemodynamic stability  Description: INTERVENTIONS:  - Monitor I/O, vital signs and rhythm  - Monitor for S/S and trends of decreased cardiac output  - Administer and titrate ordered vasoactive medications to optimize hemodynamic stability  - Assess quality of pulses, skin color and temperature  - Assess for signs of decreased coronary artery perfusion  - Instruct patient to report change in severity of symptoms  Outcome: Progressing  Goal: Absence of cardiac dysrhythmias or at baseline rhythm  Description: INTERVENTIONS:  - Continuous cardiac monitoring, vital signs, obtain 12 lead EKG if ordered  - Administer antiarrhythmic and heart rate control medications as ordered  - Monitor electrolytes and administer replacement therapy as ordered  Outcome: Progressing     Problem: RESPIRATORY - ADULT  Goal: Achieves optimal ventilation and oxygenation  Description: INTERVENTIONS:  - Assess for changes in respiratory status  - Assess for changes in mentation and behavior  - Position to facilitate oxygenation and minimize respiratory effort  - Oxygen administered by appropriate delivery if ordered  - Initiate smoking cessation education as indicated  - Encourage broncho-pulmonary hygiene including cough, deep breathe, Incentive Spirometry  - Assess the need for suctioning and aspirate as needed  - Assess and instruct to report SOB or any respiratory difficulty  - Respiratory Therapy support as indicated  Outcome: Progressing     Problem: SKIN/TISSUE INTEGRITY - ADULT  Goal: Skin Integrity remains intact(Skin Breakdown Prevention)  Description: Assess:  -Perform Arcadio assessment every    -Clean and moisturize skin every    -Inspect skin when repositioning, toileting, and assisting with ADLS  -Assess under medical devices such as   every    -Assess extremities for adequate circulation and sensation     Bed Management:  -Have minimal linens on bed & keep smooth, unwrinkled  -Change linens as needed when moist or perspiring  -Avoid sitting or lying in one position for more than   hours while in bed  -Keep HOB at  degrees     Toileting:  -Offer bedside commode  -Assess for incontinence every   -Use incontinent care products after each incontinent episode such as   Activity:  -Mobilize patient   times a day  -Encourage activity and walks on unit  -Encourage or provide ROM exercises   -Turn and reposition patient every   Hours  -Use appropriate equipment to lift or move patient in bed  -Instruct/ Assist with weight shifting every   when out of bed in chair  -Consider limitation of chair time   hour intervals    Skin Care:  -Avoid use of baby powder, tape, friction and shearing, hot water or constrictive clothing  -Relieve pressure over bony prominences using    -Do not massage red bony areas    Next Steps:  -Teach patient strategies to minimize risks such as     -Consider consults to  interdisciplinary teams such as   Ar Lanier   Outcome: Progressing  Goal: Incision(s), wounds(s) or drain site(s) healing without S/S of infection  Description: INTERVENTIONS  - Assess and document dressing, incision, wound bed, drain sites and surrounding tissue  - Provide patient and family education  - Perform skin care/dressing changes every   Gwenette Carrier Outcome: Progressing  Goal: Pressure injury heals and does not worsen  Description: Interventions:  - Implement low air loss mattress or specialty surface (Criteria met)  - Apply silicone foam dressing  - Instruct/assist with weight shifting every   minutes when in chair   - Limit chair time to   hour intervals  - Use special pressure reducing interventions such as   when in chair   - Apply fecal or urinary incontinence containment device   - Perform passive or active ROM every   - Turn and reposition patient & offload bony prominences every   hours   - Utilize friction reducing device or surface for transfers   - Consider consults to  interdisciplinary teams such as    - Use incontinent care products after each incontinent episode such as      - Consider nutrition services referral as needed  Outcome: Progressing     Problem: MUSCULOSKELETAL - ADULT  Goal: Maintain or return mobility to safest level of function  Description: INTERVENTIONS:  - Assess patient's ability to carry out ADLs; assess patient's baseline for ADL function and identify physical deficits which impact ability to perform ADLs (bathing, care of mouth/teeth, toileting, grooming, dressing, etc )  - Assess/evaluate cause of self-care deficits   - Assess range of motion  - Assess patient's mobility  - Assess patient's need for assistive devices and provide as appropriate  - Encourage maximum independence but intervene and supervise when necessary  - Involve family in performance of ADLs  - Assess for home care needs following discharge   - Consider OT consult to assist with ADL evaluation and planning for discharge  - Provide patient education as appropriate  Outcome: Progressing  Goal: Maintain proper alignment of affected body part  Description: INTERVENTIONS:  - Support, maintain and protect limb and body alignment  - Provide patient/ family with appropriate education  Outcome: Progressing     Problem: PAIN - ADULT  Goal: Verbalizes/displays adequate comfort level or baseline comfort level  Description: Interventions:  - Encourage patient to monitor pain and request assistance  - Assess pain using appropriate pain scale  - Administer analgesics based on type and severity of pain and evaluate response  - Implement non-pharmacological measures as appropriate and evaluate response  - Consider cultural and social influences on pain and pain management  - Notify physician/advanced practitioner if interventions unsuccessful or patient reports new pain  Outcome: Progressing     Problem: INFECTION - ADULT  Goal: Absence or prevention of progression during hospitalization  Description: INTERVENTIONS:  - Assess and monitor for signs and symptoms of infection  - Monitor lab/diagnostic results  - Monitor all insertion sites, i e  indwelling lines, tubes, and drains  - Monitor endotracheal if appropriate and nasal secretions for changes in amount and color  - [place] appropriate cooling/warming therapies per order  - Administer medications as ordered  - Instruct and encourage patient and family to use good hand hygiene technique  - Identify and instruct in appropriate isolation precautions for identified infection/condition  Outcome: Progressing  Goal: Absence of fever/infection during neutropenic period  Description: INTERVENTIONS:  - Monitor WBC    Outcome: Progressing     Problem: SAFETY ADULT  Goal: Patient will remain free of falls  Description: INTERVENTIONS:  - Educate patient/family on patient safety including physical limitations  - Instruct patient to call for assistance with activity   - Consult OT/PT to assist with strengthening/mobility   - Keep Call bell within reach  - Keep bed low and locked with side rails adjusted as appropriate  - Keep care items and personal belongings within reach  - Initiate and maintain comfort rounds  - Make Fall Risk Sign visible to staff  - Offer Toileting every   Hours, in advance of need  - Initiate/Maintain   alarm  - Obtain necessary fall risk management equipment:      - Apply yellow socks and bracelet for high fall risk patients  - Consider moving patient to room near nurses station  Outcome: Progressing  Goal: Maintain or return to baseline ADL function  Description: INTERVENTIONS:  -  Assess patient's ability to carry out ADLs; assess patient's baseline for ADL function and identify physical deficits which impact ability to perform ADLs (bathing, care of mouth/teeth, toileting, grooming, dressing, etc )  - Assess/evaluate cause of self-care deficits   - Assess range of motion  - Assess patient's mobility; develop plan if impaired  - Assess patient's need for assistive devices and provide as appropriate  - Encourage maximum independence but intervene and supervise when necessary  - Involve family in performance of ADLs  - Assess for home care needs following discharge   - Consider OT consult to assist with ADL evaluation and planning for discharge  - Provide patient education as appropriate  Outcome: Progressing  Goal: Maintains/Returns to pre admission functional level  Description: INTERVENTIONS:  - Perform BMAT or MOVE assessment daily    - Set and communicate daily mobility goal to care team and patient/family/caregiver  - Collaborate with rehabilitation services on mobility goals if consulted  - Perform Range of Motion   times a day  - Reposition patient every   hours  - Dangle patient   times a day  - Stand patient   times a day  - Ambulate patient   times a day  - Out of bed to chair   times a day   - Out of bed for meals     times a day  - Out of bed for toileting  - Record patient progress and toleration of activity level   Outcome: Progressing     Problem: DISCHARGE PLANNING  Goal: Discharge to home or other facility with appropriate resources  Description: INTERVENTIONS:  - Identify barriers to discharge w/patient and caregiver  - Arrange for needed discharge resources and transportation as appropriate  - Identify discharge learning needs (meds, wound care, etc )  - Arrange for interpretive services to assist at discharge as needed  - Refer to Case Management Department for coordinating discharge planning if the patient needs post-hospital services based on physician/advanced practitioner order or complex needs related to functional status, cognitive ability, or social support system  Outcome: Progressing     Problem: Knowledge Deficit  Goal: Patient/family/caregiver demonstrates understanding of disease process, treatment plan, medications, and discharge instructions  Description: Complete learning assessment and assess knowledge base    Interventions:  - Provide teaching at level of understanding  - Provide teaching via preferred learning methods  Outcome: Progressing     Problem: MOBILITY - ADULT  Goal: Maintain or return to baseline ADL function  Description: INTERVENTIONS:  -  Assess patient's ability to carry out ADLs; assess patient's baseline for ADL function and identify physical deficits which impact ability to perform ADLs (bathing, care of mouth/teeth, toileting, grooming, dressing, etc )  - Assess/evaluate cause of self-care deficits   - Assess range of motion  - Assess patient's mobility; develop plan if impaired  - Assess patient's need for assistive devices and provide as appropriate  - Encourage maximum independence but intervene and supervise when necessary  - Involve family in performance of ADLs  - Assess for home care needs following discharge   - Consider OT consult to assist with ADL evaluation and planning for discharge  - Provide patient education as appropriate  Outcome: Progressing  Goal: Maintains/Returns to pre admission functional level  Description: INTERVENTIONS:  - Perform BMAT or MOVE assessment daily    - Set and communicate daily mobility goal to care team and patient/family/caregiver  - Collaborate with rehabilitation services on mobility goals if consulted  - Perform Range of Motion   times a day  - Reposition patient every   hours  - Dangle patient   times a day  - Stand patient   times a day  - Ambulate patient   times a day  - Out of bed to chair   times a day   - Out of bed for meals     times a day  - Out of bed for toileting  - Record patient progress and toleration of activity level   Outcome: Progressing     Problem: Prexisting or High Potential for Compromised Skin Integrity  Goal: Skin integrity is maintained or improved  Description: INTERVENTIONS:  - Identify patients at risk for skin breakdown  - Assess and monitor skin integrity  - Assess and monitor nutrition and hydration status  - Monitor labs   - Assess for incontinence   - Turn and reposition patient  - Assist with mobility/ambulation  - Relieve pressure over bony prominences  - Avoid friction and shearing  - Provide appropriate hygiene as needed including keeping skin clean and dry  - Evaluate need for skin moisturizer/barrier cream  - Collaborate with interdisciplinary team   - Patient/family teaching  - Consider wound care consult   Outcome: Progressing

## 2023-02-01 NOTE — ASSESSMENT & PLAN NOTE
· As per chart review, patient had COVID on September 2022  · Patient got recently discharged from this hospital with normal resolved  · Came back with COVID-positive  · Patient is saturating well on room air  · Patient already on Eliquis, continue  · Continue treatment as per mild pathway-if patient started requiring oxygen, adjust the treatment as per protocol

## 2023-02-01 NOTE — PLAN OF CARE
Problem: Potential for Falls  Goal: Patient will remain free of falls  Description: INTERVENTIONS:  - Educate patient/family on patient safety including physical limitations  - Instruct patient to call for assistance with activity   - Consult OT/PT to assist with strengthening/mobility   - Keep Call bell within reach  - Keep bed low and locked with side rails adjusted as appropriate  - Keep care items and personal belongings within reach  - Initiate and maintain comfort rounds  - Make Fall Risk Sign visible to staff  - Offer Toileting every   Hours, in advance of need  - Initiate/Maintain   alarm  - Obtain necessary fall risk management equipment:      - Apply yellow socks and bracelet for high fall risk patients  - Consider moving patient to room near nurses station  Outcome: Progressing     Problem: CARDIOVASCULAR - ADULT  Goal: Maintains optimal cardiac output and hemodynamic stability  Description: INTERVENTIONS:  - Monitor I/O, vital signs and rhythm  - Monitor for S/S and trends of decreased cardiac output  - Administer and titrate ordered vasoactive medications to optimize hemodynamic stability  - Assess quality of pulses, skin color and temperature  - Assess for signs of decreased coronary artery perfusion  - Instruct patient to report change in severity of symptoms  Outcome: Progressing     Problem: RESPIRATORY - ADULT  Goal: Achieves optimal ventilation and oxygenation  Description: INTERVENTIONS:  - Assess for changes in respiratory status  - Assess for changes in mentation and behavior  - Position to facilitate oxygenation and minimize respiratory effort  - Oxygen administered by appropriate delivery if ordered  - Initiate smoking cessation education as indicated  - Encourage broncho-pulmonary hygiene including cough, deep breathe, Incentive Spirometry  - Assess the need for suctioning and aspirate as needed  - Assess and instruct to report SOB or any respiratory difficulty  - Respiratory Therapy support as indicated  Outcome: Progressing     Problem: SKIN/TISSUE INTEGRITY - ADULT  Goal: Skin Integrity remains intact(Skin Breakdown Prevention)  Description: Assess:  -Perform Arcadio assessment every    -Clean and moisturize skin every    -Inspect skin when repositioning, toileting, and assisting with ADLS  -Assess under medical devices such as   every    -Assess extremities for adequate circulation and sensation     Bed Management:  -Have minimal linens on bed & keep smooth, unwrinkled  -Change linens as needed when moist or perspiring  -Avoid sitting or lying in one position for more than   hours while in bed  -Keep HOB at  degrees     Toileting:  -Offer bedside commode  -Assess for incontinence every   -Use incontinent care products after each incontinent episode such as   Activity:  -Mobilize patient   times a day  -Encourage activity and walks on unit  -Encourage or provide ROM exercises   -Turn and reposition patient every   Hours  -Use appropriate equipment to lift or move patient in bed  -Instruct/ Assist with weight shifting every   when out of bed in chair  -Consider limitation of chair time   hour intervals    Skin Care:  -Avoid use of baby powder, tape, friction and shearing, hot water or constrictive clothing  -Relieve pressure over bony prominences using    -Do not massage red bony areas    Next Steps:  -Teach patient strategies to minimize risks such as     -Consider consults to  interdisciplinary teams such as   Milla Moreau   Outcome: Progressing     Problem: MUSCULOSKELETAL - ADULT  Goal: Maintain or return mobility to safest level of function  Description: INTERVENTIONS:  - Assess patient's ability to carry out ADLs; assess patient's baseline for ADL function and identify physical deficits which impact ability to perform ADLs (bathing, care of mouth/teeth, toileting, grooming, dressing, etc )  - Assess/evaluate cause of self-care deficits   - Assess range of motion  - Assess patient's mobility  - Assess patient's need for assistive devices and provide as appropriate  - Encourage maximum independence but intervene and supervise when necessary  - Involve family in performance of ADLs  - Assess for home care needs following discharge   - Consider OT consult to assist with ADL evaluation and planning for discharge  - Provide patient education as appropriate  Outcome: Progressing     Problem: PAIN - ADULT  Goal: Verbalizes/displays adequate comfort level or baseline comfort level  Description: Interventions:  - Encourage patient to monitor pain and request assistance  - Assess pain using appropriate pain scale  - Administer analgesics based on type and severity of pain and evaluate response  - Implement non-pharmacological measures as appropriate and evaluate response  - Consider cultural and social influences on pain and pain management  - Notify physician/advanced practitioner if interventions unsuccessful or patient reports new pain  Outcome: Progressing     Problem: INFECTION - ADULT  Goal: Absence or prevention of progression during hospitalization  Description: INTERVENTIONS:  - Assess and monitor for signs and symptoms of infection  - Monitor lab/diagnostic results  - Monitor all insertion sites, i e  indwelling lines, tubes, and drains  - Monitor endotracheal if appropriate and nasal secretions for changes in amount and color  - Andrews appropriate cooling/warming therapies per order  - Administer medications as ordered  - Instruct and encourage patient and family to use good hand hygiene technique  - Identify and instruct in appropriate isolation precautions for identified infection/condition  Outcome: Progressing     Problem: SAFETY ADULT  Goal: Patient will remain free of falls  Description: INTERVENTIONS:  - Educate patient/family on patient safety including physical limitations  - Instruct patient to call for assistance with activity   - Consult OT/PT to assist with strengthening/mobility   - Keep Call bell within reach  - Keep bed low and locked with side rails adjusted as appropriate  - Keep care items and personal belongings within reach  - Initiate and maintain comfort rounds  - Make Fall Risk Sign visible to staff  - Offer Toileting every   Hours, in advance of need  - Initiate/Maintain   alarm  - Obtain necessary fall risk management equipment:      - Apply yellow socks and bracelet for high fall risk patients  - Consider moving patient to room near nurses station  Outcome: Progressing

## 2023-02-01 NOTE — CONSULTS
Consult received for CHF Ed  Pt currently on COVID isolation, hx of dementia, unable to speak with pt via phone at this time  Pt is from [facility]  Recent previous admission at [address] [place] [address], during which was able to speak with pt  Per 1/19/23 author's note: "Noted to be on mechanical soft/thin liquids diet at baseline per SLP note  Pt stating "they're always pushing sandwiches on you" referring to [facility]  ? accuracy of recall, noting hx of dementia  Pt stating "I lost so much of my memory, if I continue I won't have any left " Pt asking to call her mom or neighbor during time of visit  Says she has a good appetite "as you can tell" pointing to her stomach  Says she "doesn't believe in" taking ensure supplements, would rather eat regular foods "     Pt currently on pureed/thin liquids diet which matches previous SLP recommendations from 1/26/23 during previous admission  Cardiac restriction appropriate, fluid restriction per MD    Pt appears to have consumed nearly 100% of B this AM  Supplements not indicated at this time, will monitor  Diet ed not indicated

## 2023-02-02 PROBLEM — N39.0 URINARY TRACT INFECTION ASSOCIATED WITH INDWELLING URETHRAL CATHETER (HCC): Status: RESOLVED | Noted: 2023-01-31 | Resolved: 2023-02-02

## 2023-02-02 PROBLEM — T83.511A URINARY TRACT INFECTION ASSOCIATED WITH INDWELLING URETHRAL CATHETER (HCC): Status: RESOLVED | Noted: 2023-01-31 | Resolved: 2023-02-02

## 2023-02-02 LAB
ALBUMIN SERPL BCP-MCNC: 2.8 G/DL (ref 3.5–5)
ALP SERPL-CCNC: 74 U/L (ref 34–104)
ALT SERPL W P-5'-P-CCNC: 5 U/L (ref 7–52)
ANION GAP SERPL CALCULATED.3IONS-SCNC: 8 MMOL/L (ref 4–13)
AST SERPL W P-5'-P-CCNC: 15 U/L (ref 13–39)
BILIRUB SERPL-MCNC: 0.37 MG/DL (ref 0.2–1)
BUN SERPL-MCNC: 59 MG/DL (ref 5–25)
CALCIUM ALBUM COR SERPL-MCNC: 9.2 MG/DL (ref 8.3–10.1)
CALCIUM SERPL-MCNC: 8.2 MG/DL (ref 8.4–10.2)
CHLORIDE SERPL-SCNC: 105 MMOL/L (ref 96–108)
CO2 SERPL-SCNC: 26 MMOL/L (ref 21–32)
CREAT SERPL-MCNC: 2.11 MG/DL (ref 0.6–1.3)
ERYTHROCYTE [DISTWIDTH] IN BLOOD BY AUTOMATED COUNT: 17.7 % (ref 11.6–15.1)
GFR SERPL CREATININE-BSD FRML MDRD: 21 ML/MIN/1.73SQ M
GLUCOSE SERPL-MCNC: 70 MG/DL (ref 65–140)
HCT VFR BLD AUTO: 24.5 % (ref 34.8–46.1)
HGB BLD-MCNC: 7.5 G/DL (ref 11.5–15.4)
MCH RBC QN AUTO: 28.1 PG (ref 26.8–34.3)
MCHC RBC AUTO-ENTMCNC: 30.6 G/DL (ref 31.4–37.4)
MCV RBC AUTO: 92 FL (ref 82–98)
MRSA NOSE QL CULT: NORMAL
PLATELET # BLD AUTO: 238 THOUSANDS/UL (ref 149–390)
PMV BLD AUTO: 10.4 FL (ref 8.9–12.7)
POTASSIUM SERPL-SCNC: 4.2 MMOL/L (ref 3.5–5.3)
PROT SERPL-MCNC: 5.5 G/DL (ref 6.4–8.4)
RBC # BLD AUTO: 2.67 MILLION/UL (ref 3.81–5.12)
SODIUM SERPL-SCNC: 139 MMOL/L (ref 135–147)
WBC # BLD AUTO: 4.78 THOUSAND/UL (ref 4.31–10.16)

## 2023-02-02 RX ADMIN — FUROSEMIDE 40 MG: 10 INJECTION, SOLUTION INTRAMUSCULAR; INTRAVENOUS at 08:19

## 2023-02-02 RX ADMIN — CYANOCOBALAMIN TAB 500 MCG 1000 MCG: 500 TAB at 08:19

## 2023-02-02 RX ADMIN — Medication 1000 UNITS: at 08:19

## 2023-02-02 RX ADMIN — SODIUM BICARBONATE 650 MG TABLET 650 MG: at 17:06

## 2023-02-02 RX ADMIN — METOPROLOL SUCCINATE 100 MG: 100 TABLET, EXTENDED RELEASE ORAL at 08:18

## 2023-02-02 RX ADMIN — DOCUSATE SODIUM 100 MG: 100 CAPSULE ORAL at 17:06

## 2023-02-02 RX ADMIN — LEVOTHYROXINE SODIUM 175 MCG: 175 TABLET ORAL at 05:03

## 2023-02-02 RX ADMIN — SODIUM BICARBONATE 650 MG TABLET 650 MG: at 08:19

## 2023-02-02 RX ADMIN — HYDROCORTISONE ACETATE 25 MG: 25 SUPPOSITORY RECTAL at 08:19

## 2023-02-02 RX ADMIN — HYDROCORTISONE ACETATE 25 MG: 25 SUPPOSITORY RECTAL at 17:06

## 2023-02-02 RX ADMIN — PANTOPRAZOLE SODIUM 40 MG: 40 TABLET, DELAYED RELEASE ORAL at 05:03

## 2023-02-02 RX ADMIN — TAMSULOSIN HYDROCHLORIDE 0.4 MG: 0.4 CAPSULE ORAL at 17:06

## 2023-02-02 RX ADMIN — SERTRALINE 100 MG: 100 TABLET, FILM COATED ORAL at 08:19

## 2023-02-02 RX ADMIN — ASPIRIN 81 MG: 81 TABLET, COATED ORAL at 08:18

## 2023-02-02 RX ADMIN — ALLOPURINOL 100 MG: 100 TABLET ORAL at 08:19

## 2023-02-02 RX ADMIN — APIXABAN 5 MG: 5 TABLET, FILM COATED ORAL at 17:06

## 2023-02-02 RX ADMIN — AMLODIPINE BESYLATE 10 MG: 10 TABLET ORAL at 08:19

## 2023-02-02 RX ADMIN — SODIUM BICARBONATE 650 MG TABLET 650 MG: at 11:57

## 2023-02-02 RX ADMIN — APIXABAN 5 MG: 5 TABLET, FILM COATED ORAL at 08:19

## 2023-02-02 NOTE — PLAN OF CARE
Problem: PHYSICAL THERAPY ADULT  Goal: Performs mobility at highest level of function for planned discharge setting  See evaluation for individualized goals  Description: Treatment/Interventions: ADL retraining, Functional transfer training, LE strengthening/ROM, Therapeutic exercise, Endurance training, Patient/family training, Equipment eval/education, Cognitive reorientation, Bed mobility, Compensatory technique education, Spoke to nursing, Spoke to case management, OT  Equipment Recommended:  (TBD by rehab)       See flowsheet documentation for full assessment, interventions and recommendations  Note: Prognosis: Guarded  Problem List: Decreased strength, Decreased endurance, Impaired balance, Decreased mobility, Decreased cognition, Impaired judgement, Decreased safety awareness  Assessment: Pt is a 65 y o  female seen for PT evaluation s/p admission to [address] [place] [address] on 1/31/2023 with 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 [facility]  Order placed for PT services  Upon evaluation: Pt is presenting with impaired functional mobility due to pain, decreased strength, decreased endurance, impaired balance, impaired cognition, decreased safety awareness, impaired judgment, and fall risk requiring  moderate assistance for bed mobility, moderate of 2 to maximal of 1 assistance for transfers, and mechanical conveyance from nursing standpoint for OOB mobility with recommendation for use of quick move versus stretcher board with drop arm recliner   Pt's clinical presentation is currently unpredictable given the functional mobility deficits above, especially weakness, decreased endurance, pain, decreased activity tolerance, decreased functional mobility tolerance, decreased safety awareness, impaired judgement, and decreased cognition, coupled with fall risks as indicated by AM-PAC 6-Clicks: 1/19 as well as impaired balance, polypharmacy, impaired judgement, decreased safety awareness and decreased cognition and combined with medical complications of abnormal renal lab values, abnormal H&H, multiple readmissions, need for input for mobility technique/safety and abnormal BNP, + Covid, acute CHF, acute anemia  Pt's PMHx and comorbidities that may affect physical performance and progress include: CHF, CKD, A fib and chronic anemia, ischemic cardiomyopathy s/p implantation of AICD, arthritis, CAD, cancer s/p B breast sx, depression, HTN, CVA  Personal factors affecting pt at time of IE include: inability to perform IADLs, inability to perform ADLs, inability to navigate level surfaces without external assistance and limited insight into impairments  Pt will benefit from continued skilled PT services to address deficits as defined above and to maximize level of functional mobility to facilitate return toward PLOF and improved QOL  From PT/mobility standpoint, recommendation at time of d/c would be return to SNF with rehab pending progress in order to reduce fall risk and maximize pt's functional independence and consistency with mobility in order to facilitate return to PLOF  Recommend trial with walker next 1-2 sessions and ther ex next 1-2 sessions  Barriers to Discharge Comments: pt is residing in SNF, has asisstance from staff  PT Discharge Recommendation: Return to facility with rehabilitation services    See flowsheet documentation for full assessment

## 2023-02-02 NOTE — UTILIZATION REVIEW
NOTIFICATION OF INPATIENT ADMISSION   AUTHORIZATION REQUEST   SERVICING FACILITY:   [address] [place] [address]  Sheila Stanford [address] [place] [address], [address]  Tax ID: [id number]  NPI: [id number] ATTENDING PROVIDER:  Attending Name and NPI#: Gerardo Mcdonald Md [[id number]]  Address: [facility]  Sheila Stanford [address] [place] [address], [facility] [address]  Phone: [phone]   ADMISSION INFORMATION:  Place of Service: Inpatient [facility]  [address]  Place of Service Code: 21  Inpatient Admission Date/Time: 1/31/23 11:49 AM  Discharge Date/Time: No discharge date for patient encounter  Admitting Diagnosis Code/Description:  CHF (congestive heart failure) ([facility] [I50 9]  Weakness [R53 1]  COVID [U07 1]     UTILIZATION REVIEW CONTACT:  Manolo Hodges Utilization   Network Utilization Review Department  Phone: [phone]  Fax [phone]  Email: [email or web address]  Contact for approvals/pending authorizations, clinical reviews, and discharge  PHYSICIAN ADVISORY SERVICES:  Medical Necessity Denial & Lmcm-kl-Vugy Review  Phone: [phone]  Fax: [phone]  Email: [email or web address]

## 2023-02-02 NOTE — ASSESSMENT & PLAN NOTE
· Baseline hgb 8 3, frequently going lower  · Hgb this morning is 7 5  · Likely from dilution in the setting of acute CHF vs chronic kidney disease  · No obvious signs of bleeding  · Stool smear negative for blood  · Patient had anemia work up outpatient   Outpatient nephrology recommended her to go see a hematologist   · CBC in AM to monitor

## 2023-02-02 NOTE — PLAN OF CARE
Problem: Potential for Falls  Goal: Patient will remain free of falls  Description: INTERVENTIONS:  - Educate patient/family on patient safety including physical limitations  - Instruct patient to call for assistance with activity   - Consult OT/PT to assist with strengthening/mobility   - Keep Call bell within reach  - Keep bed low and locked with side rails adjusted as appropriate  - Keep care items and personal belongings within reach  - Initiate and maintain comfort rounds  - Make Fall Risk Sign visible to staff  - Offer Toileting every   Hours, in advance of need  - Initiate/Maintain   alarm  - Obtain necessary fall risk management equipment:      - Apply yellow socks and bracelet for high fall risk patients  - Consider moving patient to room near nurses station  2/2/2023 1450 by Marlen Galicia RN  Outcome: Progressing  2/2/2023 1450 by Marlen Galicia RN  Outcome: Progressing     Problem: CARDIOVASCULAR - ADULT  Goal: Maintains optimal cardiac output and hemodynamic stability  Description: INTERVENTIONS:  - Monitor I/O, vital signs and rhythm  - Monitor for S/S and trends of decreased cardiac output  - Administer and titrate ordered vasoactive medications to optimize hemodynamic stability  - Assess quality of pulses, skin color and temperature  - Assess for signs of decreased coronary artery perfusion  - Instruct patient to report change in severity of symptoms  2/2/2023 1450 by Marlen Galicia RN  Outcome: Progressing  2/2/2023 1450 by Marlen Galicia RN  Outcome: Progressing  Goal: Absence of cardiac dysrhythmias or at baseline rhythm  Description: INTERVENTIONS:  - Continuous cardiac monitoring, vital signs, obtain 12 lead EKG if ordered  - Administer antiarrhythmic and heart rate control medications as ordered  - Monitor electrolytes and administer replacement therapy as ordered  Outcome: Progressing     Problem: RESPIRATORY - ADULT  Goal: Achieves optimal ventilation and oxygenation  Description: INTERVENTIONS:  - Assess for changes in respiratory status  - Assess for changes in mentation and behavior  - Position to facilitate oxygenation and minimize respiratory effort  - Oxygen administered by appropriate delivery if ordered  - Initiate smoking cessation education as indicated  - Encourage broncho-pulmonary hygiene including cough, deep breathe, Incentive Spirometry  - Assess the need for suctioning and aspirate as needed  - Assess and instruct to report SOB or any respiratory difficulty  - Respiratory Therapy support as indicated  Outcome: Progressing     Problem: SKIN/TISSUE INTEGRITY - ADULT  Goal: Skin Integrity remains intact(Skin Breakdown Prevention)  Description: Assess:  -Perform [place] assessment every    -Clean and moisturize skin every    -Inspect skin when repositioning, toileting, and assisting with ADLS  -Assess under medical devices such as   every    -Assess extremities for adequate circulation and sensation     Bed Management:  -Have minimal linens on bed & keep smooth, unwrinkled  -Change linens as needed when moist or perspiring  -Avoid sitting or lying in one position for more than   hours while in bed  -Keep HOB at  degrees     Toileting:  -Offer bedside commode  -Assess for incontinence every   -Use incontinent care products after each incontinent episode such as   Activity:  -Mobilize patient   times a day  -Encourage activity and walks on unit  -Encourage or provide ROM exercises   -Turn and reposition patient every   Hours  -Use appropriate equipment to lift or move patient in bed  -Instruct/ Assist with weight shifting every   when out of bed in chair  -Consider limitation of chair time    hour intervals    Skin Care:  -Avoid use of baby powder, tape, friction and shearing, hot water or constrictive clothing  -Relieve pressure over bony prominences using    -Do not massage red bony areas    Next Steps:  -Teach patient strategies to minimize risks such as     -Consider consults to  interdisciplinary teams such as   Robyn Conroy Outcome: Progressing  Goal: Incision(s), wounds(s) or drain site(s) healing without S/S of infection  Description: INTERVENTIONS  - Assess and document dressing, incision, wound bed, drain sites and surrounding tissue  - Provide patient and family education  - Perform skin care/dressing changes every   Robyn Boss Outcome: Progressing  Goal: Pressure injury heals and does not worsen  Description: Interventions:  - Implement low air loss mattress or specialty surface (Criteria met)  - Apply silicone foam dressing  - Instruct/assist with weight shifting every   minutes when in chair   - Limit chair time to   hour intervals  - Use special pressure reducing interventions such as   when in chair   - Apply fecal or urinary incontinence containment device   - Perform passive or active ROM every   - Turn and reposition patient & offload bony prominences every   hours   - Utilize friction reducing device or surface for transfers   - Consider consults to  interdisciplinary teams such as    - Use incontinent care products after each incontinent episode such as      - Consider nutrition services referral as needed  Outcome: Progressing     Problem: MUSCULOSKELETAL - ADULT  Goal: Maintain or return mobility to safest level of function  Description: INTERVENTIONS:  - Assess patient's ability to carry out ADLs; assess patient's baseline for ADL function and identify physical deficits which impact ability to perform ADLs (bathing, care of mouth/teeth, toileting, grooming, dressing, etc )  - Assess/evaluate cause of self-care deficits   - Assess range of motion  - Assess patient's mobility  - Assess patient's need for assistive devices and provide as appropriate  - Encourage maximum independence but intervene and supervise when necessary  - Involve family in performance of ADLs  - Assess for home care needs following discharge   - Consider OT consult to assist with ADL evaluation and planning for discharge  - Provide patient education as appropriate  Outcome: Progressing  Goal: Maintain proper alignment of affected body part  Description: INTERVENTIONS:  - Support, maintain and protect limb and body alignment  - Provide patient/ family with appropriate education  Outcome: Progressing     Problem: PAIN - ADULT  Goal: Verbalizes/displays adequate comfort level or baseline comfort level  Description: Interventions:  - Encourage patient to monitor pain and request assistance  - Assess pain using appropriate pain scale  - Administer analgesics based on type and severity of pain and evaluate response  - Implement non-pharmacological measures as appropriate and evaluate response  - Consider cultural and social influences on pain and pain management  - Notify physician/advanced practitioner if interventions unsuccessful or patient reports new pain  Outcome: Progressing     Problem: INFECTION - ADULT  Goal: Absence or prevention of progression during hospitalization  Description: INTERVENTIONS:  - Assess and monitor for signs and symptoms of infection  - Monitor lab/diagnostic results  - Monitor all insertion sites, i e  indwelling lines, tubes, and drains  - Monitor endotracheal if appropriate and nasal secretions for changes in amount and color  - [place] appropriate cooling/warming therapies per order  - Administer medications as ordered  - Instruct and encourage patient and family to use good hand hygiene technique  - Identify and instruct in appropriate isolation precautions for identified infection/condition  Outcome: Progressing  Goal: Absence of fever/infection during neutropenic period  Description: INTERVENTIONS:  - Monitor WBC    Outcome: Progressing     Problem: SAFETY ADULT  Goal: Patient will remain free of falls  Description: INTERVENTIONS:  - Educate patient/family on patient safety including physical limitations  - Instruct patient to call for assistance with activity   - Consult OT/PT to assist with strengthening/mobility   - Keep Call bell within reach  - Keep bed low and locked with side rails adjusted as appropriate  - Keep care items and personal belongings within reach  - Initiate and maintain comfort rounds  - Make Fall Risk Sign visible to staff  - Offer Toileting every   Hours, in advance of need  - Initiate/Maintain   alarm  - Obtain necessary fall risk management equipment:      - Apply yellow socks and bracelet for high fall risk patients  - Consider moving patient to room near nurses station  2/2/2023 1450 by Nia Chavarria RN  Outcome: Progressing  2/2/2023 1450 by Nia Chavarria RN  Outcome: Progressing  Goal: Maintain or return to baseline ADL function  Description: INTERVENTIONS:  -  Assess patient's ability to carry out ADLs; assess patient's baseline for ADL function and identify physical deficits which impact ability to perform ADLs (bathing, care of mouth/teeth, toileting, grooming, dressing, etc )  - Assess/evaluate cause of self-care deficits   - Assess range of motion  - Assess patient's mobility; develop plan if impaired  - Assess patient's need for assistive devices and provide as appropriate  - Encourage maximum independence but intervene and supervise when necessary  - Involve family in performance of ADLs  - Assess for home care needs following discharge   - Consider OT consult to assist with ADL evaluation and planning for discharge  - Provide patient education as appropriate  Outcome: Progressing  Goal: Maintains/Returns to pre admission functional level  Description: INTERVENTIONS:  - Perform BMAT or MOVE assessment daily    - Set and communicate daily mobility goal to care team and patient/family/caregiver  - Collaborate with rehabilitation services on mobility goals if consulted  - Perform Range of Motion   times a day  - Reposition patient every   hours  - Dangle patient   times a day  - Stand patient   times a day  - Ambulate patient    times a day  - Out of bed to chair   times a day   - Out of bed for meals     times a day  - Out of bed for toileting  - Record patient progress and toleration of activity level   Outcome: Progressing     Problem: DISCHARGE PLANNING  Goal: Discharge to home or other facility with appropriate resources  Description: INTERVENTIONS:  - Identify barriers to discharge w/patient and caregiver  - Arrange for needed discharge resources and transportation as appropriate  - Identify discharge learning needs (meds, wound care, etc )  - Arrange for interpretive services to assist at discharge as needed  - Refer to Case Management Department for coordinating discharge planning if the patient needs post-hospital services based on physician/advanced practitioner order or complex needs related to functional status, cognitive ability, or social support system  Outcome: Progressing     Problem: Knowledge Deficit  Goal: Patient/family/caregiver demonstrates understanding of disease process, treatment plan, medications, and discharge instructions  Description: Complete learning assessment and assess knowledge base    Interventions:  - Provide teaching at level of understanding  - Provide teaching via preferred learning methods  Outcome: Progressing     Problem: MOBILITY - ADULT  Goal: Maintain or return to baseline ADL function  Description: INTERVENTIONS:  -  Assess patient's ability to carry out ADLs; assess patient's baseline for ADL function and identify physical deficits which impact ability to perform ADLs (bathing, care of mouth/teeth, toileting, grooming, dressing, etc )  - Assess/evaluate cause of self-care deficits   - Assess range of motion  - Assess patient's mobility; develop plan if impaired  - Assess patient's need for assistive devices and provide as appropriate  - Encourage maximum independence but intervene and supervise when necessary  - Involve family in performance of ADLs  - Assess for home care needs following discharge   - Consider OT consult to assist with ADL evaluation and planning for discharge  - Provide patient education as appropriate  Outcome: Progressing  Goal: Maintains/Returns to pre admission functional level  Description: INTERVENTIONS:  - Perform BMAT or MOVE assessment daily    - Set and communicate daily mobility goal to care team and patient/family/caregiver  - Collaborate with rehabilitation services on mobility goals if consulted  - Perform Range of Motion   times a day  - Reposition patient every   hours  - Dangle patient   times a day  - Stand patient   times a day  - Ambulate patient   times a day  - Out of bed to chair   times a day   - Out of bed for meals     times a day  - Out of bed for toileting  - Record patient progress and toleration of activity level   Outcome: Progressing     Problem: Prexisting or High Potential for Compromised Skin Integrity  Goal: Skin integrity is maintained or improved  Description: INTERVENTIONS:  - Identify patients at risk for skin breakdown  - Assess and monitor skin integrity  - Assess and monitor nutrition and hydration status  - Monitor labs   - Assess for incontinence   - Turn and reposition patient  - Assist with mobility/ambulation  - Relieve pressure over bony prominences  - Avoid friction and shearing  - Provide appropriate hygiene as needed including keeping skin clean and dry  - Evaluate need for skin moisturizer/barrier cream  - Collaborate with interdisciplinary team   - Patient/family teaching  - Consider wound care consult   Outcome: Progressing
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
## 2023-02-02 NOTE — ASSESSMENT & PLAN NOTE
· Catheter exchange in ER  · Patient recently was admitted into this hospital and completed antibiotic treatment at that time  · Status post renally dose adjusted cefepime  · Urine cultures showing candida albicans  · Cefepime discontinued  · Continue to monitor
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
## 2023-02-02 NOTE — PROGRESS NOTES
[address]  Progress Note - Mayur Jones 1945, 65 y o  female MRN: [id number]  Unit/Bed#: -[facility] Encounter: [id number]  Primary Care Provider: Sola Kellogg DO   Date and time admitted to hospital: 1/31/2023  9:37 AM    * 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 heart failure (HCC)  Assessment & Plan  Wt Readings from Last 3 Encounters:   02/02/23 61 6 kg (135 lb 12 9 oz)   01/27/23 65 9 kg (145 lb 4 5 oz)   01/26/23 61 9 kg (136 lb 7 4 oz)     · Came from nursing facility with generalized weakness  · Chest x-ray shows vascular congestion with pleural effusion  · proBNP elevated (patient also has CKD)  · Status post IV diuretics with Lasix 40 mg BID  · Lasix stopped   · Currently euvolemic  · Follow intake and output  · Standing weight  · Maintain electrolytes  · Follow renal function    COVID-19 virus infection  Assessment & Plan  · As per chart review, patient had COVID on September 2022  · Patient got recently discharged from this hospital with normal resolved  · Came back with COVID-positive  · Patient is saturating well on room air  · Patient already on Eliquis, continue  · Continue treatment as per mild pathway-if patient started requiring oxygen, adjust the treatment as per protocol    Stage 3b chronic kidney disease [facility])  Assessment & Plan  Lab Results   Component Value Date    EGFR 21 02/02/2023    EGFR 21 02/01/2023    EGFR 21 01/31/2023    CREATININE 2 11 (H) 02/02/2023    CREATININE 2 16 (H) 02/01/2023    CREATININE 2 18 (H) 01/31/2023   · Patient baseline creatinine runs around 1 7  · Patient recently discharged from this hospital with creatinine 1 98 on 1/27/2023  · Follows up with nephrology on outpatient basis  · Patient placed on IV diuretics due to CHF, continue renal function  · Stopped lasix due to euvolemia  · If no improvement in AM BMP, can consult nephrology    Acute on chronic anemia  Assessment & Plan  · Baseline hgb 8 3, frequently going lower  · Hgb this morning is 7 5  · Likely from dilution in the setting of acute CHF vs chronic kidney disease  · No obvious signs of bleeding  · Stool smear negative for blood  · Patient had anemia work up outpatient  Outpatient nephrology recommended her to go see a hematologist   · CBC in AM to monitor    S/P implantation of automatic cardioverter/defibrillator (AICD)  Assessment & Plan  · Noted    Paroxysmal atrial fibrillation ([address] )  Assessment & Plan  · Status post AICD in place  · Continue Eliquis  · Continue metoprolol    Acquired hypothyroidism  Assessment & Plan  · Recently patient's levothyroxine dose adjusted, consider to recheck TSH after 4 weeks  · Continue levothyroxine 125mcg    Urinary tract infection associated with indwelling urethral catheter (HCC)-resolved as of 2/2/2023  Assessment & Plan  · Catheter exchange in ER  · Patient recently was admitted into this hospital and completed antibiotic treatment at that time  · Status post renally dose adjusted cefepime  · Urine cultures showing candida albicans  · Cefepime discontinued  · Continue to monitor       VTE Pharmacologic Prophylaxis: VTE Score: 7 High Risk (Score >/= 5) - Pharmacological DVT Prophylaxis Ordered: apixaban (Eliquis)  Sequential Compression Devices Ordered  Patient Centered Rounds: I performed bedside rounds with nursing staff today  Discussions with Specialists or Other Care Team Provider: None    Education and Discussions with Family / Patient: Updated  (daughter) via phone  Time Spent for Care: 30 minutes  More than 50% of total time spent on counseling and coordination of care as described above      Current Length of Stay: 2 day(s)  Current Patient Status: Inpatient   Certification Statement: The patient will continue to require additional inpatient hospital stay due to Covid + and pending placement  Discharge Plan: Anticipate discharge tomorrow to prior assisted or independent living facility  Code Status: Level 3 - DNAR and DNI    Subjective:   Patient states that she feels much better today compared to before she came in  Denies chest pain, shortness of breath, cough, or abdominal pain at this time  Objective:     Vitals:   Temp (24hrs), Av 6 °F (36 4 °C), Min:97 3 °F (36 3 °C), Max:98 1 °F (36 7 °C)    Temp:  [97 3 °F (36 3 °C)-98 1 °F (36 7 °C)] 97 3 °F (36 3 °C)  HR:  [68-73] 70  Resp:  [18-20] 19  BP: (134-151)/(61-67) 148/63  SpO2:  [97 %-99 %] 99 %  Body mass index is 22 6 kg/m²  Input and Output Summary (last 24 hours): Intake/Output Summary (Last 24 hours) at 2023 1433  Last data filed at 2023 1300  Gross per 24 hour   Intake 480 ml   Output 2325 ml   Net -1845 ml       Physical Exam:   Physical Exam  Vitals reviewed  Constitutional:       General: She is not in acute distress  Appearance: Normal appearance  She is normal weight  She is not ill-appearing  HENT:      Head: Normocephalic and atraumatic  Nose: Nose normal       Mouth/Throat:      Mouth: Mucous membranes are moist       Pharynx: Oropharynx is clear  Eyes:      Extraocular Movements: Extraocular movements intact  Conjunctiva/sclera: Conjunctivae normal    Cardiovascular:      Rate and Rhythm: Normal rate  Rhythm irregular  Pulses: Normal pulses  Heart sounds: Murmur heard  Comments: A fib murmur heard  Pulmonary:      Effort: Pulmonary effort is normal  No respiratory distress  Breath sounds: Normal breath sounds  No wheezing  Abdominal:      General: Abdomen is flat  Bowel sounds are normal  There is no distension  Palpations: Abdomen is soft  Tenderness: There is no abdominal tenderness  There is no guarding  Musculoskeletal:         General: Normal range of motion  Cervical back: Normal range of motion  Right lower leg: No edema  Left lower leg: No edema  Skin:     General: Skin is warm  Findings: Bruising present  Neurological:      General: No focal deficit present  Mental Status: She is alert and oriented to person, place, and time  Mental status is at baseline  Motor: No weakness  Psychiatric:         Mood and Affect: Mood normal          Behavior: Behavior normal          Thought Content: Thought content normal          Judgment: Judgment normal          Additional Data:     Labs:  Results from last 7 days   Lab Units 02/02/23  0510 02/01/23  0526   WBC Thousand/uL 4 78 6 15   HEMOGLOBIN g/dL 7 5* 7 8*   HEMATOCRIT % 24 5* 26 3*   PLATELETS Thousands/uL 238 252   NEUTROS PCT %  --  75   LYMPHS PCT %  --  13*   MONOS PCT %  --  7   EOS PCT %  --  3     Results from last 7 days   Lab Units 02/02/23  0510   SODIUM mmol/L 139   POTASSIUM mmol/L 4 2   CHLORIDE mmol/L 105   CO2 mmol/L 26   BUN mg/dL 59*   CREATININE mg/dL 2 11*   ANION GAP mmol/L 8   CALCIUM mg/dL 8 2*   ALBUMIN g/dL 2 8*   TOTAL BILIRUBIN mg/dL 0 37   ALK PHOS U/L 74   ALT U/L 5*   AST U/L 15   GLUCOSE RANDOM mg/dL 70     Results from last 7 days   Lab Units 01/31/23  1022   INR  1 62*             Results from last 7 days   Lab Units 01/31/23  1020 01/31/23  1013   LACTIC ACID mmol/L  --  1 0   PROCALCITONIN ng/ml 0 16  --        Lines/Drains:  Invasive Devices     Peripheral Intravenous Line  Duration           Peripheral IV 01/31/23 Left;Ventral (anterior) Wrist 2 days    Peripheral IV 02/02/23 Dorsal (posterior); Right Forearm <1 day          Drain  Duration           Urethral Catheter Coude 18 Fr  2 days              Urinary Catheter:  Goal for removal: N/A - Chronic Greer               Imaging: Reviewed radiology reports from this admission including: chest xray    Recent Cultures (last 7 days):   Results from last 7 days   Lab Units 01/31/23  1023 01/31/23  1022 01/31/23  1013   BLOOD CULTURE   --  No Growth at 24 hrs  No Growth at 24 hrs     URINE CULTURE  80,000-89,000 cfu/ml Candida albicans*  --   --        Last 24 Hours Medication List: Current Facility-Administered Medications   Medication Dose Route Frequency Provider Last Rate   • acetaminophen  650 mg Oral Q6H PRN Genevieve Woods MD     • allopurinol  100 mg Oral Daily Genevieve Woods MD     • amLODIPine  10 mg Oral Daily Genevieve Woods MD     • apixaban  5 mg Oral BID Genevieve Woods MD     • aspirin  81 mg Oral Daily Genevieve Woods MD     • cholecalciferol  1,000 Units Oral Daily Genevieve Woods MD     • cyanocobalamin  1,000 mcg Oral Daily Genevieve Woods MD     • docusate sodium  100 mg Oral BID Genevieve Woods MD     • hydrocortisone  25 mg Rectal BID Genevieve Woods MD     • levothyroxine  175 mcg Oral Early Morning Genevieve Woods MD     • metoprolol succinate  100 mg Oral Daily Genevieve Woods MD     • ondansetron  4 mg Intravenous Q6H PRN Genevieve Woods MD     • pantoprazole  40 mg Oral Early Morning Genevieve Woods MD     • sertraline  100 mg Oral Daily Genevieve Woods MD     • sodium bicarbonate  650 mg Oral TID after meals Genevieve Woods MD     • tamsulosin  0 4 mg Oral Daily With Wander Knox MD          Today, Patient Was Seen By: Domi Camarillo PA-C    **Please Note: This note may have been constructed using a voice recognition system  **

## 2023-02-02 NOTE — PLAN OF CARE
Problem: OCCUPATIONAL THERAPY ADULT  Goal: Performs self-care activities at highest level of function for planned discharge setting  See evaluation for individualized goals  Description: Treatment Interventions: ADL retraining, Functional transfer training, UE strengthening/ROM, Endurance training, Cognitive reorientation, Patient/family training, Equipment evaluation/education, Neuromuscular reeducation, Compensatory technique education, Continued evaluation, Energy conservation, Activityengagement          See flowsheet documentation for full assessment, interventions and recommendations  Note: Limitation: Decreased ADL status, Decreased UE strength, Decreased Safe judgement during ADL, Decreased cognition, Decreased endurance, Decreased self-care trans, Decreased high-level ADLs  Prognosis: Good  Assessment: Pt is a 65 y o  female, admitted to [address] [place] [address] 1/31/2023 d/t experiencing generalized weakness  Dx: acute on chronic conbined systolic and diastolic CHF  Pt with PMHx impacting their performance during ADL tasks, including: CHF, hyperlipidemia, CKD, a-fib, anemia, arthritis, CA, GERD, depression  Prior to admission to the hospital, Pt's PLOF and home set-up are unknown d/t Pt being poor historian  Pt was recently at [address] [place] [address] and evaluated by OT 1/19/2023, from there Pt went to rehab at [facility]  Pt is [facility]ad[facility] from [facility]  Cognitive status was PTA was impaired  OT order placed to assess Pt's ADLs, cognitive status, and performance during functional tasks in order to maximize safety and independence while making most appropriate d/c recommendations   Pt's clinical presentation is currently unstable/unpredictable given new onset deficits that effect Pt's occupational performance and ability to safely return to OF including decrease activity tolerance, decrease standing balance, decrease sitting balance, decrease performance during ADL tasks, decrease cognition, decrease safety awareness , decrease generalized strength, decrease activity engagement, decrease performance during functional transfers, frequent falls, high fall risk, limited insight to deficits and inability to express needs combined with medical complications of abnormal renal lab values, change in mental status, abnormal H&H, abnormal CBC, decreased skin integrity, impulsivity during admission, incontinence, fear/retreat and need for input for mobility technique/safety  Personal factors affecting Pt at time of initial evaluation include: depression, availability as recommended, limited home support, advanced age, past experience, behavioral pattern, inability to perform current job functions, inability to perform IADLs, inability to perform ADLs, new need for AD, inability to ambulate household distances, inability to navigate community distances, limited insight into impairments, decreased initiation and engagement, recent fall(s)/fall history and questionable non-compliance  Pt will benefit from continued skilled OT services to address deficits as defined above and to maximize level independence/participation during ADLs and functional tasks to facilitate return toward PLOF and improved quality of life  From an occupational therapy standpoint, recommendation at time of d/c would be post acute rehab       OT Discharge Recommendation: Post acute rehabilitation services

## 2023-02-02 NOTE — ASSESSMENT & PLAN NOTE
Wt Readings from Last 3 Encounters:   02/02/23 61 6 kg (135 lb 12 9 oz)   01/27/23 65 9 kg (145 lb 4 5 oz)   01/26/23 61 9 kg (136 lb 7 4 oz)     · Came from nursing facility with generalized weakness  · Chest x-ray shows vascular congestion with pleural effusion  · proBNP elevated (patient also has CKD)  · Status post IV diuretics with Lasix 40 mg BID  · Lasix stopped   · Currently euvolemic  · Follow intake and output  · Standing weight  · Maintain electrolytes  · Follow renal function

## 2023-02-02 NOTE — ASSESSMENT & PLAN NOTE
Lab Results   Component Value Date    EGFR 21 02/02/2023    EGFR 21 02/01/2023    EGFR 21 01/31/2023    CREATININE 2 11 (H) 02/02/2023    CREATININE 2 16 (H) 02/01/2023    CREATININE 2 18 (H) 01/31/2023   · Patient baseline creatinine runs around 1 7  · Patient recently discharged from this hospital with creatinine 1 98 on 1/27/2023  · Follows up with nephrology on outpatient basis  · Patient placed on IV diuretics due to CHF, continue renal function  · Stopped lasix due to euvolemia  · If no improvement in AM BMP, can consult nephrology

## 2023-02-02 NOTE — PLAN OF CARE
Problem: CARDIOVASCULAR - ADULT  Goal: Maintains optimal cardiac output and hemodynamic stability  Description: INTERVENTIONS:  - Monitor I/O, vital signs and rhythm  - Monitor for S/S and trends of decreased cardiac output  - Administer and titrate ordered vasoactive medications to optimize hemodynamic stability  - Assess quality of pulses, skin color and temperature  - Assess for signs of decreased coronary artery perfusion  - Instruct patient to report change in severity of symptoms  Outcome: Progressing     Problem: RESPIRATORY - ADULT  Goal: Achieves optimal ventilation and oxygenation  Description: INTERVENTIONS:  - Assess for changes in respiratory status  - Assess for changes in mentation and behavior  - Position to facilitate oxygenation and minimize respiratory effort  - Oxygen administered by appropriate delivery if ordered  - Initiate smoking cessation education as indicated  - Encourage broncho-pulmonary hygiene including cough, deep breathe, Incentive Spirometry  - Assess the need for suctioning and aspirate as needed  - Assess and instruct to report SOB or any respiratory difficulty  - Respiratory Therapy support as indicated  Outcome: Progressing     Problem: SKIN/TISSUE INTEGRITY - ADULT  Goal: Skin Integrity remains intact(Skin Breakdown Prevention)  Description: Assess:  -Perform Arcadio assessment every    -Clean and moisturize skin every    -Inspect skin when repositioning, toileting, and assisting with ADLS  -Assess under medical devices such as   every    -Assess extremities for adequate circulation and sensation     Bed Management:  -Have minimal linens on bed & keep smooth, unwrinkled  -Change linens as needed when moist or perspiring  -Avoid sitting or lying in one position for more than   hours while in bed  -Keep HOB at  degrees     Toileting:  -Offer bedside commode  -Assess for incontinence every     -Use incontinent care products after each incontinent episode such as      Activity:  -Mobilize patient   times a day  -Encourage activity and walks on unit  -Encourage or provide ROM exercises   -Turn and reposition patient every   Hours  -Use appropriate equipment to lift or move patient in bed  -Instruct/ Assist with weight shifting every   when out of bed in chair  -Consider limitation of chair time   hour intervals    Skin Care:  -Avoid use of baby powder, tape, friction and shearing, hot water or constrictive clothing  -Relieve pressure over bony prominences using    -Do not massage red bony areas    Next Steps:  -Teach patient strategies to minimize risks such as     -Consider consults to  interdisciplinary teams such as   Angeli Castellanos   Outcome: Progressing     Problem: SAFETY ADULT  Goal: Maintain or return to baseline ADL function  Description: INTERVENTIONS:  -  Assess patient's ability to carry out ADLs; assess patient's baseline for ADL function and identify physical deficits which impact ability to perform ADLs (bathing, care of mouth/teeth, toileting, grooming, dressing, etc )  - Assess/evaluate cause of self-care deficits   - Assess range of motion  - Assess patient's mobility; develop plan if impaired  - Assess patient's need for assistive devices and provide as appropriate  - Encourage maximum independence but intervene and supervise when necessary  - Involve family in performance of ADLs  - Assess for home care needs following discharge   - Consider OT consult to assist with ADL evaluation and planning for discharge  - Provide patient education as appropriate  Outcome: Progressing     Problem: MOBILITY - ADULT  Goal: Maintain or return to baseline ADL function  Description: INTERVENTIONS:  -  Assess patient's ability to carry out ADLs; assess patient's baseline for ADL function and identify physical deficits which impact ability to perform ADLs (bathing, care of mouth/teeth, toileting, grooming, dressing, etc )  - Assess/evaluate cause of self-care deficits   - Assess range of motion  - Assess patient's mobility; develop plan if impaired  - Assess patient's need for assistive devices and provide as appropriate  - Encourage maximum independence but intervene and supervise when necessary  - Involve family in performance of ADLs  - Assess for home care needs following discharge   - Consider OT consult to assist with ADL evaluation and planning for discharge  - Provide patient education as appropriate  Outcome: Progressing

## 2023-02-02 NOTE — CASE MANAGEMENT
Case Management Assessment & Discharge Planning Note    Patient name Addi Santos  Location [id number] [phone] MRN [id number]  : 1945 Date 2023       Current Admission Date: 2023  Current Admission Diagnosis: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 heart failure [facility])   Patient Active Problem List    Diagnosis Date Noted   • Urinary tract infection associated with indwelling urethral catheter ([place] [address] ) 2023   • Stage 3b chronic kidney disease ([place] [address] ) 2023   • Acute on chronic blood loss anemia 2023   • Acute cystitis without hematuria 2023   • Right internal carotid artery aneurysm 2023   • Carotid artery stenosis 2023   • Aneurysm ([place] [address] )    • Elevated d-dimer 2023   • Proteinuria 2023   • Goals of care, counseling/discussion 2023   • Secondary renal hyperparathyroidism ([place] [address] ) 10/26/2022   • Hyperuricemia 10/26/2022   • Hypophosphatemia 10/04/2022   • Pulmonary hypertension ([place] [address] ) 10/04/2022   • Low TSH level 2022   • COVID-19 virus infection 2022   • Dementia ([place] [address] )    • Metabolic encephalopathy    • Gastrointestinal bleeding 2022   • Hydroureteronephrosis 2022   • Diverticulitis 2022   • Rectal bleeding 2022   • Elevated troponin 2022   • Iron deficiency anemia 2022   • Paroxysmal atrial fibrillation ([place] [address] ) 2020   • Ischemic cardiomyopathy 2020   • S/P implantation of automatic cardioverter/defibrillator (AICD) 2020   • Essential hypertension 2020   • History of PTCA 2020   • Leg swelling 2020   • DARRELL (acute kidney injury) ([place] [address] ) 2020   • Perforated appendicitis 2020   • 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 heart failure ([place] [address] ) 2020   • Pyuria 2020   • Microscopic hematuria 2020   • Vitamin D insufficiency 2020   • Mitral valve insufficiency 2020   • Hypercholesterolemia 07/29/2020   • Aortic atherosclerosis ([facility] [address] ) 07/29/2020   • Renal calculus 07/29/2020   • Diverticulosis 07/29/2020   • Hiatal hernia 07/29/2020   • Pulmonary nodule 07/29/2020   • Benign hypertensive renal disease 09/12/2019   • Acute blood loss anemia 02/08/2019   • Gastroesophageal reflux disease without esophagitis 02/08/2019   • Closed fracture of body of sternum with routine healing 04/12/2018   • Acquired hypothyroidism 04/12/2018   • CAD (coronary artery disease) 04/12/2018   • Forgetfulness 04/12/2018   • Acute pain due to trauma 04/12/2018   • Hx of fall 04/12/2018   • Stress incontinence in female 04/12/2018   • Acute on chronic anemia 03/20/2018      LOS (days): 2  Geometric Mean LOS (GMLOS) (days): 3 90  Days to GMLOS:2     OBJECTIVE:  PATIENT READMITTED TO HOSPITAL  Risk of Unplanned Readmission Score: 35 51         Current admission status: Inpatient    CM contacted pt's daughter,baseline information  was obtained  CM discussed the role of CM in helping the patient develop a discharge plan and assist the patient in carry out their plan  Preferred Pharmacy:   [address] [place] [address] [address]  [address]  [place] [address]  Phone: [phone] Fax: [phone]    Primary Care Provider: Milton Lacey DO    Primary Insurance: Ayo Contreras [facility] REP  Secondary Insurance:     ASSESSMENT:  Misti Pedersen Proxies    There are no active Health Care Proxies on file         Advance Directives  Does patient have a [address] [place] [address] Avenue?: No  Does patient have Advance Directives?: No  Primary Contact: Miladis Moya         Readmission Root Cause  30 Day Readmission: Yes  Who directed you to return to the hospital?: Other (comment) (facility staff)  Did you understand whom to contact if you had questions or problems?: Yes  Did you get your prescriptions before you left the hospital?: Yes  Were you able to get your prescriptions filled when you left the hospital?: Yes  Did you take your medications as prescribed?: Yes  Were you able to get to your follow-up appointments?: Yes  During previous admission, was a post-acute recommendation made?: Yes  What post-acute resources were offered?: STR  Patient was readmitted due to: CHF exacerbation  Action Plan: D/C to STR    Patient Information  Admitted from[de-identified] Facility  Mental Status: Confused  During Assessment patient was accompanied by: Not accompanied during assessment  Assessment information provided by[de-identified] Daughter  Support Systems: Home care staff  [place] of Residence: [facility] do you live in[place] [facility] entry access options  Select all that apply : No steps to enter home  Type of Current Residence: Facility  Upon entering residence, is there a bedroom on the main floor (no further steps)?: Yes  Upon entering residence, is there a bathroom on the main floor (no further steps)?: Yes  In the last 12 months, was there a time when you were not able to pay the mortgage or rent on time?: No  In the last 12 months, how many places have you lived?: 2  In the last 12 months, was there a time when you did not have a steady place to sleep or slept in a shelter (including now)?: No  Homeless/housing insecurity resource given?: No  Living Arrangements: Other (Comment) ([place])  Is patient a ?: No    Activities of Daily Living Prior to Admission  Functional Status: Assistance  Completes ADLs independently?: No  Level of ADL dependence: Total Dependent  Ambulates independently?: No  Level of ambulatory dependence: Total Dependent  Does patient use assisted devices?: Yes  Assisted Devices (DME) used:  Wheelchair  Does patient currently own DME?: Yes  What DME does the patient currently own?: Wheelchair  Does patient have a history of Outpatient Therapy (PT/OT)?: No  Does the patient have a history of Short-Term Rehab?: Yes  Does patient have a history of HHC?: No  Does patient currently have [facility]?: No         Patient Information Continued  Income Source: SSI/SSD  Does patient have prescription coverage?: Yes  Within the past 12 months, you worried that your food would run out before you got the money to buy more : Never true  Within the past 12 months, the food you bought just didn't last and you didn't have money to get more : Never true  Food insecurity resource given?: No  Does patient receive dialysis treatments?: No  Does patient have a history of substance abuse?: No  Does patient have a history of Mental Health Diagnosis?: No         Means of Transportation  Means of Transport to Appts[de-identified]  (facility)  In the past 12 months, has lack of transportation kept you from medical appointments or from getting medications?: No  In the past 12 months, has lack of transportation kept you from meetings, work, or from getting things needed for daily living?: No  Was application for public transport provided?: No        DISCHARGE DETAILS:    Discharge planning discussed with[de-identified] pt's daughter  Freedom of Choice: Yes  Comments - Freedom of Choice: Pt's daughter would like pt to return to [facility]  CM contacted family/caregiver?: Yes  Were Treatment Team discharge recommendations reviewed with patient/caregiver?: Yes  Did patient/caregiver verbalize understanding of patient care needs?: Yes  Were patient/caregiver advised of the risks associated with not following Treatment Team discharge recommendations?: Yes    Contacts  Patient Contacts: Stanley Watkins  Relationship to Patient[de-identified] Family  Contact Method: Phone  Phone Number: [phone]  Reason/Outcome: Discharge [address]         Is the patient interested in [address] at discharge?: No    DME Referral Provided  Referral made for DME?: No    Other Referral/Resources/Interventions Provided:  Interventions: SNF         Treatment Team Recommendation: Facility Return  Discharge Destination Plan[de-identified] Facility Return  Transport at Discharge : BLS Ambulance Pt's daughter stated she prefers pt to return to [facility]  Referral placed through [address]   Awaiting response

## 2023-02-03 VITALS
DIASTOLIC BLOOD PRESSURE: 56 MMHG | RESPIRATION RATE: 16 BRPM | OXYGEN SATURATION: 99 % | BODY MASS INDEX: 22.59 KG/M2 | HEIGHT: 65 IN | TEMPERATURE: 97.3 F | SYSTOLIC BLOOD PRESSURE: 150 MMHG | WEIGHT: 135.58 LBS | HEART RATE: 70 BPM

## 2023-02-03 LAB
ALBUMIN SERPL BCP-MCNC: 2.8 G/DL (ref 3.5–5)
ALP SERPL-CCNC: 77 U/L (ref 34–104)
ALT SERPL W P-5'-P-CCNC: 4 U/L (ref 7–52)
ANION GAP SERPL CALCULATED.3IONS-SCNC: 6 MMOL/L (ref 4–13)
AST SERPL W P-5'-P-CCNC: 12 U/L (ref 13–39)
BILIRUB SERPL-MCNC: 0.37 MG/DL (ref 0.2–1)
BUN SERPL-MCNC: 60 MG/DL (ref 5–25)
CALCIUM ALBUM COR SERPL-MCNC: 9.3 MG/DL (ref 8.3–10.1)
CALCIUM SERPL-MCNC: 8.3 MG/DL (ref 8.4–10.2)
CHLORIDE SERPL-SCNC: 102 MMOL/L (ref 96–108)
CO2 SERPL-SCNC: 31 MMOL/L (ref 21–32)
CREAT SERPL-MCNC: 2.12 MG/DL (ref 0.6–1.3)
ERYTHROCYTE [DISTWIDTH] IN BLOOD BY AUTOMATED COUNT: 17.9 % (ref 11.6–15.1)
GFR SERPL CREATININE-BSD FRML MDRD: 21 ML/MIN/1.73SQ M
GLUCOSE SERPL-MCNC: 72 MG/DL (ref 65–140)
HCT VFR BLD AUTO: 25 % (ref 34.8–46.1)
HGB BLD-MCNC: 7.5 G/DL (ref 11.5–15.4)
MCH RBC QN AUTO: 27.4 PG (ref 26.8–34.3)
MCHC RBC AUTO-ENTMCNC: 30 G/DL (ref 31.4–37.4)
MCV RBC AUTO: 91 FL (ref 82–98)
PLATELET # BLD AUTO: 256 THOUSANDS/UL (ref 149–390)
PMV BLD AUTO: 10.3 FL (ref 8.9–12.7)
POTASSIUM SERPL-SCNC: 4.1 MMOL/L (ref 3.5–5.3)
PROT SERPL-MCNC: 5.5 G/DL (ref 6.4–8.4)
RBC # BLD AUTO: 2.74 MILLION/UL (ref 3.81–5.12)
SODIUM SERPL-SCNC: 139 MMOL/L (ref 135–147)
WBC # BLD AUTO: 5.89 THOUSAND/UL (ref 4.31–10.16)

## 2023-02-03 RX ORDER — LEVOTHYROXINE SODIUM 175 UG/1
175 TABLET ORAL
Qty: 30 TABLET | Refills: 0
Start: 2023-02-04 | End: 2023-03-06

## 2023-02-03 RX ADMIN — AMLODIPINE BESYLATE 10 MG: 10 TABLET ORAL at 08:10

## 2023-02-03 RX ADMIN — LEVOTHYROXINE SODIUM 175 MCG: 175 TABLET ORAL at 05:10

## 2023-02-03 RX ADMIN — ASPIRIN 81 MG: 81 TABLET, COATED ORAL at 08:10

## 2023-02-03 RX ADMIN — PANTOPRAZOLE SODIUM 40 MG: 40 TABLET, DELAYED RELEASE ORAL at 05:10

## 2023-02-03 RX ADMIN — ALLOPURINOL 100 MG: 100 TABLET ORAL at 08:10

## 2023-02-03 RX ADMIN — APIXABAN 5 MG: 5 TABLET, FILM COATED ORAL at 08:10

## 2023-02-03 RX ADMIN — DOCUSATE SODIUM 100 MG: 100 CAPSULE ORAL at 08:09

## 2023-02-03 RX ADMIN — METOPROLOL SUCCINATE 100 MG: 100 TABLET, EXTENDED RELEASE ORAL at 08:09

## 2023-02-03 RX ADMIN — SODIUM BICARBONATE 650 MG TABLET 650 MG: at 08:09

## 2023-02-03 RX ADMIN — SERTRALINE 100 MG: 100 TABLET, FILM COATED ORAL at 08:10

## 2023-02-03 RX ADMIN — Medication 1000 UNITS: at 08:10

## 2023-02-03 RX ADMIN — CYANOCOBALAMIN TAB 500 MCG 1000 MCG: 500 TAB at 08:09

## 2023-02-03 NOTE — ASSESSMENT & PLAN NOTE
· As per chart review, patient had COVID on September 2022  · Patient got recently discharged from this hospital with normal resolved  · Came back with COVID-positive  · Patient is saturating well on room air  · Patient already on Eliquis, continue

## 2023-02-03 NOTE — NURSING NOTE
Patient discharged transported to [facility] via EMS  IV catheter removed by EMS  Report given to receiving facility

## 2023-02-03 NOTE — ASSESSMENT & PLAN NOTE
· Catheter exchange in ER  · Patient recently was admitted into this hospital and completed antibiotic treatment at that time  · Status post renally dose adjusted cefepime  · Urine cultures showing candida albicans and enterococcus faecium  · Cefepime discontinued  · Patient is asymptomatic and does not have leukocytosis  Discussed with ID and they recommend no antibiotic treatment at this time

## 2023-02-03 NOTE — PLAN OF CARE
Problem: Potential for Falls  Goal: Patient will remain free of falls  Description: INTERVENTIONS:  - Educate patient/family on patient safety including physical limitations  - Instruct patient to call for assistance with activity   - Consult OT/PT to assist with strengthening/mobility   - Keep Call bell within reach  - Keep bed low and locked with side rails adjusted as appropriate  - Keep care items and personal belongings within reach  - Initiate and maintain comfort rounds  - Make Fall Risk Sign visible to staff  - Offer Toileting every   Hours, in advance of need  - Initiate/Maintain   alarm  - Obtain necessary fall risk management equipment:      - Apply yellow socks and bracelet for high fall risk patients  - Consider moving patient to room near nurses station  Outcome: Progressing     Problem: CARDIOVASCULAR - ADULT  Goal: Maintains optimal cardiac output and hemodynamic stability  Description: INTERVENTIONS:  - Monitor I/O, vital signs and rhythm  - Monitor for S/S and trends of decreased cardiac output  - Administer and titrate ordered vasoactive medications to optimize hemodynamic stability  - Assess quality of pulses, skin color and temperature  - Assess for signs of decreased coronary artery perfusion  - Instruct patient to report change in severity of symptoms  Outcome: Progressing  Goal: Absence of cardiac dysrhythmias or at baseline rhythm  Description: INTERVENTIONS:  - Continuous cardiac monitoring, vital signs, obtain 12 lead EKG if ordered  - Administer antiarrhythmic and heart rate control medications as ordered  - Monitor electrolytes and administer replacement therapy as ordered  Outcome: Progressing     Problem: RESPIRATORY - ADULT  Goal: Achieves optimal ventilation and oxygenation  Description: INTERVENTIONS:  - Assess for changes in respiratory status  - Assess for changes in mentation and behavior  - Position to facilitate oxygenation and minimize respiratory effort  - Oxygen administered by appropriate delivery if ordered  - Initiate smoking cessation education as indicated  - Encourage broncho-pulmonary hygiene including cough, deep breathe, Incentive Spirometry  - Assess the need for suctioning and aspirate as needed  - Assess and instruct to report SOB or any respiratory difficulty  - Respiratory Therapy support as indicated  Outcome: Progressing     Problem: SKIN/TISSUE INTEGRITY - ADULT  Goal: Skin Integrity remains intact(Skin Breakdown Prevention)  Description: Assess:  -Perform Arcadio assessment every    -Clean and moisturize skin every    -Inspect skin when repositioning, toileting, and assisting with ADLS  -Assess under medical devices such as   every    -Assess extremities for adequate circulation and sensation     Bed Management:  -Have minimal linens on bed & keep smooth, unwrinkled  -Change linens as needed when moist or perspiring  -Avoid sitting or lying in one position for more than   hours while in bed  -Keep HOB at  degrees     Toileting:  -Offer bedside commode  -Assess for incontinence every   -Use incontinent care products after each incontinent episode such as   Activity:  -Mobilize patient   times a day  -Encourage activity and walks on unit  -Encourage or provide ROM exercises   -Turn and reposition patient every   Hours  -Use appropriate equipment to lift or move patient in bed  -Instruct/ Assist with weight shifting every   when out of bed in chair  -Consider limitation of chair time   hour intervals    Skin Care:  -Avoid use of baby powder, tape, friction and shearing, hot water or constrictive clothing  -Relieve pressure over bony prominences using    -Do not massage red bony areas    Next Steps:  -Teach patient strategies to minimize risks such as     -Consider consults to  interdisciplinary teams such as   Katie Vallejo   Outcome: Progressing  Goal: Incision(s), wounds(s) or drain site(s) healing without S/S of infection  Description: INTERVENTIONS  - Assess and document dressing, incision, wound bed, drain sites and surrounding tissue  - Provide patient and family education  - Perform skin care/dressing changes every   Sita Estrada Outcome: Progressing  Goal: Pressure injury heals and does not worsen  Description: Interventions:  - Implement low air loss mattress or specialty surface (Criteria met)  - Apply silicone foam dressing  - Instruct/assist with weight shifting every   minutes when in chair   - Limit chair time to   hour intervals  - Use special pressure reducing interventions such as   when in chair   - Apply fecal or urinary incontinence containment device   - Perform passive or active ROM every   - Turn and reposition patient & offload bony prominences every   hours   - Utilize friction reducing device or surface for transfers   - Consider consults to  interdisciplinary teams such as    - Use incontinent care products after each incontinent episode such as      - Consider nutrition services referral as needed  Outcome: Progressing     Problem: MUSCULOSKELETAL - ADULT  Goal: Maintain or return mobility to safest level of function  Description: INTERVENTIONS:  - Assess patient's ability to carry out ADLs; assess patient's baseline for ADL function and identify physical deficits which impact ability to perform ADLs (bathing, care of mouth/teeth, toileting, grooming, dressing, etc )  - Assess/evaluate cause of self-care deficits   - Assess range of motion  - Assess patient's mobility  - Assess patient's need for assistive devices and provide as appropriate  - Encourage maximum independence but intervene and supervise when necessary  - Involve family in performance of ADLs  - Assess for home care needs following discharge   - Consider OT consult to assist with ADL evaluation and planning for discharge  - Provide patient education as appropriate  Outcome: Progressing  Goal: Maintain proper alignment of affected body part  Description: INTERVENTIONS:  - Support, maintain and protect limb and body alignment  - Provide patient/ family with appropriate education  Outcome: Progressing     Problem: PAIN - ADULT  Goal: Verbalizes/displays adequate comfort level or baseline comfort level  Description: Interventions:  - Encourage patient to monitor pain and request assistance  - Assess pain using appropriate pain scale  - Administer analgesics based on type and severity of pain and evaluate response  - Implement non-pharmacological measures as appropriate and evaluate response  - Consider cultural and social influences on pain and pain management  - Notify physician/advanced practitioner if interventions unsuccessful or patient reports new pain  Outcome: Progressing     Problem: INFECTION - ADULT  Goal: Absence or prevention of progression during hospitalization  Description: INTERVENTIONS:  - Assess and monitor for signs and symptoms of infection  - Monitor lab/diagnostic results  - Monitor all insertion sites, i e  indwelling lines, tubes, and drains  - Monitor endotracheal if appropriate and nasal secretions for changes in amount and color  - [place] appropriate cooling/warming therapies per order  - Administer medications as ordered  - Instruct and encourage patient and family to use good hand hygiene technique  - Identify and instruct in appropriate isolation precautions for identified infection/condition  Outcome: Progressing  Goal: Absence of fever/infection during neutropenic period  Description: INTERVENTIONS:  - Monitor WBC    Outcome: Progressing     Problem: SAFETY ADULT  Goal: Patient will remain free of falls  Description: INTERVENTIONS:  - Educate patient/family on patient safety including physical limitations  - Instruct patient to call for assistance with activity   - Consult OT/PT to assist with strengthening/mobility   - Keep Call bell within reach  - Keep bed low and locked with side rails adjusted as appropriate  - Keep care items and personal belongings within reach  - Initiate and maintain comfort rounds  - Make Fall Risk Sign visible to staff  - Offer Toileting every 2 Hours, in advance of need  - Initiate/Maintain bed alarm  - Obtain necessary fall risk management equipment  - Apply yellow socks and bracelet for high fall risk patients  - Consider moving patient to room near nurses station  Outcome: Progressing  Goal: Maintain or return to baseline ADL function  Description: INTERVENTIONS:  -  Assess patient's ability to carry out ADLs; assess patient's baseline for ADL function and identify physical deficits which impact ability to perform ADLs (bathing, care of mouth/teeth, toileting, grooming, dressing, etc )  - Assess/evaluate cause of self-care deficits   - Assess range of motion  - Assess patient's mobility; develop plan if impaired  - Assess patient's need for assistive devices and provide as appropriate  - Encourage maximum independence but intervene and supervise when necessary  - Involve family in performance of ADLs  - Assess for home care needs following discharge   - Consider OT consult to assist with ADL evaluation and planning for discharge  - Provide patient education as appropriate  Outcome: Progressing  Goal: Maintains/Returns to pre admission functional level  Description: INTERVENTIONS:  - Perform BMAT or MOVE assessment daily    - Set and communicate daily mobility goal to care team and patient/family/caregiver     - Collaborate with rehabilitation services on mobility goals if consulted  - Out of bed for toileting  - Record patient progress and toleration of activity level   Outcome: Progressing     Problem: DISCHARGE PLANNING  Goal: Discharge to home or other facility with appropriate resources  Description: INTERVENTIONS:  - Identify barriers to discharge w/patient and caregiver  - Arrange for needed discharge resources and transportation as appropriate  - Identify discharge learning needs (meds, wound care, etc )  - Arrange for interpretive services to assist at discharge as needed  - Refer to Case Management Department for coordinating discharge planning if the patient needs post-hospital services based on physician/advanced practitioner order or complex needs related to functional status, cognitive ability, or social support system  Outcome: Progressing     Problem: Knowledge Deficit  Goal: Patient/family/caregiver demonstrates understanding of disease process, treatment plan, medications, and discharge instructions  Description: Complete learning assessment and assess knowledge base  Interventions:  - Provide teaching at level of understanding  - Provide teaching via preferred learning methods  Outcome: Progressing     Problem: MOBILITY - ADULT  Goal: Maintain or return to baseline ADL function  Description: INTERVENTIONS:  -  Assess patient's ability to carry out ADLs; assess patient's baseline for ADL function and identify physical deficits which impact ability to perform ADLs (bathing, care of mouth/teeth, toileting, grooming, dressing, etc )  - Assess/evaluate cause of self-care deficits   - Assess range of motion  - Assess patient's mobility; develop plan if impaired  - Assess patient's need for assistive devices and provide as appropriate  - Encourage maximum independence but intervene and supervise when necessary  - Involve family in performance of ADLs  - Assess for home care needs following discharge   - Consider OT consult to assist with ADL evaluation and planning for discharge  - Provide patient education as appropriate  Outcome: Progressing  Goal: Maintains/Returns to pre admission functional level  Description: INTERVENTIONS:  - Perform BMAT or MOVE assessment daily    - Set and communicate daily mobility goal to care team and patient/family/caregiver  - Collaborate with rehabilitation services on mobility goals if consulted  - Reposition patient every 2 hours    - Dangle patient 3 times a day  - Stand patient 3 times a day  - Ambulate patient 3 times a day  - Out of bed to chair 3 times a day   - Out of bed for meals 3 times a day  - Out of bed for toileting  - Record patient progress and toleration of activity level   Outcome: Progressing     Problem: Prexisting or High Potential for Compromised Skin Integrity  Goal: Skin integrity is maintained or improved  Description: INTERVENTIONS:  - Identify patients at risk for skin breakdown  - Assess and monitor skin integrity  - Assess and monitor nutrition and hydration status  - Monitor labs   - Assess for incontinence   - Turn and reposition patient  - Assist with mobility/ambulation  - Relieve pressure over bony prominences  - Avoid friction and shearing  - Provide appropriate hygiene as needed including keeping skin clean and dry  - Evaluate need for skin moisturizer/barrier cream  - Collaborate with interdisciplinary team   - Patient/family teaching  - Consider wound care consult   Outcome: Progressing

## 2023-02-03 NOTE — ASSESSMENT & PLAN NOTE
· Baseline hgb 8 3, frequently going lower  · Hgb this morning is 7 5  · Likely from chronic anemia and chronic kidney disease  · No obvious signs of bleeding  · Stool smear negative for blood  · Patient had anemia work up outpatient   Outpatient nephrology recommended her to go see a hematologist

## 2023-02-03 NOTE — ASSESSMENT & PLAN NOTE
Wt Readings from Last 3 Encounters:   02/03/23 61 5 kg (135 lb 9 3 oz)   01/27/23 65 9 kg (145 lb 4 5 oz)   01/26/23 61 9 kg (136 lb 7 4 oz)     · Came from nursing facility with generalized weakness  · Chest x-ray shows vascular congestion with pleural effusion  · proBNP elevated (patient also has CKD)  · Status post IV diuretics with Lasix 40 mg BID  · Lasix stopped   · Currently euvolemic  · Follow intake and output  · Standing weight

## 2023-02-03 NOTE — DISCHARGE SUMMARY
[address]  Discharge- Mayur Jones 1945, 65 y o  female MRN: [id number]  Unit/Bed#: -Trisha Encounter: [id number]  Primary Care Provider: Sola Kellogg DO   Date and time admitted to hospital: 1/31/2023  9:37 AM    * 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 heart failure (HCC)  Assessment & Plan  Wt Readings from Last 3 Encounters:   02/03/23 61 5 kg (135 lb 9 3 oz)   01/27/23 65 9 kg (145 lb 4 5 oz)   01/26/23 61 9 kg (136 lb 7 4 oz)     · Came from nursing facility with generalized weakness  · Chest x-ray shows vascular congestion with pleural effusion  · proBNP elevated (patient also has CKD)  · Status post IV diuretics with Lasix 40 mg BID  · Lasix stopped   · Currently euvolemic  · Follow intake and output  · Standing weight    COVID-19 virus infection  Assessment & Plan  · As per chart review, patient had COVID on September 2022  · Patient got recently discharged from this hospital with normal resolved  · Came back with COVID-positive  · Patient is saturating well on room air  · Patient already on Eliquis, continue    Stage 3b chronic kidney disease [facility])  Assessment & Plan  Lab Results   Component Value Date    EGFR 21 02/03/2023    EGFR 21 02/02/2023    EGFR 21 02/01/2023    CREATININE 2 12 (H) 02/03/2023    CREATININE 2 11 (H) 02/02/2023    CREATININE 2 16 (H) 02/01/2023   · Patient baseline creatinine runs around 1 7  · Patient recently discharged from this hospital with creatinine 1 98 on 1/27/2023  · Follows up with nephrology on outpatient basis  · Patient placed on IV diuretics due to CHF, continue renal function  · Stopped lasix due to euvolemia  · BMP in one week  · Follow up with nephrology    Acute on chronic anemia  Assessment & Plan  · Baseline hgb 8 3, frequently going lower  · Hgb this morning is 7 5  · Likely from chronic anemia and chronic kidney disease  · No obvious signs of bleeding  · Stool smear negative for blood  · Patient had anemia work up outpatient  Outpatient nephrology recommended her to go see a hematologist     S/P implantation of automatic cardioverter/defibrillator (AICD)  Assessment & Plan  · Noted    Paroxysmal atrial fibrillation ([address] )  Assessment & Plan  · Status post AICD in place  · Continue Eliquis  · Continue metoprolol    Acquired hypothyroidism  Assessment & Plan  · Recently patient's levothyroxine dose adjusted, consider to recheck TSH after 4 weeks  · Continue levothyroxine 175mcg    Urinary tract infection associated with indwelling urethral catheter (HCC)-resolved as of 2/2/2023  Assessment & Plan  · Catheter exchange in ER  · Patient recently was admitted into this hospital and completed antibiotic treatment at that time  · Status post renally dose adjusted cefepime  · Urine cultures showing candida albicans and enterococcus faecium  · Cefepime discontinued  · Patient is asymptomatic and does not have leukocytosis  Discussed with ID and they recommend no antibiotic treatment at this time  Medical Problems     Resolved Problems  Date Reviewed: 2/3/2023          Resolved    Urinary tract infection associated with indwelling urethral catheter ([address] ) 2/2/2023     Resolved by  Jenna Kwan PA-C        Discharging Physician / Practitioner: Jenna Kwan PA-C  PCP: Dyana Villegas DO  Admission Date:   Admission Orders (From admission, onward)     Ordered        01/31/23 1148  [facility]  Once                      Discharge Date: 02/03/23    Consultations During Hospital Stay:  · None    Procedures Performed:   · None    Significant Findings / Test Results:   · Chest xray showing cardiomegaly, vascular congestion and pleural effusion    Incidental Findings:   · None    Test Results Pending at Discharge (will require follow up):    · None     Outpatient Tests Requested:  · BMP in one week prior to nephrology appointment    Complications:  None    Reason for Admission: Covid-19 and CHF exacerbation    Hospital Course:   Edie Ulloa is a 65 y o  female patient who originally presented to the hospital on 1/31/2023 due to lethargy and generalized weakness  Patient was found to be positive for COVID-19 and have a CHF exacerbation  Patient was treated with IV Lasix which improved her symptoms and resulted in her being euvolemic  Patient did not require any additional treatment for COVID-19, as she was asymptomatic  Patient's kidney function was stable throughout stay  Recommend follow-up with nephrology outpatient  Please see above list of diagnoses and related plan for additional information  Condition at Discharge: good    Discharge Day Visit / Exam:   Subjective:  Patient states that she is feeling well today  Denies any pain, trouble breathing, cough, shortness of breath, chest pain or pelvic pain  Vitals: Blood Pressure: 150/56 (02/03/23 0809)  Pulse: 70 (02/03/23 0809)  Temperature: (!) 97 3 °F (36 3 °C) (02/03/23 0701)  Temp Source: Tympanic (01/31/23 0948)  Respirations: 16 (02/03/23 0701)  Height: 5' 5" (165 1 cm) (02/01/23 0956)  Weight - Scale: 61 5 kg (135 lb 9 3 oz) (02/03/23 0600)  SpO2: 99 % (02/03/23 0701)  Exam:   Physical Exam  Vitals reviewed  Constitutional:       General: She is not in acute distress  Appearance: Normal appearance  She is normal weight  She is not ill-appearing  Comments: Sitting comfortably in bed, eating breakfast   HENT:      Head: Normocephalic and atraumatic  Nose: Nose normal       Mouth/Throat:      Mouth: Mucous membranes are moist       Pharynx: Oropharynx is clear  Eyes:      Extraocular Movements: Extraocular movements intact  Conjunctiva/sclera: Conjunctivae normal    Cardiovascular:      Rate and Rhythm: Normal rate and regular rhythm  Pulses: Normal pulses  Heart sounds: Murmur heard  Comments: A  fib murmur heard  Pulmonary:      Effort: Pulmonary effort is normal  No respiratory distress  Breath sounds: No wheezing  Abdominal:      General: Abdomen is flat  Bowel sounds are normal  There is no distension  Palpations: Abdomen is soft  Tenderness: There is no abdominal tenderness  There is no guarding  Comments: No pelvic pain   Musculoskeletal:         General: No tenderness  Normal range of motion  Cervical back: Normal range of motion  Right lower leg: No edema  Left lower leg: No edema  Skin:     General: Skin is warm  Neurological:      General: No focal deficit present  Mental Status: She is alert and oriented to person, place, and time  Mental status is at baseline  Motor: No weakness  Psychiatric:         Mood and Affect: Mood normal          Behavior: Behavior normal          Thought Content: Thought content normal          Judgment: Judgment normal           Discussion with Family: Updated  (daughter) via phone  Discharge instructions/Information to patient and family:   See after visit summary for information provided to patient and family  Provisions for Follow-Up Care:  See after visit summary for information related to follow-up care and any pertinent home health orders  Disposition:   Other [place] [facility] Readmission: None     Discharge Statement:  I spent 45 minutes discharging the patient  This time was spent on the day of discharge  I had direct contact with the patient on the day of discharge  Greater than 50% of the total time was spent examining patient, answering all patient questions, arranging and discussing plan of care with patient as well as directly providing post-discharge instructions  Additional time then spent on discharge activities  Discharge Medications:  See after visit summary for reconciled discharge medications provided to patient and/or family        **Please Note: This note may have been constructed using a voice recognition system**

## 2023-02-03 NOTE — ASSESSMENT & PLAN NOTE
· Recently patient's levothyroxine dose adjusted, consider to recheck TSH after 4 weeks  · Continue levothyroxine 175mcg Siliq Counseling:  I discussed with the patient the risks of Siliq including but not limited to new or worsening depression, suicidal thoughts and behavior, immunosuppression, malignancy, posterior leukoencephalopathy syndrome, and serious infections.  The patient understands that monitoring is required including a PPD at baseline and must alert us or the primary physician if symptoms of infection or other concerning signs are noted. There is also a special program designed to monitor depression which is required with Siliq.

## 2023-02-03 NOTE — DISCHARGE INSTR - AVS FIRST PAGE
BMP blood work in 1 week prior to nephrology appointment    You were admitted to the hospital because of COVID-19 and heart failure  You were treated with Lasix while your kidney function was being monitored  It is important that you follow up with nephrology and your PCP so that they can manage your heart and kidney function   If symptoms worsen or new symptoms arise, come back to the hospital

## 2023-02-03 NOTE — ASSESSMENT & PLAN NOTE
Lab Results   Component Value Date    EGFR 21 02/03/2023    EGFR 21 02/02/2023    EGFR 21 02/01/2023    CREATININE 2 12 (H) 02/03/2023    CREATININE 2 11 (H) 02/02/2023    CREATININE 2 16 (H) 02/01/2023   · Patient baseline creatinine runs around 1 7  · Patient recently discharged from this hospital with creatinine 1 98 on 1/27/2023  · Follows up with nephrology on outpatient basis  · Patient placed on IV diuretics due to CHF, continue renal function  · Stopped lasix due to euvolemia  · BMP in one week  · Follow up with nephrology

## 2023-02-04 LAB
BACTERIA UR CULT: ABNORMAL
BACTERIA UR CULT: ABNORMAL

## 2023-02-05 LAB
ATRIAL RATE: 69 BPM
BACTERIA BLD CULT: NORMAL
BACTERIA BLD CULT: NORMAL
P AXIS: 25 DEGREES
PR INTERVAL: 150 MS
QRS AXIS: -73 DEGREES
QRSD INTERVAL: 142 MS
QT INTERVAL: 482 MS
QTC INTERVAL: 516 MS
T WAVE AXIS: 104 DEGREES
VENTRICULAR RATE: 69 BPM

## 2023-02-06 LAB
ATRIAL RATE: 69 BPM
P AXIS: -14 DEGREES
PR INTERVAL: 190 MS
QRS AXIS: -63 DEGREES
QRSD INTERVAL: 162 MS
QT INTERVAL: 488 MS
QTC INTERVAL: 522 MS
T WAVE AXIS: 137 DEGREES
VENTRICULAR RATE: 69 BPM

## 2023-02-07 ENCOUNTER — APPOINTMENT (EMERGENCY)
Dept: RADIOLOGY | Facility: HOSPITAL | Age: 78
End: 2023-02-07

## 2023-02-07 ENCOUNTER — APPOINTMENT (INPATIENT)
Dept: CT IMAGING | Facility: HOSPITAL | Age: 78
End: 2023-02-07

## 2023-02-07 ENCOUNTER — HOSPITAL ENCOUNTER (INPATIENT)
Facility: HOSPITAL | Age: 78
LOS: 6 days | Discharge: NURSING, REHAB OR LONG-TERM CARE | End: 2023-02-13
Attending: STUDENT IN AN ORGANIZED HEALTH CARE EDUCATION/TRAINING PROGRAM | Admitting: FAMILY MEDICINE

## 2023-02-07 DIAGNOSIS — K62.5 RECTAL BLEEDING: ICD-10-CM

## 2023-02-07 DIAGNOSIS — K92.2 UGIB (UPPER GASTROINTESTINAL BLEED): ICD-10-CM

## 2023-02-07 DIAGNOSIS — R53.1 GENERALIZED WEAKNESS: ICD-10-CM

## 2023-02-07 DIAGNOSIS — K64.9 HEMORRHOID: ICD-10-CM

## 2023-02-07 DIAGNOSIS — D64.9 ACUTE ON CHRONIC ANEMIA: ICD-10-CM

## 2023-02-07 DIAGNOSIS — D62 ACUTE ON CHRONIC BLOOD LOSS ANEMIA: ICD-10-CM

## 2023-02-07 DIAGNOSIS — R04.2 HEMOPTYSIS: ICD-10-CM

## 2023-02-07 DIAGNOSIS — I50.20 SYSTOLIC HF (HEART FAILURE) (HCC): ICD-10-CM

## 2023-02-07 DIAGNOSIS — D62 ACUTE BLOOD LOSS ANEMIA: Primary | ICD-10-CM

## 2023-02-07 PROBLEM — R33.9 URINARY RETENTION: Status: ACTIVE | Noted: 2023-02-07

## 2023-02-07 LAB
ABO GROUP BLD: NORMAL
ANION GAP SERPL CALCULATED.3IONS-SCNC: 6 MMOL/L (ref 4–13)
APTT PPP: 33 SECONDS (ref 23–37)
BACTERIA UR QL AUTO: ABNORMAL /HPF
BASOPHILS # BLD AUTO: 0.08 THOUSANDS/ÂΜL (ref 0–0.1)
BASOPHILS NFR BLD AUTO: 1 % (ref 0–1)
BILIRUB UR QL STRIP: NEGATIVE
BLD GP AB SCN SERPL QL: POSITIVE
BLOOD GROUP ANTIBODIES SERPL: NORMAL
BUN SERPL-MCNC: 66 MG/DL (ref 5–25)
CALCIUM SERPL-MCNC: 8.8 MG/DL (ref 8.4–10.2)
CHLORIDE SERPL-SCNC: 100 MMOL/L (ref 96–108)
CLARITY UR: ABNORMAL
CO2 SERPL-SCNC: 30 MMOL/L (ref 21–32)
COLOR UR: YELLOW
CREAT SERPL-MCNC: 2.18 MG/DL (ref 0.6–1.3)
D DIMER PPP FEU-MCNC: 7.5 UG/ML FEU
E AG RBC QL: NEGATIVE
EOSINOPHIL # BLD AUTO: 0.11 THOUSAND/ÂΜL (ref 0–0.61)
EOSINOPHIL NFR BLD AUTO: 2 % (ref 0–6)
ERYTHROCYTE [DISTWIDTH] IN BLOOD BY AUTOMATED COUNT: 18.3 % (ref 11.6–15.1)
FERRITIN SERPL-MCNC: 192 NG/ML (ref 8–388)
GFR SERPL CREATININE-BSD FRML MDRD: 21 ML/MIN/1.73SQ M
GLUCOSE SERPL-MCNC: 103 MG/DL (ref 65–140)
GLUCOSE UR STRIP-MCNC: NEGATIVE MG/DL
HCT VFR BLD AUTO: 23.1 % (ref 34.8–46.1)
HGB BLD-MCNC: 6.8 G/DL (ref 11.5–15.4)
HGB UR QL STRIP.AUTO: ABNORMAL
IMM GRANULOCYTES # BLD AUTO: 0.03 THOUSAND/UL (ref 0–0.2)
IMM GRANULOCYTES NFR BLD AUTO: 0 % (ref 0–2)
INR PPP: 1.79 (ref 0.84–1.19)
IRON SATN MFR SERPL: 13 % (ref 15–50)
IRON SERPL-MCNC: 41 UG/DL (ref 50–170)
KETONES UR STRIP-MCNC: NEGATIVE MG/DL
LEUKOCYTE ESTERASE UR QL STRIP: ABNORMAL
LYMPHOCYTES # BLD AUTO: 1.29 THOUSANDS/ÂΜL (ref 0.6–4.47)
LYMPHOCYTES NFR BLD AUTO: 19 % (ref 14–44)
MAGNESIUM SERPL-MCNC: 2 MG/DL (ref 1.9–2.7)
MCH RBC QN AUTO: 27.4 PG (ref 26.8–34.3)
MCHC RBC AUTO-ENTMCNC: 29.4 G/DL (ref 31.4–37.4)
MCV RBC AUTO: 93 FL (ref 82–98)
MONOCYTES # BLD AUTO: 0.43 THOUSAND/ÂΜL (ref 0.17–1.22)
MONOCYTES NFR BLD AUTO: 6 % (ref 4–12)
NEUTROPHILS # BLD AUTO: 4.82 THOUSANDS/ÂΜL (ref 1.85–7.62)
NEUTS SEG NFR BLD AUTO: 72 % (ref 43–75)
NITRITE UR QL STRIP: NEGATIVE
NON-SQ EPI CELLS URNS QL MICRO: ABNORMAL /HPF
NRBC BLD AUTO-RTO: 0 /100 WBCS
PH UR STRIP.AUTO: 7 [PH]
PLATELET # BLD AUTO: 254 THOUSANDS/UL (ref 149–390)
PMV BLD AUTO: 11.2 FL (ref 8.9–12.7)
POTASSIUM SERPL-SCNC: 4.9 MMOL/L (ref 3.5–5.3)
PROT UR STRIP-MCNC: ABNORMAL MG/DL
PROTHROMBIN TIME: 20.9 SECONDS (ref 11.6–14.5)
RBC # BLD AUTO: 2.48 MILLION/UL (ref 3.81–5.12)
RBC #/AREA URNS AUTO: ABNORMAL /HPF
RH BLD: POSITIVE
SODIUM SERPL-SCNC: 136 MMOL/L (ref 135–147)
SP GR UR STRIP.AUTO: 1.01 (ref 1–1.03)
SPECIMEN EXPIRATION DATE: NORMAL
T4 FREE SERPL-MCNC: 0.74 NG/DL (ref 0.76–1.46)
TIBC SERPL-MCNC: 314 UG/DL (ref 250–450)
TSH SERPL DL<=0.05 MIU/L-ACNC: 11.88 UIU/ML (ref 0.45–4.5)
UROBILINOGEN UR QL STRIP.AUTO: 0.2 E.U./DL
WBC # BLD AUTO: 6.76 THOUSAND/UL (ref 4.31–10.16)
WBC #/AREA URNS AUTO: ABNORMAL /HPF

## 2023-02-07 PROCEDURE — 30233N1 TRANSFUSION OF NONAUTOLOGOUS RED BLOOD CELLS INTO PERIPHERAL VEIN, PERCUTANEOUS APPROACH: ICD-10-PCS | Performed by: STUDENT IN AN ORGANIZED HEALTH CARE EDUCATION/TRAINING PROGRAM

## 2023-02-07 RX ORDER — TAMSULOSIN HYDROCHLORIDE 0.4 MG/1
0.4 CAPSULE ORAL
Status: DISCONTINUED | OUTPATIENT
Start: 2023-02-07 | End: 2023-02-13 | Stop reason: HOSPADM

## 2023-02-07 RX ORDER — POLYETHYLENE GLYCOL 3350 17 G/17G
17 POWDER, FOR SOLUTION ORAL DAILY PRN
Status: DISCONTINUED | OUTPATIENT
Start: 2023-02-07 | End: 2023-02-13 | Stop reason: HOSPADM

## 2023-02-07 RX ORDER — AMLODIPINE BESYLATE 10 MG/1
10 TABLET ORAL DAILY
Status: DISCONTINUED | OUTPATIENT
Start: 2023-02-08 | End: 2023-02-13 | Stop reason: HOSPADM

## 2023-02-07 RX ORDER — LEVOTHYROXINE SODIUM 175 UG/1
175 TABLET ORAL
Status: DISCONTINUED | OUTPATIENT
Start: 2023-02-08 | End: 2023-02-13 | Stop reason: HOSPADM

## 2023-02-07 RX ORDER — MELATONIN
1000 DAILY
Status: DISCONTINUED | OUTPATIENT
Start: 2023-02-08 | End: 2023-02-13 | Stop reason: HOSPADM

## 2023-02-07 RX ORDER — SODIUM BICARBONATE 650 MG/1
650 TABLET ORAL
Status: DISCONTINUED | OUTPATIENT
Start: 2023-02-07 | End: 2023-02-13 | Stop reason: HOSPADM

## 2023-02-07 RX ORDER — SERTRALINE HYDROCHLORIDE 100 MG/1
100 TABLET, FILM COATED ORAL DAILY
Status: DISCONTINUED | OUTPATIENT
Start: 2023-02-08 | End: 2023-02-13 | Stop reason: HOSPADM

## 2023-02-07 RX ORDER — PANTOPRAZOLE SODIUM 40 MG/1
40 TABLET, DELAYED RELEASE ORAL
Status: DISCONTINUED | OUTPATIENT
Start: 2023-02-08 | End: 2023-02-13 | Stop reason: HOSPADM

## 2023-02-07 RX ORDER — ALLOPURINOL 100 MG/1
100 TABLET ORAL DAILY
Status: DISCONTINUED | OUTPATIENT
Start: 2023-02-08 | End: 2023-02-13 | Stop reason: HOSPADM

## 2023-02-07 RX ORDER — FUROSEMIDE 10 MG/ML
20 INJECTION INTRAMUSCULAR; INTRAVENOUS ONCE
Status: COMPLETED | OUTPATIENT
Start: 2023-02-07 | End: 2023-02-07

## 2023-02-07 RX ORDER — METOPROLOL SUCCINATE 100 MG/1
100 TABLET, EXTENDED RELEASE ORAL DAILY
Status: DISCONTINUED | OUTPATIENT
Start: 2023-02-08 | End: 2023-02-13 | Stop reason: HOSPADM

## 2023-02-07 RX ORDER — DOCUSATE SODIUM 100 MG/1
100 CAPSULE, LIQUID FILLED ORAL 2 TIMES DAILY
Status: DISCONTINUED | OUTPATIENT
Start: 2023-02-07 | End: 2023-02-13 | Stop reason: HOSPADM

## 2023-02-07 RX ORDER — ACETAMINOPHEN 325 MG/1
650 TABLET ORAL EVERY 6 HOURS PRN
Status: DISCONTINUED | OUTPATIENT
Start: 2023-02-07 | End: 2023-02-13 | Stop reason: HOSPADM

## 2023-02-07 RX ADMIN — SODIUM BICARBONATE 650 MG TABLET 650 MG: at 18:48

## 2023-02-07 RX ADMIN — FUROSEMIDE 20 MG: 10 INJECTION, SOLUTION INTRAMUSCULAR; INTRAVENOUS at 18:48

## 2023-02-07 RX ADMIN — TAMSULOSIN HYDROCHLORIDE 0.4 MG: 0.4 CAPSULE ORAL at 19:10

## 2023-02-07 RX ADMIN — DOCUSATE SODIUM 100 MG: 100 CAPSULE ORAL at 18:48

## 2023-02-07 NOTE — ASSESSMENT & PLAN NOTE
Background: During previous admission 1/31 - 2/3 incidentally tested positive for COVID, was asymptomatic and maintained on mild COVID pathway -remdesivir not given  • Mild COVID pathway as below   • Vaccinated x2  • COVID19- positive on 1/31   • Not needing supplemental oxygen  • Chest CT ordered  • CBC and CMP daily  • Right now holding AC due to acute anemia and possible bleed  • OOB TID; ambulation and mobilization strongly encouraged  • PT/OT consult and evaluation   • Self proning strongly encouraged  • Supportive care

## 2023-02-07 NOTE — ASSESSMENT & PLAN NOTE
Continue beta-blocker, hold aspirin due to anemia  Denies any chest pain  EKG was sinus rhythm, rate 69, complete LBBB which is chronic, no signs for ischemia

## 2023-02-07 NOTE — ASSESSMENT & PLAN NOTE
She is at her baseline-alert and oriented  Delirium precautions  Consents need to be obtained via daughter

## 2023-02-07 NOTE — ASSESSMENT & PLAN NOTE
Presents to ED with weakness, hemoptysis yesterday with large blood clot  • CBC showing: Hgb 6 8/Hct 23 1/MCV 93  o Differential includes: CKD, thyroid disorder, acute blood loss  • Iron panel ordered   • TSH with reflex ordered, during recent admission had abnormalities with Synthroid increased  • B12 ordered, was previously deficient and started on B12 supplement  • Blood consent obtained by ER, will re-consent with patient's daughter, placed in chart  • Transfuse if Hgb <7  o Recent history of transfusion during admission 1/19 - 1/26, a 1 unit PRBC   o Ordered additional unit   • Trend CBC q 8 hrs    Lab Results   Component Value Date    HGB 6 8 (LL) 02/07/2023    HGB 7 5 (L) 02/03/2023     · In the past patient has refused EGD/colonoscopy -is also COVID-positive on day 7  · Stool records at bedside, hold off on further occult stool testing (was positive in the ER)  · We will hold off on GI consult at this time until patient agreeable for scope  · Consult pulmonology for hemoptysis  · Er stated hemocult positive will order one  · Hb base >8

## 2023-02-07 NOTE — ASSESSMENT & PLAN NOTE
EKG shows sinus rhythm  Rate control with metoprolol  Is currently on Eliquis for anticoagulation -on hold secondary to acute anemia

## 2023-02-07 NOTE — ASSESSMENT & PLAN NOTE
· Reported episode of hemoptysis with large clot at nursing facility  · Chest x-ray without acute findings, seems improved from previous  · Had outpatient chest x-ray ordered for 2/13 secondary to hemoptysis  · Does have history of a pulmonary nodule 2 mm lower left quadrant noted on CT in September 2022, was not deemed significant and no follow-up recommended    · Will order CT for inpatient  · Pulmonary consult  · Transfuse to keep hemoglobin greater than 7  · Unclear amount of blood was expelled as outpatient, continue to monitor closely

## 2023-02-07 NOTE — ASSESSMENT & PLAN NOTE
Wt Readings from Last 3 Encounters:   02/07/23 63 5 kg (139 lb 15 9 oz)   02/03/23 61 5 kg (135 lb 9 3 oz)   01/27/23 65 9 kg (145 lb 4 5 oz)       Currently euvolemic  Maintained on diuretic therapy as an outpatient  Previous echo done 1/19: EF decreased to 25% which was changed from previous study in September, severe global hypokinesis, new tricuspid regurg without other changes from previous study  Currently is on beta-blocker, not on ACE or ARB, on aspirin  Does have Zaina HAGAN [address] cardiology

## 2023-02-07 NOTE — ASSESSMENT & PLAN NOTE
· Only placed on 1/26 by her to discharge to the nursing home she still has a Greer catheter was post to have a voiding trial 7 to 20 days after placement will follow-up tomorrow why she still has a Greer and see if we could do a voiding trial
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
## 2023-02-07 NOTE — ASSESSMENT & PLAN NOTE
During hospitalization 1/19 - 1/26 had abnormal thyroid levels  Synthroid was adjusted to 175 micrograms  Was recommended to have repeat labs in 4 to 6 weeks    We will repeat labs during this admission

## 2023-02-07 NOTE — ASSESSMENT & PLAN NOTE
Lab Results   Component Value Date    EGFR 21 02/07/2023    EGFR 21 02/03/2023    EGFR 21 02/02/2023    CREATININE 2 18 (H) 02/07/2023    CREATININE 2 12 (H) 02/03/2023    CREATININE 2 11 (H) 02/02/2023   Creatinine baseline is around 1 7  Recently during last hospitalization has been around 2 1, currently at near and not meeting criteria for DARRELL  Monitor closely  Avoid nephrotoxic agents, NSAIDs and hypotension  Renally dose medications

## 2023-02-08 LAB
ABO GROUP BLD BPU: NORMAL
ALBUMIN SERPL BCP-MCNC: 3.1 G/DL (ref 3.5–5)
ALP SERPL-CCNC: 95 U/L (ref 34–104)
ALT SERPL W P-5'-P-CCNC: 7 U/L (ref 7–52)
ANION GAP SERPL CALCULATED.3IONS-SCNC: 6 MMOL/L (ref 4–13)
AST SERPL W P-5'-P-CCNC: 17 U/L (ref 13–39)
BACTERIA UR CULT: NORMAL
BILIRUB SERPL-MCNC: 0.64 MG/DL (ref 0.2–1)
BPU ID: NORMAL
BUN SERPL-MCNC: 66 MG/DL (ref 5–25)
CALCIUM ALBUM COR SERPL-MCNC: 9.2 MG/DL (ref 8.3–10.1)
CALCIUM SERPL-MCNC: 8.5 MG/DL (ref 8.4–10.2)
CHLORIDE SERPL-SCNC: 101 MMOL/L (ref 96–108)
CO2 SERPL-SCNC: 30 MMOL/L (ref 21–32)
CREAT SERPL-MCNC: 2.18 MG/DL (ref 0.6–1.3)
CROSSMATCH: NORMAL
ERYTHROCYTE [DISTWIDTH] IN BLOOD BY AUTOMATED COUNT: 17.3 % (ref 11.6–15.1)
GFR SERPL CREATININE-BSD FRML MDRD: 21 ML/MIN/1.73SQ M
GLUCOSE SERPL-MCNC: 85 MG/DL (ref 65–140)
HCT VFR BLD AUTO: 26 % (ref 34.8–46.1)
HCT VFR BLD AUTO: 26.6 % (ref 34.8–46.1)
HEMOCCULT STL QL: POSITIVE
HGB BLD-MCNC: 6.3 G/DL (ref 11.5–15.4)
HGB BLD-MCNC: 8 G/DL (ref 11.5–15.4)
HGB BLD-MCNC: 8.1 G/DL (ref 11.5–15.4)
MCH RBC QN AUTO: 27.8 PG (ref 26.8–34.3)
MCHC RBC AUTO-ENTMCNC: 30.1 G/DL (ref 31.4–37.4)
MCV RBC AUTO: 92 FL (ref 82–98)
PLATELET # BLD AUTO: 224 THOUSANDS/UL (ref 149–390)
PMV BLD AUTO: 10.2 FL (ref 8.9–12.7)
POTASSIUM SERPL-SCNC: 4.6 MMOL/L (ref 3.5–5.3)
PROT SERPL-MCNC: 5.8 G/DL (ref 6.4–8.4)
QRS AXIS: -56 DEGREES
QRSD INTERVAL: 156 MS
QT INTERVAL: 478 MS
QTC INTERVAL: 512 MS
RBC # BLD AUTO: 2.88 MILLION/UL (ref 3.81–5.12)
SODIUM SERPL-SCNC: 137 MMOL/L (ref 135–147)
T WAVE AXIS: 157 DEGREES
UNIT DISPENSE STATUS: NORMAL
UNIT PRODUCT CODE: NORMAL
UNIT PRODUCT VOLUME: 300 ML
UNIT RH: NORMAL
VENTRICULAR RATE: 69 BPM
WBC # BLD AUTO: 5.65 THOUSAND/UL (ref 4.31–10.16)

## 2023-02-08 RX ADMIN — CYANOCOBALAMIN TAB 500 MCG 1000 MCG: 500 TAB at 09:31

## 2023-02-08 RX ADMIN — METOPROLOL SUCCINATE 100 MG: 100 TABLET, EXTENDED RELEASE ORAL at 09:31

## 2023-02-08 RX ADMIN — DOCUSATE SODIUM 100 MG: 100 CAPSULE ORAL at 16:35

## 2023-02-08 RX ADMIN — SODIUM BICARBONATE 650 MG TABLET 650 MG: at 09:30

## 2023-02-08 RX ADMIN — ALLOPURINOL 100 MG: 100 TABLET ORAL at 09:32

## 2023-02-08 RX ADMIN — SODIUM BICARBONATE 650 MG TABLET 650 MG: at 16:35

## 2023-02-08 RX ADMIN — DOCUSATE SODIUM 100 MG: 100 CAPSULE ORAL at 09:31

## 2023-02-08 RX ADMIN — PANTOPRAZOLE SODIUM 40 MG: 40 TABLET, DELAYED RELEASE ORAL at 05:56

## 2023-02-08 RX ADMIN — TAMSULOSIN HYDROCHLORIDE 0.4 MG: 0.4 CAPSULE ORAL at 16:35

## 2023-02-08 RX ADMIN — Medication 1000 UNITS: at 09:31

## 2023-02-08 RX ADMIN — AMLODIPINE BESYLATE 10 MG: 10 TABLET ORAL at 09:31

## 2023-02-08 RX ADMIN — LEVOTHYROXINE SODIUM 175 MCG: 175 TABLET ORAL at 05:56

## 2023-02-08 RX ADMIN — SERTRALINE HYDROCHLORIDE 100 MG: 100 TABLET ORAL at 09:32

## 2023-02-08 RX ADMIN — SODIUM BICARBONATE 650 MG TABLET 650 MG: at 12:12

## 2023-02-08 NOTE — PROGRESS NOTES
[address]  Progress Note - John Speaks 1945, 65 y o  female MRN: [id number]  Unit/Bed#: -01 Encounter: [id number]  Primary Care Provider: Sherrell Mcknight DO   Date and time admitted to hospital: 2/7/2023  2:48 PM    * Acute on chronic blood loss anemia  Assessment & Plan  Presents to ED with weakness, hemoptysis yesterday with large blood clot, Hemoccult positive as well  History of the same presentation  She received transfusion currently hemoglobin is stable      • Iron panel ordered anemia of chronic disease mild iron deficiency  • B12 ordered, was previously deficient and started on B12 supplement  • Blood consent obtained by ER, will re-consent with patient's daughter, placed in chart  • Transfuse if Hgb <7  o Recent history of transfusion during admission 1/19 - 1/26, a 1 unit PRBC   o Ordered additional unit   • Hb q12  • Unfortunately I had a discussion with the patient she does have dementia but she does not want to proceed with GI evaluation in light of unknown avoidance going on in terms of GI evaluation if there is a bleed and will be difficult to restart her anticoagulation antiplatelet as this is a 2nd-3rd admission for same we will have to discuss the daughter the risks    Lab Results   Component Value Date    HGB 8 0 (L) 02/08/2023    HGB 6 3 (LL) 02/08/2023     · In the past patient has refused EGD/colonoscopy -is also COVID-positive on day 7  · Stool records at bedside, hold off on further occult stool testing (was positive in the ER)  · We will hold off on GI consult at this time until patient agreeable for scope  · Consult pulmonology for hemoptysis  · Er stated hemocult positive will order one  · Hb base >8      Urinary retention  Assessment & Plan  · Only placed on 1/26 by her to discharge to the nursing home she still has a Greer catheter was post to have a voiding trial 7 to 20 days after placement will follow-up tomorrow why she still has a Greer and see if we could do a voiding trial we will do a voiding trial now    Hemoptysis  Assessment & Plan  · Reported episode of hemoptysis with large clot at nursing facility  · Chest x-ray without acute findings, seems improved from previous  · Had outpatient chest x-ray ordered for 2/13 secondary to hemoptysis  · Does have history of a pulmonary nodule 2 mm lower left quadrant noted on CT in September 2022, was not deemed significant and no follow-up recommended    · CTchest done without contrast reveals no pneumonia  · Pulmonary consult  · Transfuse to keep hemoglobin greater than 7  · Unclear amount of blood was expelled as outpatient, continue to monitor closely  · No recurrence    Stage 3b chronic kidney disease [facility])  Assessment & Plan  Lab Results   Component Value Date    EGFR 21 02/08/2023    EGFR 21 02/07/2023    EGFR 21 02/03/2023    CREATININE 2 18 (H) 02/08/2023    CREATININE 2 18 (H) 02/07/2023    CREATININE 2 12 (H) 02/03/2023   Creatinine baseline is around 1 7  Recently during last hospitalization has been around 2 1, currently at near and not meeting criteria for DARRELL has relatively remained stable  Monitor closely  Avoid nephrotoxic agents, NSAIDs and hypotension  Renally dose medications    COVID-19 virus infection  Assessment & Plan  Background: During previous admission 1/31 - 2/3 incidentally tested positive for COVID, was asymptomatic and maintained on mild COVID pathway -remdesivir not given  • Mild COVID pathway as below   • Vaccinated x2  • COVID19- positive on 1/31   • Not needing supplemental oxygen  • Chest CT ordered no evidence of pneumonia  • CBC and CMP daily  • Right now holding AC due to acute anemia and possible bleed  • OOB TID; ambulation and mobilization strongly encouraged  • PT/OT consult and evaluation   • Self proning strongly encouraged  • Supportive care      Dementia [facility])  Assessment & Plan  She is at her baseline-alert and oriented  Delirium precautions  Consents need to be obtained via daughter    Paroxysmal atrial fibrillation [facility])  Assessment & Plan  EKG shows sinus rhythm  Rate control with metoprolol  Is currently on Eliquis for anticoagulation -on hold secondary to acute anemia    Chronic combined systolic and diastolic congestive heart failure (HCC)  Assessment & Plan  Wt Readings from Last 3 Encounters:   02/07/23 63 5 kg (139 lb 15 9 oz)   02/03/23 61 5 kg (135 lb 9 3 oz)   01/27/23 65 9 kg (145 lb 4 5 oz)       Currently euvolemic  Maintained on diuretic therapy as an outpatient  Previous echo done 1/19: EF decreased to 25% which was changed from previous study in September, severe global hypokinesis, new tricuspid regurg without other changes from previous study  Currently is on beta-blocker, not on ACE or ARB, on aspirin  Does have AICD  Consult cardiology discussed with cardiology no recommendation in terms of proceeding with ischemic evaluation as patient has a GI bleed and refuses to have a GI evaluation and will be unable to at this time actually proceed with antiplatelets      CAD (coronary artery disease)  Assessment & Plan  Continue beta-blocker, hold aspirin due to anemia  Denies any chest pain  EKG was sinus rhythm, rate 69, complete LBBB which is chronic, no signs for ischemia  Data GI evaluation will be difficult to restart antiplatelets    Acquired hypothyroidism  Assessment & Plan  During hospitalization 1/19 - 1/26 had abnormal thyroid levels  Synthroid was adjusted to 175 micrograms  Was recommended to have repeat labs in 4 to 6 weeks    We will repeat labs during this admission improved with suggest repeat a TSH in another 2 weeks          VTE Pharmacologic Prophylaxis: VTE Score: 4 Moderate Risk (Score 3-4) - Pharmacological DVT Prophylaxis Contraindicated  Sequential Compression Devices Ordered  Patient Centered Rounds: I performed bedside rounds with nursing staff today    Discussions with Specialists or Other Care Team Provider: Discussed with the case management    Education and Discussions with Family / Patient: Patient and will update daughter  Time Spent for Care: 30 minutes  More than 50% of total time spent on counseling and coordination of care as described above  Current Length of Stay: 1 day(s)  Current Patient Status: Inpatient   Certification Statement: The patient will continue to require additional inpatient hospital stay due to Secondary to anemia  Discharge Plan: Anticipate discharge in 24-48 hrs to rehab facility  Code Status: Level 3 - DNAR and DNI    Subjective:   Patient seen and examined she denies any complaints    Objective:     Vitals:   Temp (24hrs), Av 6 °F (36 4 °C), Min:97 °F (36 1 °C), Max:98 1 °F (36 7 °C)    Temp:  [97 °F (36 1 °C)-98 1 °F (36 7 °C)] 97 5 °F (36 4 °C)  HR:  [57-70] 69  Resp:  [15-22] 19  BP: (126-140)/(56-70) 133/67  SpO2:  [93 %-98 %] 96 %  Body mass index is 23 3 kg/m²  Input and Output Summary (last 24 hours): Intake/Output Summary (Last 24 hours) at 2023 1229  Last data filed at 2023 0603  Gross per 24 hour   Intake 302 08 ml   Output 700 ml   Net -397 92 ml       Physical Exam:   Physical Exam  Vitals and nursing note reviewed  Constitutional:       General: She is not in acute distress  Appearance: She is well-developed  HENT:      Head: Normocephalic and atraumatic  Eyes:      Conjunctiva/sclera: Conjunctivae normal    Cardiovascular:      Rate and Rhythm: Normal rate and regular rhythm  Heart sounds: No murmur heard  Pulmonary:      Effort: Pulmonary effort is normal  No respiratory distress  Breath sounds: Normal breath sounds  No wheezing or rales  Abdominal:      General: Bowel sounds are normal  There is no distension  Palpations: Abdomen is soft  Tenderness: There is no abdominal tenderness  Musculoskeletal:         General: No swelling  Cervical back: Neck supple  Skin:     General: Skin is warm and dry        Capillary Refill: Capillary refill takes less than 2 seconds  Neurological:      Mental Status: She is alert and oriented to person, place, and time  Psychiatric:         Mood and Affect: Mood normal          Additional Data:     Labs:  Results from last 7 days   Lab Units 02/08/23  0422 02/08/23  0011 02/07/23  1505   WBC Thousand/uL 5 65  --  6 76   HEMOGLOBIN g/dL 8 0*   < > 6 8*   HEMATOCRIT % 26 6*  --  23 1*   PLATELETS Thousands/uL 224  --  254   NEUTROS PCT %  --   --  72   LYMPHS PCT %  --   --  19   MONOS PCT %  --   --  6   EOS PCT %  --   --  2    < > = values in this interval not displayed  Results from last 7 days   Lab Units 02/08/23  0422   SODIUM mmol/L 137   POTASSIUM mmol/L 4 6   CHLORIDE mmol/L 101   CO2 mmol/L 30   BUN mg/dL 66*   CREATININE mg/dL 2 18*   ANION GAP mmol/L 6   CALCIUM mg/dL 8 5   ALBUMIN g/dL 3 1*   TOTAL BILIRUBIN mg/dL 0 64   ALK PHOS U/L 95   ALT U/L 7   AST U/L 17   GLUCOSE RANDOM mg/dL 85     Results from last 7 days   Lab Units 02/07/23  1505   INR  1 79*                   Lines/Drains:  Invasive Devices     Peripheral Intravenous Line  Duration           Peripheral IV 02/07/23 Right Antecubital <1 day          Drain  Duration           Urethral Catheter Coude 18 Fr  8 days              Urinary Catheter:  Goal for removal: No longer needed   Will place order to discontinue               Imaging: Reviewed radiology reports from this admission including: chest CT scan    Recent Cultures (last 7 days):         Last 24 Hours Medication List:   Current Facility-Administered Medications   Medication Dose Route Frequency Provider Last Rate   • acetaminophen  650 mg Oral Q6H PRN Buster Clark PA-C     • allopurinol  100 mg Oral Daily Buster Clark PA-C     • amLODIPine  10 mg Oral Daily Buster Clark PA-C     • cholecalciferol  1,000 Units Oral Daily Buster Clark PA-C     • cyanocobalamin  1,000 mcg Oral Daily Buster Clark PA-C     • docusate sodium  100 mg Oral BID Sara Howard Carmelo Carreon PA-C     • levothyroxine  175 mcg Oral Early Morning Harriett Soliz PA-C     • metoprolol succinate  100 mg Oral Daily Harriett Soliz PA-C     • pantoprazole  40 mg Oral Early Morning Harriett Soliz PA-C     • polyethylene glycol  17 g Oral Daily PRN Harriett Soliz PA-C     • sertraline  100 mg Oral Daily Harriett Soliz PA-C     • sodium bicarbonate  650 mg Oral TID after meals Harriett Soliz PA-C     • tamsulosin  0 4 mg Oral Daily With Ashley Cano PA-C          Today, Patient Was Seen By: Kinjal Abraham MD    **Please Note: This note may have been constructed using a voice recognition system  **
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
## 2023-02-08 NOTE — ASSESSMENT & PLAN NOTE
· Reported episode of hemoptysis with large clot at nursing facility  · Chest x-ray without acute findings, seems improved from previous  · Had outpatient chest x-ray ordered for 2/13 secondary to hemoptysis  · Does have history of a pulmonary nodule 2 mm lower left quadrant noted on CT in September 2022, was not deemed significant and no follow-up recommended    · CTchest done without contrast reveals no pneumonia  · Pulmonary consult  · Transfuse to keep hemoglobin greater than 7  · Unclear amount of blood was expelled as outpatient, continue to monitor closely  · No recurrence

## 2023-02-08 NOTE — PLAN OF CARE
Problem: Potential for Falls  Goal: Patient will remain free of falls  Description: INTERVENTIONS:  - Educate patient/family on patient safety including physical limitations  - Instruct patient to call for assistance with activity   - Consult OT/PT to assist with strengthening/mobility   - Keep Call bell within reach  - Keep bed low and locked with side rails adjusted as appropriate  - Keep care items and personal belongings within reach  - Initiate and maintain comfort rounds  - Make Fall Risk Sign visible to staff    - Apply yellow socks and bracelet for high fall risk patients  - Consider moving patient to room near nurses station  Outcome: Progressing     Problem: Prexisting or High Potential for Compromised Skin Integrity  Goal: Skin integrity is maintained or improved  Description: INTERVENTIONS:  - Identify patients at risk for skin breakdown  - Assess and monitor skin integrity  - Assess and monitor nutrition and hydration status  - Monitor labs   - Assess for incontinence   - Turn and reposition patient  - Assist with mobility/ambulation  - Relieve pressure over bony prominences  - Avoid friction and shearing  - Provide appropriate hygiene as needed including keeping skin clean and dry  - Evaluate need for skin moisturizer/barrier cream  - Collaborate with interdisciplinary team   - Patient/family teaching  - Consider wound care consult   Outcome: Progressing     Problem: PAIN - ADULT  Goal: Verbalizes/displays adequate comfort level or baseline comfort level  Description: Interventions:  - Encourage patient to monitor pain and request assistance  - Assess pain using appropriate pain scale  - Administer analgesics based on type and severity of pain and evaluate response  - Implement non-pharmacological measures as appropriate and evaluate response  - Consider cultural and social influences on pain and pain management  - Notify physician/advanced practitioner if interventions unsuccessful or patient reports new pain  Outcome: Progressing     Problem: INFECTION - ADULT  Goal: Absence or prevention of progression during hospitalization  Description: INTERVENTIONS:  - Assess and monitor for signs and symptoms of infection  - Monitor lab/diagnostic results  - Monitor all insertion sites, i e  indwelling lines, tubes, and drains  - Monitor endotracheal if appropriate and nasal secretions for changes in amount and color  - [place] appropriate cooling/warming therapies per order  - Administer medications as ordered  - Instruct and encourage patient and family to use good hand hygiene technique  - Identify and instruct in appropriate isolation precautions for identified infection/condition  Outcome: Progressing     Problem: SAFETY ADULT  Goal: Patient will remain free of falls  Description: INTERVENTIONS:  - Educate patient/family on patient safety including physical limitations  - Instruct patient to call for assistance with activity   - Consult OT/PT to assist with strengthening/mobility   - Keep Call bell within reach  - Keep bed low and locked with side rails adjusted as appropriate  - Keep care items and personal belongings within reach  - Initiate and maintain comfort rounds  - Make Fall Risk Sign visible to staff    - Apply yellow socks and bracelet for high fall risk patients  - Consider moving patient to room near nurses station  Outcome: Progressing  Goal: Maintain or return to baseline ADL function  Description: INTERVENTIONS:  -  Assess patient's ability to carry out ADLs; assess patient's baseline for ADL function and identify physical deficits which impact ability to perform ADLs (bathing, care of mouth/teeth, toileting, grooming, dressing, etc )  - Assess/evaluate cause of self-care deficits   - Assess range of motion  - Assess patient's mobility; develop plan if impaired  - Assess patient's need for assistive devices and provide as appropriate  - Encourage maximum independence but intervene and supervise when necessary  - Involve family in performance of ADLs  - Assess for home care needs following discharge   - Consider OT consult to assist with ADL evaluation and planning for discharge  - Provide patient education as appropriate  Outcome: Progressing  Goal: Maintains/Returns to pre admission functional level  Description: INTERVENTIONS:  - Perform BMAT or MOVE assessment daily    - Set and communicate daily mobility goal to care team and patient/family/caregiver  - Collaborate with rehabilitation services on mobility goals if consulted    - Out of bed for toileting  - Record patient progress and toleration of activity level   Outcome: Progressing     Problem: DISCHARGE PLANNING  Goal: Discharge to home or other facility with appropriate resources  Description: INTERVENTIONS:  - Identify barriers to discharge w/patient and caregiver  - Arrange for needed discharge resources and transportation as appropriate  - Identify discharge learning needs (meds, wound care, etc )  - Arrange for interpretive services to assist at discharge as needed  - Refer to Case Management Department for coordinating discharge planning if the patient needs post-hospital services based on physician/advanced practitioner order or complex needs related to functional status, cognitive ability, or social support system  Outcome: Progressing     Problem: Knowledge Deficit  Goal: Patient/family/caregiver demonstrates understanding of disease process, treatment plan, medications, and discharge instructions  Description: Complete learning assessment and assess knowledge base    Interventions:  - Provide teaching at level of understanding  - Provide teaching via preferred learning methods  Outcome: Progressing

## 2023-02-08 NOTE — CASE MANAGEMENT
Case Management Assessment & Discharge Planning Note    Patient name Tramaine Schwab  Location /-70 MRN [id number]  : 1945 Date 2023       Current Admission Date: 2023  Current Admission Diagnosis:Acute on chronic blood loss anemia   Patient Active Problem List    Diagnosis Date Noted   • Hemoptysis 2023   • Urinary retention 2023   • Stage 3b chronic kidney disease ([place] [address] ) 2023   • Acute on chronic blood loss anemia 2023   • Acute cystitis without hematuria 2023   • Right internal carotid artery aneurysm 2023   • Carotid artery stenosis 2023   • Aneurysm ([place] [address] )    • Elevated d-dimer 2023   • Proteinuria 2023   • Goals of care, counseling/discussion 2023   • Secondary renal hyperparathyroidism ([place] [address] ) 10/26/2022   • Hyperuricemia 10/26/2022   • Hypophosphatemia 10/04/2022   • Pulmonary hypertension ([place] [address] ) 10/04/2022   • Low TSH level 2022   • COVID-19 virus infection 2022   • Dementia ([place] [address] )    • Metabolic encephalopathy    • Gastrointestinal bleeding 2022   • Hydroureteronephrosis 2022   • Diverticulitis 2022   • Rectal bleeding 2022   • Elevated troponin 2022   • Iron deficiency anemia 2022   • Paroxysmal atrial fibrillation ([place] [address] ) 2020   • Ischemic cardiomyopathy 2020   • S/P implantation of automatic cardioverter/defibrillator (AICD) 2020   • Essential hypertension 2020   • History of PTCA 2020   • Leg swelling 2020   • DARRELL (acute kidney injury) ([place] [address] ) 2020   • Perforated appendicitis 2020   • Chronic combined systolic and diastolic congestive heart failure ([place] [address] ) 2020   • Pyuria 2020   • Microscopic hematuria 2020   • Vitamin D insufficiency 2020   • Mitral valve insufficiency 2020   • Hypercholesterolemia 2020   • Aortic atherosclerosis ([address] ) 2020   • Renal calculus 2020   • Diverticulosis 07/29/2020   • Hiatal hernia 07/29/2020   • Pulmonary nodule 07/29/2020   • Benign hypertensive renal disease 09/12/2019   • Acute blood loss anemia 02/08/2019   • Gastroesophageal reflux disease without esophagitis 02/08/2019   • Closed fracture of body of sternum with routine healing 04/12/2018   • Acquired hypothyroidism 04/12/2018   • CAD (coronary artery disease) 04/12/2018   • Forgetfulness 04/12/2018   • Acute pain due to trauma 04/12/2018   • Hx of fall 04/12/2018   • Stress incontinence in female 04/12/2018   • Acute on chronic anemia 03/20/2018      LOS (days): 1  Geometric Mean LOS (GMLOS) (days): 2 70  Days to GMLOS:1 7     OBJECTIVE:  PATIENT READMITTED TO HOSPITAL  Risk of Unplanned Readmission Score: 34 19         Current admission status: Inpatient  Referral Reason:  (Post Acute Placement (specify))    Preferred Pharmacy:   [address] [place], [place] [address]  [address]  [place] [address]  Phone: [phone] Fax: [phone]    Primary Care Provider: Maribel Green DO    Primary Insurance: Del Suman [facility]  Secondary Insurance:     ASSESSMENT:  Misti Pedersen Proxies    There are no active Health Care Proxies on file         Advance Directives  Does patient have a [address]?: No  Was patient offered paperwork?: No  Does patient currently have a Health Care decision maker?: Yes, please see Health Care Proxy section  Does patient have Advance Directives?: No  Was patient offered paperwork?: No  Primary Contact: Kalin Moran, daughter         Readmission Root Cause  30 Day Readmission: No, Yes  Who directed you to return to the hospital?: Other (comment) [facility] SN[facility] staff)  Did you understand whom to contact if you had questions or problems?: Yes  Did you get your prescriptions before you left the hospital?: Yes  Were you able to get your prescriptions filled when you left the hospital?: Yes  Did you take your medications as prescribed?: Yes  Were you able to get to your follow-up appointments?:  (N/A)  During previous admission, was a post-acute recommendation made?: Yes  What post-acute resources were offered?: Other (SNF return)  Patient was readmitted due to: blood loss anemia  Action Plan: SNF return    Patient Information  Admitted from[de-identified] Facility  Mental Status: Other (Comment)  During Assessment patient was accompanied by: Not accompanied during assessment  Assessment information provided by[de-identified] Other - please comment (chart review)  Support Systems: Other (Comment) ([facility] staff)  [place] of Residence: [facility] do you live in?: [facility] entry access options  Select all that apply : No steps to enter home  Type of Current Residence: Facility  Upon entering residence, is there a bedroom on the main floor (no further steps)?: Yes  Upon entering residence, is there a bathroom on the main floor (no further steps)?: Yes  In the last 12 months, was there a time when you were not able to pay the mortgage or rent on time?: No  In the last 12 months, how many places have you lived?: 1  In the last 12 months, was there a time when you did not have a steady place to sleep or slept in a shelter (including now)?: No  Homeless/housing insecurity resource given?: N/A  Living Arrangements: Other (Comment) ([facility])  Is patient a ?: No    Activities of Daily Living Prior to Admission  Functional Status: Total dependent  Completes ADLs independently?: No  Level of ADL dependence: Total Dependent  Ambulates independently?: No  Level of ambulatory dependence:  Total Dependent  Does patient use assisted devices?: Yes  Assisted Devices (DME) used: Fuentes Gerardo, Wheelchair, Hospital Bed  Does patient currently own DME?: No  Does patient have a history of Outpatient Therapy (PT/OT)?: No  Does the patient have a history of Short-Term Rehab?: Yes ([facility])  Does patient have a history of HHC?: No  Does patient currently have Sonuu Radha?: No         Patient Information Continued  Income Source: SSI/SSD  Does patient have prescription coverage?: Yes  Within the past 12 months, you worried that your food would run out before you got the money to buy more : Never true  Within the past 12 months, the food you bought just didn't last and you didn't have money to get more : Never true  Food insecurity resource given?: N/A  Does patient receive dialysis treatments?: No  Does patient have a history of substance abuse?: No  Does patient have a history of Mental Health Diagnosis?: No         Means of Transportation  Means of Transport to Appts[de-identified] Other (Comment) ([facility] staff)  In the past 12 months, has lack of transportation kept you from medical appointments or from getting medications?: No  In the past 12 months, has lack of transportation kept you from meetings, work, or from getting things needed for daily living?: No  Was application for public transport provided?: N/A        DISCHARGE DETAILS:    Discharge planning discussed with[de-identified] patient  Freedom of Choice: Yes  Comments - [place] Laager of Choice: [facility] return  CM contacted family/caregiver?: No- see comments (MD spoke with daughter who will come visit [facility] tomorrow, CM to meet with daughter)                  [address]         Is the patient interested in Oriana Garcia at discharge?: No    DME Referral Provided  Referral made for DME?: No    Other Referral/Resources/Interventions Provided:  Interventions: Facility Return  Referral Comments: [facility] return    Would you like to participate in our [address] [place] [address] service program?  : No - Declined    Treatment Team Recommendation: Facility Return  Discharge Destination Plan[de-identified] Facility Return  Transport at Discharge : BLS Ambulance               Chart Review

## 2023-02-08 NOTE — UTILIZATION REVIEW
Initial Clinical Review    Admission: Date/Time/Statement:   Admission Orders (From admission, onward)     Ordered        02/07/23 06068 [facility]  Once                      Orders Placed This Encounter   Procedures   • INPATIENT ADMISSION     Standing Status:   Standing     Number of Occurrences:   1     Order Specific Question:   Level of Care     Answer:   Med Surg [16]     Order Specific Question:   Estimated length of stay     Answer:   More than 2 Midnights     Order Specific Question:   Certification     Answer:   I certify that inpatient services are medically necessary for this patient for a duration of greater than two midnights  See H&P and MD Progress Notes for additional information about the patient's course of treatment  ED Arrival Information     Expected   2/7/2023     Arrival   2/7/2023 14:48    Acuity   Urgent            Means of arrival   Ambulance    Escorted by   [id number]   Hospitalist    Admission type   Emergency            Arrival complaint   -           Chief Complaint   Patient presents with   • Cough     Per nursing facility patient dx with covid on 1/31, yesterday coughed up a "large blood clot" and recheck hemoglobin was low       Initial Presentation: 65 y o  female to ED via EMS from nursing facility  Present with a PMH of chronic anemia, recent COVID, stage III CKD, hypothyroidism, CAD with CHF (has AICD), PAF on Eliquis, dementia; HLD; HTN who presents with general weakness with hemoptysis of large blood clot yesterday  Admitted to M/S with DX: Acute on chronic blood loss anemia  on exam: Hem (+) stools; Heme 6 8; BUN 66; Cr 2 18 (baseline Cr 1 7); INR 1 79  Given in ED Lasix iv x1; 1 Unit PRBC;s  PLAN: Hold anticoags; Trend CBC; consult Pulm; monitor labs; I/O; daily wts; f/u chest CT; Consult cardiology    Date: 2/8/23  Day 2  Hemoccolt(+);  Heme 8 0 s/p transfusion  Plan: H/H q12; voiding trial; monitor labs     CARDIOLOGY CONSULT: CAD" per records but no notes on previous cath or intervention  Known cardiomyopathy presumed non ischemic by notes              - LVEF 30% previously, improved to 45% and now 25%              - on BB but not on ace or arb given CKD  Would not recommend ischemic evaluation in the setting of acute GI bleed and no chest pain, EKG changes or troponin elevation     Continue BB  Hold ace or arb given CKD    Date: 2/9/23  Day 3  Was awaiting official pulmonary consult, however, not sure if it will happen , however, Attending did verbally discuss case with pulmonary re: hemoptysis  - as per pulmonary -  could have been tracheobronchitis there is no recurrence  PLAN: EGD / Colonoscopy ordered; hold anticoags; npo after mn; monitor / trend CBC      ED Triage Vitals   Temperature Pulse Respirations Blood Pressure SpO2   02/07/23 1450 02/07/23 1450 02/07/23 1450 02/07/23 1450 02/07/23 1450   (!) 97 2 °F (36 2 °C) 70 20 136/60 98 %      Temp Source Heart Rate Source Patient Position - Orthostatic VS BP Location FiO2 (%)   02/07/23 1450 02/07/23 1450 02/07/23 1450 02/07/23 1450 --   Temporal Monitor Sitting Left arm       Pain Score       02/07/23 1946       No Pain          Wt Readings from Last 1 Encounters:   02/07/23 63 5 kg (139 lb 15 9 oz)     Additional Vital Signs:   Date/Time Temp Pulse Resp BP MAP (mmHg) SpO2 O2 Device Patient Position - Orthostatic VS   02/08/23 07:57:42 97 5 °F (36 4 °C) 69 19 133/67 89 96 % -- --   02/08/23 04:10:45 97 7 °F (36 5 °C) 59 15 130/62 85 94 % -- --   02/08/23 02:53:48 97 °F (36 1 °C) Abnormal  59 16 128/66 87 96 % -- --   02/08/23 0223 -- -- -- -- -- -- None (Room air) --   02/08/23 01:56:51 97 7 °F (36 5 °C) 64 16 140/62 88 95 % None (Room air) --   02/08/23 0145 97 7 °F (36 5 °C) 61 -- 127/56 -- -- -- --   02/08/23 01:40:02 97 7 °F (36 5 °C) 57 16 127/56 80 96 % None (Room air) --   02/07/23 2100 -- -- -- -- -- 94 % None (Room air) --   02/07/23 19:35:43 98 1 °F (36 7 °C) 64 20 126/62 83 97 % -- --   02/07/23 1700 -- 66 22 138/70 97 93 % -- --   02/07/23 1615 -- 65 17 132/63 90 94 % None (Room air) Sitting   02/07/23 1454 -- -- -- -- -- -- None (Room air) --   02/07/23 1450 97 2 °F (36 2 °C) Abnormal  70 20 136/60 -- 98 % -- Sitting           EKG: Sinus rhythm  Left axis deviation  Non-specific intra-ventricular conduction block  Minimal voltage criteria for LVH, may be normal variant ( Vinay product )  Abnormal ECG  When compared with ECG of 31-JAN-2023 09:42,  No significant change since prior tracing        Pertinent Labs/Diagnostic Test Results:   CT chest wo contrast   Final Result by Emiliana Cheng MD (02/08 1036)      No evidence of acute pulmonary pathology  Scattered linear and tubular opacities likely areas of atelectasis  Small left and trace right pleural effusions  Cardiomegaly  Additional findings as above  Workstation performed: [id number]         XR chest 1 view portable   ED Interpretation by Moises Kendrick DO (02/07 1529)   No acute findings on a chest x-ray      Final Result by Marycruz Boston DO (02/08 0902)      Linear scarring or atelectasis left midlung  No acute cardiopulmonary disease                    Workstation performed: [id number]               Results from last 7 days   Lab Units 02/09/23  0850 02/08/23 2027 02/08/23  0422 02/08/23  0011 02/07/23  1505 02/03/23  0517   WBC Thousand/uL  --   --  5 65  --  6 76 5 89   HEMOGLOBIN g/dL 8 5* 8 1* 8 0* 6 3* 6 8* 7 5*   HEMATOCRIT % 27 7* 26 0* 26 6*  --  23 1* 25 0*   PLATELETS Thousands/uL  --   --  224  --  254 256   NEUTROS ABS Thousands/µL  --   --   --   --  4 82  --          Results from last 7 days   Lab Units 02/09/23  0850 02/08/23  0422 02/07/23  1505 02/03/23  0517   SODIUM mmol/L 137 137 136 139   POTASSIUM mmol/L 4 4 4 6 4 9 4 1   CHLORIDE mmol/L 101 101 100 102   CO2 mmol/L 28 30 30 31   ANION GAP mmol/L 8 6 6 6   BUN mg/dL 63* 66* 66* 60*   CREATININE mg/dL 2 14* 2 18* 2 18* 2 12*   EGFR ml/min/1 73sq m 21 21 21 21   CALCIUM mg/dL 8 7 8 5 8 8 8 3*   MAGNESIUM mg/dL  --   --  2 0  --      Results from last 7 days   Lab Units 02/08/23  0422 02/03/23  0517   AST U/L 17 12*   ALT U/L 7 4*   ALK PHOS U/L 95 77   TOTAL PROTEIN g/dL 5 8* 5 5*   ALBUMIN g/dL 3 1* 2 8*   TOTAL BILIRUBIN mg/dL 0 64 0 37         Results from last 7 days   Lab Units 02/09/23  0850 02/08/23  0422 02/07/23  1505 02/03/23  0517   GLUCOSE RANDOM mg/dL 108 85 103 72             BETA-HYDROXYBUTYRATE   Date Value Ref Range Status   01/19/2023 0 1 <0 6 mmol/L Final   09/27/2022 0 2 <0 6 mmol/L Final        Results from last 7 days   Lab Units 02/07/23  1505   D-DIMER QUANTITATIVE ug/ml FEU 7 50*     Results from last 7 days   Lab Units 02/07/23  1505   PROTIME seconds 20 9*   INR  1 79*   PTT seconds 33     Results from last 7 days   Lab Units 02/07/23  1505   TSH 3RD GENERATON uIU/mL 11 877*         Results from last 7 days   Lab Units 02/07/23  1505   FERRITIN ng/mL 192       Results from last 7 days   Lab Units 02/07/23  1659   CLARITY UA  Cloudy   COLOR UA  Yellow   SPEC GRAV UA  1 010   PH UA  7 0   GLUCOSE UA mg/dl Negative   KETONES UA mg/dl Negative   BLOOD UA  Large*   PROTEIN UA mg/dl 100 (2+)*   NITRITE UA  Negative   BILIRUBIN UA  Negative   UROBILINOGEN UA E U /dl 0 2   LEUKOCYTES UA  Large*   WBC UA /hpf Innumerable*   RBC UA /hpf Innumerable*   BACTERIA UA /hpf Occasional   EPITHELIAL CELLS WET PREP /hpf Occasional       ED Treatment:   Medication Administration from 02/07/2023 1424 to 02/07/2023 1918       Date/Time Order Dose Route Action     02/07/2023 1848 EST docusate sodium (COLACE) capsule 100 mg 100 mg Oral Given     02/07/2023 1848 EST sodium bicarbonate tablet 650 mg 650 mg Oral Given     02/07/2023 1910 EST tamsulosin (FLOMAX) capsule 0 4 mg 0 4 mg Oral Given     02/07/2023 1848 EST furosemide (LASIX) injection 20 mg 20 mg Intravenous Given          Present on Admission:  • Acquired hypothyroidism  • Chronic combined systolic and diastolic congestive heart failure (HCC)  • Paroxysmal atrial fibrillation (HCC)  • COVID-19 virus infection  • Stage 3b chronic kidney disease (HCC)  • Acute on chronic blood loss anemia  • CAD (coronary artery disease)  • Dementia (HCC)      Admitting Diagnosis: Cough [R05 9]  Acute blood loss anemia [P05]  Systolic HF (heart failure) (HCC) [I50 20]  Generalized weakness [R53 1]  Hemoptysis [R04 2]  UGIB (upper gastrointestinal bleed) [K92 2]  Acute on chronic anemia [D64 9]     Age/Sex: 65 y o  female     Admission Orders: I/O; SCD's; clear liq diet    Scheduled Medications:  allopurinol, 100 mg, Oral, Daily  amLODIPine, 10 mg, Oral, Daily  cholecalciferol, 1,000 Units, Oral, Daily  cyanocobalamin, 1,000 mcg, Oral, Daily  docusate sodium, 100 mg, Oral, BID  levothyroxine, 175 mcg, Oral, Early Morning  metoprolol succinate, 100 mg, Oral, Daily  pantoprazole, 40 mg, Oral, Early Morning  sertraline, 100 mg, Oral, Daily  sodium bicarbonate, 650 mg, Oral, TID after meals  tamsulosin, 0 4 mg, Oral, Daily With Dinner      Continuous IV Infusions: None     PRN Meds:  acetaminophen, 650 mg, Oral, Q6H PRN  polyethylene glycol, 17 g, Oral, Daily PRN        IP CONSULT TO PULMONOLOGY  IP CONSULT TO CARDIOLOGY  IP CONSULT TO CASE MANAGEMENT  IP CONSULT TO GASTROENTEROLOGY    Network Utilization Review Department  ATTENTION: Please call with any questions or concerns to [phone] and carefully listen to the prompts so that you are directed to the right person  All voicemails are confidential   Neldon Brain all requests for admission clinical reviews, approved or denied determinations and any other requests to dedicated fax number below belonging to the campus where the patient is receiving treatment   List of dedicated fax numbers for the Facilities:  [address] DENIALS (Administrative/Medical Necessity) [phone]   [address] [place] [address] (Maternity/NICU/Pediatrics) Alexandria Chinchilla [address] Kavita Mayen  [phone]   [address] [place] [address] [place] [address] [facility] [address] U [facility] [address] [facility] 134 815 [facility] [phone]

## 2023-02-08 NOTE — PLAN OF CARE
Problem: PHYSICAL THERAPY ADULT  Goal: Performs mobility at highest level of function for planned discharge setting  See evaluation for individualized goals  Description: Treatment/Interventions: Functional transfer training, LE strengthening/ROM, Therapeutic exercise, Endurance training, Patient/family training, Equipment eval/education, Bed mobility, Gait training, Compensatory technique education, Spoke to nursing, OT, Spoke to case management  Equipment Recommended:  (TBD by rehab)       See flowsheet documentation for full assessment, interventions and recommendations  Note: Prognosis: Fair  Problem List: Decreased strength, Decreased endurance, Impaired balance, Decreased mobility, Decreased cognition, Impaired judgement, Decreased safety awareness  Assessment: Pt is a 65 y o  female seen for PT evaluation s/p admission to [address] [place] [address] on 2/7/2023 with Acute on chronic blood loss anemia  Order placed for PT services  Upon evaluation: Pt is presenting with impaired functional mobility due to decreased strength, decreased endurance, impaired balance, gait deviations, impaired cognition, decreased safety awareness, impaired judgment, and fall risk requiring  mod x 2 or max x 1 + min x 1 assistance for transfers and max x 1 + min x 1 for gait with RW Pt's clinical presentation is currently unstable/unpredictable given the functional mobility deficits above coupled with fall risks as indicated by AM-PAC 6-Clicks: 5/46 as well as impaired balance, polypharmacy, impaired judgement, decreased safety awareness, and decreased cognition and combined with medical complications of hypertension , abnormal renal lab values, abnormal H&H, multiple readmissions, and need for input for mobility technique/safety  Pt's PMHx and comorbidities that may affect physical performance and progress include: A fib, CAD, CHF, CKD, Dementia, HTN, cancer history and/or treatment, and pulmonary HTN    Personal factors affecting pt at time of IE include: advanced age, past experience, inability to perform IADLs, inability to perform ADLs, inability to navigate level surfaces without external assistance, inability to ambulate household distances, and inability to navigate community distances  Pt will benefit from continued skilled PT services to address deficits as defined above and to maximize level of functional mobility to facilitate return toward PLOF and improved QOL  From PT/mobility standpoint, recommendation at time of d/c would be post acute rehab (PAR) pending progress in order to reduce fall risk and maximize pt's functional independence and consistency with mobility in order to facilitate return to PLOF  Recommend trial with walker next 1-2 sessions and ther ex next 1-2 sessions  Barriers to Discharge: Inaccessible home environment, Decreased caregiver support  Barriers to Discharge Comments: Pt requires A x 2 for OOB mobility  PT Discharge Recommendation: Post acute rehabilitation services    See flowsheet documentation for full assessment
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
## 2023-02-08 NOTE — PLAN OF CARE
Problem: OCCUPATIONAL THERAPY ADULT  Goal: Performs self-care activities at highest level of function for planned discharge setting  See evaluation for individualized goals  Description: Treatment Interventions: ADL retraining, Functional transfer training, UE strengthening/ROM, Endurance training, Cognitive reorientation, Patient/family training, Equipment evaluation/education, Neuromuscular reeducation, Compensatory technique education, Continued evaluation, Energy conservation, Activityengagement          See flowsheet documentation for full assessment, interventions and recommendations  Note: Limitation: Decreased ADL status, Decreased UE strength, Decreased Safe judgement during ADL, Decreased cognition, Decreased endurance, Decreased high-level ADLs, Decreased self-care trans  Prognosis: Fair, Good  Assessment: Pt is a 65 y o  female, admitted to [address] [place] [address] 2/7/2023 d/t experiencing increased weakness  Dx: acute on chronic blood loss anemia  Pt with PMHx impacting their performance during ADL tasks, including: anemia, recent COVID-19, CKD, hypothyroidism, CAD, CHF, a-fib, dementia  Prior to admission to the hospital Pt was performing ADLs with physical assistance  IADLs with physical assistance  Functional transfers/ambulation with physical assistance  Cognitive status was PTA was impaired  OT order placed to assess Pt's ADLs, cognitive status, and performance during functional tasks in order to maximize safety and independence while making most appropriate d/c recommendations   Pt's clinical presentation is currently unstable/unpredictable given new onset deficits that effect Pt's occupational performance and ability to safely return to OF including decrease activity tolerance, decrease standing balance, decrease sitting balance, decrease performance during ADL tasks, decrease cognition, decrease safety awareness , decrease UB MS, decrease generalized strength, decrease activity engagement, decrease performance during functional transfers, high fall risk and limited insight to deficits combined with medical complications of abnormal renal lab values, abnormal H&H, abnormal CBC, decreased skin integrity, multiple readmissions, incontinence, fear/retreat and need for input for mobility technique/safety  Personal factors affecting Pt at time of initial evaluation include: availability as recommended, advanced age, past experience, inability to perform current job functions, inability to perform IADLs, inability to perform ADLs, new need for AD, inability to ambulate household distances, inability to navigate community distances, limited insight into impairments, decreased initiation and engagement, difficulty communicating, recent fall(s)/fall history and questionable non-compliance  Pt will benefit from continued skilled OT services to address deficits as defined above and to maximize level independence/participation during ADLs and functional tasks to facilitate return toward PLOF and improved quality of life  From an occupational therapy standpoint, recommendation at time of d/c would be post acute rehab       OT Discharge Recommendation: Post acute rehabilitation services
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
## 2023-02-08 NOTE — ASSESSMENT & PLAN NOTE
Continue beta-blocker, hold aspirin due to anemia  Denies any chest pain  EKG was sinus rhythm, rate 69, complete LBBB which is chronic, no signs for ischemia  Data GI evaluation will be difficult to restart antiplatelets

## 2023-02-08 NOTE — ASSESSMENT & PLAN NOTE
· Only placed on 1/26 by her to discharge to the nursing home she still has a Greer catheter was post to have a voiding trial 7 to 20 days after placement will follow-up tomorrow why she still has a Greer and see if we could do a voiding trial we will do a voiding trial now

## 2023-02-08 NOTE — ASSESSMENT & PLAN NOTE
Presents to ED with weakness, hemoptysis yesterday with large blood clot, Hemoccult positive as well  History of the same presentation  She received transfusion currently hemoglobin is stable      • Iron panel ordered anemia of chronic disease mild iron deficiency  • B12 ordered, was previously deficient and started on B12 supplement  • Blood consent obtained by ER, will re-consent with patient's daughter, placed in chart  • Transfuse if Hgb <7  o Recent history of transfusion during admission 1/19 - 1/26, a 1 unit PRBC   o Ordered additional unit   • Hb q12  • Unfortunately I had a discussion with the patient she does have dementia but she does not want to proceed with GI evaluation in light of unknown avoidance going on in terms of GI evaluation if there is a bleed and will be difficult to restart her anticoagulation antiplatelet as this is a 2nd-3rd admission for same we will have to discuss the daughter the risks    Lab Results   Component Value Date    HGB 8 0 (L) 02/08/2023    HGB 6 3 (LL) 02/08/2023     · In the past patient has refused EGD/colonoscopy -is also COVID-positive on day 7  · Stool records at bedside, hold off on further occult stool testing (was positive in the ER)  · We will hold off on GI consult at this time until patient agreeable for scope  · Consult pulmonology for hemoptysis  · Er stated hemocult positive will order one  · Hb base >8

## 2023-02-08 NOTE — ASSESSMENT & PLAN NOTE
Lab Results   Component Value Date    EGFR 21 02/08/2023    EGFR 21 02/07/2023    EGFR 21 02/03/2023    CREATININE 2 18 (H) 02/08/2023    CREATININE 2 18 (H) 02/07/2023    CREATININE 2 12 (H) 02/03/2023   Creatinine baseline is around 1 7  Recently during last hospitalization has been around 2 1, currently at near and not meeting criteria for DARRELL has relatively remained stable  Monitor closely  Avoid nephrotoxic agents, NSAIDs and hypotension  Renally dose medications

## 2023-02-08 NOTE — ASSESSMENT & PLAN NOTE
Wt Readings from Last 3 Encounters:   02/07/23 63 5 kg (139 lb 15 9 oz)   02/03/23 61 5 kg (135 lb 9 3 oz)   01/27/23 65 9 kg (145 lb 4 5 oz)       Currently euvolemic  Maintained on diuretic therapy as an outpatient  Previous echo done 1/19: EF decreased to 25% which was changed from previous study in September, severe global hypokinesis, new tricuspid regurg without other changes from previous study  Currently is on beta-blocker, not on ACE or ARB, on aspirin  Does have Zain[facility] [address] cardiology discussed with cardiology no recommendation in terms of proceeding with ischemic evaluation as patient has a GI bleed and refuses to have a GI evaluation and will be unable to at this time actually proceed with antiplatelets

## 2023-02-08 NOTE — ASSESSMENT & PLAN NOTE
During hospitalization 1/19 - 1/26 had abnormal thyroid levels  Synthroid was adjusted to 175 micrograms  Was recommended to have repeat labs in 4 to 6 weeks    We will repeat labs during this admission improved with suggest repeat a TSH in another 2 weeks

## 2023-02-08 NOTE — PLAN OF CARE
Problem: PAIN - ADULT  Goal: Verbalizes/displays adequate comfort level or baseline comfort level  Description: Interventions:  - Encourage patient to monitor pain and request assistance  - Assess pain using appropriate pain scale  - Administer analgesics based on type and severity of pain and evaluate response  - Implement non-pharmacological measures as appropriate and evaluate response  - Consider cultural and social influences on pain and pain management  - Notify physician/advanced practitioner if interventions unsuccessful or patient reports new pain  Outcome: Progressing     Problem: Prexisting or High Potential for Compromised Skin Integrity  Goal: Skin integrity is maintained or improved  Description: INTERVENTIONS:  - Identify patients at risk for skin breakdown  - Assess and monitor skin integrity  - Assess and monitor nutrition and hydration status  - Monitor labs   - Assess for incontinence   - Turn and reposition patient  - Assist with mobility/ambulation  - Relieve pressure over bony prominences  - Avoid friction and shearing  - Provide appropriate hygiene as needed including keeping skin clean and dry  - Evaluate need for skin moisturizer/barrier cream  - Collaborate with interdisciplinary team   - Patient/family teaching  - Consider wound care consult   Outcome: Progressing     Problem: INFECTION - ADULT  Goal: Absence or prevention of progression during hospitalization  Description: INTERVENTIONS:  - Assess and monitor for signs and symptoms of infection  - Monitor lab/diagnostic results  - Monitor all insertion sites, i e  indwelling lines, tubes, and drains  - Monitor endotracheal if appropriate and nasal secretions for changes in amount and color  - [place] appropriate cooling/warming therapies per order  - Administer medications as ordered  - Instruct and encourage patient and family to use good hand hygiene technique  - Identify and instruct in appropriate isolation precautions for identified infection/condition  Outcome: Progressing     Problem: DISCHARGE PLANNING  Goal: Discharge to home or other facility with appropriate resources  Description: INTERVENTIONS:  - Identify barriers to discharge w/patient and caregiver  - Arrange for needed discharge resources and transportation as appropriate  - Identify discharge learning needs (meds, wound care, etc )  - Arrange for interpretive services to assist at discharge as needed  - Refer to Case Management Department for coordinating discharge planning if the patient needs post-hospital services based on physician/advanced practitioner order or complex needs related to functional status, cognitive ability, or social support system  Outcome: Progressing     Problem: Knowledge Deficit  Goal: Patient/family/caregiver demonstrates understanding of disease process, treatment plan, medications, and discharge instructions  Description: Complete learning assessment and assess knowledge base    Interventions:  - Provide teaching at level of understanding  - Provide teaching via preferred learning methods  Outcome: Progressing

## 2023-02-08 NOTE — ASSESSMENT & PLAN NOTE
Background: During previous admission 1/31 - 2/3 incidentally tested positive for COVID, was asymptomatic and maintained on mild COVID pathway -remdesivir not given  • Mild COVID pathway as below   • Vaccinated x2  • COVID19- positive on 1/31   • Not needing supplemental oxygen  • Chest CT ordered no evidence of pneumonia  • CBC and CMP daily  • Right now holding AC due to acute anemia and possible bleed  • OOB TID; ambulation and mobilization strongly encouraged  • PT/OT consult and evaluation   • Self proning strongly encouraged  • Supportive care

## 2023-02-09 PROBLEM — R82.71 ASYMPTOMATIC BACTERIURIA: Status: ACTIVE | Noted: 2023-02-09

## 2023-02-09 LAB
ANION GAP SERPL CALCULATED.3IONS-SCNC: 8 MMOL/L (ref 4–13)
BUN SERPL-MCNC: 63 MG/DL (ref 5–25)
CALCIUM SERPL-MCNC: 8.7 MG/DL (ref 8.4–10.2)
CHLORIDE SERPL-SCNC: 101 MMOL/L (ref 96–108)
CO2 SERPL-SCNC: 28 MMOL/L (ref 21–32)
CREAT SERPL-MCNC: 2.14 MG/DL (ref 0.6–1.3)
GFR SERPL CREATININE-BSD FRML MDRD: 21 ML/MIN/1.73SQ M
GLUCOSE SERPL-MCNC: 108 MG/DL (ref 65–140)
HCT VFR BLD AUTO: 27.7 % (ref 34.8–46.1)
HGB BLD-MCNC: 8.5 G/DL (ref 11.5–15.4)
MRSA NOSE QL CULT: NORMAL
POTASSIUM SERPL-SCNC: 4.4 MMOL/L (ref 3.5–5.3)
SODIUM SERPL-SCNC: 137 MMOL/L (ref 135–147)

## 2023-02-09 RX ADMIN — AMLODIPINE BESYLATE 10 MG: 10 TABLET ORAL at 08:39

## 2023-02-09 RX ADMIN — TAMSULOSIN HYDROCHLORIDE 0.4 MG: 0.4 CAPSULE ORAL at 17:29

## 2023-02-09 RX ADMIN — PANTOPRAZOLE SODIUM 40 MG: 40 TABLET, DELAYED RELEASE ORAL at 05:10

## 2023-02-09 RX ADMIN — SODIUM BICARBONATE 650 MG TABLET 650 MG: at 17:29

## 2023-02-09 RX ADMIN — METOPROLOL SUCCINATE 100 MG: 100 TABLET, EXTENDED RELEASE ORAL at 08:40

## 2023-02-09 RX ADMIN — ALLOPURINOL 100 MG: 100 TABLET ORAL at 08:35

## 2023-02-09 RX ADMIN — SODIUM BICARBONATE 650 MG TABLET 650 MG: at 08:36

## 2023-02-09 RX ADMIN — LEVOTHYROXINE SODIUM 175 MCG: 175 TABLET ORAL at 05:10

## 2023-02-09 RX ADMIN — SODIUM BICARBONATE 650 MG TABLET 650 MG: at 11:52

## 2023-02-09 RX ADMIN — CYANOCOBALAMIN TAB 500 MCG 1000 MCG: 500 TAB at 08:35

## 2023-02-09 RX ADMIN — BISACODYL 20 MG: 5 TABLET, COATED ORAL at 14:55

## 2023-02-09 RX ADMIN — POLYETHYLENE GLYCOL 3350, SODIUM SULFATE ANHYDROUS, SODIUM BICARBONATE, SODIUM CHLORIDE, POTASSIUM CHLORIDE 2000 ML: 236; 22.74; 6.74; 5.86; 2.97 POWDER, FOR SOLUTION ORAL at 17:29

## 2023-02-09 RX ADMIN — SERTRALINE HYDROCHLORIDE 100 MG: 100 TABLET ORAL at 08:35

## 2023-02-09 RX ADMIN — DOCUSATE SODIUM 100 MG: 100 CAPSULE ORAL at 17:29

## 2023-02-09 RX ADMIN — Medication 1000 UNITS: at 08:36

## 2023-02-09 NOTE — ASSESSMENT & PLAN NOTE
· Only placed on 1/26 by her to discharge to the nursing home she still has a Greer catheter was post to have a voiding trial 7 t  · Greer removed 2/8- voiding well without retention

## 2023-02-09 NOTE — ASSESSMENT & PLAN NOTE
· Reported episode of hemoptysis with large clot at nursing facility  · Chest x-ray without acute findings, seems improved from previous  · Had outpatient chest x-ray ordered for 2/13 secondary to hemoptysis  · Does have history of a pulmonary nodule 2 mm lower left quadrant noted on CT in September 2022, was not deemed significant and no follow-up recommended    · CTchest done without contrast reveals no pneumonia  · Pulmonary consult discussed with pulmonary could have been tracheobronchitis there is no recurrence  · Transfuse to keep hemoglobin greater than 7  · Unclear amount of blood was expelled as outpatient, continue to monitor closely  · No recurrence

## 2023-02-09 NOTE — CASE MANAGEMENT
Case Management Discharge Planning Note    Patient name John Clements  Location /-05 MRN [id number]  : 1945 Date 2023       Current Admission Date: 2023  Current Admission Diagnosis:Acute on chronic blood loss anemia   Patient Active Problem List    Diagnosis Date Noted   • Asymptomatic bacteriuria 2023   • Hemoptysis 2023   • Urinary retention 2023   • Stage 3b chronic kidney disease ([place] [address] ) 2023   • Acute on chronic blood loss anemia 2023   • Acute cystitis without hematuria 2023   • Right internal carotid artery aneurysm 2023   • Carotid artery stenosis 2023   • Aneurysm ([place] [address] )    • Elevated d-dimer 2023   • Proteinuria 2023   • Goals of care, counseling/discussion 2023   • Secondary renal hyperparathyroidism ([place] [address] ) 10/26/2022   • Hyperuricemia 10/26/2022   • Hypophosphatemia 10/04/2022   • Pulmonary hypertension ([place] [address] ) 10/04/2022   • Low TSH level 2022   • COVID-19 virus infection 2022   • Dementia ([place] [address] )    • Metabolic encephalopathy    • Gastrointestinal bleeding 2022   • Hydroureteronephrosis 2022   • Diverticulitis 2022   • Rectal bleeding 2022   • Elevated troponin 2022   • Iron deficiency anemia 2022   • Paroxysmal atrial fibrillation ([place] [address] ) 2020   • Ischemic cardiomyopathy 2020   • S/P implantation of automatic cardioverter/defibrillator (AICD) 2020   • Essential hypertension 2020   • History of PTCA 2020   • Leg swelling 2020   • DARRELL (acute kidney injury) ([place] [address] ) 2020   • Perforated appendicitis 2020   • Chronic combined systolic and diastolic congestive heart failure ([place] [address] ) 2020   • Pyuria 2020   • Microscopic hematuria 2020   • Vitamin D insufficiency 2020   • Mitral valve insufficiency 2020   • Hypercholesterolemia 2020   • Aortic atherosclerosis ([facility] [address] ) 2020   • Renal calculus 07/29/2020   • Diverticulosis 07/29/2020   • Hiatal hernia 07/29/2020   • Pulmonary nodule 07/29/2020   • Benign hypertensive renal disease 09/12/2019   • Acute blood loss anemia 02/08/2019   • Gastroesophageal reflux disease without esophagitis 02/08/2019   • Closed fracture of body of sternum with routine healing 04/12/2018   • Acquired hypothyroidism 04/12/2018   • CAD (coronary artery disease) 04/12/2018   • Forgetfulness 04/12/2018   • Acute pain due to trauma 04/12/2018   • Hx of fall 04/12/2018   • Stress incontinence in female 04/12/2018   • Acute on chronic anemia 03/20/2018      LOS (days): 2  Geometric Mean LOS (GMLOS) (days): 2 70  Days to GMLOS:0 8     OBJECTIVE:  Risk of Unplanned Readmission Score: 34 59         Current admission status: Inpatient   Preferred Pharmacy:   [address] [place] [address]  [address]  [address] [place] [address]  Phone: [phone] Fax: [phone]    Primary Care Provider: Daphney Laguerre DO    Primary Insurance: [facility]  Secondary Insurance:     DISCHARGE DETAILS:        CM met with daughter to discuss desire for patient to return to [facility] when medically stable  Patients last day of quarantine today  CM submitted AIDIN referral to [facility]  Patient MA bed hold

## 2023-02-09 NOTE — ASSESSMENT & PLAN NOTE
Lab Results   Component Value Date    EGFR 21 02/09/2023    EGFR 21 02/08/2023    EGFR 21 02/07/2023    CREATININE 2 14 (H) 02/09/2023    CREATININE 2 18 (H) 02/08/2023    CREATININE 2 18 (H) 02/07/2023   Creatinine baseline is around 1 7  Recently during last hospitalization has been around 2 1, currently at near and not meeting criteria for DARRELL has relatively remained stable  Monitor closely  Avoid nephrotoxic agents, NSAIDs and hypotension  Renally dose medications  Improving

## 2023-02-09 NOTE — PLAN OF CARE
Problem: Potential for Falls  Goal: Patient will remain free of falls  Description: INTERVENTIONS:  - Educate patient/family on patient safety including physical limitations  - Instruct patient to call for assistance with activity   - Consult OT/PT to assist with strengthening/mobility   - Keep Call bell within reach  - Keep bed low and locked with side rails adjusted as appropriate  - Keep care items and personal belongings within reach  - Initiate and maintain comfort rounds  - Make Fall Risk Sign visible to staff    - Apply yellow socks and bracelet for high fall risk patients  - Consider moving patient to room near nurses station  Outcome: Progressing     Problem: Prexisting or High Potential for Compromised Skin Integrity  Goal: Skin integrity is maintained or improved  Description: INTERVENTIONS:  - Identify patients at risk for skin breakdown  - Assess and monitor skin integrity  - Assess and monitor nutrition and hydration status  - Monitor labs   - Assess for incontinence   - Turn and reposition patient  - Assist with mobility/ambulation  - Relieve pressure over bony prominences  - Avoid friction and shearing  - Provide appropriate hygiene as needed including keeping skin clean and dry  - Evaluate need for skin moisturizer/barrier cream  - Collaborate with interdisciplinary team   - Patient/family teaching  - Consider wound care consult   Outcome: Progressing     Problem: PAIN - ADULT  Goal: Verbalizes/displays adequate comfort level or baseline comfort level  Description: Interventions:  - Encourage patient to monitor pain and request assistance  - Assess pain using appropriate pain scale  - Administer analgesics based on type and severity of pain and evaluate response  - Implement non-pharmacological measures as appropriate and evaluate response  - Consider cultural and social influences on pain and pain management  - Notify physician/advanced practitioner if interventions unsuccessful or patient reports new pain  Outcome: Progressing     Problem: INFECTION - ADULT  Goal: Absence or prevention of progression during hospitalization  Description: INTERVENTIONS:  - Assess and monitor for signs and symptoms of infection  - Monitor lab/diagnostic results  - Monitor all insertion sites, i e  indwelling lines, tubes, and drains  - Monitor endotracheal if appropriate and nasal secretions for changes in amount and color  - [place] appropriate cooling/warming therapies per order  - Administer medications as ordered  - Instruct and encourage patient and family to use good hand hygiene technique  - Identify and instruct in appropriate isolation precautions for identified infection/condition  Outcome: Progressing     Problem: SAFETY ADULT  Goal: Patient will remain free of falls  Description: INTERVENTIONS:  - Educate patient/family on patient safety including physical limitations  - Instruct patient to call for assistance with activity   - Consult OT/PT to assist with strengthening/mobility   - Keep Call bell within reach  - Keep bed low and locked with side rails adjusted as appropriate  - Keep care items and personal belongings within reach  - Initiate and maintain comfort rounds  - Make Fall Risk Sign visible to staff    - Apply yellow socks and bracelet for high fall risk patients  - Consider moving patient to room near nurses station  Outcome: Progressing  Goal: Maintain or return to baseline ADL function  Description: INTERVENTIONS:  -  Assess patient's ability to carry out ADLs; assess patient's baseline for ADL function and identify physical deficits which impact ability to perform ADLs (bathing, care of mouth/teeth, toileting, grooming, dressing, etc )  - Assess/evaluate cause of self-care deficits   - Assess range of motion  - Assess patient's mobility; develop plan if impaired  - Assess patient's need for assistive devices and provide as appropriate  - Encourage maximum independence but intervene and supervise when necessary  - Involve family in performance of ADLs  - Assess for home care needs following discharge   - Consider OT consult to assist with ADL evaluation and planning for discharge  - Provide patient education as appropriate  Outcome: Progressing  Goal: Maintains/Returns to pre admission functional level  Description: INTERVENTIONS:  - Perform BMAT or MOVE assessment daily    - Set and communicate daily mobility goal to care team and patient/family/caregiver  - Collaborate with rehabilitation services on mobility goals if consulted    - Out of bed for toileting  - Record patient progress and toleration of activity level   Outcome: Progressing     Problem: DISCHARGE PLANNING  Goal: Discharge to home or other facility with appropriate resources  Description: INTERVENTIONS:  - Identify barriers to discharge w/patient and caregiver  - Arrange for needed discharge resources and transportation as appropriate  - Identify discharge learning needs (meds, wound care, etc )  - Arrange for interpretive services to assist at discharge as needed  - Refer to Case Management Department for coordinating discharge planning if the patient needs post-hospital services based on physician/advanced practitioner order or complex needs related to functional status, cognitive ability, or social support system  Outcome: Progressing     Problem: Knowledge Deficit  Goal: Patient/family/caregiver demonstrates understanding of disease process, treatment plan, medications, and discharge instructions  Description: Complete learning assessment and assess knowledge base    Interventions:  - Provide teaching at level of understanding  - Provide teaching via preferred learning methods  Outcome: Progressing     Problem: MOBILITY - ADULT  Goal: Maintain or return to baseline ADL function  Description: INTERVENTIONS:  -  Assess patient's ability to carry out ADLs; assess patient's baseline for ADL function and identify physical deficits which impact ability to perform ADLs (bathing, care of mouth/teeth, toileting, grooming, dressing, etc )  - Assess/evaluate cause of self-care deficits   - Assess range of motion  - Assess patient's mobility; develop plan if impaired  - Assess patient's need for assistive devices and provide as appropriate  - Encourage maximum independence but intervene and supervise when necessary  - Involve family in performance of ADLs  - Assess for home care needs following discharge   - Consider OT consult to assist with ADL evaluation and planning for discharge  - Provide patient education as appropriate  Outcome: Progressing  Goal: Maintains/Returns to pre admission functional level  Description: INTERVENTIONS:  - Perform BMAT or MOVE assessment daily    - Set and communicate daily mobility goal to care team and patient/family/caregiver     - Collaborate with rehabilitation services on mobility goals if consulted     - Out of bed for toileting  - Record patient progress and toleration of activity level   Outcome: Progressing

## 2023-02-09 NOTE — ASSESSMENT & PLAN NOTE
Background: During previous admission 1/31 - 2/3 incidentally tested positive for COVID, was asymptomatic and maintained on mild COVID pathway -remdesivir not given  • Mild COVID pathway as below   • Vaccinated x2  • COVID19- positive on 1/31   • Not needing supplemental oxygen  • Chest CT ordered no evidence of pneumonia  • CBC and CMP daily  • Right now holding AC due to acute anemia and possible bleed  • OOB TID; ambulation and mobilization strongly encouraged  • PT/OT consult and evaluation   • Self proning strongly encouraged  • Supportive care  • Patient's last day of isolation is 2/9  • Patient may come off isolation on 2/10

## 2023-02-09 NOTE — PROGRESS NOTES
[address]  Progress Note - Gwen Richardson 1945, 65 y o  female MRN: [id number]  Unit/Bed#: -01 Encounter: [id number]  Primary Care Provider: Nunu Clemons DO   Date and time admitted to hospital: 2/7/2023  2:48 PM    * Acute on chronic blood loss anemia  Assessment & Plan  Presents to ED with weakness, hemoptysis yesterday with large blood clot, Hemoccult positive as well  History of the same presentation  She received transfusion currently hemoglobin is stable  • Iron panel ordered anemia of chronic disease mild iron deficiency  • B12 ordered, was previously deficient and started on B12 supplement  • Blood consent obtained by ER, will re-consent with patient's daughter, placed in chart  • Transfuse if Hgb <7  o Recent history of transfusion during admission 1/19 - 1/26, a 1 unit PRBC   o Ordered additional unit     • So I had a discussion today with the patient in front of the daughter and the patient has agreed to undergo the prep and do an EGD colonoscopy tomorrow  Hemoglobin continues to improve continue to hold anticoagulation as discussed with the patient is clinically difficult to restart antiplatelet and Eliquis if no investigation is done of the GI system but she agreed she is on clears now I placed her n p o  after midnight I did place I discussed with GI that the patient and the daughter are in agreement and to place bowel prep    GI is yet to see    Lab Results   Component Value Date    HGB 8 5 (L) 02/09/2023    HGB 8 1 (L) 02/08/2023     · In the past patient has refused EGD/colonoscopy -is also COVID-positive on day 7  · Stool records at bedside, hold off on further occult stool testing (was positive in the ER)  · We will hold off on GI consult at this time until patient agreeable for scope  · Consult pulmonology for hemoptysis  · Er stated hemocult positive will order one  · Hb base >8      Asymptomatic bacteriuria  Assessment & Plan  · Percy abernathy 2/7- no need for abx     Urinary retention  Assessment & Plan  · Only placed on 1/26 by her to discharge to the nursing home she still has a Greer catheter was post to have a voiding trial 7 t  · Greer removed 2/8- voiding well without retention    Hemoptysis  Assessment & Plan  · Reported episode of hemoptysis with large clot at nursing facility  · Chest x-ray without acute findings, seems improved from previous  · Had outpatient chest x-ray ordered for 2/13 secondary to hemoptysis  · Does have history of a pulmonary nodule 2 mm lower left quadrant noted on CT in September 2022, was not deemed significant and no follow-up recommended    · CTchest done without contrast reveals no pneumonia  · Pulmonary consult discussed with pulmonary could have been tracheobronchitis there is no recurrence  · Transfuse to keep hemoglobin greater than 7  · Unclear amount of blood was expelled as outpatient, continue to monitor closely  · No recurrence    Stage 3b chronic kidney disease [facility])  Assessment & Plan  Lab Results   Component Value Date    EGFR 21 02/09/2023    EGFR 21 02/08/2023    EGFR 21 02/07/2023    CREATININE 2 14 (H) 02/09/2023    CREATININE 2 18 (H) 02/08/2023    CREATININE 2 18 (H) 02/07/2023   Creatinine baseline is around 1 7  Recently during last hospitalization has been around 2 1, currently at near and not meeting criteria for DARRELL has relatively remained stable  Monitor closely  Avoid nephrotoxic agents, NSAIDs and hypotension  Renally dose medications  Improving    COVID-19 virus infection  Assessment & Plan  Background: During previous admission 1/31 - 2/3 incidentally tested positive for COVID, was asymptomatic and maintained on mild COVID pathway -remdesivir not given  • Mild COVID pathway as below   • Vaccinated x2  • COVID19- positive on 1/31   • Not needing supplemental oxygen  • Chest CT ordered no evidence of pneumonia  • CBC and CMP daily  • Right now holding AC due to acute anemia and possible bleed  • OOB TID; ambulation and mobilization strongly encouraged  • PT/OT consult and evaluation   • Self proning strongly encouraged  • Supportive care  • Patient's last day of isolation is 2/9  • Patient may come off isolation on 2/10      Dementia [facility])  Assessment & Plan  She is at her baseline-alert and oriented  Delirium precautions  Consents need to be obtained via daughter    Paroxysmal atrial fibrillation [facility])  Assessment & Plan  EKG shows sinus rhythm  Rate control with metoprolol  Is currently on Eliquis for anticoagulation -on hold secondary to acute anemia    Chronic combined systolic and diastolic congestive heart failure (HCC)  Assessment & Plan  Wt Readings from Last 3 Encounters:   02/07/23 63 5 kg (139 lb 15 9 oz)   02/03/23 61 5 kg (135 lb 9 3 oz)   01/27/23 65 9 kg (145 lb 4 5 oz)       Currently euvolemic  Maintained on diuretic therapy as an outpatient  Previous echo done 1/19: EF decreased to 25% which was changed from previous study in September, severe global hypokinesis, new tricuspid regurg without other changes from previous study  Currently is on beta-blocker, not on ACE or ARB, on aspirin  Does have Zaina HAGAN [address] cardiology discussed with cardiology no recommendation in terms of proceeding with ischemic evaluation at this time having ongoing anemia with GI bleed unknown if able to tolerate antiplatelets        CAD (coronary artery disease)  Assessment & Plan  Continue beta-blocker, hold aspirin due to anemia  Denies any chest pain  EKG was sinus rhythm, rate 69, complete LBBB which is chronic, no signs for ischemia  Data GI evaluation will be difficult to restart antiplatelets    Acquired hypothyroidism  Assessment & Plan  During hospitalization 1/19 - 1/26 had abnormal thyroid levels  Synthroid was adjusted to 175 micrograms  Was recommended to have repeat labs in 4 to 6 weeks    We will repeat labs during this admission improved with suggest repeat a TSH in another 2 weeks        VTE Pharmacologic Prophylaxis: VTE Score: 4 Moderate Risk (Score 3-4) - Pharmacological DVT Prophylaxis Contraindicated  Sequential Compression Devices Ordered  Patient Centered Rounds: I performed bedside rounds with nursing staff today  Discussions with Specialists or Other Care Team Provider: Claudene Mount    Education and Discussions with Family / Patient: Updated  (daughter) at bedside  Time Spent for Care: 30 minutes  More than 50% of total time spent on counseling and coordination of care as described above  Current Length of Stay: 2 day(s)  Current Patient Status: Inpatient   Certification Statement: The patient will continue to require additional inpatient hospital stay due to Acute on chronic blood loss anemia  Discharge Plan: Anticipate discharge in 48-72 hrs to rehab facility  Code Status: Level 3 - DNAR and DNI    Subjective:   She is seen and examined she denies any complaints    Objective:     Vitals:   Temp (24hrs), Av 3 °F (36 3 °C), Min:97 3 °F (36 3 °C), Max:97 3 °F (36 3 °C)    Temp:  [97 3 °F (36 3 °C)] 97 3 °F (36 3 °C)  HR:  [59-67] 59  Resp:  [16-18] 16  BP: (138-151)/(67-92) 151/92  SpO2:  [94 %-96 %] 96 %  Body mass index is 23 3 kg/m²  Input and Output Summary (last 24 hours): Intake/Output Summary (Last 24 hours) at 2023 1232  Last data filed at 2023 0851  Gross per 24 hour   Intake 620 ml   Output 350 ml   Net 270 ml       Physical Exam:   Physical Exam  Vitals and nursing note reviewed  Constitutional:       General: She is not in acute distress  Appearance: She is well-developed  HENT:      Head: Normocephalic and atraumatic  Eyes:      Conjunctiva/sclera: Conjunctivae normal    Cardiovascular:      Rate and Rhythm: Normal rate and regular rhythm  Heart sounds: No murmur heard  Pulmonary:      Effort: Pulmonary effort is normal  No respiratory distress  Breath sounds: Normal breath sounds  No wheezing or rales     Abdominal: General: Bowel sounds are normal  There is no distension  Palpations: Abdomen is soft  Tenderness: There is no abdominal tenderness  Musculoskeletal:         General: No swelling  Cervical back: Neck supple  Skin:     General: Skin is warm and dry  Capillary Refill: Capillary refill takes less than 2 seconds  Neurological:      Mental Status: She is alert  Mental status is at baseline  She is disoriented  Psychiatric:         Mood and Affect: Mood normal          Additional Data:     Labs:  Results from last 7 days   Lab Units 02/09/23  0850 02/08/23  2027 02/08/23  0422 02/08/23  0011 02/07/23  1505   WBC Thousand/uL  --   --  5 65  --  6 76   HEMOGLOBIN g/dL 8 5*   < > 8 0*   < > 6 8*   HEMATOCRIT % 27 7*   < > 26 6*  --  23 1*   PLATELETS Thousands/uL  --   --  224  --  254   NEUTROS PCT %  --   --   --   --  72   LYMPHS PCT %  --   --   --   --  19   MONOS PCT %  --   --   --   --  6   EOS PCT %  --   --   --   --  2    < > = values in this interval not displayed       Results from last 7 days   Lab Units 02/09/23  0850 02/08/23  0422   SODIUM mmol/L 137 137   POTASSIUM mmol/L 4 4 4 6   CHLORIDE mmol/L 101 101   CO2 mmol/L 28 30   BUN mg/dL 63* 66*   CREATININE mg/dL 2 14* 2 18*   ANION GAP mmol/L 8 6   CALCIUM mg/dL 8 7 8 5   ALBUMIN g/dL  --  3 1*   TOTAL BILIRUBIN mg/dL  --  0 64   ALK PHOS U/L  --  95   ALT U/L  --  7   AST U/L  --  17   GLUCOSE RANDOM mg/dL 108 85     Results from last 7 days   Lab Units 02/07/23  1505   INR  1 79*                   Lines/Drains:  Invasive Devices     Peripheral Intravenous Line  Duration           Peripheral IV 02/07/23 Right Antecubital 1 day                      Imaging: Reviewed radiology reports from this admission including: chest xray and chest CT scan    Recent Cultures (last 7 days):   Results from last 7 days   Lab Units 02/07/23  1659   URINE CULTURE  >100,000 cfu/ml       Last 24 Hours Medication List:   Current Facility-Administered Medications   Medication Dose Route Frequency Provider Last Rate   • acetaminophen  650 mg Oral Q6H PRN Samy Lin PA-C     • allopurinol  100 mg Oral Daily Samy Lin PA-C     • amLODIPine  10 mg Oral Daily Samy Lin PA-C     • cholecalciferol  1,000 Units Oral Daily Samy Lin PA-C     • cyanocobalamin  1,000 mcg Oral Daily Samy Lin PA-C     • docusate sodium  100 mg Oral BID Samy Lin PA-C     • levothyroxine  175 mcg Oral Early Morning Samy Lin PA-C     • metoprolol succinate  100 mg Oral Daily Samy Lin PA-C     • pantoprazole  40 mg Oral Early Morning Samy Lin PA-C     • polyethylene glycol  17 g Oral Daily PRN Samy Lin PA-C     • sertraline  100 mg Oral Daily Samy Lin PA-C     • sodium bicarbonate  650 mg Oral TID after meals Samy Lin PA-C     • tamsulosin  0 4 mg Oral Daily With Arianne Lindo PA-C          Today, Patient Was Seen By: Rafia Juarez MD    **Please Note: This note may have been constructed using a voice recognition system  **

## 2023-02-09 NOTE — UTILIZATION REVIEW
NOTIFICATION OF INPATIENT ADMISSION   AUTHORIZATION REQUEST   SERVICING FACILITY:   [address] [place], [place] [address]elias Stanford [address] [place] [address], [address]  Tax ID: [id number]  NPI: [id number] ATTENDING PROVIDER:  Attending Name and NPI#: Maria R Ibrahim Md [[id number]]  Address: [facility]  Sheila Stanford [address] [place] [address], [employer] [address]  Phone: [phone]   ADMISSION INFORMATION:  Place of Service: Inpatient [address]  [address]  Place of Service Code: 21  Inpatient Admission Date/Time: 2/7/23  4:45 PM  Discharge Date/Time: No discharge date for patient encounter  Admitting Diagnosis Code/Description:  Cough [R05 9]  Acute blood loss anemia [P82]  Systolic HF (heart failure) (HCC) [I50 20]  Generalized weakness [R53 1]  Hemoptysis [R04 2]  UGIB (upper gastrointestinal bleed) [K92 2]  Acute on chronic anemia [D64 9]     UTILIZATION REVIEW CONTACT:  Aston Gonzalez, Utilization   Network Utilization Review Department  Phone: [phone]  Fax [phone]  Email: [email or web address]  Contact for approvals/pending authorizations, clinical reviews, and discharge  PHYSICIAN ADVISORY SERVICES:  Medical Necessity Denial & Gpsh[employer]sw-[employer] Review  Phone: [phone]  Fax: [phone]  Email: [email or web address]

## 2023-02-09 NOTE — UTILIZATION REVIEW
NOTIFICATION OF INPATIENT ADMISSION   AUTHORIZATION REQUEST   SERVICING FACILITY:   [address] [place], [place] [address]elias Stanford [address] [place] [address], [facility] [address]  Tax ID: [id number]  NPI: [id number] ATTENDING PROVIDER:  Attending Name and NPI#: Sarita Garrison Md [[id number]]  Address: [facility]  Sheila Stanford [address] [place], [place] [address]forrest Daniel  Phone: [phone]   ADMISSION INFORMATION:  Place of Service: Inpatient [address] [place] [address]  [address]  Place of Service Code: 21  Inpatient Admission Date/Time: 2/7/23  4:45 PM  Discharge Date/Time: No discharge date for patient encounter  Admitting Diagnosis Code/Description:  Cough [R05 9]  Acute blood loss anemia [N51]  Systolic HF (heart failure) (HCC) [I50 20]  Generalized weakness [R53 1]  Hemoptysis [R04 2]  UGIB (upper gastrointestinal bleed) [K92 2]  Acute on chronic anemia [D64 9]     UTILIZATION REVIEW CONTACT:  Mitra Sanchez Utilization   Network Utilization Review Department  Phone: [phone]  Fax [phone]  Email: [email or web address]  Contact for approvals/pending authorizations, clinical reviews, and discharge  PHYSICIAN ADVISORY SERVICES:  Medical Necessity Denial & Ilfa-mt-Jn[facility] Review  Phone: [phone]  Fax: [phone]  Email: [email or web address]
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
## 2023-02-10 ENCOUNTER — ANESTHESIA (INPATIENT)
Dept: GASTROENTEROLOGY | Facility: HOSPITAL | Age: 78
End: 2023-02-10

## 2023-02-10 ENCOUNTER — APPOINTMENT (INPATIENT)
Dept: GASTROENTEROLOGY | Facility: HOSPITAL | Age: 78
End: 2023-02-10

## 2023-02-10 ENCOUNTER — ANESTHESIA EVENT (OUTPATIENT)
Dept: ANESTHESIOLOGY | Facility: HOSPITAL | Age: 78
End: 2023-02-10

## 2023-02-10 ENCOUNTER — ANESTHESIA (OUTPATIENT)
Dept: ANESTHESIOLOGY | Facility: HOSPITAL | Age: 78
End: 2023-02-10

## 2023-02-10 ENCOUNTER — ANESTHESIA EVENT (INPATIENT)
Dept: GASTROENTEROLOGY | Facility: HOSPITAL | Age: 78
End: 2023-02-10

## 2023-02-10 LAB
BASOPHILS # BLD AUTO: 0.07 THOUSANDS/ÂΜL (ref 0–0.1)
BASOPHILS NFR BLD AUTO: 1 % (ref 0–1)
EOSINOPHIL # BLD AUTO: 0.16 THOUSAND/ÂΜL (ref 0–0.61)
EOSINOPHIL NFR BLD AUTO: 3 % (ref 0–6)
ERYTHROCYTE [DISTWIDTH] IN BLOOD BY AUTOMATED COUNT: 18.2 % (ref 11.6–15.1)
HCT VFR BLD AUTO: 26.8 % (ref 34.8–46.1)
HGB BLD-MCNC: 8 G/DL (ref 11.5–15.4)
IMM GRANULOCYTES # BLD AUTO: 0.03 THOUSAND/UL (ref 0–0.2)
IMM GRANULOCYTES NFR BLD AUTO: 1 % (ref 0–2)
LYMPHOCYTES # BLD AUTO: 0.94 THOUSANDS/ÂΜL (ref 0.6–4.47)
LYMPHOCYTES NFR BLD AUTO: 19 % (ref 14–44)
MCH RBC QN AUTO: 27.9 PG (ref 26.8–34.3)
MCHC RBC AUTO-ENTMCNC: 29.9 G/DL (ref 31.4–37.4)
MCV RBC AUTO: 93 FL (ref 82–98)
MONOCYTES # BLD AUTO: 0.31 THOUSAND/ÂΜL (ref 0.17–1.22)
MONOCYTES NFR BLD AUTO: 6 % (ref 4–12)
NEUTROPHILS # BLD AUTO: 3.39 THOUSANDS/ÂΜL (ref 1.85–7.62)
NEUTS SEG NFR BLD AUTO: 70 % (ref 43–75)
NRBC BLD AUTO-RTO: 0 /100 WBCS
PLATELET # BLD AUTO: 251 THOUSANDS/UL (ref 149–390)
PMV BLD AUTO: 10.5 FL (ref 8.9–12.7)
RBC # BLD AUTO: 2.87 MILLION/UL (ref 3.81–5.12)
WBC # BLD AUTO: 4.9 THOUSAND/UL (ref 4.31–10.16)

## 2023-02-10 PROCEDURE — 0DJ08ZZ INSPECTION OF UPPER INTESTINAL TRACT, VIA NATURAL OR ARTIFICIAL OPENING ENDOSCOPIC: ICD-10-PCS | Performed by: INTERNAL MEDICINE

## 2023-02-10 PROCEDURE — 0DJD8ZZ INSPECTION OF LOWER INTESTINAL TRACT, VIA NATURAL OR ARTIFICIAL OPENING ENDOSCOPIC: ICD-10-PCS | Performed by: INTERNAL MEDICINE

## 2023-02-10 RX ORDER — SODIUM CHLORIDE, SODIUM LACTATE, POTASSIUM CHLORIDE, CALCIUM CHLORIDE 600; 310; 30; 20 MG/100ML; MG/100ML; MG/100ML; MG/100ML
INJECTION, SOLUTION INTRAVENOUS CONTINUOUS PRN
Status: DISCONTINUED | OUTPATIENT
Start: 2023-02-10 | End: 2023-02-10

## 2023-02-10 RX ORDER — PROPOFOL 10 MG/ML
INJECTION, EMULSION INTRAVENOUS CONTINUOUS PRN
Status: DISCONTINUED | OUTPATIENT
Start: 2023-02-10 | End: 2023-02-10

## 2023-02-10 RX ORDER — PROPOFOL 10 MG/ML
INJECTION, EMULSION INTRAVENOUS AS NEEDED
Status: DISCONTINUED | OUTPATIENT
Start: 2023-02-10 | End: 2023-02-10

## 2023-02-10 RX ADMIN — TAMSULOSIN HYDROCHLORIDE 0.4 MG: 0.4 CAPSULE ORAL at 18:06

## 2023-02-10 RX ADMIN — SODIUM CHLORIDE, SODIUM LACTATE, POTASSIUM CHLORIDE, AND CALCIUM CHLORIDE: .6; .31; .03; .02 INJECTION, SOLUTION INTRAVENOUS at 14:51

## 2023-02-10 RX ADMIN — PROPOFOL 120 MCG/KG/MIN: 10 INJECTION, EMULSION INTRAVENOUS at 15:02

## 2023-02-10 RX ADMIN — PROPOFOL 120 MCG/KG/MIN: 10 INJECTION, EMULSION INTRAVENOUS at 14:55

## 2023-02-10 RX ADMIN — PANTOPRAZOLE SODIUM 40 MG: 40 TABLET, DELAYED RELEASE ORAL at 05:27

## 2023-02-10 RX ADMIN — SODIUM BICARBONATE 650 MG TABLET 650 MG: at 18:06

## 2023-02-10 RX ADMIN — PROPOFOL 70 MG: 10 INJECTION, EMULSION INTRAVENOUS at 14:55

## 2023-02-10 RX ADMIN — IRON SUCROSE 300 MG: 20 INJECTION, SOLUTION INTRAVENOUS at 14:01

## 2023-02-10 RX ADMIN — LEVOTHYROXINE SODIUM 175 MCG: 175 TABLET ORAL at 05:27

## 2023-02-10 RX ADMIN — DOCUSATE SODIUM 100 MG: 100 CAPSULE ORAL at 18:06

## 2023-02-10 NOTE — ASSESSMENT & PLAN NOTE
Background: During previous admission 1/31 - 2/3 incidentally tested positive for COVID, was asymptomatic and maintained on mild COVID pathway -remdesivir not given  • Mild COVID pathway as below   • Vaccinated x2  • COVID19- positive on 1/31   • Not needing supplemental oxygen  • Chest CT ordered no evidence of pneumonia  • CBC and CMP daily  • Right now holding AC due to acute anemia and possible bleed  • OOB TID; ambulation and mobilization strongly encouraged  • PT/OT consult and evaluation   • Self proning strongly encouraged  • Supportive care  • Patient's last day of isolation is 2/9  • Can be taken off isolation

## 2023-02-10 NOTE — ANESTHESIA PREPROCEDURE EVALUATION
Procedure:      1  Hemoptysis  2  Acute on chronic blood loss anemia  3  Bronchiectasis  4  Recent COVID-19 infection  5  PAF  6  Chronic combined systolic and diastolic CHF  7  Dementia   8  CAD  9  Hypothyroidism   FLEXIBLE SIGMOIDOSCOPY  EGD    Relevant Problems   CARDIO   (+) Aortic atherosclerosis (HCC)   (+) CAD (coronary artery disease)   (+) Chronic combined systolic and diastolic congestive heart failure (HCC)   (+) Essential hypertension   (+) History of PTCA   (+) Hypercholesterolemia   (+) Mitral valve insufficiency   (+) Paroxysmal atrial fibrillation (HCC)      ENDO   (+) Acquired hypothyroidism   (+) Secondary renal hyperparathyroidism (HCC)      GI/HEPATIC   (+) Gastroesophageal reflux disease without esophagitis   (+) Gastrointestinal bleeding   (+) Hiatal hernia   (+) Rectal bleeding      /RENAL   (+) DARRELL (acute kidney injury) ([facility] [address] )   (+) Benign hypertensive renal disease   (+) Hydroureteronephrosis   (+) Renal calculus   (+) Stage 3b chronic kidney disease (HCC)      HEMATOLOGY   (+) Acute blood loss anemia   (+) Acute on chronic anemia   (+) Acute on chronic blood loss anemia   (+) Iron deficiency anemia      NEURO/PSYCH   (+) Dementia (HCC)   (+) Hx of fall        Physical Exam    Airway    Mallampati score: II  TM Distance: >3 FB  Neck ROM: full     Dental   No notable dental hx     Cardiovascular  Cardiovascular exam normal    Pulmonary  Pulmonary exam normal     Other Findings     Sinus rhythm  Left axis deviation  Non-specific intra-ventricular conduction block  Minimal voltage criteria for LVH, may be normal variant ( Vinay product )  Abnormal ECG         •  Left Ventricle: Left ventricular cavity size is normal  Wall thickness is increased  There is mild to moderate concentric hypertrophy  The left ventricular ejection fraction is 25%  Systolic function is severely reduced  There is severe global hypokinesis   Unable to assess diastolic function due to significant annular calcification  •  Right Ventricle: Right ventricular cavity size is upper normal  Systolic function is mildly reduced  Abnormal tricuspid annular plane systolic excursion (TAPSE) < 1 7 cm  A ICD wire is present  •  Left Atrium: The atrium is dilated  •  Atrial Septum: No patent foramen ovale detected  Negative bubble study  The septum bows into the left atrium  •  Aortic Valve: There is mild regurgitation  The aortic valve has no significant stenosis  •  Mitral Valve: There is moderate thickening  There is moderate calcification  The posterior leaflet is fixed  There is severe annular calcification  There is moderate regurgitation  There is no evidence of stenosis as assessed but visually the valve appears to have mild stenosis  •  Tricuspid Valve: There is moderate regurgitation  There is no evidence of stenosis  The right ventricular systolic pressure is moderately to severely elevated  The estimated right ventricular systolic pressure is 28 31 mmHg  •  Pulmonic Valve: There is mild regurgitation  •  Aorta: The aortic root is upper normal in size  The aortic root is 3 70 cm  •  Prior TTE study available for comparison  Prior study date: 9/22/2022  Changes noted when compared to prior study  Changes include: The EF on prior study was estimated at 45%  Direct comparison of images shows EF is decreased on today's study (estimated at 25%)  No tricuspid regurgitations was reported on prior study however this was an error as there is a documented TR velocity  Other findings are similar         Hb 8 0  Anesthesia Plan  ASA Score- 4     Anesthesia Type- IV sedation with anesthesia with ASA Monitors  Additional Monitors:   Airway Plan:           Plan Factors-Exercise tolerance (METS): <4 METS  Chart reviewed  EKG reviewed  Imaging results reviewed  Existing labs reviewed  Patient summary reviewed  Patient is not a current smoker  Patient not instructed to abstain from smoking on day of procedure  Patient did not smoke on day of surgery  Induction-     Postoperative Plan-     Informed Consent- Anesthetic plan and risks discussed with daughter  I personally reviewed this patient with the CRNA  Discussed and agreed on the Anesthesia Plan with the CRNA  Elizabeth Gotti

## 2023-02-10 NOTE — NURSING NOTE
Patient drank about 700 ml of the Golytely bowel prep since 1800  She is starting to refuse to drink anymore of it  She did have a large bowel movement around 0230

## 2023-02-10 NOTE — CASE MANAGEMENT
Case Management Discharge Planning Note    Patient name Rosemary Chambers /-68 MRN [id number]  : 1945 Date 2/10/2023       Current Admission Date: 2023  Current Admission Diagnosis:Acute on chronic blood loss anemia   Patient Active Problem List    Diagnosis Date Noted   • Asymptomatic bacteriuria 2023   • Hemoptysis 2023   • Urinary retention 2023   • Stage 3b chronic kidney disease ([place] [address] ) 2023   • Acute on chronic blood loss anemia 2023   • Acute cystitis without hematuria 2023   • Right internal carotid artery aneurysm 2023   • Carotid artery stenosis 2023   • Aneurysm ([place] [address] )    • Elevated d-dimer 2023   • Proteinuria 2023   • Goals of care, counseling/discussion 2023   • Secondary renal hyperparathyroidism ([place] [address] ) 10/26/2022   • Hyperuricemia 10/26/2022   • Hypophosphatemia 10/04/2022   • Pulmonary hypertension ([place] [address] ) 10/04/2022   • Low TSH level 2022   • COVID-19 virus infection 2022   • Dementia ([place] [address] )    • Metabolic encephalopathy    • Gastrointestinal bleeding 2022   • Hydroureteronephrosis 2022   • Diverticulitis 2022   • Rectal bleeding 2022   • Elevated troponin 2022   • Iron deficiency anemia 2022   • Paroxysmal atrial fibrillation ([place] [address] ) 2020   • Ischemic cardiomyopathy 2020   • S/P implantation of automatic cardioverter/defibrillator (AICD) 2020   • Essential hypertension 2020   • History of PTCA 2020   • Leg swelling 2020   • DARRELL (acute kidney injury) ([place] [address] ) 2020   • Perforated appendicitis 2020   • Chronic combined systolic and diastolic congestive heart failure ([place] [address] ) 2020   • Pyuria 2020   • Microscopic hematuria 2020   • Vitamin D insufficiency 2020   • Mitral valve insufficiency 2020   • Hypercholesterolemia 2020   • Aortic atherosclerosis ([facility] [address] ) 2020   • Renal calculus 07/29/2020   • Diverticulosis 07/29/2020   • Hiatal hernia 07/29/2020   • Pulmonary nodule 07/29/2020   • Benign hypertensive renal disease 09/12/2019   • Acute blood loss anemia 02/08/2019   • Gastroesophageal reflux disease without esophagitis 02/08/2019   • Closed fracture of body of sternum with routine healing 04/12/2018   • Acquired hypothyroidism 04/12/2018   • CAD (coronary artery disease) 04/12/2018   • Forgetfulness 04/12/2018   • Acute pain due to trauma 04/12/2018   • Hx of fall 04/12/2018   • Stress incontinence in female 04/12/2018   • Acute on chronic anemia 03/20/2018      LOS (days): 3  Geometric Mean LOS (GMLOS) (days): 2 70  Days to GMLOS:-0 2     OBJECTIVE:  Risk of Unplanned Readmission Score: 35 27         Current admission status: Inpatient   Preferred Pharmacy:   [address] [place] [address] [facility]  [address]  [place] [address]  Phone: [phone] Fax: [phone]    Primary Care Provider: Francois Dacosta DO    Primary Insurance: Joselin Hough [facility] REP  Secondary Insurance:     DISCHARGE DETAILS:        CM updated [facility], in [place], with potential discharge 48 - 72 hours; inquiring if patient can return over weekend                                                                                                  Accepting Facility Name, [address] : [facility]  Receiving Facility/Agency Phone Number: [phone]  Facility/Agency Fax Number: [phone]
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
## 2023-02-10 NOTE — ASSESSMENT & PLAN NOTE
Presents to ED with weakness, hemoptysis yesterday with large blood clot, Hemoccult positive as well  History of the same presentation  She received transfusion currently hemoglobin is stable  • Iron panel ordered anemia of chronic disease mild iron deficiency  • B12 ordered, was previously deficient and started on B12 supplement  • Blood consent obtained by ER, will re-consent with patient's daughter, placed in chart  • Transfuse if Hgb <7  o Recent history of transfusion during admission 1/19 - 1/26, a 1 unit PRBC   o Ordered additional unit     • So I had a discussion today with the patient in front of the daughter and the patient has agreed to undergo the prep and do an EGD colonoscopy tomorrow  Hemoglobin continues to improve continue to hold anticoagulation as discussed with the patient is clinically difficult to restart antiplatelet and Eliquis if no investigation is done of the GI system but she agreed she is on clears now I placed her n p o  after midnight I did place I discussed with GI that the patient and the daughter are in agreement and to place bowel prep  GI is yet to see-patient did not complete the whole prep she will get an EGD and flex sig and then will go from there    Hemoglobin dropped slightly to 8 will give a dose of Venofer as well    Lab Results   Component Value Date    HGB 8 0 (L) 02/10/2023    HGB 8 5 (L) 02/09/2023     · In the past patient has refused EGD/colonoscopy -is also COVID-positive on day 7  · Stool records at bedside, hold off on further occult stool testing (was positive in the ER)  · We will hold off on GI consult at this time until patient agreeable for scope  · Consult pulmonology for hemoptysis  · Er stated hemocult positive will order one  · Hb base >8 no

## 2023-02-10 NOTE — ANESTHESIA POSTPROCEDURE EVALUATION
Post-Op Assessment Note    CV Status:  Stable  Pain Score: 0    Pain management: adequate     Mental Status:  Sleepy   Hydration Status:  Stable   PONV Controlled:  None   Airway Patency:  Patent      Post Op Vitals Reviewed: Yes      Staff: Anesthesiologist         No notable events documented      BP (!) 82/46 (02/10/23 1525)    Temp 98 1 °F (36 7 °C) (02/10/23 1525)    Pulse (!) 50 (02/10/23 1525)   Resp 20 (02/10/23 1525)    SpO2 98 % (02/10/23 1525)

## 2023-02-10 NOTE — PROGRESS NOTES
[address]  Progress Note - Edie Ulloa 1945, 65 y o  female MRN: [id number]  Unit/Bed#: -01 Encounter: [id number]  Primary Care Provider: eLlo Armstrong DO   Date and time admitted to hospital: 2/7/2023  2:48 PM    * Acute on chronic blood loss anemia  Assessment & Plan  Presents to ED with weakness, hemoptysis yesterday with large blood clot, Hemoccult positive as well  History of the same presentation  She received transfusion currently hemoglobin is stable  • Iron panel ordered anemia of chronic disease mild iron deficiency  • B12 ordered, was previously deficient and started on B12 supplement  • Blood consent obtained by ER, will re-consent with patient's daughter, placed in chart  • Transfuse if Hgb <7  o Recent history of transfusion during admission 1/19 - 1/26, a 1 unit PRBC   o Ordered additional unit     • So I had a discussion today with the patient in front of the daughter and the patient has agreed to undergo the prep and do an EGD colonoscopy tomorrow  Hemoglobin continues to improve continue to hold anticoagulation as discussed with the patient is clinically difficult to restart antiplatelet and Eliquis if no investigation is done of the GI system but she agreed she is on clears now I placed her n p o  after midnight I did place I discussed with GI that the patient and the daughter are in agreement and to place bowel prep  GI is yet to see-patient did not complete the whole prep she will get an EGD and flex sig and then will go from there    Hemoglobin dropped slightly to 8 will give a dose of Venofer as well    Lab Results   Component Value Date    HGB 8 0 (L) 02/10/2023    HGB 8 5 (L) 02/09/2023     · In the past patient has refused EGD/colonoscopy -is also COVID-positive on day 7  · Stool records at bedside, hold off on further occult stool testing (was positive in the ER)  · We will hold off on GI consult at this time until patient agreeable for scope  · Consult pulmonology for hemoptysis  · Er stated hemocult positive will order one  · Hb base >8      Asymptomatic bacteriuria  Assessment & Plan  · Greer obtained 2/7- no need for abx     Urinary retention  Assessment & Plan  · Only placed on 1/26 by her to discharge to the nursing home she still has a Greer catheter was post to have a voiding trial 7 t  · Greer removed 2/8- voiding well without retention    Hemoptysis  Assessment & Plan  · Reported episode of hemoptysis with large clot at nursing facility  · Chest x-ray without acute findings, seems improved from previous  · Had outpatient chest x-ray ordered for 2/13 secondary to hemoptysis  · Does have history of a pulmonary nodule 2 mm lower left quadrant noted on CT in September 2022, was not deemed significant and no follow-up recommended    · CTchest done without contrast reveals no pneumonia  · Pulmonary consult discussed with pulmonary could have been tracheobronchitis there is no recurrence  · Transfuse to keep hemoglobin greater than 7  · Unclear amount of blood was expelled as outpatient, continue to monitor closely  · No recurrence    Stage 3b chronic kidney disease [facility])  Assessment & Plan  Lab Results   Component Value Date    EGFR 21 02/09/2023    EGFR 21 02/08/2023    EGFR 21 02/07/2023    CREATININE 2 14 (H) 02/09/2023    CREATININE 2 18 (H) 02/08/2023    CREATININE 2 18 (H) 02/07/2023   Creatinine baseline is around 1 7  Recently during last hospitalization has been around 2 1, currently at near and not meeting criteria for DARRELL has relatively remained stable  Monitor closely  Avoid nephrotoxic agents, NSAIDs and hypotension  Renally dose medications  Improving    COVID-19 virus infection  Assessment & Plan  Background: During previous admission 1/31 - 2/3 incidentally tested positive for COVID, was asymptomatic and maintained on mild COVID pathway -remdesivir not given  • Mild COVID pathway as below   • Vaccinated x2  • COVID19- positive on 1/31   • Not needing supplemental oxygen  • Chest CT ordered no evidence of pneumonia  • CBC and CMP daily  • Right now holding AC due to acute anemia and possible bleed  • OOB TID; ambulation and mobilization strongly encouraged  • PT/OT consult and evaluation   • Self proning strongly encouraged  • Supportive care  • Patient's last day of isolation is 2/9  • Can be taken off isolation      Dementia [facility])  Assessment & Plan  She is at her baseline-alert and oriented  Delirium precautions  Consents need to be obtained via daughter    Paroxysmal atrial fibrillation [facility])  Assessment & Plan  EKG shows sinus rhythm  Rate control with metoprolol  Is currently on Eliquis for anticoagulation -on hold secondary to acute anemia    Chronic combined systolic and diastolic congestive heart failure (HCC)  Assessment & Plan  Wt Readings from Last 3 Encounters:   02/07/23 63 5 kg (139 lb 15 9 oz)   02/03/23 61 5 kg (135 lb 9 3 oz)   01/27/23 65 9 kg (145 lb 4 5 oz)       Currently euvolemic  Maintained on diuretic therapy as an outpatient  Previous echo done 1/19: EF decreased to 25% which was changed from previous study in September, severe global hypokinesis, new tricuspid regurg without other changes from previous study  Currently is on beta-blocker, not on ACE or ARB, on aspirin  Does have AICD  Consult cardiology discussed with cardiology no recommendation in terms of proceeding with ischemic evaluation at this time having ongoing anemia with GI bleed unknown if able to tolerate antiplatelets        CAD (coronary artery disease)  Assessment & Plan  Continue beta-blocker, hold aspirin due to anemia  Denies any chest pain  EKG was sinus rhythm, rate 69, complete LBBB which is chronic, no signs for ischemia  Data GI evaluation will be difficult to restart antiplatelets    Acquired hypothyroidism  Assessment & Plan  During hospitalization 1/19 - 1/26 had abnormal thyroid levels  Synthroid was adjusted to 175 micrograms  Was recommended to have repeat labs in 4 to 6 weeks    We will repeat labs during this admission improved with suggest repeat a TSH in another 2 weeks        VTE Pharmacologic Prophylaxis: VTE Score: 4 Moderate Risk (Score 3-4) - Pharmacological DVT Prophylaxis Contraindicated  Sequential Compression Devices Ordered  Patient Centered Rounds: I performed bedside rounds with nursing staff today  Discussions with Specialists or Other Care Team Provider: discussed with gi    Education and Discussions with Family / Patient: Updated  (daughter) via phone  Time Spent for Care: 30 minutes  More than 50% of total time spent on counseling and coordination of care as described above  Current Length of Stay: 3 day(s)  Current Patient Status: Inpatient   Certification Statement: The patient will continue to require additional inpatient hospital stay due to gi bleed  Discharge Plan: Anticipate discharge in 48-72 hrs to rehab facility  Code Status: Level 3 - DNAR and DNI    Subjective:   Seen and examined trying to get to commode  Did not do good prep    Objective:     Vitals:   Temp (24hrs), Av 2 °F (36 2 °C), Min:97 2 °F (36 2 °C), Max:97 3 °F (36 3 °C)    Temp:  [97 2 °F (36 2 °C)-97 3 °F (36 3 °C)] 97 3 °F (36 3 °C)  HR:  [57-60] 57  Resp:  [16-18] 18  BP: (127-142)/(58-68) 142/68  SpO2:  [93 %-97 %] 97 %  Body mass index is 23 3 kg/m²  Input and Output Summary (last 24 hours): Intake/Output Summary (Last 24 hours) at 2/10/2023 1315  Last data filed at 2/10/2023 0944  Gross per 24 hour   Intake 740 ml   Output 672 ml   Net 68 ml       Physical Exam:   Physical Exam  Vitals and nursing note reviewed  Constitutional:       General: She is not in acute distress  Appearance: She is well-developed  HENT:      Head: Normocephalic and atraumatic  Eyes:      Conjunctiva/sclera: Conjunctivae normal    Cardiovascular:      Rate and Rhythm: Normal rate and regular rhythm  Heart sounds:  No murmur heard  Pulmonary:      Effort: Pulmonary effort is normal  No respiratory distress  Breath sounds: Normal breath sounds  No wheezing or rales  Abdominal:      General: There is no distension  Palpations: Abdomen is soft  Tenderness: There is no abdominal tenderness  Musculoskeletal:         General: No swelling  Cervical back: Neck supple  Skin:     General: Skin is warm and dry  Capillary Refill: Capillary refill takes less than 2 seconds  Neurological:      Mental Status: She is alert and oriented to person, place, and time     Psychiatric:         Mood and Affect: Mood normal          Additional Data:     Labs:  Results from last 7 days   Lab Units 02/10/23  0514   WBC Thousand/uL 4 90   HEMOGLOBIN g/dL 8 0*   HEMATOCRIT % 26 8*   PLATELETS Thousands/uL 251   NEUTROS PCT % 70   LYMPHS PCT % 19   MONOS PCT % 6   EOS PCT % 3     Results from last 7 days   Lab Units 02/09/23  0850 02/08/23  0422   SODIUM mmol/L 137 137   POTASSIUM mmol/L 4 4 4 6   CHLORIDE mmol/L 101 101   CO2 mmol/L 28 30   BUN mg/dL 63* 66*   CREATININE mg/dL 2 14* 2 18*   ANION GAP mmol/L 8 6   CALCIUM mg/dL 8 7 8 5   ALBUMIN g/dL  --  3 1*   TOTAL BILIRUBIN mg/dL  --  0 64   ALK PHOS U/L  --  95   ALT U/L  --  7   AST U/L  --  17   GLUCOSE RANDOM mg/dL 108 85     Results from last 7 days   Lab Units 02/07/23  1505   INR  1 79*                   Lines/Drains:  Invasive Devices     Peripheral Intravenous Line  Duration           Peripheral IV 02/07/23 Right Antecubital 2 days                      Imaging: Reviewed radiology reports from this admission including: chest xray    Recent Cultures (last 7 days):   Results from last 7 days   Lab Units 02/07/23  1659   URINE CULTURE  >100,000 cfu/ml       Last 24 Hours Medication List:   Current Facility-Administered Medications   Medication Dose Route Frequency Provider Last Rate   • acetaminophen  650 mg Oral Q6H PRN Cici Real PA-C     • allopurinol  100 mg Oral Daily Krystyna Vieira PA-C     • amLODIPine  10 mg Oral Daily Krystynamilagros Vieira PA-C     • cholecalciferol  1,000 Units Oral Daily Krystynamilagros Vieira PA-C     • cyanocobalamin  1,000 mcg Oral Daily Krystyna HARRIET Vieira     • docusate sodium  100 mg Oral BID Krystynamilagros Vieira PA-C     • iron sucrose  300 mg Intravenous Once Jannet Robert MD     • levothyroxine  175 mcg Oral Early Morning Krystyna HARRIET Vieira     • metoprolol succinate  100 mg Oral Daily Krystynamilagros Vieira PA-C     • pantoprazole  40 mg Oral Early Morning Krystyna HARRIET Vieira     • polyethylene glycol  2,000 mL Oral Once per day on Thu Fri Anh Thomas MD     • polyethylene glycol  17 g Oral Daily PRN Krystyna Vieira PA-C     • sertraline  100 mg Oral Daily Krystyna HARRIET Vieira     • sodium bicarbonate  650 mg Oral TID after meals Krystyna Vieira PA-C     • tamsulosin  0 4 mg Oral Daily With Tommy Sharma PA-C          Today, Patient Was Seen By: Jannet Robert MD    **Please Note: This note may have been constructed using a voice recognition system  **

## 2023-02-10 NOTE — ASSESSMENT & PLAN NOTE
Wt Readings from Last 3 Encounters:   02/07/23 63 5 kg (139 lb 15 9 oz)   02/03/23 61 5 kg (135 lb 9 3 oz)   01/27/23 65 9 kg (145 lb 4 5 oz)       Currently euvolemic  Maintained on diuretic therapy as an outpatient  Previous echo done 1/19: EF decreased to 25% which was changed from previous study in September, severe global hypokinesis, new tricuspid regurg without other changes from previous study  Currently is on beta-blocker, not on ACE or ARB, on aspirin  Does have Zaina HAGAN [address] cardiology discussed with cardiology no recommendation in terms of proceeding with ischemic evaluation at this time having ongoing anemia with GI bleed unknown if able to tolerate antiplatelets

## 2023-02-10 NOTE — ANESTHESIA PREPROCEDURE EVALUATION
Procedure:      1  Hemoptysis  2  Acute on chronic blood loss anemia  3  Bronchiectasis  4  Recent COVID-19 infection  5  PAF  6  Chronic combined systolic and diastolic CHF  7  Dementia   8  CAD  9  Hypothyroidism   PRE-OP ONLY    Relevant Problems   CARDIO   (+) Aortic atherosclerosis (HCC)   (+) CAD (coronary artery disease)   (+) Chronic combined systolic and diastolic congestive heart failure (HCC)   (+) Essential hypertension   (+) History of PTCA   (+) Hypercholesterolemia   (+) Mitral valve insufficiency   (+) Paroxysmal atrial fibrillation (HCC)      ENDO   (+) Acquired hypothyroidism   (+) Secondary renal hyperparathyroidism (HCC)      GI/HEPATIC   (+) Gastroesophageal reflux disease without esophagitis   (+) Gastrointestinal bleeding   (+) Hiatal hernia   (+) Rectal bleeding      /RENAL   (+) DARRELL (acute kidney injury) ([facility] [address] )   (+) Benign hypertensive renal disease   (+) Hydroureteronephrosis   (+) Renal calculus   (+) Stage 3b chronic kidney disease (HCC)      HEMATOLOGY   (+) Acute blood loss anemia   (+) Acute on chronic anemia   (+) Acute on chronic blood loss anemia   (+) Iron deficiency anemia      NEURO/PSYCH   (+) Dementia (HCC)   (+) Hx of fall        Physical Exam    Airway    Mallampati score: II  TM Distance: >3 FB  Neck ROM: full     Dental   No notable dental hx     Cardiovascular  Cardiovascular exam normal    Pulmonary  Pulmonary exam normal     Other Findings     Sinus rhythm  Left axis deviation  Non-specific intra-ventricular conduction block  Minimal voltage criteria for LVH, may be normal variant ( [employer] product )  Abnormal ECG         •  Left Ventricle: Left ventricular cavity size is normal  Wall thickness is increased  There is mild to moderate concentric hypertrophy  The left ventricular ejection fraction is 25%  Systolic function is severely reduced  There is severe global hypokinesis  Unable to assess diastolic function due to significant annular calcification    • Right Ventricle: Right ventricular cavity size is upper normal  Systolic function is mildly reduced  Abnormal tricuspid annular plane systolic excursion (TAPSE) < 1 7 cm  A ICD wire is present  •  Left Atrium: The atrium is dilated  •  Atrial Septum: No patent foramen ovale detected  Negative bubble study  The septum bows into the left atrium  •  Aortic Valve: There is mild regurgitation  The aortic valve has no significant stenosis  •  Mitral Valve: There is moderate thickening  There is moderate calcification  The posterior leaflet is fixed  There is severe annular calcification  There is moderate regurgitation  There is no evidence of stenosis as assessed but visually the valve appears to have mild stenosis  •  Tricuspid Valve: There is moderate regurgitation  There is no evidence of stenosis  The right ventricular systolic pressure is moderately to severely elevated  The estimated right ventricular systolic pressure is 67 64 mmHg  •  Pulmonic Valve: There is mild regurgitation  •  Aorta: The aortic root is upper normal in size  The aortic root is 3 70 cm  •  Prior TTE study available for comparison  Prior study date: 9/22/2022  Changes noted when compared to prior study  Changes include: The EF on prior study was estimated at 45%  Direct comparison of images shows EF is decreased on today's study (estimated at 25%)  No tricuspid regurgitations was reported on prior study however this was an error as there is a documented TR velocity  Other findings are similar         Hb 8 0  Anesthesia Plan  ASA Score- 4     Anesthesia Type- IV sedation with anesthesia with ASA Monitors  Additional Monitors:   Airway Plan:           Plan Factors-Exercise tolerance (METS): <4 METS  Chart reviewed  EKG reviewed  Imaging results reviewed  Existing labs reviewed  Patient summary reviewed  Patient is not a current smoker  Patient not instructed to abstain from smoking on day of procedure   Patient did not smoke on day of surgery          Induction-     Postoperative Plan-     Informed Consent-

## 2023-02-10 NOTE — PROGRESS NOTES
Progress Note - Pulmonary   Carmen Olmstead Grief 65 y o  female MRN: [id number]  Unit/Bed#: -01 Encounter: [id number]      Assessment:  1  Hemoptysis   2  Acute blood loss anemia - likely GI source  3  Atrial fibrillation on eliquis  4  Recent COVID-19 infection  5  Mild basilar bronchiectasis     Recommendations:  · No need for bronchoscopy at this time  · Can resume anti-coagulation from a pulmonary standpoint, will defer to GI findings  · Can consider flutter valve use  · No COVID-19 therapies needed  · Will sign off, please call with further questions or concerns    Discussed with Dr Shelli Doty via [employer]    Subjective:   Feels well, no further hemoptysis, actually reported sensation of possible posterior epistaxis at the time, no GI bleeding reported    Objective:     Vitals: Blood pressure 142/68, pulse 57, temperature (!) 97 3 °F (36 3 °C), resp  rate 18, height 5' 5" (1 651 m), weight 63 5 kg (139 lb 15 9 oz), SpO2 97 % , RA, Body mass index is 23 3 kg/m²  Intake/Output Summary (Last 24 hours) at 2/10/2023 0949  Last data filed at 2/10/2023 0944  Gross per 24 hour   Intake 740 ml   Output 672 ml   Net 68 ml         Physical Exam  Gen: older woman in bed, awake, alert, oriented x 3, no acute distress  HEENT: Mucous membranes moist, no oral lesions, no thrush  NECK: No accessory muscle use, JVP not elevated  Cardiac: Regular, single S1, single S2, II/VI IVON, no rubs, no gallops  Lungs: clear, no wheeze, no rales, no rhonchi  Abdomen: normoactive bowel sounds, soft nontender, nondistended, no rebound or rigidity, no guarding  Extremities: no cyanosis, no clubbing, no edema    Labs: I have personally reviewed pertinent lab results    Laboratory and Diagnostics  Results from last 7 days   Lab Units 02/10/23  0514 02/09/23  0850 02/08/23 2027 02/08/23  0422 02/08/23  0011 02/07/23  1505   WBC Thousand/uL 4 90  --   --  5 65  --  6 76   HEMOGLOBIN g/dL 8 0* 8 5* 8 1* 8 0* 6 3* 6 8*   HEMATOCRIT % 26 8* 27 7* 26 0* 26 6*  --  23 1*   PLATELETS Thousands/uL 251  --   --  224  --  254   NEUTROS PCT % 70  --   --   --   --  72   MONOS PCT % 6  --   --   --   --  6     Results from last 7 days   Lab Units 02/09/23  0850 02/08/23  0422 02/07/23  1505   SODIUM mmol/L 137 137 136   POTASSIUM mmol/L 4 4 4 6 4 9   CHLORIDE mmol/L 101 101 100   CO2 mmol/L 28 30 30   ANION GAP mmol/L 8 6 6   BUN mg/dL 63* 66* 66*   CREATININE mg/dL 2 14* 2 18* 2 18*   CALCIUM mg/dL 8 7 8 5 8 8   GLUCOSE RANDOM mg/dL 108 85 103   ALT U/L  --  7  --    AST U/L  --  17  --    ALK PHOS U/L  --  95  --    ALBUMIN g/dL  --  3 1*  --    TOTAL BILIRUBIN mg/dL  --  0 64  --      Results from last 7 days   Lab Units 02/07/23  1505   MAGNESIUM mg/dL 2 0      Results from last 7 days   Lab Units 02/07/23  1505   INR  1 79*   PTT seconds 33              Results from last 7 days   Lab Units 02/07/23  1505   FERRITIN ng/mL 192             Results from last 7 days   Lab Units 02/07/23  1505   D-DIMER QUANTITATIVE ug/ml FEU 7 50*       Microbiology:  MRAS neg  Urine Cx mixed contaminants   Urine Cx 1/31 >100K VRE  COVID-19 (+) 1/31    Imaging and other studies: I have personally reviewed pertinent reports  and I have personally reviewed pertinent films in PACS  CT chest 2/7/2023 - basilar atelectasis, mild basilar bronchiectasis, chronic left sided volume loss, new small left effusion, scattered stable 3mm nodules, no focal infiltrates, no dense consolidations      Star Harris DO, [facility]

## 2023-02-10 NOTE — PLAN OF CARE
Problem: Potential for Falls  Goal: Patient will remain free of falls  Description: INTERVENTIONS:  - Educate patient/family on patient safety including physical limitations  - Instruct patient to call for assistance with activity   - Consult OT/PT to assist with strengthening/mobility   - Keep Call bell within reach  - Keep bed low and locked with side rails adjusted as appropriate  - Keep care items and personal belongings within reach  - Initiate and maintain comfort rounds  - Make Fall Risk Sign visible to staff  - Offer Toileting every   Hours, in advance of need  - Initiate/Maintain   alarm  - Obtain necessary fall risk management equipment:     - Apply yellow socks and bracelet for high fall risk patients  - Consider moving patient to room near nurses station  Outcome: Progressing     Problem: Prexisting or High Potential for Compromised Skin Integrity  Goal: Skin integrity is maintained or improved  Description: INTERVENTIONS:  - Identify patients at risk for skin breakdown  - Assess and monitor skin integrity  - Assess and monitor nutrition and hydration status  - Monitor labs   - Assess for incontinence   - Turn and reposition patient  - Assist with mobility/ambulation  - Relieve pressure over bony prominences  - Avoid friction and shearing  - Provide appropriate hygiene as needed including keeping skin clean and dry  - Evaluate need for skin moisturizer/barrier cream  - Collaborate with interdisciplinary team   - Patient/family teaching  - Consider wound care consult   Outcome: Progressing     Problem: PAIN - ADULT  Goal: Verbalizes/displays adequate comfort level or baseline comfort level  Description: Interventions:  - Encourage patient to monitor pain and request assistance  - Assess pain using appropriate pain scale  - Administer analgesics based on type and severity of pain and evaluate response  - Implement non-pharmacological measures as appropriate and evaluate response  - Consider cultural and social influences on pain and pain management  - Notify physician/advanced practitioner if interventions unsuccessful or patient reports new pain  Outcome: Progressing     Problem: INFECTION - ADULT  Goal: Absence or prevention of progression during hospitalization  Description: INTERVENTIONS:  - Assess and monitor for signs and symptoms of infection  - Monitor lab/diagnostic results  - Monitor all insertion sites, i e  indwelling lines, tubes, and drains  - Monitor endotracheal if appropriate and nasal secretions for changes in amount and color  - [place] appropriate cooling/warming therapies per order  - Administer medications as ordered  - Instruct and encourage patient and family to use good hand hygiene technique  - Identify and instruct in appropriate isolation precautions for identified infection/condition  Outcome: Progressing     Problem: SAFETY ADULT  Goal: Patient will remain free of falls  Description: INTERVENTIONS:  - Educate patient/family on patient safety including physical limitations  - Instruct patient to call for assistance with activity   - Consult OT/PT to assist with strengthening/mobility   - Keep Call bell within reach  - Keep bed low and locked with side rails adjusted as appropriate  - Keep care items and personal belongings within reach  - Initiate and maintain comfort rounds  - Make Fall Risk Sign visible to staff  - Offer Toileting every   Hours, in advance of need  - Initiate/Maintain   alarm  - Obtain necessary fall risk management equipment:     - Apply yellow socks and bracelet for high fall risk patients  - Consider moving patient to room near nurses station  Outcome: Progressing  Goal: Maintain or return to baseline ADL function  Description: INTERVENTIONS:  -  Assess patient's ability to carry out ADLs; assess patient's baseline for ADL function and identify physical deficits which impact ability to perform ADLs (bathing, care of mouth/teeth, toileting, grooming, dressing, etc )  - Assess/evaluate cause of self-care deficits   - Assess range of motion  - Assess patient's mobility; develop plan if impaired  - Assess patient's need for assistive devices and provide as appropriate  - Encourage maximum independence but intervene and supervise when necessary  - Involve family in performance of ADLs  - Assess for home care needs following discharge   - Consider OT consult to assist with ADL evaluation and planning for discharge  - Provide patient education as appropriate  Outcome: Progressing  Goal: Maintains/Returns to pre admission functional level  Description: INTERVENTIONS:  - Perform BMAT or MOVE assessment daily    - Set and communicate daily mobility goal to care team and patient/family/caregiver  - Collaborate with rehabilitation services on mobility goals if consulted  - Perform Range of Motion   times a day  - Reposition patient every   hours  - Dangle patient   times a day  - Stand patient   times a day  - Ambulate patient   times a day  - Out of bed to chair    times a day   - Out of bed for meals  times a day  - Out of bed for toileting  - Record patient progress and toleration of activity level   Outcome: Progressing     Problem: DISCHARGE PLANNING  Goal: Discharge to home or other facility with appropriate resources  Description: INTERVENTIONS:  - Identify barriers to discharge w/patient and caregiver  - Arrange for needed discharge resources and transportation as appropriate  - Identify discharge learning needs (meds, wound care, etc )  - Arrange for interpretive services to assist at discharge as needed  - Refer to Case Management Department for coordinating discharge planning if the patient needs post-hospital services based on physician/advanced practitioner order or complex needs related to functional status, cognitive ability, or social support system  Outcome: Progressing     Problem: Knowledge Deficit  Goal: Patient/family/caregiver demonstrates understanding of disease process, treatment plan, medications, and discharge instructions  Description: Complete learning assessment and assess knowledge base  Interventions:  - Provide teaching at level of understanding  - Provide teaching via preferred learning methods  Outcome: Progressing     Problem: MOBILITY - ADULT  Goal: Maintain or return to baseline ADL function  Description: INTERVENTIONS:  -  Assess patient's ability to carry out ADLs; assess patient's baseline for ADL function and identify physical deficits which impact ability to perform ADLs (bathing, care of mouth/teeth, toileting, grooming, dressing, etc )  - Assess/evaluate cause of self-care deficits   - Assess range of motion  - Assess patient's mobility; develop plan if impaired  - Assess patient's need for assistive devices and provide as appropriate  - Encourage maximum independence but intervene and supervise when necessary  - Involve family in performance of ADLs  - Assess for home care needs following discharge   - Consider OT consult to assist with ADL evaluation and planning for discharge  - Provide patient education as appropriate  Outcome: Progressing  Goal: Maintains/Returns to pre admission functional level  Description: INTERVENTIONS:  - Perform BMAT or MOVE assessment daily    - Set and communicate daily mobility goal to care team and patient/family/caregiver  - Collaborate with rehabilitation services on mobility goals if consulted  - Perform Range of Motion   times a day  - Reposition patient every   hours  - Dangle patient   times a day  - Stand patient   times a day  - Ambulate patient   times a day  - Out of bed to chair   times a day   - Out of bed for meals    times a day  - Out of bed for toileting  - Record patient progress and toleration of activity level   Outcome: Progressing

## 2023-02-11 LAB
ANION GAP SERPL CALCULATED.3IONS-SCNC: 9 MMOL/L (ref 4–13)
BASOPHILS # BLD AUTO: 0.08 THOUSANDS/ÂΜL (ref 0–0.1)
BASOPHILS NFR BLD AUTO: 1 % (ref 0–1)
BUN SERPL-MCNC: 52 MG/DL (ref 5–25)
CALCIUM SERPL-MCNC: 8.5 MG/DL (ref 8.4–10.2)
CHLORIDE SERPL-SCNC: 101 MMOL/L (ref 96–108)
CO2 SERPL-SCNC: 26 MMOL/L (ref 21–32)
CREAT SERPL-MCNC: 1.99 MG/DL (ref 0.6–1.3)
EOSINOPHIL # BLD AUTO: 0.09 THOUSAND/ÂΜL (ref 0–0.61)
EOSINOPHIL NFR BLD AUTO: 1 % (ref 0–6)
ERYTHROCYTE [DISTWIDTH] IN BLOOD BY AUTOMATED COUNT: 18.1 % (ref 11.6–15.1)
GFR SERPL CREATININE-BSD FRML MDRD: 23 ML/MIN/1.73SQ M
GLUCOSE SERPL-MCNC: 68 MG/DL (ref 65–140)
HCT VFR BLD AUTO: 28.7 % (ref 34.8–46.1)
HGB BLD-MCNC: 8.4 G/DL (ref 11.5–15.4)
IMM GRANULOCYTES # BLD AUTO: 0.05 THOUSAND/UL (ref 0–0.2)
IMM GRANULOCYTES NFR BLD AUTO: 1 % (ref 0–2)
LYMPHOCYTES # BLD AUTO: 1.02 THOUSANDS/ÂΜL (ref 0.6–4.47)
LYMPHOCYTES NFR BLD AUTO: 10 % (ref 14–44)
MCH RBC QN AUTO: 27.4 PG (ref 26.8–34.3)
MCHC RBC AUTO-ENTMCNC: 29.3 G/DL (ref 31.4–37.4)
MCV RBC AUTO: 94 FL (ref 82–98)
MONOCYTES # BLD AUTO: 0.57 THOUSAND/ÂΜL (ref 0.17–1.22)
MONOCYTES NFR BLD AUTO: 6 % (ref 4–12)
NEUTROPHILS # BLD AUTO: 7.97 THOUSANDS/ÂΜL (ref 1.85–7.62)
NEUTS SEG NFR BLD AUTO: 81 % (ref 43–75)
NRBC BLD AUTO-RTO: 0 /100 WBCS
PLATELET # BLD AUTO: 272 THOUSANDS/UL (ref 149–390)
PMV BLD AUTO: 10.1 FL (ref 8.9–12.7)
POTASSIUM SERPL-SCNC: 3.7 MMOL/L (ref 3.5–5.3)
RBC # BLD AUTO: 3.07 MILLION/UL (ref 3.81–5.12)
SODIUM SERPL-SCNC: 136 MMOL/L (ref 135–147)
WBC # BLD AUTO: 9.78 THOUSAND/UL (ref 4.31–10.16)

## 2023-02-11 RX ORDER — HYDROCORTISONE ACETATE 25 MG/1
25 SUPPOSITORY RECTAL 2 TIMES DAILY
Status: DISCONTINUED | OUTPATIENT
Start: 2023-02-11 | End: 2023-02-13 | Stop reason: HOSPADM

## 2023-02-11 RX ADMIN — AMLODIPINE BESYLATE 10 MG: 10 TABLET ORAL at 08:21

## 2023-02-11 RX ADMIN — SODIUM BICARBONATE 650 MG TABLET 650 MG: at 17:54

## 2023-02-11 RX ADMIN — HYDROCORTISONE ACETATE 25 MG: 25 SUPPOSITORY RECTAL at 12:54

## 2023-02-11 RX ADMIN — HYDROCORTISONE ACETATE 25 MG: 25 SUPPOSITORY RECTAL at 22:52

## 2023-02-11 RX ADMIN — Medication 1000 UNITS: at 08:21

## 2023-02-11 RX ADMIN — SERTRALINE HYDROCHLORIDE 100 MG: 100 TABLET ORAL at 08:21

## 2023-02-11 RX ADMIN — SODIUM BICARBONATE 650 MG TABLET 650 MG: at 08:21

## 2023-02-11 RX ADMIN — ALLOPURINOL 100 MG: 100 TABLET ORAL at 08:21

## 2023-02-11 RX ADMIN — METOPROLOL SUCCINATE 100 MG: 100 TABLET, EXTENDED RELEASE ORAL at 08:21

## 2023-02-11 RX ADMIN — PANTOPRAZOLE SODIUM 40 MG: 40 TABLET, DELAYED RELEASE ORAL at 05:58

## 2023-02-11 RX ADMIN — LEVOTHYROXINE SODIUM 175 MCG: 175 TABLET ORAL at 05:58

## 2023-02-11 RX ADMIN — IRON SUCROSE 300 MG: 20 INJECTION, SOLUTION INTRAVENOUS at 13:49

## 2023-02-11 RX ADMIN — TAMSULOSIN HYDROCHLORIDE 0.4 MG: 0.4 CAPSULE ORAL at 17:54

## 2023-02-11 RX ADMIN — SODIUM BICARBONATE 650 MG TABLET 650 MG: at 13:49

## 2023-02-11 RX ADMIN — DOCUSATE SODIUM 100 MG: 100 CAPSULE ORAL at 17:54

## 2023-02-11 RX ADMIN — CYANOCOBALAMIN TAB 500 MCG 1000 MCG: 500 TAB at 08:21

## 2023-02-11 RX ADMIN — DOCUSATE SODIUM 100 MG: 100 CAPSULE ORAL at 08:21

## 2023-02-11 NOTE — ASSESSMENT & PLAN NOTE
Lab Results   Component Value Date    EGFR 23 02/11/2023    EGFR 21 02/09/2023    EGFR 21 02/08/2023    CREATININE 1 99 (H) 02/11/2023    CREATININE 2 14 (H) 02/09/2023    CREATININE 2 18 (H) 02/08/2023   Creatinine baseline is around 1 7  Recently during last hospitalization has been around 2 1, currently at near and not meeting criteria for DARRELL has relatively remained stable  Monitor closely  Avoid nephrotoxic agents, NSAIDs and hypotension  Renally dose medications  Improving

## 2023-02-11 NOTE — ACP (ADVANCE CARE PLANNING)
Advanced Care Planning Progress Note    Serious Illness Conversation    1  What is your understanding now of where you are with your illness? Secondary to dementia had to be discussed with daughter prior to patient agreeing to undergo procedures     2  How much information about what is likely to be ahead with your illness would you like to have? Information: patient wants to be fully informed     3  What did you (clinician) communicate to the patient? Prognostic Communication: Uncertain - It can be difficult to predict what will happen with your illness  I hope you will continue to live well for a long time but I’m worried that you could get sick quickly, and I think it is important to prepare for that possibility  4  If your health situation worsens, what are your most important goals? Goals: be independent     5  What are the biggest fears and worries about the future and your health? Fears/Worries: pain     6  What abilities are so critical to your life that you cannot imagine living without them? Unacceptable Function: not being myself     7  What gives you strength as you think about the future with your illness? N/a     8  If you become sicker, how much are you willing to go through for the possibility of gaining more time? Be in the hospital: No Have a feeding tube: No   Be in the ICU: No Live in a nursing home: Yes   Be on a ventilator: No Be uncomfortable: No   Be on dialysis: No Undergo aggressive test and/or procedures: No   9  How much does your proxy and family know about your priorities and wishes? Discussion Discussion: extensive discussion with family about goals and wishes          How does this plan sound to you? I will do everything I can to help you through this    Patient verbalized understanding of the plan     I have spent >35minutes speaking with my patient on advanced care planning today or during this visit     Advanced directives  Five Wishes: Patient does not have Five Wishes- would not like information         Chelsea Wood MD

## 2023-02-11 NOTE — PROGRESS NOTES
[address]  Progress Note - Luan García 1945, 65 y o  female MRN: [id number]  Unit/Bed#: -01 Encounter: [id number]  Primary Care Provider: Maribel Green DO   Date and time admitted to hospital: 2/7/2023  2:48 PM    * Acute on chronic blood loss anemia  Assessment & Plan  Presents to ED with weakness, hemoptysis yesterday with large blood clot, Hemoccult positive as well  History of the same presentation  She received transfusion currently hemoglobin is stable  • Iron panel ordered anemia of chronic disease mild iron deficiency  • B12 ordered, was previously deficient and started on B12 supplement  • Blood consent obtained by ER, will re-consent with patient's daughter, placed in chart  • Transfuse if Hgb <7  o S/p prbc transfusion     So I had a discussion today with the patient in front of the daughter and the patient has agreed to undergo the prep and do an EGD colonoscopy tomorrow  Hemoglobin continues to improve continue to hold anticoagulation as discussed with the patient is clinically difficult to restart antiplatelet and Eliquis if no investigation is done of the GI system but she agreed she is on clears now I placed her n p o  after midnight I did place I discussed with GI that the patient and the daughter are in agreement and to place bowel prep  Status post EGD without any evidence of bleeding she is status post sigmoidoscopy difficult to evaluate but there was no active bleedingLarge external hemorrhoids-likely the source of her intermittent rectal bleeding other differential diagnosis includes mass from prior or diverticular bleeding or AVM unfortunately this likely would not be accessible via colonoscopy given her severe narrowing  • For which I did ask surgery to evaluate for hemorrhoidectomy we will have to discussed with the daughter as well as she does have an EF of 25% and high risk for any general anesthesia    Anusol suppositories have been started we will give a second dose of Venofer hemoglobin has been improving no blood per rectum    Lab Results   Component Value Date    HGB 8 4 (L) 02/11/2023    HGB 8 0 (L) 02/10/2023     · In the past patient has refused EGD/colonoscopy -is also COVID-positive on day 7  · Stool records at bedside, hold off on further occult stool testing (was positive in the ER)  ·       Asymptomatic bacteriuria  Assessment & Plan  · Greer obtained 2/7- no need for abx     Urinary retention  Assessment & Plan  · Only placed on 1/26 by her to discharge to the nursing home she still has a Greer catheter was post to have a voiding trial 7 t  · Greer removed 2/8- voiding well without retention    Hemoptysis  Assessment & Plan  · Reported episode of hemoptysis with large clot at nursing facility  · Chest x-ray without acute findings, seems improved from previous  · Had outpatient chest x-ray ordered for 2/13 secondary to hemoptysis  · Does have history of a pulmonary nodule 2 mm lower left quadrant noted on CT in September 2022, was not deemed significant and no follow-up recommended    · CTchest done without contrast reveals no pneumonia  · Pulmonary consult discussed with pulmonary could have been tracheobronchitis there is no recurrence  · Transfuse to keep hemoglobin greater than 7  · Unclear amount of blood was expelled as outpatient, continue to monitor closely  · No recurrence    Stage 3b chronic kidney disease [facility])  Assessment & Plan  Lab Results   Component Value Date    EGFR 23 02/11/2023    EGFR 21 02/09/2023    EGFR 21 02/08/2023    CREATININE 1 99 (H) 02/11/2023    CREATININE 2 14 (H) 02/09/2023    CREATININE 2 18 (H) 02/08/2023   Creatinine baseline is around 1 7  Recently during last hospitalization has been around 2 1, currently at near and not meeting criteria for DARRELL has relatively remained stable  Monitor closely  Avoid nephrotoxic agents, NSAIDs and hypotension  Renally dose medications  Improving    COVID-19 virus infection  Assessment & Plan  Background: During previous admission 1/31 - 2/3 incidentally tested positive for COVID, was asymptomatic and maintained on mild COVID pathway -remdesivir not given  • Mild COVID pathway as below   • Vaccinated x2  • COVID19- positive on 1/31   • Not needing supplemental oxygen  • Chest CT ordered no evidence of pneumonia  • CBC and CMP daily  • Right now holding AC due to acute anemia and possible bleed  • OOB TID; ambulation and mobilization strongly encouraged  • PT/OT consult and evaluation   • Self proning strongly encouraged  • Supportive care  • Patient's last day of isolation is 2/9  • Can be taken off isolation      Dementia [facility])  Assessment & Plan  She is at her baseline-alert and oriented  Delirium precautions  Consents need to be obtained via daughter    Paroxysmal atrial fibrillation [facility])  Assessment & Plan  EKG shows sinus rhythm  Rate control with metoprolol  Is currently on Eliquis for anticoagulation -on hold secondary to acute anemia    Chronic combined systolic and diastolic congestive heart failure (HCC)  Assessment & Plan  Wt Readings from Last 3 Encounters:   02/07/23 63 5 kg (139 lb 15 9 oz)   02/03/23 61 5 kg (135 lb 9 3 oz)   01/27/23 65 9 kg (145 lb 4 5 oz)       Currently euvolemic  Maintained on diuretic therapy as an outpatient  Previous echo done 1/19: EF decreased to 25% which was changed from previous study in September, severe global hypokinesis, new tricuspid regurg without other changes from previous study  Currently is on beta-blocker, not on ACE or ARB, on aspirin  Does have AICD  Consult cardiology discussed with cardiology no recommendation in terms of proceeding with ischemic evaluation at this time having ongoing anemia with GI bleed unknown if able to tolerate antiplatelets        CAD (coronary artery disease)  Assessment & Plan  Continue beta-blocker, hold aspirin due to anemia  Denies any chest pain  EKG was sinus rhythm, rate 69, complete LBBB which is chronic, no signs for ischemia  Data GI evaluation will be difficult to restart antiplatelets    Acquired hypothyroidism  Assessment & Plan  During hospitalization  -  had abnormal thyroid levels  Synthroid was adjusted to 175 micrograms  Was recommended to have repeat labs in 4 to 6 weeks    We will repeat labs during this admission improved with suggest repeat a TSH in another 2 weeks        VTE Pharmacologic Prophylaxis: VTE Score: 4 Moderate Risk (Score 3-4) - Pharmacological DVT Prophylaxis Contraindicated  Sequential Compression Devices Ordered  Patient Centered Rounds: I performed bedside rounds with nursing staff today  Discussions with Specialists or Other Care Team Provider: will discuss with surgery    Education and Discussions with Family / Patient: patient will update family   Time Spent for Care: 30 minutes  More than 50% of total time spent on counseling and coordination of care as described above  Current Length of Stay: 4 day(s)  Current Patient Status: Inpatient   Certification Statement: The patient will continue to require additional inpatient hospital stay due to due to gi bleed  Discharge Plan: Anticipate discharge in 48-72 hrs to rehab facility  Code Status: Level 3 - DNAR and DNI    Subjective:   Seen and examined no complaints    Objective:     Vitals:   Temp (24hrs), Av 6 °F (36 4 °C), Min:97 3 °F (36 3 °C), Max:98 1 °F (36 7 °C)    Temp:  [97 3 °F (36 3 °C)-98 1 °F (36 7 °C)] 97 5 °F (36 4 °C)  HR:  [50-67] 67  Resp:  [16-20] 16  BP: ()/(46-67) 142/60  SpO2:  [92 %-98 %] 96 %  Body mass index is 23 3 kg/m²  Input and Output Summary (last 24 hours): Intake/Output Summary (Last 24 hours) at 2023 1201  Last data filed at 2023 0820  Gross per 24 hour   Intake 600 ml   Output 69 ml   Net 531 ml       Physical Exam:   Physical Exam  Vitals and nursing note reviewed     Constitutional:       General: She is not in acute distress  Appearance: She is well-developed  HENT:      Head: Normocephalic and atraumatic  Eyes:      Conjunctiva/sclera: Conjunctivae normal    Cardiovascular:      Rate and Rhythm: Normal rate and regular rhythm  Heart sounds: No murmur heard  Pulmonary:      Effort: Pulmonary effort is normal  No respiratory distress  Breath sounds: Normal breath sounds  No wheezing or rales  Abdominal:      Palpations: Abdomen is soft  Tenderness: There is no abdominal tenderness  Musculoskeletal:         General: No swelling  Cervical back: Neck supple  Skin:     General: Skin is warm and dry  Capillary Refill: Capillary refill takes less than 2 seconds  Neurological:      Mental Status: She is alert and oriented to person, place, and time  Mental status is at baseline  Psychiatric:         Mood and Affect: Mood normal          Additional Data:     Labs:  Results from last 7 days   Lab Units 02/11/23  0643   WBC Thousand/uL 9 78   HEMOGLOBIN g/dL 8 4*   HEMATOCRIT % 28 7*   PLATELETS Thousands/uL 272   NEUTROS PCT % 81*   LYMPHS PCT % 10*   MONOS PCT % 6   EOS PCT % 1     Results from last 7 days   Lab Units 02/11/23  0643 02/09/23  0850 02/08/23  0422   SODIUM mmol/L 136   < > 137   POTASSIUM mmol/L 3 7   < > 4 6   CHLORIDE mmol/L 101   < > 101   CO2 mmol/L 26   < > 30   BUN mg/dL 52*   < > 66*   CREATININE mg/dL 1 99*   < > 2 18*   ANION GAP mmol/L 9   < > 6   CALCIUM mg/dL 8 5   < > 8 5   ALBUMIN g/dL  --   --  3 1*   TOTAL BILIRUBIN mg/dL  --   --  0 64   ALK PHOS U/L  --   --  95   ALT U/L  --   --  7   AST U/L  --   --  17   GLUCOSE RANDOM mg/dL 68   < > 85    < > = values in this interval not displayed       Results from last 7 days   Lab Units 02/07/23  1505   INR  1 79*                   Lines/Drains:  Invasive Devices     Peripheral Intravenous Line  Duration           Peripheral IV 02/11/23 Distal;Right;Ventral (anterior) Forearm <1 day                      Imaging: Reviewed radiology reports from this admission including: chest CT scan    Recent Cultures (last 7 days):   Results from last 7 days   Lab Units 02/07/23  1659   URINE CULTURE  >100,000 cfu/ml       Last 24 Hours Medication List:   Current Facility-Administered Medications   Medication Dose Route Frequency Provider Last Rate   • acetaminophen  650 mg Oral Q6H PRN Natacha Jo PA-C     • allopurinol  100 mg Oral Daily Natacha Jo PA-C     • amLODIPine  10 mg Oral Daily Natacha Jo PA-C     • cholecalciferol  1,000 Units Oral Daily Natacha Jo PA-C     • cyanocobalamin  1,000 mcg Oral Daily Natacha Jo PA-C     • docusate sodium  100 mg Oral BID Natacha Jo PA-C     • hydrocortisone  25 mg Rectal BID Carolina Pretty DO     • iron sucrose  300 mg Intravenous Once Debbie Vera MD     • levothyroxine  175 mcg Oral Early Morning Natacha Jo PA-C     • metoprolol succinate  100 mg Oral Daily Natacha Jo PA-C     • pantoprazole  40 mg Oral Early Morning Natacha Jo PA-C     • polyethylene glycol  17 g Oral Daily PRN Natacha Jo PA-C     • sertraline  100 mg Oral Daily Natacha Jo PA-C     • sodium bicarbonate  650 mg Oral TID after meals Natacha Jo PA-C     • tamsulosin  0 4 mg Oral Daily With Blu Edmondson PA-C          Today, Patient Was Seen By: Debbie Vera MD    **Please Note: This note may have been constructed using a voice recognition system  **

## 2023-02-11 NOTE — ASSESSMENT & PLAN NOTE
Presents to ED with weakness, hemoptysis yesterday with large blood clot, Hemoccult positive as well  History of the same presentation  She received transfusion currently hemoglobin is stable  • Iron panel ordered anemia of chronic disease mild iron deficiency  • B12 ordered, was previously deficient and started on B12 supplement  • Blood consent obtained by ER, will re-consent with patient's daughter, placed in chart  • Transfuse if Hgb <7  o S/p prbc transfusion     So I had a discussion today with the patient in front of the daughter and the patient has agreed to undergo the prep and do an EGD colonoscopy tomorrow  Hemoglobin continues to improve continue to hold anticoagulation as discussed with the patient is clinically difficult to restart antiplatelet and Eliquis if no investigation is done of the GI system but she agreed she is on clears now I placed her n p o  after midnight I did place I discussed with GI that the patient and the daughter are in agreement and to place bowel prep  Status post EGD without any evidence of bleeding she is status post sigmoidoscopy difficult to evaluate but there was no active bleedingLarge external hemorrhoids-likely the source of her intermittent rectal bleeding other differential diagnosis includes mass from prior or diverticular bleeding or AVM unfortunately this likely would not be accessible via colonoscopy given her severe narrowing  • For which I did ask surgery to evaluate for hemorrhoidectomy we will have to discussed with the daughter as well as she does have an EF of 25% and high risk for any general anesthesia    Anusol suppositories have been started we will give a second dose of Venofer hemoglobin has been improving no blood per rectum    Lab Results   Component Value Date    HGB 8 4 (L) 02/11/2023    HGB 8 0 (L) 02/10/2023     · In the past patient has refused EGD/colonoscopy -is also COVID-positive on day 7  · Stool records at bedside, hold off on further occult stool testing (was positive in the ER)  ·
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
## 2023-02-11 NOTE — PLAN OF CARE
Problem: Potential for Falls  Goal: Patient will remain free of falls  Description: INTERVENTIONS:  - Educate patient/family on patient safety including physical limitations  - Instruct patient to call for assistance with activity   - Consult OT/PT to assist with strengthening/mobility   - Keep Call bell within reach  - Keep bed low and locked with side rails adjusted as appropriate  - Keep care items and personal belongings within reach  - Initiate and maintain comfort rounds  - Make Fall Risk Sign visible to staff  - Offer Toileting every 2 Hours, in advance of need  - Initiate/Maintain bed/chair alarm  - Apply yellow socks and bracelet for high fall risk patients  - Consider moving patient to room near nurses station  Outcome: Progressing     Problem: Prexisting or High Potential for Compromised Skin Integrity  Goal: Skin integrity is maintained or improved  Description: INTERVENTIONS:  - Identify patients at risk for skin breakdown  - Assess and monitor skin integrity  - Assess and monitor nutrition and hydration status  - Monitor labs   - Assess for incontinence   - Turn and reposition patient  - Assist with mobility/ambulation  - Relieve pressure over bony prominences  - Avoid friction and shearing  - Provide appropriate hygiene as needed including keeping skin clean and dry  - Evaluate need for skin moisturizer/barrier cream  - Collaborate with interdisciplinary team   - Patient/family teaching  - Consider wound care consult   Outcome: Progressing     Problem: PAIN - ADULT  Goal: Verbalizes/displays adequate comfort level or baseline comfort level  Description: Interventions:  - Encourage patient to monitor pain and request assistance  - Assess pain using appropriate pain scale  - Administer analgesics based on type and severity of pain and evaluate response  - Implement non-pharmacological measures as appropriate and evaluate response  - Consider cultural and social influences on pain and pain management  - Notify physician/advanced practitioner if interventions unsuccessful or patient reports new pain  Outcome: Progressing     Problem: INFECTION - ADULT  Goal: Absence or prevention of progression during hospitalization  Description: INTERVENTIONS:  - Assess and monitor for signs and symptoms of infection  - Monitor lab/diagnostic results  - Monitor all insertion sites, i e  indwelling lines, tubes, and drains  - Monitor endotracheal if appropriate and nasal secretions for changes in amount and color  - [place] appropriate cooling/warming therapies per order  - Administer medications as ordered  - Instruct and encourage patient and family to use good hand hygiene technique  - Identify and instruct in appropriate isolation precautions for identified infection/condition  Outcome: Progressing     Problem: SAFETY ADULT  Goal: Patient will remain free of falls  Description: INTERVENTIONS:  - Educate patient/family on patient safety including physical limitations  - Instruct patient to call for assistance with activity   - Consult OT/PT to assist with strengthening/mobility   - Keep Call bell within reach  - Keep bed low and locked with side rails adjusted as appropriate  - Keep care items and personal belongings within reach  - Initiate and maintain comfort rounds  - Make Fall Risk Sign visible to staff  - Offer Toileting every 2 Hours, in advance of need  - Initiate/Maintain bed/chair alarm  - Apply yellow socks and bracelet for high fall risk patients  - Consider moving patient to room near nurses station  Outcome: Progressing  Goal: Maintain or return to baseline ADL function  Description: INTERVENTIONS:  -  Assess patient's ability to carry out ADLs; assess patient's baseline for ADL function and identify physical deficits which impact ability to perform ADLs (bathing, care of mouth/teeth, toileting, grooming, dressing, etc )  - Assess/evaluate cause of self-care deficits   - Assess range of motion  - Assess patient's mobility; develop plan if impaired  - Assess patient's need for assistive devices and provide as appropriate  - Encourage maximum independence but intervene and supervise when necessary  - Involve family in performance of ADLs  - Assess for home care needs following discharge   - Consider OT consult to assist with ADL evaluation and planning for discharge  - Provide patient education as appropriate  Outcome: Progressing  Goal: Maintains/Returns to pre admission functional level  Description: INTERVENTIONS:  - Perform BMAT or MOVE assessment daily    - Set and communicate daily mobility goal to care team and patient/family/caregiver  - Collaborate with rehabilitation services on mobility goals if consulted  - Out of bed for meals 3 times a day  - Out of bed for toileting  - Record patient progress and toleration of activity level   Outcome: Progressing     Problem: DISCHARGE PLANNING  Goal: Discharge to home or other facility with appropriate resources  Description: INTERVENTIONS:  - Identify barriers to discharge w/patient and caregiver  - Arrange for needed discharge resources and transportation as appropriate  - Identify discharge learning needs (meds, wound care, etc )  - Arrange for interpretive services to assist at discharge as needed  - Refer to Case Management Department for coordinating discharge planning if the patient needs post-hospital services based on physician/advanced practitioner order or complex needs related to functional status, cognitive ability, or social support system  Outcome: Progressing     Problem: Knowledge Deficit  Goal: Patient/family/caregiver demonstrates understanding of disease process, treatment plan, medications, and discharge instructions  Description: Complete learning assessment and assess knowledge base    Interventions:  - Provide teaching at level of understanding  - Provide teaching via preferred learning methods  Outcome: Progressing     Problem: Nutrition/Hydration-ADULT  Goal: Nutrient/Hydration intake appropriate for improving, restoring or maintaining nutritional needs  Description: Monitor and assess patient's nutrition/hydration status for malnutrition  Collaborate with interdisciplinary team and initiate plan and interventions as ordered  Monitor patient's weight and dietary intake as ordered or per policy  Utilize nutrition screening tool and intervene as necessary  Determine patient's food preferences and provide high-protein, high-caloric foods as appropriate       INTERVENTIONS:  - Monitor oral intake, urinary output, labs, and treatment plans  - Assess nutrition and hydration status and recommend course of action  - Evaluate amount of meals eaten  - Assist patient with eating if necessary   - Allow adequate time for meals  - Recommend/ encourage appropriate diets, oral nutritional supplements, and vitamin/mineral supplements  - Order, calculate, and assess calorie counts as needed  - Recommend, monitor, and adjust tube feedings and TPN/PPN based on assessed needs  - Assess need for intravenous fluids  - Provide specific nutrition/hydration education as appropriate  - Include patient/family/caregiver in decisions related to nutrition  Outcome: Progressing

## 2023-02-12 LAB
ABO GROUP BLD: NORMAL
BLD GP AB SCN SERPL QL: POSITIVE
HCT VFR BLD AUTO: 24.7 % (ref 34.8–46.1)
HGB BLD-MCNC: 7.5 G/DL (ref 11.5–15.4)
RH BLD: POSITIVE
SPECIMEN EXPIRATION DATE: NORMAL

## 2023-02-12 PROCEDURE — 30233N1 TRANSFUSION OF NONAUTOLOGOUS RED BLOOD CELLS INTO PERIPHERAL VEIN, PERCUTANEOUS APPROACH: ICD-10-PCS | Performed by: FAMILY MEDICINE

## 2023-02-12 RX ORDER — FUROSEMIDE 10 MG/ML
20 INJECTION INTRAMUSCULAR; INTRAVENOUS ONCE
Status: COMPLETED | OUTPATIENT
Start: 2023-02-12 | End: 2023-02-12

## 2023-02-12 RX ADMIN — DOCUSATE SODIUM 100 MG: 100 CAPSULE ORAL at 08:33

## 2023-02-12 RX ADMIN — FUROSEMIDE 20 MG: 10 INJECTION, SOLUTION INTRAMUSCULAR; INTRAVENOUS at 13:47

## 2023-02-12 RX ADMIN — HYDROCORTISONE ACETATE 25 MG: 25 SUPPOSITORY RECTAL at 17:31

## 2023-02-12 RX ADMIN — HYDROCORTISONE ACETATE 25 MG: 25 SUPPOSITORY RECTAL at 08:36

## 2023-02-12 RX ADMIN — SODIUM BICARBONATE 650 MG TABLET 650 MG: at 12:11

## 2023-02-12 RX ADMIN — ALLOPURINOL 100 MG: 100 TABLET ORAL at 08:33

## 2023-02-12 RX ADMIN — TAMSULOSIN HYDROCHLORIDE 0.4 MG: 0.4 CAPSULE ORAL at 17:31

## 2023-02-12 RX ADMIN — LEVOTHYROXINE SODIUM 175 MCG: 175 TABLET ORAL at 06:35

## 2023-02-12 RX ADMIN — METOPROLOL SUCCINATE 100 MG: 100 TABLET, EXTENDED RELEASE ORAL at 08:33

## 2023-02-12 RX ADMIN — Medication 1000 UNITS: at 08:33

## 2023-02-12 RX ADMIN — CYANOCOBALAMIN TAB 500 MCG 1000 MCG: 500 TAB at 08:33

## 2023-02-12 RX ADMIN — DOCUSATE SODIUM 100 MG: 100 CAPSULE ORAL at 17:31

## 2023-02-12 RX ADMIN — PANTOPRAZOLE SODIUM 40 MG: 40 TABLET, DELAYED RELEASE ORAL at 06:35

## 2023-02-12 RX ADMIN — SERTRALINE HYDROCHLORIDE 100 MG: 100 TABLET ORAL at 08:33

## 2023-02-12 RX ADMIN — AMLODIPINE BESYLATE 10 MG: 10 TABLET ORAL at 08:33

## 2023-02-12 RX ADMIN — SODIUM BICARBONATE 650 MG TABLET 650 MG: at 17:31

## 2023-02-12 RX ADMIN — SODIUM BICARBONATE 650 MG TABLET 650 MG: at 08:33

## 2023-02-12 NOTE — PLAN OF CARE
Problem: Potential for Falls  Goal: Patient will remain free of falls  Description: INTERVENTIONS:  - Educate patient/family on patient safety including physical limitations  - Instruct patient to call for assistance with activity   - Consult OT/PT to assist with strengthening/mobility   - Keep Call bell within reach  - Keep bed low and locked with side rails adjusted as appropriate  - Keep care items and personal belongings within reach  - Initiate and maintain comfort rounds  - Make Fall Risk Sign visible to staff  - Offer Toileting every   Hours, in advance of need  - Initiate/Maintain   alarm  - Obtain necessary fall risk management equipment:     - Apply yellow socks and bracelet for high fall risk patients  - Consider moving patient to room near nurses station  Outcome: Progressing     Problem: Prexisting or High Potential for Compromised Skin Integrity  Goal: Skin integrity is maintained or improved  Description: INTERVENTIONS:  - Identify patients at risk for skin breakdown  - Assess and monitor skin integrity  - Assess and monitor nutrition and hydration status  - Monitor labs   - Assess for incontinence   - Turn and reposition patient  - Assist with mobility/ambulation  - Relieve pressure over bony prominences  - Avoid friction and shearing  - Provide appropriate hygiene as needed including keeping skin clean and dry  - Evaluate need for skin moisturizer/barrier cream  - Collaborate with interdisciplinary team   - Patient/family teaching  - Consider wound care consult   Outcome: Progressing     Problem: PAIN - ADULT  Goal: Verbalizes/displays adequate comfort level or baseline comfort level  Description: Interventions:  - Encourage patient to monitor pain and request assistance  - Assess pain using appropriate pain scale  - Administer analgesics based on type and severity of pain and evaluate response  - Implement non-pharmacological measures as appropriate and evaluate response  - Consider cultural and social influences on pain and pain management  - Notify physician/advanced practitioner if interventions unsuccessful or patient reports new pain  Outcome: Progressing     Problem: INFECTION - ADULT  Goal: Absence or prevention of progression during hospitalization  Description: INTERVENTIONS:  - Assess and monitor for signs and symptoms of infection  - Monitor lab/diagnostic results  - Monitor all insertion sites, i e  indwelling lines, tubes, and drains  - Monitor endotracheal if appropriate and nasal secretions for changes in amount and color  - [place] appropriate cooling/warming therapies per order  - Administer medications as ordered  - Instruct and encourage patient and family to use good hand hygiene technique  - Identify and instruct in appropriate isolation precautions for identified infection/condition  Outcome: Progressing     Problem: SAFETY ADULT  Goal: Patient will remain free of falls  Description: INTERVENTIONS:  - Educate patient/family on patient safety including physical limitations  - Instruct patient to call for assistance with activity   - Consult OT/PT to assist with strengthening/mobility   - Keep Call bell within reach  - Keep bed low and locked with side rails adjusted as appropriate  - Keep care items and personal belongings within reach  - Initiate and maintain comfort rounds  - Make Fall Risk Sign visible to staff  - Offer Toileting every   Hours, in advance of need  - Initiate/Maintain   alarm  - Obtain necessary fall risk management equipment:     - Apply yellow socks and bracelet for high fall risk patients  - Consider moving patient to room near nurses station  Outcome: Progressing  Goal: Maintain or return to baseline ADL function  Description: INTERVENTIONS:  -  Assess patient's ability to carry out ADLs; assess patient's baseline for ADL function and identify physical deficits which impact ability to perform ADLs (bathing, care of mouth/teeth, toileting, grooming, dressing, etc )  - Assess/evaluate cause of self-care deficits   - Assess range of motion  - Assess patient's mobility; develop plan if impaired  - Assess patient's need for assistive devices and provide as appropriate  - Encourage maximum independence but intervene and supervise when necessary  - Involve family in performance of ADLs  - Assess for home care needs following discharge   - Consider OT consult to assist with ADL evaluation and planning for discharge  - Provide patient education as appropriate  Outcome: Progressing  Goal: Maintains/Returns to pre admission functional level  Description: INTERVENTIONS:  - Perform BMAT or MOVE assessment daily    - Set and communicate daily mobility goal to care team and patient/family/caregiver  - Collaborate with rehabilitation services on mobility goals if consulted  - Perform Range of Motion   times a day  - Reposition patient every   hours  - Dangle patient     Tamia Blight Tamia Blight Tamia Blight times a day  - Stand patient   times a day  - Ambulate patient   times a day  - Out of bed to chair   times a day   - Out of bed for meals    times a day  - Out of bed for toileting  - Record patient progress and toleration of activity level   Outcome: Progressing

## 2023-02-12 NOTE — PROGRESS NOTES
[address]  Progress Note - Altamese Maujonathan 1945, 65 y o  female MRN: [id number]  Unit/Bed#: -[facility] Encounter: [id number]  Primary Care Provider: Carlos Esquivel DO   Date and time admitted to hospital: 2/7/2023  2:48 PM    * Acute on chronic blood loss anemia  Assessment & Plan  Presents to ED with weakness, hemoptysis yesterday with large blood clot, Hemoccult positive as well  History of the same presentation  She received transfusion currently hemoglobin is stable  • Iron panel ordered anemia of chronic disease mild iron deficiency  • B12 ordered, was previously deficient and started on B12 supplement  • Blood consent obtained by ER, will re-consent with patient's daughter, placed in chart  • Transfuse if Hgb <8  o S/p prbc transfusion     · So I had a discussion today with the patient in front of the daughter and the patient has agreed to undergo the prep and do an EGD colonoscopy tomorrow  Status post EGD without any evidence of bleeding she is status post sigmoidoscopy difficult to evaluate but there was no active bleedingLarge external hemorrhoids-likely the source of her intermittent rectal bleeding other differential diagnosis includes mass from prior or diverticular bleeding or AVM unfortunately this likely would not be accessible via colonoscopy given her severe narrowing  • For which I did ask surgery to evaluate for hemorrhoidectomy we will have to discussed with the daughter as well as she does have an EF of 25% and high risk for any general anesthesia    Anusol suppositories have been started     Lab Results   Component Value Date    HGB 7 5 (L) 02/12/2023    HGB 8 4 (L) 02/11/2023     · In the past patient has refused EGD/colonoscopy -is also COVID-positive on day 7  · Stool records at bedside, hold off on further occult stool testing (was positive in the ER)  · S/p 2 doses of venofer - no gross bleed reported   · Discussed with daughter risk of death during surgery reported as high risk with ef 25 % and general   daughter had agreed to surgery - gs and anesthisia to discuss with daughter again as risk to high   · Hb dropped again will transfuse 1 unit with lasix post   · If no surgery to avoid reccurent anemia /bleed asa and ac have to be held and not restarted post decision will discuss this aspect with daughter again       Asymptomatic bacteriuria  Assessment & Plan  · Greer obtained 2/7- no need for abx     Urinary retention  Assessment & Plan  · Only placed on 1/26 by her to discharge to the nursing home she still has a Greer catheter was post to have a voiding trial 7 t  · Greer removed 2/8- voiding well without retention    Hemoptysis  Assessment & Plan  · Reported episode of hemoptysis with large clot at nursing facility  · Chest x-ray without acute findings, seems improved from previous  · Had outpatient chest x-ray ordered for 2/13 secondary to hemoptysis  · Does have history of a pulmonary nodule 2 mm lower left quadrant noted on CT in September 2022, was not deemed significant and no follow-up recommended    · CTchest done without contrast reveals no pneumonia  · Pulmonary consult discussed with pulmonary could have been tracheobronchitis there is no recurrence  · Transfuse to keep hemoglobin greater than 7  · Unclear amount of blood was expelled as outpatient, continue to monitor closely  · No recurrence    Stage 3b chronic kidney disease [facility])  Assessment & Plan  Lab Results   Component Value Date    EGFR 23 02/11/2023    EGFR 21 02/09/2023    EGFR 21 02/08/2023    CREATININE 1 99 (H) 02/11/2023    CREATININE 2 14 (H) 02/09/2023    CREATININE 2 18 (H) 02/08/2023   Creatinine baseline is around 1 7  Recently during last hospitalization has been around 2 1, currently at near and not meeting criteria for DARRELL has relatively remained stable  Monitor closely  Avoid nephrotoxic agents, NSAIDs and hypotension  Renally dose medications  Improving    COVID-19 virus infection  Assessment & Plan  Background: During previous admission 1/31 - 2/3 incidentally tested positive for COVID, was asymptomatic and maintained on mild COVID pathway -remdesivir not given  • Mild COVID pathway as below   • Vaccinated x2  • COVID19- positive on 1/31   • Not needing supplemental oxygen  • Chest CT ordered no evidence of pneumonia  • CBC and CMP daily  • Right now holding AC due to acute anemia and possible bleed  • OOB TID; ambulation and mobilization strongly encouraged  • PT/OT consult and evaluation   • Self proning strongly encouraged  • Supportive care  • Patient's last day of isolation is 2/9  • Can be taken off isolation      Dementia [facility])  Assessment & Plan  She is at her baseline-alert and oriented  Delirium precautions  Consents need to be obtained via daughter    Paroxysmal atrial fibrillation [facility])  Assessment & Plan  EKG shows sinus rhythm  Rate control with metoprolol  Is currently on Eliquis for anticoagulation -on hold secondary to acute anemia    Chronic combined systolic and diastolic congestive heart failure (HCC)  Assessment & Plan  Wt Readings from Last 3 Encounters:   02/07/23 63 5 kg (139 lb 15 9 oz)   02/03/23 61 5 kg (135 lb 9 3 oz)   01/27/23 65 9 kg (145 lb 4 5 oz)       Currently euvolemic  Maintained on diuretic therapy as an outpatient  Previous echo done 1/19: EF decreased to 25% which was changed from previous study in September, severe global hypokinesis, new tricuspid regurg without other changes from previous study  Currently is on beta-blocker, not on ACE or ARB, on aspirin  Does have AICD  Consult cardiology discussed with cardiology no recommendation in terms of proceeding with ischemic evaluation at this time having ongoing anemia with GI bleed unknown if able to tolerate antiplatelets        CAD (coronary artery disease)  Assessment & Plan  Continue beta-blocker, hold aspirin due to anemia  Denies any chest pain  EKG was sinus rhythm, rate 69, complete LBBB which is chronic, no signs for ischemia  No asa as recurent blood loss anemia     Acquired hypothyroidism  Assessment & Plan  During hospitalization  -  had abnormal thyroid levels  Synthroid was adjusted to 175 micrograms  Was recommended to have repeat labs in 4 to 6 weeks    We will repeat labs during this admission improved with suggest repeat a TSH in another 2 weeks        VTE Pharmacologic Prophylaxis: VTE Score: 4 Moderate Risk (Score 3-4) - Pharmacological DVT Prophylaxis Contraindicated  Sequential Compression Devices Ordered  Patient Centered Rounds: I performed bedside rounds with nursing staff today  Discussions with Specialists or Other Care Team Provider: andrey    Education and Discussions with Family / Patient: patient  Time Spent for Care: 30 minutes  More than 50% of total time spent on counseling and coordination of care as described above  Current Length of Stay: 5 day(s)  Current Patient Status: Inpatient   Certification Statement: The patient will continue to require additional inpatient hospital stay due to abla  Discharge Plan: Anticipate discharge in 48-72 hrs to rehab facility  Code Status: Level 3 - DNAR and DNI    Subjective:   Seen and examined no complaints    Objective:     Vitals:   Temp (24hrs), Av 3 °F (36 3 °C), Min:97 °F (36 1 °C), Max:97 5 °F (36 4 °C)    Temp:  [97 °F (36 1 °C)-97 5 °F (36 4 °C)] 97 °F (36 1 °C)  HR:  [59-68] 59  Resp:  [17-18] 18  BP: (135-150)/(57-71) 138/57  SpO2:  [95 %-97 %] 96 %  Body mass index is 23 3 kg/m²  Input and Output Summary (last 24 hours): Intake/Output Summary (Last 24 hours) at 2023 1110  Last data filed at 2023 1741  Gross per 24 hour   Intake 180 ml   Output 132 ml   Net 48 ml       Physical Exam:   Physical Exam  Vitals and nursing note reviewed  Constitutional:       General: She is not in acute distress  Appearance: She is well-developed     HENT:      Head: Normocephalic and atraumatic  Eyes:      Conjunctiva/sclera: Conjunctivae normal    Cardiovascular:      Rate and Rhythm: Normal rate and regular rhythm  Heart sounds: No murmur heard  Pulmonary:      Effort: Pulmonary effort is normal  No respiratory distress  Breath sounds: Normal breath sounds  No wheezing or rales  Abdominal:      General: There is no distension  Palpations: Abdomen is soft  Tenderness: There is no abdominal tenderness  Musculoskeletal:         General: No swelling  Cervical back: Neck supple  Skin:     General: Skin is warm and dry  Capillary Refill: Capillary refill takes less than 2 seconds  Neurological:      Mental Status: She is alert  Mental status is at baseline  Psychiatric:         Mood and Affect: Mood normal           Additional Data:     Labs:  Results from last 7 days   Lab Units 02/12/23  0452 02/11/23  0643   WBC Thousand/uL  --  9 78   HEMOGLOBIN g/dL 7 5* 8 4*   HEMATOCRIT % 24 7* 28 7*   PLATELETS Thousands/uL  --  272   NEUTROS PCT %  --  81*   LYMPHS PCT %  --  10*   MONOS PCT %  --  6   EOS PCT %  --  1     Results from last 7 days   Lab Units 02/11/23  0643 02/09/23  0850 02/08/23  0422   SODIUM mmol/L 136   < > 137   POTASSIUM mmol/L 3 7   < > 4 6   CHLORIDE mmol/L 101   < > 101   CO2 mmol/L 26   < > 30   BUN mg/dL 52*   < > 66*   CREATININE mg/dL 1 99*   < > 2 18*   ANION GAP mmol/L 9   < > 6   CALCIUM mg/dL 8 5   < > 8 5   ALBUMIN g/dL  --   --  3 1*   TOTAL BILIRUBIN mg/dL  --   --  0 64   ALK PHOS U/L  --   --  95   ALT U/L  --   --  7   AST U/L  --   --  17   GLUCOSE RANDOM mg/dL 68   < > 85    < > = values in this interval not displayed       Results from last 7 days   Lab Units 02/07/23  1505   INR  1 79*                   Lines/Drains:  Invasive Devices     Peripheral Intravenous Line  Duration           Peripheral IV 02/11/23 Distal;Right;Ventral (anterior) Forearm 1 day                      Imaging: Reviewed radiology reports from this admission including: abdominal/pelvic CT    Recent Cultures (last 7 days):   Results from last 7 days   Lab Units 02/07/23  1659   URINE CULTURE  >100,000 cfu/ml       Last 24 Hours Medication List:   Current Facility-Administered Medications   Medication Dose Route Frequency Provider Last Rate   • acetaminophen  650 mg Oral Q6H PRN Doron Askew PA-C     • allopurinol  100 mg Oral Daily Doron Askew PA-C     • amLODIPine  10 mg Oral Daily Doron Askew PA-C     • cholecalciferol  1,000 Units Oral Daily Doron Askew PA-C     • cyanocobalamin  1,000 mcg Oral Daily Doron Askew PA-C     • docusate sodium  100 mg Oral BID Doron Askew PA-C     • furosemide  20 mg Intravenous Once Tamia Donohue MD     • hydrocortisone  25 mg Rectal BID Sade Guillermo DO     • levothyroxine  175 mcg Oral Early Morning Doron Askew PA-C     • metoprolol succinate  100 mg Oral Daily Doron Askew PA-C     • pantoprazole  40 mg Oral Early Morning Doron Askew PA-C     • polyethylene glycol  17 g Oral Daily PRN Doron Askew PA-C     • sertraline  100 mg Oral Daily Doron Askew PA-C     • sodium bicarbonate  650 mg Oral TID after meals Doron Askew PA-C     • tamsulosin  0 4 mg Oral Daily With Flavia Chavarria PA-C          Today, Patient Was Seen By: Tamia Donohue MD    **Please Note: This note may have been constructed using a voice recognition system  **

## 2023-02-12 NOTE — ASSESSMENT & PLAN NOTE
Presents to ED with weakness, hemoptysis yesterday with large blood clot, Hemoccult positive as well  History of the same presentation  She received transfusion currently hemoglobin is stable  • Iron panel ordered anemia of chronic disease mild iron deficiency  • B12 ordered, was previously deficient and started on B12 supplement  • Blood consent obtained by ER, will re-consent with patient's daughter, placed in chart  • Transfuse if Hgb <8  o S/p prbc transfusion     · So I had a discussion today with the patient in front of the daughter and the patient has agreed to undergo the prep and do an EGD colonoscopy tomorrow  Status post EGD without any evidence of bleeding she is status post sigmoidoscopy difficult to evaluate but there was no active bleedingLarge external hemorrhoids-likely the source of her intermittent rectal bleeding other differential diagnosis includes mass from prior or diverticular bleeding or AVM unfortunately this likely would not be accessible via colonoscopy given her severe narrowing  • For which I did ask surgery to evaluate for hemorrhoidectomy we will have to discussed with the daughter as well as she does have an EF of 25% and high risk for any general anesthesia    Anusol suppositories have been started     Lab Results   Component Value Date    HGB 7 5 (L) 02/12/2023    HGB 8 4 (L) 02/11/2023     · In the past patient has refused EGD/colonoscopy -is also COVID-positive on day 7  · Stool records at bedside, hold off on further occult stool testing (was positive in the ER)  · S/p 2 doses of venofer - no gross bleed reported   · Discussed with daughter risk of death during surgery reported as high risk with ef 25 % and general   daughter had agreed to surgery - gs and anesthisia to discuss with daughter again as risk to high   · Hb dropped again will transfuse 1 unit with lasix post   · If no surgery to avoid reccurent anemia /bleed asa and ac have to be held and not restarted post decision will discuss this aspect with daughter again

## 2023-02-12 NOTE — PLAN OF CARE
Problem: Potential for Falls  Goal: Patient will remain free of falls  Description: INTERVENTIONS:  - Educate patient/family on patient safety including physical limitations  - Instruct patient to call for assistance with activity   - Consult OT/PT to assist with strengthening/mobility   - Keep Call bell within reach  - Keep bed low and locked with side rails adjusted as appropriate  - Keep care items and personal belongings within reach  - Initiate and maintain comfort rounds  - Make Fall Risk Sign visible to staff  - Offer Toileting every  Hours, in advance of need  - Initiate/Maintain alarm  - Obtain necessary fall risk management equipment:   - Apply yellow socks and bracelet for high fall risk patients  - Consider moving patient to room near nurses station  Outcome: Progressing     Problem: Prexisting or High Potential for Compromised Skin Integrity  Goal: Skin integrity is maintained or improved  Description: INTERVENTIONS:  - Identify patients at risk for skin breakdown  - Assess and monitor skin integrity  - Assess and monitor nutrition and hydration status  - Monitor labs   - Assess for incontinence   - Turn and reposition patient  - Assist with mobility/ambulation  - Relieve pressure over bony prominences  - Avoid friction and shearing  - Provide appropriate hygiene as needed including keeping skin clean and dry  - Evaluate need for skin moisturizer/barrier cream  - Collaborate with interdisciplinary team   - Patient/family teaching  - Consider wound care consult   Outcome: Progressing     Problem: PAIN - ADULT  Goal: Verbalizes/displays adequate comfort level or baseline comfort level  Description: Interventions:  - Encourage patient to monitor pain and request assistance  - Assess pain using appropriate pain scale  - Administer analgesics based on type and severity of pain and evaluate response  - Implement non-pharmacological measures as appropriate and evaluate response  - Consider cultural and social influences on pain and pain management  - Notify physician/advanced practitioner if interventions unsuccessful or patient reports new pain  Outcome: Progressing     Problem: INFECTION - ADULT  Goal: Absence or prevention of progression during hospitalization  Description: INTERVENTIONS:  - Assess and monitor for signs and symptoms of infection  - Monitor lab/diagnostic results  - Monitor all insertion sites, i e  indwelling lines, tubes, and drains  - Monitor endotracheal if appropriate and nasal secretions for changes in amount and color  - [place] appropriate cooling/warming therapies per order  - Administer medications as ordered  - Instruct and encourage patient and family to use good hand hygiene technique  - Identify and instruct in appropriate isolation precautions for identified infection/condition  Outcome: Progressing     Problem: SAFETY ADULT  Goal: Patient will remain free of falls  Description: INTERVENTIONS:  - Educate patient/family on patient safety including physical limitations  - Instruct patient to call for assistance with activity   - Consult OT/PT to assist with strengthening/mobility   - Keep Call bell within reach  - Keep bed low and locked with side rails adjusted as appropriate  - Keep care items and personal belongings within reach  - Initiate and maintain comfort rounds  - Make Fall Risk Sign visible to staff  - Offer Toileting every  Hours, in advance of need  - Initiate/Maintain alarm  - Obtain necessary fall risk management equipment:   - Apply yellow socks and bracelet for high fall risk patients  - Consider moving patient to room near nurses station  Outcome: Progressing  Goal: Maintain or return to baseline ADL function  Description: INTERVENTIONS:  -  Assess patient's ability to carry out ADLs; assess patient's baseline for ADL function and identify physical deficits which impact ability to perform ADLs (bathing, care of mouth/teeth, toileting, grooming, dressing, etc )  - Assess/evaluate cause of self-care deficits   - Assess range of motion  - Assess patient's mobility; develop plan if impaired  - Assess patient's need for assistive devices and provide as appropriate  - Encourage maximum independence but intervene and supervise when necessary  - Involve family in performance of ADLs  - Assess for home care needs following discharge   - Consider OT consult to assist with ADL evaluation and planning for discharge  - Provide patient education as appropriate  Outcome: Progressing  Goal: Maintains/Returns to pre admission functional level  Description: INTERVENTIONS:  - Perform BMAT or MOVE assessment daily    - Set and communicate daily mobility goal to care team and patient/family/caregiver  - Collaborate with rehabilitation services on mobility goals if consulted  - Perform Range of Motion  times a day  - Reposition patient every  hours    - Dangle patient  times a day  - Stand patient  times a day  - Ambulate patient  times a day  - Out of bed to chair  times a day   - Out of bed for meals  times a day  - Out of bed for toileting  - Record patient progress and toleration of activity level   Outcome: Progressing     Problem: DISCHARGE PLANNING  Goal: Discharge to home or other facility with appropriate resources  Description: INTERVENTIONS:  - Identify barriers to discharge w/patient and caregiver  - Arrange for needed discharge resources and transportation as appropriate  - Identify discharge learning needs (meds, wound care, etc )  - Arrange for interpretive services to assist at discharge as needed  - Refer to Case Management Department for coordinating discharge planning if the patient needs post-hospital services based on physician/advanced practitioner order or complex needs related to functional status, cognitive ability, or social support system  Outcome: Progressing     Problem: Knowledge Deficit  Goal: Patient/family/caregiver demonstrates understanding of disease process, treatment plan, medications, and discharge instructions  Description: Complete learning assessment and assess knowledge base  Interventions:  - Provide teaching at level of understanding  - Provide teaching via preferred learning methods  Outcome: Progressing     Problem: MOBILITY - ADULT  Goal: Maintain or return to baseline ADL function  Description: INTERVENTIONS:  -  Assess patient's ability to carry out ADLs; assess patient's baseline for ADL function and identify physical deficits which impact ability to perform ADLs (bathing, care of mouth/teeth, toileting, grooming, dressing, etc )  - Assess/evaluate cause of self-care deficits   - Assess range of motion  - Assess patient's mobility; develop plan if impaired  - Assess patient's need for assistive devices and provide as appropriate  - Encourage maximum independence but intervene and supervise when necessary  - Involve family in performance of ADLs  - Assess for home care needs following discharge   - Consider OT consult to assist with ADL evaluation and planning for discharge  - Provide patient education as appropriate  Outcome: Progressing  Goal: Maintains/Returns to pre admission functional level  Description: INTERVENTIONS:  - Perform BMAT or MOVE assessment daily    - Set and communicate daily mobility goal to care team and patient/family/caregiver  - Collaborate with rehabilitation services on mobility goals if consulted  - Perform Range of Motion  times a day  - Reposition patient every  hours    - Dangle patient  times a day  - Stand patient  times a day  - Ambulate patient  times a day  - Out of bed to chair  times a day   - Out of bed for meal times a day  - Out of bed for toileting  - Record patient progress and toleration of activity level   Outcome: Progressing     Problem: Nutrition/Hydration-ADULT  Goal: Nutrient/Hydration intake appropriate for improving, restoring or maintaining nutritional needs  Description: Monitor and assess patient's nutrition/hydration status for malnutrition  Collaborate with interdisciplinary team and initiate plan and interventions as ordered  Monitor patient's weight and dietary intake as ordered or per policy  Utilize nutrition screening tool and intervene as necessary  Determine patient's food preferences and provide high-protein, high-caloric foods as appropriate       INTERVENTIONS:  - Monitor oral intake, urinary output, labs, and treatment plans  - Assess nutrition and hydration status and recommend course of action  - Evaluate amount of meals eaten  - Assist patient with eating if necessary   - Allow adequate time for meals  - Recommend/ encourage appropriate diets, oral nutritional supplements, and vitamin/mineral supplements  - Order, calculate, and assess calorie counts as needed  - Recommend, monitor, and adjust tube feedings and TPN/PPN based on assessed needs  - Assess need for intravenous fluids  - Provide specific nutrition/hydration education as appropriate  - Include patient/family/caregiver in decisions related to nutrition  Outcome: Progressing

## 2023-02-12 NOTE — PROGRESS NOTES
Progress Note - General Surgery   Taiwo Gomez 65 y o  female MRN: [id number]  Unit/Bed#: -01 Encounter: [id number]    Assessment:  65-year-old female who presents with rectal bleeding  The patient does have enlarged external and internal hemorrhoids without sign of active bleeding on my exam   Per the chart, the patient's previous bowel movements were black in color  This would indicate a more proximal GI bleed  Unfortunately, the proximal colon is not able to be evaluated with colonoscopy  The patient is high risk for any sort of surgical intervention due to CHF with an EF of 25%       Plan:  Due to the patient's stool being black in color as documented, I am concerned that the source of GI bleeding is not the hemorrhoids alone  Due to the patient's high surgical risk, any procedure should only be done if absolutely necessary  Will discuss with gastroenterology tomorrow if there is any other possible option to evaluate the right colon  I discussed these findings with the patient's daughter  She also does not want to consent to any surgical procedures unless absolutely necessary  I discussed with the patient's daughter the decision will likely come down to the risk of CVA while holding Eliquis versus risk of surgical intervention  Continue Anusol suppositories  Continue to hold Eliquis at this time    Subjective/Objective     Subjective: The patient denies any abdominal pain  Per chart review, the patient has not had any further bloody bowel movements    Objective:     Blood pressure 138/57, pulse 59, temperature (!) 97 °F (36 1 °C), resp  rate 18, height 5' 5" (1 651 m), weight 63 5 kg (139 lb 15 9 oz), SpO2 96 %  ,Body mass index is 23 3 kg/m²        Intake/Output Summary (Last 24 hours) at 2/12/2023 1116  Last data filed at 2/11/2023 1741  Gross per 24 hour   Intake 180 ml   Output 132 ml   Net 48 ml       Invasive Devices     Peripheral Intravenous Line  Duration           Peripheral IV 02/11/23 Distal;Right;Ventral (anterior) Forearm 1 day                Physical Exam: General appearance: alert and no distress  Head: Normocephalic, without obvious abnormality, atraumatic  Abdomen: soft, non-tender; bowel sounds normal; no masses,  no organomegaly  Extremities: extremities normal, warm and well-perfused; no cyanosis, clubbing, or edema    Lab, Imaging and other studies:  CBC:   Lab Results   Component Value Date    HGB 7 5 (L) 02/12/2023    HCT 24 7 (L) 02/12/2023   , CMP: No results found for: SODIUM, K, CL, CO2, ANIONGAP, BUN, CREATININE, GLUCOSE, CALCIUM, AST, ALT, ALKPHOS, PROT, BILITOT, EGFR  VTE Pharmacologic Prophylaxis: Reason for no pharmacologic prophylaxis Bleeding  VTE Mechanical Prophylaxis: sequential compression device

## 2023-02-12 NOTE — ASSESSMENT & PLAN NOTE
Continue beta-blocker, hold aspirin due to anemia  Denies any chest pain  EKG was sinus rhythm, rate 69, complete LBBB which is chronic, no signs for ischemia  No asa as recurent blood loss anemia

## 2023-02-13 VITALS
RESPIRATION RATE: 18 BRPM | DIASTOLIC BLOOD PRESSURE: 73 MMHG | TEMPERATURE: 97.5 F | WEIGHT: 139.99 LBS | SYSTOLIC BLOOD PRESSURE: 137 MMHG | HEART RATE: 63 BPM | HEIGHT: 65 IN | BODY MASS INDEX: 23.32 KG/M2 | OXYGEN SATURATION: 97 %

## 2023-02-13 LAB
ABO GROUP BLD BPU: NORMAL
BASOPHILS # BLD AUTO: 0.05 THOUSANDS/ÂΜL (ref 0–0.1)
BASOPHILS NFR BLD AUTO: 1 % (ref 0–1)
BPU ID: NORMAL
CROSSMATCH: NORMAL
EOSINOPHIL # BLD AUTO: 0.09 THOUSAND/ÂΜL (ref 0–0.61)
EOSINOPHIL NFR BLD AUTO: 1 % (ref 0–6)
ERYTHROCYTE [DISTWIDTH] IN BLOOD BY AUTOMATED COUNT: 18.1 % (ref 11.6–15.1)
HCT VFR BLD AUTO: 33.9 % (ref 34.8–46.1)
HGB BLD-MCNC: 10.2 G/DL (ref 11.5–15.4)
IMM GRANULOCYTES # BLD AUTO: 0.03 THOUSAND/UL (ref 0–0.2)
IMM GRANULOCYTES NFR BLD AUTO: 0 % (ref 0–2)
LYMPHOCYTES # BLD AUTO: 0.86 THOUSANDS/ÂΜL (ref 0.6–4.47)
LYMPHOCYTES NFR BLD AUTO: 11 % (ref 14–44)
MCH RBC QN AUTO: 27.7 PG (ref 26.8–34.3)
MCHC RBC AUTO-ENTMCNC: 30.1 G/DL (ref 31.4–37.4)
MCV RBC AUTO: 92 FL (ref 82–98)
MONOCYTES # BLD AUTO: 0.47 THOUSAND/ÂΜL (ref 0.17–1.22)
MONOCYTES NFR BLD AUTO: 6 % (ref 4–12)
NEUTROPHILS # BLD AUTO: 6.25 THOUSANDS/ÂΜL (ref 1.85–7.62)
NEUTS SEG NFR BLD AUTO: 81 % (ref 43–75)
NRBC BLD AUTO-RTO: 0 /100 WBCS
PLATELET # BLD AUTO: 255 THOUSANDS/UL (ref 149–390)
PMV BLD AUTO: 10.4 FL (ref 8.9–12.7)
RBC # BLD AUTO: 3.68 MILLION/UL (ref 3.81–5.12)
UNIT DISPENSE STATUS: NORMAL
UNIT PRODUCT CODE: NORMAL
UNIT PRODUCT VOLUME: 350 ML
UNIT RH: NORMAL
WBC # BLD AUTO: 7.75 THOUSAND/UL (ref 4.31–10.16)

## 2023-02-13 RX ORDER — SPIRONOLACTONE 25 MG/1
TABLET ORAL
COMMUNITY
End: 2023-02-21

## 2023-02-13 RX ORDER — HYDROCORTISONE ACETATE 25 MG/1
25 SUPPOSITORY RECTAL 2 TIMES DAILY
Qty: 12 SUPPOSITORY | Refills: 0
Start: 2023-02-13

## 2023-02-13 RX ORDER — UBIDECARENONE 100 MG
CAPSULE ORAL
COMMUNITY
End: 2023-02-21

## 2023-02-13 RX ADMIN — PANTOPRAZOLE SODIUM 40 MG: 40 TABLET, DELAYED RELEASE ORAL at 05:06

## 2023-02-13 RX ADMIN — CYANOCOBALAMIN TAB 500 MCG 1000 MCG: 500 TAB at 08:49

## 2023-02-13 RX ADMIN — Medication 1000 UNITS: at 08:49

## 2023-02-13 RX ADMIN — AMLODIPINE BESYLATE 10 MG: 10 TABLET ORAL at 08:49

## 2023-02-13 RX ADMIN — SODIUM BICARBONATE 650 MG TABLET 650 MG: at 08:49

## 2023-02-13 RX ADMIN — LEVOTHYROXINE SODIUM 175 MCG: 175 TABLET ORAL at 05:06

## 2023-02-13 RX ADMIN — DOCUSATE SODIUM 100 MG: 100 CAPSULE ORAL at 08:49

## 2023-02-13 RX ADMIN — SODIUM BICARBONATE 650 MG TABLET 650 MG: at 12:36

## 2023-02-13 RX ADMIN — HYDROCORTISONE ACETATE 25 MG: 25 SUPPOSITORY RECTAL at 08:52

## 2023-02-13 RX ADMIN — ALLOPURINOL 100 MG: 100 TABLET ORAL at 08:49

## 2023-02-13 RX ADMIN — METOPROLOL SUCCINATE 100 MG: 100 TABLET, EXTENDED RELEASE ORAL at 08:50

## 2023-02-13 RX ADMIN — SERTRALINE HYDROCHLORIDE 100 MG: 100 TABLET ORAL at 08:49

## 2023-02-13 NOTE — ASSESSMENT & PLAN NOTE
EKG shows sinus rhythm  Rate control with metoprolol  Is currently on Eliquis for anticoagulation -on hold secondary to acute anemia see further discussion above

## 2023-02-13 NOTE — ASSESSMENT & PLAN NOTE
Presents to ED with weakness, hemoptysis yesterday with large blood clot, Hemoccult positive as well  History of the same presentation  She received transfusion currently hemoglobin is stable  • Iron panel ordered anemia of chronic disease mild iron deficiency  • B12 ordered, was previously deficient and started on B12 supplement  • Blood consent obtained by ER, will re-consent with patient's daughter, placed in chart  • Transfuse if Hgb <8  o S/p prbc transfusion     · So I had a discussion today with the patient in front of the daughter and the patient has agreed to undergo the prep and do an EGD colonoscopy tomorrow  Status post EGD without any evidence of bleeding she is status post sigmoidoscopy difficult to evaluate but there was no active bleedingLarge external hemorrhoids-likely the source of her intermittent rectal bleeding other differential diagnosis includes mass from prior or diverticular bleeding or AVM unfortunately this likely would not be accessible via colonoscopy given her severe narrowing  • For which I did ask surgery to evaluate for hemorrhoidectomy we will have to discussed with the daughter as well as she does have an EF of 25% and high risk for any general anesthesia  Anusol suppositories have been started     Lab Results   Component Value Date    HGB 10 2 (L) 02/13/2023    HGB 7 5 (L) 02/12/2023     · In the past patient has refused EGD/colonoscopy -is also COVID-positive on day 7  · Stool records at bedside, hold off on further occult stool testing (was positive in the ER)  · S/p 2 doses of venofer - no gross bleed reported   · Discussed with daughter risk of death during surgery reported as high risk with ef 25 % and general   daughter had agreed to surgery - gs and anesthisia to discuss with daughter again as risk to high   · Globin is 10 2 there is no further bleeding    The surgery has discussed with the daughter yesterday about her high risk mortality rate with surgery and the daughter decided to not intervene with surgery and also intermittently have black stool and ability to perform colonoscopy due to [facility] risk to just do a hemorrhoidectomy at this time the risk of restarting Eliquis and aspirin is higher than not discussed that with the daughter as this is her third admission and they worsen the bleeding and finally she stopped and her hemoglobin is stable we discussed the risk of a stroke blood loss anemia is higher as this is stated with third admission  Currently she will be discharged to nursing home

## 2023-02-13 NOTE — CASE MANAGEMENT
Case Management Discharge Planning Note    Patient name Floyd Silva  Location /-35 MRN [id number]  : 1945 Date 2023       Current Admission Date: 2023  Current Admission Diagnosis:Acute on chronic blood loss anemia   Patient Active Problem List    Diagnosis Date Noted   • Asymptomatic bacteriuria 2023   • Hemoptysis 2023   • Urinary retention 2023   • Stage 3b chronic kidney disease ([place] [address] ) 2023   • Acute on chronic blood loss anemia 2023   • Acute cystitis without hematuria 2023   • Right internal carotid artery aneurysm 2023   • Carotid artery stenosis 2023   • Aneurysm ([place] [address] )    • Elevated d-dimer 2023   • Proteinuria 2023   • Goals of care, counseling/discussion 2023   • Secondary renal hyperparathyroidism ([place] [address] ) 10/26/2022   • Hyperuricemia 10/26/2022   • Hypophosphatemia 10/04/2022   • Pulmonary hypertension ([place] [address] ) 10/04/2022   • Low TSH level 2022   • COVID-19 virus infection 2022   • Dementia ([place] [address] )    • Metabolic encephalopathy    • Gastrointestinal bleeding 2022   • Hydroureteronephrosis 2022   • Diverticulitis 2022   • Rectal bleeding 2022   • Elevated troponin 2022   • Iron deficiency anemia 2022   • Paroxysmal atrial fibrillation ([place] [address] ) 2020   • Ischemic cardiomyopathy 2020   • S/P implantation of automatic cardioverter/defibrillator (AICD) 2020   • Essential hypertension 2020   • History of PTCA 2020   • Leg swelling 2020   • DARRELL (acute kidney injury) ([place] [address] ) 2020   • Perforated appendicitis 2020   • Chronic combined systolic and diastolic congestive heart failure ([place] [address] ) 2020   • Pyuria 2020   • Microscopic hematuria 2020   • Vitamin D insufficiency 2020   • Mitral valve insufficiency 2020   • Hypercholesterolemia 2020   • Aortic atherosclerosis ([facility] [address] ) 2020   • Renal calculus 07/29/2020   • Diverticulosis 07/29/2020   • Hiatal hernia 07/29/2020   • Pulmonary nodule 07/29/2020   • Benign hypertensive renal disease 09/12/2019   • Acute blood loss anemia 02/08/2019   • Gastroesophageal reflux disease without esophagitis 02/08/2019   • Closed fracture of body of sternum with routine healing 04/12/2018   • Acquired hypothyroidism 04/12/2018   • CAD (coronary artery disease) 04/12/2018   • Forgetfulness 04/12/2018   • Acute pain due to trauma 04/12/2018   • Hx of fall 04/12/2018   • Stress incontinence in female 04/12/2018   • Acute on chronic anemia 03/20/2018      LOS (days): 6  Geometric Mean LOS (GMLOS) (days): 2 70  Days to GMLOS:-3     OBJECTIVE:  Risk of Unplanned Readmission Score: 33 41         Current admission status: Inpatient   Preferred Pharmacy:   [address] [place] [address] [address]  [address]   DR Jackie FREEMAN [id number]  Phone: [phone] Fax: [phone]    Primary Care Provider: Jacques Orneals DO    Primary Insurance: [facility]  Secondary Insurance:     DISCHARGE DETAILS:    Discharge planning discussed with[de-identified] Julissa mcghee [place] of Choice: Yes  Comments - [place] of Choice: [facility]  CM contacted family/caregiver?: Yes  Were Treatment Team discharge recommendations reviewed with patient/caregiver?: Yes  Did patient/caregiver verbalize understanding of patient care needs?: Yes  Were patient/caregiver advised of the risks associated with not following Treatment Team discharge recommendations?: Yes    Contacts  Patient Contacts: Eleonora Gongora daughter  Relationship to Patient[de-identified] Family  Contact Method: Phone  Phone Number: [phone]  Reason/Outcome: Discharge [address]         Is the patient interested in [address] at discharge?: No    DME Referral Provided  Referral made for DME?: No    Other Referral/Resources/Interventions Provided:  Interventions: Facility Return  Referral Comments: [facility]    Would you like to participate in our [address] service program?  : No - Declined    Treatment Team Recommendation: Facility Return  Discharge Destination Plan[de-identified] Facility Return  Transport at Discharge : [facility]LS Ambulance        Transported by Melissa and Unit #): [facility]  ETA of Transport (Date): 02/13/23  ETA of Transport (Time): 1300               CM left voice message for daughter, Markel Goldmann, letting her know patient will be discharged today to [facility]  FARHAD updated [place] [facility] with  time  [id number] Daughter called back to confirm  time  CM spoke to daughter, reviewed DC IMM and informed that patient can stay an additional 4 hours for reconsidering appealing the discharge as the medicare rights were review on the day of discharge  Daughter verbalized understanding and feels ready to go STR and does not intend to stay 4 hours to reconsider   IMM placed in bin for filing

## 2023-02-13 NOTE — PLAN OF CARE
Problem: Potential for Falls  Goal: Patient will remain free of falls  Description: INTERVENTIONS:  - Educate patient/family on patient safety including physical limitations  - Instruct patient to call for assistance with activity   - Consult OT/PT to assist with strengthening/mobility   - Keep Call bell within reach  - Keep bed low and locked with side rails adjusted as appropriate  - Keep care items and personal belongings within reach  - Initiate and maintain comfort rounds  - Make Fall Risk Sign visible to staff  - Offer Toileting every   Hours, in advance of need  - Initiate/Maintain   alarm  - Obtain necessary fall risk management equipment:      - Apply yellow socks and bracelet for high fall risk patients  - Consider moving patient to room near nurses station  Outcome: Progressing     Problem: Prexisting or High Potential for Compromised Skin Integrity  Goal: Skin integrity is maintained or improved  Description: INTERVENTIONS:  - Identify patients at risk for skin breakdown  - Assess and monitor skin integrity  - Assess and monitor nutrition and hydration status  - Monitor labs   - Assess for incontinence   - Turn and reposition patient  - Assist with mobility/ambulation  - Relieve pressure over bony prominences  - Avoid friction and shearing  - Provide appropriate hygiene as needed including keeping skin clean and dry  - Evaluate need for skin moisturizer/barrier cream  - Collaborate with interdisciplinary team   - Patient/family teaching  - Consider wound care consult   Outcome: Progressing     Problem: PAIN - ADULT  Goal: Verbalizes/displays adequate comfort level or baseline comfort level  Description: Interventions:  - Encourage patient to monitor pain and request assistance  - Assess pain using appropriate pain scale  - Administer analgesics based on type and severity of pain and evaluate response  - Implement non-pharmacological measures as appropriate and evaluate response  - Consider cultural and social influences on pain and pain management  - Notify physician/advanced practitioner if interventions unsuccessful or patient reports new pain  Outcome: Progressing     Problem: INFECTION - ADULT  Goal: Absence or prevention of progression during hospitalization  Description: INTERVENTIONS:  - Assess and monitor for signs and symptoms of infection  - Monitor lab/diagnostic results  - Monitor all insertion sites, i e  indwelling lines, tubes, and drains  - Monitor endotracheal if appropriate and nasal secretions for changes in amount and color  - [place] appropriate cooling/warming therapies per order  - Administer medications as ordered  - Instruct and encourage patient and family to use good hand hygiene technique  - Identify and instruct in appropriate isolation precautions for identified infection/condition  Outcome: Progressing     Problem: SAFETY ADULT  Goal: Patient will remain free of falls  Description: INTERVENTIONS:  - Educate patient/family on patient safety including physical limitations  - Instruct patient to call for assistance with activity   - Consult OT/PT to assist with strengthening/mobility   - Keep Call bell within reach  - Keep bed low and locked with side rails adjusted as appropriate  - Keep care items and personal belongings within reach  - Initiate and maintain comfort rounds  - Make Fall Risk Sign visible to staff  - Offer Toileting every   Hours, in advance of need  - Initiate/Maintain   alarm  - Obtain necessary fall risk management equipment:      - Apply yellow socks and bracelet for high fall risk patients  - Consider moving patient to room near nurses station  Outcome: Progressing  Goal: Maintain or return to baseline ADL function  Description: INTERVENTIONS:  - Educate patient/family on patient safety including physical limitations  - Instruct patient to call for assistance with activity   - Consult OT/PT to assist with strengthening/mobility   - Keep Call bell within reach  - Keep bed low and locked with side rails adjusted as appropriate  - Keep care items and personal belongings within reach  - Initiate and maintain comfort rounds  - Make Fall Risk Sign visible to staff  - Offer Toileting every   Hours, in advance of need  - Initiate/Maintain   alarm  - Obtain necessary fall risk management equipment:      - Apply yellow socks and bracelet for high fall risk patients  - Consider moving patient to room near nurses station  Outcome: Progressing  Goal: Maintains/Returns to pre admission functional level  Description: INTERVENTIONS:  - Perform BMAT or MOVE assessment daily    - Set and communicate daily mobility goal to care team and patient/family/caregiver  - Collaborate with rehabilitation services on mobility goals if consulted  - Perform Range of Motion   times a day  - Reposition patient every   hours  - Dangle patient   times a day  - Stand patient   times a day  - Ambulate patient   times a day  - Out of bed to chair   times a day   - Out of bed for meals    times a day  - Out of bed for toileting  - Record patient progress and toleration of activity level   Outcome: Progressing     Problem: DISCHARGE PLANNING  Goal: Discharge to home or other facility with appropriate resources  Description: INTERVENTIONS:  - Identify barriers to discharge w/patient and caregiver  - Arrange for needed discharge resources and transportation as appropriate  - Identify discharge learning needs (meds, wound care, etc )  - Arrange for interpretive services to assist at discharge as needed  - Refer to Case Management Department for coordinating discharge planning if the patient needs post-hospital services based on physician/advanced practitioner order or complex needs related to functional status, cognitive ability, or social support system  Outcome: Progressing     Problem: Knowledge Deficit  Goal: Patient/family/caregiver demonstrates understanding of disease process, treatment plan, medications, and discharge instructions  Description: Complete learning assessment and assess knowledge base  Interventions:  - Provide teaching at level of understanding  - Provide teaching via preferred learning methods  Outcome: Progressing     Problem: MOBILITY - ADULT  Goal: Maintain or return to baseline ADL function  Description: INTERVENTIONS:  - Educate patient/family on patient safety including physical limitations  - Instruct patient to call for assistance with activity   - Consult OT/PT to assist with strengthening/mobility   - Keep Call bell within reach  - Keep bed low and locked with side rails adjusted as appropriate  - Keep care items and personal belongings within reach  - Initiate and maintain comfort rounds  - Make Fall Risk Sign visible to staff  - Offer Toileting every   Hours, in advance of need  - Initiate/Maintain   alarm  - Obtain necessary fall risk management equipment:      - Apply yellow socks and bracelet for high fall risk patients  - Consider moving patient to room near nurses station  Outcome: Progressing  Goal: Maintains/Returns to pre admission functional level  Description: INTERVENTIONS:  - Perform BMAT or MOVE assessment daily    - Set and communicate daily mobility goal to care team and patient/family/caregiver  - Collaborate with rehabilitation services on mobility goals if consulted  - Perform Range of Motion   times a day  - Reposition patient every   hours  - Dangle patient   times a day  - Stand patient   times a day  - Ambulate patient   times a day  - Out of bed to chair   times a day   - Out of bed for meals     times a day  - Out of bed for toileting  - Record patient progress and toleration of activity level   Outcome: Progressing     Problem: Nutrition/Hydration-ADULT  Goal: Nutrient/Hydration intake appropriate for improving, restoring or maintaining nutritional needs  Description: Monitor and assess patient's nutrition/hydration status for malnutrition   Collaborate with interdisciplinary team and initiate plan and interventions as ordered  Monitor patient's weight and dietary intake as ordered or per policy  Utilize nutrition screening tool and intervene as necessary  Determine patient's food preferences and provide high-protein, high-caloric foods as appropriate       INTERVENTIONS:  - Monitor oral intake, urinary output, labs, and treatment plans  - Assess nutrition and hydration status and recommend course of action  - Evaluate amount of meals eaten  - Assist patient with eating if necessary   - Allow adequate time for meals  - Recommend/ encourage appropriate diets, oral nutritional supplements, and vitamin/mineral supplements  - Order, calculate, and assess calorie counts as needed  - Recommend, monitor, and adjust tube feedings and TPN/PPN based on assessed needs  - Assess need for intravenous fluids  - Provide specific nutrition/hydration education as appropriate  - Include patient/family/caregiver in decisions related to nutrition  Outcome: Progressing     Problem: GASTROINTESTINAL - ADULT  Goal: Minimal or absence of nausea and/or vomiting  Description: INTERVENTIONS:  - Administer IV fluids if ordered to ensure adequate hydration  - Maintain NPO status until nausea and vomiting are resolved  - Nasogastric tube if ordered  - Administer ordered antiemetic medications as needed  - Provide nonpharmacologic comfort measures as appropriate  - Advance diet as tolerated, if ordered  - Consider nutrition services referral to assist patient with adequate nutrition and appropriate food choices  Outcome: Progressing  Goal: Maintains or returns to baseline bowel function  Description: INTERVENTIONS:  - Assess bowel function  - Encourage oral fluids to ensure adequate hydration  - Administer IV fluids if ordered to ensure adequate hydration  - Administer ordered medications as needed  - Encourage mobilization and activity  - Consider nutritional services referral to assist patient with adequate nutrition and appropriate food choices  Outcome: Progressing  Goal: Maintains adequate nutritional intake  Description: INTERVENTIONS:  - Monitor percentage of each meal consumed  - Identify factors contributing to decreased intake, treat as appropriate  - Assist with meals as needed  - Monitor I&O, weight, and lab values if indicated  - Obtain nutrition services referral as needed  Outcome: Progressing  Goal: Oral mucous membranes remain intact  Description: INTERVENTIONS  - Assess oral mucosa and hygiene practices  - Implement preventative oral hygiene regimen  - Implement oral medicated treatments as ordered  - Initiate Nutrition services referral as needed  Outcome: Progressing     Problem: HEMATOLOGIC - ADULT  Goal: Maintains hematologic stability  Description: INTERVENTIONS  - Assess for signs and symptoms of bleeding or hemorrhage  - Monitor labs  - Administer supportive blood products/factors as ordered and appropriate  Outcome: Progressing

## 2023-02-13 NOTE — DISCHARGE SUMMARY
[address]  Discharge- Taiwo Gomez 1945, 65 y o  female MRN: [id number]  Unit/Bed#: -Trisha Encounter: [id number]  Primary Care Provider: Shahid Mir DO   Date and time admitted to hospital: 2/7/2023  2:48 PM    * Acute on chronic blood loss anemia  Assessment & Plan  Presents to ED with weakness, hemoptysis yesterday with large blood clot, Hemoccult positive as well  History of the same presentation  She received transfusion currently hemoglobin is stable  • Iron panel ordered anemia of chronic disease mild iron deficiency  • B12 ordered, was previously deficient and started on B12 supplement  • Blood consent obtained by ER, will re-consent with patient's daughter, placed in chart  • Transfuse if Hgb <8  o S/p prbc transfusion     · So I had a discussion today with the patient in front of the daughter and the patient has agreed to undergo the prep and do an EGD colonoscopy tomorrow  Status post EGD without any evidence of bleeding she is status post sigmoidoscopy difficult to evaluate but there was no active bleedingLarge external hemorrhoids-likely the source of her intermittent rectal bleeding other differential diagnosis includes mass from prior or diverticular bleeding or AVM unfortunately this likely would not be accessible via colonoscopy given her severe narrowing  • For which I did ask surgery to evaluate for hemorrhoidectomy we will have to discussed with the daughter as well as she does have an EF of 25% and high risk for any general anesthesia    Anusol suppositories have been started     Lab Results   Component Value Date    HGB 10 2 (L) 02/13/2023    HGB 7 5 (L) 02/12/2023     · In the past patient has refused EGD/colonoscopy -is also COVID-positive on day 7  · Stool records at bedside, hold off on further occult stool testing (was positive in the ER)  · S/p 2 doses of venofer - no gross bleed reported   · Discussed with daughter risk of death during surgery reported as high risk with ef 25 % and general   daughter had agreed to surgery - gs and anesthisia to discuss with daughter again as risk to high   · Globin is 10 2 there is no further bleeding  The surgery has discussed with the daughter yesterday about her high risk mortality rate with surgery and the daughter decided to not intervene with surgery and also intermittently have black stool and ability to perform colonoscopy due to [place] risk to just do a hemorrhoidectomy at this time the risk of restarting Eliquis and aspirin is higher than not discussed that with the daughter as this is her third admission and they worsen the bleeding and finally she stopped and her hemoglobin is stable we discussed the risk of a stroke blood loss anemia is higher as this is stated with third admission  Currently she will be discharged to nursing home        Asymptomatic bacteriuria  Assessment & Plan  · Greer obtained 2/7- no need for abx     Urinary retention  Assessment & Plan  · Only placed on 1/26 by her to discharge to the nursing home she still has a Greer catheter was post to have a voiding trial 7 t  · Greer removed 2/8- voiding well without retention  · Continue further Flomax    Hemoptysis  Assessment & Plan  · Reported episode of hemoptysis with large clot at nursing facility  · Chest x-ray without acute findings, seems improved from previous  · Had outpatient chest x-ray ordered for 2/13 secondary to hemoptysis  · Does have history of a pulmonary nodule 2 mm lower left quadrant noted on CT in September 2022, was not deemed significant and no follow-up recommended    · CTchest done without contrast reveals no pneumonia  · Pulmonary consult discussed with pulmonary could have been tracheobronchitis there is no recurrence  · Transfuse to keep hemoglobin greater than 7  · Unclear amount of blood was expelled as outpatient, continue to monitor closely  · No recurrence    Stage 3b chronic kidney disease [facility])  Assessment & Plan  Lab Results   Component Value Date    EGFR 23 02/11/2023    EGFR 21 02/09/2023    EGFR 21 02/08/2023    CREATININE 1 99 (H) 02/11/2023    CREATININE 2 14 (H) 02/09/2023    CREATININE 2 18 (H) 02/08/2023   Creatinine baseline is around 1 7  Recently during last hospitalization has been around 2 1, currently at near and not meeting criteria for DARRELL has relatively remained stable  Monitor closely  Avoid nephrotoxic agents, NSAIDs and hypotension  Renally dose medications  Improving    COVID-19 virus infection  Assessment & Plan  Background: During previous admission 1/31 - 2/3 incidentally tested positive for COVID, was asymptomatic and maintained on mild COVID pathway -remdesivir not given  • Mild COVID pathway as below   • Vaccinated x2  • COVID19- positive on 1/31   • Not needing supplemental oxygen  • Chest CT ordered no evidence of pneumonia  • CBC and CMP daily  • Right now holding AC due to acute anemia and possible bleed  • OOB TID; ambulation and mobilization strongly encouraged  • PT/OT consult and evaluation   • Self proning strongly encouraged  • Supportive care  • Patient's last day of isolation is 2/9  • Can be taken off isolation      Dementia [facility])  Assessment & Plan  She is at her baseline-alert and oriented  Delirium precautions  Consents need to be obtained via daughter    Paroxysmal atrial fibrillation [facility])  Assessment & Plan  EKG shows sinus rhythm  Rate control with metoprolol  Is currently on Eliquis for anticoagulation -on hold secondary to acute anemia see further discussion above    Chronic combined systolic and diastolic congestive heart failure (HCC)  Assessment & Plan  Wt Readings from Last 3 Encounters:   02/07/23 63 5 kg (139 lb 15 9 oz)   02/03/23 61 5 kg (135 lb 9 3 oz)   01/27/23 65 9 kg (145 lb 4 5 oz)       Currently euvolemic  Maintained on diuretic therapy as an outpatient  Previous echo done 1/19: EF decreased to 25% which was changed from previous study in September, severe global hypokinesis, new tricuspid regurg without other changes from previous study  Currently is on beta-blocker, not on ACE or ARB, on aspirin  Does have [address] cardiology discussed with cardiology no recommendation in terms of proceeding with ischemic evaluation at this time having ongoing anemia with GI bleed unknown if able to tolerate antiplatelets        CAD (coronary artery disease)  Assessment & Plan  Continue beta-blocker, hold aspirin due to anemia  Denies any chest pain  EKG was sinus rhythm, rate 69, complete LBBB which is chronic, no signs for ischemia  No asa as recurent blood loss anemia     Acquired hypothyroidism  Assessment & Plan  During hospitalization 1/19 - 1/26 had abnormal thyroid levels  Synthroid was adjusted to 175 micrograms  Was recommended to have repeat labs in 4 to 6 weeks    We will repeat labs during this admission improved with suggest repeat a TSH in another 2 weeks      Medical Problems     Resolved Problems  Date Reviewed: 2/13/2023   None       Discharging Physician / Practitioner: Debbie Vera MD  PCP: Carlos Esquivel DO  Admission Date:   Admission Orders (From admission, onward)     Ordered        02/07/23 [address] [facility]  Once                      Discharge Date: 02/13/23    Consultations During Hospital Stay:  · Gastroenterology  · General surgery  · Cardiology  · Pulmonary    Procedures Performed:   · egd-No evidence of bleeding or stigmata of recent hemorrhage  Normal-appearing esophagus  Small sliding hiatal hernia without evidence of Jc's erosions  Multiple gastric polyps representing fundic gland polyps  Otherwise normal-appearing stomach on direct and retroflexed views  Normal-appearing duodenal bulb and visualized second portion of duodenum  • Flex sig-Few diverticula causing severe luminal narrowing (not traversable) in the sigmoid colon  Large, protruding, external hemorrhoids  Significant Findings / Test Results: · cxr--Linear scarring or atelectasis left midlung  No acute cardiopulmonary disease  Ct chest -No evidence of acute pulmonary pathology  Scattered linear and tubular opacities likely areas of atelectasis      Small left and trace right pleural effusions      Cardiomegaly      Additional findings as above    Incidental Findings:   · none    Test Results Pending at Discharge (will require follow up):   · none     Outpatient Tests Requested:  · none    Complications:  none    Reason for Admission: Acute blood loss anemia    Hospital Course:   Jaclyn Rebolledo is a 65 y o  female patient who originally presented to the hospital on 2/7/2023 due to acute blood loss anemia and hemoptysis  No evidence of any pneumonia on the CT chest   Suspect this was secondary to tracheobronchitis as discussed with pulmonary  No further imaging  Patient started admission for acute blood loss anemia could be coming either from the hemorrhoids or something that a colonoscopy unable to be done to see  She had transfusion her hemoglobin has improved without any further bleeding she does have an EF of 25% she had an EGD with no active bleed unfortunately she is not able to fulfill full prep and sigmoidoscopy was only done and it revealed nothing and patient has difficulty completing the prep and refusal of NG tube to complete the prep as well I discussed with surgery and patient is very high risk to undergo surgery high mortality rate with her heart failure of EF of 25%    Discussed with the daughter she does not want proceed with surgery we discussed the risk and benefits of just stopping the aspirin and Eliquis as we do not know what is going on further in the colon she also had associated dark bleed and therefore the risk of restarting again this is the third admission for acute blood loss anemia is higher therefore for now aspirin and Eliquis will be discontinued we did discuss that she does have an increased risk of stroke but the bleeding outweighs it  Otherwise vitals are stable she is medically cleared to be discharged back to the nursing home        Please see above list of diagnoses and related plan for additional information  Condition at Discharge: stable    Discharge Day Visit / Exam:   Subjective:  Seen and examined no complaints  Vitals: Blood Pressure: 137/73 (02/13/23 0713)  Pulse: 63 (02/13/23 0849)  Temperature: 97 5 °F (36 4 °C) (02/13/23 0713)  Temp Source: Temporal (02/13/23 0713)  Respirations: 18 (02/13/23 0713)  Height: 5' 5" (165 1 cm) (02/10/23 1411)  Weight - Scale: 63 5 kg (139 lb 15 9 oz) (02/07/23 1450)  SpO2: 97 % (02/13/23 0713)  Exam:   Physical Exam  Vitals and nursing note reviewed  Constitutional:       General: She is not in acute distress  Appearance: She is well-developed  HENT:      Head: Normocephalic and atraumatic  Eyes:      Conjunctiva/sclera: Conjunctivae normal    Cardiovascular:      Rate and Rhythm: Normal rate and regular rhythm  Heart sounds: No murmur heard  Pulmonary:      Effort: Pulmonary effort is normal  No respiratory distress  Breath sounds: Normal breath sounds  No wheezing or rales  Abdominal:      General: There is no distension  Palpations: Abdomen is soft  Tenderness: There is no abdominal tenderness  Musculoskeletal:         General: No swelling  Cervical back: Neck supple  Skin:     General: Skin is warm and dry  Capillary Refill: Capillary refill takes less than 2 seconds  Neurological:      Mental Status: She is alert  Mental status is at baseline  Psychiatric:         Mood and Affect: Mood normal          Discussion with Family: Updated  (daughter) via phone  Discharge instructions/Information to patient and family:   See after visit summary for information provided to patient and family        Provisions for Follow-Up Care:  See after visit summary for information related to follow-up care and any pertinent home health orders  Disposition:   Other [facility] Readmission: no     Discharge Statement:  I spent >35 minutes discharging the patient  This time was spent on the day of discharge  I had direct contact with the patient on the day of discharge  Greater than 50% of the total time was spent examining patient, answering all patient questions, arranging and discussing plan of care with patient as well as directly providing post-discharge instructions  Additional time then spent on discharge activities  Discharge Medications:  See after visit summary for reconciled discharge medications provided to patient and/or family        **Please Note: This note may have been constructed using a voice recognition system**

## 2023-02-13 NOTE — ASSESSMENT & PLAN NOTE
· Only placed on 1/26 by her to discharge to the nursing home she still has a Greer catheter was post to have a voiding trial 7 t  · Greer removed 2/8- voiding well without retention  · Continue further Flomax

## 2023-02-13 NOTE — PLAN OF CARE
Problem: Potential for Falls  Goal: Patient will remain free of falls  Description: INTERVENTIONS:  - Educate patient/family on patient safety including physical limitations  - Instruct patient to call for assistance with activity   - Consult OT/PT to assist with strengthening/mobility   - Keep Call bell within reach  - Keep bed low and locked with side rails adjusted as appropriate  - Keep care items and personal belongings within reach  - Initiate and maintain comfort rounds  - Make Fall Risk Sign visible to staff  - Offer Toileting every  Hours, in advance of need  - Initiate/Maintain alarm  - Obtain necessary fall risk management equipment:   - Apply yellow socks and bracelet for high fall risk patients  - Consider moving patient to room near nurses station  Outcome: Progressing     Problem: Prexisting or High Potential for Compromised Skin Integrity  Goal: Skin integrity is maintained or improved  Description: INTERVENTIONS:  - Identify patients at risk for skin breakdown  - Assess and monitor skin integrity  - Assess and monitor nutrition and hydration status  - Monitor labs   - Assess for incontinence   - Turn and reposition patient  - Assist with mobility/ambulation  - Relieve pressure over bony prominences  - Avoid friction and shearing  - Provide appropriate hygiene as needed including keeping skin clean and dry  - Evaluate need for skin moisturizer/barrier cream  - Collaborate with interdisciplinary team   - Patient/family teaching  - Consider wound care consult   Outcome: Progressing     Problem: PAIN - ADULT  Goal: Verbalizes/displays adequate comfort level or baseline comfort level  Description: Interventions:  - Encourage patient to monitor pain and request assistance  - Assess pain using appropriate pain scale  - Administer analgesics based on type and severity of pain and evaluate response  - Implement non-pharmacological measures as appropriate and evaluate response  - Consider cultural and social influences on pain and pain management  - Notify physician/advanced practitioner if interventions unsuccessful or patient reports new pain  Outcome: Progressing     Problem: INFECTION - ADULT  Goal: Absence or prevention of progression during hospitalization  Description: INTERVENTIONS:  - Assess and monitor for signs and symptoms of infection  - Monitor lab/diagnostic results  - Monitor all insertion sites, i e  indwelling lines, tubes, and drains  - Monitor endotracheal if appropriate and nasal secretions for changes in amount and color  - [place] appropriate cooling/warming therapies per order  - Administer medications as ordered  - Instruct and encourage patient and family to use good hand hygiene technique  - Identify and instruct in appropriate isolation precautions for identified infection/condition  Outcome: Progressing     Problem: SAFETY ADULT  Goal: Patient will remain free of falls  Description: INTERVENTIONS:  - Educate patient/family on patient safety including physical limitations  - Instruct patient to call for assistance with activity   - Consult OT/PT to assist with strengthening/mobility   - Keep Call bell within reach  - Keep bed low and locked with side rails adjusted as appropriate  - Keep care items and personal belongings within reach  - Initiate and maintain comfort rounds  - Make Fall Risk Sign visible to staff  - Offer Toileting every  Hours, in advance of need  - Initiate/Maintain alarm  - Obtain necessary fall risk management equipment:   - Apply yellow socks and bracelet for high fall risk patients  - Consider moving patient to room near nurses station  Outcome: Progressing  Goal: Maintain or return to baseline ADL function  Description: INTERVENTIONS:  -  Assess patient's ability to carry out ADLs; assess patient's baseline for ADL function and identify physical deficits which impact ability to perform ADLs (bathing, care of mouth/teeth, toileting, grooming, dressing, etc )  - Assess/evaluate cause of self-care deficits   - Assess range of motion  - Assess patient's mobility; develop plan if impaired  - Assess patient's need for assistive devices and provide as appropriate  - Encourage maximum independence but intervene and supervise when necessary  - Involve family in performance of ADLs  - Assess for home care needs following discharge   - Consider OT consult to assist with ADL evaluation and planning for discharge  - Provide patient education as appropriate  Outcome: Progressing  Goal: Maintains/Returns to pre admission functional level  Description: INTERVENTIONS:  - Perform BMAT or MOVE assessment daily    - Set and communicate daily mobility goal to care team and patient/family/caregiver  - Collaborate with rehabilitation services on mobility goals if consulted  - Perform Range of Motion times a day  - Reposition patient every  hours    - Dangle patient  times a day  - Stand patient times a day  - Ambulate patient  times a day  - Out of bed to chair  times a day   - Out of bed for meals  times a day  - Out of bed for toileting  - Record patient progress and toleration of activity level   Outcome: Progressing     Problem: DISCHARGE PLANNING  Goal: Discharge to home or other facility with appropriate resources  Description: INTERVENTIONS:  - Identify barriers to discharge w/patient and caregiver  - Arrange for needed discharge resources and transportation as appropriate  - Identify discharge learning needs (meds, wound care, etc )  - Arrange for interpretive services to assist at discharge as needed  - Refer to Case Management Department for coordinating discharge planning if the patient needs post-hospital services based on physician/advanced practitioner order or complex needs related to functional status, cognitive ability, or social support system  Outcome: Progressing     Problem: Knowledge Deficit  Goal: Patient/family/caregiver demonstrates understanding of disease process, treatment plan, medications, and discharge instructions  Description: Complete learning assessment and assess knowledge base  Interventions:  - Provide teaching at level of understanding  - Provide teaching via preferred learning methods  Outcome: Progressing     Problem: MOBILITY - ADULT  Goal: Maintain or return to baseline ADL function  Description: INTERVENTIONS:  -  Assess patient's ability to carry out ADLs; assess patient's baseline for ADL function and identify physical deficits which impact ability to perform ADLs (bathing, care of mouth/teeth, toileting, grooming, dressing, etc )  - Assess/evaluate cause of self-care deficits   - Assess range of motion  - Assess patient's mobility; develop plan if impaired  - Assess patient's need for assistive devices and provide as appropriate  - Encourage maximum independence but intervene and supervise when necessary  - Involve family in performance of ADLs  - Assess for home care needs following discharge   - Consider OT consult to assist with ADL evaluation and planning for discharge  - Provide patient education as appropriate  Outcome: Progressing  Goal: Maintains/Returns to pre admission functional level  Description: INTERVENTIONS:  - Perform BMAT or MOVE assessment daily    - Set and communicate daily mobility goal to care team and patient/family/caregiver  - Collaborate with rehabilitation services on mobility goals if consulted  - Perform Range of Motion  times a day  - Reposition patient every  hours    - Dangle patient  times a day  - Stand patient  times a day  - Ambulate patient  times a day  - Out of bed to chair  times a day   - Out of bed for meals  times a day  - Out of bed for toileting  - Record patient progress and toleration of activity level   Outcome: Progressing     Problem: Nutrition/Hydration-ADULT  Goal: Nutrient/Hydration intake appropriate for improving, restoring or maintaining nutritional needs  Description: Monitor and assess patient's nutrition/hydration status for malnutrition  Collaborate with interdisciplinary team and initiate plan and interventions as ordered  Monitor patient's weight and dietary intake as ordered or per policy  Utilize nutrition screening tool and intervene as necessary  Determine patient's food preferences and provide high-protein, high-caloric foods as appropriate       INTERVENTIONS:  - Monitor oral intake, urinary output, labs, and treatment plans  - Assess nutrition and hydration status and recommend course of action  - Evaluate amount of meals eaten  - Assist patient with eating if necessary   - Allow adequate time for meals  - Recommend/ encourage appropriate diets, oral nutritional supplements, and vitamin/mineral supplements  - Order, calculate, and assess calorie counts as needed  - Recommend, monitor, and adjust tube feedings and TPN/PPN based on assessed needs  - Assess need for intravenous fluids  - Provide specific nutrition/hydration education as appropriate  - Include patient/family/caregiver in decisions related to nutrition  Outcome: Progressing

## 2023-02-13 NOTE — PROGRESS NOTES
Progress Note - General Surgery   Saintclair Vargas 65 y o  female MRN: [id number]  Unit/Bed#: -01 Encounter: [id number]    Assessment:  79-year-old female who presents with prior concerns of rectal bleeding  - Afebrile, VSS on room air  - WBC 7 75 (9 78, 4 90)   - Hgb stable 10 2 (7 5, 8 4, 8 0)  - Cr 1 99 (2 14, 2 18)  - Noted enlarged external and internal hemorrhoids without sign of active bleeding on prior exam by Dr Mady Ward  - Nursing notes no further blood in BMs, no black BMs  - The patient is high risk for any sort of surgical intervention due to CHF with an EF of 25%    Plan:  - No surgical intervention, patient is high risk  - Findings were discussed with the patient's daughter yesterday and she does not want to consent to any surgical procedures unless absolutely necessary  I discussed with the patient's daughter the decision will likely come down to the risk of CVA while holding Eliquis versus risk of surgical intervention   - Continue Anusol suppositories  - Continue to hold Eliquis at this time  - Discussed with GI who will hold off on any discussion of further intervention second to improvement and no further blood in stool   - Discharge planning for today per medicine team    Subjective:   Patient states she is doing well today  She denies abdominal pain and states she feels "back to normal"  She has no complaints  Discussed with nursing - no black or bloody stools noted recently  Objective:   Blood pressure 137/73, pulse 63, temperature 97 5 °F (36 4 °C), temperature source Temporal, resp  rate 18, height 5' 5" (1 651 m), weight 63 5 kg (139 lb 15 9 oz), SpO2 97 %  ,Body mass index is 23 3 kg/m²      Intake/Output Summary (Last 24 hours) at 2/13/2023 0911  Last data filed at 2/13/2023 0813  Gross per 24 hour   Intake 710 ml   Output 700 ml   Net 10 ml     Invasive Devices     Peripheral Intravenous Line  Duration           Peripheral IV 02/11/23 Distal;Right;Ventral (anterior) Forearm 2 days          Drain  Duration           External Urinary Catheter <1 day              Physical Exam:  General: no acute distress, alert, comfortable in bed  Skin: warm and dry to touch  Pulmonary: normal effort  Abdominal: soft, non-tender, non-distended, no guarding or rebound, active bowel sounds    Lab, Imaging and other studies:  I have personally reviewed pertinent lab results      CBC:   Lab Results   Component Value Date    WBC 7 75 02/13/2023    HGB 10 2 (L) 02/13/2023    HCT 33 9 (L) 02/13/2023    MCV 92 02/13/2023     02/13/2023    MCH 27 7 02/13/2023    MCHC 30 1 (L) 02/13/2023    RDW 18 1 (H) 02/13/2023    MPV 10 4 02/13/2023    NRBC 0 02/13/2023   , CMP: No results found for: SODIUM, K, CL, CO2, ANIONGAP, BUN, CREATININE, GLUCOSE, CALCIUM, AST, ALT, ALKPHOS, PROT, BILITOT, EGFR  VTE Pharmacologic Prophylaxis: Reason for no pharmacologic prophylaxis bleeding  VTE Mechanical Prophylaxis: sequential compression device    Yadira Mora PA-C  2/13/2023

## 2023-02-13 NOTE — CASE MANAGEMENT
Case Management Discharge Planning Note    Patient name Brian Neves  Location /-75 MRN [id number]  : 1945 Date 2023       Current Admission Date: 2023  Current Admission Diagnosis:Acute on chronic blood loss anemia   Patient Active Problem List    Diagnosis Date Noted   • Asymptomatic bacteriuria 2023   • Hemoptysis 2023   • Urinary retention 2023   • Stage 3b chronic kidney disease ([place] [address] ) 2023   • Acute on chronic blood loss anemia 2023   • Acute cystitis without hematuria 2023   • Right internal carotid artery aneurysm 2023   • Carotid artery stenosis 2023   • Aneurysm ([place] [address] )    • Elevated d-dimer 2023   • Proteinuria 2023   • Goals of care, counseling/discussion 2023   • Secondary renal hyperparathyroidism ([place] [address] ) 10/26/2022   • Hyperuricemia 10/26/2022   • Hypophosphatemia 10/04/2022   • Pulmonary hypertension ([place] [address] ) 10/04/2022   • Low TSH level 2022   • COVID-19 virus infection 2022   • Dementia ([place] [address] )    • Metabolic encephalopathy    • Gastrointestinal bleeding 2022   • Hydroureteronephrosis 2022   • Diverticulitis 2022   • Rectal bleeding 2022   • Elevated troponin 2022   • Iron deficiency anemia 2022   • Paroxysmal atrial fibrillation ([place] [address] ) 2020   • Ischemic cardiomyopathy 2020   • S/P implantation of automatic cardioverter/defibrillator (AICD) 2020   • Essential hypertension 2020   • History of PTCA 2020   • Leg swelling 2020   • DARRELL (acute kidney injury) ([place] [address] ) 2020   • Perforated appendicitis 2020   • Chronic combined systolic and diastolic congestive heart failure ([place] [address] ) 2020   • Pyuria 2020   • Microscopic hematuria 2020   • Vitamin D insufficiency 2020   • Mitral valve insufficiency 2020   • Hypercholesterolemia 2020   • Aortic atherosclerosis ([facility] [address] ) 2020   • Renal calculus 07/29/2020   • Diverticulosis 07/29/2020   • Hiatal hernia 07/29/2020   • Pulmonary nodule 07/29/2020   • Benign hypertensive renal disease 09/12/2019   • Acute blood loss anemia 02/08/2019   • Gastroesophageal reflux disease without esophagitis 02/08/2019   • Closed fracture of body of sternum with routine healing 04/12/2018   • Acquired hypothyroidism 04/12/2018   • CAD (coronary artery disease) 04/12/2018   • Forgetfulness 04/12/2018   • Acute pain due to trauma 04/12/2018   • Hx of fall 04/12/2018   • Stress incontinence in female 04/12/2018   • Acute on chronic anemia 03/20/2018      LOS (days): 6  Geometric Mean LOS (GMLOS) (days): 2 70  Days to GMLOS:-3     OBJECTIVE:  Risk of Unplanned Readmission Score: 33 41         Current admission status: Inpatient   Preferred Pharmacy:   [address] [place] [address] [address]  [address]  [place] [address]  Phone: [phone] Fax: [phone]    Primary Care Provider: Sola Kellogg DO    Primary Insurance: [facility] REP  Secondary Insurance:     DISCHARGE DETAILS:     CM submitted [employer] referral for BLS transport to [facility]

## 2023-02-14 ENCOUNTER — CONSULT (OUTPATIENT)
Dept: VASCULAR SURGERY | Facility: CLINIC | Age: 78
End: 2023-02-14

## 2023-02-14 VITALS
DIASTOLIC BLOOD PRESSURE: 64 MMHG | OXYGEN SATURATION: 98 % | SYSTOLIC BLOOD PRESSURE: 118 MMHG | HEART RATE: 71 BPM | TEMPERATURE: 98 F

## 2023-02-14 DIAGNOSIS — I65.22 ASYMPTOMATIC STENOSIS OF LEFT CAROTID ARTERY: Primary | ICD-10-CM

## 2023-02-14 DIAGNOSIS — Z95.810 S/P IMPLANTATION OF AUTOMATIC CARDIOVERTER/DEFIBRILLATOR (AICD): ICD-10-CM

## 2023-02-14 DIAGNOSIS — Z95.828 INTERNAL CAROTID ARTERY STENT PRESENT: ICD-10-CM

## 2023-02-14 DIAGNOSIS — I65.29 OCCLUSION AND STENOSIS OF UNSPECIFIED CAROTID ARTERY: ICD-10-CM

## 2023-02-14 DIAGNOSIS — I48.0 PAROXYSMAL ATRIAL FIBRILLATION (HCC): ICD-10-CM

## 2023-02-14 RX ORDER — SERTRALINE HYDROCHLORIDE 100 MG/1
TABLET, FILM COATED ORAL
COMMUNITY

## 2023-02-14 NOTE — PATIENT INSTRUCTIONS
1) Carotid stenosis  -you had a carotid stent on the right side  -based on your testing, this is still wide open  -you have a significant blockage on the left side; I would only do surgery for this if you were having stroke symptoms  -we are going to monitor this on a yearly basis with ultrasound    2) Medications  -the paperwork provided did not have a clear list of the medications she is taking  -at a minimum, she should be taking an antiplatelet agent (either aspirin or plavix or brillenta  -she should also be on a lipid lowering agent    3) Pressure  -patient is at risk for heel decubitus ulcers and has stage I changes; please do pressure offloading either with prevalon boots or pillow under the ankles to offload the heels

## 2023-02-14 NOTE — PROGRESS NOTES
Assessment/Plan:    Pt is a 64 yo F w/ hypothyroisism, hyperparathryoidism, HTN, afib, s/p ICD, ICM, HLD, s/dCHF, CAD, severe dementia, CKD, hiatal hernia, hemorrhoids, recurrent GI bleeding, hx of R ICA stent, referred for carotid stenosis    Asymptomatic stenosis of left carotid artery  Internal carotid artery stent present  -     VAS carotid complete study;  Future  -     Ambulatory Referral to Vascular Surgery  -medical hx somewhat unclear as pt with severe dementia  -R ICA s/p stent; cannot find who/when/where details in our system or at [address] [place] [address] [address]  -presented in Jan w/ reported L side weakness  -reviewed CT head which was negative for acute CVA; does have chronic R PCA finding  -reviewed CTA neck which shows widely patent R ICA stent and heavily calcified L ICA stenosis; cannot determine level of stenosis but presume >70%  -reviewed carotid DU which shows patent R ICA stent and L ICA >70% stenosis w/ jose m 293/34 and ratio 5 45  -given severe dementia and multiple severe comorbidities including heart failure, CAD, EF: 25%, and recurrent GI bleeding, I would not consider this patient a candidate for elective carotid surgery; would consider L ICA intervention only if she became symptomatic; L side weakness and CT neg for CVA are not consistent with symptomatic L ICA  -will continue surveillance on a yearly basis w/ DU  -f/u 1 year    Paroxysmal atrial fibrillation (HCC)  S/P implantation of automatic cardioverter/defibrillator (AICD)  -s/p ICD  -s/dCHF w/ EF: 25%  -per cards  -was on Eliquis for afib; unclear if she is still on this after GI bleeding episode requiring transfusion; MAR from rehab not clear    Medications  -recommend at least single antiplatelet; cannot tell from [facility] provided if she is taking one but recommended in her paperwork to the facility  -recommend a lipid lowering agent; chart indicates statin intolerance; I do not know how many statins have been trialed          Subjective:      Patient ID: Natacha Juan A Sue Dent is a 65 y o  female  New patient referred from Dr Flori Alonso for stenosis of carotid artery had CTA done 1/19/23 and CV 1/21/23  PT denies TIA/storke like symptoms  PT is taking Eliquis and ASA 81 mg  and is a former smoker  HPI:    Patient referred for carotid artery evaluation  She is currently at [facility] rehab center after discharge from hospital yesterday  Admitted for GI bleeding  Patient with severe dementia  When asked why she was in the hospital, she replied "[facility]"    Patient has a hx of R ICA stent  Unsure where/when this was placed  Early in Jan, it appears she presented with L side weakness  Stroke workup appears to be negative  No MRI  Has pacer  Currently denies vision changes, speech changes, weakness/numbness  Unclear how reliable this history is  She reports that she can ambulated but is in a stretcher for this appointment  Tried to call patients daughter Hernandez Sotelo for appointment but could not reach her; left message on machine  Patient is here with transport only  No aid or nurse from facility  Unclear what medications she is currently taking  Medication list from rehab is very unclear      The following portions of the patient's history were reviewed and updated as appropriate: allergies, current medications, past family history, past medical history, past social history, past surgical history and problem list     Review of Systems   Constitutional: Negative  HENT: Negative  Eyes: Negative  Negative for visual disturbance  Respiratory: Positive for shortness of breath  Cardiovascular: Negative  Negative for chest pain  Gastrointestinal: Negative  Negative for abdominal pain  Endocrine: Negative  Genitourinary: Negative  Musculoskeletal: Positive for gait problem (on stretcher)  Skin: Negative  Negative for wound  Allergic/Immunologic: Negative      Neurological: Negative for facial asymmetry, speech difficulty, weakness and numbness  Hematological: Negative  Psychiatric/Behavioral: Positive for confusion  Objective:      /64 (BP Location: Right arm, Patient Position: Supine, Cuff Size: Standard)   Pulse 71   Temp 98 °F (36 7 °C)   SpO2 98%          Physical Exam  Cardiovascular:      Pulses:           Dorsalis pedis pulses are 0 on the right side and 2+ on the left side  Posterior tibial pulses are 0 on the right side and 0 on the left side  Heart sounds: Murmur heard  Pulmonary:      Effort: No respiratory distress  Breath sounds: No wheezing or rales  Skin:     Comments: Slight redness of the L heel without skin breakdown   Psychiatric:         Cognition and Memory: Cognition is impaired  Memory is impaired  Comments: Oriented to person only; knows she is at the doctor but no details of where or why           I have reviewed and made appropriate changes to the review of systems input by the medical assistant      Vitals:    02/14/23 1129 02/14/23 1132   BP: 122/68 118/64   BP Location: Left arm Right arm   Patient Position: Supine Supine   Cuff Size: Standard Standard   Pulse: 71    Temp: 98 °F (36 7 °C)    SpO2: 98%        Patient Active Problem List   Diagnosis   • Closed fracture of body of sternum with routine healing   • Acquired hypothyroidism   • CAD (coronary artery disease)   • Forgetfulness   • Acute pain due to trauma   • Hx of fall   • Stress incontinence in female   • DARRELL (acute kidney injury) ([facility] [address] )   • Perforated appendicitis   • Chronic combined systolic and diastolic congestive heart failure (HCC)   • Pyuria   • Microscopic hematuria   • Vitamin D insufficiency   • Mitral valve insufficiency   • Hypercholesterolemia   • Aortic atherosclerosis ([facility])   • Renal calculus   • Diverticulosis   • Hiatal hernia   • Pulmonary nodule   • Leg swelling   • Paroxysmal atrial fibrillation (HCC)   • Ischemic cardiomyopathy   • S/P implantation of automatic cardioverter/defibrillator (AICD)   • Essential hypertension   • History of PTCA   • Iron deficiency anemia   • Diverticulitis   • Rectal bleeding   • Elevated troponin   • Acute blood loss anemia   • Acute on chronic anemia   • Benign hypertensive renal disease   • Gastroesophageal reflux disease without esophagitis   • Metabolic encephalopathy   • Gastrointestinal bleeding   • Hydroureteronephrosis   • Dementia (HCC)   • Low TSH level   • COVID-19 virus infection   • Hypophosphatemia   • Pulmonary hypertension (HCC)   • Secondary renal hyperparathyroidism ([address] )   • Hyperuricemia   • Proteinuria   • Goals of care, counseling/discussion   • Elevated d-dimer   • Right internal carotid artery aneurysm   • Internal carotid artery stent present   • Acute cystitis without hematuria   • Acute on chronic blood loss anemia   • Stage 3b chronic kidney disease (HCC)   • Hemoptysis   • Urinary retention   • Asymptomatic bacteriuria   • Asymptomatic stenosis of left carotid artery       Past Surgical History:   Procedure Laterality Date   • ANGIOPLASTY     • BREAST SURGERY      mastectomy left, par on right   • CARDIAC DEFIBRILLATOR PLACEMENT      2015 has had for 12 yrs   • CARDIAC SURGERY      Pt has 2 stents in heart, and 1 carotid artery  • CHOLECYSTECTOMY     • COLONOSCOPY     • FACIAL/NECK BIOPSY N/A 7/19/2018    Procedure: REMOVE NASAL LESION, FROZEN SECTION;  Surgeon: John Tay MD;  Location: [address] [place] [address];  Service: Plastics   • HYSTERECTOMY      total   • INSERT / Gayathri Drape / Kacey Sero      2015   • KNEE ARTHROSCOPY      Pt does not remember which knee  • MASTECTOMY      right partial, left total   • WI ESOPHAGOGASTRODUODENOSCOPY TRANSORAL DIAGNOSTIC N/A 2/14/2017    Procedure: ESOPHAGOGASTRODUODENOSCOPY (EGD) with bx;  Surgeon: Jeanette Abreu MD;  Location: [facility];   Service: Gastroenterology   • WI SPLIT AGRFT F/S/N/H/F/G/M/D GT 1ST 100 CM/</1 % N/A 7/19/2018    Procedure: full thickness skin graft taken from right neck; Surgeon: Kitty Chauhan MD;  Location: [address] [place] [address] OR;  Service: Plastics   • TONSILLECTOMY         Family History   Problem Relation Age of Onset   • Lymphoma Mother    • Cancer Mother    • Stroke Father        Social History     Socioeconomic History   • Marital status: /Civil Union     Spouse name: Not on file   • Number of children: Not on file   • Years of education: Not on file   • Highest education level: Not on file   Occupational History   • Not on file   Tobacco Use   • Smoking status: Former   • Smokeless tobacco: Never   Vaping Use   • Vaping Use: Never used   Substance and Sexual Activity   • Alcohol use: Not Currently     Comment: rarely   • Drug use: No   • Sexual activity: Not Currently     Partners: Male   Other Topics Concern   • Not on file   Social History Narrative   • Not on file     Social Determinants of Health     Financial Resource Strain: Not on file   Food Insecurity: No Food Insecurity   • Worried About Running Out of Food in the Last Year: Never true   • Ran Out of Food in the Last Year: Never true   Transportation Needs: No Transportation Needs   • Lack of Transportation (Medical): No   • Lack of Transportation (Non-Medical): No   Physical Activity: Not on file   Stress: Not on file   Social Connections: Not on file   Intimate Partner Violence: Not on file   Housing Stability: Low Risk    • Unable to Pay for Housing in the Last Year: No   • Number of Places Lived in the Last Year: 1   • Unstable Housing in the Last Year: No       Allergies   Allergen Reactions   • Other Dermatitis     Pt states is allergic to adhesive tape  • Statins Other (See Comments)     Pt experiences severe leg weakness and cramping  • Shrimp (Diagnostic) - Food Allergy Swelling     Pt states lips and mouth swells     • Shrimp Extract Allergy Skin Test - Food Allergy Other (See Comments)     Lips swell     • Ezetimibe Other (See Comments)     shellfish  shellfish           Current Outpatient Medications: •  acetaminophen (TYLENOL) 325 mg tablet, Take 2 tablets (650 mg total) by mouth every 6 (six) hours as needed for mild pain, fever or headaches Do not exceed a total of 3 grams of tylenol/acetaminophen in a 24-hour period  , Disp: , Rfl: 0  •  allopurinol (ZYLOPRIM) 100 mg tablet, Take 100 mg by mouth daily, Disp: , Rfl:   •  amLODIPine (NORVASC) 10 mg tablet, Take 1 tablet (10 mg total) by mouth daily Hold for SBP<120 mmHg Do not start before October 5, 2022 , Disp: , Rfl: 0  •  cholecalciferol (VITAMIN D3) 1,000 units tablet, Take 1 tablet (1,000 Units total) by mouth daily Do not start before October 5, 2022 , Disp: , Rfl: 0  •  cyanocobalamin (VITAMIN B-12) 1000 MCG tablet, Take 1 tablet (1,000 mcg total) by mouth daily Do not start before January 27, 2023 , Disp: , Rfl: 0  •  docusate sodium (COLACE) 100 mg capsule, Take 1 capsule (100 mg total) by mouth 2 (two) times a day Hold for diarrhea or loose stools, Disp: , Rfl: 0  •  hydrocortisone (ANUSOL-HC) 25 mg suppository, Insert 1 suppository (25 mg total) into the rectum 2 (two) times a day, Disp: 12 suppository, Rfl: 0  •  levothyroxine 175 mcg tablet, Take 1 tablet (175 mcg total) by mouth daily in the early morning Do not start before February 4, 2023 , Disp: 30 tablet, Rfl: 0  •  metoprolol succinate (TOPROL-XL) 100 mg 24 hr tablet, Take 100 mg by mouth daily Hold for HR less than 60 and SBP less than 110, Disp: , Rfl:   •  pantoprazole (PROTONIX) 40 mg tablet, Take 1 tablet (40 mg total) by mouth daily in the early morning Do not start before January 27, 2023 , Disp: , Rfl: 0  •  polyethylene glycol (MIRALAX) 17 g packet, Take 17 g by mouth daily as needed (constipation), Disp: , Rfl: 0  •  sertraline (ZOLOFT) 100 mg tablet, sertraline 100 mg tablet, Disp: , Rfl:   •  sodium bicarbonate 650 mg tablet, Take 1 tablet (650 mg total) by mouth 3 (three) times daily after meals, Disp: , Rfl: 0  •  ascorbic acid (VITAMIN C) 1000 MG tablet, Take by mouth (Patient not taking: Reported on 2/14/2023), Disp: , Rfl:   •  co-enzyme Q-10 100 mg capsule, Take by mouth (Patient not taking: Reported on 2/14/2023), Disp: , Rfl:   •  Iron Combinations (IRON COMPLEX PO), Take by mouth (Patient not taking: Reported on 2/14/2023), Disp: , Rfl:   •  Multiple Vitamins-Minerals (MULTI COMPLETE/IRON PO), Take by mouth (Patient not taking: Reported on 2/14/2023), Disp: , Rfl:   •  sertraline (ZOLOFT) 100 mg tablet, Take 100 mg by mouth daily, Disp: , Rfl:   •  spironolactone (ALDACTONE) 25 mg tablet, Take by mouth (Patient not taking: Reported on 2/14/2023), Disp: , Rfl:   •  ticagrelor (BRILINTA) 90 MG, Take by mouth (Patient not taking: Reported on 2/14/2023), Disp: , Rfl:   No current facility-administered medications for this visit

## 2023-02-14 NOTE — UTILIZATION REVIEW
NOTIFICATION OF ADMISSION DISCHARGE   This is a Notification of Discharge from [address] [place] [address]  Please be advised that this patient has been discharge from our facility  Below you will find the admission and discharge date and time including the patient’s disposition  UTILIZATION REVIEW CONTACT:  [address] [place]  Utilization   Network Utilization Review Department  Phone: [phone] x carefully listen to the prompts  All voicemails are confidential   Email: [email or web address]     ADMISSION INFORMATION  PRESENTATION DATE: 2/7/2023  2:48 PM  OBERVATION ADMISSION DATE:   INPATIENT ADMISSION DATE: 2/7/23  4:45 PM   DISCHARGE DATE: 2/13/2023  1:23 PM   DISPOSITION:Non [facility] SNF/TCU/SNU    IMPORTANT INFORMATION:  Send all requests for admission clinical reviews, approved or denied determinations and any other requests to dedicated fax number below belonging to the campus where the patient is receiving treatment   List of dedicated fax numbers:  [address] DENIALS (Administrative/Medical Necessity) [phone]   [address] (Maternity/NICU/Pediatrics) [phone]   [facility] [phone]   [place] [address] [phone]   [address] [phone]   [address] [facility] [phone]   [address] [phone]   [address] [place] [address] 119 [phone]   [facility]  [phone]   [address] [facility] [phone] [address] [facility] [address] [facility] [phone]

## 2023-02-21 ENCOUNTER — OFFICE VISIT (OUTPATIENT)
Dept: NEPHROLOGY | Facility: CLINIC | Age: 78
End: 2023-02-21

## 2023-02-21 VITALS — SYSTOLIC BLOOD PRESSURE: 118 MMHG | DIASTOLIC BLOOD PRESSURE: 70 MMHG | HEART RATE: 69 BPM | OXYGEN SATURATION: 95 %

## 2023-02-21 DIAGNOSIS — R80.9 PROTEINURIA, UNSPECIFIED TYPE: ICD-10-CM

## 2023-02-21 DIAGNOSIS — I50.42 CHRONIC COMBINED SYSTOLIC AND DIASTOLIC CONGESTIVE HEART FAILURE (HCC): ICD-10-CM

## 2023-02-21 DIAGNOSIS — I10 ESSENTIAL HYPERTENSION: ICD-10-CM

## 2023-02-21 DIAGNOSIS — E87.20 METABOLIC ACIDOSIS: ICD-10-CM

## 2023-02-21 DIAGNOSIS — N18.4 STAGE 4 CHRONIC KIDNEY DISEASE (HCC): Primary | ICD-10-CM

## 2023-02-21 DIAGNOSIS — N30.00 ACUTE CYSTITIS WITHOUT HEMATURIA: ICD-10-CM

## 2023-02-21 RX ORDER — SODIUM BICARBONATE 650 MG/1
650 TABLET ORAL 2 TIMES DAILY
Qty: 60 TABLET | Refills: 3 | Status: SHIPPED | OUTPATIENT
Start: 2023-02-21

## 2023-02-21 RX ORDER — AMLODIPINE BESYLATE 5 MG/1
5 TABLET ORAL DAILY
Qty: 90 TABLET | Refills: 1 | Status: SHIPPED | OUTPATIENT
Start: 2023-02-21

## 2023-02-21 NOTE — ASSESSMENT & PLAN NOTE
Wt Readings from Last 3 Encounters:   02/07/23 63 5 kg (139 lb 15 9 oz)   02/03/23 61 5 kg (135 lb 9 3 oz)   01/27/23 65 9 kg (145 lb 4 5 oz)   Currently examines euvolemic  Unable to obtain weight due to stretcher use today  Appears spironolactone on hold given hypotension  Continues on beta-blocker 100 mg daily  I will actually reduce amlodipine from 10 mg to 5 mg daily given systolic in the 160G  This may allow room for diuretic therapy, if it is required, although patient has no lower extremity edema today  She should keep to a 2 g dietary sodium restriction and about 2 L fluid restriction

## 2023-02-21 NOTE — ASSESSMENT & PLAN NOTE
Lab Results   Component Value Date    EGFR 23 02/11/2023    EGFR 21 02/09/2023    EGFR 21 02/08/2023    CREATININE 1 99 (H) 02/11/2023    CREATININE 2 14 (H) 02/09/2023    CREATININE 2 18 (H) 02/08/2023   Thankfully, renal function remained stable at baseline despite GI bleed  Given hypotension at this time, will reduce amlodipine from 10 mg to 5 mg daily  Continue to optimize hemodynamics, volume status and monitor renal function  Avoid nephrotoxins and monitor for possible urinary retention

## 2023-02-21 NOTE — PROGRESS NOTES
Assessment & Plan:    1  Stage 4 chronic kidney disease [facility])  Assessment & Plan:  Lab Results   Component Value Date    EGFR 23 02/11/2023    EGFR 21 02/09/2023    EGFR 21 02/08/2023    CREATININE 1 99 (H) 02/11/2023    CREATININE 2 14 (H) 02/09/2023    CREATININE 2 18 (H) 02/08/2023   Thankfully, renal function remained stable at baseline despite GI bleed  Given hypotension at this time, will reduce amlodipine from 10 mg to 5 mg daily  Continue to optimize hemodynamics, volume status and monitor renal function  Avoid nephrotoxins and monitor for possible urinary retention  Orders:  -     CBC and differential; Future; Expected date: 02/21/2023  -     Comprehensive metabolic panel; Future  -     Magnesium; Future  -     Microalbumin / creatinine urine ratio  -     Phosphorus; Future  -     Protein / creatinine ratio, urine  -     PTH, intact; Future  -     Urinalysis with microscopic    2  Essential hypertension  Assessment & Plan:  Tending towards hypotension with systolic 757  Currently on amlodipine 10 mg daily metoprolol succinate 100 mg daily  Spironolactone 25 mg daily has been on hold  Will reduce amlodipine from 10 mg daily to 5 mg daily given hypotension  She examines euvolemic at this time, monitor volume status for possible need for diuretic initiation given CHF history  Orders:  -     amLODIPine (NORVASC) 5 mg tablet; Take 1 tablet (5 mg total) by mouth daily Hold for SBP<120 mmHg    3  Chronic combined systolic and diastolic congestive heart failure [facility])  Assessment & Plan:  Wt Readings from Last 3 Encounters:   02/07/23 63 5 kg (139 lb 15 9 oz)   02/03/23 61 5 kg (135 lb 9 3 oz)   01/27/23 65 9 kg (145 lb 4 5 oz)   Currently examines euvolemic  Unable to obtain weight due to stretcher use today  Appears spironolactone on hold given hypotension  Continues on beta-blocker 100 mg daily  I will actually reduce amlodipine from 10 mg to 5 mg daily given systolic in the 616G    This may allow room for diuretic therapy, if it is required, although patient has no lower extremity edema today  She should keep to a 2 g dietary sodium restriction and about 2 L fluid restriction  4  Proteinuria, unspecified type  Assessment & Plan:  Followed off ACE/ARB given advanced renal disease  Orders:  -     Microalbumin / creatinine urine ratio  -     Protein / creatinine ratio, urine    5  Acute cystitis without hematuria  -     sodium bicarbonate 650 mg tablet; Take 1 tablet (650 mg total) by mouth 2 (two) times a day    6  Metabolic acidosis  Assessment & Plan:  Most recent CO2 is 26 mmol/L  I will reduce sodium bicarbonate supplementation from 600 mg 3 times daily to twice daily to reduce sodium load  Orders:  -     Comprehensive metabolic panel; Future       The benefits, risks and alternatives to the treatment plan were discussed at this visit  Patient was advised of common adverse effects of any medical therapies prescribed  All questions were answered and discussed with the patient and any accompanying family members or caretakers  Subjective:      Patient ID: Funmilayo Curiel is a 65 y o  female seen in the [place] office  HPI    Today, patient presents for follow-up of hospitalization  patient was hospitalized [facility] from 2/7/2023 through 2/13/2023 for acute on chronic blood loss anemia, COVID-19 infection  Nephrology was to consult during this hospitalization  Patient was seen by nephrology during prior hospitalization 1/19/2023 through 1/26/2023 for stable chronic kidney disease  Blood pressure is 118/70 HR 69  Patient does monitor blood pressures at home and reports  Denies headaches, lightheadedness, dizziness  Patient reports adherence with antihypertensive regimen and denies adverse effects: Amlodipine 10 mg daily, Toprol succinate 100 mg daily  Patient not taking spironolactone 25 mg daily    Patient denies lower extremity swelling  Reviewed and discussed the results of labs performed 2/11/2023 which reveals that renal function remained stable at baseline with creatinine 1 99 mg per dose with estimated GFR 23 mL/min  History is obtained from patient  The following portions of the patient's history were reviewed and updated as appropriate: allergies, current medications, past family history, past medical history, past social history, past surgical history, and problem list     Review of Systems   Constitutional: Negative for activity change, appetite change, chills, fatigue, fever and unexpected weight change  Respiratory: Negative for apnea, cough and shortness of breath  Cardiovascular: Negative for chest pain, palpitations and leg swelling  Gastrointestinal: Negative for abdominal pain, blood in stool, constipation, diarrhea, nausea and vomiting  Genitourinary: Negative for decreased urine volume, difficulty urinating, dysuria, flank pain, frequency, hematuria and urgency  Musculoskeletal: Negative for arthralgias, back pain, joint swelling and myalgias  Skin: Negative for color change and rash  Allergic/Immunologic: Negative for immunocompromised state  Neurological: Negative for dizziness, seizures, syncope, weakness, light-headedness, numbness and headaches  Hematological: Negative for adenopathy  Does not bruise/bleed easily  Psychiatric/Behavioral: Negative for agitation, behavioral problems, confusion, decreased concentration, dysphoric mood and hallucinations  The patient is not nervous/anxious and is not hyperactive  All other systems reviewed and are negative  Objective:      /70 (BP Location: Right arm, Patient Position: Supine, Cuff Size: Standard)   Pulse 69   SpO2 95%          Physical Exam  Vitals and nursing note reviewed  Exam conducted with a chaperone present  Constitutional:       General: She is not in acute distress  Appearance: Normal appearance   She is normal weight  She is not ill-appearing or toxic-appearing  Comments: On stretcher transport   HENT:      Head: Normocephalic and atraumatic  Nose: Nose normal  No congestion or rhinorrhea  Mouth/Throat:      Mouth: Mucous membranes are moist       Pharynx: Oropharynx is clear  Eyes:      General: No scleral icterus  Right eye: No discharge  Left eye: No discharge  Extraocular Movements: Extraocular movements intact  Conjunctiva/sclera: Conjunctivae normal       Pupils: Pupils are equal, round, and reactive to light  Cardiovascular:      Rate and Rhythm: Normal rate and regular rhythm  Pulses: Normal pulses  Heart sounds: Normal heart sounds  No murmur heard  Pulmonary:      Effort: Pulmonary effort is normal  No respiratory distress  Breath sounds: Normal breath sounds  No wheezing  Abdominal:      General: Abdomen is flat  Bowel sounds are normal  There is no distension  Palpations: Abdomen is soft  There is no mass  Tenderness: There is no abdominal tenderness  Musculoskeletal:         General: No swelling or deformity  Normal range of motion  Cervical back: Normal range of motion and neck supple  No rigidity or tenderness  Skin:     General: Skin is warm and dry  Coloration: Skin is not jaundiced or pale  Findings: No bruising  Neurological:      General: No focal deficit present  Mental Status: She is alert and oriented to person, place, and time  Mental status is at baseline  Psychiatric:         Mood and Affect: Mood normal          Behavior: Behavior normal          Thought Content:  Thought content normal          Judgment: Judgment normal              Lab Results   Component Value Date     04/11/2018    SODIUM 136 02/11/2023    K 3 7 02/11/2023     02/11/2023    CO2 26 02/11/2023    ANIONGAP 15 2 04/11/2018    AGAP 9 02/11/2023    BUN 52 (H) 02/11/2023    CREATININE 1 99 (H) 02/11/2023    GLUC 68 02/11/2023    GLUF 96 06/27/2022    CALCIUM 8 5 02/11/2023    AST 17 02/08/2023    ALT 7 02/08/2023    ALKPHOS 95 02/08/2023    PROT 7 1 04/11/2018    TP 5 8 (L) 02/08/2023    BILITOT 0 4 04/11/2018    TBILI 0 64 02/08/2023    EGFR 23 02/11/2023      Lab Results   Component Value Date    CREATININE 1 99 (H) 02/11/2023    CREATININE 2 14 (H) 02/09/2023    CREATININE 2 18 (H) 02/08/2023    CREATININE 2 18 (H) 02/07/2023    CREATININE 2 12 (H) 02/03/2023    CREATININE 2 11 (H) 02/02/2023    CREATININE 2 16 (H) 02/01/2023    CREATININE 2 18 (H) 01/31/2023    CREATININE 1 98 (H) 01/27/2023    CREATININE 1 81 (H) 01/26/2023    CREATININE 1 93 (H) 01/25/2023    CREATININE 1 86 (H) 01/24/2023    CREATININE 1 85 (H) 01/23/2023    CREATININE 1 80 (H) 01/22/2023    CREATININE 1 79 (H) 01/21/2023      Lab Results   Component Value Date    COLORU Yellow 02/07/2023    CLARITYU Cloudy 02/07/2023    SPECGRAV 1 010 02/07/2023    PHUR 7 0 02/07/2023    LEUKOCYTESUR Large (A) 02/07/2023    NITRITE Negative 02/07/2023    PROTEIN  (2+) (A) 02/07/2023    GLUCOSEU Negative 02/07/2023    KETONESU Negative 02/07/2023    UROBILINOGEN 0 2 02/07/2023    BILIRUBINUR Negative 02/07/2023    BLOODU Large (A) 02/07/2023    RBCUA Innumerable (A) 02/07/2023    WBCUA Innumerable (A) 02/07/2023    EPIS Occasional 02/07/2023    BACTERIA Occasional 02/07/2023      No results found for: LABPROT  No results found for: Milюлияa Ice  Lab Results   Component Value Date    WBC 7 75 02/13/2023    HGB 10 2 (L) 02/13/2023    HCT 33 9 (L) 02/13/2023    MCV 92 02/13/2023     02/13/2023      Lab Results   Component Value Date    HGB 10 2 (L) 02/13/2023    HGB 7 5 (L) 02/12/2023    HGB 8 4 (L) 02/11/2023    HGB 8 0 (L) 02/10/2023    HGB 8 5 (L) 02/09/2023      Lab Results   Component Value Date    IRON 41 (L) 02/07/2023    TIBC 314 02/07/2023    FERRITIN 192 02/07/2023      No results found for: PTHCALCIUM, [id number], PHOSPHORUS   Lab Results Component Value Date    CHOLESTEROL 130 01/19/2023    HDL 20 (L) 01/19/2023    LDLCALC 78 01/19/2023    TRIG 160 (H) 01/19/2023      Lab Results   Component Value Date    URICACID 4 6 09/22/2022      Lab Results   Component Value Date    HGBA1C 5 1 01/19/2023      Lab Results   Component Value Date    FREET4 0 74 (L) 02/07/2023      No results found for: ERSNT, DSDNAAB, RFIGM   Lab Results   Component Value Date    PROT 7 1 04/11/2018        Portions of the record may have been created with voice recognition software  Occasional wrong word or "sound a like" substitutions may have occurred due to the inherent limitations of voice recognition software  Read the chart carefully and recognize, using context, where substitutions have occurred  If you have any questions, please contact the dictating provider

## 2023-02-21 NOTE — ASSESSMENT & PLAN NOTE
Tending towards hypotension with systolic 059  Currently on amlodipine 10 mg daily metoprolol succinate 100 mg daily  Spironolactone 25 mg daily has been on hold  Will reduce amlodipine from 10 mg daily to 5 mg daily given hypotension  She examines euvolemic at this time, monitor volume status for possible need for diuretic initiation given CHF history

## 2023-02-21 NOTE — ASSESSMENT & PLAN NOTE
Most recent CO2 is 26 mmol/L  I will reduce sodium bicarbonate supplementation from 600 mg 3 times daily to twice daily to reduce sodium load

## 2023-03-22 ENCOUNTER — TELEPHONE (OUTPATIENT)
Dept: NEPHROLOGY | Facility: CLINIC | Age: 78
End: 2023-03-22

## 2023-03-22 NOTE — TELEPHONE ENCOUNTER
Patient is being transferred to hospice care. [facility] called to cancel her upcoming appointment.

## 2023-03-25 PROBLEM — N30.00 ACUTE CYSTITIS WITHOUT HEMATURIA: Status: RESOLVED | Noted: 2023-01-21 | Resolved: 2023-03-25

## 2023-06-07 NOTE — ASSESSMENT & PLAN NOTE
· Doing well post-hospitalization  · Completed course of PO abx  · Follow up with Cardiologist  · Repeat CTAP w/ ORAL contrast as part of planning for interval appendectomy  Patient consented here in the office pending scan and Cardiology clearance    · Hold Eliquis 2 days prior to planned procedure  · Can continue ASA Class II - visualization of the soft palate, fauces, and uvula

## 2023-07-11 NOTE — CONSULTS
Consult d/t stroke pathway  Full nutrition assessment viewable in the nutrition documentation  Patient assessed at moderate nutrition risk d/t abnormal lipid profile (, HDL 26, )  Patient noted she has recently been watching her diet and did lose wt  Provided her with and reviewed handout Heart Healthy - Reduced Sodium Nutrition Therapy  Also provided her with RD contact information  SLP recommended regular diet texture/consistency  RD adjusted current low Na diet to cardiac, low- Na d/t stroke pathway and labs  Will follow's patient's status  Thank you for the consult  Cibinqo Pregnancy And Lactation Text: It is unknown if this medication will adversely affect pregnancy or breast feeding.  You should not take this medication if you are currently pregnant or planning a pregnancy or while breastfeeding.

## 2023-08-01 NOTE — ASSESSMENT & PLAN NOTE
TSH markedly low at 0 05, although checked during acute illness  Levothyroxine dosing decreased to 125 mcg, with recommendations for repeat TSH in 4-6 weeks  90

## 2024-04-05 NOTE — NURSING NOTE
Discharge instructions reviewed with both patient and   Both verbalized understanding  Patient ambulated to bathroom and voided without difficulty 
Received patient from PACU awake and alert  VSS  Denies pain  Tolerating liquids and toast now    at bedside
at home:

## 2024-12-10 NOTE — ASSESSMENT & PLAN NOTE
Presents to ED with weakness, hemoptysis yesterday with large blood clot, Hemoccult positive as well  History of the same presentation  She received transfusion currently hemoglobin is stable  • Iron panel ordered anemia of chronic disease mild iron deficiency  • B12 ordered, was previously deficient and started on B12 supplement  • Blood consent obtained by ER, will re-consent with patient's daughter, placed in chart  • Transfuse if Hgb <7  o Recent history of transfusion during admission 1/19 - 1/26, a 1 unit PRBC   o Ordered additional unit     • So I had a discussion today with the patient in front of the daughter and the patient has agreed to undergo the prep and do an EGD colonoscopy tomorrow  Hemoglobin continues to improve continue to hold anticoagulation as discussed with the patient is clinically difficult to restart antiplatelet and Eliquis if no investigation is done of the GI system but she agreed she is on clears now I placed her n p o  after midnight I did place I discussed with GI that the patient and the daughter are in agreement and to place bowel prep    GI is yet to see    Lab Results   Component Value Date    HGB 8 5 (L) 02/09/2023    HGB 8 1 (L) 02/08/2023     · In the past patient has refused EGD/colonoscopy -is also COVID-positive on day 7  · Stool records at bedside, hold off on further occult stool testing (was positive in the ER)  · We will hold off on GI consult at this time until patient agreeable for scope  · Consult pulmonology for hemoptysis  · Er stated hemocult positive will order one  · Hb base >8

## (undated) DEVICE — DRESSING XEROFORM 5 X 9

## (undated) DEVICE — SUT MONOCRYL 3-0 PS-2 27 IN Y427H

## (undated) DEVICE — NEEDLE BLUNT 18 G X 1 1/2IN

## (undated) DEVICE — X-RAY DETECTABLE SPONGES,16 PLY: Brand: VISTEC

## (undated) DEVICE — COTTON BALLS: Brand: DEROYAL

## (undated) DEVICE — BLADE ELECTRO MODEL B DISP

## (undated) DEVICE — TIBURON SPLIT SHEET: Brand: CONVERTORS

## (undated) DEVICE — NDL CNTR 20CT FM MAG: Brand: MEDLINE INDUSTRIES, INC.

## (undated) DEVICE — PISTOL GRIP SKIN STAPLER: Brand: MULTIFIRE PREMIUM

## (undated) DEVICE — BASIC PACK: Brand: CONVERTORS

## (undated) DEVICE — INTENDED FOR TISSUE SEPARATION, AND OTHER PROCEDURES THAT REQUIRE A SHARP SURGICAL BLADE TO PUNCTURE OR CUT.: Brand: BARD-PARKER SAFETY BLADES SIZE 10, STERILE

## (undated) DEVICE — NEEDLE 25G X 1 1/2

## (undated) DEVICE — SKIN MARKER DUAL TIP WITH RULER CAP, FLEXIBLE RULER AND LABELS: Brand: DEVON

## (undated) DEVICE — SPONGE LAP STERILE 18X18

## (undated) DEVICE — STERILE POLYISOPRENE POWDER-FREE SURGICAL GLOVES: Brand: PROTEXIS

## (undated) DEVICE — ASTOUND STANDARD SURGICAL GOWN, XL: Brand: CONVERTORS

## (undated) DEVICE — CABLE BIPOLAR DISP MEGADYNE

## (undated) DEVICE — STERILE POLYISOPRENE POWDER-FREE SURGICAL GLOVES WITH EMOLLIENT COATING: Brand: PROTEXIS

## (undated) DEVICE — INTENDED FOR TISSUE SEPARATION, AND OTHER PROCEDURES THAT REQUIRE A SHARP SURGICAL BLADE TO PUNCTURE OR CUT.: Brand: BARD-PARKER SAFETY BLADES SIZE 15, STERILE

## (undated) DEVICE — MEDIUM RAYON BALLS STERILE 3/PKG: Brand: CENTURION

## (undated) DEVICE — 4-PORT MANIFOLD: Brand: NEPTUNE 2

## (undated) DEVICE — GAUZE SPONGES,16 PLY: Brand: CURITY

## (undated) DEVICE — SYRINGE 10ML LL CONTROL TOP

## (undated) DEVICE — SYRINGE BULB 50/CS 48/PLT: Brand: MEDEGEN MEDICAL PRODUCTS, LLC

## (undated) DEVICE — ADHESIVE SKN CLSR HISTOACRYL FLEX 0.5ML LF

## (undated) DEVICE — TUBING SUCTION 5MM X 12 FT

## (undated) DEVICE — SYRINGE 30ML LL

## (undated) DEVICE — SINGLE-USE BIOPSY FORCEPS: Brand: RADIAL JAW 4

## (undated) DEVICE — SUT SILK 4-0 G-3 789G

## (undated) DEVICE — ABDOMINAL PAD: Brand: DERMACEA

## (undated) DEVICE — SUT SILK 3-0 FS-1 684G